# Patient Record
Sex: FEMALE | Race: WHITE | NOT HISPANIC OR LATINO | Employment: OTHER | URBAN - METROPOLITAN AREA
[De-identification: names, ages, dates, MRNs, and addresses within clinical notes are randomized per-mention and may not be internally consistent; named-entity substitution may affect disease eponyms.]

---

## 2017-01-25 ENCOUNTER — ALLSCRIPTS OFFICE VISIT (OUTPATIENT)
Dept: OTHER | Facility: OTHER | Age: 82
End: 2017-01-25

## 2017-03-13 ENCOUNTER — ALLSCRIPTS OFFICE VISIT (OUTPATIENT)
Dept: OTHER | Facility: OTHER | Age: 82
End: 2017-03-13

## 2017-03-13 DIAGNOSIS — E78.5 HYPERLIPIDEMIA: ICD-10-CM

## 2017-03-13 DIAGNOSIS — E74.39 OTHER DISORDERS OF INTESTINAL CARBOHYDRATE ABSORPTION: ICD-10-CM

## 2017-03-13 DIAGNOSIS — E55.9 VITAMIN D DEFICIENCY: ICD-10-CM

## 2017-03-13 DIAGNOSIS — I44.2 ATRIOVENTRICULAR BLOCK, COMPLETE (HCC): ICD-10-CM

## 2017-03-13 DIAGNOSIS — R55 SYNCOPE AND COLLAPSE: ICD-10-CM

## 2017-03-13 DIAGNOSIS — I21.3 ST ELEVATION (STEMI) MYOCARDIAL INFARCTION (HCC): ICD-10-CM

## 2017-03-13 DIAGNOSIS — I25.10 ATHEROSCLEROTIC HEART DISEASE OF NATIVE CORONARY ARTERY WITHOUT ANGINA PECTORIS: ICD-10-CM

## 2017-03-13 DIAGNOSIS — D50.9 IRON DEFICIENCY ANEMIA: ICD-10-CM

## 2017-03-13 DIAGNOSIS — I10 ESSENTIAL (PRIMARY) HYPERTENSION: ICD-10-CM

## 2017-03-20 ENCOUNTER — GENERIC CONVERSION - ENCOUNTER (OUTPATIENT)
Dept: OTHER | Facility: OTHER | Age: 82
End: 2017-03-20

## 2017-03-20 ENCOUNTER — APPOINTMENT (OUTPATIENT)
Dept: LAB | Facility: CLINIC | Age: 82
End: 2017-03-20
Payer: MEDICARE

## 2017-03-20 ENCOUNTER — TRANSCRIBE ORDERS (OUTPATIENT)
Dept: LAB | Facility: CLINIC | Age: 82
End: 2017-03-20

## 2017-03-20 DIAGNOSIS — E78.5 HYPERLIPIDEMIA: ICD-10-CM

## 2017-03-20 DIAGNOSIS — E74.39 OTHER DISORDERS OF INTESTINAL CARBOHYDRATE ABSORPTION: ICD-10-CM

## 2017-03-20 DIAGNOSIS — D50.9 IRON DEFICIENCY ANEMIA: ICD-10-CM

## 2017-03-20 DIAGNOSIS — E55.9 VITAMIN D DEFICIENCY: ICD-10-CM

## 2017-03-20 DIAGNOSIS — I10 ESSENTIAL (PRIMARY) HYPERTENSION: ICD-10-CM

## 2017-03-20 LAB
25(OH)D3 SERPL-MCNC: 27.8 NG/ML (ref 30–100)
ALBUMIN SERPL BCP-MCNC: 3.5 G/DL (ref 3.5–5)
ALP SERPL-CCNC: 61 U/L (ref 46–116)
ALT SERPL W P-5'-P-CCNC: 25 U/L (ref 12–78)
ANION GAP SERPL CALCULATED.3IONS-SCNC: 6 MMOL/L (ref 4–13)
AST SERPL W P-5'-P-CCNC: 14 U/L (ref 5–45)
BASOPHILS # BLD AUTO: 0.02 THOUSANDS/ΜL (ref 0–0.1)
BASOPHILS NFR BLD AUTO: 0 % (ref 0–1)
BILIRUB SERPL-MCNC: 0.78 MG/DL (ref 0.2–1)
BUN SERPL-MCNC: 17 MG/DL (ref 5–25)
CALCIUM SERPL-MCNC: 9.3 MG/DL (ref 8.3–10.1)
CHLORIDE SERPL-SCNC: 104 MMOL/L (ref 100–108)
CO2 SERPL-SCNC: 32 MMOL/L (ref 21–32)
CREAT SERPL-MCNC: 0.8 MG/DL (ref 0.6–1.3)
CREAT UR-MCNC: 69.2 MG/DL
EOSINOPHIL # BLD AUTO: 0.13 THOUSAND/ΜL (ref 0–0.61)
EOSINOPHIL NFR BLD AUTO: 2 % (ref 0–6)
ERYTHROCYTE [DISTWIDTH] IN BLOOD BY AUTOMATED COUNT: 14.4 % (ref 11.6–15.1)
EST. AVERAGE GLUCOSE BLD GHB EST-MCNC: 128 MG/DL
FERRITIN SERPL-MCNC: 51 NG/ML (ref 8–388)
GFR SERPL CREATININE-BSD FRML MDRD: >60 ML/MIN/1.73SQ M
GLUCOSE P FAST SERPL-MCNC: 131 MG/DL (ref 65–99)
HBA1C MFR BLD: 6.1 % (ref 4.2–6.3)
HCT VFR BLD AUTO: 48.2 % (ref 34.8–46.1)
HGB BLD-MCNC: 15.8 G/DL (ref 11.5–15.4)
IRON SERPL-MCNC: 119 UG/DL (ref 50–170)
LDLC SERPL DIRECT ASSAY-MCNC: 110 MG/DL (ref 0–100)
LYMPHOCYTES # BLD AUTO: 2.01 THOUSANDS/ΜL (ref 0.6–4.47)
LYMPHOCYTES NFR BLD AUTO: 37 % (ref 14–44)
MCH RBC QN AUTO: 29.6 PG (ref 26.8–34.3)
MCHC RBC AUTO-ENTMCNC: 32.8 G/DL (ref 31.4–37.4)
MCV RBC AUTO: 90 FL (ref 82–98)
MICROALBUMIN UR-MCNC: 24.4 MG/L (ref 0–20)
MICROALBUMIN/CREAT 24H UR: 35 MG/G CREATININE (ref 0–30)
MONOCYTES # BLD AUTO: 0.46 THOUSAND/ΜL (ref 0.17–1.22)
MONOCYTES NFR BLD AUTO: 9 % (ref 4–12)
NEUTROPHILS # BLD AUTO: 2.77 THOUSANDS/ΜL (ref 1.85–7.62)
NEUTS SEG NFR BLD AUTO: 52 % (ref 43–75)
NRBC BLD AUTO-RTO: 0 /100 WBCS
PLATELET # BLD AUTO: 331 THOUSANDS/UL (ref 149–390)
PMV BLD AUTO: 10.6 FL (ref 8.9–12.7)
POTASSIUM SERPL-SCNC: 4.3 MMOL/L (ref 3.5–5.3)
PROT SERPL-MCNC: 7.6 G/DL (ref 6.4–8.2)
RBC # BLD AUTO: 5.34 MILLION/UL (ref 3.81–5.12)
SODIUM SERPL-SCNC: 142 MMOL/L (ref 136–145)
TIBC SERPL-MCNC: 362 UG/DL (ref 250–450)
TRIGL SERPL-MCNC: 90 MG/DL
TSH SERPL DL<=0.05 MIU/L-ACNC: 1.07 UIU/ML (ref 0.36–3.74)
WBC # BLD AUTO: 5.4 THOUSAND/UL (ref 4.31–10.16)

## 2017-03-20 PROCEDURE — 80053 COMPREHEN METABOLIC PANEL: CPT

## 2017-03-20 PROCEDURE — 83540 ASSAY OF IRON: CPT

## 2017-03-20 PROCEDURE — 82728 ASSAY OF FERRITIN: CPT

## 2017-03-20 PROCEDURE — 84478 ASSAY OF TRIGLYCERIDES: CPT

## 2017-03-20 PROCEDURE — 84443 ASSAY THYROID STIM HORMONE: CPT

## 2017-03-20 PROCEDURE — 83036 HEMOGLOBIN GLYCOSYLATED A1C: CPT

## 2017-03-20 PROCEDURE — 82306 VITAMIN D 25 HYDROXY: CPT

## 2017-03-20 PROCEDURE — 82570 ASSAY OF URINE CREATININE: CPT

## 2017-03-20 PROCEDURE — 83550 IRON BINDING TEST: CPT

## 2017-03-20 PROCEDURE — 82043 UR ALBUMIN QUANTITATIVE: CPT

## 2017-03-20 PROCEDURE — 85025 COMPLETE CBC W/AUTO DIFF WBC: CPT

## 2017-03-20 PROCEDURE — 36415 COLL VENOUS BLD VENIPUNCTURE: CPT

## 2017-03-20 PROCEDURE — 83721 ASSAY OF BLOOD LIPOPROTEIN: CPT

## 2017-03-21 ENCOUNTER — GENERIC CONVERSION - ENCOUNTER (OUTPATIENT)
Dept: OTHER | Facility: OTHER | Age: 82
End: 2017-03-21

## 2017-04-07 ENCOUNTER — GENERIC CONVERSION - ENCOUNTER (OUTPATIENT)
Dept: OTHER | Facility: OTHER | Age: 82
End: 2017-04-07

## 2017-04-07 ENCOUNTER — APPOINTMENT (OUTPATIENT)
Dept: LAB | Facility: CLINIC | Age: 82
End: 2017-04-07
Payer: MEDICARE

## 2017-04-07 ENCOUNTER — ALLSCRIPTS OFFICE VISIT (OUTPATIENT)
Dept: OTHER | Facility: OTHER | Age: 82
End: 2017-04-07

## 2017-04-07 ENCOUNTER — TRANSCRIBE ORDERS (OUTPATIENT)
Dept: LAB | Facility: CLINIC | Age: 82
End: 2017-04-07

## 2017-04-07 DIAGNOSIS — I21.11 ST ELEVATION MYOCARDIAL INFARCTION INVOLVING RIGHT CORONARY ARTERY (HCC): Primary | ICD-10-CM

## 2017-04-07 DIAGNOSIS — I10 ESSENTIAL (PRIMARY) HYPERTENSION: ICD-10-CM

## 2017-04-07 DIAGNOSIS — I21.3 ST ELEVATION (STEMI) MYOCARDIAL INFARCTION (HCC): ICD-10-CM

## 2017-04-07 DIAGNOSIS — I25.10 ATHEROSCLEROTIC HEART DISEASE OF NATIVE CORONARY ARTERY WITHOUT ANGINA PECTORIS: ICD-10-CM

## 2017-04-07 DIAGNOSIS — R55 SYNCOPE AND COLLAPSE: ICD-10-CM

## 2017-04-07 DIAGNOSIS — I10 ESSENTIAL HYPERTENSION, BENIGN: ICD-10-CM

## 2017-04-07 DIAGNOSIS — I44.2 ATRIOVENTRICULAR BLOCK, COMPLETE (HCC): ICD-10-CM

## 2017-04-07 LAB
ALBUMIN SERPL BCP-MCNC: 3.2 G/DL (ref 3.5–5)
ALP SERPL-CCNC: 58 U/L (ref 46–116)
ALT SERPL W P-5'-P-CCNC: 29 U/L (ref 12–78)
ANION GAP SERPL CALCULATED.3IONS-SCNC: 3 MMOL/L (ref 4–13)
AST SERPL W P-5'-P-CCNC: 14 U/L (ref 5–45)
BASOPHILS # BLD AUTO: 0.03 THOUSANDS/ΜL (ref 0–0.1)
BASOPHILS NFR BLD AUTO: 0 % (ref 0–1)
BILIRUB SERPL-MCNC: 0.62 MG/DL (ref 0.2–1)
BUN SERPL-MCNC: 11 MG/DL (ref 5–25)
CALCIUM SERPL-MCNC: 9 MG/DL (ref 8.3–10.1)
CHLORIDE SERPL-SCNC: 102 MMOL/L (ref 100–108)
CO2 SERPL-SCNC: 33 MMOL/L (ref 21–32)
CREAT SERPL-MCNC: 0.83 MG/DL (ref 0.6–1.3)
EOSINOPHIL # BLD AUTO: 0.12 THOUSAND/ΜL (ref 0–0.61)
EOSINOPHIL NFR BLD AUTO: 2 % (ref 0–6)
ERYTHROCYTE [DISTWIDTH] IN BLOOD BY AUTOMATED COUNT: 14.1 % (ref 11.6–15.1)
GFR SERPL CREATININE-BSD FRML MDRD: >60 ML/MIN/1.73SQ M
GLUCOSE SERPL-MCNC: 105 MG/DL (ref 65–140)
HCT VFR BLD AUTO: 43.2 % (ref 34.8–46.1)
HGB BLD-MCNC: 13.8 G/DL (ref 11.5–15.4)
LYMPHOCYTES # BLD AUTO: 1.61 THOUSANDS/ΜL (ref 0.6–4.47)
LYMPHOCYTES NFR BLD AUTO: 21 % (ref 14–44)
MCH RBC QN AUTO: 28.7 PG (ref 26.8–34.3)
MCHC RBC AUTO-ENTMCNC: 31.9 G/DL (ref 31.4–37.4)
MCV RBC AUTO: 90 FL (ref 82–98)
MONOCYTES # BLD AUTO: 0.89 THOUSAND/ΜL (ref 0.17–1.22)
MONOCYTES NFR BLD AUTO: 11 % (ref 4–12)
NEUTROPHILS # BLD AUTO: 5.16 THOUSANDS/ΜL (ref 1.85–7.62)
NEUTS SEG NFR BLD AUTO: 66 % (ref 43–75)
NRBC BLD AUTO-RTO: 0 /100 WBCS
PLATELET # BLD AUTO: 412 THOUSANDS/UL (ref 149–390)
PMV BLD AUTO: 9.7 FL (ref 8.9–12.7)
POTASSIUM SERPL-SCNC: 4.2 MMOL/L (ref 3.5–5.3)
PROT SERPL-MCNC: 7.2 G/DL (ref 6.4–8.2)
RBC # BLD AUTO: 4.81 MILLION/UL (ref 3.81–5.12)
SODIUM SERPL-SCNC: 138 MMOL/L (ref 136–145)
WBC # BLD AUTO: 7.83 THOUSAND/UL (ref 4.31–10.16)

## 2017-04-07 PROCEDURE — 36415 COLL VENOUS BLD VENIPUNCTURE: CPT

## 2017-04-07 PROCEDURE — 85025 COMPLETE CBC W/AUTO DIFF WBC: CPT

## 2017-04-07 PROCEDURE — 80053 COMPREHEN METABOLIC PANEL: CPT

## 2017-05-03 ENCOUNTER — ALLSCRIPTS OFFICE VISIT (OUTPATIENT)
Dept: OTHER | Facility: OTHER | Age: 82
End: 2017-05-03

## 2017-05-20 ENCOUNTER — GENERIC CONVERSION - ENCOUNTER (OUTPATIENT)
Dept: OTHER | Facility: OTHER | Age: 82
End: 2017-05-20

## 2017-05-22 ENCOUNTER — GENERIC CONVERSION - ENCOUNTER (OUTPATIENT)
Dept: OTHER | Facility: OTHER | Age: 82
End: 2017-05-22

## 2017-05-31 ENCOUNTER — APPOINTMENT (OUTPATIENT)
Dept: LAB | Facility: CLINIC | Age: 82
End: 2017-05-31
Payer: MEDICARE

## 2017-05-31 ENCOUNTER — ALLSCRIPTS OFFICE VISIT (OUTPATIENT)
Dept: OTHER | Facility: OTHER | Age: 82
End: 2017-05-31

## 2017-05-31 DIAGNOSIS — R74.8 ABNORMAL LEVELS OF OTHER SERUM ENZYMES: ICD-10-CM

## 2017-05-31 DIAGNOSIS — J18.9 PNEUMONIA: ICD-10-CM

## 2017-05-31 DIAGNOSIS — R55 SYNCOPE AND COLLAPSE: ICD-10-CM

## 2017-05-31 DIAGNOSIS — N39.0 URINARY TRACT INFECTION: ICD-10-CM

## 2017-05-31 DIAGNOSIS — R79.89 OTHER SPECIFIED ABNORMAL FINDINGS OF BLOOD CHEMISTRY: ICD-10-CM

## 2017-05-31 DIAGNOSIS — R26.9 ABNORMALITY OF GAIT AND MOBILITY: ICD-10-CM

## 2017-05-31 LAB
ALBUMIN SERPL BCP-MCNC: 3.6 G/DL (ref 3.5–5)
ALP SERPL-CCNC: 49 U/L (ref 46–116)
ALT SERPL W P-5'-P-CCNC: 27 U/L (ref 12–78)
ANION GAP SERPL CALCULATED.3IONS-SCNC: 5 MMOL/L (ref 4–13)
AST SERPL W P-5'-P-CCNC: 18 U/L (ref 5–45)
BASOPHILS # BLD AUTO: 0.03 THOUSANDS/ΜL (ref 0–0.1)
BASOPHILS NFR BLD AUTO: 0 % (ref 0–1)
BILIRUB SERPL-MCNC: 0.81 MG/DL (ref 0.2–1)
BUN SERPL-MCNC: 9 MG/DL (ref 5–25)
CALCIUM SERPL-MCNC: 10.1 MG/DL (ref 8.3–10.1)
CHLORIDE SERPL-SCNC: 103 MMOL/L (ref 100–108)
CO2 SERPL-SCNC: 32 MMOL/L (ref 21–32)
CREAT SERPL-MCNC: 0.8 MG/DL (ref 0.6–1.3)
DEPRECATED D DIMER PPP: 498 NG/ML (FEU) (ref 0–424)
EOSINOPHIL # BLD AUTO: 0.04 THOUSAND/ΜL (ref 0–0.61)
EOSINOPHIL NFR BLD AUTO: 1 % (ref 0–6)
ERYTHROCYTE [DISTWIDTH] IN BLOOD BY AUTOMATED COUNT: 14.7 % (ref 11.6–15.1)
ERYTHROCYTE [SEDIMENTATION RATE] IN BLOOD: 9 MM/HOUR (ref 0–20)
GFR SERPL CREATININE-BSD FRML MDRD: >60 ML/MIN/1.73SQ M
GLUCOSE SERPL-MCNC: 139 MG/DL (ref 65–140)
HCT VFR BLD AUTO: 47.3 % (ref 34.8–46.1)
HGB BLD-MCNC: 15.6 G/DL (ref 11.5–15.4)
LYMPHOCYTES # BLD AUTO: 1.88 THOUSANDS/ΜL (ref 0.6–4.47)
LYMPHOCYTES NFR BLD AUTO: 22 % (ref 14–44)
MCH RBC QN AUTO: 29.6 PG (ref 26.8–34.3)
MCHC RBC AUTO-ENTMCNC: 33 G/DL (ref 31.4–37.4)
MCV RBC AUTO: 90 FL (ref 82–98)
MONOCYTES # BLD AUTO: 0.84 THOUSAND/ΜL (ref 0.17–1.22)
MONOCYTES NFR BLD AUTO: 10 % (ref 4–12)
NEUTROPHILS # BLD AUTO: 5.75 THOUSANDS/ΜL (ref 1.85–7.62)
NEUTS SEG NFR BLD AUTO: 67 % (ref 43–75)
NRBC BLD AUTO-RTO: 0 /100 WBCS
PLATELET # BLD AUTO: 404 THOUSANDS/UL (ref 149–390)
PMV BLD AUTO: 10 FL (ref 8.9–12.7)
POTASSIUM SERPL-SCNC: 3.9 MMOL/L (ref 3.5–5.3)
PROT SERPL-MCNC: 8.7 G/DL (ref 6.4–8.2)
RBC # BLD AUTO: 5.27 MILLION/UL (ref 3.81–5.12)
SODIUM SERPL-SCNC: 140 MMOL/L (ref 136–145)
WBC # BLD AUTO: 8.55 THOUSAND/UL (ref 4.31–10.16)

## 2017-05-31 PROCEDURE — 85379 FIBRIN DEGRADATION QUANT: CPT

## 2017-05-31 PROCEDURE — 36415 COLL VENOUS BLD VENIPUNCTURE: CPT

## 2017-05-31 PROCEDURE — 85025 COMPLETE CBC W/AUTO DIFF WBC: CPT

## 2017-05-31 PROCEDURE — 80053 COMPREHEN METABOLIC PANEL: CPT

## 2017-05-31 PROCEDURE — 85652 RBC SED RATE AUTOMATED: CPT

## 2017-06-01 ENCOUNTER — GENERIC CONVERSION - ENCOUNTER (OUTPATIENT)
Dept: OTHER | Facility: OTHER | Age: 82
End: 2017-06-01

## 2017-06-05 ENCOUNTER — GENERIC CONVERSION - ENCOUNTER (OUTPATIENT)
Dept: OTHER | Facility: OTHER | Age: 82
End: 2017-06-05

## 2017-06-27 ENCOUNTER — ALLSCRIPTS OFFICE VISIT (OUTPATIENT)
Dept: OTHER | Facility: OTHER | Age: 82
End: 2017-06-27

## 2017-06-28 ENCOUNTER — GENERIC CONVERSION - ENCOUNTER (OUTPATIENT)
Dept: OTHER | Facility: OTHER | Age: 82
End: 2017-06-28

## 2017-08-08 ENCOUNTER — ALLSCRIPTS OFFICE VISIT (OUTPATIENT)
Dept: OTHER | Facility: OTHER | Age: 82
End: 2017-08-08

## 2017-08-21 ENCOUNTER — GENERIC CONVERSION - ENCOUNTER (OUTPATIENT)
Dept: OTHER | Facility: OTHER | Age: 82
End: 2017-08-21

## 2017-09-26 ENCOUNTER — ALLSCRIPTS OFFICE VISIT (OUTPATIENT)
Dept: OTHER | Facility: OTHER | Age: 82
End: 2017-09-26

## 2017-09-26 ENCOUNTER — GENERIC CONVERSION - ENCOUNTER (OUTPATIENT)
Dept: OTHER | Facility: OTHER | Age: 82
End: 2017-09-26

## 2017-09-26 DIAGNOSIS — E78.5 HYPERLIPIDEMIA: ICD-10-CM

## 2017-09-26 DIAGNOSIS — D50.9 IRON DEFICIENCY ANEMIA: ICD-10-CM

## 2017-09-26 DIAGNOSIS — R11.0 NAUSEA: ICD-10-CM

## 2017-09-26 DIAGNOSIS — E74.39 OTHER DISORDERS OF INTESTINAL CARBOHYDRATE ABSORPTION: ICD-10-CM

## 2017-09-26 DIAGNOSIS — I10 ESSENTIAL (PRIMARY) HYPERTENSION: ICD-10-CM

## 2017-09-28 ENCOUNTER — GENERIC CONVERSION - ENCOUNTER (OUTPATIENT)
Dept: OTHER | Facility: OTHER | Age: 82
End: 2017-09-28

## 2017-10-03 ENCOUNTER — ALLSCRIPTS OFFICE VISIT (OUTPATIENT)
Dept: OTHER | Facility: OTHER | Age: 82
End: 2017-10-03

## 2017-10-10 ENCOUNTER — GENERIC CONVERSION - ENCOUNTER (OUTPATIENT)
Dept: OTHER | Facility: OTHER | Age: 82
End: 2017-10-10

## 2017-10-18 ENCOUNTER — APPOINTMENT (OUTPATIENT)
Dept: LAB | Facility: CLINIC | Age: 82
End: 2017-10-18
Payer: MEDICARE

## 2017-10-18 ENCOUNTER — TRANSCRIBE ORDERS (OUTPATIENT)
Dept: LAB | Facility: CLINIC | Age: 82
End: 2017-10-18

## 2017-10-18 DIAGNOSIS — E78.5 HYPERLIPIDEMIA: ICD-10-CM

## 2017-10-18 DIAGNOSIS — E74.39 OTHER DISORDERS OF INTESTINAL CARBOHYDRATE ABSORPTION: ICD-10-CM

## 2017-10-18 DIAGNOSIS — D50.9 IRON DEFICIENCY ANEMIA: ICD-10-CM

## 2017-10-18 DIAGNOSIS — I10 ESSENTIAL (PRIMARY) HYPERTENSION: ICD-10-CM

## 2017-10-18 DIAGNOSIS — R11.0 NAUSEA: ICD-10-CM

## 2017-10-18 LAB
ALBUMIN SERPL BCP-MCNC: 3.3 G/DL (ref 3.5–5)
ALP SERPL-CCNC: 53 U/L (ref 46–116)
ALT SERPL W P-5'-P-CCNC: 22 U/L (ref 12–78)
AMYLASE SERPL-CCNC: 37 IU/L (ref 25–115)
ANION GAP SERPL CALCULATED.3IONS-SCNC: 6 MMOL/L (ref 4–13)
AST SERPL W P-5'-P-CCNC: 23 U/L (ref 5–45)
BASOPHILS # BLD AUTO: 0.02 THOUSANDS/ΜL (ref 0–0.1)
BASOPHILS NFR BLD AUTO: 0 % (ref 0–1)
BILIRUB SERPL-MCNC: 0.94 MG/DL (ref 0.2–1)
BUN SERPL-MCNC: 11 MG/DL (ref 5–25)
CALCIUM SERPL-MCNC: 8.8 MG/DL (ref 8.3–10.1)
CHLORIDE SERPL-SCNC: 104 MMOL/L (ref 100–108)
CHOLEST SERPL-MCNC: 120 MG/DL (ref 50–200)
CO2 SERPL-SCNC: 30 MMOL/L (ref 21–32)
CREAT SERPL-MCNC: 0.8 MG/DL (ref 0.6–1.3)
EOSINOPHIL # BLD AUTO: 0.13 THOUSAND/ΜL (ref 0–0.61)
EOSINOPHIL NFR BLD AUTO: 2 % (ref 0–6)
ERYTHROCYTE [DISTWIDTH] IN BLOOD BY AUTOMATED COUNT: 14.5 % (ref 11.6–15.1)
EST. AVERAGE GLUCOSE BLD GHB EST-MCNC: 131 MG/DL
FERRITIN SERPL-MCNC: 33 NG/ML (ref 8–388)
GFR SERPL CREATININE-BSD FRML MDRD: 64 ML/MIN/1.73SQ M
GLUCOSE P FAST SERPL-MCNC: 109 MG/DL (ref 65–99)
HBA1C MFR BLD: 6.2 % (ref 4.2–6.3)
HCT VFR BLD AUTO: 46 % (ref 34.8–46.1)
HDLC SERPL-MCNC: 63 MG/DL (ref 40–60)
HGB BLD-MCNC: 15.3 G/DL (ref 11.5–15.4)
IRON SERPL-MCNC: 91 UG/DL (ref 50–170)
LDLC SERPL CALC-MCNC: 43 MG/DL (ref 0–100)
LIPASE SERPL-CCNC: 131 U/L (ref 73–393)
LYMPHOCYTES # BLD AUTO: 2.15 THOUSANDS/ΜL (ref 0.6–4.47)
LYMPHOCYTES NFR BLD AUTO: 38 % (ref 14–44)
MAGNESIUM SERPL-MCNC: 2.2 MG/DL (ref 1.6–2.6)
MCH RBC QN AUTO: 29.4 PG (ref 26.8–34.3)
MCHC RBC AUTO-ENTMCNC: 33.3 G/DL (ref 31.4–37.4)
MCV RBC AUTO: 89 FL (ref 82–98)
MONOCYTES # BLD AUTO: 0.53 THOUSAND/ΜL (ref 0.17–1.22)
MONOCYTES NFR BLD AUTO: 9 % (ref 4–12)
NEUTROPHILS # BLD AUTO: 2.78 THOUSANDS/ΜL (ref 1.85–7.62)
NEUTS SEG NFR BLD AUTO: 51 % (ref 43–75)
NRBC BLD AUTO-RTO: 0 /100 WBCS
PLATELET # BLD AUTO: 331 THOUSANDS/UL (ref 149–390)
PMV BLD AUTO: 10 FL (ref 8.9–12.7)
POTASSIUM SERPL-SCNC: 3.7 MMOL/L (ref 3.5–5.3)
PROT SERPL-MCNC: 8 G/DL (ref 6.4–8.2)
RBC # BLD AUTO: 5.2 MILLION/UL (ref 3.81–5.12)
SODIUM SERPL-SCNC: 140 MMOL/L (ref 136–145)
TIBC SERPL-MCNC: 348 UG/DL (ref 250–450)
TRIGL SERPL-MCNC: 72 MG/DL
TSH SERPL DL<=0.05 MIU/L-ACNC: 0.64 UIU/ML (ref 0.36–3.74)
WBC # BLD AUTO: 5.61 THOUSAND/UL (ref 4.31–10.16)

## 2017-10-18 PROCEDURE — 84443 ASSAY THYROID STIM HORMONE: CPT

## 2017-10-18 PROCEDURE — 85025 COMPLETE CBC W/AUTO DIFF WBC: CPT

## 2017-10-18 PROCEDURE — 80053 COMPREHEN METABOLIC PANEL: CPT

## 2017-10-18 PROCEDURE — 83550 IRON BINDING TEST: CPT

## 2017-10-18 PROCEDURE — 83540 ASSAY OF IRON: CPT

## 2017-10-18 PROCEDURE — 80061 LIPID PANEL: CPT

## 2017-10-18 PROCEDURE — 82150 ASSAY OF AMYLASE: CPT

## 2017-10-18 PROCEDURE — 83036 HEMOGLOBIN GLYCOSYLATED A1C: CPT

## 2017-10-18 PROCEDURE — 36415 COLL VENOUS BLD VENIPUNCTURE: CPT

## 2017-10-18 PROCEDURE — 82728 ASSAY OF FERRITIN: CPT

## 2017-10-18 PROCEDURE — 83735 ASSAY OF MAGNESIUM: CPT

## 2017-10-18 PROCEDURE — 83690 ASSAY OF LIPASE: CPT

## 2017-10-19 ENCOUNTER — GENERIC CONVERSION - ENCOUNTER (OUTPATIENT)
Dept: OTHER | Facility: OTHER | Age: 82
End: 2017-10-19

## 2017-10-24 ENCOUNTER — ALLSCRIPTS OFFICE VISIT (OUTPATIENT)
Dept: OTHER | Facility: OTHER | Age: 82
End: 2017-10-24

## 2017-12-27 ENCOUNTER — GENERIC CONVERSION - ENCOUNTER (OUTPATIENT)
Dept: FAMILY MEDICINE CLINIC | Facility: CLINIC | Age: 82
End: 2017-12-27

## 2018-01-09 NOTE — PROGRESS NOTES
Assessment    1  Medicare annual wellness visit, subsequent (V70 0) (Z00 00)   2  Allergic rhinitis (477 9) (J30 9)   3  Hypertension (401 9) (I10)   4  Pulmonary vascular congestion (514) (R09 89)    Plan  Allergic rhinitis    · Fexofenadine HCl - 180 MG Oral Tablet; TAKE 1 TABLET DAILY AS NEEDED FOR  ALLERGIES   · Fluticasone Propionate 50 MCG/ACT Nasal Suspension; USE 2 SPRAYS IN EACH  NOSTRIL ONCE DAILY  Generalized anxiety disorder    · LORazepam 0 5 MG Oral Tablet; take 1 tablet by mouth every 8 hours if needed  Pulmonary vascular congestion    · Furosemide 20 MG Oral Tablet; take 1/2 tablet by mouth once daily    Discussion/Summary  Impression: Subsequent Annual Wellness Visit  Cardiovascular screening and counseling: screening is current  Diabetes screening and counseling: screening is current  Colorectal cancer screening and counseling: screening is current  Breast cancer screening and counseling: screening is current  Osteoporosis screening and counseling: screening is current  Abdominal aortic aneurysm screening and counseling: screening not indicated  Glaucoma screening and counseling: screening is current  HIV screening and counseling: screening not indicated  Immunizations: influenza vaccine is up to date this year  Advance Directive Planning: complete and up to date  Advice and education were given regarding fall risk reduction, increasing physical activity, nutrition (non-diabetic) and seat belt use  She was referred to cardiology  Medical Equipment/Suppliers: none  Patient Discussion: plan discussed with the patient, follow-up visit needed in one year, follow-up as needed  Self Referrals: No      History of Present Illness  WF presents for Medicare Wellness  The patient is being seen for the subsequent annual wellness visit  Medicare Screening and Risk Factors   Hospitalizations: she has been previously hospitalizied    Medicare Screening Tests Risk Questions   Abdominal aortic aneurysm risk assessment: none indicated  Osteoporosis risk assessment:  and female gender  HIV risk assessment: none indicated  Drug and Alcohol Use: The patient is a former cigarette smoker  The patient reports occasional alcohol use  Alcohol concern:   The patient has no concerns about alcohol abuse  She has never used illicit drugs  Diet and Physical Activity: Current diet includes frequent junk food, 1 servings of fruit per day, 1 servings of meat per day, 1 servings of whole grains per day and water  She exercises infrequently  Mood Disorder and Cognitive Impairment Screening: She reports feeling down, depressed, or hopeless over the past two weeks  She reports feeling little interest or pleasure in doing things over the past two weeks  Cognitive impairment screening: denies difficulty learning/retaining new information, denies difficulty handling complex tasks, denies difficulty with reasoning, denies difficulty with spatial ability and orientation, denies difficulty with language and denies difficulty with behavior  Functional Ability/Level of Safety: Hearing is normal bilaterally  She denies hearing difficulties  She does not use a hearing aid  The patient is currently able to do activities of daily living with limitations  Activities of daily living details: transportation help needed, but does not need help using the phone, does not need help shopping, no meal preparation help needed, does not need help doing housework, does not need help doing laundry, does not need help managing medications and does not need help managing money  Fall risk factors: The patient fell 0 times in the past 12 months  Home safety risk factors:  loose rugs, but no unfamiliar surroundings, no poor household lighting, no uneven floors, no household clutter, grab bars in the bathroom and handrails on the stairs  Advance Directives: Advance directives: living will and advance directives  Co-Managers and Medical Equipment/Suppliers: See Patient Care Team     Last Medicare Wellness Visit Information was reviewed, patient interviewed and updates made to the record today  Falls Risk: The patient fell 0 times in the past 12 months  The patient is currently asymptomatic Symptoms Include: The patient currently has no urinary incontinence symptoms  Patient Care Team    Care Team Member Role Specialty Office Number   Dom Quick MD  General Surgery [phone]     Active Problems    1  Abnormal imaging of thyroid (794 5) (R94 6)   2  Acute congestive heart failure (428 0) (I50 9)   3  Allergic rhinitis (477 9) (J30 9)   4  Asymptomatic menopausal state (V49 81) (Z78 0)   5  Benign colon polyp (211 3) (K63 5)   6  Benign paroxysmal vertigo, unspecified laterality (386 11) (H81 10)   7  Cataract (366 9) (H26 9)   8  Chronic obstructive pulmonary disease with acute exacerbation (491 21) (J44 1)   9  Compression fracture of thoracic vertebra, closed, initial encounter (805 2) (S22 000A)   10  Constipation (564 00) (K59 00)   11  Coronary artery disease (414 00) (I25 10)   12  Diverticulosis (562 10) (K57 90)   13  Dizziness (780 4) (R42)   14  Fibrocystic breast disease, unspecified laterality (610 1) (N60 19)   15  Gait abnormality (781 2) (R26 9)   16  Generalized anxiety disorder (300 02) (F41 1)   17  GERD without esophagitis (530 81) (K21 9)   18  Glucose intolerance (no malabsorption) (271 3) (E74 39)   19  Hyperlipidemia (272 4) (E78 5)   20  Hypertension (401 9) (I10)   21  Iron deficiency anemia (280 9) (D50 9)   22  Malignant melanoma of skin (172 9) (C43 9)   23  Nausea (787 02) (R11 0)   24  Need for influenza vaccination (V04 81) (Z23)   25  Non-ST elevation myocardial infarction (NSTEMI), type 2 (410 70) (I21 4)   26  Osteoarthritis (715 90) (M19 90)   27  Osteoporosis (733 00) (M81 0)   28  Palpitations (785 1) (R00 2)   29  Proteinuria (791 0) (R80 9)   30   Pulmonary vascular congestion (514) (R09 89)   31  Screening for depression (V79 0) (Z13 89)   32  Screening for genitourinary condition (V81 6) (Z13 89)   33  Screening for neurological condition (V80 09) (Z13 89)   34  Urinary incontinence (788 30) (R32)   35  Vitamin D deficiency (268 9) (E55 9)    Past Medical History    1  History of Abnormal blood chemistry (790 6) (R79 9)   2  History of Abnormal blood sugar (790 29) (R73 09)   3  History of Abnormal blood sugar (790 29) (R73 09)   4  History of Acute frontal sinusitis (461 1) (J01 10)   5  History of Acute myocardial infarction (410 90) (I21 9)   6  History of Acute otitis media, unspecified laterality   7  History of Atypical chest pain (786 59) (R07 89)   8  History of Bleeding hemorrhoids (455 8) (K64 9)   9  History of CAP (community acquired pneumonia) (5) (J18 9)   10  History of Cervical radiculopathy (723 4) (M54 12)   11  History of Costochondritis (733 6) (M94 0)   12  History of Cough (786 2) (R05)   13  History of Elevated d-dimer (790 92) (R79 89)   14  History of Elevated troponin level (790 6) (R74 8)   15  History of abnormal weight loss (V13 89) (Z87 898)   16  History of acute sinusitis (V12 69) (Z87 09)   17  History of dermatitis (V13 3) (Z87 2)   18  History of dizziness (V13 89) (Z87 898)   19  History of pneumococcal vaccination (V49 89) (Z92 29)   20  History of pneumonia (V12 61) (Z87 01)   21  History of pneumonia (V12 61) (Z87 01)   22  History of syncope (V15 89) (Z87 898)   23  History of upper respiratory infection (V12 09) (Z87 09)   24  History of urinary tract infection (V13 02) (Z87 440)   25  History of Medicare annual wellness visit, initial (V70 0) (Z00 00)   26  History of Mental status change (780 97) (R41 82)   27  History of Microscopic hematuria (599 72) (R31 29)   28  History of Near syncope (780 2) (R55)   29  History of Need for influenza vaccination (V04 81) (Z23)   30   History of Short of breath on exertion (786 05) (R06 02) 31  History of 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426 0) (I44 2)    Surgical History    1  History of Breast Surgery Lumpectomy   2  History of Cardiac Cath Procedure Outcome: Successful   3  History of Cardio-Vascular Agents Drug-Eluting Stent   4  History of Cholecystectomy   5  History of Hemorrhoidectomy    Family History  Mother    1  Family history of Congestive Heart Failure  Father    2  Family history of Prostate Cancer (V16 42)  Family History    3  Family history of Stroke Syndrome (V17 1)    Social History    · Former smoker (A40 09) (H89 836)   · No alcohol use   · No caffeine use   · No illicit drug use   · Uses Safety Equipment - Seatbelts    Current Meds   1  Aspirin EC 81 MG Oral Tablet Delayed Release; Take one tablet once daily; Therapy: 13NYJ3352 to (Evaluate:07Apr2013); Last DF:09PBQ4318 Ordered   2  Atorvastatin Calcium 40 MG Oral Tablet; TAKE 1 TABLET DAILY; Therapy: 70Fbs3211 to (Evaluate:13Mar2018)  Requested for: 75Zny6051; Last   Rx:79Zed9339 Ordered   3  Benzonatate 200 MG Oral Capsule; TAKE 1 CAPSULE 3 TIMES DAILY AS NEEDED; Therapy: 07XHR0425 to ([phone])  Requested for: 22AOQ0509; Last   Rx:98Ypc5739 Ordered   4  Calcium Carbonate-Vitamin D 600-125 MG-UNIT TABS; Take 2 twice daily; Therapy: 65TVE6433 to (Last MW:52HHY6166) Ordered   5  Clopidogrel Bisulfate 75 MG Oral Tablet; TAKE 1 TABLET DAILY; Therapy: 68Xxe2518 to (Evaluate:13Mar2018)  Requested for: 49Dpm5394; Last   Rx:80Ued3158 Ordered   6  Ferrous Sulfate 325 (65 Fe) MG Oral Tablet; TAKE 1 TABLET DAILY AS DIRECTED; Therapy: 48NRA2333 to (Evaluate:12Mar2017)  Requested for: 35PPS0505; Last   Rx:17Mar2016 Ordered   7  Furosemide 20 MG Oral Tablet; TAKE 1 TABLET BY MOUTH ONCE DAILY; Therapy: 62QUB6509 to ()  Requested for: 41YQU7062; Last   Rx:16Qhe0859 Ordered   8  LORazepam 0 5 MG Oral Tablet; take 1 tablet by mouth every 8 hours if needed;    Therapy: 86AJN5219 to (Evaluate:43Vtf1415) Requested for: 00Czu7329; Last   Rx:11Poz1550 Ordered   9  Meclizine HCl - 25 MG Oral Tablet; TAKE 1 TABLET 3 TIMES DAILY AS NEEDED; Therapy: 78ERZ6317 to (Last Rx:78Dob8358)  Requested for: 26JNW1836 Ordered   10  Metoprolol Tartrate 25 MG Oral Tablet; Take half a tab bid; Therapy: 01Ijg0494 to (Evaluate:14Kqh5946)  Requested for: 55HRE2035 Recorded   11  Multiple Vitamins Oral Tablet; Take 1 daily; Therapy: 96ARL2955 to (Last GP:51JHP5423) Ordered   12  OneTouch Verio In [employer]; USE 1 STRIP DAILY; Therapy: 38UHM1330 to (Laura Lopezbury)  Requested for: 80VBK7521; Last    Rx:86Xuv9407 Ordered   13  Polyethylene Glycol 3350 Oral Powder; MIX 17 GRAMS IN 8 OUNCES OF LIQUID AND    DRINK TWICE DAILY AS NEEDED; Therapy: 22XZB8534 to (Evaluate:94Ghn4227)  Requested for: 94Fll9830; Last    Rx:43Kpz6146 Ordered   14  Prochlorperazine Maleate 5 MG Oral Tablet; TAKE 1 TABLET EVERY 4 TO 6 HOURS AS    NEEDED FOR NAUSEA; Therapy: 38Kaw5016 to (Evaluate:93Ivn6890)  Requested for: 60Ker5330; Last    Rx:37Myl7243 Ordered   15  RaNITidine HCl - 150 MG Oral Capsule; Take 1 capsule twice daily; Therapy: 15PEL4232 to ([id number])  Requested for: 97WVS2104; Last    Rx:62Cht9678 Ordered   16  RaNITidine HCl - 150 MG Oral Tablet; take 1 tablet by mouth twice a day; Therapy: 36QWO6244 to (Evaluate:79Bji9261)  Requested for: 76ULN9761; Last    Rx:74Hzp9413 Ordered    Allergies    1  Ciprofloxacin-Ciproflox HCl ER TB24   2  Macrodantin CAPS   3  Penicillins    Immunizations  Influenza --- Eulogio Young: 91-Cyy-4455Kfhnyd Ashin80-Lyj-7550Vyog Sánchez: 2014; Series4:  14-Sep-2015; Series5: 12-Sep-2016   PCV --- Series1: 13-Mar-2017   PPSV --- Series1: ;  Series2: 2009     Vitals  Signs   Recorded: 34IRQ7924 02:52PM   Temperature: 97 6 F, Tympanic  Heart Rate: 82  Respiration: 18  Systolic: 883, Sitting  Diastolic: 78, Sitting  Height: 5 ft 2 in  Weight: 164 lb 8 0 oz  BMI Calculated: 30 09  BSA Calculated: 1 76  O2 Saturation: 92    Results/Data  PHQ-9 Adult Depression Screening 08RJB9972 03:01PM User, Papo     Test Name Result Flag Reference   PHQ-9 Adult Depression Score 3     Over the last two weeks, how often have you been bothered by any of the following problems? Little interest or pleasure in doing things: Several days - 1  Feeling down, depressed, or hopeless: Several days - 1  Trouble falling or staying asleep, or sleeping too much: Not at all - 0  Feeling tired or having little energy: Not at all - 0  Poor appetite or over eating: Not at all - 0  Feeling bad about yourself - or that you are a failure or have let yourself or your family down: Several days - 1  Trouble concentrating on things, such as reading the newspaper or watching television: Not at all - 0  Moving or speaking so slowly that other people could have noticed  Or the opposite -  being so fidgety or restless that you have been moving around a lot more than usual: Not at all - 0  Thoughts that you would be better off dead, or of hurting yourself in some way: Not at all - 0   PHQ-9 Adult Depression Screening Negative     PHQ-9 Difficulty Level Not difficult at all     PHQ-9 Severity Minimal Depression         Health Management  Benign colon polyp   COLONOSCOPY; every 5 years; Last 04Apr2016; Next Due: 11GVL1307; Active  Fibrocystic breast disease, unspecified laterality   Digital Bilateral Screening Mammogram With CAD; every 2 years; Last 86OJK9902; Next Due:  00WQY8654; Overdue    Future Appointments    Date/Time Provider Specialty Site   02/06/2018 02:30 PM MARIA ELENA Day   Internal Medicine [facility] [id number]     Signatures   Electronically signed by : MARIA ELENA Dodge ; Oct 24 2017  3:28PM EST                       (Author)

## 2018-01-11 NOTE — RESULT NOTES
Verified Results  (1) MICROALBUMIN CREATININE RATIO, RANDOM URINE 20Mar2017 08:55AM Jade [facility] Order Number: [id number]     Test Name Result Flag Reference   MICROALBUMIN/ CREAT R 35 mg/g creatinine H 0-30   MICROALBUMIN,URINE 24 4 mg/L H 0 0-20 0   CREATININE URINE 69 2 mg/dL       (1) HEMOGLOBIN A1C 20Mar2017 08:54AM [facility] Order Number: [id number]     Test Name Result Flag Reference   HEMOGLOBIN A1C 6 1 %  4 2-6 3   EST  AVG  GLUCOSE 128 mg/dl       (1) LDL,DIRECT 74FIB0104 08:54AM Jade [facility] Order Number: [id number]     Test Name Result Flag Reference   LDL, DIRECT 110 mg/dl H 0-100   - Patient Instructions: This is a fasting blood test  Water,black tea or black  coffee only after 9:00pm the night before test   Drink 2 glasses of water the morning of test     - Patient Instructions: This is a fasting blood test  Water,black tea or black  coffee only after 9:00pm the night before test Drink 2 glasses of water the morning of test - Patient Instructions: This bloodwork is non-fasting  Please drink two glasses of   water morning of bloodwork  LDL Cholesterol:        Optimal          <100 mg/dl        Near Optimal     100-129 mg/dl        Above Optimal          Borderline High   130-159 mg/dl          High              160-189 mg/dl          Very High        >189 mg/dl     (1) TRIGLYCERIDE 20Mar2017 08:54AM [facility] Order Number: [id number]     Test Name Result Flag Reference   TRIGLYCERIDES 90 mg/dL  <=150   Specimen collection should occur prior to N-Acetylcysteine or Metamizole administration due to the potential for falsely depressed results  - Patient Instructions: This is a fasting blood test  Water,black tea or black  coffee only after 9:00pm the night before test Drink 2 glasses of water the morning of test - Patient Instructions: This bloodwork is non-fasting  Please drink two glasses of   water morning of bloodwork  Triglyceride:         Normal              <150 mg/dl       Borderline High    150-199 mg/dl       High               200-499 mg/dl       Very High          >499 mg/dl     (1) CBC/PLT/DIFF 31WHD8952 08:54AM Moreno Palringo Order Number: [id number]     Test Name Result Flag Reference   WBC COUNT 5 40 Thousand/uL  4 31-10 16   RBC COUNT 5 34 Million/uL H 3 81-5 12   HEMOGLOBIN 15 8 g/dL H 11 5-15 4   HEMATOCRIT 48 2 % H 34 8-46  1   MCV 90 fL  82-98   MCH 29 6 pg  26 8-34 3   MCHC 32 8 g/dL  31 4-37 4   RDW 14 4 %  11 6-15 1   MPV 10 6 fL  8 9-12 7   PLATELET COUNT 366 Thousands/uL  149-390   nRBC AUTOMATED 0 /100 WBCs     NEUTROPHILS RELATIVE PERCENT 52 %  43-75   LYMPHOCYTES RELATIVE PERCENT 37 %  14-44   MONOCYTES RELATIVE PERCENT 9 %  4-12   EOSINOPHILS RELATIVE PERCENT 2 %  0-6   BASOPHILS RELATIVE PERCENT 0 %  0-1   NEUTROPHILS ABSOLUTE COUNT 2 77 Thousands/? ??L  1 85-7 62   LYMPHOCYTES ABSOLUTE COUNT 2 01 Thousands/? ??L  0 60-4 47   MONOCYTES ABSOLUTE COUNT 0 46 Thousand/? ??L  0 17-1 22   EOSINOPHILS ABSOLUTE COUNT 0 13 Thousand/? ??L  0 00-0 61   BASOPHILS ABSOLUTE COUNT 0 02 Thousands/? ??L  0 00-0 10   - Patient Instructions: This bloodwork is non-fasting  Please drink two glasses of water morning of bloodwork  - Patient Instructions: This bloodwork is non-fasting  Please drink two glasses of water morning of bloodwork       (1) COMPREHENSIVE METABOLIC PANEL 47VTF8309 22:16YS [facility]: [id number]     Test Name Result Flag Reference   SODIUM 142 mmol/L  136-145   POTASSIUM 4 3 mmol/L  3 5-5 3   CHLORIDE 104 mmol/L  100-108   CARBON DIOXIDE 32 mmol/L  21-32   ANION GAP (CALC) 6 mmol/L  4-13   BLOOD UREA NITROGEN 17 mg/dL  5-25   CREATININE 0 80 mg/dL  0 60-1 30   Standardized to IDMS reference method   CALCIUM 9 3 mg/dL  8 3-10 1   BILI, TOTAL 0 78 mg/dL  0 20-1 00   ALK PHOSPHATAS 61 U/L     ALT (SGPT) 25 U/L  12-78   AST(SGOT) 14 U/L  5-45 ALBUMIN 3 5 g/dL  3 5-5 0   TOTAL PROTEIN 7 6 g/dL  6 4-8 2   eGFR Non-African American      >60 0 ml/min/1 73sq m   - Patient Instructions: This is a fasting blood test  Water,black tea or black  coffee only after 9:00pm the night before test Drink 2 glasses of water the morning of test - Patient Instructions: This bloodwork is non-fasting  Please drink two glasses of   water morning of bloodwork  National Kidney Disease Education Program recommendations are as follows:  GFR calculation is accurate only with a steady state creatinine  Chronic Kidney disease less than 60 ml/min/1 73 sq  meters  Kidney failure less than 15 ml/min/1 73 sq  meters  GLUCOSE FASTING 131 mg/dL H 65-99     (1) TSH 24ZGA2847 08:54AM [facility] [facility] Order Number: [id number]     Test Name Result Flag Reference   TSH 1 070 uIU/mL  0 358-3 740   - Patient Instructions: This bloodwork is non-fasting  Please drink two glasses of water morning of bloodwork  - Patient Instructions: This is a fasting blood test  Water,black tea or black  coffee only after 9:00pm the night before test Drink 2 glasses of water the morning of test - Patient Instructions: This bloodwork is non-fasting  Please drink two glasses of   water morning of bloodwork  Patients undergoing fluorescein dye angiography may retain small amounts of fluorescein in the body for 48-72 hours post procedure  Samples containing fluorescein can produce falsely depressed TSH values  If the patient had this procedure,a specimen should be resubmitted post fluorescein clearance  The recommended reference ranges for TSH during pregnancy are as follows:  First trimester 0 1 to 2 5 uIU/mL  Second trimester  0 2 to 3 0 uIU/mL  Third trimester 0 3 to 3 0 uIU/m     (1) FERRITIN 72PMG9163 08:54AM [employer] [facility] Order Number: [id number]     Test Name Result Flag Reference   FERRITIN 51 ng/mL  8-388   - Patient Instructions:  This is a fasting blood test  Water,black tea or black  coffee only after 9:00pm the night before test Drink 2 glasses of water the morning of test - Patient Instructions: This bloodwork is non-fasting  Please drink two glasses of   water morning of bloodwork  (1) IRON 01OOW9196 08:54AM Samuel Luciano Order Number: [id number]     Test Name Result Flag Reference   IRON 119 ug/dL     Patients treated with metal-binding drugs (ie  Deferoxamine) may have depressed iron values  - Patient Instructions: This is a fasting blood test  Water,black tea or black  coffee only after 9:00pm the night before test Drink 2 glasses of water the morning of test - Patient Instructions: This bloodwork is non-fasting  Please drink two glasses of   water morning of bloodwork  (1) TIBC 61ZGI9629 08:54AM Samuel Luciano Order Number: [id number]     Test Name Result Flag Reference   TOTAL IRON BINDING CAPACITY 362 ug/dL  250-450   - Patient Instructions: This is a fasting blood test  Water,black tea or black  coffee only after 9:00pm the night before test Drink 2 glasses of water the morning of test - Patient Instructions: This bloodwork is non-fasting  Please drink two glasses of   water morning of bloodwork  (1) VITAMIN D 25-HYDROXY 68LKZ0733 08:54AM Samuel Luciano Order Number: [id number]     Test Name Result Flag Reference   VIT D 25-HYDROX 27 8 ng/mL L 30 0-100 0   This assay is a certified procedure of the CDC Vitamin D Standardization Certification Program (VDSCP)     Deficiency <20ng/ml   Insufficiency 20-30ng/ml   Sufficient  ng/ml     *Patients undergoing fluorescein dye angiography may retain small amounts of fluorescein in the body for 48-72 hours post procedure  Samples containing fluorescein can produce falsely elevated Vitamin D values  If the patient had this procedure, a specimen should be resubmitted post fluorescein clearance

## 2018-01-11 NOTE — RESULT NOTES
Verified Results  (1) COMPREHENSIVE METABOLIC PANEL 32KGW1831 91:96FY [facility]ad[facility] Order Number: [id number]     Test Name Result Flag Reference   SODIUM 140 mmol/L  136-145   POTASSIUM 3 7 mmol/L  3 5-5 3   CHLORIDE 104 mmol/L  100-108   CARBON DIOXIDE 30 mmol/L  21-32   ANION GAP (CALC) 6 mmol/L  4-13   BLOOD UREA NITROGEN 11 mg/dL  5-25   CREATININE 0 80 mg/dL  0 60-1 30   Standardized to IDMS reference method   CALCIUM 8 8 mg/dL  8 3-10 1   BILI, TOTAL 0 94 mg/dL  0 20-1 00   ALK PHOSPHATAS 53 U/L     ALT (SGPT) 22 U/L  12-78   Specimen collection should occur prior to Sulfasalazine and/or Sulfapyridine administration due to the potential for falsely depressed results  AST(SGOT) 23 U/L  5-45   Specimen collection should occur prior to Sulfasalazine administration due to the potential for falsely depressed results  ALBUMIN 3 3 g/dL L 3 5-5 0   TOTAL PROTEIN 8 0 g/dL  6 4-8 2   eGFR 64 ml/min/1 73sq [facility] Disease Education Program recommendations are as follows:  GFR calculation is accurate only with a steady state creatinine  Chronic Kidney disease less than 60 ml/min/1 73 sq  meters  Kidney failure less than 15 ml/min/1 73 sq  meters  GLUCOSE FASTING 109 mg/dL H 65-99   Specimen collection should occur prior to Sulfasalazine administration due to the potential for falsely depressed results  Specimen collection should occur prior to Sulfapyridine administration due to the potential for falsely elevated results       (1) LIPID PANEL FASTING W DIRECT LDL REFLEX 18Oct2017 09:32AM Bladimir [facility] Order Number: [id number]     Test Name Result Flag Reference   CHOLESTEROL 120 mg/dL     LDL CHOLESTEROL CALCULATED 43 mg/dL  0-100   Triglyceride:        Normal <150 mg/dl   Borderline High 150-199 mg/dl   High 200-499 mg/dl   Very High >499 mg/dl      Cholesterol:       Desirable <200 mg/dl    Borderline High 200-239 mg/dl    High >239 mg/dl      HDL Cholesterol:       High>59 mg/dL    Low <41 mg/dL      HDL Cholesterol:       High>59 mg/dL    Low <41 mg/dL      This screening LDL is a calculated result  It does not have the accuracy of the Direct Measured LDL in the monitoring of patients with hyperlipidemia and/or statin therapy  Direct Measure LDL (IOT671) must be ordered separately in these patients  TRIGLYCERIDES 72 mg/dL  <=150   Specimen collection should occur prior to N-Acetylcysteine or Metamizole administration due to the potential for falsely depressed results  HDL,DIRECT 63 mg/dL H 40-60   Specimen collection should occur prior to Metamizole administration due to the potential for falsley depressed results  (1) TSH 18Oct2017 09:32AM [employer] Order Number: [id number]     Test Name Result Flag Reference   TSH 0 642 uIU/mL  0 358-3 740   Patients undergoing fluorescein dye angiography may retain small amounts of fluorescein in the body for 48-72 hours post procedure  Samples containing fluorescein can produce falsely depressed TSH values  If the patient had this procedure,a specimen should be resubmitted post fluorescein clearance  The recommended reference ranges for TSH during pregnancy are as follows:  First trimester 0 1 to 2 5 uIU/mL  Second trimester  0 2 to 3 0 uIU/mL  Third trimester 0 3 to 3 0 uIU/m     (1) CBC/PLT/DIFF [id number] 09:32AM [employer] Order Number: [id number]     Test Name Result Flag Reference   WBC COUNT 5 61 Thousand/uL  4 31-10 16   RBC COUNT 5 20 Million/uL H 3 81-5 12   HEMOGLOBIN 15 3 g/dL  11 5-15 4   HEMATOCRIT 46 0 %  34 8-46  1   MCV 89 fL  82-98   MCH 29 4 pg  26 8-34 3   MCHC 33 3 g/dL  31 4-37 4   RDW 14 5 %  11 6-15 1   MPV 10 0 fL  8 9-12 7   PLATELET COUNT 456 Thousands/uL  149-390   nRBC AUTOMATED 0 /100 WBCs     NEUTROPHILS RELATIVE PERCENT 51 %  43-75   LYMPHOCYTES RELATIVE PERCENT 38 %  14-44   MONOCYTES RELATIVE PERCENT 9 %  4-12   EOSINOPHILS RELATIVE PERCENT 2 %  0-6   BASOPHILS RELATIVE PERCENT 0 %  0-1   NEUTROPHILS ABSOLUTE COUNT 2 78 Thousands/? ??L  1 85-7 62   LYMPHOCYTES ABSOLUTE COUNT 2 15 Thousands/? ??L  0 60-4 47   MONOCYTES ABSOLUTE COUNT 0 53 Thousand/? ??L  0 17-1 22   EOSINOPHILS ABSOLUTE COUNT 0 13 Thousand/? ??L  0 00-0 61   BASOPHILS ABSOLUTE COUNT 0 02 Thousands/? ??L  0 00-0 10     (1) FERRITIN [id number] 09:32AM [employer] Order Number: [id number]     Test Name Result Flag Reference   FERRITIN 33 ng/mL  8-388     (1) IRON [id number] 09:32AM [employer] Order Number: [id number]     Test Name Result Flag Reference   IRON 91 ug/dL     Patients treated with metal-binding drugs (ie  Deferoxamine) may have depressed iron values  (1) TIBC 19AKI8431 09:32AM [employer] Order Number: [id number]     Test Name Result Flag Reference   TOTAL IRON BINDING CAPACITY 348 ug/dL  250-450     (1) HEMOGLOBIN A1C 92NBM7041 09:32AM [employer] Order Number: [id number]     Test Name Result Flag Reference   HEMOGLOBIN A1C 6 2 %  4 2-6 3   EST  AVG   GLUCOSE 131 mg/dl       (1) AMYLASE 64FDO9154 09:32AM [employer] Order Number: [id number]     Test Name Result Flag Reference   AMYLASE 37 IU/L       (1) LIPASE 16DIL8387 09:32AM [employer] Order Number: [id number]     Test Name Result Flag Reference   LIPASE 131 u/L       (1) MAGNESIUM 01CXZ5077 09:32AM [employer] Order Number: [id number]     Test Name Result Flag Reference   MAGNESIUM 2 2 mg/dL  1 6-2 6

## 2018-01-12 NOTE — MISCELLANEOUS
Assessment    1  Hypertension (401 9) (I10)   2  Acute congestive heart failure (428 0) (I50 9)   3  Non-ST elevation myocardial infarction (NSTEMI), type 2 (410 70) (I21 4)   4  Dizziness (780 4) (R42)    Discussion/Summary  Discussion Summary:   Again, looks better and PE reveals pt to be on the dry side  BP and HR are acceptable and wt is exactly the same  ??why she went into CHF  Will continue current treatment with reduced beta blocker and daily lasix  Check labs later this week and keep f/u with cards next week  RTC as scheduled for routine  Medication SE Review and Pt Understands Tx: Possible side effects of new medications were reviewed with the patient/guardian today  The treatment plan was reviewed with the patient/guardian  The patient/guardian understands and agrees with the treatment plan      Chief Complaint  Chief Complaint Free Text Note Form: Patient is being seen today for a ELIUD  Patient was seen at [facility]  Patient was seen in the hospital for respiratory distress  Patient states to be doing well today but she does feel a little lightheaded today  History of Present Illness  TCM Communication [facility]: The patient is being contacted for 10/3/17  She was hospitalized [facility]  The date of admission: 09/27/2017, date of discharge: 09/29/2017  Diagnosis: CHF  She was discharged to home, Pts daughter called to schedule appt on 9/29/17  Communication performed and completed by rik 9/29/17   HPI: WF presents with her relative for f/u two day admission to [facility] for SOB  W/u revealed sCHF and NESTMI due to increase demand  Pt suffered a hypotensive episode and bradycardia after receiving IV NTG  Pt eventually diuresed and meds adjusted  Since d/c, doing better with no CP or SOB  Some dizziness at times  No new issues  Review of Systems  Complete-Female:   Constitutional: no fever, not feeling poorly, no chills and not feeling tired     Cardiovascular: no chest pain, no intermittent leg claudication, no palpitations and no lower extremity edema  Respiratory: no shortness of breath, no cough, no orthopnea, no wheezing, no shortness of breath during exertion and no PND  Gastrointestinal: no abdominal pain, no nausea, no constipation and no diarrhea  Genitourinary: no dysuria  Neurological: dizziness, but no headache  Active Problems    1  Abnormal imaging of thyroid (794 5) (R94 6)   2  Allergic rhinitis (477 9) (J30 9)   3  Asymptomatic menopausal state (V49 81) (Z78 0)   4  Benign colon polyp (211 3) (K63 5)   5  Benign paroxysmal vertigo, unspecified laterality (386 11) (H81 10)   6  Cataract (366 9) (H26 9)   7  Chronic obstructive pulmonary disease with acute exacerbation (491 21) (J44 1)   8  Compression fracture of thoracic vertebra, closed, initial encounter (805 2) (S22 000A)   9  Constipation (564 00) (K59 00)   10  Coronary artery disease (414 00) (I25 10)   11  Diverticulosis (562 10) (K57 90)   12  Dizziness (780 4) (R42)   13  Fibrocystic breast disease, unspecified laterality (610 1) (N60 19)   14  Gait abnormality (781 2) (R26 9)   15  Generalized anxiety disorder (300 02) (F41 1)   16  GERD without esophagitis (530 81) (K21 9)   17  Glucose intolerance (no malabsorption) (271 3) (E74 39)   18  Hyperlipidemia (272 4) (E78 5)   19  Hypertension (401 9) (I10)   20  Iron deficiency anemia (280 9) (D50 9)   21  Malignant melanoma of skin (172 9) (C43 9)   22  Nausea (787 02) (R11 0)   23  Need for influenza vaccination (V04 81) (Z23)   24  Osteoarthritis (715 90) (M19 90)   25  Osteoporosis (733 00) (M81 0)   26  Palpitations (785 1) (R00 2)   27  Proteinuria (791 0) (R80 9)   28  Pulmonary vascular congestion (514) (R09 89)   29  Screening for depression (V79 0) (Z13 89)   30  Screening for genitourinary condition (V81 6) (Z13 89)   31  Screening for neurological condition (V80 09) (Z13 89)   32  Urinary incontinence (788 30) (R32)   33   Vitamin D deficiency (268 9) (E55 9)    Past Medical History    1  History of Abnormal blood chemistry (790 6) (R79 9)   2  History of Abnormal blood sugar (790 29) (R73 09)   3  History of Abnormal blood sugar (790 29) (R73 09)   4  History of Acute frontal sinusitis (461 1) (J01 10)   5  History of Acute myocardial infarction (410 90) (I21 9)   6  History of Acute otitis media, unspecified laterality   7  History of Atypical chest pain (786 59) (R07 89)   8  History of Bleeding hemorrhoids (455 8) (K64 9)   9  History of CAP (community acquired pneumonia) (5) (J18 9)   10  History of Cervical radiculopathy (723 4) (M54 12)   11  History of Costochondritis (733 6) (M94 0)   12  History of Cough (786 2) (R05)   13  History of Elevated d-dimer (790 92) (R79 89)   14  History of Elevated troponin level (790 6) (R74 8)   15  History of abnormal weight loss (V13 89) (Z87 898)   16  History of acute sinusitis (V12 69) (Z87 09)   17  History of dermatitis (V13 3) (Z87 2)   18  History of dizziness (V13 89) (Z87 898)   19  History of pneumococcal vaccination (V49 89) (Z92 29)   20  History of pneumonia (V12 61) (Z87 01)   21  History of pneumonia (V12 61) (Z87 01)   22  History of syncope (V15 89) (Z87 898)   23  History of upper respiratory infection (V12 09) (Z87 09)   24  History of urinary tract infection (V13 02) (Z87 440)   25  History of Medicare annual wellness visit, initial (V70 0) (Z00 00)   26  History of Mental status change (780 97) (R41 82)   27  History of Microscopic hematuria (599 72) (R31 29)   28  History of Near syncope (780 2) (R55)   29  History of Need for influenza vaccination (V04 81) (Z23)   30  History of Short of breath on exertion (786 05) (R06 02)   31  History of 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426 0) (I44 2)    Surgical History    1  History of Breast Surgery Lumpectomy   2  History of Cardiac Cath Procedure Outcome: Successful   3  History of Cardio-Vascular Agents Drug-Eluting Stent   4   History of Cholecystectomy   5  History of Hemorrhoidectomy  Surgical History Reviewed: The surgical history was reviewed and updated today  Family History  Mother    1  Family history of Congestive Heart Failure  Father    2  Family history of Prostate Cancer (V16 42)  Family History    3  Family history of Stroke Syndrome (V17 1)  Family History Reviewed: The family history was reviewed and updated today  Social History    · Former smoker (H21 95) (O12 347)   · No alcohol use   · No caffeine use   · No illicit drug use   · Uses Safety Equipment - Seatbelts  Social History Reviewed: The social history was reviewed and updated today  The social history was reviewed and is unchanged  Current Meds   1  Aspirin EC 81 MG Oral Tablet Delayed Release; Take one tablet once daily; Therapy: 54ECX4754 to (Evaluate:07Apr2013); Last YJ:16XOC4998 Ordered   2  Atorvastatin Calcium 40 MG Oral Tablet; TAKE 1 TABLET DAILY; Therapy: 07Apr2017 to (Evaluate:13Mar2018)  Requested for: 90Hde1755; Last   Rx:32Oie4279 Ordered   3  Benzonatate 200 MG Oral Capsule; TAKE 1 CAPSULE 3 TIMES DAILY AS NEEDED; Therapy: 98XQU2813 to ([phone])  Requested for: 90DFS4965; Last   Rx:27Chv0683 Ordered   4  Calcium Carbonate-Vitamin D 600-125 MG-UNIT TABS; Take 2 twice daily; Therapy: 40GFT4967 to (Last UZ:27BFA8494) Ordered   5  Clopidogrel Bisulfate 75 MG Oral Tablet; TAKE 1 TABLET DAILY; Therapy: 07Apr2017 to (Evaluate:13Mar2018)  Requested for: 46Mxe3861; Last   Rx:69Ult3859 Ordered   6  Ferrous Sulfate 325 (65 Fe) MG Oral Tablet; TAKE 1 TABLET DAILY AS DIRECTED; Therapy: 73JUY5262 to (Evaluate:12Mar2017)  Requested for: 80EZI7564; Last   Rx:17Mar2016 Ordered   7  Furosemide 20 MG Oral Tablet; TAKE 1 TABLET BY MOUTH ONCE DAILY; Therapy: 66ABP1565 to [phone])  Requested for: 45SRJ2764; Last   Rx:90Hpj6411 Ordered   8  LORazepam 0 5 MG Oral Tablet; take 1 tablet by mouth every 8 hours if needed;    Therapy: 24USA4065 to (Evaluate:23Sav9079)  Requested for: 06Dek3596; Last   Rx:20Zsi9450 Ordered   9  Meclizine HCl - 25 MG Oral Tablet; TAKE 1 TABLET 3 TIMES DAILY AS NEEDED; Therapy: 39NQQ5421 to (Last Rx:60Woe4428)  Requested for: 65XUU1394 Ordered   10  Metoprolol Tartrate 25 MG Oral Tablet; Take half a tab bid; Therapy: 91Gry0472 to (Evaluate:58Pik2727)  Requested for: 17XLK1322 Recorded   11  Multiple Vitamins Oral Tablet; Take 1 daily; Therapy: 94NEP2952 to (Last J85HXR4152) Ordered   12  OneTouch Verio In [employer]; USE 1 STRIP DAILY; Therapy: 38QVQ7983 to (Bonnie Shepard)  Requested for: 91LBX1519; Last    Rx:09Esp7362 Ordered   13  Polyethylene Glycol 3350 Oral Powder; MIX 17 GRAMS IN 8 OUNCES OF LIQUID AND    DRINK TWICE DAILY AS NEEDED; Therapy: 12MZK0734 to (Evaluate:80Cow6488)  Requested for: 45Man6076; Last    Rx:99Vxd5479 Ordered   14  Prochlorperazine Maleate 5 MG Oral Tablet; TAKE 1 TABLET EVERY 4 TO 6 HOURS AS    NEEDED FOR NAUSEA; Therapy: 35Qlu6062 to (Evaluate:87Muy5380)  Requested for: 69Rdz7343; Last    Rx:29Fsx0757 Ordered   15  RaNITidine HCl - 150 MG Oral Capsule; Take 1 capsule twice daily; Therapy: 13Imq1479 to ([employer])  Requested for: 13QDR4835; Last    Rx:33Qqb4433 Ordered  Medication List Reviewed: The medication list was reviewed and updated today  Allergies    1  Ciprofloxacin-Ciproflox HCl ER TB24   2  Macrodantin CAPS   3  Penicillins    Vitals  Signs   Recorded:  03:23PM   Temperature: 97 6 F, Tympanic  Heart Rate: 77  Respiration: 18  Systolic: 136, Sitting  Diastolic: 82, Sitting  Height: 5 ft 2 in  Weight: 166 lb 4 0 oz  BMI Calculated: 30 41  BSA Calculated: 1 77  O2 Saturation: 95    Physical Exam    Constitutional   General appearance: No acute distress, well appearing and well nourished  Eyes   Conjunctiva and lids: No swelling, erythema or discharge  Pupils and irises: Equal, round and reactive to light      Ears, Nose, Mouth, and Throat   Oropharynx: Normal with no erythema, edema, exudate or lesions  Pulmonary   Respiratory effort: No increased work of breathing or signs of respiratory distress  Auscultation of lungs: Clear to auscultation  Cardiovascular   Auscultation of heart: Normal rate and rhythm, normal S1 and S2, without murmurs  Examination of extremities for edema and/or varicosities: Normal     Carotid pulses: Normal     Abdomen   Abdomen: Non-tender, no masses  Psychiatric   Orientation to person, place, and time: Normal     Mood and affect: Normal          Health Management  Benign colon polyp   COLONOSCOPY; every 5 years; Last 04Apr2016; Next Due: 17BJZ9145; Active  Fibrocystic breast disease, unspecified laterality   Digital Bilateral Screening Mammogram With CAD; every 2 years; Last 64HHL4542; Next Due:  47NDF6973; Overdue    Future Appointments    Date/Time Provider Specialty Site   10/24/2017 02:30 PM MARIA ELENA Mason   [facility]     Signatures   Electronically signed by : Metta Leyden, M D ; Oct  3 2017  4:18PM EST                       (Author)

## 2018-01-12 NOTE — RESULT NOTES
Verified Results  (1) IRON [id number] 10:40AM [employer] Order Number: [id number]     Test Name Result Flag Reference   IRON 35 ug/dL L      (1) TIBC 61OWV7547 10:40AM [employer] Order Number: [id number]     Test Name Result Flag Reference   TOTAL IRON BINDING CAPACITY 465 ug/dL H 250-450     (1) FERRITIN 11ZWR5114 10:40AM [employer] Order Number: [id number]     Test Name Result Flag Reference   FERRITIN 9 ng/mL  8-388     (1) HEMOGLOBIN A1C 47XUZ4979 08:13AM [employer] Order Number: [id number]      5 7-6 4% impaired fasting glucose  >=6 5% diagnosis of diabetes    Falsely low levels are seen in conditions linked to short RBC life span-  hemolytic anemia, and splenomegaly  Falsely elevated levels are seen in situations where there is an increased production of RBC- receipt of erythropoietin or blood transfusions  Adopted from ADA-Clinical Practice Recommendations     Test Name Result Flag Reference   HEMOGLOBIN A1C 6 1 % H 4 0-5 6   EST  AVG   GLUCOSE 128 mg/dl

## 2018-01-12 NOTE — RESULT NOTES
Verified Results  (1) CBC/PLT/DIFF 07Apr2017 12:45PM [facility] Holder Order Number: [id number]     Test Name Result Flag Reference   WBC COUNT 7 83 Thousand/uL  4 31-10 16   RBC COUNT 4 81 Million/uL  3 81-5 12   HEMOGLOBIN 13 8 g/dL  11 5-15 4   HEMATOCRIT 43 2 %  34 8-46  1   MCV 90 fL  82-98   MCH 28 7 pg  26 8-34 3   MCHC 31 9 g/dL  31 4-37 4   RDW 14 1 %  11 6-15 1   MPV 9 7 fL  8 9-12 7   PLATELET COUNT 228 Thousands/uL H 149-390   nRBC AUTOMATED 0 /100 WBCs     NEUTROPHILS RELATIVE PERCENT 66 %  43-75   LYMPHOCYTES RELATIVE PERCENT 21 %  14-44   MONOCYTES RELATIVE PERCENT 11 %  4-12   EOSINOPHILS RELATIVE PERCENT 2 %  0-6   BASOPHILS RELATIVE PERCENT 0 %  0-1   NEUTROPHILS ABSOLUTE COUNT 5 16 Thousands/? ??L  1 85-7 62   LYMPHOCYTES ABSOLUTE COUNT 1 61 Thousands/? ??L  0 60-4 47   MONOCYTES ABSOLUTE COUNT 0 89 Thousand/? ??L  0 17-1 22   EOSINOPHILS ABSOLUTE COUNT 0 12 Thousand/? ??L  0 00-0 61   BASOPHILS ABSOLUTE COUNT 0 03 Thousands/? ??L  0 00-0 10   - Patient Instructions: This bloodwork is non-fasting  Please drink two glasses of water morning of bloodwork  - Patient Instructions: This bloodwork is non-fasting  Please drink two glasses of water morning of bloodwork  (1) COMPREHENSIVE METABOLIC PANEL 44BMA1567 38:51QN Tenisha Holder Order Number: [id number]     Test Name Result Flag Reference   GLUCOSE,RANDM 105 mg/dL     If the patient is fasting, the ADA then defines impaired fasting glucose as > 100 mg/dL and diabetes as > or equal to 123 mg/dL     SODIUM 138 mmol/L  136-145   POTASSIUM 4 2 mmol/L  3 5-5 3   CHLORIDE 102 mmol/L  100-108   CARBON DIOXIDE 33 mmol/L H 21-32   ANION GAP (CALC) 3 mmol/L L 4-13   BLOOD UREA NITROGEN 11 mg/dL  5-25   CREATININE 0 83 mg/dL  0 60-1 30   Standardized to IDMS reference method   CALCIUM 9 0 mg/dL  8 3-10 1   BILI, TOTAL 0 62 mg/dL  0 20-1 00   ALK PHOSPHATAS 58 U/L     ALT (SGPT) 29 U/L  12-78   AST(SGOT) 14 U/L  5-45 ALBUMIN 3 2 g/dL L 3 5-5 0   TOTAL PROTEIN 7 2 g/dL  6 4-8 2   eGFR Non-African American      >60 0 ml/min/1 73sq [facility] [employer] Disease Education Program recommendations are as follows:  GFR calculation is accurate only with a steady state creatinine  Chronic Kidney disease less than 60 ml/min/1 73 sq  meters  Kidney failure less than 15 ml/min/1 73 sq  meters

## 2018-01-13 VITALS
RESPIRATION RATE: 18 BRPM | HEIGHT: 62 IN | TEMPERATURE: 97.6 F | BODY MASS INDEX: 32.02 KG/M2 | DIASTOLIC BLOOD PRESSURE: 80 MMHG | SYSTOLIC BLOOD PRESSURE: 142 MMHG | WEIGHT: 174 LBS | HEART RATE: 86 BPM

## 2018-01-13 VITALS
HEART RATE: 84 BPM | WEIGHT: 174 LBS | TEMPERATURE: 97.2 F | HEIGHT: 62 IN | BODY MASS INDEX: 32.02 KG/M2 | DIASTOLIC BLOOD PRESSURE: 68 MMHG | SYSTOLIC BLOOD PRESSURE: 134 MMHG | RESPIRATION RATE: 18 BRPM

## 2018-01-13 VITALS
WEIGHT: 173 LBS | DIASTOLIC BLOOD PRESSURE: 68 MMHG | SYSTOLIC BLOOD PRESSURE: 158 MMHG | BODY MASS INDEX: 31.83 KG/M2 | TEMPERATURE: 97.9 F | HEART RATE: 88 BPM | HEIGHT: 62 IN | RESPIRATION RATE: 18 BRPM

## 2018-01-13 NOTE — MISCELLANEOUS
Assessment    1  Atypical chest pain (786 59) (R07 89)   2  Cervical radiculopathy (723 4) (M54 12)    Discussion/Summary  Discussion Summary:   Doing well and appears her s/s of left UE is more MS rather than cardiac  Continue to modify her risks  Discussed options as far as PT, Pain management, or going up on her pain meds, watching for constipation and confusion  Pt elected to go up on meds  Would like to use lidoderm patch, but insurance won't cover  Continue current meds and RTC two months for routine  Medication SE Review and Pt Understands Tx: Possible side effects of new medications were reviewed with the patient/guardian today  The treatment plan was reviewed with the patient/guardian  The patient/guardian understands and agrees with the treatment plan      Chief Complaint  Chief Complaint Free Text Note Form: pt here efren still has arm pain  History of Present Illness  TCM Communication [facility]: The patient is being contacted for 8-8-17  She was hospitalized [facility]  Diagnosis: Non-ST elevated myocardial infaction, HTN, GERD, Anxiety  She was discharged to home  Medications reviewed and updated today  Communication performed and completed by Priyanka Meredith   HPI: WF presents with her relative for f/u recent, brief admission to [facility] for left arm pain  Pt s/p CAD with MI earlier this year and when the arm became sore, felt it may be her heart  Admitted and r/o for ischemia, but echo showed 3V disease and family decline cath as it was too risky  Pt taken off norvasc and beta blocker adjusted  Since d/c, no CP or SOB, but continues to get left arm pain from the neck to the index finger, which is numb  No new issues  Was started on tramadol and was taking a half a pill at  and it helps  Review of Systems  Complete-Female:   Constitutional: no fever, not feeling poorly, no chills and not feeling tired     Cardiovascular: no chest pain, no intermittent leg claudication, no palpitations and no lower extremity edema  Respiratory: no shortness of breath, no cough, no orthopnea, no wheezing, no shortness of breath during exertion and no PND  Gastrointestinal: no abdominal pain, no nausea, no constipation and no diarrhea  Genitourinary: No complaints of dysuria, no incontinence, no pelvic pain, no dysmenorrhea, no vaginal discharge or bleeding  Musculoskeletal: as noted in HPI  Neurological: no headache, no confusion and no convulsions  Psychiatric: no anxiety and no depression  Endocrine: No complaints of proptosis, no hot flashes, no muscle weakness, no deepening of the voice, no feelings of weakness  Hematologic/Lymphatic: No complaints of swollen glands, no swollen glands in the neck, does not bleed easily, does not bruise easily  Active Problems    1  Abnormal imaging of thyroid (794 5) (R94 6)   2  Abnormal weight loss (783 21) (R63 4)   3  Acute frontal sinusitis (461 1) (J01 10)   4  Acute myocardial infarction (410 90) (I21 3)   5  Acute ST elevation myocardial infarction (410 90) (I21 3)   6  Allergic rhinitis (477 9) (J30 9)   7  Asymptomatic menopausal state (V49 81) (Z78 0)   8  Benign colon polyp (211 3) (K63 5)   9  Benign paroxysmal vertigo, unspecified laterality (386 11) (H81 10)   10  Bleeding hemorrhoids (455 8) (K64 9)   11  CAP (community acquired pneumonia) (5) (J18 9)   12  Cataract (366 9) (H26 9)   13  Chronic obstructive pulmonary disease with acute exacerbation (491 21) (J44 1)   14  Compression fracture of thoracic vertebra, closed, initial encounter (805 2) (S22 000A)   15  Constipation (564 00) (K59 00)   16  Coronary artery disease (414 00) (I25 10)   17  Dermatitis (692 9) (L30 9)   18  Diverticulosis (562 10) (K57 90)   19  Dizziness (780 4) (R42)   20  Elevated d-dimer (790 92) (R79 89)   21  Elevated troponin level (790 6) (R74 8)   22  Fibrocystic breast disease, unspecified laterality (610 1) (N60 19)   23   Gait abnormality (781 2) (R26 9) 24  Generalized anxiety disorder (300 02) (F41 1)   25  GERD without esophagitis (530 81) (K21 9)   26  Glucose intolerance (no malabsorption) (271 3) (E74 39)   27  Hyperlipidemia (272 4) (E78 5)   28  Hypertension (401 9) (I10)   29  Iron deficiency anemia (280 9) (D50 9)   30  Low iron stores (790 6) (R79 0)   31  Malignant melanoma of skin (172 9) (C43 9)   32  Microscopic hematuria (599 72) (R31 29)   33  Near syncope (780 2) (R55)   34  Need for influenza vaccination (V04 81) (Z23)   35  Need for pneumococcal vaccination (V03 82) (Z23)   36  Osteoarthritis (715 90) (M19 90)   37  Osteoporosis (733 00) (M81 0)   38  Palpitations (785 1) (R00 2)   39  Proteinuria (791 0) (R80 9)   40  Screening for depression (V79 0) (Z13 89)   41  Screening for genitourinary condition (V81 6) (Z13 89)   42  Screening for neurological condition (V80 09) (Z13 89)   43  Syncope (780 2) (R55)   44  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426 0) (I44 2)   45  Urinary incontinence (788 30) (R32)   46  Urinary tract infection (599 0) (N39 0)   47  Vitamin D deficiency (268 9) (E55 9)    Past Medical History    1  History of Abnormal blood chemistry (790 6) (R79 9)   2  History of Abnormal blood sugar (790 29) (R73 09)   3  History of Abnormal blood sugar (790 29) (R73 09)   4  History of Acute otitis media, unspecified laterality   5  History of Costochondritis (733 6) (M94 0)   6  History of Cough (786 2) (R05)   7  History of acute sinusitis (V12 69) (Z87 09)   8  History of dizziness (V13 89) (Z87 898)   9  History of pneumonia (V12 61) (Z87 01)   10  History of pneumonia (V12 61) (Z87 01)   11  History of upper respiratory infection (V12 09) (Z87 09)   12  History of Medicare annual wellness visit, initial (V70 0) (Z00 00)   13  History of Mental status change (780 97) (R41 82)   14  History of Need for influenza vaccination (V04 81) (Z23)   15  History of Short of breath on exertion (786 05) (R06 02)    Surgical History    1   History of Breast Surgery Lumpectomy   2  History of Cardiac Cath Procedure Outcome: Successful   3  History of Cardio-Vascular Agents Drug-Eluting Stent   4  History of Cholecystectomy   5  History of Hemorrhoidectomy  Surgical History Reviewed: The surgical history was reviewed and updated today  Family History  Mother    1  Family history of Congestive Heart Failure  Father    2  Family history of Prostate Cancer (V16 42)  Family History    3  Family history of Stroke Syndrome (V17 1)  Family History Reviewed: The family history was reviewed and updated today  Social History    · Former smoker (E51 28) (O88 923)   · Uses Safety Equipment - Seatbelts  Social History Reviewed: The social history was reviewed and updated today  The social history was reviewed and is unchanged  Current Meds   1  Aspirin EC 81 MG Oral Tablet Delayed Release; Take one tablet once daily; Therapy: 36JOP1660 to (Evaluate:07Apr2013); Last AB:13BUL4925 Ordered   2  Atorvastatin Calcium 40 MG Oral Tablet; TAKE 1 TABLET DAILY; Therapy: 07Apr2017 to (Evaluate:04Oct2017) Recorded   3  Benzonatate 200 MG Oral Capsule; TAKE 1 CAPSULE 3 TIMES DAILY AS NEEDED; Therapy: 18PMU2955 to (Roseanna Sandoval)  Requested for: 45UNQ6742; Last   Rx:99Rrm5062 Ordered   4  Calcium Carbonate-Vitamin D 600-125 MG-UNIT TABS; Take 2 twice daily; Therapy: 77MOK1744 to (Last YN:53FBS3281) Ordered   5  Clopidogrel Bisulfate 75 MG Oral Tablet; TAKE 1 TABLET DAILY; Therapy: 72YGL5339 to (Evaluate:04Oct2017) Recorded   6  Ferrous Sulfate 325 (65 Fe) MG Oral Tablet; TAKE 1 TABLET DAILY AS DIRECTED; Therapy: 01KWO1059 to (Evaluate:12Mar2017)  Requested for: 47OSH7000; Last   Rx:17Mar2016 Ordered   7  LORazepam 0 5 MG Oral Tablet; take 1 tablet by mouth every 8 hours if needed; Therapy: 63SXT1909 to (Evaluate:56Uxm9809)  Requested for: 83Hri9899; Last   Rx:82Htn3099 Ordered   8   Meclizine HCl - 25 MG Oral Tablet; TAKE 1 TABLET 3 TIMES DAILY AS NEEDED; Therapy: 96ZIP4653 to (Last Rx:72Tot1232)  Requested for: 82RJQ8276 Ordered   9  Metoprolol Tartrate 25 MG Oral Tablet; TAKE 1 TABLET DAILY; Therapy: 07Apr2017 to (Evaluate:68Mob5909)  Requested for: 28Ypi7823 Recorded   10  Multiple Vitamins Oral Tablet; Take 1 daily; Therapy: 74GSJ7897 to (Last Rx:48Drg9511) Ordered   11  Omega-3-acid Ethyl Esters 1 GM Oral Capsule; TAKE 2 CAPSULES TWICE DAILY; Therapy: 61MDT0458 to (Preet Manuela)  Requested for: 07Apr2017; Last    Rx:07Apr2017 Ordered   12  OneTouch Verio In [employer]; USE 1 STRIP DAILY; Therapy: 04GDG9331 to (Kevyn Ojeda)  Requested for: 24PZA5085; Last    Rx:31May2017 Ordered   13  Polyethylene Glycol 3350 Oral Powder; MIX 17 GRAMS IN 8 OUNCES OF LIQUID AND    DRINK TWICE DAILY AS NEEDED; Therapy: 09FSS0672 to (Evaluate:51Nuc9985)  Requested for: 06Rro3253; Last    Rx:72Dvo3175 Ordered   14  RaNITidine HCl - 150 MG Oral Capsule; Take 1 capsule twice daily; Therapy: 07Apr2017 to ([facility]) Recorded  Medication List Reviewed: The medication list was reviewed and updated today  Allergies    1  Ciprofloxacin-Ciproflox HCl ER TB24   2  Macrodantin CAPS   3  Penicillins    Physical Exam    Constitutional   General appearance: No acute distress, well appearing and well nourished  Eyes   Conjunctiva and lids: No swelling, erythema or discharge  Pupils and irises: Equal, round and reactive to light  Ears, Nose, Mouth, and Throat   Oropharynx: Normal with no erythema, edema, exudate or lesions  Pulmonary   Respiratory effort: No increased work of breathing or signs of respiratory distress  Auscultation of lungs: Clear to auscultation  Cardiovascular   Auscultation of heart: Normal rate and rhythm, normal S1 and S2, without murmurs  Examination of extremities for edema and/or varicosities: Normal     Carotid pulses: Normal     Abdomen   Abdomen: Non-tender, no masses      Musculoskeletal   Gait and station: Normal     Neurologic   Cranial nerves: Cranial nerves 2-12 intact  Psychiatric   Orientation to person, place, and time: Normal     Mood and affect: Normal          Health Management  Benign colon polyp   COLONOSCOPY; every 5 years; Last 04Apr2016; Next Due: 32KGD9387; Active  Fibrocystic breast disease, unspecified laterality   Digital Bilateral Screening Mammogram With CAD; every 2 years; Last 75XXM9339; Next Due:  06DUZ1067; Overdue    Future Appointments    Date/Time Provider Specialty Site   09/26/2017 02:30 PM MARIA ELENA Day   Internal Medicine [address] [place] [address]     Signatures   Electronically signed by : Wilmar Santos, ; Jul 31 2017  8:49AM EST                       (Author)    Electronically signed by : MARIA ELENA Dodge ; Aug  8 2017  4:28PM EST                       (Author)

## 2018-01-14 VITALS
RESPIRATION RATE: 18 BRPM | WEIGHT: 169 LBS | DIASTOLIC BLOOD PRESSURE: 60 MMHG | BODY MASS INDEX: 31.1 KG/M2 | HEIGHT: 62 IN | HEART RATE: 74 BPM | TEMPERATURE: 97.3 F | SYSTOLIC BLOOD PRESSURE: 124 MMHG

## 2018-01-14 VITALS
RESPIRATION RATE: 18 BRPM | HEIGHT: 62 IN | HEART RATE: 88 BPM | WEIGHT: 165 LBS | DIASTOLIC BLOOD PRESSURE: 78 MMHG | SYSTOLIC BLOOD PRESSURE: 130 MMHG | BODY MASS INDEX: 30.36 KG/M2 | TEMPERATURE: 98.1 F

## 2018-01-14 VITALS
HEART RATE: 82 BPM | BODY MASS INDEX: 30.27 KG/M2 | HEIGHT: 62 IN | SYSTOLIC BLOOD PRESSURE: 148 MMHG | TEMPERATURE: 97.6 F | OXYGEN SATURATION: 92 % | RESPIRATION RATE: 18 BRPM | WEIGHT: 164.5 LBS | DIASTOLIC BLOOD PRESSURE: 78 MMHG

## 2018-01-14 VITALS
RESPIRATION RATE: 16 BRPM | HEIGHT: 62 IN | SYSTOLIC BLOOD PRESSURE: 136 MMHG | BODY MASS INDEX: 30.36 KG/M2 | TEMPERATURE: 97.5 F | DIASTOLIC BLOOD PRESSURE: 60 MMHG | WEIGHT: 165 LBS | HEART RATE: 76 BPM

## 2018-01-14 VITALS
HEART RATE: 77 BPM | OXYGEN SATURATION: 95 % | TEMPERATURE: 97.6 F | RESPIRATION RATE: 18 BRPM | BODY MASS INDEX: 30.59 KG/M2 | WEIGHT: 166.25 LBS | HEIGHT: 62 IN | DIASTOLIC BLOOD PRESSURE: 82 MMHG | SYSTOLIC BLOOD PRESSURE: 140 MMHG

## 2018-01-14 NOTE — RESULT NOTES
Verified Results  (1) CBC/PLT/DIFF 71YRZ8669 12:59PM Liana Pate Order Number: [id number]     Test Name Result Flag Reference   WBC COUNT 8 55 Thousand/uL  4 31-10 16   RBC COUNT 5 27 Million/uL H 3 81-5 12   HEMOGLOBIN 15 6 g/dL H 11 5-15 4   HEMATOCRIT 47 3 % H 34 8-46  1   MCV 90 fL  82-98   MCH 29 6 pg  26 8-34 3   MCHC 33 0 g/dL  31 4-37 4   RDW 14 7 %  11 6-15 1   MPV 10 0 fL  8 9-12 7   PLATELET COUNT 393 Thousands/uL H 149-390   nRBC AUTOMATED 0 /100 WBCs     NEUTROPHILS RELATIVE PERCENT 67 %  43-75   LYMPHOCYTES RELATIVE PERCENT 22 %  14-44   MONOCYTES RELATIVE PERCENT 10 %  4-12   EOSINOPHILS RELATIVE PERCENT 1 %  0-6   BASOPHILS RELATIVE PERCENT 0 %  0-1   NEUTROPHILS ABSOLUTE COUNT 5 75 Thousands/? ??L  1 85-7 62   LYMPHOCYTES ABSOLUTE COUNT 1 88 Thousands/? ??L  0 60-4 47   MONOCYTES ABSOLUTE COUNT 0 84 Thousand/? ??L  0 17-1 22   EOSINOPHILS ABSOLUTE COUNT 0 04 Thousand/? ??L  0 00-0 61   BASOPHILS ABSOLUTE COUNT 0 03 Thousands/? ??L  0 00-0 10     (1) COMPREHENSIVE METABOLIC PANEL 58DVD8933 80:71VF Liana Pate Order Number: [id number]     Test Name Result Flag Reference   GLUCOSE,RANDM 139 mg/dL     If the patient is fasting, the ADA then defines impaired fasting glucose as > 100 mg/dL and diabetes as > or equal to 123 mg/dL     SODIUM 140 mmol/L  136-145   POTASSIUM 3 9 mmol/L  3 5-5 3   CHLORIDE 103 mmol/L  100-108   CARBON DIOXIDE 32 mmol/L  21-32   ANION GAP (CALC) 5 mmol/L  4-13   BLOOD UREA NITROGEN 9 mg/dL  5-25   CREATININE 0 80 mg/dL  0 60-1 30   Standardized to IDMS reference method   CALCIUM 10 1 mg/dL  8 3-10 1   BILI, TOTAL 0 81 mg/dL  0 20-1 00   ALK PHOSPHATAS 49 U/L     ALT (SGPT) 27 U/L  12-78   AST(SGOT) 18 U/L  5-45   ALBUMIN 3 6 g/dL  3 5-5 0   TOTAL PROTEIN 8 7 g/dL H 6 4-8 2   eGFR Non-African American      >60 0 ml/min/1 73sq m   [facility] [employer] Disease Education Program recommendations are as follows:  GFR calculation is accurate only with a steady state creatinine  Chronic Kidney disease less than 60 ml/min/1 73 sq  meters  Kidney failure less than 15 ml/min/1 73 sq  meters  (1) D-DIMER 11TLE6275 12:59PM Lalita Matos Order Number: [id number]     Test Name Result Flag Reference   D-DIMER 498 ng/ml (FEU) H 0-424   Reference and upper limits to exclude DVT and PE are the same  Do not use to exclude if clinical symptoms are present        Pregnant women:  1st trimester:  <220 - 1060 ng/ml (FEU)  2nd trimester:  <220 - 1880 ng/ml (FEU)  3rd trimester:   238 - 3280 ng/ml (FEU)     (1) SED RATE 99JYD7799 12:59PM Lalita Matos Order Number: [id number]     Test Name Result Flag Reference   SED RATE 9 mm/hour  0-20

## 2018-01-14 NOTE — RESULT NOTES
Verified Results  (1) VITAMIN D 25-HYDROXY 33ESN1959 08:13AM [employer] Order Number: [id number]     Order Number: [id number]     Test Name Result Flag Reference   VIT D 25-HYDROX 30 0 ng/mL  30 0-100 0     (1) CBC/PLT/DIFF 21ASN6892 08:13AM [employer] Order Number: [id number]     Order Number: [id number]     Test Name Result Flag Reference   WBC COUNT 5 91 Thousand/uL  4 31-10 16   RBC COUNT 5 63 Million/uL H 3 81-5 12   HEMOGLOBIN 13 5 g/dL  11 5-15 4   HEMATOCRIT 43 3 %  34 8-46  1   MCV 77 fL L 82-98   MCH 24 0 pg L 26 8-34 3   MCHC 31 2 g/dL L 31 4-37 4   RDW 16 2 % H 11 6-15 1   MPV 10 4 fL  8 9-12 7   PLATELET COUNT 874 Thousands/uL H 149-390   NEUTROPHILS RELATIVE PERCENT 43 %  43-75   LYMPHOCYTES RELATIVE PERCENT 44 %  14-44   MONOCYTES RELATIVE PERCENT 10 %  4-12   EOSINOPHILS RELATIVE PERCENT 2 %  0-6   BASOPHILS RELATIVE PERCENT 1 %  0-1   NEUTROPHILS ABSOLUTE COUNT 2 55 Thousands/µL  1 85-7 62   LYMPHOCYTES ABSOLUTE COUNT 2 59 Thousands/µL  0 60-4 47   MONOCYTES ABSOLUTE COUNT 0 60 Thousand/µL  0 17-1 22   EOSINOPHILS ABSOLUTE COUNT 0 14 Thousand/µL  0 00-0 61   BASOPHILS ABSOLUTE COUNT 0 03 Thousands/µL  0 00-0 10     (1) COMPREHENSIVE METABOLIC PANEL 15OPC5226 00:68EV [employer] Order Number: [id number]      National Kidney Disease Education Program recommendations are as follows:  GFR calculation is accurate only with a steady state creatinine  Chronic Kidney disease less than 60 ml/min/1 73 sq  meters  Kidney failure less than 15 ml/min/1 73 sq  meters  Test Name Result Flag Reference   GLUCOSE,RANDM 120 mg/dL     If the patient is fasting, the ADA then defines impaired fasting glucose as > 100 mg/dL and diabetes as > or equal to 123 mg/dL     SODIUM 139 mmol/L  136-145   POTASSIUM 4 1 mmol/L  3 5-5 3   CHLORIDE 102 mmol/L  100-108   CARBON DIOXIDE 28 mmol/L  21-32   ANION GAP (CALC) 9 mmol/L  4-13   BLOOD UREA NITROGEN 10 mg/dL  5-25   CREATININE 0 73 mg/dL  0 60-1 30   Standardized to IDMS reference method   CALCIUM 8 7 mg/dL  8 3-10 1   BILI, TOTAL 0 49 mg/dL  0 20-1 00   ALK PHOSPHATAS 66 U/L     ALT (SGPT) 24 U/L  12-78   AST(SGOT) 17 U/L  5-45   ALBUMIN 3 6 g/dL  3 5-5 0   TOTAL PROTEIN 7 3 g/dL  6 4-8 2   eGFR Non-African American      >60 0 ml/min/1 73sq m     (1) MICROALBUMIN CREATININE RATIO, RANDOM URINE [id number] 08:13AM Samuel Luciano Order Number: [id number]     Test Name Result Flag Reference   MICROALBUMIN/ CREAT R 35 mg/g creatinine H 0-30   MICROALBUMIN,URINE 32 8 mg/L H 0 0-20 0   CREATININE URINE 92 5 mg/dL       (1) LDL,DIRECT [id number] 08:13AM [facility] Order Number: [id number]      LDL Cholesterol:        Optimal          <100 mg/dl         Near Optimal     100-129 mg/dl        Above Optimal          Borderline High   130-159 mg/dl          High              160-189 mg/dl          Very High        >189 mg/dl     Test Name Result Flag Reference   LDL, DIRECT 94 mg/dl  0-100     (1) TRIGLYCERIDE 11KCX0693 08:13AM [facility] Order Number: [id number]      Triglyceride:         Normal              <150 mg/dl       Borderline High    150-199 mg/dl       High               200-499 mg/dl       Very High          >499 mg/dl     Test Name Result Flag Reference   TRIGLYCERIDES 76 mg/dL  <=150     (1) TSH 72OCB7330 08:13AM [facility] Order Number: [id number]    Patients undergoing fluorescein dye angiography may retain small amounts of fluorescein in the body for 48-72 hours post procedure  Samples containing fluorescein can produce falsely depressed TSH values  If the patient had this procedure,a specimen should be resubmitted post fluorescein clearance          The recommended reference ranges for TSH during pregnancy are as follows:  First trimester 0 1 to 2 5 uIU/mL  Second trimester  0 2 to 3 0 uIU/mL  Third trimester 0 3 to 3 0 uIU/m     Test Name Result Flag Reference   TSH 0 890 uIU/mL  0 358-3 740

## 2018-01-16 NOTE — RESULT NOTES
Verified Results  (1) HEMOGLOBIN A1C 19Sep2016 08:30AM Angelika Brice Order Number: [id number]     Test Name Result Flag Reference   HEMOGLOBIN A1C 5 9 %  4 2-6 3   EST  AVG  GLUCOSE 123 mg/dl       (1) COMPREHENSIVE METABOLIC PANEL 69GEL1518 64:79HQ Angelika Brice Order Number: [id number]     Test Name Result Flag Reference   GLUCOSE,RANDM 123 mg/dL     If the patient is fasting, the ADA then defines impaired fasting glucose as > 100 mg/dL and diabetes as > or equal to 123 mg/dL  SODIUM 139 mmol/L  136-145   POTASSIUM 3 6 mmol/L  3 5-5 3   CHLORIDE 103 mmol/L  100-108   CARBON DIOXIDE 29 mmol/L  21-32   ANION GAP (CALC) 7 mmol/L  4-13   BLOOD UREA NITROGEN 14 mg/dL  5-25   CREATININE 0 82 mg/dL  0 60-1 30   Standardized to IDMS reference method   CALCIUM 8 5 mg/dL  8 3-10 1   BILI, TOTAL 0 74 mg/dL  0 20-1 00   ALK PHOSPHATAS 61 U/L     ALT (SGPT) 21 U/L  12-78   AST(SGOT) 13 U/L  5-45   ALBUMIN 3 5 g/dL  3 5-5 0   TOTAL PROTEIN 7 9 g/dL  6 4-8 2   eGFR Non-African American      >60 0 ml/min/1 73sq m   - Patient Instructions: This is a fasting blood test  Water, black tea or black coffee only after 9:00pm the night before test Drink 2 glasses of water the morning of test   National Kidney Disease Education Program recommendations are as follows:  GFR calculation is accurate only with a steady state creatinine  Chronic Kidney disease less than 60 ml/min/1 73 sq  meters  Kidney failure less than 15 ml/min/1 73 sq  meters  (1) LIPID PANEL FASTING W DIRECT LDL REFLEX [id number] 08:30AM Angelika Brice Order Number: [id number]     Test Name Result Flag Reference   CHOLESTEROL 178 mg/dL     LDL CHOLESTEROL CALCULATED 104 mg/dL H 0-100   - Patient Instructions: This is a fasting blood test  Water, black tea or black coffee only after 9:00pm the night before test   Drink 2 glasses of water the morning of test     - Patient Instructions:  This is a fasting blood test  Water, black tea or black coffee only after 9:00pm the night before test Drink 2 glasses of water the morning of test   Triglyceride:         Normal              <150 mg/dl       Borderline High    150-199 mg/dl       High               200-499 mg/dl       Very High          >499 mg/dl  Cholesterol:         Desirable        <200 mg/dl      Borderline High  200-239 mg/dl      High             >239 mg/dl  HDL Cholesterol:        High    >59 mg/dL      Low     <41 mg/dL  LDL Cholesterol:        Optimal          <100 mg/dl        Near Optimal     100-129 mg/dl        Above Optimal          Borderline High   130-159 mg/dl          High              160-189 mg/dl          Very High        >189 mg/dl  LDL CALCULATED:    This screening LDL is a calculated result  It does not have the accuracy of the Direct Measured LDL in the monitoring of patients with hyperlipidemia and/or statin therapy  Direct Measure LDL (WEV585) must be ordered separately in these patients  TRIGLYCERIDES 82 mg/dL  <=150   Specimen collection should occur prior to N-Acetylcysteine or Metamizole administration due to the potential for falsely depressed results  HDL,DIRECT 58 mg/dL  40-60   Specimen collection should occur prior to Metamizole administration due to the potential for falsely depressed results  (1) HGB AND HCT, BLOOD 54Peo5232 08:30AM [place] Order Number: [id number]     Test Name Result Flag Reference   HEMATOCRIT 45 5 %  34 8-46 1   HEMOGLOBIN 15 4 g/dL  11 5-15 4     (1) FERRITIN 85Duo7959 08:30AM [place] Order Number: [id number]     Test Name Result Flag Reference   FERRITIN 26 ng/mL  8-388   - Patient Instructions:  This is a fasting blood test  Water, black tea or black coffee only after 9:00pm the night before test Drink 2 glasses of water the morning of test      (1) IRON 68Opv1919 08:30AM [place] Order Number: [id number]     Test Name Result Flag Reference   IRON 86 ug/dL     - Patient Instructions: This is a fasting blood test  Water, black tea or black coffee only after 9:00pm the night before test Drink 2 glasses of water the morning of test      (1) TIBC 43Mlb2315 08:30AM [facility] Order Number: [id number]     Test Name Result Flag Reference   TOTAL IRON BINDING CAPACITY 389 ug/dL  250-450   - Patient Instructions:  This is a fasting blood test  Water, black tea or black coffee only after 9:00pm the night before test Drink 2 glasses of water the morning of test

## 2018-01-17 NOTE — MISCELLANEOUS
Assessment    1  Near syncope (780 2) (R55)   2  Gait abnormality (781 2) (R26 9)   3  CAP (community acquired pneumonia) (5) (J18 9)   4  Urinary tract infection (599 0) (N39 0)   5  Elevated d-dimer (790 92) (R79 89)   6  Elevated troponin level (790 6) (R74 8)    Plan  CAP (community acquired pneumonia)    · Benzonatate 200 MG Oral Capsule; TAKE 1 CAPSULE 3 TIMES DAILY AS NEEDED   Rx By: Laura Benson; Dispense: 17 Days ; #:50 Capsule; Refill: 1; For: CAP (community acquired pneumonia); ROCHELLE = N; Verified Transmission to [address] [place] [address]; Last Updated By: System, SureScripts; 5/31/2017 12:57:27 PM  CAP (community acquired pneumonia), Elevated d-dimer, Elevated troponin level, Gait  abnormality, Near syncope, Urinary tract infection    · (1) CBC/PLT/DIFF; Status:Active; Requested for:31May2017;    Perform:[facility]; JVP:[id number]; Ordered; For:CAP (community acquired pneumonia), Elevated d-dimer, Elevated troponin level, Gait abnormality, Near syncope, Urinary tract infection; Ordered By:Nestor Medina;   · (1) COMPREHENSIVE METABOLIC PANEL; Status:Active; Requested for:31May2017;    Perform:[facility]; EKN:[id number]; Ordered; For:CAP (community acquired pneumonia), Elevated d-dimer, Elevated troponin level, Gait abnormality, Near syncope, Urinary tract infection; Ordered By:Nestor Medina;   · (1) SED RATE; Status:Active; Requested for:31May2017;    Perform:[facility]; SIP:[id number]; Ordered; For:CAP (community acquired pneumonia), Elevated d-dimer, Elevated troponin level, Gait abnormality, Near syncope, Urinary tract infection; Ordered By:Nestor Medina;   · HOLTER MONITOR - 24 HOUR; Status:Hold For - Scheduling; Requested  for:31May2017;    Perform:[facility]; UFI:[id number]; Ordered;   For:CAP (community acquired pneumonia), Elevated d-dimer, Elevated troponin level, Gait abnormality, Near syncope, Urinary tract infection; Ordered By:Adam Octavio Sanz;  CAP (community acquired pneumonia), Gait abnormality    · *1 - [facility]  *  Status: Hold For - Scheduling  Requested  for: 04CYB5161   Ordered; For: CAP (community acquired pneumonia), Gait abnormality; Ordered By: Joseph Haney Performed:  Due: 41ICI0610; Last Updated By: Irene Agustin; 5/31/2017 1:43:54 PM  Care Summary provided  : Yes  CAP (community acquired pneumonia), Near syncope    · * XR CHEST PA & LATERAL; Status:Active; Requested for:45Wde1956;    Perform:[facility]; LJY:[id number]; Ordered; For:CAP (community acquired pneumonia), Near syncope; Ordered By:Nestor Medina;  Glucose intolerance (no malabsorption)    · OneTouch Verio In [employer]; USE 1 STRIP DAILY   Rx By: Joseph Haney; Dispense: 30 Days ; #:1 X 50 Strip Box; Refill: 5; For: Glucose intolerance (no malabsorption); ROCHELLE = N; Verified Transmission to [address]; Last Updated By: System, SureScripts; 5/31/2017 2:10:52 PM  Near syncope    · Meclizine HCl - 25 MG Oral Tablet; TAKE 1 TABLET 3 TIMES DAILY AS NEEDED   Rx By: Joseph Haney; Dispense: 0 Days ; #:90 Tablet; Refill: 0; For: Near syncope; ROCHELLE = N; Verified Transmission to [facility] [address]; Last Updated By: System, SureScripts; 5/31/2017 12:56:32 PM   · (1) D-DIMER; Status:Active; Requested for:81Naa2061;    Perform:[facility]; CPR:[id number]; Ordered;  For:Near syncope; Ordered By:Nestor Medina; Discussion/Summary  Discussion Summary:   ??cause  PE unimpressive  EKG unremarkable  Will check labs, f/u cxr, and since this is an ongoing issue, 24 hour holter  Cards recommended a stress test as OP and will order next visit  Will try some meclazine per pt's request  Had a negative CT head in the hospital per the d/c summary  RTC two weeks  Chief Complaint  Chief Complaint Free Text Note Form: [facility] FU - Pneumonia, UTI  Shelvy Mingle Shelvy Mingle Shelemmay Mingle Pt c/o dizziness and passing out        History of Present Illness  TCM Communication [facility]: The patient is being contacted for follow-up after hospitalization and 06/02/2017  Hospital records were not available  She was hospitalized at and [facility]  The dates of hospitalization:, date of admission: 05/20/2017, date of discharge: 05/23/2017  Diagnosis: PNEUMONIA/UTI  She was discharged to home  She scheduled a follow up appointment  Communication performed and completed by  5/24/2017   HPI: WF presents with her granddaughter for f/u recent [facility] admission  Pt with near syncopal/syncopal event and taken to the ER  Admitted and w/u revealed multilobar pneumonia, elevated treponins, and a UTI  Seen by cards and felt her syncope was due to infection  Treated conservatively and d/c to home  Since going home, continues with periods of "dizziness", but asked further, its is more of lightheadedness and feeling poorly than spinning  No associated CP, SOB, or palpitations  Occurred upon awakening in the am initially, but now is throughout the day  Is eating and drinking  No new meds  No sz  No fever or chills  No slurred speech, facial droop, numbness or parethsias  No prodrome or aura  Review of Systems  Complete-Female:   Constitutional: feeling poorly and feeling tired, but no fever and no chills  Eyes: No complaints of eye pain, no red eyes, no eyesight problems, no discharge, no dry eyes, no itching of eyes  ENT: no complaints of earache, no loss of hearing, no nose bleeds, no nasal discharge, no sore throat, no hoarseness  Cardiovascular: as noted in HPI, no chest pain, no intermittent leg claudication, no palpitations and no lower extremity edema  Respiratory: cough, but no shortness of breath, no orthopnea, no wheezing, no shortness of breath during exertion and no PND  Gastrointestinal: no abdominal pain, no nausea, no constipation and no diarrhea     Genitourinary: No complaints of dysuria, no incontinence, no pelvic pain, no dysmenorrhea, no vaginal discharge or bleeding  Musculoskeletal: No complaints of arthralgias, no myalgias, no joint swelling or stiffness, no limb pain or swelling  Integumentary: No complaints of skin rash or lesions, no itching, no skin wounds, no breast pain or lump  Neurological: fainting, but as noted in HPI, no headache, no confusion and no convulsions  Psychiatric: Not suicidal, no sleep disturbance, no anxiety or depression, no change in personality, no emotional problems  Endocrine: No complaints of proptosis, no hot flashes, no muscle weakness, no deepening of the voice, no feelings of weakness  Hematologic/Lymphatic: No complaints of swollen glands, no swollen glands in the neck, does not bleed easily, does not bruise easily  Active Problems    1  Abnormal imaging of thyroid (794 5) (R94 6)   2  Abnormal weight loss (783 21) (R63 4)   3  Acute frontal sinusitis (461 1) (J01 10)   4  Acute myocardial infarction (410 90) (I21 3)   5  Acute ST elevation myocardial infarction (410 90) (I21 3)   6  Allergic rhinitis (477 9) (J30 9)   7  Asymptomatic menopausal state (V49 81) (Z78 0)   8  Benign colon polyp (211 3) (K63 5)   9  Benign paroxysmal vertigo, unspecified laterality (386 11) (H81 10)   10  Bleeding hemorrhoids (455 8) (K64 9)   11  Cataract (366 9) (H26 9)   12  Chronic obstructive pulmonary disease with acute exacerbation (491 21) (J44 1)   13  Compression fracture of thoracic vertebra, closed, initial encounter (805 2) (S22 000A)   14  Constipation (564 00) (K59 00)   15  Coronary artery disease (414 00) (I25 10)   16  Dermatitis (692 9) (L30 9)   17  Diverticulosis (562 10) (K57 90)   18  Dizziness (780 4) (R42)   19  Fibrocystic breast disease, unspecified laterality (610 1) (N60 19)   20  Generalized anxiety disorder (300 02) (F41 1)   21  GERD without esophagitis (530 81) (K21 9)   22  Glucose intolerance (no malabsorption) (271 3) (E74 39)   23  Hyperlipidemia (272 4) (E78 5)   24   Hypertension (401  9) (I10)   25  Iron deficiency anemia (280 9) (D50 9)   26  Low iron stores (790 6) (R79 0)   27  Malignant melanoma of skin (172 9) (C43 9)   28  Microscopic hematuria (599 72) (R31 29)   29  Need for influenza vaccination (V04 81) (Z23)   30  Need for pneumococcal vaccination (V03 82) (Z23)   31  Osteoarthritis (715 90) (M19 90)   32  Osteoporosis (733 00) (M81 0)   33  Palpitations (785 1) (R00 2)   34  Proteinuria (791 0) (R80 9)   35  Screening for depression (V79 0) (Z13 89)   36  Screening for genitourinary condition (V81 6) (Z13 89)   37  Screening for neurological condition (V80 09) (Z13 89)   38  Syncope (780 2) (R55)   39  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426 0) (I44 2)   40  Urinary incontinence (788 30) (R32)   41  Urinary tract infection (599 0) (N39 0)   42  Vitamin D deficiency (268 9) (E55 9)    Past Medical History    1  History of Abnormal blood chemistry (790 6) (R79 9)   2  History of Abnormal blood sugar (790 29) (R73 09)   3  History of Abnormal blood sugar (790 29) (R73 09)   4  History of Acute otitis media, unspecified laterality   5  History of Costochondritis (733 6) (M94 0)   6  History of Cough (786 2) (R05)   7  History of acute sinusitis (V12 69) (Z87 09)   8  History of dizziness (V13 89) (Z87 898)   9  History of pneumonia (V12 61) (Z87 01)   10  History of pneumonia (V12 61) (Z87 01)   11  History of upper respiratory infection (V12 09) (Z87 09)   12  History of Medicare annual wellness visit, initial (V70 0) (Z00 00)   13  History of Mental status change (780 97) (R41 82)   14  History of Need for influenza vaccination (V04 81) (Z23)   15  History of Short of breath on exertion (786 05) (R06 02)    Surgical History    1  History of Breast Surgery Lumpectomy   2  History of Cardiac Cath Procedure Outcome: Successful   3  History of Cardio-Vascular Agents Drug-Eluting Stent   4  History of Cholecystectomy   5  History of Hemorrhoidectomy  Surgical History Reviewed:    The surgical history was reviewed and updated today  Family History  Mother    1  Family history of Congestive Heart Failure  Father    2  Family history of Prostate Cancer (V16 42)  Family History    3  Family history of Stroke Syndrome (V17 1)  Family History Reviewed: The family history was reviewed and updated today  Social History    · Former smoker (O16 97) (G40 424)   · Uses Safety Equipment - Seatbelts  Social History Reviewed: The social history was reviewed and updated today  The social history was reviewed and is unchanged  Current Meds   1  AmLODIPine Besylate 10 MG Oral Tablet; take 1 tablet by mouth once daily; Therapy: 2017 to (Evaluate:2017)  Requested for: 82Wnj9886; Last   Rx:10Sky5909 Ordered   2  Aspirin EC 81 MG Oral Tablet Delayed Release; Take one tablet once daily; Therapy: 85BGT9355 to (Evaluate:2013); Last [id number] Ordered   3  Atorvastatin Calcium 40 MG Oral Tablet; TAKE 1 TABLET DAILY; Therapy: 49QAY7003 to (Evaluate:2017) Recorded   4  Calcium Carbonate-Vitamin D 600-125 MG-UNIT TABS; Take 2 twice daily; Therapy: 29XHW4881 to (Last [id number]) Ordered   5  Clopidogrel Bisulfate 75 MG Oral Tablet; TAKE 1 TABLET DAILY; Therapy: 69RQL1989 to (Evaluate:2017) Recorded   6  Ferrous Sulfate 325 (65 Fe) MG Oral Tablet; TAKE 1 TABLET DAILY AS DIRECTED; Therapy: 62ACM3680 to (Evaluate:2017)  Requested for: 71VTB4356; Last   Rx:2016 Ordered   7  LORazepam 0 5 MG Oral Tablet; take 1 tablet by mouth every 8 hours if needed; Therapy: 01FFU3748 to (Evaluate:2017)  Requested for: 78TMQ4655; Last   Rx:53Qza3970 Ordered   8  Metoprolol Tartrate 25 MG Oral Tablet; TAKE 1 TABLET TWICE DAILY; Therapy: 2017 to ([phone])  Requested for: 27Sqv0535; Last   Rx:2017 Ordered   9  Multiple Vitamins Oral Tablet; Take 1 daily; Therapy: 34ZJY0958 to (Last Rx:22Jpq3643) Ordered   10   Omega-3-acid Ethyl Esters 1 GM Oral Capsule; TAKE 2 CAPSULES TWICE DAILY; Therapy: 60TAZ0332 to (Dennie Modena)  Requested for: 07Apr2017; Last    Rx:07Apr2017 Ordered   11  Polyethylene Glycol 3350 Oral Powder; MIX 17 GRAMS IN 8 OUNCES OF LIQUID AND    DRINK TWICE DAILY AS NEEDED; Therapy: 44HBX8376 to (Evaluate:06Rfx2954)  Requested for: 95WAW2117; Last    Rx:41Fen5101 Ordered   12  RaNITidine HCl - 150 MG Oral Capsule; Take 1 capsule twice daily; Therapy: 07Apr2017 to (Dennie Modena) Recorded  Medication List Reviewed: The medication list was reviewed and updated today  Allergies    1  Ciprofloxacin-Ciproflox HCl ER TB24   2  Macrodantin CAPS   3  Penicillins    Vitals  Signs   Recorded: 13HJU0585 12:22PM   Temperature: 97 8 F  Heart Rate: 80  Respiration: 18  Systolic: 805  Diastolic: 60  Height: 5 ft 2 in  Weight: 164 lb   BMI Calculated: 30  BSA Calculated: 1 76    Physical Exam    Constitutional   General appearance: No acute distress, well appearing and well nourished  Eyes   Conjunctiva and lids: No swelling, erythema or discharge  Pupils and irises: Equal, round and reactive to light  Ears, Nose, Mouth, and Throat   External inspection of ears and nose: Normal     Otoscopic examination: Tympanic membranes translucent with normal light reflex  Canals patent without erythema  Nasal mucosa, septum, and turbinates: Normal without edema or erythema  Oropharynx: Normal with no erythema, edema, exudate or lesions  Pulmonary   Respiratory effort: No increased work of breathing or signs of respiratory distress  Auscultation of lungs: Clear to auscultation  Cardiovascular   Auscultation of heart: Normal rate and rhythm, normal S1 and S2, without murmurs  Examination of extremities for edema and/or varicosities: Normal     Carotid pulses: Normal     Abdomen   Abdomen: Non-tender, no masses      Musculoskeletal   Gait and station: Normal     Psychiatric   Orientation to person, place, and time: Normal  Mood and affect: Normal          Health Management  Benign colon polyp   COLONOSCOPY; every 5 years; Last 09Cao1944; Next Due: 15CGA5762; Active  Fibrocystic breast disease, unspecified laterality   Digital Bilateral Screening Mammogram With CAD; every 2 years; Last 82KLF0299; Next Due:  82SFS9655; Overdue    Future Appointments    Date/Time Provider Specialty Site   06/27/2017 01:45 PM MARIA ELENA You  Internal [address] [place] [address]   09/11/2017 01:00 PM MARIA ELENA You   Internal Medicine [facility] [address]     Signatures   Electronically signed by : MARIA ELENA Guadalupe ; May 31 2017  2:50PM EST                       (Author)

## 2018-01-22 VITALS
BODY MASS INDEX: 30.18 KG/M2 | WEIGHT: 164 LBS | HEIGHT: 62 IN | HEART RATE: 80 BPM | RESPIRATION RATE: 18 BRPM | SYSTOLIC BLOOD PRESSURE: 146 MMHG | DIASTOLIC BLOOD PRESSURE: 60 MMHG | TEMPERATURE: 97.8 F

## 2018-01-22 VITALS
WEIGHT: 166.38 LBS | TEMPERATURE: 98 F | RESPIRATION RATE: 18 BRPM | SYSTOLIC BLOOD PRESSURE: 152 MMHG | HEART RATE: 72 BPM | DIASTOLIC BLOOD PRESSURE: 72 MMHG | BODY MASS INDEX: 30.62 KG/M2 | OXYGEN SATURATION: 90 % | HEIGHT: 62 IN

## 2018-02-06 ENCOUNTER — OFFICE VISIT (OUTPATIENT)
Dept: INTERNAL MEDICINE CLINIC | Facility: CLINIC | Age: 83
End: 2018-02-06
Payer: MEDICARE

## 2018-02-06 VITALS
HEART RATE: 77 BPM | BODY MASS INDEX: 30.33 KG/M2 | HEIGHT: 62 IN | OXYGEN SATURATION: 92 % | WEIGHT: 164.8 LBS | DIASTOLIC BLOOD PRESSURE: 84 MMHG | SYSTOLIC BLOOD PRESSURE: 158 MMHG | RESPIRATION RATE: 18 BRPM | TEMPERATURE: 97.2 F

## 2018-02-06 DIAGNOSIS — M19.90 OSTEOARTHRITIS, UNSPECIFIED OSTEOARTHRITIS TYPE, UNSPECIFIED SITE: ICD-10-CM

## 2018-02-06 DIAGNOSIS — I25.10 CORONARY ARTERY DISEASE INVOLVING NATIVE HEART WITHOUT ANGINA PECTORIS, UNSPECIFIED VESSEL OR LESION TYPE: ICD-10-CM

## 2018-02-06 DIAGNOSIS — D50.9 IRON DEFICIENCY ANEMIA, UNSPECIFIED IRON DEFICIENCY ANEMIA TYPE: ICD-10-CM

## 2018-02-06 DIAGNOSIS — I10 ESSENTIAL HYPERTENSION: Primary | ICD-10-CM

## 2018-02-06 DIAGNOSIS — J44.9 CHRONIC OBSTRUCTIVE PULMONARY DISEASE, UNSPECIFIED COPD TYPE (HCC): ICD-10-CM

## 2018-02-06 DIAGNOSIS — F41.1 GENERALIZED ANXIETY DISORDER: ICD-10-CM

## 2018-02-06 DIAGNOSIS — K59.00 CONSTIPATION, UNSPECIFIED CONSTIPATION TYPE: ICD-10-CM

## 2018-02-06 DIAGNOSIS — M81.0 AGE-RELATED OSTEOPOROSIS WITHOUT CURRENT PATHOLOGICAL FRACTURE: ICD-10-CM

## 2018-02-06 DIAGNOSIS — K21.9 GERD WITHOUT ESOPHAGITIS: ICD-10-CM

## 2018-02-06 DIAGNOSIS — E78.5 HYPERLIPIDEMIA, UNSPECIFIED HYPERLIPIDEMIA TYPE: ICD-10-CM

## 2018-02-06 DIAGNOSIS — E74.39 GLUCOSE INTOLERANCE (NO MALABSORPTION): ICD-10-CM

## 2018-02-06 PROBLEM — I21.11 ST ELEVATION MYOCARDIAL INFARCTION INVOLVING RIGHT CORONARY ARTERY (HCC): Status: ACTIVE | Noted: 2017-03-24

## 2018-02-06 PROBLEM — I21.4 NSTEMI (NON-ST ELEVATED MYOCARDIAL INFARCTION) (HCC): Status: ACTIVE | Noted: 2017-07-25

## 2018-02-06 PROBLEM — R00.2 PALPITATIONS: Status: ACTIVE | Noted: 2017-03-13

## 2018-02-06 PROBLEM — R26.9 GAIT ABNORMALITY: Status: ACTIVE | Noted: 2017-05-31

## 2018-02-06 PROCEDURE — 99214 OFFICE O/P EST MOD 30 MIN: CPT | Performed by: INTERNAL MEDICINE

## 2018-02-06 RX ORDER — ATORVASTATIN CALCIUM 40 MG/1
1 TABLET, FILM COATED ORAL DAILY
COMMUNITY
Start: 2017-04-07 | End: 2018-08-01 | Stop reason: SDUPTHER

## 2018-02-06 RX ORDER — FEXOFENADINE HCL 180 MG/1
1 TABLET ORAL DAILY PRN
COMMUNITY
Start: 2017-10-24 | End: 2018-05-08 | Stop reason: ALTCHOICE

## 2018-02-06 RX ORDER — MECLIZINE HYDROCHLORIDE 25 MG/1
1 TABLET ORAL 3 TIMES DAILY PRN
COMMUNITY
Start: 2017-05-31 | End: 2018-09-12

## 2018-02-06 RX ORDER — RANITIDINE 150 MG/1
1 TABLET ORAL 2 TIMES DAILY
COMMUNITY
Start: 2017-10-13 | End: 2018-06-25 | Stop reason: SDUPTHER

## 2018-02-06 RX ORDER — PROCHLORPERAZINE MALEATE 5 MG/1
1 TABLET ORAL
COMMUNITY
Start: 2017-08-23 | End: 2018-06-29 | Stop reason: ALTCHOICE

## 2018-02-06 RX ORDER — FERROUS SULFATE 325(65) MG
0.5 TABLET ORAL DAILY
Status: ON HOLD | COMMUNITY
Start: 2016-03-17 | End: 2019-01-01 | Stop reason: CLARIF

## 2018-02-06 RX ORDER — LORAZEPAM 0.5 MG/1
1 TABLET ORAL
COMMUNITY
Start: 2012-02-01 | End: 2018-03-12 | Stop reason: SDUPTHER

## 2018-02-06 RX ORDER — ASPIRIN 81 MG/1
1 TABLET ORAL DAILY
COMMUNITY
Start: 2012-09-17 | End: 2018-07-27 | Stop reason: HOSPADM

## 2018-02-06 RX ORDER — FUROSEMIDE 20 MG/1
0.5 TABLET ORAL DAILY
COMMUNITY
Start: 2017-09-27 | End: 2018-02-21 | Stop reason: SDUPTHER

## 2018-02-06 RX ORDER — POLYETHYLENE GLYCOL 1000
17 POWDER (GRAM) MISCELLANEOUS 2 TIMES DAILY PRN
COMMUNITY
Start: 2016-05-20 | End: 2018-05-08 | Stop reason: ALTCHOICE

## 2018-02-06 RX ORDER — CLOPIDOGREL BISULFATE 75 MG/1
1 TABLET ORAL DAILY
COMMUNITY
Start: 2017-04-07 | End: 2018-06-25 | Stop reason: SDUPTHER

## 2018-02-06 RX ORDER — LISINOPRIL 2.5 MG/1
2.5 TABLET ORAL 2 TIMES DAILY
Refills: 0 | COMMUNITY
Start: 2018-01-09 | End: 2018-05-08

## 2018-02-06 NOTE — PATIENT INSTRUCTIONS
Hypotension   WHAT YOU NEED TO KNOW:   What is hypotension? Hypotension is a condition that causes your blood pressure (BP) to drop lower than it should be  Hypotension may be mild, serious, or life-threatening  What are the most common types of hypotension? · Acute:  Acute hypotension is a sudden drop in your BP that may be life-threatening  · Constitutional:  Constitutional hypotension means your BP is lower than it should be most or all of the time  This is a chronic condition that occurs with no known medical cause  · Orthostatic:  Orthostatic hypotension normally occurs when you stand up from a sitting or lying position  It is also called postural hypotension  · Postprandial:  Postprandial hypotension means your BP becomes too low after you eat a meal  Your BP may drop within 2 hours after you eat, and is more common when you eat meals high in carbohydrates  What causes hypotension? · Anemia or blood loss    · Nervous system, heart, or adrenal disorders    · Dehydration from not drinking enough liquids, frequent vomiting, diarrhea, or severe burns    · Some medicines, such as those used to treat high blood pressure, heart conditions, pain, depression, or cancer    · A blood infection (sepsis)  What increases my risk for hypotension? · Increasing age    · Drug and alcohol use    · Being bedridden for a long period of time    · Low body weight    · Hemodialysis    · Medical conditions such as diabetes, Parkinson disease, and Alzheimer disease  What are the signs and symptoms of hypotension? · Feeling anxious, nauseated, tired, or weak    · Lightheadedness, dizziness, or fainting    · Increased sweating, palpitations (fast, forceful heartbeats), tremors, or a seizure     · Headache or pain in your neck, shoulders, chest, lower back, buttocks, or legs    · Blurred vision or changes in vision    · Confusion, decreased memory, or inability to pay attention  How is hypotension diagnosed?   Your healthcare provider will ask about your symptoms, health conditions, and medicines  Tell him how often you have symptoms, and if they change during the day  Tell him if you recently have had diarrhea, vomiting, or blood loss  Your healthcare provider will carefully examine you, listen to your heart, and may check your eyes  He may check your body movements and sensations (ability to feel something that touches you)  You may also need the following:  · Blood pressure testing: This may be done while you lie down, sit, and stand  You may need to wear a BP monitor to record your BP for up to 24 hours  · Tilt table testing: You are secured on a table that changes your position  Your BP is checked when the table moves you into each position  What tests may help find the cause of my hypotension? Many times, hypotension is a symptom of another condition  You may need any of the following tests to find the cause of your hypotension:  · Blood tests:  A sample of your blood or urine may be checked for anemia or other conditions causing your hypotension  · EKG: This test records the electrical activity of your heart  It is used to check for damage or other heart problems that may be causing your hypotension  · Autonomic nervous system tests:  Your healthcare provider may check for changes in how fast your heart beats when you take deep breaths  He may also check for changes in your BP while you put your hand in ice cold water  · 24 hour urine test: During this test you will need to collect all of your urine for 24 hours  You will urinate into a container and the urine will be put into a jug  The jug will need to be kept cold  If you urinate during the night, you will need to save that urine  Caregivers will measure and record how much you urinate  At the end of 24 hours, the urine will be sent to a lab for tests  · Echocardiogram:  This is a type of ultrasound, also called an echo   An ultrasound uses sound waves to show pictures of your heart on a monitor  An ultrasound may be done to show how your heart moves when it beats  How is hypotension treated? Your healthcare provider will work with you to find the cause of your hypotension and help treat your symptoms  You may need the following:  · Compression stockings or abdominal binder: These may help blood return to your heart and decrease your hypotension  · IV fluids: These may be used to increase your BP if you are dehydrated or have blood loss or sepsis  · Medicines:      ¨ Alpha-adrenoreceptor agonists: These medicines may increase your BP and decrease your symptoms  ¨ Steroids: This medicine helps prevent salt loss from your body  Steroids may also help increase the amount of fluid in your body and raise your BP  ¨ Vasopressors: These medicines help constrict (make smaller) your blood vessels and increase your BP  Vasopressor medicines may increase the blood flow to your brain and help decrease your symptoms  ¨ Antidiuretic hormone: This medicine helps control your BP and helps decrease your need to urinate during the night  ¨ Antiparkinson medicine: This medicine may help increase your standing BP and decrease your symptoms  What are the risks of hypotension? Without treatment, your symptoms may get worse  You may faint or fall often, which can lead to injuries, such as a broken bone  You may be at increased risk for depression, confusion, and memory problems  Hypotension may cause decreased blood flow to your brain and heart  This may lead to a stroke or heart attack and can be life-threatening  Sepsis-related hypotension is life-threatening without treatment  How can I manage my symptoms? · Change your position slowly:  When you get out of bed, sit up first, then slowly move your legs to the side of the bed  If you are not having any symptoms, slowly stand up  If you have symptoms, sit down right away      · Avoid straining: Activities and movements that cause you to strain can cause a drop in your BP  Activities to avoid include lifting, coughing, and other movements that increase the feeling of pressure in your chest     · Avoid the heat: This can cause a decrease in your BP  Stay inside during very hot days, or limit the amount of time you are outside  Do not take hot baths  · Exercise and do physical counter maneuvers:  Ask your healthcare provider about the best exercise plan for you  Physical counter maneuvers may help to increase your BP and increase blood flow to your heart  They include crossing your legs, squatting, and bending at the waist  You can also rise up on your toes while you are standing, and tighten your thigh muscles  · Drink liquids as directed:  Ask your healthcare provider how much liquid to drink each day and which liquids are best for you  Drink 500 milliliters (½ liter) of liquid quickly in the morning or before meals to help increase your BP  Your healthcare provider may tell you to drink 2 cups of coffee with, or after, breakfast and lunch  The caffeine in the coffee can help prevent a drop in your BP  Your healthcare provider may also give you caffeine pills  · Change how you eat meals: If your BP drops after you eat large meals, try to eat smaller meals more often  Eat foods low in carbohydrates and cholesterol to help prevent BP drops after you eat  Ask if you need to increase the amount of sodium (salt) you eat each day  · Raise the head of your bed:  Raise the head of your bed 4 to 8 inches  This can help prevent morning BP drops and decrease the need to urinate during the night  · Do not drink alcohol:  Alcohol can make your symptoms worse  Ask for information if you need help quitting  When should I contact my healthcare provider? · You vomit several times or have diarrhea, and you cannot drink liquid  · You have a fever       · You have new or increased symptoms, such as dizziness, weakness, or fainting  · Your legs, ankles, and feet are swollen, or you gain weight for no known reason  · You have questions or concerns about your condition or care  When should I seek immediate care or call 911? · You become confused or cannot speak  · You urinate very little or not at all  · You have a seizure  · You have chest pain or trouble breathing  · You have changes in vision or cannot see  CARE AGREEMENT:   You have the right to help plan your care  Learn about your health condition and how it may be treated  Discuss treatment options with your caregivers to decide what care you want to receive  You always have the right to refuse treatment  The above information is an  only  It is not intended as medical advice for individual conditions or treatments  Talk to your doctor, nurse or pharmacist before following any medical regimen to see if it is safe and effective for you  © 2017 [employer] Information is for End User's use only and may not be sold, redistributed or otherwise used for commercial purposes  All illustrations and images included in CareNotes® are the copyrighted property of [employer]  or [employer]

## 2018-02-06 NOTE — PROGRESS NOTES
Assessment/Plan:    No problem-specific Assessment & Plan notes found for this encounter  Diagnoses and all orders for this visit:    Essential hypertension    Coronary artery disease involving native heart without angina pectoris, unspecified vessel or lesion type    Chronic obstructive pulmonary disease, unspecified COPD type ([facility] [address] )    GERD without esophagitis    Constipation, unspecified constipation type    Age-related osteoporosis without current pathological fracture    Osteoarthritis, unspecified osteoarthritis type, unspecified site    Generalized anxiety disorder    Hyperlipidemia, unspecified hyperlipidemia type    Glucose intolerance (no malabsorption)    Iron deficiency anemia, unspecified iron deficiency anemia type    Other orders  -     aspirin (ECOTRIN LOW STRENGTH) 81 mg EC tablet; Take 1 tablet by mouth daily  -     atorvastatin (LIPITOR) 40 mg tablet; Take 1 tablet by mouth daily  -     Calcium Carbonate-Vitamin D 600-125 MG-UNIT TABS; Take by mouth 2 (two) times a day  -     clopidogrel (PLAVIX) 75 mg tablet; Take 1 tablet by mouth daily  -     ferrous sulfate 325 (65 Fe) mg tablet; Take 1 tablet by mouth daily  -     fexofenadine (ALLEGRA) 180 MG tablet; Take 1 tablet by mouth daily as needed  -     furosemide (LASIX) 20 mg tablet; Take 0 5 tablets by mouth daily  -     LORazepam (ATIVAN) 0 5 mg tablet; Take 1 tablet by mouth  -     meclizine (ANTIVERT) 25 mg tablet; Take 1 tablet by mouth 3 (three) times a day as needed  -     metoprolol tartrate (LOPRESSOR) 25 mg tablet; Take by mouth  -     MULTIPLE VITAMINS-CALCIUM PO; Take by mouth daily  -     ranitidine (ZANTAC) 150 mg tablet; Take 1 tablet by mouth 2 (two) times a day  -     prochlorperazine (COMPAZINE) 5 mg tablet;  Take 1 tablet by mouth  -     Polyethylene Glycol 1000 POWD; Take 17 g by mouth 2 (two) times a day as needed  -     glucose blood (ONE TOUCH TEST STRIPS) test strip; 1 Squirt by In Vitro route daily  -     lisinopril (ZESTRIL) 2 5 mg tablet; Take 2 5 mg by mouth 2 (two) times a day      A/P: Doing well  Labs up to date  BP is up and pt is feeling better and I think we will have to live with her BP being up slightly so she doesn't fall  As far as the dizziness in bed, ?due to her c spine and the way she sleeps  She will try using different bed and pillows  Flu vaccine up to date  Continue current treatment otherwise and keep f/u with cards  RTC four months  Subjective:      Patient ID: Vicki Moore is a 80 y o  female  WF RTC with her daughter for f/u Htn, CAD, etc  Doing better and no new issues  Reports ACEI increased due to elevated bp, but became lightheaded and is back to once a day  Gets dizzy if she lies in bed too long  Remains active w/o difficulty otherwise  No recent falls  Labs up to date  BP is up today and daughter reports slight increase at home, but pt feels better with higher bp  Had her flu shot  Continues with MADELYN, but meds are helping  No CP or edema  Breathing has been good as well  Hypertension   Pertinent negatives include no chest pain, headaches, palpitations or shortness of breath  The following portions of the patient's history were reviewed and updated as appropriate:   She  has a past medical history of Abnormal blood sugar (03/13/2017); Anxiety; Cervical radiculopathy (08/08/2017); COPD (chronic obstructive pulmonary disease) ([facility] ) (2/6/2018); Coronary artery disease (4/7/2017); Costochondritis (02/05/2013); GERD without esophagitis (8/30/2012); Hypertension; Iron deficiency anemia (4/21/2016); Malignant melanoma of skin ([facility] ) (9/17/2012); NSTEMI (non-ST elevated myocardial infarction) ([facility] ) (7/25/2017); Osteoarthritis (9/17/2012); Osteoporosis (3/13/2017); and 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[facility] ) (04/07/2017)  She  does not have any pertinent problems on file  She  has a past surgical history that includes Breast surgery; Cardiac catheterization (04/07/2017);  INSERTION STENT ARTERY (04/07/2017); Cholecystectomy; and Hemorrhoid surgery  Her family history includes Heart failure in her mother; Prostate cancer in her father; Stroke in her family  She  reports that she has quit smoking  She has never used smokeless tobacco  She reports that she does not drink alcohol or use drugs  Current Outpatient Prescriptions   Medication Sig Dispense Refill    aspirin (ECOTRIN LOW STRENGTH) 81 mg EC tablet Take 1 tablet by mouth daily      atorvastatin (LIPITOR) 40 mg tablet Take 1 tablet by mouth daily      Calcium Carbonate-Vitamin D 600-125 MG-UNIT TABS Take by mouth 2 (two) times a day      clopidogrel (PLAVIX) 75 mg tablet Take 1 tablet by mouth daily      ferrous sulfate 325 (65 Fe) mg tablet Take 1 tablet by mouth daily      fexofenadine (ALLEGRA) 180 MG tablet Take 1 tablet by mouth daily as needed      furosemide (LASIX) 20 mg tablet Take 0 5 tablets by mouth daily      glucose blood (ONE TOUCH TEST STRIPS) test strip 1 Squirt by In Vitro route daily      LORazepam (ATIVAN) 0 5 mg tablet Take 1 tablet by mouth      meclizine (ANTIVERT) 25 mg tablet Take 1 tablet by mouth 3 (three) times a day as needed      metoprolol tartrate (LOPRESSOR) 25 mg tablet Take by mouth      MULTIPLE VITAMINS-CALCIUM PO Take by mouth daily      Polyethylene Glycol 1000 POWD Take 17 g by mouth 2 (two) times a day as needed      prochlorperazine (COMPAZINE) 5 mg tablet Take 1 tablet by mouth      ranitidine (ZANTAC) 150 mg tablet Take 1 tablet by mouth 2 (two) times a day      lisinopril (ZESTRIL) 2 5 mg tablet Take 2 5 mg by mouth 2 (two) times a day  0     No current facility-administered medications for this visit  No current outpatient prescriptions on file prior to visit  No current facility-administered medications on file prior to visit  She is allergic to ciprofloxacin; nitrofurantoin; and penicillins       Review of Systems   Constitutional: Negative for activity change, chills, diaphoresis, fatigue and fever  Respiratory: Negative for cough, chest tightness, shortness of breath and wheezing  Cardiovascular: Negative for chest pain, palpitations and leg swelling  Gastrointestinal: Negative for abdominal pain, constipation, diarrhea, nausea and vomiting  Genitourinary: Negative for difficulty urinating, dysuria and frequency  Musculoskeletal: Negative for arthralgias, gait problem and myalgias  Neurological: Positive for dizziness  Negative for seizures, syncope, weakness, light-headedness and headaches  Psychiatric/Behavioral: Negative for confusion and sleep disturbance  The patient is not nervous/anxious  Objective:     Physical Exam   Constitutional: She is oriented to person, place, and time  She appears well-developed and well-nourished  No distress  HENT:   Head: Normocephalic and atraumatic  Mouth/Throat: Oropharynx is clear and moist    Eyes: Conjunctivae and EOM are normal  Pupils are equal, round, and reactive to light  Cardiovascular: Normal rate, regular rhythm and normal heart sounds  Pulmonary/Chest: Effort normal and breath sounds normal  No respiratory distress  She has no wheezes  Abdominal: Soft  Bowel sounds are normal  There is no tenderness  Musculoskeletal: She exhibits no edema  Neurological: She is alert and oriented to person, place, and time  Psychiatric: She has a normal mood and affect  Her behavior is normal  Judgment and thought content normal    Nursing note and vitals reviewed

## 2018-02-21 DIAGNOSIS — R60.9 EDEMA, UNSPECIFIED TYPE: Primary | ICD-10-CM

## 2018-02-22 RX ORDER — FUROSEMIDE 20 MG/1
TABLET ORAL
Qty: 30 TABLET | Refills: 5 | Status: SHIPPED | OUTPATIENT
Start: 2018-02-22 | End: 2018-02-23 | Stop reason: DRUGHIGH

## 2018-02-23 DIAGNOSIS — R60.9 EDEMA, UNSPECIFIED TYPE: ICD-10-CM

## 2018-02-24 RX ORDER — FUROSEMIDE 20 MG/1
TABLET ORAL
Qty: 30 TABLET | Refills: 0 | Status: SHIPPED | OUTPATIENT
Start: 2018-02-24 | End: 2018-03-26 | Stop reason: SDUPTHER

## 2018-03-12 ENCOUNTER — TELEPHONE (OUTPATIENT)
Dept: INTERNAL MEDICINE CLINIC | Facility: CLINIC | Age: 83
End: 2018-03-12

## 2018-03-12 DIAGNOSIS — F41.1 GENERALIZED ANXIETY DISORDER: Primary | ICD-10-CM

## 2018-03-12 RX ORDER — LORAZEPAM 0.5 MG/1
0.5 TABLET ORAL EVERY 8 HOURS PRN
Qty: 90 TABLET | Refills: 0 | Status: SHIPPED | OUTPATIENT
Start: 2018-03-12 | End: 2018-05-08 | Stop reason: SDUPTHER

## 2018-03-26 DIAGNOSIS — R60.9 EDEMA, UNSPECIFIED TYPE: ICD-10-CM

## 2018-03-26 RX ORDER — FUROSEMIDE 20 MG/1
TABLET ORAL
Qty: 30 TABLET | Refills: 5 | Status: SHIPPED | OUTPATIENT
Start: 2018-03-26 | End: 2018-06-04

## 2018-05-07 PROBLEM — F41.9 ANXIETY: Status: ACTIVE | Noted: 2018-05-07

## 2018-05-07 PROBLEM — S22.000A COMPRESSION FRACTURE OF THORACIC VERTEBRA, CLOSED, INITIAL ENCOUNTER (HCC): Status: ACTIVE | Noted: 2017-03-13

## 2018-05-07 PROBLEM — I21.A1 NON-ST ELEVATION MYOCARDIAL INFARCTION (NSTEMI), TYPE 2: Status: ACTIVE | Noted: 2017-07-25

## 2018-05-08 ENCOUNTER — OFFICE VISIT (OUTPATIENT)
Dept: INTERNAL MEDICINE CLINIC | Facility: CLINIC | Age: 83
End: 2018-05-08
Payer: MEDICARE

## 2018-05-08 VITALS
BODY MASS INDEX: 30.18 KG/M2 | WEIGHT: 164 LBS | TEMPERATURE: 99.4 F | OXYGEN SATURATION: 94 % | DIASTOLIC BLOOD PRESSURE: 80 MMHG | HEIGHT: 62 IN | RESPIRATION RATE: 18 BRPM | SYSTOLIC BLOOD PRESSURE: 124 MMHG | HEART RATE: 80 BPM

## 2018-05-08 DIAGNOSIS — K21.9 GASTROESOPHAGEAL REFLUX DISEASE WITHOUT ESOPHAGITIS: ICD-10-CM

## 2018-05-08 DIAGNOSIS — F41.1 GENERALIZED ANXIETY DISORDER: ICD-10-CM

## 2018-05-08 DIAGNOSIS — E55.9 VITAMIN D DEFICIENCY: ICD-10-CM

## 2018-05-08 DIAGNOSIS — J44.9 CHRONIC OBSTRUCTIVE PULMONARY DISEASE, UNSPECIFIED COPD TYPE (HCC): ICD-10-CM

## 2018-05-08 DIAGNOSIS — D50.9 IRON DEFICIENCY ANEMIA, UNSPECIFIED IRON DEFICIENCY ANEMIA TYPE: ICD-10-CM

## 2018-05-08 DIAGNOSIS — I25.10 CORONARY ARTERY DISEASE INVOLVING NATIVE HEART WITHOUT ANGINA PECTORIS, UNSPECIFIED VESSEL OR LESION TYPE: ICD-10-CM

## 2018-05-08 DIAGNOSIS — I10 ESSENTIAL HYPERTENSION: Primary | ICD-10-CM

## 2018-05-08 PROBLEM — F41.9 ANXIETY: Status: RESOLVED | Noted: 2018-05-07 | Resolved: 2018-05-08

## 2018-05-08 PROCEDURE — 99214 OFFICE O/P EST MOD 30 MIN: CPT | Performed by: INTERNAL MEDICINE

## 2018-05-08 RX ORDER — LORAZEPAM 0.5 MG/1
0.5 TABLET ORAL EVERY 8 HOURS PRN
Qty: 90 TABLET | Refills: 0 | Status: SHIPPED | OUTPATIENT
Start: 2018-05-08 | End: 2018-07-16 | Stop reason: SDUPTHER

## 2018-05-08 NOTE — PROGRESS NOTES
Assessment/Plan:    No problem-specific Assessment & Plan notes found for this encounter  Diagnoses and all orders for this visit:    Essential hypertension  -     Comprehensive metabolic panel; Future  -     Microalbumin / creatinine urine ratio; Future    Coronary artery disease involving native heart without angina pectoris, unspecified vessel or lesion type  -     LDL cholesterol, direct; Future  -     TSH, 3rd generation; Future  -     Triglycerides; Future    Chronic obstructive pulmonary disease, unspecified COPD type ([place] [address] )    Gastroesophageal reflux disease without esophagitis    Iron deficiency anemia, unspecified iron deficiency anemia type  -     CBC and differential; Future  -     Ferritin; Future  -     Iron; Future    Generalized anxiety disorder    Vitamin D deficiency  -     Vitamin D 1,25 dihydroxy; Future      A/P: Doing well and check labs  Continue current treatment and await labs  RTC four months for routine  Subjective:      Patient ID: Sharon Baeza is a 80 y o  female  WF RTC with her family for f/u htn, COPD, etc  Doing ok and no c/o's  Remains active w/o difficulty and no falls  Lives with her family and sold her house  Breathing is good  No angina, edema, palpitations, or PND/orthopnea  Due for labs  No skin lesions  Family relates only slight memory issues  The following portions of the patient's history were reviewed and updated as appropriate:   She  has a past medical history of Abnormal blood sugar (03/13/2017); Anxiety; Cervical radiculopathy (08/08/2017); COPD (chronic obstructive pulmonary disease) ([facility] ) (2/6/2018); Coronary artery disease (4/7/2017); Costochondritis (02/05/2013); GERD without esophagitis (8/30/2012); Hypertension; Iron deficiency anemia (4/21/2016); Malignant melanoma of skin ([facility] ) (9/17/2012); NSTEMI (non-ST elevated myocardial infarction) ([facility] ) (7/25/2017); Osteoarthritis (9/17/2012);  Osteoporosis (3/13/2017); and 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[place] [address] ) (04/07/2017)  She   Patient Active Problem List    Diagnosis Date Noted    Hyperlipidemia 02/06/2018    COPD (chronic obstructive pulmonary disease) ([place] [address] ) 02/06/2018    Non-ST elevation myocardial infarction (NSTEMI), type 2 07/25/2017    Gait abnormality 05/31/2017    Coronary artery disease 04/07/2017    ST elevation myocardial infarction involving right coronary artery ([place] [address] ) 03/24/2017    Osteoporosis 03/13/2017    Palpitations 03/13/2017    Compression fracture of thoracic vertebra, closed, initial encounter ([place] [address] ) 03/13/2017    Constipation 04/21/2016    Diverticulosis 04/21/2016    Iron deficiency anemia 04/21/2016    Proteinuria 01/06/2015    Glucose intolerance (no malabsorption) 02/06/2014    Vitamin D deficiency 01/07/2013    Allergic rhinitis 09/17/2012    Benign colon polyp 09/17/2012    Benign paroxysmal vertigo 09/17/2012    Cataract 09/17/2012    Fibrocystic breast disease, unspecified laterality 09/17/2012    Generalized anxiety disorder 09/17/2012    Hypertension 09/17/2012    Malignant melanoma of skin ([place] [address] ) 09/17/2012    Osteoarthritis 09/17/2012    Urinary incontinence 09/17/2012    GERD (gastroesophageal reflux disease) 08/30/2012     She  has a past surgical history that includes Breast surgery; Cardiac catheterization (04/07/2017); INSERTION STENT ARTERY (04/07/2017); Cholecystectomy; and Hemorrhoid surgery  Her family history includes Heart failure in her mother; Prostate cancer in her father; Stroke in her family  She  reports that she has quit smoking  She has never used smokeless tobacco  She reports that she does not drink alcohol or use drugs    Current Outpatient Prescriptions   Medication Sig Dispense Refill    aspirin (ECOTRIN LOW STRENGTH) 81 mg EC tablet Take 1 tablet by mouth daily      atorvastatin (LIPITOR) 40 mg tablet Take 1 tablet by mouth daily      Calcium Carbonate-Vitamin D 600-125 MG-UNIT TABS Take by mouth 2 (two) times a day  clopidogrel (PLAVIX) 75 mg tablet Take 1 tablet by mouth daily      ferrous sulfate 325 (65 Fe) mg tablet Take 1 tablet by mouth daily      furosemide (LASIX) 20 mg tablet Take  1/2 Tablet daily 30 tablet 5    glucose blood (ONE TOUCH TEST STRIPS) test strip 1 Squirt by In Vitro route daily      LORazepam (ATIVAN) 0 5 mg tablet Take 1 tablet (0 5 mg total) by mouth every 8 (eight) hours as needed for anxiety 90 tablet 0    meclizine (ANTIVERT) 25 mg tablet Take 1 tablet by mouth 3 (three) times a day as needed      metoprolol tartrate (LOPRESSOR) 25 mg tablet Take 1 tablet (25 mg total) by mouth every 12 (twelve) hours 60 tablet 5    MULTIPLE VITAMINS-CALCIUM PO Take by mouth daily      prochlorperazine (COMPAZINE) 5 mg tablet Take 1 tablet by mouth      ranitidine (ZANTAC) 150 mg tablet Take 1 tablet by mouth 2 (two) times a day       No current facility-administered medications for this visit        Current Outpatient Prescriptions on File Prior to Visit   Medication Sig    aspirin (ECOTRIN LOW STRENGTH) 81 mg EC tablet Take 1 tablet by mouth daily    atorvastatin (LIPITOR) 40 mg tablet Take 1 tablet by mouth daily    Calcium Carbonate-Vitamin D 600-125 MG-UNIT TABS Take by mouth 2 (two) times a day    clopidogrel (PLAVIX) 75 mg tablet Take 1 tablet by mouth daily    ferrous sulfate 325 (65 Fe) mg tablet Take 1 tablet by mouth daily    furosemide (LASIX) 20 mg tablet Take  1/2 Tablet daily    glucose blood (ONE TOUCH TEST STRIPS) test strip 1 Squirt by In Vitro route daily    meclizine (ANTIVERT) 25 mg tablet Take 1 tablet by mouth 3 (three) times a day as needed    metoprolol tartrate (LOPRESSOR) 25 mg tablet Take 1 tablet (25 mg total) by mouth every 12 (twelve) hours    MULTIPLE VITAMINS-CALCIUM PO Take by mouth daily    prochlorperazine (COMPAZINE) 5 mg tablet Take 1 tablet by mouth    ranitidine (ZANTAC) 150 mg tablet Take 1 tablet by mouth 2 (two) times a day    [DISCONTINUED] fexofenadine (ALLEGRA) 180 MG tablet Take 1 tablet by mouth daily as needed    [DISCONTINUED] lisinopril (ZESTRIL) 2 5 mg tablet Take 2 5 mg by mouth 2 (two) times a day    [DISCONTINUED] LORazepam (ATIVAN) 0 5 mg tablet Take 1 tablet (0 5 mg total) by mouth every 8 (eight) hours as needed for anxiety    [DISCONTINUED] Polyethylene Glycol 1000 POWD Take 17 g by mouth 2 (two) times a day as needed     No current facility-administered medications on file prior to visit  She is allergic to ciprofloxacin; nitrofurantoin; and penicillins       Review of Systems   Constitutional: Negative for activity change, chills, diaphoresis, fatigue and fever  HENT: Negative  Eyes: Negative for visual disturbance  Respiratory: Negative for cough, chest tightness, shortness of breath and wheezing  Cardiovascular: Negative for chest pain, palpitations and leg swelling  Gastrointestinal: Negative for abdominal pain, constipation, diarrhea, nausea and vomiting  Endocrine: Negative for cold intolerance and heat intolerance  Genitourinary: Negative for difficulty urinating, dysuria and frequency  Musculoskeletal: Negative for arthralgias, gait problem and myalgias  Neurological: Negative for dizziness, tremors, seizures, light-headedness and headaches  Psychiatric/Behavioral: Negative for confusion, dysphoric mood and sleep disturbance  The patient is nervous/anxious  Objective:      /80 (BP Location: Left arm, Patient Position: Sitting, Cuff Size: Adult)   Pulse 80   Temp 99 4 °F (37 4 °C) (Tympanic)   Resp 18   Ht 5' 2" (1 575 m)   Wt 74 4 kg (164 lb)   SpO2 94%   BMI 30 00 kg/m²          Physical Exam   Constitutional: She is oriented to person, place, and time  She appears well-developed and well-nourished  No distress  HENT:   Head: Normocephalic and atraumatic  Mouth/Throat: Oropharynx is clear and moist  No oropharyngeal exudate     Eyes: Conjunctivae and EOM are normal  Pupils are equal, round, and reactive to light  Neck: Neck supple  No JVD present  Cardiovascular: Normal rate, regular rhythm and normal heart sounds  No murmur heard  Pulmonary/Chest: Effort normal and breath sounds normal  No respiratory distress  She has no wheezes  Abdominal: Soft  Bowel sounds are normal  She exhibits no distension  There is no tenderness  Musculoskeletal: She exhibits no edema  Neurological: She is alert and oriented to person, place, and time  Psychiatric: She has a normal mood and affect  Her behavior is normal  Judgment and thought content normal    Nursing note and vitals reviewed

## 2018-05-08 NOTE — PATIENT INSTRUCTIONS

## 2018-05-10 ENCOUNTER — LAB (OUTPATIENT)
Dept: LAB | Facility: CLINIC | Age: 83
End: 2018-05-10
Payer: MEDICARE

## 2018-05-10 ENCOUNTER — TRANSCRIBE ORDERS (OUTPATIENT)
Dept: LAB | Facility: CLINIC | Age: 83
End: 2018-05-10

## 2018-05-10 DIAGNOSIS — E55.9 VITAMIN D DEFICIENCY: ICD-10-CM

## 2018-05-10 DIAGNOSIS — I25.10 CORONARY ARTERY DISEASE INVOLVING NATIVE HEART WITHOUT ANGINA PECTORIS, UNSPECIFIED VESSEL OR LESION TYPE: ICD-10-CM

## 2018-05-10 DIAGNOSIS — I10 ESSENTIAL HYPERTENSION: ICD-10-CM

## 2018-05-10 DIAGNOSIS — D50.9 IRON DEFICIENCY ANEMIA, UNSPECIFIED IRON DEFICIENCY ANEMIA TYPE: ICD-10-CM

## 2018-05-10 LAB
ALBUMIN SERPL BCP-MCNC: 3.5 G/DL (ref 3.5–5)
ALP SERPL-CCNC: 58 U/L (ref 46–116)
ALT SERPL W P-5'-P-CCNC: 22 U/L (ref 12–78)
ANION GAP SERPL CALCULATED.3IONS-SCNC: 5 MMOL/L (ref 4–13)
AST SERPL W P-5'-P-CCNC: 15 U/L (ref 5–45)
BASOPHILS # BLD AUTO: 0.03 THOUSANDS/ΜL (ref 0–0.1)
BASOPHILS NFR BLD AUTO: 1 % (ref 0–1)
BILIRUB SERPL-MCNC: 1.03 MG/DL (ref 0.2–1)
BUN SERPL-MCNC: 17 MG/DL (ref 5–25)
CALCIUM SERPL-MCNC: 9 MG/DL (ref 8.3–10.1)
CHLORIDE SERPL-SCNC: 103 MMOL/L (ref 100–108)
CO2 SERPL-SCNC: 32 MMOL/L (ref 21–32)
CREAT SERPL-MCNC: 0.82 MG/DL (ref 0.6–1.3)
CREAT UR-MCNC: 32 MG/DL
EOSINOPHIL # BLD AUTO: 0.14 THOUSAND/ΜL (ref 0–0.61)
EOSINOPHIL NFR BLD AUTO: 2 % (ref 0–6)
ERYTHROCYTE [DISTWIDTH] IN BLOOD BY AUTOMATED COUNT: 14.2 % (ref 11.6–15.1)
FERRITIN SERPL-MCNC: 51 NG/ML (ref 8–388)
GFR SERPL CREATININE-BSD FRML MDRD: 62 ML/MIN/1.73SQ M
GLUCOSE P FAST SERPL-MCNC: 110 MG/DL (ref 65–99)
HCT VFR BLD AUTO: 47.1 % (ref 34.8–46.1)
HGB BLD-MCNC: 14.1 G/DL (ref 11.5–15.4)
IRON SERPL-MCNC: 98 UG/DL (ref 50–170)
LDLC SERPL DIRECT ASSAY-MCNC: 52 MG/DL (ref 0–100)
LYMPHOCYTES # BLD AUTO: 2.25 THOUSANDS/ΜL (ref 0.6–4.47)
LYMPHOCYTES NFR BLD AUTO: 39 % (ref 14–44)
MCH RBC QN AUTO: 27.6 PG (ref 26.8–34.3)
MCHC RBC AUTO-ENTMCNC: 29.9 G/DL (ref 31.4–37.4)
MCV RBC AUTO: 92 FL (ref 82–98)
MICROALBUMIN UR-MCNC: 5.2 MG/L (ref 0–20)
MICROALBUMIN/CREAT 24H UR: 16 MG/G CREATININE (ref 0–30)
MONOCYTES # BLD AUTO: 0.54 THOUSAND/ΜL (ref 0.17–1.22)
MONOCYTES NFR BLD AUTO: 9 % (ref 4–12)
NEUTROPHILS # BLD AUTO: 2.85 THOUSANDS/ΜL (ref 1.85–7.62)
NEUTS SEG NFR BLD AUTO: 49 % (ref 43–75)
NRBC BLD AUTO-RTO: 0 /100 WBCS
PLATELET # BLD AUTO: 287 THOUSANDS/UL (ref 149–390)
PMV BLD AUTO: 10.2 FL (ref 8.9–12.7)
POTASSIUM SERPL-SCNC: 3.9 MMOL/L (ref 3.5–5.3)
PROT SERPL-MCNC: 7.8 G/DL (ref 6.4–8.2)
RBC # BLD AUTO: 5.1 MILLION/UL (ref 3.81–5.12)
SODIUM SERPL-SCNC: 140 MMOL/L (ref 136–145)
TRIGL SERPL-MCNC: 107 MG/DL
TSH SERPL DL<=0.05 MIU/L-ACNC: 0.9 UIU/ML (ref 0.36–3.74)
WBC # BLD AUTO: 5.82 THOUSAND/UL (ref 4.31–10.16)

## 2018-05-10 PROCEDURE — 83540 ASSAY OF IRON: CPT

## 2018-05-10 PROCEDURE — 83721 ASSAY OF BLOOD LIPOPROTEIN: CPT

## 2018-05-10 PROCEDURE — 82652 VIT D 1 25-DIHYDROXY: CPT

## 2018-05-10 PROCEDURE — 84478 ASSAY OF TRIGLYCERIDES: CPT

## 2018-05-10 PROCEDURE — 36415 COLL VENOUS BLD VENIPUNCTURE: CPT

## 2018-05-10 PROCEDURE — 82728 ASSAY OF FERRITIN: CPT

## 2018-05-10 PROCEDURE — 82043 UR ALBUMIN QUANTITATIVE: CPT

## 2018-05-10 PROCEDURE — 85025 COMPLETE CBC W/AUTO DIFF WBC: CPT

## 2018-05-10 PROCEDURE — 84443 ASSAY THYROID STIM HORMONE: CPT

## 2018-05-10 PROCEDURE — 80053 COMPREHEN METABOLIC PANEL: CPT

## 2018-05-10 PROCEDURE — 82570 ASSAY OF URINE CREATININE: CPT

## 2018-05-11 LAB — 1,25(OH)2D3 SERPL-MCNC: 33.1 PG/ML (ref 19.9–79.3)

## 2018-05-23 ENCOUNTER — TRANSITIONAL CARE MANAGEMENT (OUTPATIENT)
Dept: INTERNAL MEDICINE CLINIC | Facility: CLINIC | Age: 83
End: 2018-05-23

## 2018-06-04 ENCOUNTER — OFFICE VISIT (OUTPATIENT)
Dept: INTERNAL MEDICINE CLINIC | Facility: CLINIC | Age: 83
End: 2018-06-04
Payer: MEDICARE

## 2018-06-04 VITALS
TEMPERATURE: 98.1 F | WEIGHT: 164 LBS | RESPIRATION RATE: 18 BRPM | SYSTOLIC BLOOD PRESSURE: 142 MMHG | HEART RATE: 76 BPM | BODY MASS INDEX: 30.18 KG/M2 | DIASTOLIC BLOOD PRESSURE: 70 MMHG | HEIGHT: 62 IN

## 2018-06-04 DIAGNOSIS — R04.2 HEMOPTYSIS: ICD-10-CM

## 2018-06-04 DIAGNOSIS — R06.02 SHORTNESS OF BREATH: ICD-10-CM

## 2018-06-04 DIAGNOSIS — I50.9 ACUTE CONGESTIVE HEART FAILURE, UNSPECIFIED HEART FAILURE TYPE (HCC): ICD-10-CM

## 2018-06-04 DIAGNOSIS — R07.89 ATYPICAL CHEST PAIN: ICD-10-CM

## 2018-06-04 DIAGNOSIS — J20.9 ACUTE BRONCHITIS, UNSPECIFIED ORGANISM: Primary | ICD-10-CM

## 2018-06-04 LAB
ANION GAP SERPL CALCULATED.3IONS-SCNC: 10.5 MM/L
BUN SERPL-MCNC: 16 MG/DL (ref 7–25)
CALCIUM SERPL-MCNC: 9.4 MG/DL (ref 8.6–10.5)
CHLORIDE SERPL-SCNC: 100 MM/L (ref 98–107)
CO2 SERPL-SCNC: 33 MM/L (ref 21–31)
CREAT SERPL-MCNC: 0.74 MG/DL (ref 0.6–1.2)
EGFR (HISTORICAL): > 60 GFR
EGFR AFRICAN AMERICAN (HISTORICAL): > 60 GFR
GLUCOSE (HISTORICAL): 85 MG/DL (ref 65–99)
OSMOLALITY, SERUM (HISTORICAL): 278 MOSM (ref 262–291)
POTASSIUM SERPL-SCNC: 4.5 MM/L (ref 3.5–5.5)
SODIUM SERPL-SCNC: 139 MM/L (ref 134–143)

## 2018-06-04 PROCEDURE — 99495 TRANSJ CARE MGMT MOD F2F 14D: CPT | Performed by: INTERNAL MEDICINE

## 2018-06-04 RX ORDER — FUROSEMIDE 20 MG/1
20 TABLET ORAL DAILY
Qty: 90 TABLET | Refills: 0 | Status: SHIPPED | OUTPATIENT
Start: 2018-06-04 | End: 2018-08-31 | Stop reason: SDUPTHER

## 2018-06-04 RX ORDER — POTASSIUM CHLORIDE 750 MG/1
10 TABLET, FILM COATED, EXTENDED RELEASE ORAL DAILY
Refills: 0 | COMMUNITY
Start: 2018-05-23 | End: 2018-06-25 | Stop reason: SDUPTHER

## 2018-06-04 NOTE — PROGRESS NOTES
Assessment/Plan:     No problem-specific Assessment & Plan notes found for this encounter  Diagnoses and all orders for this visit:    Acute bronchitis, unspecified organism    Atypical chest pain    Shortness of breath    Acute congestive heart failure, unspecified heart failure type ([facility] [address] )  -     Basic metabolic panel; Future  -     furosemide (LASIX) 20 mg tablet; Take 1 tablet (20 mg total) by mouth daily    Hemoptysis    Other orders  -     potassium chloride (K-DUR) 10 mEq tablet; Take 10 mEq by mouth daily     A/P: Doing better  Activity level increasing  Tolerating diet and meds  Continue current treatment with increase lasix and potassium  Will check BMP  RTC as scheduled  Subjective:     Patient ID: Angel Luis Paulino is a 80 y o  female  WF presents with her relative for f/u brief admission to [facility] for SOB, CP, and coughing up blood  Admitted and w/u revealed bronchitis and ??CHF  Lasix increase and did well  D/c'd to home after one day  Since d/c, doing better and cough almost gone  No SOB and no more blood  Activity level back to normal  Eating well  Tolerating increase lasix  No edema, orthopnea or PND  No new c/o's  Review of Systems   Constitutional: Negative for activity change, chills, diaphoresis, fatigue and fever  HENT: Negative for congestion, postnasal drip and rhinorrhea  Respiratory: Negative for cough, chest tightness, shortness of breath and wheezing  Cardiovascular: Negative for chest pain, palpitations and leg swelling  Gastrointestinal: Negative for abdominal pain, constipation, diarrhea, nausea and vomiting  Endocrine: Negative for cold intolerance and heat intolerance  Genitourinary: Negative for difficulty urinating, dysuria and frequency  Musculoskeletal: Negative for arthralgias, gait problem and myalgias  Neurological: Negative for dizziness, weakness, light-headedness and headaches     Psychiatric/Behavioral: Negative for confusion and dysphoric mood  The patient is not nervous/anxious  Objective:     Physical Exam   Constitutional: She is oriented to person, place, and time  She appears well-developed and well-nourished  No distress  HENT:   Head: Normocephalic and atraumatic  Mouth/Throat: Oropharynx is clear and moist  No oropharyngeal exudate  Eyes: Conjunctivae and EOM are normal  Pupils are equal, round, and reactive to light  Neck: Neck supple  No JVD present  Cardiovascular: Normal rate, regular rhythm and normal heart sounds  No murmur heard  Pulmonary/Chest: Effort normal and breath sounds normal  No respiratory distress  She has no wheezes  She has no rales  Abdominal: Soft  Bowel sounds are normal  There is no tenderness  Musculoskeletal: She exhibits no edema  Lymphadenopathy:     She has no cervical adenopathy  Neurological: She is alert and oriented to person, place, and time  Psychiatric: She has a normal mood and affect  Her behavior is normal  Judgment and thought content normal    Nursing note and vitals reviewed  Vitals:    06/04/18 1436   BP: 142/70   Pulse: 76   Resp: 18   Temp: 98 1 °F (36 7 °C)   TempSrc: Tympanic   Weight: 74 4 kg (164 lb)   Height: 5' 2" (1 575 m)       Transitional Care Management Review: Kristian Umana is a 80 y o  female here for TCM follow up       During the TCM phone call patient stated:    Date and time hospital follow up call was made:  5/23/2018  4:24 PM  Hospital care reviewed:  Records not available  Patient was hopsitalized at:  [address] [place] [address]  Date of admission:  5/21/18  Date of discharge:  5/23/18  Diagnosis:  bronchitis/CHF  Comments:   06/04/2018             Ameya Esposito DO

## 2018-06-25 ENCOUNTER — TELEPHONE (OUTPATIENT)
Dept: INTERNAL MEDICINE CLINIC | Facility: CLINIC | Age: 83
End: 2018-06-25

## 2018-06-25 DIAGNOSIS — K21.9 GASTROESOPHAGEAL REFLUX DISEASE WITHOUT ESOPHAGITIS: Primary | ICD-10-CM

## 2018-06-25 DIAGNOSIS — E87.6 POTASSIUM DEFICIENCY: Primary | ICD-10-CM

## 2018-06-25 DIAGNOSIS — I25.10 CORONARY ARTERY DISEASE INVOLVING NATIVE HEART WITHOUT ANGINA PECTORIS, UNSPECIFIED VESSEL OR LESION TYPE: Primary | ICD-10-CM

## 2018-06-25 RX ORDER — CLOPIDOGREL BISULFATE 75 MG/1
TABLET ORAL
Qty: 30 TABLET | Refills: 2 | Status: SHIPPED | OUTPATIENT
Start: 2018-06-25 | End: 2018-07-27 | Stop reason: HOSPADM

## 2018-06-25 RX ORDER — RANITIDINE 150 MG/1
TABLET ORAL
Qty: 60 TABLET | Refills: 2 | Status: SHIPPED | OUTPATIENT
Start: 2018-06-25 | End: 2018-09-29 | Stop reason: SDUPTHER

## 2018-06-25 RX ORDER — POTASSIUM CHLORIDE 750 MG/1
10 TABLET, FILM COATED, EXTENDED RELEASE ORAL DAILY
Qty: 30 TABLET | Refills: 5 | Status: SHIPPED | OUTPATIENT
Start: 2018-06-25 | End: 2018-06-26 | Stop reason: SDUPTHER

## 2018-06-25 NOTE — TELEPHONE ENCOUNTER
Pt called for potassium refill earlier, pt called back asking if she is to cont with this med before you send it in to the pharmacy

## 2018-06-25 NOTE — TELEPHONE ENCOUNTER
Pt needs refill on potassium 10mg, qd, 30 day supply, pls send to [facility] pharm,[place]

## 2018-06-26 DIAGNOSIS — E87.6 POTASSIUM DEFICIENCY: ICD-10-CM

## 2018-06-26 RX ORDER — POTASSIUM CHLORIDE 750 MG/1
10 TABLET, FILM COATED, EXTENDED RELEASE ORAL DAILY
Qty: 30 TABLET | Refills: 0 | Status: SHIPPED | OUTPATIENT
Start: 2018-06-26 | End: 2018-09-12

## 2018-06-28 PROBLEM — Z98.61 CORONARY ANGIOPLASTY STATUS: Status: ACTIVE | Noted: 2018-06-28

## 2018-06-28 RX ORDER — NITROGLYCERIN 0.4 MG/1
TABLET SUBLINGUAL
COMMUNITY
End: 2018-09-12

## 2018-06-29 ENCOUNTER — OFFICE VISIT (OUTPATIENT)
Dept: CARDIOLOGY CLINIC | Facility: CLINIC | Age: 83
End: 2018-06-29
Payer: MEDICARE

## 2018-06-29 VITALS
HEART RATE: 80 BPM | BODY MASS INDEX: 30.55 KG/M2 | DIASTOLIC BLOOD PRESSURE: 80 MMHG | WEIGHT: 166 LBS | SYSTOLIC BLOOD PRESSURE: 130 MMHG | HEIGHT: 62 IN

## 2018-06-29 DIAGNOSIS — Z98.61 CORONARY ANGIOPLASTY STATUS: ICD-10-CM

## 2018-06-29 DIAGNOSIS — I10 ESSENTIAL HYPERTENSION: ICD-10-CM

## 2018-06-29 DIAGNOSIS — E78.2 MIXED HYPERLIPIDEMIA: ICD-10-CM

## 2018-06-29 DIAGNOSIS — I25.2 HISTORY OF NON-ST ELEVATION MYOCARDIAL INFARCTION (NSTEMI): Primary | ICD-10-CM

## 2018-06-29 PROCEDURE — 99214 OFFICE O/P EST MOD 30 MIN: CPT | Performed by: INTERNAL MEDICINE

## 2018-06-29 NOTE — PROGRESS NOTES
Subjective:        Patient ID: Herbert Rogel is a 80 y o  female  Chief Complaint:  And had a pulmonary infection which resulted in a small troponin spill but she had no angina with this  She was treated medically in the hospital, records reviewed  She has not had any chest pain chest tightness concerning dyspnea, palpitations, presyncope, edema, orthopnea and in fact she states that her morning lightheadedness has pretty much resolved  She is no longer on ACE-inhibitor therapy and remains on low-dose beta-blocker therapy, dual anti-platelet therapy, and statin therapy  Coronary Artery Disease   Pertinent negatives include no chest pain, dizziness, leg swelling, muscle weakness, palpitations, shortness of breath or weight gain  Risk factors include hyperlipidemia  Hyperlipidemia   Pertinent negatives include no chest pain, focal weakness, myalgias or shortness of breath  Hypertension   Pertinent negatives include no blurred vision, chest pain, headaches, malaise/fatigue, orthopnea, palpitations, PND or shortness of breath  The following portions of the patient's history were reviewed and updated as appropriate: allergies, current medications, past family history, past medical history, past social history, past surgical history and problem list   Review of Systems   Constitution: Negative  Negative for diaphoresis, fever, weakness, malaise/fatigue, night sweats, weight gain and weight loss  HENT: Negative  Negative for congestion, nosebleeds, stridor and tinnitus  Eyes: Negative  Negative for blurred vision, double vision, photophobia, vision loss in left eye, vision loss in right eye and visual disturbance  Cardiovascular: Negative for chest pain, claudication, cyanosis, dyspnea on exertion, irregular heartbeat, leg swelling, near-syncope, orthopnea, palpitations, paroxysmal nocturnal dyspnea and syncope  Respiratory: Negative    Negative for cough, hemoptysis, shortness of breath, sleep disturbances due to breathing, snoring, sputum production and wheezing  Endocrine: Negative  Negative for cold intolerance and heat intolerance  Hematologic/Lymphatic: Negative  Negative for adenopathy and bleeding problem  Does not bruise/bleed easily  Skin: Negative  Negative for color change, dry skin, flushing, itching, nail changes and rash  Musculoskeletal: Negative  Negative for arthritis, back pain, joint pain, muscle cramps, muscle weakness, myalgias and stiffness  Gastrointestinal: Negative  Negative for bloating, abdominal pain, anorexia, bowel incontinence, constipation, diarrhea, dysphagia, excessive appetite, heartburn, hematemesis, hematochezia, melena, nausea and vomiting  Genitourinary: Negative  Negative for bladder incontinence, hematuria, nocturia, non-menstrual bleeding and urgency  Neurological: Negative  Negative for excessive daytime sleepiness, dizziness, focal weakness, headaches, light-headedness, loss of balance, numbness, paresthesias and tremors  Psychiatric/Behavioral: Negative  Negative for altered mental status, depression, memory loss and substance abuse  The patient is not nervous/anxious  Allergic/Immunologic: Negative  Negative for environmental allergies and hives  Objective:     Physical Exam   Constitutional: She is oriented to person, place, and time  She appears well-developed and well-nourished  HENT:   Head: Normocephalic and atraumatic  Eyes: EOM are normal  Pupils are equal, round, and reactive to light  Neck: Normal range of motion  Neck supple  No JVD present  No thyromegaly present  Cardiovascular: Normal rate, regular rhythm, normal heart sounds and intact distal pulses  Exam reveals no gallop and no friction rub  No murmur heard  Pulmonary/Chest: Effort normal and breath sounds normal  No stridor  No respiratory distress  She has no wheezes  She has no rales  Abdominal: Soft   Bowel sounds are normal  She exhibits no mass  There is no tenderness  Musculoskeletal: Normal range of motion  She exhibits no edema  Lymphadenopathy:     She has no cervical adenopathy  Neurological: She is alert and oriented to person, place, and time  Skin: Skin is warm and dry  No rash noted  No pallor  Psychiatric: She has a normal mood and affect  Her behavior is normal  Judgment and thought content normal    Nursing note and vitals reviewed  Lab Review:   not applicable      Assessment:       1  History of non-ST elevation myocardial infarction (NSTEMI)     2  Coronary angioplasty status     3  Essential hypertension     4  Mixed hyperlipidemia          Plan:       I think she is doing well without any signs or symptoms of unstable angina, decompensated heart failure, nor electrical instability  Her medications are adequate I made no changes today  We discussed the option of surveillance pharmacologic nuclear stress testing, both her and I were disinterested in this, we plan on following her clinically, I think her medications are optimized    I will see her back in about 3 months, sooner as needed with any problems

## 2018-06-29 NOTE — PATIENT INSTRUCTIONS
Coronary Intravascular Stent Placement   AMBULATORY CARE:   What you need to know about coronary intravascular stent placement:  Coronary intravascular stent placement is a procedure to place a stent in an artery of your heart that has plaque buildup  Plaque is a mixture of fat and cholesterol  A stent is a small mesh tube made of metal that helps keep your artery open  Your healthcare provider may place a bare metal stent or a drug-eluting stent (ARNIE) in your artery  A ARNIE is coated with medicine that is slowly released and helps prevent more plaque buildup in the area where the stent is placed  The stent remains in your artery for life  You may need more than one stent  How to prepare for your procedure:   · Your healthcare provider will talk to you about how to prepare for your procedure  He may tell you not to eat or drink anything after midnight on the day of your procedure  He will tell you what medicines to take or not take on the day of your procedure  Arrange to have someone drive you home when you leave the hospital      · You may need blood tests and a stress test before your procedure  Talk to your healthcare provider about these or other tests you may need  Contrast liquid will be used during your procedure to help healthcare providers see your heart better  Tell the healthcare provider if you have ever had an allergic reaction to contrast liquid  What will happen during the procedure:   · You may be given an antibiotic through your IV to help prevent a bacterial infection  You will be given medicine in your IV to help you relax or make you drowsy  You will also get local anesthesia that will numb the area where the catheter will be placed  You will be awake during the procedure so healthcare providers can give you instructions  You may be asked to cough, hold your breath, or to tell them how you feel during the procedure       · A catheter (long, thin tube) will be put into an artery in your wrist, groin, or neck  The catheter will be gently guided through this artery to your heart and into the narrowed or blocked artery  Healthcare providers will use x-rays and contrast liquid to find the area where the stent needs to be placed  You may feel warm as the contrast liquid is put into the catheter  This feeling should go away quickly  · A guidewire will then be placed into the catheter  The balloon catheter will be guided into the narrowed or blocked artery using the guidewire  Healthcare providers will inflate the balloon several times for short periods  The inflated balloon pushes the plaque against the artery walls  This opens them and allows more blood flow to your heart  Another balloon catheter with a stent is then inserted into the artery  The balloon is inflated  This expands the stent and pushes it into place against the artery wall  The stent will be left in your artery to help keep it open  What will happen after the procedure:   · The catheter and guidewire will be taken out of the artery and a pressure bandage will be put on the area  Your healthcare provider may apply a collagen plug or other closure device to stop the bleeding  Healthcare providers will put pressure on the bandaged area to help stop the bleeding  · You will be taken to a room to rest  Healthcare providers will monitor you closely for any problems  You will then be taken to your hospital room  You may need to lie still and flat for 3 to 6 hours after the procedure to prevent bleeding  Do not get out of bed until your healthcare provider says it is okay  Risks of the procedure:   · You may develop a hematoma (swelling caused by collection of blood) or bleed more than expected from your catheter site  The contrast liquid used during this procedure may cause an allergic reaction or kidney problems  You may develop an infection  · Your artery may be injured when the catheter is inserted   During or after your procedure, blood clots may form, or plaque may break off  The blood clot or plaque may block your artery and cause a heart attack or stroke  The stent could collapse, or a clot could form on the stent  This could cause the artery to become blocked again  You may need another procedure to open your artery  Call 911 for any of the following:   · You have any of the following signs of a heart attack:      ¨ Squeezing, pressure, or pain in your chest that lasts longer than 5 minutes or returns    ¨ Discomfort or pain in your back, neck, jaw, stomach, or arm     ¨ Trouble breathing    ¨ Nausea or vomiting    ¨ Lightheadedness or a sudden cold sweat, especially with chest pain or trouble breathing    · You have any of the following signs of a stroke:      ¨ Numbness or drooping on one side of your face     ¨ Weakness in an arm or leg    ¨ Confusion or difficulty speaking    ¨ Dizziness, a severe headache, or vision loss  Seek care immediately if:   · Your arm or leg feels warm, tender, and painful  It may look swollen and red  · Your leg or arm becomes numb or turns white or blue  · The area where the catheter was placed is swollen, red, or has pus or foul-smelling fluid coming from it  · You start to bleed from your catheter site again  Contact your cardiologist if:   · You have a fever or chills  · You have questions or concerns about your condition or care  Medicine:   · Medicines  may be given to prevent blood clots around your stent, lower your blood pressure, and decrease cholesterol in your blood  You may also be given medicine to relieve chest pain  · Take your medicine as directed  Contact your healthcare provider if you think your medicine is not helping or if you have side effects  Tell him or her if you are allergic to any medicine  Keep a list of the medicines, vitamins, and herbs you take  Include the amounts, and when and why you take them   Bring the list or the pill bottles to follow-up visits  Carry your medicine list with you in case of an emergency  Activity:   · Avoid unnecessary stair climbing for 48 hours, if a catheter was put in your groin  · Do not place pressure on your arm, hand, or wrist, if the catheter was placed in your wrist  Avoid pushing, pulling, or heavy lifting with that arm  · If you need to cough, support the area where the catheter was inserted with your hand  · Ask your cardiologist how long you need to limit movement and avoid certain activities  · You may feel like resting more after your procedure  Slowly start to do more each day  Rest when you feel it is needed  Wound care:  Ask your cardiologist about how to care for your incision wound  Ask when you can get into a tub, shower, or pool  Do not smoke:  Nicotine and other chemicals in cigarettes and cigars can cause heart and lung damage  Ask your healthcare provider for information if you currently smoke and need help to quit  E-cigarettes or smokeless tobacco still contain nicotine  Talk to your healthcare provider before you use these products  Cardiac rehab:  Your cardiologist may recommend that you attend cardiac rehabilitation (rehab)  This is a program run by specialists who will help you safely strengthen your heart and reduce the risk for more heart disease  The plan includes exercise, relaxation, stress management, and heart-healthy nutrition  Healthcare providers will also check to make sure any medicines you are taking are working  Follow up with your cardiologist as directed: If you need an MRI, wait at least 6 to 8 weeks after stent placement, or as directed  Write down your questions so you remember to ask them during your visits  © 2017 [address] Information is for End User's use only and may not be sold, redistributed or otherwise used for commercial purposes   All illustrations and images included in CareNotes® are the copyrighted property of [employer]  or [employer]  The above information is an  only  It is not intended as medical advice for individual conditions or treatments  Talk to your doctor, nurse or pharmacist before following any medical regimen to see if it is safe and effective for you  Coronary Artery Disease   WHAT YOU SHOULD KNOW:   Coronary artery disease (CAD) is narrowing of the arteries to your heart caused by a buildup of plaque  Plaque is made up of cholesterol and other substances  The narrowing in your arteries decreases the amount of blood that can flow to your heart  This causes your heart to get less oxygen  INSTRUCTIONS:   Medicines: You may  need any of the following:  · Blood pressure medicines  are given to lower your blood pressure  These medicines may include ACE inhibitors and beta-blockers  ACE inhibitors help keep your blood vessels relaxed and open, which helps keep blood flowing into your heart  Beta-blockers keep your heart pumping strongly and regularly  This helps keep your heart from working too hard to get oxygen  · Cholesterol-lowering medicines  help lower high blood cholesterol levels  · Nitrates , such as nitroglycerin, relax the arteries of your heart so it gets more oxygen  They help to relieve your chest pain  · Antiplatelet medicines , such as aspirin, keep platelets from sticking to a damaged part of your artery  Platelets are a part of your blood that stick together to help heal injuries  They may cause a blockage in your artery and keep blood from flowing to your heart  · Anticoagulants    are a type of blood thinner medicine that helps prevent clots  Clots can cause strokes, heart attacks, and death  These medicines may cause you to bleed or bruise more easily  ¨ Watch for bleeding from your gums or nose  Watch for blood in your urine and bowel movements  Use a soft washcloth and a soft toothbrush  If you shave, use an electric razor   Avoid activities that can cause bruising or bleeding  ¨ Tell your healthcare provider about all medicines you take because many medicines cannot be used with anticoagulants  Do not start or stop any medicines unless your healthcare provider tells you to  Tell your dentist and other healthcare providers that you take anticoagulants  Wear a bracelet or necklace that says you take this medicine  ¨ You will need regular blood tests so your healthcare provider can decide how much medicine you need  Take anticoagulants exactly as directed  Tell your healthcare provider right away if you forget to take the medicine, or if you take too much  ¨ If you take warfarin, some foods can change how your blood clots  Do not make major changes to your diet while you take warfarin  Warfarin works best when you eat about the same amount of vitamin K every day  Vitamin K is found in green leafy vegetables, broccoli, grapes, and other foods  Ask for more information about what to eat when you take warfarin  · Take your medicine as directed  Call your healthcare provider if you think your medicine is not helping or if you have side effects  Tell him if you are allergic to any medicine  Keep a list of the medicines, vitamins, and herbs you take  Include the amounts, and when and why you take them  Bring the list or the pill bottles to follow-up visits  Carry your medicine list with you in case of an emergency  Follow up with your primary healthcare provider or cardiologist as directed: You may need to return for other tests  You may also be referred to a cardiac surgeon  Write down your questions so you remember to ask them during your visits  Cardiac rehabilitation:  Your primary healthcare provider or cardiologist may recommend that you attend cardiac rehabilitation (rehab)  This is a program run by specialists who will help you safely strengthen your heart and reduce the risk of more heart disease   The plan includes exercise, relaxation, stress management, and heart-healthy nutrition  Caregivers will also check to make sure any medicines you are taking are working  Manage CAD:   · Exercise regularly  Exercise at least 30 minutes per day, on most days of the week  Exercise helps to lower high cholesterol and high blood pressure  It can also help you to maintain a healthy weight  Ask your primary healthcare provider about the kind of exercise you should do and how to get started  · Maintain a healthy weight  If you are overweight, talk to your primary healthcare provider about how to lose weight  A weight loss of 10% can improve your heart health  · Eat heart-healthy foods  Include fresh fruits and vegetables in your meal plan  Choose low-fat foods, such as skim or 1% fat milk, low-fat cheese and yogurt, fish, chicken (without skin), and lean meats  Eat two 4-ounce servings of fish high in omega-3 fats each week, such as salmon, fresh tuna, and herring  Avoid foods that are high in sodium, such as canned foods, potato chips, salty snacks, and cold cuts  Put less table salt on your food  · Do not smoke  Smoking increases your risk of a heart attack  If you smoke, it is never too late to quit  Ask primary healthcare provider for information if you need help quitting  · Limit alcohol  Women should limit alcohol to 1 drink a day  Men should limit alcohol to 2 drinks a day  A drink of alcohol is 12 ounces of beer, 5 ounces of wine, or 1½ ounces of liquor  · Manage other health conditions  Follow your primary healthcare provider's advice on how to manage other conditions that can affect your heart health  These include diabetes, high blood pressure, and high cholesterol  You may need to take medicines for these conditions and make other lifestyle changes  Talk to your primary healthcare provider if you are depressed  He may recommend treatment for your depression  · Ask if you should have a flu vaccine    The flu can be dangerous for a person who has CAD  The flu vaccine is available every year in the fall  Contact your primary healthcare provider if:   · You have chest pain that is more frequent, or you have chest pain at rest      · You have questions or concerns about your condition or care  Return to the emergency department if:  You have any of the following signs of a heart attack:  · Squeezing, pressure, fullness, or pain in your chest that lasts longer than a few minutes or returns     · Discomfort or pain in your back, neck, jaw, stomach, or arm    · Shortness of breath or breathing problems    · A sudden cold sweat, lightheadedness, dizziness, or nausea, especially with chest pain or trouble breathing  © 2014 [address] is for End User's use only and may not be sold, redistributed or otherwise used for commercial purposes  All illustrations and images included in CareNotes® are the copyrighted property of [employer]  or [employer]  The above information is an  only  It is not intended as medical advice for individual conditions or treatments  Talk to your doctor, nurse or pharmacist before following any medical regimen to see if it is safe and effective for you

## 2018-07-16 ENCOUNTER — TELEPHONE (OUTPATIENT)
Dept: INTERNAL MEDICINE CLINIC | Facility: CLINIC | Age: 83
End: 2018-07-16

## 2018-07-16 DIAGNOSIS — F41.1 GENERALIZED ANXIETY DISORDER: ICD-10-CM

## 2018-07-16 RX ORDER — LORAZEPAM 0.5 MG/1
0.5 TABLET ORAL EVERY 8 HOURS PRN
Qty: 90 TABLET | Refills: 0 | Status: SHIPPED | OUTPATIENT
Start: 2018-07-16 | End: 2018-09-11 | Stop reason: SDUPTHER

## 2018-07-23 ENCOUNTER — APPOINTMENT (EMERGENCY)
Dept: CT IMAGING | Facility: HOSPITAL | Age: 83
DRG: 378 | End: 2018-07-23
Payer: MEDICARE

## 2018-07-23 ENCOUNTER — HOSPITAL ENCOUNTER (INPATIENT)
Facility: HOSPITAL | Age: 83
LOS: 4 days | Discharge: HOME | DRG: 378 | End: 2018-07-27
Attending: FAMILY MEDICINE | Admitting: INTERNAL MEDICINE
Payer: MEDICARE

## 2018-07-23 DIAGNOSIS — K57.31 DIVERTICULOSIS OF LARGE INTESTINE WITH HEMORRHAGE: ICD-10-CM

## 2018-07-23 DIAGNOSIS — K62.5 BRIGHT RED RECTAL BLEEDING: Primary | ICD-10-CM

## 2018-07-23 PROBLEM — K92.1 HEMATOCHEZIA: Status: ACTIVE | Noted: 2018-07-23

## 2018-07-23 PROBLEM — E87.2 LACTIC ACID INCREASED: Status: ACTIVE | Noted: 2018-07-23

## 2018-07-23 LAB
ABO GROUP BLD: NORMAL
ANION GAP SERPL CALCULATED.3IONS-SCNC: 5 MMOL/L (ref 4–13)
BACTERIA UR QL AUTO: ABNORMAL /HPF
BASOPHILS # BLD AUTO: 0 THOUSANDS/ΜL (ref 0–0.1)
BASOPHILS NFR BLD AUTO: 1 % (ref 0–2)
BILIRUB UR QL STRIP: NEGATIVE
BLD GP AB SCN SERPL QL: NEGATIVE
BUN SERPL-MCNC: 20 MG/DL (ref 7–25)
CALCIUM SERPL-MCNC: 9.2 MG/DL (ref 8.6–10.5)
CHLORIDE SERPL-SCNC: 103 MMOL/L (ref 98–107)
CLARITY UR: CLEAR
CO2 SERPL-SCNC: 32 MMOL/L (ref 21–31)
COLOR UR: YELLOW
CREAT SERPL-MCNC: 0.8 MG/DL (ref 0.6–1.2)
EOSINOPHIL # BLD AUTO: 0.1 THOUSAND/ΜL (ref 0–0.61)
EOSINOPHIL NFR BLD AUTO: 1 % (ref 0–5)
ERYTHROCYTE [DISTWIDTH] IN BLOOD BY AUTOMATED COUNT: 14.7 % (ref 11.5–14.5)
GFR SERPL CREATININE-BSD FRML MDRD: 64 ML/MIN/1.73SQ M
GLUCOSE SERPL-MCNC: 214 MG/DL (ref 65–99)
GLUCOSE UR STRIP-MCNC: NEGATIVE MG/DL
HCT VFR BLD AUTO: 37.6 % (ref 34.8–46.1)
HGB BLD-MCNC: 11.6 G/DL (ref 12–16)
HGB BLD-MCNC: 12.7 G/DL (ref 12–16)
HGB UR QL STRIP.AUTO: NEGATIVE
INR PPP: 1.11 (ref 0.9–1.5)
KETONES UR STRIP-MCNC: NEGATIVE MG/DL
LACTATE SERPL-SCNC: 1.8 MMOL/L (ref 0.5–2)
LACTATE SERPL-SCNC: 2.3 MMOL/L (ref 0.5–2)
LEUKOCYTE ESTERASE UR QL STRIP: ABNORMAL
LYMPHOCYTES # BLD AUTO: 1.4 THOUSANDS/ΜL (ref 0.6–4.47)
LYMPHOCYTES NFR BLD AUTO: 30 % (ref 21–51)
MCH RBC QN AUTO: 29.6 PG (ref 26–34)
MCHC RBC AUTO-ENTMCNC: 33.7 G/DL (ref 31–37)
MCV RBC AUTO: 88 FL (ref 81–99)
MONOCYTES # BLD AUTO: 0.3 THOUSAND/ΜL (ref 0.17–1.22)
MONOCYTES NFR BLD AUTO: 6 % (ref 2–12)
MUCOUS THREADS UR QL AUTO: ABNORMAL
NEUTROPHILS # BLD AUTO: 2.8 THOUSANDS/ΜL (ref 1.4–6.5)
NEUTS SEG NFR BLD AUTO: 63 % (ref 42–75)
NITRITE UR QL STRIP: NEGATIVE
NON-SQ EPI CELLS URNS QL MICRO: ABNORMAL /HPF
NRBC BLD AUTO-RTO: 0 /100 WBCS
PH UR STRIP.AUTO: 6 [PH] (ref 5–8)
PLATELET # BLD AUTO: 262 THOUSANDS/UL (ref 149–390)
PMV BLD AUTO: 7.7 FL (ref 8.6–11.7)
POTASSIUM SERPL-SCNC: 3.9 MMOL/L (ref 3.5–5.5)
PROT UR STRIP-MCNC: NEGATIVE MG/DL
PROTHROMBIN TIME: 12.9 SECONDS (ref 10.1–12.9)
RBC # BLD AUTO: 4.29 MILLION/UL (ref 3.9–5.2)
RBC #/AREA URNS AUTO: ABNORMAL /HPF
RH BLD: POSITIVE
SODIUM SERPL-SCNC: 140 MMOL/L (ref 134–143)
SP GR UR STRIP.AUTO: 1.02 (ref 1–1.03)
SPECIMEN EXPIRATION DATE: NORMAL
UROBILINOGEN UR QL STRIP.AUTO: 0.2 E.U./DL
WBC # BLD AUTO: 4.5 THOUSAND/UL (ref 4.8–10.8)
WBC #/AREA URNS AUTO: ABNORMAL /HPF

## 2018-07-23 PROCEDURE — 99285 EMERGENCY DEPT VISIT HI MDM: CPT

## 2018-07-23 PROCEDURE — 83605 ASSAY OF LACTIC ACID: CPT | Performed by: FAMILY MEDICINE

## 2018-07-23 PROCEDURE — 81001 URINALYSIS AUTO W/SCOPE: CPT | Performed by: FAMILY MEDICINE

## 2018-07-23 PROCEDURE — 85610 PROTHROMBIN TIME: CPT | Performed by: FAMILY MEDICINE

## 2018-07-23 PROCEDURE — 86850 RBC ANTIBODY SCREEN: CPT | Performed by: FAMILY MEDICINE

## 2018-07-23 PROCEDURE — 83605 ASSAY OF LACTIC ACID: CPT | Performed by: NURSE PRACTITIONER

## 2018-07-23 PROCEDURE — 96361 HYDRATE IV INFUSION ADD-ON: CPT

## 2018-07-23 PROCEDURE — 86901 BLOOD TYPING SEROLOGIC RH(D): CPT | Performed by: FAMILY MEDICINE

## 2018-07-23 PROCEDURE — 93005 ELECTROCARDIOGRAM TRACING: CPT

## 2018-07-23 PROCEDURE — 36415 COLL VENOUS BLD VENIPUNCTURE: CPT | Performed by: FAMILY MEDICINE

## 2018-07-23 PROCEDURE — 85025 COMPLETE CBC W/AUTO DIFF WBC: CPT | Performed by: FAMILY MEDICINE

## 2018-07-23 PROCEDURE — 74177 CT ABD & PELVIS W/CONTRAST: CPT

## 2018-07-23 PROCEDURE — 86900 BLOOD TYPING SEROLOGIC ABO: CPT | Performed by: FAMILY MEDICINE

## 2018-07-23 PROCEDURE — 99223 1ST HOSP IP/OBS HIGH 75: CPT | Performed by: NURSE PRACTITIONER

## 2018-07-23 PROCEDURE — C9113 INJ PANTOPRAZOLE SODIUM, VIA: HCPCS | Performed by: NURSE PRACTITIONER

## 2018-07-23 PROCEDURE — 85018 HEMOGLOBIN: CPT | Performed by: NURSE PRACTITIONER

## 2018-07-23 PROCEDURE — 80048 BASIC METABOLIC PNL TOTAL CA: CPT | Performed by: FAMILY MEDICINE

## 2018-07-23 PROCEDURE — 96374 THER/PROPH/DIAG INJ IV PUSH: CPT

## 2018-07-23 RX ORDER — POTASSIUM CHLORIDE 20 MEQ/1
20 TABLET, EXTENDED RELEASE ORAL DAILY
Status: DISCONTINUED | OUTPATIENT
Start: 2018-07-24 | End: 2018-07-27 | Stop reason: HOSPADM

## 2018-07-23 RX ORDER — B-COMPLEX WITH VITAMIN C
1 TABLET ORAL 2 TIMES DAILY WITH MEALS
Status: DISCONTINUED | OUTPATIENT
Start: 2018-07-23 | End: 2018-07-27 | Stop reason: HOSPADM

## 2018-07-23 RX ORDER — BENZONATATE 200 MG/1
200 CAPSULE ORAL 3 TIMES DAILY PRN
COMMUNITY
End: 2018-07-27 | Stop reason: HOSPADM

## 2018-07-23 RX ORDER — ACETAMINOPHEN 325 MG/1
650 TABLET ORAL EVERY 6 HOURS PRN
Status: DISCONTINUED | OUTPATIENT
Start: 2018-07-23 | End: 2018-07-27 | Stop reason: HOSPADM

## 2018-07-23 RX ORDER — BENZONATATE 100 MG/1
200 CAPSULE ORAL 3 TIMES DAILY PRN
Status: DISCONTINUED | OUTPATIENT
Start: 2018-07-23 | End: 2018-07-27 | Stop reason: HOSPADM

## 2018-07-23 RX ORDER — MECLIZINE HYDROCHLORIDE 25 MG/1
25 TABLET ORAL 3 TIMES DAILY PRN
Status: DISCONTINUED | OUTPATIENT
Start: 2018-07-23 | End: 2018-07-27 | Stop reason: HOSPADM

## 2018-07-23 RX ORDER — ONDANSETRON 2 MG/ML
4 INJECTION INTRAMUSCULAR; INTRAVENOUS EVERY 6 HOURS PRN
Status: DISCONTINUED | OUTPATIENT
Start: 2018-07-23 | End: 2018-07-27 | Stop reason: HOSPADM

## 2018-07-23 RX ORDER — ATORVASTATIN CALCIUM 40 MG/1
40 TABLET, FILM COATED ORAL
Status: DISCONTINUED | OUTPATIENT
Start: 2018-07-23 | End: 2018-07-27 | Stop reason: HOSPADM

## 2018-07-23 RX ORDER — LORAZEPAM 0.5 MG/1
0.5 TABLET ORAL EVERY 8 HOURS PRN
Status: DISCONTINUED | OUTPATIENT
Start: 2018-07-23 | End: 2018-07-27 | Stop reason: HOSPADM

## 2018-07-23 RX ORDER — NITROGLYCERIN 0.4 MG/1
0.4 TABLET SUBLINGUAL
Status: DISCONTINUED | OUTPATIENT
Start: 2018-07-23 | End: 2018-07-27 | Stop reason: HOSPADM

## 2018-07-23 RX ORDER — FUROSEMIDE 20 MG/1
10 TABLET ORAL DAILY
Status: DISCONTINUED | OUTPATIENT
Start: 2018-07-23 | End: 2018-07-27 | Stop reason: HOSPADM

## 2018-07-23 RX ORDER — FERROUS SULFATE 325(65) MG
325 TABLET ORAL DAILY
Status: DISCONTINUED | OUTPATIENT
Start: 2018-07-23 | End: 2018-07-27 | Stop reason: HOSPADM

## 2018-07-23 RX ORDER — FAMOTIDINE 20 MG/1
20 TABLET, FILM COATED ORAL 2 TIMES DAILY
Status: DISCONTINUED | OUTPATIENT
Start: 2018-07-23 | End: 2018-07-24

## 2018-07-23 RX ORDER — POTASSIUM CHLORIDE 750 MG/1
10 TABLET, FILM COATED, EXTENDED RELEASE ORAL DAILY
Status: DISCONTINUED | OUTPATIENT
Start: 2018-07-23 | End: 2018-07-23

## 2018-07-23 RX ORDER — POTASSIUM CHLORIDE 750 MG/1
10 TABLET, EXTENDED RELEASE ORAL DAILY
Status: DISCONTINUED | OUTPATIENT
Start: 2018-07-23 | End: 2018-07-23

## 2018-07-23 RX ADMIN — IOHEXOL 80 ML: 350 INJECTION, SOLUTION INTRAVENOUS at 11:38

## 2018-07-23 RX ADMIN — FAMOTIDINE 20 MG: 20 TABLET ORAL at 17:12

## 2018-07-23 RX ADMIN — LORAZEPAM 0.5 MG: 0.5 TABLET ORAL at 21:30

## 2018-07-23 RX ADMIN — OYSTER SHELL CALCIUM WITH VITAMIN D 1 TABLET: 500; 200 TABLET, FILM COATED ORAL at 15:40

## 2018-07-23 RX ADMIN — FAMOTIDINE 40 MG: 10 INJECTION INTRAVENOUS at 13:29

## 2018-07-23 RX ADMIN — FUROSEMIDE 10 MG: 20 TABLET ORAL at 15:42

## 2018-07-23 RX ADMIN — ATORVASTATIN CALCIUM 40 MG: 40 TABLET, FILM COATED ORAL at 15:40

## 2018-07-23 RX ADMIN — SODIUM CHLORIDE 1000 ML: 0.9 INJECTION, SOLUTION INTRAVENOUS at 10:52

## 2018-07-23 RX ADMIN — Medication 325 MG: at 15:40

## 2018-07-23 RX ADMIN — METOPROLOL TARTRATE 25 MG: 25 TABLET, FILM COATED ORAL at 20:45

## 2018-07-23 RX ADMIN — Medication 8 MG/HR: at 16:36

## 2018-07-23 RX ADMIN — POTASSIUM CHLORIDE 10 MEQ: 750 TABLET, EXTENDED RELEASE ORAL at 15:48

## 2018-07-23 NOTE — ED NOTES
Dr Rigo Rashid at bedside after pt went to restroom, rectal exam performed       Sarah Kingsley RN  07/23/18 [id number]

## 2018-07-23 NOTE — H&P
H&P- Shelby Godwin 2/25/1925, 80 y o  female MRN: [id number]    Unit/Bed#: -02 Encounter: [id number]    Primary Care Provider: Sun Mckinney DO   Date and time admitted to hospital: 7/23/2018 10:08 AM        * Hematochezia   Assessment & Plan    · BRBPR with + heme test in ED  · Has history of hemorrhoidectomy due to internal hemorrhoids  · Consult GI  · Check HgB Q 8 hours  · Telemetry monitor  · Clear liquids  · protonix drip  · Hold Aspirin and Plavix        Diverticulosis   Assessment & Plan    · In the past, today CT of abdomen says NO diverticulitis  · Consult GI  · Continue clear liquid diet with no red liquids  · On CT of the abdomen, large hiatal hernia was found  Iron deficiency anemia   Assessment & Plan    · Continue iron        Anxiety   Assessment & Plan    · Continue Ativan        Coronary artery disease   Assessment & Plan    · She did have a cardiac catheterization with stent placement in March of 2017  · We are holding her aspirin and Plavix at this time  · Continue metoprolol         Essential hypertension   Assessment & Plan    · Metoprolol        Vitamin D deficiency   Assessment & Plan    · Continue supplements        Benign paroxysmal vertigo   Assessment & Plan    · Continue Antivert        Gastroesophageal reflux disease without esophagitis   Assessment & Plan    · Will start patient on a Protonix drip  · Continue her Pepcid        Hyperlipidemia   Assessment & Plan    · Continue Lipitor        Osteoarthritis   Assessment & Plan    · Continue supportive care        Osteoporosis   Assessment & Plan    · Continue supportive care        Lactic acid increased   Assessment & Plan    · Will recheck in 3 hr  · Monitor the patient            VTE Prophylaxis: none due to actve bleed    / sequential compression device   Code Status: full  POLST: POLST is not applicable to this patient  Discussion with family: testing that may be required to verify bleed, and consultation to GI     Anticipated Length of Stay:  Patient will be admitted on an Inpatient basis with an anticipated length of stay of  > 2 midnights  Justification for Hospital Stay: rectal bleed    Total Time for Visit, including Counseling / Coordination of Care: 30 minutes  Greater than 50% of this total time spent on direct patient counseling and coordination of care  Chief Complaint:     Bleeding from her rectum    History of Present Illness:    Bruce Elliott is a 80 y o  female who presents with bleeding from her rectum  Patient states that it is bright red blood in the toilet  She also states that there is a thought of dark clots that are there as well, with stool  Patient does take an 81 mg aspirin daily and 75 mg Plavix daily, as well as iron tablets  She does deny any type of abdominal pain nausea or vomiting  She does state that she has taken Imodium in the past and most recently secondary to having which she thought was diarrhea  She does have a past medical history of hemorrhoidectomy, and which she believes as having diverticulitis in the past     Review of Systems:    Review of Systems   Constitutional: Negative  HENT: Negative  Eyes: Negative  Respiratory: Negative  Cardiovascular: Negative  Gastrointestinal: Positive for blood in stool  Endocrine: Negative  Genitourinary: Negative  Musculoskeletal: Negative  Skin: Negative  Allergic/Immunologic: Negative  Neurological: Negative  Hematological: Negative  Psychiatric/Behavioral: Negative  Past Medical and Surgical History:     Past Medical History:   Diagnosis Date    Abnormal blood sugar 03/13/2017    Abnormal carotid duplex scan     Carotid Duplex (Plaque formation is quite prominent bilaterally     Despite these changes, no evidence for greater than 50% diameter reducing lesions in the cervical portion of either carotid artery hemisystem ) - 6/13/2017    Anxiety     Cervical radiculopathy 08/08/2017  CHF (congestive heart failure) (HCC)     [facility] ) 2/6/2018    Coronary artery disease 4/7/2017    Costochondritis 02/05/2013    suspect MS in origin given relationship to ROM and location  Start mobic and if persists, reeval  Refused xrayat this time   Dyslipidemia     GERD without esophagitis 8/30/2012    H/O CT scan of chest      (No PE, minimal interstitial change left lower lobe and right upper lobe, which may be acute ) - 5/19/2017    History of Holter monitoring     Holter (Sinus rhythm with rates ranging from 52 to 118 bpm   No afib or heart block  Rare atrial and ventricular ectopic beats  One six-beat atrial run noted  No pauses  ) - 5/31/2017    Hx of echocardiogram     Echo (EF 0 60 (60%), Biatrial enlargement ) - 3/24/2017 Echo (EF 0 55 (55%), mild LVH  Aortic valvular sclerosis  Trace MI with mild left atrial dilatation, mild MAC  Mild TI with mild pulmonary hypertension  ) - 5/22/2017 Echo (EF 0 60 (60%), Normal left vent chamber size and systolic function  Mild diastolic dysfunction of LV w/normal filling pressures   Mild mitral regurg ) - 7/26/2017 Echo (EF 0    Hypertension     Iron deficiency anemia 4/21/2016    Malignant melanoma of skin ([facility] ) 9/17/2012    NSTEMI (non-ST elevated myocardial infarction) ([facility] ) 7/25/2017    Osteoarthritis 9/17/2012    Osteoporosis 3/13/2017    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[facility] ) 04/07/2017       Past Surgical History:   Procedure Laterality Date    ABDOMINAL SURGERY      BREAST SURGERY      Lumpectomy    CARDIAC CATHETERIZATION  03/24/2017    ARNIE to 100% occluded prox RCA, chronic 99% ostial LCfx, 60% mid LAD, discrete 40% distal LM    CHOLECYSTECTOMY      CORONARY STENT PLACEMENT  03/25/2017    ARNIE RCA    HEMORRHOID SURGERY      INSERTION STENT ARTERY  04/07/2017    Cardio vascular agents drug-eluting stent    SKIN BIOPSY      TONSILLECTOMY         Meds/Allergies:    Prior to Admission medications    Medication Sig Start Date End Date Taking? Authorizing Provider   aspirin (ECOTRIN LOW STRENGTH) 81 mg EC tablet Take 1 tablet by mouth daily 9/17/12  Yes Historical Provider, MD   atorvastatin (LIPITOR) 40 mg tablet Take 1 tablet by mouth daily   4/7/17  Yes Historical Provider, MD   Calcium Carbonate-Vitamin D 600-125 MG-UNIT TABS Take by mouth daily   5/20/13  Yes Historical Provider, MD   clopidogrel (PLAVIX) 75 mg tablet TAKE 1 TABLET DAILY 6/25/18  Yes Aydee Bowers DO   ferrous sulfate 325 (65 Fe) mg tablet Take 1 tablet by mouth daily 3/17/16  Yes Historical Provider, MD   furosemide (LASIX) 20 mg tablet Take 1 tablet (20 mg total) by mouth daily 6/4/18  Yes Aydee Bowers DO   metoprolol tartrate (LOPRESSOR) 25 mg tablet 25 mg Take 1/2 tablet BID   Yes Historical Provider, MD   MULTIPLE VITAMINS-CALCIUM PO Take by mouth daily 5/20/13  Yes Historical Provider, MD   potassium chloride (K-DUR) 10 mEq tablet Take 1 tablet (10 mEq total) by mouth daily 6/26/18  Yes Aydee Bowers DO   ranitidine (ZANTAC) 150 mg tablet TAKE 1 TABLET BY MOUTH TWICE A DAY 6/25/18  Yes Aydee Bowers DO   benzonatate (TESSALON) 200 MG capsule Take 200 mg by mouth 3 (three) times a day as needed for cough    Historical Provider, MD   LORazepam (ATIVAN) 0 5 mg tablet Take 1 tablet (0 5 mg total) by mouth every 8 (eight) hours as needed for anxiety 7/16/18   Aydee Bowers DO   meclizine (ANTIVERT) 25 mg tablet Take 1 tablet by mouth 3 (three) times a day as needed 5/31/17   Historical Provider, MD   nitroglycerin (NITROSTAT) 0 4 mg SL tablet Place under the tongue place 1 tablet by sublingual route at the 1st sign of attack; may repeat every 5 min until relief; if pain persists after 3 tablets in 15 min, prompt medical attention is recommended    Historical Provider, MD     I have reviewed home medications with patient personally  Allergies:    Allergies   Allergen Reactions    Ciprofloxacin      Reaction Date: [id number];     Nitrofurantoin      Reaction Date: [id number];     Penicillins Rash and Other (See Comments)     Throat swells       Social History:     Marital Status:    Occupation:  Retired  Patient Pre-hospital Living Situation:  At home  Patient Pre-hospital Level of Mobility:  With a roller walker  Patient Pre-hospital Diet Restrictions:  None  Substance Use History:   History   Alcohol Use No     History   Smoking Status    Former Smoker   Smokeless Tobacco    Never Used     History   Drug Use No       Family History:    Family History   Problem Relation Age of Onset    Heart failure Mother     Prostate cancer Father     Stroke Family        Physical Exam:     Vitals:   Blood Pressure: 130/63 (07/23/18 1315)  Pulse: 76 (07/23/18 1315)  Temperature: 97 8 °F (36 6 °C) (07/23/18 1005)  Temp Source: Temporal (07/23/18 1005)  Respirations: 18 (07/23/18 1315)  Height: 5' 2" (157 5 cm) (07/23/18 1005)  Weight - Scale: 72 6 kg (160 lb) (07/23/18 1005)  SpO2: 93 % (07/23/18 1315)    Physical Exam   Constitutional: She is oriented to person, place, and time  Vital signs are normal  She appears well-developed and well-nourished  HENT:   Head: Normocephalic and atraumatic  Nose: Nose normal    Mouth/Throat: Mucous membranes are normal    Eyes: Pupils are equal, round, and reactive to light  Neck: Neck supple  Cardiovascular: Normal rate, regular rhythm, S1 normal, S2 normal, normal heart sounds and normal pulses  Pulmonary/Chest: Effort normal and breath sounds normal    Abdominal: Soft  Normal appearance  GI Bleed with BRBPR, patient thinks that it may be due to hemorrhoids  Musculoskeletal: Normal range of motion  Neurological: She is alert and oriented to person, place, and time  She has normal strength  Continue with roller walker   Skin: Skin is warm, dry and intact  Psychiatric: She has a normal mood and affect   Her speech is normal and behavior is normal  Thought content normal  Additional Data:     Lab Results: I have personally reviewed pertinent reports  Results from last 7 days  Lab Units 07/23/18  1040   WBC Thousand/uL 4 50*   HEMOGLOBIN g/dL 12 7   HEMATOCRIT % 37 6   PLATELETS Thousands/uL 262   NEUTROS PCT % 63   LYMPHS PCT % 30   MONOS PCT % 6   EOS PCT % 1       Results from last 7 days  Lab Units 07/23/18  1040   SODIUM mmol/L 140   POTASSIUM mmol/L 3 9   CHLORIDE mmol/L 103   CO2 mmol/L 32*   BUN mg/dL 20   CREATININE mg/dL 0 80   CALCIUM mg/dL 9 2   GLUCOSE RANDOM mg/dL 214*       Results from last 7 days  Lab Units 07/23/18  1040   INR  1 11               Imaging: I have personally reviewed pertinent reports  CT abdomen pelvis with contrast   Final Result by Chuck Esposito (07/23 6002)   1  No acute process in the bowel  Incidental large hiatal hernia  Incidental diverticulosis without superimposed diverticulitis  2   Stable probable hyperdense cyst in the upper pole of the left kidney   compared to CTA chest of 5/19/2017  Signed by Chuck Esposito MD          EKG, Pathology, and Other Studies Reviewed on Admission:   · EKG:  Not applicable    Allscripts / [facility] Records Reviewed: Yes     ** Please Note: This note has been constructed using a voice recognition system   **

## 2018-07-23 NOTE — ASSESSMENT & PLAN NOTE
· BRBPR with + heme test in ED  · Has history of hemorrhoidectomy due to internal hemorrhoids  · Consult GI  · Check HgB Q 8 hours  · Telemetry monitor  · Clear liquids  · protonix drip  · Hold Aspirin and Plavix

## 2018-07-23 NOTE — ASSESSMENT & PLAN NOTE
· She did have a cardiac catheterization with stent placement in March of 2017  · We are holding her aspirin and Plavix at this time  · Continue metoprolol

## 2018-07-23 NOTE — ED NOTES
Per [facility] in there lab, lactic acid is 2 3  Dr Burgess Luke aware         Reza Pozo, RN  07/23/18 1238

## 2018-07-23 NOTE — PLAN OF CARE
CARDIOVASCULAR - ADULT     Maintains optimal cardiac output and hemodynamic stability Progressing     Absence of cardiac dysrhythmias or at baseline rhythm Progressing        DISCHARGE PLANNING     Discharge to home or other facility with appropriate resources Progressing        GASTROINTESTINAL - ADULT     Minimal or absence of nausea and/or vomiting Progressing     Maintains or returns to baseline bowel function Progressing     Maintains adequate nutritional intake Progressing        GENITOURINARY - ADULT     Maintains or returns to baseline urinary function Progressing     Absence of urinary retention Progressing        HEMATOLOGIC - ADULT     Maintains hematologic stability Progressing        INFECTION - ADULT     Absence or prevention of progression during hospitalization Progressing     Absence of fever/infection during neutropenic period Progressing        Knowledge Deficit     Patient/family/caregiver demonstrates understanding of disease process, treatment plan, medications, and discharge instructions Progressing        METABOLIC, FLUID AND ELECTROLYTES - ADULT     Electrolytes maintained within normal limits Progressing     Fluid balance maintained Progressing     Glucose maintained within target range Progressing        MUSCULOSKELETAL - ADULT     Maintain or return mobility to safest level of function Progressing     Maintain proper alignment of affected body part Progressing        PAIN - ADULT     Verbalizes/displays adequate comfort level or baseline comfort level Progressing        Potential for Falls     Patient will remain free of falls Progressing        RESPIRATORY - ADULT     Achieves optimal ventilation and oxygenation Progressing        SAFETY ADULT     Maintain or return to baseline ADL function Progressing     Maintain or return mobility status to optimal level Progressing        SKIN/TISSUE INTEGRITY - ADULT     Skin integrity remains intact Progressing     Oral mucous membranes remain intact Progressing

## 2018-07-23 NOTE — ASSESSMENT & PLAN NOTE
· In the past, today CT of abdomen says NO diverticulitis  · Consult GI  · Continue clear liquid diet with no red liquids  · On CT of the abdomen, large hiatal hernia was found

## 2018-07-24 PROBLEM — D62 ACUTE BLOOD LOSS ANEMIA: Status: ACTIVE | Noted: 2018-07-24

## 2018-07-24 PROBLEM — E87.2 LACTIC ACID INCREASED: Status: RESOLVED | Noted: 2018-07-23 | Resolved: 2018-07-24

## 2018-07-24 PROBLEM — M81.0 OSTEOPOROSIS: Status: RESOLVED | Noted: 2017-03-13 | Resolved: 2018-07-24

## 2018-07-24 LAB
ALBUMIN SERPL BCP-MCNC: 3.1 G/DL (ref 3.5–5.7)
ALP SERPL-CCNC: 35 U/L (ref 55–165)
ALT SERPL W P-5'-P-CCNC: 11 U/L (ref 7–52)
ANION GAP SERPL CALCULATED.3IONS-SCNC: 6 MMOL/L (ref 4–13)
AST SERPL W P-5'-P-CCNC: 17 U/L (ref 13–39)
BASOPHILS # BLD AUTO: 0 THOUSANDS/ΜL (ref 0–0.1)
BASOPHILS NFR BLD AUTO: 1 % (ref 0–2)
BILIRUB SERPL-MCNC: 1.3 MG/DL (ref 0.2–1)
BUN SERPL-MCNC: 15 MG/DL (ref 7–25)
CALCIUM SERPL-MCNC: 8.8 MG/DL (ref 8.6–10.5)
CHLORIDE SERPL-SCNC: 106 MMOL/L (ref 98–107)
CO2 SERPL-SCNC: 28 MMOL/L (ref 21–31)
CREAT SERPL-MCNC: 0.84 MG/DL (ref 0.6–1.2)
EOSINOPHIL # BLD AUTO: 0.1 THOUSAND/ΜL (ref 0–0.61)
EOSINOPHIL NFR BLD AUTO: 2 % (ref 0–5)
ERYTHROCYTE [DISTWIDTH] IN BLOOD BY AUTOMATED COUNT: 14.5 % (ref 11.5–14.5)
GFR SERPL CREATININE-BSD FRML MDRD: 60 ML/MIN/1.73SQ M
GLUCOSE SERPL-MCNC: 135 MG/DL (ref 65–99)
HCT VFR BLD AUTO: 30.8 % (ref 34.8–46.1)
HCT VFR BLD AUTO: 30.9 % (ref 34.8–46.1)
HGB BLD-MCNC: 10.4 G/DL (ref 12–16)
HGB BLD-MCNC: 10.6 G/DL (ref 12–16)
HGB BLD-MCNC: 10.8 G/DL (ref 12–16)
INR PPP: 1.15 (ref 0.9–1.5)
LYMPHOCYTES # BLD AUTO: 1.6 THOUSANDS/ΜL (ref 0.6–4.47)
LYMPHOCYTES NFR BLD AUTO: 30 % (ref 21–51)
MAGNESIUM SERPL-MCNC: 1.9 MG/DL (ref 1.9–2.7)
MCH RBC QN AUTO: 29.5 PG (ref 26–34)
MCHC RBC AUTO-ENTMCNC: 33.5 G/DL (ref 31–37)
MCV RBC AUTO: 88 FL (ref 81–99)
MONOCYTES # BLD AUTO: 0.5 THOUSAND/ΜL (ref 0.17–1.22)
MONOCYTES NFR BLD AUTO: 10 % (ref 2–12)
NEUTROPHILS # BLD AUTO: 3.1 THOUSANDS/ΜL (ref 1.4–6.5)
NEUTS SEG NFR BLD AUTO: 58 % (ref 42–75)
NRBC BLD AUTO-RTO: 0 /100 WBCS
PLATELET # BLD AUTO: 234 THOUSANDS/UL (ref 149–390)
PMV BLD AUTO: 8.5 FL (ref 8.6–11.7)
POTASSIUM SERPL-SCNC: 4.1 MMOL/L (ref 3.5–5.5)
PROT SERPL-MCNC: 5.5 G/DL (ref 6.4–8.9)
PROTHROMBIN TIME: 13.4 SECONDS (ref 10.1–12.9)
RBC # BLD AUTO: 3.52 MILLION/UL (ref 3.9–5.2)
SODIUM SERPL-SCNC: 140 MMOL/L (ref 134–143)
WBC # BLD AUTO: 5.3 THOUSAND/UL (ref 4.8–10.8)

## 2018-07-24 PROCEDURE — 85610 PROTHROMBIN TIME: CPT | Performed by: NURSE PRACTITIONER

## 2018-07-24 PROCEDURE — 97165 OT EVAL LOW COMPLEX 30 MIN: CPT

## 2018-07-24 PROCEDURE — G8978 MOBILITY CURRENT STATUS: HCPCS

## 2018-07-24 PROCEDURE — G8979 MOBILITY GOAL STATUS: HCPCS

## 2018-07-24 PROCEDURE — 83735 ASSAY OF MAGNESIUM: CPT | Performed by: NURSE PRACTITIONER

## 2018-07-24 PROCEDURE — 97163 PT EVAL HIGH COMPLEX 45 MIN: CPT

## 2018-07-24 PROCEDURE — G8988 SELF CARE GOAL STATUS: HCPCS

## 2018-07-24 PROCEDURE — G8987 SELF CARE CURRENT STATUS: HCPCS

## 2018-07-24 PROCEDURE — 85025 COMPLETE CBC W/AUTO DIFF WBC: CPT | Performed by: NURSE PRACTITIONER

## 2018-07-24 PROCEDURE — 85018 HEMOGLOBIN: CPT | Performed by: NURSE PRACTITIONER

## 2018-07-24 PROCEDURE — 85018 HEMOGLOBIN: CPT | Performed by: HOSPITALIST

## 2018-07-24 PROCEDURE — 85014 HEMATOCRIT: CPT | Performed by: HOSPITALIST

## 2018-07-24 PROCEDURE — 80053 COMPREHEN METABOLIC PANEL: CPT | Performed by: NURSE PRACTITIONER

## 2018-07-24 PROCEDURE — 99232 SBSQ HOSP IP/OBS MODERATE 35: CPT | Performed by: HOSPITALIST

## 2018-07-24 PROCEDURE — C9113 INJ PANTOPRAZOLE SODIUM, VIA: HCPCS | Performed by: NURSE PRACTITIONER

## 2018-07-24 RX ORDER — CALCIUM CARBONATE 200(500)MG
1000 TABLET,CHEWABLE ORAL 3 TIMES DAILY PRN
Status: DISCONTINUED | OUTPATIENT
Start: 2018-07-24 | End: 2018-07-27 | Stop reason: HOSPADM

## 2018-07-24 RX ADMIN — POLYETHYLENE GLYCOL 3350, SODIUM SULFATE ANHYDROUS, SODIUM BICARBONATE, SODIUM CHLORIDE, POTASSIUM CHLORIDE 4000 ML: 236; 22.74; 6.74; 5.86; 2.97 POWDER, FOR SOLUTION ORAL at 11:25

## 2018-07-24 RX ADMIN — ATORVASTATIN CALCIUM 40 MG: 40 TABLET, FILM COATED ORAL at 15:08

## 2018-07-24 RX ADMIN — CALCIUM CARBONATE (ANTACID) CHEW TAB 500 MG 1000 MG: 500 CHEW TAB at 22:29

## 2018-07-24 RX ADMIN — OYSTER SHELL CALCIUM WITH VITAMIN D 1 TABLET: 500; 200 TABLET, FILM COATED ORAL at 15:09

## 2018-07-24 RX ADMIN — LORAZEPAM 0.5 MG: 0.5 TABLET ORAL at 19:39

## 2018-07-24 RX ADMIN — LORAZEPAM 0.5 MG: 0.5 TABLET ORAL at 08:59

## 2018-07-24 RX ADMIN — OYSTER SHELL CALCIUM WITH VITAMIN D 1 TABLET: 500; 200 TABLET, FILM COATED ORAL at 09:00

## 2018-07-24 RX ADMIN — FUROSEMIDE 10 MG: 20 TABLET ORAL at 09:00

## 2018-07-24 RX ADMIN — METOPROLOL TARTRATE 25 MG: 25 TABLET, FILM COATED ORAL at 08:59

## 2018-07-24 RX ADMIN — POTASSIUM CHLORIDE 20 MEQ: 1500 TABLET, EXTENDED RELEASE ORAL at 09:00

## 2018-07-24 RX ADMIN — Medication 325 MG: at 09:00

## 2018-07-24 RX ADMIN — Medication 8 MG/HR: at 04:15

## 2018-07-24 NOTE — PHYSICAL THERAPY NOTE
Physical Therapy Evaluation     Patient's Name: Coral Napier    Admitting Diagnosis  Bright red rectal bleeding [K62 5]  Rectal bleeding [K62 5]    Problem List  Patient Active Problem List   Diagnosis    Allergic rhinitis    Benign colon polyp    Cataract    Constipation    Coronary artery disease    Fibrocystic breast disease, unspecified laterality    Gait abnormality    Generalized anxiety disorder    Gastroesophageal reflux disease without esophagitis    Glucose intolerance (no malabsorption)    Hyperlipidemia    Essential hypertension    Iron deficiency anemia    [facility]    History of non-ST elevation myocardial infarction (NSTEMI)    Palpitations    Urinary incontinence    Vitamin D deficiency    ST elevation myocardial infarction involving right coronary artery ([facility] )    COPD [facility]ch[facility] obstructive [facility] disease) ([facility] )    Anxiety    Compression fracture of thoracic vertebra, closed, initial encounter ([facility] )    Proteinuria    Coronary angioplasty status    Hematochezia    Acute blood loss anemia       Past Medical History  Past Medical History:   Diagnosis Date    Abnormal blood sugar 03/13/2017    Abnormal carotid duplex scan     Carotid Duplex (Plaque formation is quite prominent bilaterally  Despite these changes, no evidence for greater than 50% diameter reducing lesions in the cervical portion of either carotid artery hemisystem ) - 6/13/2017    Anxiety     Cervical radiculopathy 08/08/2017    [facility]     COPD (chronic obstructive [facility] disease) [facility] ) 2/6/2018    Coronary artery disease 4/7/2017    Costochondritis 02/05/2013    suspect MS in origin given relationship to ROM and location  Start mobic and if persists, reeval  Refused xrayat this time        Dyslipidemia     GERD without esophagitis 8/30/2012    H/O CT scan of chest      (No PE, minimal interstitial change left lower lobe and right upper lobe, which may be acute ) - 5/19/2017    History of Holter monitoring     Holter (Sinus rhythm with rates ranging from 52 to 118 bpm   No afib or heart block  Rare atrial and ventricular ectopic beats  One six-beat atrial run noted  No pauses  ) - 5/31/2017    Hx of echocardiogram     Echo (EF 0 60 (60%), Biatrial enlargement ) - 3/24/2017 Echo (EF 0 55 (55%), mild LVH  Aortic valvular sclerosis  Trace MI with mild left atrial dilatation, mild MAC  Mild TI with mild pulmonary hypertension  ) - 5/22/2017 Echo (EF 0 60 (60%), Normal left vent chamber size and systolic function  Mild diastolic dysfunction of LV w/normal filling pressures   Mild mitral regurg ) - 7/26/2017 Echo (EF 0    Hypertension     Iron deficiency anemia 4/21/2016    Mal[facility] of [facility] ) 9/17/2012    NSTEMI (non-ST elevated myocardial infarction) ([facility] ) 7/25/2017    Osteoarthritis 9/17/2012    Osteoporosis 3/13/2017    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[facility] ) 04/07/2017       Past Surgical History  Past Surgical History:   Procedure Laterality Date    ABDOMINAL SURGERY      BREAST SURGERY      Lumpectomy    CARDIAC CATHETERIZATION  03/24/2017    ARNIE to 100% occluded prox RCA, chronic 99% ostial LCfx, 60% mid LAD, discrete 40% distal LM    CHOLECYSTECTOMY      CORONARY STENT PLACEMENT  03/25/2017    ARNIE RCA    HEMORRHOID SURGERY      INSERTION STENT ARTERY  04/07/2017    Cardio vascular agents drug-eluting stent    SKIN BIOPSY      TONSILLECTOMY        07/24/18 0855   Note Type   Note type Eval/Treat   Pain Assessment   Pain Assessment No/denies pain   Pain Score No Pain   Home Living   Type of Home House  (stays with 2 different grand daughters)   Home Layout Two level;Ramped entrance   Bathroom Shower/Tub Tub/shower unit   Bathroom Toilet Standard   Bathroom Equipment Grab bars in shower;Grab bars around toilet   [address] [facility]   Prior Function   Level of [place] Independent with ADLs and functional mobility; Needs assistance with IADLs   Lives With Daughter  (2 granddaughters in 2 seperate homes on/off monthly)   Receives Help From Family   ADL Assistance Independent   IADLs Needs assistance   Falls in the last 6 months 0   Vocational Retired   Restrictions/Precautions   [employer] Bearing Precautions Per Order No   Cognition   Overall Cognitive Status WFL   Arousal/Participation Alert   Orientation Level Oriented X4   Memory Within functional limits   Following Commands Follows one step commands with increased time or repetition   RUE Assessment   RUE Assessment WFL   LUE Assessment   LUE Assessment WFL   RLE Assessment   RLE Assessment WFL   LLE Assessment   LLE Assessment WFL   Bed Mobility   Rolling L 6  Modified independent   Additional items Bedrails   Supine to Sit 4  Minimal assistance   Additional items Assist x 1   Sit to Supine 4  Minimal assistance   Additional items Assist x 1   Transfers   Sit to Stand 4  Minimal assistance   Additional items Assist x 1   Stand to Sit 4  Minimal assistance   Additional items Assist x 1   Ambulation/Elevation   Gait pattern Improper Weight shift; Forward Flexion;Narrow WYATT; Decreased foot clearance; Short stride   Gait Assistance 4  Minimal assist   Additional items Assist x 1   Assistive Device Rolling walker   Distance 35 feet   Stair Management Assistance Not tested   Balance   Static Sitting Good   Dynamic Sitting Fair +  (decrease stability upon removing BUE's from bed)   Static Standing Fair   Dynamic Standing Fair -   Ambulatory Fair -  ("just dont feel right", "weak"; provided tactile cues 100%)   Endurance Deficit   Endurance Deficit Yes   Endurance Deficit Description increase fatigue after short distance of AMB   Activity Tolerance   Activity Tolerance Patient limited by fatigue   Assessment   Prognosis Good   Problem List Decreased strength;Decreased endurance; Impaired balance;Decreased mobility; Decreased coordination;Decreased safety awareness   Assessment Pt is 80 y o  female seen for PT evaluation s/p admit to [address] on 7/23/2018 w/ Hematochezia  PT consulted to assess pt's functional mobility and d/c needs  Order placed for PT eval and tx, w/ up as tolerated order  Comorbidities affecting pt's physical performance at time of assessment include: hematochezia, divertulosis, anxiety, anemia, osteoporosis, osteoarthritis, CAD, HTN, vitamin D deficiency, iron deficient anemia  pt was requiring A for mobility and lives w/ granddaughter  in two level house  Personal factors affecting pt at time of IE include: inability to navigate community distances, unable to perform dynamic tasks in community, inability to perform IADLs, inability to perform ADLs and decrease activity tolerance  Please find objective findings from PT assessment regarding body systems outlined above with impairments and limitations including weakness, impaired balance, decreased endurance, impaired coordination, gait deviations, decreased activity tolerance and decreased functional mobility tolerance  The following objective measures performed on IE also reveal limitations: Barthel Index: 30/100  Pt's clinical presentation is currently unstable/unpredictable seen in pt's presentation of complex PMH, decrease activity tolerance, and HPI  Pt to benefit from continued PT tx to address deficits as defined above and maximize level of functional independent mobility and consistency  From PT/mobility standpoint, recommendation at time of d/c would be Home PT vs  STR pending progress in order to facilitate return to PLOF  Goals   Patient Goals get stronger, move better   [facility] Expiration Date 07/29/18   Short Term Goal #1 Pt will: Perform bed mobility tasks to Supervision to decrease burden of care  Perform transfers to Supervision to increase Indep in home environment and decrease risk for falls   Perform ambulation with RW for 200 feet, supervision of 1  to improve activity tolerance and increase Indep in prior living environment   Increase patient's dynamic standing balance to fair+ to perform ADL's in bathroom with supervision of 1 and RW without LOB or perturbations  Pt  Will exhibit increased  dynamic ambulatory  balance to good, manual cues 50% of treatment session to increase safety, decrease risk of falls, and enable safe ambulation to toilet/hallway to decrease risk of medical complications  LTG Expiration Date 08/03/18   Long Term Goal #1 Pt will: Perform bed mobility tasks to modified I to decrease burden of care  Perform transfers to modified I with RW to increase Indep in home environment and decrease risk for falls  Perform ambulation with RW for 350 feet, modified I  to improve activity tolerance and increase Indep in prior living environment   Increase patient's dynamic standing balance to good to perform ADL's in bathroom with  Modified I,RW without LOB or perturbations  Pt  Will exhibit increased  dynamic ambulatory  balance to good+, manual cues 25% of treatment session to increase safety, decrease risk of falls, and enable safe ambulation to toilet/hallway to decrease risk of medical complications  Plan   Treatment/Interventions ADL retraining;Functional transfer training;LE strengthening/ROM; Therapeutic exercise; Endurance training;Patient/family training;Bed mobility;Gait training; Compensatory technique education   PT Frequency 5x/wk   Recommendation   Recommendation Other (Comment)  (STR vs  home PT)   PT - OK to Discharge No   Barthel Index   Feeding 5   Bathing 0   Grooming Score 0   Dressing Score 5   Bladder Score 5   Bowels Score 5   Toilet Use Score 5   Transfers (Bed/Chair) Score 5   Mobility (Level Surface) Score 0   Stairs Score 0   Barthel Index Score 30     Patrick Hay, PT

## 2018-07-24 NOTE — PLAN OF CARE
Problem: DISCHARGE PLANNING - CARE MANAGEMENT  Goal: Discharge to post-acute care or home with appropriate resources  INTERVENTIONS:  - Conduct assessment to determine patient/family and health care team treatment goals, and need for post-acute services based on payer coverage, community resources, and patient preferences, and barriers to discharge  - Address psychosocial, clinical, and financial barriers to discharge as identified in assessment in conjunction with the patient/family and health care team  - Arrange appropriate level of post-acute services according to patient's   needs and preference and payer coverage in collaboration with the physician and health care team  - Communicate with and update the patient/family, physician, and health care team regarding progress on the discharge plan  - Arrange appropriate transportation to post-acute venues      D/c home with family  Outcome: Progressing

## 2018-07-24 NOTE — OCCUPATIONAL THERAPY NOTE
Chief Complaint:     Bleeding from her rectum       History of Present Illness:     Trinity Velazquez is a 80 y o  female who presents with bleeding from her rectum  Patient states that it is bright red blood in the toilet  She also states that there is a thought of dark clots that are there as well, with stool  Patient does take an 81 mg aspirin daily and 75 mg Plavix daily, as well as iron tablets      She does deny any type of abdominal pain nausea or vomiting  She does state that she has taken Imodium in the past and most recently secondary to having which she thought was diarrhea    She does have a past medical history of hemorrhoidectomy, and which she believes as having diverticulitis in the past          07/24/18 1015   Note Type   Note type Eval/Treat   Restrictions/Precautions   Weight Bearing Precautions Per Order No   Pain Assessment   Pain Assessment No/denies pain   Home Living   Type of [address] [facility] Two level   Bathroom Shower/Tub Tub/shower unit   Bathroom Toilet Standard   Bathroom Equipment Grab bars in [facility] 124   Additional Comments (reports she goes between 2 different [facility])   Prior Function   Level of Andrew Independent with ADLs and functional mobility   Lives With Other (Comment)  ([facility])   Receives Help From Family   ADL Assistance Independent   IADLs Needs assistance   Falls in the last 6 months 0   ADL   Eating Assistance 7  Independent   Grooming Assistance 7  Independent   [facility] [address] [place] [address] [address]  5  Samantha  66  4  [facility] to Stand 4  Minimal assistance   Stand pivot 4  Minimal assistance   Toilet transfer 4  Minimal assistance   Additional items (bedside commode)   RUE Assessment   RUE Assessment WFL   LUE Assessment   LUE Assessment WFL   Cognition   Overall Cognitive Status WFL   Memory Within functional limits   Following Commands Follows all commands and directions without difficulty   Assessment   Limitation Decreased ADL status; Decreased endurance   Prognosis Good   Assessment Pt is a 80 y o  female seen for OT evaluation [facility] w/ Hematochezia  Comorbidities affecting pt's functional performance at time of assessment include: decreased endurance, dynamic standing balance and ADL's  Prior to admission, pt was MI/I with ADL's  Upon evaluation: Pt requires Min A for functional mobility and LB self care 2* the following deficits impacting occupational performance: decreased balance, activity tolerance, endurance and overall strength  Pt to benefit from continued skilled OT tx while in the hospital to address deficits as defined above and maximize level of functional independence w ADL's and functional mobility  Occupational Performance areas to address include: ADL's   From OT standpoint, recommendation at time of d/c would be to return to living with family at [address] level for ADL's with assist for IADL's as previously  Goals   Patient Goals to get stronger so I can feel better and go home   Short Term Goal #1 Improve sponge bathing and dressing to set up assist   Short Term Goal #2 improve toileting to supervision   Short Term Goal  Improve safety and balance with transfer to toilet to good without VC   Long Term Goal #1 Improve ADL's of sponge bathing , dressing, toileting to independent   Plan   Treatment Interventions ADL retraining;UE strengthening/ROM; Endurance training;Patient/family training   Goal Expiration Date 08/03/18   OT Frequency 3-5x/wk   Barthel Index   Feeding 10   Bathing 0   Grooming Score 0   Dressing Score 5   Bladder Score 5   Bowels Score 5   Toilet Use Score 5   Transfers (Bed/Chair) Score 5   Mobility (Level Surface) Score 10   Stairs Score 0   Barthel Index Score 45   Gideon Jacobsen OT

## 2018-07-24 NOTE — PLAN OF CARE
Problem: OCCUPATIONAL THERAPY ADULT  Goal: Performs self-care activities at highest level of function for planned discharge setting  See evaluation for individualized goals  Outcome: Progressing  Limitation: Decreased ADL status, Decreased endurance  Prognosis: Good  Assessment: Pt is a 80 y o  female seen for OT evaluation s/p admit to [facility] 7/23/2018 w/ Hematochezia  Comorbidities affecting pt's functional performance at time of assessment include: decreased endurance, dynamic standing balance and ADL's  Prior to admission, pt was MI/I with ADL's  Upon evaluation: Pt requires Min A for functional mobility and LB self care 2* the following deficits impacting occupational performance: decreased balance, activity tolerance, endurance and overall strength  Pt to benefit from continued skilled OT tx while in the hospital to address deficits as defined above and maximize level of functional independence w ADL's and functional mobility  Occupational Performance areas to address include: ADL's   From OT standpoint, recommendation at time of d/c would be to return to living with family at [address] level for ADL's with assist for IADL's as previously

## 2018-07-24 NOTE — PLAN OF CARE
Problem: PHYSICAL THERAPY ADULT  Goal: Performs mobility at highest level of function for planned discharge setting  See evaluation for individualized goals  Treatment/Interventions: ADL retraining, Functional transfer training, LE strengthening/ROM, Therapeutic exercise, Endurance training, Patient/family training, Bed mobility, Gait training, Compensatory technique education     Michael Smith, PT         See flowsheet documentation for full assessment, interventions and recommendations  Outcome: Progressing  Prognosis: Good  Problem List: Decreased strength, Decreased endurance, Impaired balance, Decreased mobility, Decreased coordination, Decreased safety awareness  Assessment: Pt is 80 y o  female seen for PT evaluation s/p admit to [address] [place] [address] on 7/23/2018 w/ Hematochezia  PT consulted to assess pt's functional mobility and d/c needs  Order placed for PT eval and tx, w/ up as tolerated order  Comorbidities affecting pt's physical performance at time of assessment include: hematochezia, divertulosis, anxiety, anemia, osteoporosis, osteoarthritis, CAD, HTN, vitamin D deficiency, iron deficient anemia  pt was requiring A for mobility and lives w/ granddaughter  in two level house  Personal factors affecting pt at time of IE include: inability to navigate community distances, unable to perform dynamic tasks in community, inability to perform IADLs, inability to perform ADLs and decrease activity tolerance  Please find objective findings from PT assessment regarding body systems outlined above with impairments and limitations including weakness, impaired balance, decreased endurance, impaired coordination, gait deviations, decreased activity tolerance and decreased functional mobility tolerance  The following objective measures performed on IE also reveal limitations: Barthel Index: 30/100   Pt's clinical presentation is currently unstable/unpredictable seen in pt's presentation of complex PMH, decrease activity tolerance, and HPI  Pt to benefit from continued PT tx to address deficits as defined above and maximize level of functional independent mobility and consistency  From PT/mobility standpoint, recommendation at time of d/c would be Home PT vs  STR pending progress in order to facilitate return to PLOF  Recommendation: Other (Comment) (STR vs  home PT)     PT - OK to Discharge: No    See flowsheet documentation for full assessment

## 2018-07-24 NOTE — ASSESSMENT & PLAN NOTE
· Continue Protonix drip  · Unclear as to why Pepcid was also being continued, we will stop it while she is on Protonix

## 2018-07-24 NOTE — SOCIAL WORK
Chart reviewed by case management, assessment was completed at the bedside, the pt made  it very clear to me she does not need hhc, I will continue to follow and reassess for any additional d/c need, d/c plan was discussed at the d/c planning meeting, th ept lives with her grand-daughter Margarita and she helps her as needed, the pt also stated that she also lives with her other grand-daughter, she takes turns going to each others home,she stated when the winter comes she will stay longer at one place, pt is for a colonoscopy not stable for d/c today, family will transport the pt home when stable for d/c , call was placed to grand-daughter to discuss d/c plan and any additional d/c needs,  Pt has a ramp outside and lives on the first floor of the 2 story home, br on the 1st &2nd floor, she denies smoking and drinks alcohol on rare occasions, she has [facility] rx pal[facility], pt has dme equipment, pt does wear glasses and has had hhc in the past, she has declined at this time, will follow for any additional needs, CM reviewed d/c planning process including the following: identifying help at home, patient preference for d/c planning needs, availability of treatment team to discuss questions or concerns patient and/or family may have regarding understanding medications and recognizing signs and symptoms once discharged  CM also encouraged patient to follow up with all recommended appointments after discharge  Patient advised of importance for patient and family to participate in managing patients medical well being

## 2018-07-24 NOTE — CONSULTS
Consultation - Mirna Godoy 80 y o  female MRN: [id number]  Unit/Bed#: -02 Encounter: [id number]      Assessment/Plan     Assessment:  1  Hematochezia  2  Acute blood loss anemia  3  History of diverticulosis  4  History of colonic polyps  5  GERD  6  Hiatal hernia    Plan:  1-4  Monitor H/H  Transfuse PRBC prn to keep Hgb >8  Clear liquids  Will prep today for colonoscopy tomorrow      5-6  Dietary and lifestyle modification  Continue PPI  History of Present Illness   Physician Requesting Consult: Hayley Dale MD  Reason for Consult / Principal Problem: Hematochezia  Hx and PE limited by: Inpatient consult to gastroenterology  Consult performed by: Lokesh Han, [address] [place] [address] ordered by: Bong Pruett      HPI: Dariel Pham is a 80y o  year old female who presents with BRBPR yesterday  Patient states that blood per rectum started on Saturday with sudden-onset  Denies any abdominal pain or eating anything unusual  She has h/o MI and stents for which she takes ASA and plavix  It is currently being held in the hospital  She states that this am she had semi-formed brown stool  In the ER, patient had a CT abd/pelvis performed that showed large hiatal hernia and diverticulosis  She states that she's had several colonoscopies and the last one was about 5-6 yrs ago and was normal       Review of Systems   Gastrointestinal: Positive for blood in stool  Negative except for mentioned above in HPI  Historical Information   Past Medical History:   Diagnosis Date    Abnormal blood sugar 03/13/2017    Abnormal carotid duplex scan     Carotid Duplex (Plaque formation is quite prominent bilaterally     Despite these changes, no evidence for greater than 50% diameter reducing lesions in the cervical portion of either carotid artery hemisystem ) - 6/13/2017    Anxiety     Cervical radiculopathy 08/08/2017    CHF (congestive heart failure) (HCC)     COPD (chronic obstructive pulmonary disease) ([facility] ) 2/6/2018    Coronary artery disease 4/7/2017    Costochondritis 02/05/2013    suspect MS in origin given relationship to ROM and location  Start mobic and if persists, reeval  Refused xrayat this time   Dyslipidemia     GERD without esophagitis 8/30/2012    H/O CT scan of chest      (No PE, minimal interstitial change left lower lobe and right upper lobe, which may be acute ) - 5/19/2017    History of Holter monitoring     Holter (Sinus rhythm with rates ranging from 52 to 118 bpm   No afib or heart block  Rare atrial and ventricular ectopic beats  One six-beat atrial run noted  No pauses  ) - 5/31/2017    Hx of echocardiogram     Echo (EF 0 60 (60%), Biatrial enlargement ) - 3/24/2017 Echo (EF 0 55 (55%), mild LVH  Aortic valvular sclerosis  Trace MI with mild left atrial dilatation, mild MAC  Mild TI with mild pulmonary hypertension  ) - 5/22/2017 Echo (EF 0 60 (60%), Normal left vent chamber size and systolic function  Mild diastolic dysfunction of LV w/normal filling pressures   Mild mitral regurg ) - 7/26/2017 Echo (EF 0    Hypertension     Iron deficiency anemia 4/21/2016    Malignant melanoma of skin ([facility] ) 9/17/2012    NSTEMI (non-ST elevated myocardial infarction) ([facility] ) 7/25/2017    Osteoarthritis 9/17/2012    Osteoporosis 3/13/2017    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[facility] ) 04/07/2017     Past Surgical History:   Procedure Laterality Date    ABDOMINAL SURGERY      BREAST SURGERY      Lumpectomy    CARDIAC CATHETERIZATION  03/24/2017    ARNIE to 100% occluded prox RCA, chronic 99% ostial LCfx, 60% mid LAD, discrete 40% distal LM    CHOLECYSTECTOMY      CORONARY STENT PLACEMENT  03/25/2017    ARNIE RCA    HEMORRHOID SURGERY      INSERTION STENT ARTERY  04/07/2017    Cardio vascular agents drug-eluting stent    SKIN BIOPSY      TONSILLECTOMY       Social History   History   Alcohol Use No     History   Drug Use No     History   Smoking Status    Former Smoker   Smokeless Tobacco    Never Used     Family History: non-contributory    Meds/Allergies   all current active meds have been reviewed    Allergies   Allergen Reactions    Ciprofloxacin      Reaction Date: 01Jul2011;     Nitrofurantoin      Reaction Date: 01Jul2011;     Penicillins Rash and Other (See Comments)     Throat swells       Objective       Intake/Output Summary (Last 24 hours) at 07/24/18 0946  Last data filed at 07/24/18 0802   Gross per 24 hour   Intake             1960 ml   Output                0 ml   Net             1960 ml       Invasive Devices:   Peripheral IV 07/23/18 Right Antecubital (Active)   Site Assessment Clean;Dry; Intact 7/24/2018  7:33 AM   Dressing Type Transparent 7/24/2018  7:33 AM   Line Status Blood return noted; Saline locked 7/24/2018  7:33 AM   Dressing Status Clean;Dry; Intact 7/24/2018  7:33 AM       Physical Exam   Constitutional: She is oriented to person, place, and time  She appears well-developed and well-nourished  HENT:   Head: Normocephalic  Eyes: Pupils are equal, round, and reactive to light  Cardiovascular: Normal rate and regular rhythm  Pulmonary/Chest: Breath sounds normal    Abdominal: Soft  Bowel sounds are normal  She exhibits no distension  There is no tenderness  Neurological: She is alert and oriented to person, place, and time  Lab Results: I have personally reviewed pertinent reports  Imaging Studies: I have personally reviewed pertinent reports  EKG, Pathology, and Other Studies: I have personally reviewed pertinent reports  VTE Prophylaxis: RX contraindicated due to: blood per rectum    Counseling / Coordination of Care  Total floor / unit time spent today 40 minutes  Greater than 50% of total time was spent with the patient and / or family counseling and / or coordination of care   A description of the counseling / coordination of care: discussed with hospitalist

## 2018-07-24 NOTE — PHYSICIAN ADVISOR
Current patient class: Inpatient  The patient is currently on Hospital Day: 2 at [address]      The patient was admitted to the hospital at [id number] [phone] on 7/23/18 for the following diagnosis:  Bright red rectal bleeding [K62 5]  Rectal bleeding [K62 5]       There is documentation in the medical record of an expected length of stay of at least 2 midnights  The patient is therefore expected to satisfy the 2 midnight benchmark and given the 2 midnight presumption is appropriate for INPATIENT ADMISSION  Given this expectation of a satisfying stay, CMS instructs us that the patient is most often appropriate for inpatient admission under part A provided medical necessity is documented in the chart  After review of the relevant documentation, labs, vital signs and test results, the patient is appropriate for INPATIENT ADMISSION  Admission to the hospital as an inpatient is a complex decision making process which requires the practitioner to consider the patients presenting complaint, history and physical examination and all relevant testing  With this in mind, in this case, the patient was deemed appropriate for INPATIENT ADMISSION  After review of the documentation and testing available at the time of the admission I concur with this clinical determination of medical necessity  Rationale is as follows: The patient is a 80 yrs old Female who presented to the ED at 7/23/2018 10:08 AM with a chief complaint of Rectal Bleeding (rectal beeding that began yesterday  Dark red  )     The patient is being presented with rectal bleeding  She also noted dark clots with the stool  The patient is being admitted with hematochezia and diverticulosis  The plan of care includes serial Hgb levels, IV protonix, hold anticoagulation, clear liquid diet, GI consultation       The patients vitals on arrival were ED Triage Vitals [07/23/18 1005]   Temperature Pulse Respirations Blood Pressure SpO2 97 8 °F (36 6 °C) (!) 114 16 143/67 91 %      Temp Source Heart Rate Source Patient Position - Orthostatic VS BP Location FiO2 (%)   Temporal Monitor Sitting Left arm --      Pain Score       No Pain           Past Medical History:   Diagnosis Date    Abnormal blood sugar 03/13/2017    Abnormal carotid duplex scan     Carotid Duplex (Plaque formation is quite prominent bilaterally  Despite these changes, no evidence for greater than 50% diameter reducing lesions in the cervical portion of either carotid artery hemisystem ) - 6/13/2017    Anxiety     Cervical radiculopathy 08/08/2017    CHF (congestive heart failure) (HCC)     COPD (chronic obstructive pulmonary disease) ([place] [address] ) 2/6/2018    Coronary artery disease 4/7/2017    Costochondritis 02/05/2013    suspect MS in origin given relationship to ROM and location  Start mobic and if persists, reeval  Refused xrayat this time   Dyslipidemia     GERD without esophagitis 8/30/2012    H/O CT scan of chest      (No PE, minimal interstitial change left lower lobe and right upper lobe, which may be acute ) - 5/19/2017    History of Holter monitoring     Holter (Sinus rhythm with rates ranging from 52 to 118 bpm   No afib or heart block  Rare atrial and ventricular ectopic beats  One six-beat atrial run noted  No pauses  ) - 5/31/2017    Hx of echocardiogram     Echo (EF 0 60 (60%), Biatrial enlargement ) - 3/24/2017 Echo (EF 0 55 (55%), mild LVH  Aortic valvular sclerosis  Trace MI with mild left atrial dilatation, mild MAC  Mild TI with mild pulmonary hypertension  ) - 5/22/2017 Echo (EF 0 60 (60%), Normal left vent chamber size and systolic function  Mild diastolic dysfunction of LV w/normal filling pressures   Mild mitral regurg ) - 7/26/2017 Echo (EF 0    Hypertension     Iron deficiency anemia 4/21/2016    Malignant melanoma of skin ([place] [address] ) 9/17/2012    NSTEMI (non-ST elevated myocardial infarction) ([place] [address] ) 7/25/2017    Osteoarthritis 9/17/2012    Osteoporosis 3/13/2017    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[facility] ) 04/07/2017     Past Surgical History:   Procedure Laterality Date    ABDOMINAL SURGERY      BREAST SURGERY      Lumpectomy    CARDIAC CATHETERIZATION  03/24/2017    ARNIE to 100% occluded prox RCA, chronic 99% ostial LCfx, 60% mid LAD, discrete 40% distal LM    CHOLECYSTECTOMY      CORONARY STENT PLACEMENT  03/25/2017    ARNIE RCA    HEMORRHOID SURGERY      INSERTION STENT ARTERY  04/07/2017    Cardio vascular agents drug-eluting stent    SKIN BIOPSY      TONSILLECTOMY             Consults have been placed to:   IP CONSULT TO CASE MANAGEMENT  IP CONSULT TO GASTROENTEROLOGY  IP CONSULT TO CASE MANAGEMENT    Vitals:    07/24/18 1513 07/24/18 1514 07/24/18 1515 07/24/18 1539   BP: 106/54   98/60   BP Location:    Left arm   Pulse: 66   76   Resp:    16   Temp: 98 5 °F (36 9 °C)   97 5 °F (36 4 °C)   TempSrc:    Tympanic   SpO2: 92%   94%   Weight:  74 8 kg (165 lb)     Height:  5' 2" (1 575 m) 5' 2" (1 575 m)        Most recent labs:    Recent Labs      07/24/18   0442   WBC  5 30   HGB  10 4*   HCT  30 9*   PLT  234   K  4 1   NA  140   CALCIUM  8 8   BUN  15   CREATININE  0 84   INR  1 15   AST  17   ALT  11   ALKPHOS  35*   BILITOT  1 30*       Scheduled Meds:  Current Facility-Administered Medications:  acetaminophen 650 mg Oral Q6H PRN Mavis Bustamante, CRNP    atorvastatin 40 mg Oral Daily With [place], CRNP    benzonatate 200 mg Oral TID PRN Mavis Bustamante, CRNP    calcium carbonate-vitamin D 1 tablet Oral BID With Meals Mavis Bustamante, CRNP    ferrous sulfate 325 mg Oral Daily Mavis Bustamante, CRNP    furosemide 10 mg Oral Daily Mavis Bustamante, CRNP    LORazepam 0 5 mg Oral Q8H PRN Mavis Bustamante, CRNP    meclizine 25 mg Oral TID PRN Mavis Bustamante, CRNP    metoprolol tartrate 25 mg Oral Q12H [address] Mavis A Dianna, CRNP    nitroglycerin 0 4 mg Sublingual Q5 Min PRN Mavis Bustamante, CRNP    ondansetron 4 mg Intravenous Q6H PRN Mavis A Meckes, CRNP    pantoprozole (PROTONIX) infusion (Continuous) 8 mg/hr Intravenous Continuous Mavis A Meckes, CRNP Last Rate: 8 mg/hr (07/24/18 0415)   potassium chloride 20 mEq Oral Daily Mavis A Meckes, CRNP      Continuous Infusions:  pantoprozole (PROTONIX) infusion (Continuous) 8 mg/hr Last Rate: 8 mg/hr (07/24/18 0415)     PRN Meds:   acetaminophen    benzonatate    LORazepam    meclizine    nitroglycerin    ondansetron    Surgical procedures (if appropriate):  Procedure(s):  COLONOSCOPY

## 2018-07-24 NOTE — PLAN OF CARE
Problem: Nutrition/Hydration-ADULT  Goal: Nutrient/Hydration intake appropriate for improving, restoring or maintaining nutritional needs  Monitor and assess patient's nutrition/hydration status for malnutrition (ex- brittle hair, bruises, dry skin, pale skin and conjunctiva, muscle wasting, smooth red tongue, and disorientation)  Collaborate with interdisciplinary team and initiate plan and interventions as ordered  Monitor patient's weight and dietary intake as ordered or per policy  Utilize nutrition screening tool and intervene per policy  Determine patient's food preferences and provide high-protein, high-caloric foods as appropriate  INTERVENTIONS:  - Monitor oral intake, urinary output, labs, and treatment plans  - Assess nutrition and hydration status and recommend course of action  - Evaluate amount of meals eaten  - Assist patient with eating if necessary   - Allow adequate time for meals  - Recommend/ encourage appropriate diets, oral nutritional supplements, and vitamin/mineral supplements  - Order, calculate, and assess calorie counts as needed  - Recommend, monitor, and adjust tube feedings and TPN/PPN based on assessed needs  - Assess need for intravenous fluids  - Provide specific nutrition/hydration education as appropriate  - Include patient/family/caregiver in decisions related to nutrition  Outcome: Progressing  Goal: Pt will consume and tolerate >75% of clear liquid diet s/p colonoscopy  Recommend initiate supplement when pt diet is upgraded

## 2018-07-24 NOTE — PROGRESS NOTES
Progress Note - Julian Rajan 2/25/1925, 80 y o  female MRN: [id number]    Unit/Bed#: -02 Encounter: [id number]    Primary Care Provider: Adelaida Ervin DO   Date and time admitted to hospital: 7/23/2018 10:08 AM        * Hematochezia   Assessment & Plan    · Unspecified exact etiology - could be hemorrhoidal versus diverticular versus mixed verses a brisk upper GI bleed  · Patient continues to complain of ongoing GI bleeding, she describes it as occasionally being bright red and occasionally being dark  · Continue clear liquid diet  · Await GI input , continue Protonix drip        Acute blood loss anemia   Assessment & Plan    · Patient has had a drop in hemoglobin  · Arrival hemoglobin was 12 7 it has dropped down to 10 4 this morning  · Patient remains hemodynamically stable  · Patient has been typed and screened, will transfuse for hemoglobin of less than 7 and or if the patient becomes hemodynamically unstable        Coronary artery disease   Assessment & Plan    · Continue to hold blood thinners in the form of aspirin and Plavix  · Risks and benefits have been explained to patient  · She understands why we are holding the aspirin and Plavix and is in full agreement   · Continue metoprolol, atorvastatin, and nitroglycerin from a cardiac based medication standpoint        Gastroesophageal reflux disease without esophagitis   Assessment & Plan    · Continue Protonix drip  · Unclear as to why Pepcid was also being continued, we will stop it while she is on Protonix        Hyperlipidemia   Assessment & Plan    · Continue Lipitor        Iron deficiency anemia   Assessment & Plan    · Continue iron            VTE Pharmacologic Prophylaxis:   Pharmacologic: Pharmacologic VTE Prophylaxis contraindicated due to GI bleeding  Mechanical VTE Prophylaxis in Place: Yes    Patient Centered Rounds: I have performed bedside rounds with nursing staff today      Discussions with Specialists or Other Care Team Provider: GI    Education and Discussions with Family / Patient:   Family was not present at the time of my evaluation, patient was brought up to par with her current status, all questions were answered to her satisfaction    Time Spent for Care: 15 minutes  More than 50% of total time spent on counseling and coordination of care as described above  Current Length of Stay: 1 day(s)    Current Patient Status: Inpatient   Certification Statement: The patient will continue to require additional inpatient hospital stay due to The pending workup for her active GI bleeding    Discharge Plan:   Discharge planning will commence once the patient is medically stable from a GI standpoint   Code Status: Level 1 - Full Code      Subjective:   Patient seen and examined at 7:45 a m  she is concerned about ongoing intermittent self-limited GI bleeding  She denies any pain or discomfort  She is very eager on wanting to speak with Gastroenterology    Objective:     Vitals:   Temp (24hrs), Av 1 °F (36 7 °C), Min:97 8 °F (36 6 °C), Max:98 6 °F (37 °C)    HR:  [] 81  Resp:  [16-18] 18  BP: (106-146)/(54-67) 133/67  SpO2:  [90 %-94 %] 92 %  Body mass index is 30 19 kg/m²  Input and Output Summary (last 24 hours): Intake/Output Summary (Last 24 hours) at 18 0759  Last data filed at 18 1944   Gross per 24 hour   Intake             1360 ml   Output                0 ml   Net             1360 ml       Physical Exam:     Physical Exam   Constitutional: She is oriented to person, place, and time  She appears well-developed and well-nourished  HENT:   Head: Normocephalic and atraumatic  Nose: Nose normal    Mouth/Throat: Oropharynx is clear and moist    Eyes: Conjunctivae and EOM are normal  Pupils are equal, round, and reactive to light  Neck: Normal range of motion  Neck supple  No JVD present  No thyromegaly present  Cardiovascular: Normal rate, regular rhythm and intact distal pulses    Exam reveals no gallop and no friction rub  No murmur heard  Pulmonary/Chest: Effort normal and breath sounds normal  No respiratory distress  Abdominal: Soft  Bowel sounds are normal  She exhibits no distension and no mass  There is no tenderness  There is no guarding  Musculoskeletal: Normal range of motion  She exhibits no edema  Lymphadenopathy:     She has no cervical adenopathy  Neurological: She is alert and oriented to person, place, and time  No cranial nerve deficit  Skin: Skin is warm  No rash noted  No erythema  Psychiatric: She has a normal mood and affect  Her behavior is normal    Vitals reviewed  Additional Data:     Labs:      Results from last 7 days  Lab Units 07/24/18  0442   WBC Thousand/uL 5 30   HEMOGLOBIN g/dL 10 4*   HEMATOCRIT % 30 9*   PLATELETS Thousands/uL 234   NEUTROS PCT % 58   LYMPHS PCT % 30   MONOS PCT % 10   EOS PCT % 2       Results from last 7 days  Lab Units 07/23/18  1040   SODIUM mmol/L 140   POTASSIUM mmol/L 3 9   CHLORIDE mmol/L 103   CO2 mmol/L 32*   BUN mg/dL 20   CREATININE mg/dL 0 80   CALCIUM mg/dL 9 2   GLUCOSE RANDOM mg/dL 214*       Results from last 7 days  Lab Units 07/24/18  0442   INR  1 15                 * I Have Reviewed All Lab Data Listed Above  * Additional Pertinent Lab Tests Reviewed:  Amanda 66 Admission Reviewed    Imaging:    Imaging Reports Reviewed Today Include:  CT scan of the abdomen  Imaging Personally Reviewed by Myself Includes:  None    Recent Cultures (last 7 days):           Last 24 Hours Medication List:     Current Facility-Administered Medications:  acetaminophen 650 mg Oral Q6H PRN Mavis Bustamante, ROGELIO    atorvastatin 40 mg Oral Daily With [employer], CRNP    benzonatate 200 mg Oral TID PRN ROGELIO Sweeney    calcium carbonate-vitamin D 1 tablet Oral BID With Meals Mavis Bustamante, ROGELIO    famotidine 20 mg Oral BID ROGELIO Sweeney    ferrous sulfate 325 mg Oral Daily Mavis CONNELL Dianna, CRNP    furosemide 10 mg Oral Daily Mavis Bustamante, WINSTONNP    LORazepam 0 5 mg Oral Q8H PRN Mavis Bustamante, CRNP    meclizine 25 mg Oral TID PRN Escobar Bustamante, CRNP    metoprolol tartrate 25 mg Oral Q12H [facility] Mavis Bustamante, WINSTONNP    nitroglycerin 0 4 mg Sublingual Q5 Min PRN Mavis Bustamante, WINSTONNP    ondansetron 4 mg Intravenous Q6H PRN Mavis Bustamante, WINSTONNP    pantoprozole (PROTONIX) infusion (Continuous) 8 mg/hr Intravenous Continuous WINSTON SweeneyNP Last Rate: 8 mg/hr (07/24/18 0415)   potassium chloride 20 mEq Oral Daily ROGELIO Sweeney         Today, Patient Was Seen By: Anjelica Sanabria MD    ** Please Note: Dictation voice to text software may have been used in the creation of this document   **

## 2018-07-24 NOTE — ASSESSMENT & PLAN NOTE
· Continue to hold blood thinners in the form of aspirin and Plavix  · Risks and benefits have been explained to patient  · She understands why we are holding the aspirin and Plavix and is in full agreement   · Continue metoprolol, atorvastatin, and nitroglycerin from a cardiac based medication standpoint

## 2018-07-24 NOTE — ASSESSMENT & PLAN NOTE
· Patient has had a drop in hemoglobin  · Arrival hemoglobin was 12 7 it has dropped down to 10 4 this morning  · Patient remains hemodynamically stable  · Patient has been typed and screened, will transfuse for hemoglobin of less than 7 and or if the patient becomes hemodynamically unstable

## 2018-07-24 NOTE — ASSESSMENT & PLAN NOTE
· Unspecified exact etiology - could be hemorrhoidal versus diverticular versus mixed verses a brisk upper GI bleed  · Patient continues to complain of ongoing GI bleeding, she describes it as occasionally being bright red and occasionally being dark  · Continue clear liquid diet  · Await GI input , continue Protonix drip

## 2018-07-25 ENCOUNTER — ANESTHESIA (INPATIENT)
Dept: PERIOP | Facility: HOSPITAL | Age: 83
DRG: 378 | End: 2018-07-25
Payer: MEDICARE

## 2018-07-25 ENCOUNTER — ANESTHESIA EVENT (INPATIENT)
Dept: PERIOP | Facility: HOSPITAL | Age: 83
DRG: 378 | End: 2018-07-25
Payer: MEDICARE

## 2018-07-25 LAB
ANION GAP SERPL CALCULATED.3IONS-SCNC: 6 MMOL/L (ref 4–13)
ATRIAL RATE: 91 BPM
BASOPHILS # BLD AUTO: 0 THOUSANDS/ΜL (ref 0–0.1)
BASOPHILS NFR BLD AUTO: 1 % (ref 0–2)
BUN SERPL-MCNC: 12 MG/DL (ref 7–25)
CALCIUM SERPL-MCNC: 8.3 MG/DL (ref 8.6–10.5)
CHLORIDE SERPL-SCNC: 106 MMOL/L (ref 98–107)
CO2 SERPL-SCNC: 29 MMOL/L (ref 21–31)
CREAT SERPL-MCNC: 0.8 MG/DL (ref 0.6–1.2)
EOSINOPHIL # BLD AUTO: 0.1 THOUSAND/ΜL (ref 0–0.61)
EOSINOPHIL NFR BLD AUTO: 1 % (ref 0–5)
ERYTHROCYTE [DISTWIDTH] IN BLOOD BY AUTOMATED COUNT: 14.8 % (ref 11.5–14.5)
GFR SERPL CREATININE-BSD FRML MDRD: 64 ML/MIN/1.73SQ M
GLUCOSE SERPL-MCNC: 134 MG/DL (ref 65–99)
HCT VFR BLD AUTO: 23.6 % (ref 34.8–46.1)
HCT VFR BLD AUTO: 24.1 % (ref 34.8–46.1)
HCT VFR BLD AUTO: 32.1 % (ref 34.8–46.1)
HGB BLD-MCNC: 10.4 G/DL (ref 12–16)
HGB BLD-MCNC: 8.1 G/DL (ref 12–16)
HGB BLD-MCNC: 8.4 G/DL (ref 12–16)
LYMPHOCYTES # BLD AUTO: 1.7 THOUSANDS/ΜL (ref 0.6–4.47)
LYMPHOCYTES NFR BLD AUTO: 27 % (ref 21–51)
MAGNESIUM SERPL-MCNC: 1.8 MG/DL (ref 1.9–2.7)
MCH RBC QN AUTO: 30.4 PG (ref 26–34)
MCHC RBC AUTO-ENTMCNC: 35 G/DL (ref 31–37)
MCV RBC AUTO: 87 FL (ref 81–99)
MONOCYTES # BLD AUTO: 0.5 THOUSAND/ΜL (ref 0.17–1.22)
MONOCYTES NFR BLD AUTO: 8 % (ref 2–12)
NEUTROPHILS # BLD AUTO: 4 THOUSANDS/ΜL (ref 1.4–6.5)
NEUTS SEG NFR BLD AUTO: 63 % (ref 42–75)
NRBC BLD AUTO-RTO: 0 /100 WBCS
P AXIS: 24 DEGREES
PHOSPHATE SERPL-MCNC: 3.3 MG/DL (ref 3–5.5)
PLATELET # BLD AUTO: 209 THOUSANDS/UL (ref 149–390)
PMV BLD AUTO: 8 FL (ref 8.6–11.7)
POTASSIUM SERPL-SCNC: 3.7 MMOL/L (ref 3.5–5.5)
PR INTERVAL: 178 MS
QRS AXIS: 35 DEGREES
QRSD INTERVAL: 90 MS
QT INTERVAL: 364 MS
QTC INTERVAL: 447 MS
RBC # BLD AUTO: 2.77 MILLION/UL (ref 3.9–5.2)
SODIUM SERPL-SCNC: 141 MMOL/L (ref 134–143)
T WAVE AXIS: -42 DEGREES
VENTRICULAR RATE: 91 BPM
WBC # BLD AUTO: 6.4 THOUSAND/UL (ref 4.8–10.8)

## 2018-07-25 PROCEDURE — 99233 SBSQ HOSP IP/OBS HIGH 50: CPT | Performed by: HOSPITALIST

## 2018-07-25 PROCEDURE — C9113 INJ PANTOPRAZOLE SODIUM, VIA: HCPCS | Performed by: NURSE PRACTITIONER

## 2018-07-25 PROCEDURE — 80048 BASIC METABOLIC PNL TOTAL CA: CPT | Performed by: HOSPITALIST

## 2018-07-25 PROCEDURE — 84100 ASSAY OF PHOSPHORUS: CPT | Performed by: HOSPITALIST

## 2018-07-25 PROCEDURE — 83735 ASSAY OF MAGNESIUM: CPT | Performed by: HOSPITALIST

## 2018-07-25 PROCEDURE — 85025 COMPLETE CBC W/AUTO DIFF WBC: CPT | Performed by: HOSPITALIST

## 2018-07-25 PROCEDURE — 0DJD8ZZ INSPECTION OF LOWER INTESTINAL TRACT, VIA NATURAL OR ARTIFICIAL OPENING ENDOSCOPIC: ICD-10-PCS | Performed by: INTERNAL MEDICINE

## 2018-07-25 PROCEDURE — 85014 HEMATOCRIT: CPT | Performed by: HOSPITALIST

## 2018-07-25 PROCEDURE — 86920 COMPATIBILITY TEST SPIN: CPT

## 2018-07-25 PROCEDURE — P9021 RED BLOOD CELLS UNIT: HCPCS

## 2018-07-25 PROCEDURE — 30233N1 TRANSFUSION OF NONAUTOLOGOUS RED BLOOD CELLS INTO PERIPHERAL VEIN, PERCUTANEOUS APPROACH: ICD-10-PCS | Performed by: HOSPITALIST

## 2018-07-25 PROCEDURE — 85018 HEMOGLOBIN: CPT | Performed by: HOSPITALIST

## 2018-07-25 RX ORDER — SODIUM PHOSPHATE, DIBASIC AND SODIUM PHOSPHATE, MONOBASIC 7; 19 G/133ML; G/133ML
1 ENEMA RECTAL ONCE
Status: COMPLETED | OUTPATIENT
Start: 2018-07-25 | End: 2018-07-25

## 2018-07-25 RX ORDER — SODIUM CHLORIDE 9 MG/ML
75 INJECTION, SOLUTION INTRAVENOUS CONTINUOUS
Status: DISCONTINUED | OUTPATIENT
Start: 2018-07-25 | End: 2018-07-26

## 2018-07-25 RX ORDER — MAGNESIUM HYDROXIDE/ALUMINUM HYDROXICE/SIMETHICONE 120; 1200; 1200 MG/30ML; MG/30ML; MG/30ML
15 SUSPENSION ORAL ONCE
Status: COMPLETED | OUTPATIENT
Start: 2018-07-25 | End: 2018-07-25

## 2018-07-25 RX ORDER — PROPOFOL 10 MG/ML
INJECTION, EMULSION INTRAVENOUS AS NEEDED
Status: DISCONTINUED | OUTPATIENT
Start: 2018-07-25 | End: 2018-07-25 | Stop reason: SURG

## 2018-07-25 RX ORDER — SODIUM CHLORIDE, SODIUM LACTATE, POTASSIUM CHLORIDE, CALCIUM CHLORIDE 600; 310; 30; 20 MG/100ML; MG/100ML; MG/100ML; MG/100ML
INJECTION, SOLUTION INTRAVENOUS CONTINUOUS PRN
Status: DISCONTINUED | OUTPATIENT
Start: 2018-07-25 | End: 2018-07-25 | Stop reason: SURG

## 2018-07-25 RX ADMIN — SALINE LAXATIVE 1 ENEMA: 7; 19 ENEMA RECTAL at 13:16

## 2018-07-25 RX ADMIN — LIDOCAINE HYDROCHLORIDE 100 MG: 20 INJECTION, SOLUTION INTRAVENOUS at 14:51

## 2018-07-25 RX ADMIN — PROPOFOL 10 MG: 10 INJECTION, EMULSION INTRAVENOUS at 15:02

## 2018-07-25 RX ADMIN — SODIUM CHLORIDE, SODIUM LACTATE, POTASSIUM CHLORIDE, AND CALCIUM CHLORIDE: .6; .31; .03; .02 INJECTION, SOLUTION INTRAVENOUS at 14:46

## 2018-07-25 RX ADMIN — ATORVASTATIN CALCIUM 40 MG: 40 TABLET, FILM COATED ORAL at 17:08

## 2018-07-25 RX ADMIN — PROPOFOL 10 MG: 10 INJECTION, EMULSION INTRAVENOUS at 15:05

## 2018-07-25 RX ADMIN — PROPOFOL 50 MG: 10 INJECTION, EMULSION INTRAVENOUS at 14:52

## 2018-07-25 RX ADMIN — LORAZEPAM 0.5 MG: 0.5 TABLET ORAL at 19:21

## 2018-07-25 RX ADMIN — ALUMINUM HYDROXIDE, MAGNESIUM HYDROXIDE, AND SIMETHICONE 15 ML: 200; 200; 20 SUSPENSION ORAL at 02:53

## 2018-07-25 RX ADMIN — PROPOFOL 10 MG: 10 INJECTION, EMULSION INTRAVENOUS at 14:59

## 2018-07-25 RX ADMIN — OYSTER SHELL CALCIUM WITH VITAMIN D 1 TABLET: 500; 200 TABLET, FILM COATED ORAL at 17:08

## 2018-07-25 RX ADMIN — PROPOFOL 10 MG: 10 INJECTION, EMULSION INTRAVENOUS at 15:04

## 2018-07-25 RX ADMIN — PROPOFOL 10 MG: 10 INJECTION, EMULSION INTRAVENOUS at 14:58

## 2018-07-25 RX ADMIN — Medication 8 MG/HR: at 14:07

## 2018-07-25 RX ADMIN — PROPOFOL 10 MG: 10 INJECTION, EMULSION INTRAVENOUS at 14:56

## 2018-07-25 RX ADMIN — PROPOFOL 20 MG: 10 INJECTION, EMULSION INTRAVENOUS at 14:55

## 2018-07-25 RX ADMIN — PROPOFOL 10 MG: 10 INJECTION, EMULSION INTRAVENOUS at 15:00

## 2018-07-25 RX ADMIN — PROPOFOL 10 MG: 10 INJECTION, EMULSION INTRAVENOUS at 14:57

## 2018-07-25 RX ADMIN — METOPROLOL TARTRATE 25 MG: 25 TABLET, FILM COATED ORAL at 20:45

## 2018-07-25 RX ADMIN — Medication 8 MG/HR: at 02:33

## 2018-07-25 NOTE — NURSING NOTE
Patient continues to c/o "heartburn" with a rating of 2/10  She points to her left chest when asked where she is having pain but informs "I'm not having chest pain" "it's heart burn"  Spoke with hospitalist to inform  New order given for Mylanta which was administered as per order    Patient informs "it's hardly there any more "

## 2018-07-25 NOTE — ASSESSMENT & PLAN NOTE
· Arrival hemoglobin was 12 7 it has dropped down to 8 4 this morning  · The nurse reports that the patient did have a brief syncopal episode this morning  · Will transfuse 2 units of PRBCs in view of her cardiac history  · Will transfer patient to ICU for higher level of care and closer monitoring

## 2018-07-25 NOTE — NURSING NOTE
Patient's daily weight and chlorhexidine bath were not performed as of this time  This writer spoke with erika Camargo who is going to perform these items later this am once patient is awake

## 2018-07-25 NOTE — ANESTHESIA POSTPROCEDURE EVALUATION
Post-Op Assessment Note      CV Status:  Stable    Mental Status:  Alert and awake    Hydration Status:  Euvolemic    PONV Controlled:  Controlled    Airway Patency:  Patent    Post Op Vitals Reviewed: Yes          Staff: Anesthesiologist           BP   104/50   Temp (P) 97 9 °F (36 6 °C) (07/25/18 1510)    Pulse  88   Resp (P) 16 (07/25/18 1510)    SpO2   92

## 2018-07-25 NOTE — CONSULTS
Patient admitted with lower GI bleed, now presents to OR with need for colonoscopy  She denies any abdominal pain  Her last colonoscopy was 5-10 years ago  Physical exam reveals no abdominal tenderness  She is considered stable from Anesthesia viewpoint for colonoscopy

## 2018-07-25 NOTE — NURSING NOTE
Spoke with hospitalist to inform of patient's most recent hemoglobin of 8 4 (prior was 10 6)  New order given for repeat H&H at noon today  Will inform oncoming RN of this new order

## 2018-07-25 NOTE — PROCEDURES
Colonoscopy showed severe left-sided diverticulosis with old maroon blood in the left colon  The cecum and ascending colon were poorly prepped by showed no blood  The transverse colon had very little blood but did show 5 very small polyps the largest being approximately 7-8 mm  None of these polyps were removed due to advanced patient age and acute GI bleed  Impression probable diverticular bleed  Would recommend continuing clear liquid diet and then advancing diet slowly  Would avoid any anti-platelet agents or anticoagulation for at least 1 week  Would recommend Metamucil daily, starting once patient starts on solid food

## 2018-07-25 NOTE — PROGRESS NOTES
Progress Note - Ane Showers 2/25/1925, 80 y o  female MRN: [id number]    Unit/Bed#: -02 Encounter: [id number]    Primary Care Provider: Odalys Shipley DO   Date and time admitted to hospital: 7/23/2018 10:08 AM        * Hematochezia   Assessment & Plan    · Patient continues to complain of ongoing GI bleeding  · Patient is scheduled for colonoscopy later this afternoon  · Will continue Protonix infusion at this time        Acute blood loss anemia   Assessment & Plan    · Arrival hemoglobin was 12 7 it has dropped down to 8 4 this morning  · The nurse reports that the patient did have a brief syncopal episode this morning  · Will transfuse 2 units of PRBCs in view of her cardiac history  · Will transfer patient to ICU for higher level of care and closer monitoring        Coronary artery disease   Assessment & Plan    · Aspirin and Plavix remain on hold in view GI of bleeding   · Continue metoprolol, atorvastatin, and nitroglycerin from a cardiac based medication standpoint        Gastroesophageal reflux disease without esophagitis   Assessment & Plan    · Continue Protonix drip          Hyperlipidemia   Assessment & Plan    · Continue Lipitor        Iron deficiency anemia   Assessment & Plan    · Continue ferrous sulfate         Essential hypertension   Assessment & Plan    · Blood pressures remained stable-continue Lasix and metoprolol            VTE Pharmacologic Prophylaxis:   Pharmacologic: Pharmacologic VTE Prophylaxis contraindicated due to Active GI bleeding  Mechanical VTE Prophylaxis in Place: Yes    Patient Centered Rounds: I have performed bedside rounds with nursing staff today  Discussions with Specialists or Other Care Team Provider:   GI    Education and Discussions with Family / Patient:  Patient    Time Spent for Care: 30 minutes  More than 50% of total time spent on counseling and coordination of care as described above      Current Length of Stay: 2 day(s)    Current Patient Status: Inpatient   Certification Statement: The patient will continue to require additional inpatient hospital stay due to The need for blood transfusions, a colonoscopy and close monitoring    Discharge Plan:  Discharge planning will commence once patient is medically stable    Code Status: Level 1 - Full Code      Subjective:   Patient seen examined at 7:40 a m  at that time patient had no complaints other than feeling concerned for ongoing bloody bowel movements  About 10 minutes after I left the room the nurse reports she had a brief syncopal episode  Vitals remained stable, and once the patient was brought back to bed she has been doing fine    Objective:     Vitals:   Temp (24hrs), Av 5 °F (36 9 °C), Min:97 5 °F (36 4 °C), Max:99 7 °F (37 6 °C)    HR:  [] 100  Resp:  [16-20] 20  BP: ()/(54-65) 124/65  SpO2:  [91 %-94 %] 92 %  Body mass index is 30 18 kg/m²  Input and Output Summary (last 24 hours): Intake/Output Summary (Last 24 hours) at 18 0802  Last data filed at 18 2321   Gross per 24 hour   Intake              600 ml   Output                0 ml   Net              600 ml       Physical Exam:     Physical Exam   Constitutional: She is oriented to person, place, and time  She appears well-developed and well-nourished  HENT:   Head: Normocephalic and atraumatic  Nose: Nose normal    Mouth/Throat: Oropharynx is clear and moist    Eyes: Conjunctivae and EOM are normal  Pupils are equal, round, and reactive to light  Neck: Normal range of motion  Neck supple  No JVD present  No thyromegaly present  Cardiovascular: Normal rate, regular rhythm and intact distal pulses  Exam reveals no gallop and no friction rub  No murmur heard  Pulmonary/Chest: Effort normal and breath sounds normal  No respiratory distress  Abdominal: Soft  Bowel sounds are normal  She exhibits no distension and no mass  There is no tenderness  There is no guarding     Musculoskeletal: Normal range of motion  She exhibits no edema  Lymphadenopathy:     She has no cervical adenopathy  Neurological: She is alert and oriented to person, place, and time  No cranial nerve deficit  Skin: Skin is warm  No rash noted  No erythema  Psychiatric: She has a normal mood and affect  Her behavior is normal    Vitals reviewed  Additional Data:     Labs:      Results from last 7 days  Lab Units 07/25/18  0456   WBC Thousand/uL 6 40   HEMOGLOBIN g/dL 8 4*   HEMATOCRIT % 24 1*   PLATELETS Thousands/uL 209   NEUTROS PCT % 63   LYMPHS PCT % 27   MONOS PCT % 8   EOS PCT % 1       Results from last 7 days  Lab Units 07/25/18  0456 07/24/18  0442   SODIUM mmol/L 141 140   POTASSIUM mmol/L 3 7 4 1   CHLORIDE mmol/L 106 106   CO2 mmol/L 29 28   BUN mg/dL 12 15   CREATININE mg/dL 0 80 0 84   CALCIUM mg/dL 8 3* 8 8   TOTAL PROTEIN g/dL  --  5 5*   BILIRUBIN TOTAL mg/dL  --  1 30*   ALK PHOS U/L  --  35*   ALT U/L  --  11   AST U/L  --  17   GLUCOSE RANDOM mg/dL 134* 135*       Results from last 7 days  Lab Units 07/24/18  0442   INR  1 15                 * I Have Reviewed All Lab Data Listed Above  * Additional Pertinent Lab Tests Reviewed:  Amanda 66 Admission Reviewed    Imaging:    Imaging Reports Reviewed Today Include:  None  Imaging Personally Reviewed by Myself Includes:  None    Recent Cultures (last 7 days):           Last 24 Hours Medication List:     Current Facility-Administered Medications:  acetaminophen 650 mg Oral Q6H PRN ROGELIO Sweeney    atorvastatin 40 mg Oral Daily With [employer], ROGELIO    benzonatate 200 mg Oral TID PRN ROGELIO Sweeney    calcium carbonate 1,000 mg Oral TID PRN Kahlil Villanueva PA-C    calcium carbonate-vitamin D 1 tablet Oral BID With Meals ROGELIO Sweeney    ferrous sulfate 325 mg Oral Daily ROGELIO Sweeney    furosemide 10 mg Oral Daily ROGELIO Sweeney    LORazepam 0 5 mg Oral Q8H PRN Glorious ROGELIO Patel meclizine 25 mg Oral TID PRN Oran Pew Dianna, CRNP    metoprolol tartrate 25 mg Oral Q12H [facility] ROGELIO Sweeney    nitroglycerin 0 4 mg Sublingual Q5 Min PRN Mavis Bustamante, WINSTONNP    ondansetron 4 mg Intravenous Q6H PRN Mavis Bustamante, WINSTONNP    pantoprozole (PROTONIX) infusion (Continuous) 8 mg/hr Intravenous Continuous ROGELIO Sweeney Last Rate: 8 mg/hr (07/25/18 0233)   potassium chloride 20 mEq Oral Daily ROGELIO Sweeney         Today, Patient Was Seen By: Michelle See MD    ** Please Note: Dictation voice to text software may have been used in the creation of this document   **

## 2018-07-25 NOTE — ASSESSMENT & PLAN NOTE
· Aspirin and Plavix remain on hold in view GI of bleeding   · Continue metoprolol, atorvastatin, and nitroglycerin from a cardiac based medication standpoint

## 2018-07-25 NOTE — PLAN OF CARE
HEMATOLOGIC - ADULT     Maintains hematologic stability Progressing        Patient continues to have loose, bloody stools with clots  She consumed all of her ordered go-lytely on the prior shift in preparation for a colonscopy later today  She has q12hr h&h labs scheduled with her next one due at 0700  Will continue to monitor stools

## 2018-07-25 NOTE — NURSING NOTE
Patient has been alert and oriented x 3 throughout this shift  She has denied pain however c/o dyspepsia with a rating of 2-3/10  Hospitalist aware; new orders given for Tums and Mylanta for which she has received as per order  Patient continues to have bloody red stools with clots  She completed her bowel prep prior to the start of this shift and continues to have BMs via the [facility]  She is steady on feet  HRR  Lungs are CTA; breathing easy and non-labored  Protonix drip infusing as per order  Patient maintained NPO since midnight as per order  She is in bed sleeping at present; appears to be comfortable and in no acute distress  Will continue to monitor

## 2018-07-25 NOTE — ANESTHESIA PREPROCEDURE EVALUATION
Review of Systems/Medical History          Cardiovascular  Hyperlipidemia, Hypertension , Past MI > 6 months, CAD , Dysrhythmias , CHF ,   Comment: MI 3/17 Stents  On Plavix last dose 7/22,  Pulmonary  COPD ,        GI/Hepatic    GERD ,             Endo/Other     GYN       Hematology  Anemia ,     Musculoskeletal    Arthritis     Neurology   Psychology   Anxiety,            Wt Readings from Last 3 Encounters:   07/25/18 78 5 kg (173 lb 1 oz)   06/29/18 75 3 kg (166 lb)   06/04/18 74 4 kg (164 lb)     Temp Readings from Last 3 Encounters:   07/25/18 97 9 °F (36 6 °C) (Temporal)   06/04/18 98 1 °F (36 7 °C) (Tympanic)   05/08/18 99 4 °F (37 4 °C) (Tympanic)     BP Readings from Last 3 Encounters:   07/25/18 116/58   06/29/18 130/80   06/04/18 142/70     Pulse Readings from Last 3 Encounters:   07/25/18 78   06/29/18 80   06/04/18 76     Lab Results   Component Value Date    WBC 6 40 07/25/2018    HGB 8 1 (L) 07/25/2018    HCT 23 6 (L) 07/25/2018    MCV 87 07/25/2018     07/25/2018   getting blood starting at 1300  Lab Results   Component Value Date    GLUCOSE 134 (H) 07/25/2018    CALCIUM 8 3 (L) 07/25/2018     07/25/2018    K 3 7 07/25/2018    CO2 29 07/25/2018     07/25/2018    BUN 12 07/25/2018    CREATININE 0 80 07/25/2018     Lab Results   Component Value Date    INR 1 15 07/24/2018    INR 1 11 07/23/2018    INR 1 06 05/21/2018    PROTIME 13 4 (H) 07/24/2018    PROTIME 12 9 07/23/2018     Lab Results   Component Value Date    PTT 28 1 05/21/2018     o2 while in hospital  Physical Exam    Airway    Mallampati score: III  TM Distance: >3 FB  Neck ROM: full     Dental   upper dentures and lower dentures,     Cardiovascular  Cardiovascular exam normal    Pulmonary  Pulmonary exam normal     Other Findings        Anesthesia Plan  ASA Score- 3 Emergent    Anesthesia Type- IV sedation with anesthesia with ASA Monitors     Additional Monitors:   Airway Plan:     Comment: Risks and benefits of MAC vs  GA explained to pt and accepted  Questions answered  Pt  agrees to Fany 27  Eliezer Saldaña Plan Factors-    Induction- intravenous  Postoperative Plan-     Informed Consent- Anesthetic plan and risks discussed with patient  I personally reviewed this patient with the CRNA  Discussed and agreed on the Anesthesia Plan with the CRNA  Eliezer Saldaña

## 2018-07-25 NOTE — PROGRESS NOTES
At 0800, pt was assisted out of bed to [facility] with assist of 2  She became pale and stated she was lightheaded then became unresponsive  She was lifted back to bed  Only blood and clots noted in commode  BP was 110/61 and HR was 80 but O2 sat was 81 during episode  She became responsive after about 2 minutes  BP was 103/51 and O2 sat was 92%  MD made aware  He stated that he was ordering her 2 units of blood  One minute later, BP was 99/54  MD in room  Order to transfer now to ICU  Pt transferred in bed  Report given to ICU RN  Lab called to verify that type and screen was on file and let them know about 2 units of blood ordered

## 2018-07-25 NOTE — ASSESSMENT & PLAN NOTE
· Patient continues to complain of ongoing GI bleeding  · Patient is scheduled for colonoscopy later this afternoon  · Will continue Protonix infusion at this time

## 2018-07-25 NOTE — NURSING NOTE
Charge RN [id number] [facility] spoke with hospitalist to request Tums as per patient request for dyspepsia  New order given  Medication administered as per order

## 2018-07-25 NOTE — SOCIAL WORK
Chart reviewed by case management, pt was transferred to ICU today, pt needs to receive blood, will need to reassess d/c plan, call was placed to [facility] @11:30 am  Phone# [phone], will continue to follow for additional d/c needs

## 2018-07-25 NOTE — PHYSICAL THERAPY NOTE
PHYSICAL THERAPY NOTE    Patient Name: Torey MATHUROE'V Date: 7/25/2018   PT interventions held at this time due to medical complications  I will check her status later today, tomorrow am to assess the safety and appropriateness of treatment      Chaitanya Sellers, PT

## 2018-07-26 LAB
ANION GAP SERPL CALCULATED.3IONS-SCNC: 4 MMOL/L (ref 4–13)
BASOPHILS # BLD AUTO: 0 THOUSANDS/ΜL (ref 0–0.1)
BASOPHILS NFR BLD AUTO: 0 % (ref 0–2)
BUN SERPL-MCNC: 12 MG/DL (ref 7–25)
CALCIUM SERPL-MCNC: 8.1 MG/DL (ref 8.6–10.5)
CHLORIDE SERPL-SCNC: 109 MMOL/L (ref 98–107)
CO2 SERPL-SCNC: 29 MMOL/L (ref 21–31)
CREAT SERPL-MCNC: 0.74 MG/DL (ref 0.6–1.2)
EOSINOPHIL # BLD AUTO: 0.2 THOUSAND/ΜL (ref 0–0.61)
EOSINOPHIL NFR BLD AUTO: 2 % (ref 0–5)
ERYTHROCYTE [DISTWIDTH] IN BLOOD BY AUTOMATED COUNT: 14.9 % (ref 11.5–14.5)
GFR SERPL CREATININE-BSD FRML MDRD: 70 ML/MIN/1.73SQ M
GLUCOSE SERPL-MCNC: 109 MG/DL (ref 65–99)
HCT VFR BLD AUTO: 29.9 % (ref 34.8–46.1)
HGB BLD-MCNC: 10.3 G/DL (ref 12–16)
LYMPHOCYTES # BLD AUTO: 1.9 THOUSANDS/ΜL (ref 0.6–4.47)
LYMPHOCYTES NFR BLD AUTO: 23 % (ref 21–51)
MCH RBC QN AUTO: 30.1 PG (ref 26–34)
MCHC RBC AUTO-ENTMCNC: 34.4 G/DL (ref 31–37)
MCV RBC AUTO: 87 FL (ref 81–99)
MONOCYTES # BLD AUTO: 0.9 THOUSAND/ΜL (ref 0.17–1.22)
MONOCYTES NFR BLD AUTO: 11 % (ref 2–12)
NEUTROPHILS # BLD AUTO: 5.1 THOUSANDS/ΜL (ref 1.4–6.5)
NEUTS SEG NFR BLD AUTO: 63 % (ref 42–75)
NRBC BLD AUTO-RTO: 0 /100 WBCS
PLATELET # BLD AUTO: 192 THOUSANDS/UL (ref 149–390)
PMV BLD AUTO: 8.9 FL (ref 8.6–11.7)
POTASSIUM SERPL-SCNC: 3.7 MMOL/L (ref 3.5–5.5)
RBC # BLD AUTO: 3.42 MILLION/UL (ref 3.9–5.2)
SODIUM SERPL-SCNC: 142 MMOL/L (ref 134–143)
WBC # BLD AUTO: 8.1 THOUSAND/UL (ref 4.8–10.8)

## 2018-07-26 PROCEDURE — 97110 THERAPEUTIC EXERCISES: CPT

## 2018-07-26 PROCEDURE — C9113 INJ PANTOPRAZOLE SODIUM, VIA: HCPCS | Performed by: NURSE PRACTITIONER

## 2018-07-26 PROCEDURE — 99232 SBSQ HOSP IP/OBS MODERATE 35: CPT | Performed by: HOSPITALIST

## 2018-07-26 PROCEDURE — 85025 COMPLETE CBC W/AUTO DIFF WBC: CPT | Performed by: HOSPITALIST

## 2018-07-26 PROCEDURE — 80048 BASIC METABOLIC PNL TOTAL CA: CPT | Performed by: HOSPITALIST

## 2018-07-26 PROCEDURE — 97112 NEUROMUSCULAR REEDUCATION: CPT

## 2018-07-26 PROCEDURE — 97116 GAIT TRAINING THERAPY: CPT

## 2018-07-26 RX ORDER — FAMOTIDINE 20 MG/1
20 TABLET, FILM COATED ORAL DAILY
Status: DISCONTINUED | OUTPATIENT
Start: 2018-07-26 | End: 2018-07-27 | Stop reason: HOSPADM

## 2018-07-26 RX ADMIN — FUROSEMIDE 20 MG: 20 TABLET ORAL at 08:31

## 2018-07-26 RX ADMIN — ATORVASTATIN CALCIUM 40 MG: 40 TABLET, FILM COATED ORAL at 16:21

## 2018-07-26 RX ADMIN — FAMOTIDINE 20 MG: 20 TABLET ORAL at 08:31

## 2018-07-26 RX ADMIN — OYSTER SHELL CALCIUM WITH VITAMIN D 1 TABLET: 500; 200 TABLET, FILM COATED ORAL at 16:21

## 2018-07-26 RX ADMIN — Medication 8 MG/HR: at 00:11

## 2018-07-26 RX ADMIN — SODIUM CHLORIDE 75 ML/HR: 9 INJECTION, SOLUTION INTRAVENOUS at 03:52

## 2018-07-26 RX ADMIN — OYSTER SHELL CALCIUM WITH VITAMIN D 1 TABLET: 500; 200 TABLET, FILM COATED ORAL at 08:31

## 2018-07-26 RX ADMIN — Medication 325 MG: at 08:32

## 2018-07-26 RX ADMIN — LORAZEPAM 0.5 MG: 0.5 TABLET ORAL at 13:12

## 2018-07-26 RX ADMIN — METOPROLOL TARTRATE 25 MG: 25 TABLET, FILM COATED ORAL at 21:36

## 2018-07-26 RX ADMIN — LORAZEPAM 0.5 MG: 0.5 TABLET ORAL at 21:36

## 2018-07-26 RX ADMIN — METOPROLOL TARTRATE 25 MG: 25 TABLET, FILM COATED ORAL at 08:32

## 2018-07-26 RX ADMIN — Medication 1 PACKET: at 08:33

## 2018-07-26 RX ADMIN — POTASSIUM CHLORIDE 20 MEQ: 1500 TABLET, EXTENDED RELEASE ORAL at 08:32

## 2018-07-26 NOTE — ASSESSMENT & PLAN NOTE
· Bleeding has currently resolved  · Patient is status post a colonoscopy yesterday afternoon  · This bleeding was most likely secondary to a self-limited diverticular bleed  · I discussed this case with Dr Jessica Sinclair -if the patient were to rebleed the patient would need to be transferred to a higher level for an arterial embolization procedure under Interventional Radiology  · At this time we will increase her diet to a full regular diet and start Metamucil as instructed by Dr Jessica Sinclair  · We will continue to hold oral anticoagulants

## 2018-07-26 NOTE — SOCIAL WORK
I spoke with Margarita and she is in agreement with the pt receiving hhc, when I met with the patient today she has refused hhc, will continue to follow and reasses

## 2018-07-26 NOTE — ASSESSMENT & PLAN NOTE
· Aspirin and Plavix cannot be resumed for at least 7-10 days -once again patient understands why  · Continue metoprolol, atorvastatin, and nitroglycerin from a cardiac based medication standpoint

## 2018-07-26 NOTE — ASSESSMENT & PLAN NOTE
· Patient is status post 2 units of PRBC transfusion yesterday  · Repeat H&H is stable  · Patient is asymptomatic and has had no recurrent episodes of dizziness, presyncope or syncope

## 2018-07-26 NOTE — PROGRESS NOTES
Progress Note - Rosario Mcneal 2/25/1925, 80 y o  female MRN: [id number]    Unit/Bed#: ICU 05 Encounter: [id number]    Primary Care Provider: Joana Magallon DO   Date and time admitted to hospital: 7/23/2018 10:08 AM        * Hematochezia   Assessment & Plan    · Bleeding has currently resolved  · Patient is status post a colonoscopy yesterday afternoon  · This bleeding was most likely secondary to a self-limited diverticular bleed  · I discussed this case with Dr Chari Majano -if the patient were to rebleed the patient would need to be transferred to a higher level for an arterial embolization procedure under Interventional Radiology  · At this time we will increase her diet to a full regular diet and start Metamucil as instructed by Dr Chari Majano  · We will continue to hold oral anticoagulants        Acute blood loss anemia   Assessment & Plan    · Patient is status post 2 units of PRBC transfusion yesterday  · Repeat H&H is stable  · Patient is asymptomatic and has had no recurrent episodes of dizziness, presyncope or syncope         Coronary artery disease   Assessment & Plan    · Aspirin and Plavix cannot be resumed for at least 7-10 days -once again patient understands why  · Continue metoprolol, atorvastatin, and nitroglycerin from a cardiac based medication standpoint        Gastroesophageal reflux disease without esophagitis   Assessment & Plan    · Will discontinue the Protonix drip  · Restart patient's home Pepcid          Hyperlipidemia   Assessment & Plan    · Continue Lipitor        Iron deficiency anemia   Assessment & Plan    · Continue ferrous sulfate         Essential hypertension   Assessment & Plan    · Blood pressures remained stable-continue Lasix and metoprolol            VTE Pharmacologic Prophylaxis:   Pharmacologic: Pharmacologic VTE Prophylaxis contraindicated due to Recent GI bleed  Mechanical VTE Prophylaxis in Place: Yes    Patient Centered Rounds: I have performed bedside rounds with nursing staff today  Discussions with Specialists or Other Care Team Provider:   Gastroenterology    Education and Discussions with Family / Patient:   Discussed with patient    Time Spent for Care: 20 minutes  More than 50% of total time spent on counseling and coordination of care as described above  Current Length of Stay: 3 day(s)    Current Patient Status: Inpatient   Certification Statement: The patient will continue to require additional inpatient hospital stay due to Hemoglobin and hemodynamic monitoring    Discharge Plan:   Hopeful discharge planning in 24 hrs    Code Status: Level 1 - Full Code      Subjective:   Patient seen and examined 7:30 a m  doing well  Patient has no new complaints and is in no visible distress  Patient does report no further episodes of GI bleeding  She denies dizziness, chest pain, presyncope and/or syncope  Objective:     Vitals:   Temp (24hrs), Av 8 °F (36 6 °C), Min:96 7 °F (35 9 °C), Max:98 8 °F (37 1 °C)    HR:  [57-95] 95  Resp:  [12-33] 18  BP: ()/(50-65) 133/65  SpO2:  [90 %-97 %] 93 %  Body mass index is 31 53 kg/m²  Input and Output Summary (last 24 hours): Intake/Output Summary (Last 24 hours) at 18 0752  Last data filed at 18 0601   Gross per 24 hour   Intake             3271 ml   Output              450 ml   Net             2821 ml       Physical Exam:     Physical Exam   Constitutional: She is oriented to person, place, and time  She appears well-developed and well-nourished  HENT:   Head: Normocephalic and atraumatic  Nose: Nose normal    Mouth/Throat: Oropharynx is clear and moist    Eyes: Conjunctivae and EOM are normal  Pupils are equal, round, and reactive to light  Neck: Normal range of motion  Neck supple  No JVD present  No thyromegaly present  Cardiovascular: Normal rate, regular rhythm and intact distal pulses  Exam reveals no gallop and no friction rub  No murmur heard    Pulmonary/Chest: Effort normal and breath sounds normal  No respiratory distress  Abdominal: Soft  Bowel sounds are normal  She exhibits no distension and no mass  There is no tenderness  There is no guarding  Musculoskeletal: Normal range of motion  She exhibits no edema  Lymphadenopathy:     She has no cervical adenopathy  Neurological: She is alert and oriented to person, place, and time  No cranial nerve deficit  Skin: Skin is warm  No rash noted  No erythema  Psychiatric: She has a normal mood and affect  Her behavior is normal    Vitals reviewed  Additional Data:     Labs:      Results from last 7 days  Lab Units 07/26/18  0422   WBC Thousand/uL 8 10   HEMOGLOBIN g/dL 10 3*   HEMATOCRIT % 29 9*   PLATELETS Thousands/uL 192   NEUTROS PCT % 63   LYMPHS PCT % 23   MONOS PCT % 11   EOS PCT % 2       Results from last 7 days  Lab Units 07/26/18  0422  07/24/18  0442   SODIUM mmol/L 142  < > 140   POTASSIUM mmol/L 3 7  < > 4 1   CHLORIDE mmol/L 109*  < > 106   CO2 mmol/L 29  < > 28   BUN mg/dL 12  < > 15   CREATININE mg/dL 0 74  < > 0 84   CALCIUM mg/dL 8 1*  < > 8 8   TOTAL PROTEIN g/dL  --   --  5 5*   BILIRUBIN TOTAL mg/dL  --   --  1 30*   ALK PHOS U/L  --   --  35*   ALT U/L  --   --  11   AST U/L  --   --  17   GLUCOSE RANDOM mg/dL 109*  < > 135*   < > = values in this interval not displayed  Results from last 7 days  Lab Units 07/24/18  0442   INR  1 15                 * I Have Reviewed All Lab Data Listed Above  * Additional Pertinent Lab Tests Reviewed:  Amanda 66 Admission Reviewed    Imaging:    Imaging Reports Reviewed Today Include:  Colonoscopy report  Imaging Personally Reviewed by Myself Includes:  None    Recent Cultures (last 7 days):           Last 24 Hours Medication List:     Current Facility-Administered Medications:  acetaminophen 650 mg Oral Q6H PRN ROGELIO Sweeney   atorvastatin 40 mg Oral Daily With [employer], ROGELIO   benzonatate 200 mg Oral TID PRN Carolina Sabillon A Dianna, CRNP   calcium carbonate 1,000 mg Oral TID PRN Esmer Lowery PA-C   calcium carbonate-vitamin D 1 tablet Oral BID With Meals Mavis Bustamante, CRNP   ferrous sulfate 325 mg Oral Daily Mavis Bustamante, CRNP   furosemide 10 mg Oral Daily Mavis Bustamante, CRNP   LORazepam 0 5 mg Oral Q8H PRN Mavis Bustamante, CRNP   meclizine 25 mg Oral TID PRN Mavis Bustamante, CRNP   metoprolol tartrate 25 mg Oral Q12H [facility] Mavis Bustamante, CRNP   nitroglycerin 0 4 mg Sublingual Q5 Min PRN Mavis Bustamante, CRNP   ondansetron 4 mg Intravenous Q6H PRN Mavis Bustmaante, CRNP   potassium chloride 20 mEq Oral Daily Mavis Bustamante, CRNP   psyllium 1 packet Oral Daily Mykel Curtis MD        Today, Patient Was Seen By: Mykel Curtis MD    ** Please Note: Dictation voice to text software may have been used in the creation of this document   **

## 2018-07-26 NOTE — PLAN OF CARE
CARDIOVASCULAR - ADULT     Maintains optimal cardiac output and hemodynamic stability Progressing     Absence of cardiac dysrhythmias or at baseline rhythm Progressing        DISCHARGE PLANNING     Discharge to home or other facility with appropriate resources Progressing        DISCHARGE PLANNING - CARE MANAGEMENT     Discharge to post-acute care or home with appropriate resources Progressing        GASTROINTESTINAL - ADULT     Minimal or absence of nausea and/or vomiting Progressing     Maintains or returns to baseline bowel function Progressing     Maintains adequate nutritional intake Progressing        GENITOURINARY - ADULT     Maintains or returns to baseline urinary function Progressing     Absence of urinary retention Progressing        HEMATOLOGIC - ADULT     Maintains hematologic stability Progressing        INFECTION - ADULT     Absence or prevention of progression during hospitalization Progressing     Absence of fever/infection during neutropenic period Progressing        Knowledge Deficit     Patient/family/caregiver demonstrates understanding of disease process, treatment plan, medications, and discharge instructions Progressing        METABOLIC, FLUID AND ELECTROLYTES - ADULT     Electrolytes maintained within normal limits Progressing     Fluid balance maintained Progressing     Glucose maintained within target range Progressing        MUSCULOSKELETAL - ADULT     Maintain or return mobility to safest level of function Progressing     Maintain proper alignment of affected body part Progressing        Nutrition/Hydration-ADULT     Nutrient/Hydration intake appropriate for improving, restoring or maintaining nutritional needs Progressing        PAIN - ADULT     Verbalizes/displays adequate comfort level or baseline comfort level Progressing        Potential for Falls     Patient will remain free of falls Progressing        RESPIRATORY - ADULT     Achieves optimal ventilation and oxygenation Progressing        SAFETY ADULT     Maintain or return to baseline ADL function Progressing     Maintain or return mobility status to optimal level Progressing        SKIN/TISSUE INTEGRITY - ADULT     Skin integrity remains intact Progressing     Oral mucous membranes remain intact Progressing

## 2018-07-26 NOTE — NURSING NOTE
Pt arrived to [facility] room 116-1  Vss  Pt denies pain denies shortness of breath  Room orientation completed and call bell and personal needs within reach  All belongings with pt  Will continue to monitor

## 2018-07-26 NOTE — PLAN OF CARE
Problem: PHYSICAL THERAPY ADULT  Goal: Performs mobility at highest level of function for planned discharge setting  See evaluation for individualized goals  Treatment/Interventions: ADL retraining, Functional transfer training, LE strengthening/ROM, Therapeutic exercise, Endurance training, Patient/family training, Bed mobility, Gait training, Compensatory technique education     Ronda Singleton, PT         See flowsheet documentation for full assessment, interventions and recommendations  Outcome: Progressing  Prognosis: Good  Problem List: Decreased strength, Decreased endurance, Impaired balance, Decreased mobility, Decreased coordination, Decreased safety awareness  Assessment: Elva Flores was pleasant, apprehensive about moving due to "dizziness" but eager to use commode  Patient performed stand pivot transfer to commode with min A of 1, RW without LOB utilizing B armrests for stabilization  Upon concluding on commode, patient ambulated to chair with RW, 25 feet with min A of 1 with tactile cues 100% of tx  Upon session conclusion, patient was resting in chair with needs met and nursing staff preparing to help patient wash with assist  I discussed my continued recommendation of STR, patient apprehensive and reports grandchildren help her  However, she acknowledged her current weakness and decline from PLOF  Patient continues to benefit from skilled inpatient PT interventions to address deficits  Recommendation: Short-term skilled PT     PT - OK to Discharge: No    See flowsheet documentation for full assessment

## 2018-07-26 NOTE — OCCUPATIONAL THERAPY NOTE
07/26/18 1347   Restrictions/Precautions   Weight Bearing Precautions Per Order No   Pain Assessment   Pain Assessment No/denies pain   Right Upper Extremity- Strength   R Shoulder Flexion; Horizontal ABduction; Other (Comment)  (protraction/retraction)   R Elbow Elbow flexion;Elbow extension  (supination/pronation)   R Weight/Reps/Sets AROM 10 reps each shoulder motion (*2 sets of flexion); 1 pound weight 20 reps each elbow motion    RUE Strength Comment does have some arthritis of R shoulder giving mild discomfort with some movement   Left Upper Extremity-Strength   L Shoulder Flexion; Horizontal ABduction; Other (Comment)  (protraction/retraction)   L Elbow Elbow flexion;Elbow extension  (supination/pronation)   L Weights/Reps/Sets As above - See "R weight", etc     Coordination   Gross Motor WFL   Fine Motor some incomplete digit flexion of R hand secondary to arthritis   Cognition   Overall Cognitive Status WFL   Arousal/Participation Alert; Cooperative   Attention Within functional limits   Orientation Level Oriented X4   Memory Within functional limits   Following Commands Follows all commands and directions without difficulty   Activity Tolerance   Activity Tolerance Patient limited by fatigue  (stated 1 pound weight is "heavier than you'd think")   Assessment   Assessment Working toward Increased activity poncho  (today with UE exer  per pt  interest - when approached, pt  stated "I just got back into bed" and wanted to remain in same position) to resume self-care task completion at level prior to hospitalization  Pt  still having some bowel issues  Declined bathing need at this time  She understands the purpose of tx  and is eager to get back home  Cont  with POC as approp     Plan   Treatment Interventions UE strengthening/ROM   Goal Expiration Date 08/03/18   Treatment Day 172 CECILIA Valdovinos/L

## 2018-07-26 NOTE — PHYSICAL THERAPY NOTE
Physical Therapy Treatment Encounter Note    Patient's Name: Liane Mckeon    Admitting Diagnosis  Bright red rectal bleeding [K62 5]  Rectal bleeding [K62 5]    Problem List  Patient Active Problem List   Diagnosis    Allergic rhinitis    Benign colon polyp    Cataract    Constipation    Coronary artery disease    Fibrocystic breast disease, unspecified laterality    Gait abnormality    Generalized anxiety disorder    Gastroesophageal reflux disease without esophagitis    Glucose intolerance (no malabsorption)    Hyperlipidemia    Essential hypertension    Iron deficiency anemia    [facility]    History of non-ST elevation myocardial infarction (NSTEMI)    Palpitations    Urinary incontinence    Vitamin D deficiency    ST elevation myocardial infarction involving right coronary artery ([facility] )    COPD (chronic obstructive pulmonary disease) ([facility] )    Anxiety    Compression fracture of thoracic vertebra, closed, initial encounter ([facility] )    Proteinuria    Coronary angioplasty status    Hematochezia    Acute blood loss anemia       Past Medical History  Past Medical History:   Diagnosis Date    Abnormal blood sugar 03/13/2017    Abnormal carotid duplex scan     Carotid Duplex (Plaque formation is quite prominent bilaterally  Despite these changes, no evidence for greater than 50% diameter reducing lesions in the cervical portion of either carotid artery hemisystem ) - 6/13/2017    Anxiety     Cervical radiculopathy 08/08/2017    [facility]congestive [facility] failure) [facility]     COPD (chronic obstructive pulmonary disease) ([facility] ) 2/6/2018    Coronary artery disease 4/7/2017    Costochondritis 02/05/2013    suspect MS in origin given relationship to ROM and location  Start mobic and if persists, reeval  Refused xrayat this time        Dyslipidemia     GERD without esophagitis 8/30/2012    H/O CT scan of chest      (No PE, minimal interstitial change left lower lobe and right upper lobe, which may be acute ) - 5/19/2017    History of Holter monitoring     Holter (Sinus rhythm with rates ranging from 52 to 118 bpm   No afib or heart block  Rare atrial and ventricular ectopic beats  One six-beat atrial run noted  No pauses  ) - 5/31/2017    Hx of echocardiogram     Echo (EF 0 60 (60%), Biatrial enlargement ) - 3/24/2017 Echo (EF 0 55 (55%), mild LVH  Aortic valvular sclerosis  Trace MI with mild left atrial dilatation, mild MAC  Mild TI with mild pulmonary hypertension  ) - 5/22/2017 Echo (EF 0 60 (60%), Normal left vent chamber size and systolic function  Mild diastolic dysfunction of LV w/normal filling pressures  Mild mitral regurg ) - 7/26/2017 Echo (EF 0    Hypertension     Iron deficiency anemia 4/21/2016    Malignant melanoma [facility] ) 9/17/2012    NSTEMI (non-ST elevated myocardial infarction) ([facility] ) 7/25/2017    Osteoarthritis 9/17/2012    Osteoporosis 3/13/2017    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[facility] ) 04/07/2017       Past Surgical History  Past Surgical History:   Procedure Laterality Date    ABDOMINAL SURGERY      BREAST SURGERY      Lumpectomy    CARDIAC CATHETERIZATION  03/24/2017    ARNIE to 100% occluded prox RCA, chronic 99% ostial LCfx, 60% mid LAD, discrete 40% distal LM    CHOLECYSTECTOMY      COLONOSCOPY N/A 7/25/2018    Procedure: COLONOSCOPY;  Surgeon:  Tiffany Cintron MD;  Location: [address] [place] [address] OR;  Service: Gastroenterology    CORONARY STENT PLACEMENT  03/25/2017    ARNIE RCA    HEMORRHOID SURGERY      INSERTION STENT ARTERY  04/07/2017    Cardio vascular agents drug-eluting stent    SKIN BIOPSY      TONSILLECTOMY          07/26/18 1019   Pain Assessment   Pain Assessment 0-10   Pain Score No Pain   General   Family/Caregiver Present No   Cognition   Overall Cognitive Status WFL   Arousal/Participation Alert   Attention Within functional limits   Orientation Level Oriented X4   Memory Within functional limits   Following Commands Follows one step commands without difficulty   Subjective   Subjective "Im feeling much better"   Bed Mobility   Rolling L 6  Modified independent   Additional items Bedrails   Supine to Sit 3  Moderate assistance   Additional items Assist x 1;Bedrails   Transfers   Sit to Stand 4  Minimal assistance   Additional items Assist x 1   Stand to Sit 4  Minimal assistance   Additional items Assist x 1   Toilet transfer 4  Minimal assistance   Additional items Assist x 1;Commode   Ambulation/Elevation   Gait pattern Improper Weight shift; Wide WYATT; Forward Flexion;Decreased foot clearance; Inconsistent raghav; Short stride   Gait Assistance 4  Minimal assist   Additional items Assist x 1;Verbal cues; Tactile cues  (tactile cues 100% of tx session)   Assistive Device Rolling walker   Distance 25 feet   Stair Management Assistance Not tested   Balance   Static Sitting Fair +  (BUE stabilization)   Dynamic Sitting Fair   Static Standing Fair   Dynamic Standing Fair -   Ambulatory Fair -  (verbal cues 50% of tx session)   Endurance Deficit   Endurance Deficit Yes   Activity Tolerance   Activity Tolerance Patient limited by fatigue   Assessment   Prognosis Good   Problem List Decreased strength;Decreased endurance; Impaired balance;Decreased mobility; Decreased coordination;Decreased safety awareness   Assessment [place] was pleasant, apprehensive about moving due to "dizziness" but eager to use commode  Patient performed stand pivot transfer to commode with min A of 1, RW without LOB utilizing B armrests for stabilization  Upon concluding on commode, patient ambulated to chair with RW, 25 feet with min A of 1 with tactile cues 100% of tx  Upon session conclusion, patient was resting in chair with needs met and nursing staff preparing to help patient wash with assist  I discussed my continued recommendation of STR, patient apprehensive and reports grandchildren help her  However, she acknowledged her current weakness and decline from PLOF   Patient continues to benefit from skilled inpatient PT interventions to address deficits  Goals   Patient Goals get stronger   STG Expiration Date 07/29/18   LTG Expiration Date 08/03/18   Plan   Treatment/Interventions ADL retraining;Functional transfer training;LE strengthening/ROM; Therapeutic exercise; Endurance training;Patient/family training;Bed mobility;Gait training; Compensatory technique education;Spoke to nursing   Progress Progressing toward goals   PT Frequency 5x/wk   Recommendation   Recommendation Short-term skilled PT   PT - OK to Discharge No     Mee Delgado, PT

## 2018-07-26 NOTE — NURSING NOTE
Blood transfusion complete  pt denies s/sx of transfusion reaction  Personal needs and call bell within reach, will continue to monitor

## 2018-07-26 NOTE — PLAN OF CARE
Problem: OCCUPATIONAL THERAPY ADULT  Goal: Performs self-care activities at highest level of function for planned discharge setting  See evaluation for individualized goals  Outcome: Progressing  Limitation: Decreased ADL status, Decreased endurance  Prognosis: Good  Assessment: Working toward Increased activity poncho  (today with UE exer  per pt  interest - when approached, pt  stated "I just got back into bed" and wanted to remain in same position) to resume self-care task completion at level prior to hospitalization  Pt  still having some bowel issues  Declined bathing need at this time  She understands the purpose of tx  and is eager to get back home  Cont  with POC as approp            CECILIA Osborn/YUKO

## 2018-07-27 ENCOUNTER — TRANSITIONAL CARE MANAGEMENT (OUTPATIENT)
Dept: INTERNAL MEDICINE CLINIC | Facility: CLINIC | Age: 83
End: 2018-07-27

## 2018-07-27 ENCOUNTER — TELEPHONE (OUTPATIENT)
Dept: INTERNAL MEDICINE CLINIC | Facility: CLINIC | Age: 83
End: 2018-07-27

## 2018-07-27 VITALS
RESPIRATION RATE: 18 BRPM | HEIGHT: 62 IN | SYSTOLIC BLOOD PRESSURE: 144 MMHG | TEMPERATURE: 97.1 F | BODY MASS INDEX: 31.76 KG/M2 | HEART RATE: 79 BPM | OXYGEN SATURATION: 96 % | DIASTOLIC BLOOD PRESSURE: 77 MMHG | WEIGHT: 172.6 LBS

## 2018-07-27 LAB
ANION GAP SERPL CALCULATED.3IONS-SCNC: 3 MMOL/L (ref 4–13)
BASOPHILS # BLD AUTO: 0 THOUSANDS/ΜL (ref 0–0.1)
BASOPHILS NFR BLD AUTO: 1 % (ref 0–2)
BUN SERPL-MCNC: 13 MG/DL (ref 7–25)
CALCIUM SERPL-MCNC: 8.6 MG/DL (ref 8.6–10.5)
CHLORIDE SERPL-SCNC: 106 MMOL/L (ref 98–107)
CO2 SERPL-SCNC: 32 MMOL/L (ref 21–31)
CREAT SERPL-MCNC: 0.69 MG/DL (ref 0.6–1.2)
EOSINOPHIL # BLD AUTO: 0.2 THOUSAND/ΜL (ref 0–0.61)
EOSINOPHIL NFR BLD AUTO: 3 % (ref 0–5)
ERYTHROCYTE [DISTWIDTH] IN BLOOD BY AUTOMATED COUNT: 14.6 % (ref 11.5–14.5)
GFR SERPL CREATININE-BSD FRML MDRD: 75 ML/MIN/1.73SQ M
GLUCOSE SERPL-MCNC: 109 MG/DL (ref 65–99)
HCT VFR BLD AUTO: 30.8 % (ref 34.8–46.1)
HGB BLD-MCNC: 10.5 G/DL (ref 12–16)
LYMPHOCYTES # BLD AUTO: 1.4 THOUSANDS/ΜL (ref 0.6–4.47)
LYMPHOCYTES NFR BLD AUTO: 22 % (ref 21–51)
MCH RBC QN AUTO: 29.8 PG (ref 26–34)
MCHC RBC AUTO-ENTMCNC: 34 G/DL (ref 31–37)
MCV RBC AUTO: 88 FL (ref 81–99)
MONOCYTES # BLD AUTO: 0.6 THOUSAND/ΜL (ref 0.17–1.22)
MONOCYTES NFR BLD AUTO: 9 % (ref 2–12)
NEUTROPHILS # BLD AUTO: 4.4 THOUSANDS/ΜL (ref 1.4–6.5)
NEUTS SEG NFR BLD AUTO: 66 % (ref 42–75)
NRBC BLD AUTO-RTO: 0 /100 WBCS
PLATELET # BLD AUTO: 198 THOUSANDS/UL (ref 149–390)
PMV BLD AUTO: 8.9 FL (ref 8.6–11.7)
POTASSIUM SERPL-SCNC: 3.7 MMOL/L (ref 3.5–5.5)
RBC # BLD AUTO: 3.51 MILLION/UL (ref 3.9–5.2)
SODIUM SERPL-SCNC: 141 MMOL/L (ref 134–143)
WBC # BLD AUTO: 6.6 THOUSAND/UL (ref 4.8–10.8)

## 2018-07-27 PROCEDURE — 85025 COMPLETE CBC W/AUTO DIFF WBC: CPT | Performed by: HOSPITALIST

## 2018-07-27 PROCEDURE — 80048 BASIC METABOLIC PNL TOTAL CA: CPT | Performed by: HOSPITALIST

## 2018-07-27 PROCEDURE — 99239 HOSP IP/OBS DSCHRG MGMT >30: CPT | Performed by: HOSPITALIST

## 2018-07-27 PROCEDURE — 97116 GAIT TRAINING THERAPY: CPT

## 2018-07-27 PROCEDURE — 97110 THERAPEUTIC EXERCISES: CPT

## 2018-07-27 RX ADMIN — METOPROLOL TARTRATE 25 MG: 25 TABLET, FILM COATED ORAL at 08:11

## 2018-07-27 RX ADMIN — POTASSIUM CHLORIDE 20 MEQ: 1500 TABLET, EXTENDED RELEASE ORAL at 08:11

## 2018-07-27 RX ADMIN — OYSTER SHELL CALCIUM WITH VITAMIN D 1 TABLET: 500; 200 TABLET, FILM COATED ORAL at 08:10

## 2018-07-27 RX ADMIN — Medication 1 PACKET: at 08:13

## 2018-07-27 RX ADMIN — FUROSEMIDE 10 MG: 20 TABLET ORAL at 08:11

## 2018-07-27 RX ADMIN — Medication 325 MG: at 08:10

## 2018-07-27 RX ADMIN — FAMOTIDINE 20 MG: 20 TABLET ORAL at 08:10

## 2018-07-27 NOTE — DISCHARGE SUMMARY
Discharge- Justine Yeung 2/25/1925, 80 y o  female MRN: [id number]    Unit/Bed#: -01 Encounter: [id number]    Primary Care Provider: Aydee Bowers DO   Date and time admitted to hospital: 7/23/2018 10:08 AM        * Hematochezia   Assessment & Plan    · Bleeding has resolved  · Hemoglobin has remained stable  · Patient is tolerating a full diet  · This was secondary to his self-limited diverticular bleed  · Patient is status post a colonoscopy  · Has been cleared for discharge by GI  · Patient will follow up as an outpatient with GI in 1 week  · No aspirin or Plavix for 1 week         Acute blood loss anemia   Assessment & Plan    · Patient is status post transfusion of 2 units of PRBCs on this admission   · H&H is stable  · Patient will follow up with her primary care physician in 7-10 days  · Ideally patient should have repeat follow-up blood work from her primary care physician        Coronary artery disease   Assessment & Plan    · No aspirin or Plavix for at least 7-10 days post discharge  · Patient should follow up with both her primary care physician and Gastroenterology prior to restarting her medications  · Okay to continue her other cardiac based medications at the preadmission medications at the preadmission dosages         Gastroesophageal reflux disease without esophagitis   Assessment & Plan    · Resume home Pepcid          Hyperlipidemia   Assessment & Plan    · Resume Lipitor at home        Iron deficiency anemia   Assessment & Plan    · Continue ferrous sulfate         Essential hypertension   Assessment & Plan    · Blood pressures remained stable-continue Lasix and metoprolol              Discharging Physician / Practitioner: Pascual Lopez MD  PCP: Aydee Bowers DO  Admission Date:   Admission Orders     Ordered        07/23/18 1339  Inpatient Admission (expected length of stay for this patient is greater than two midnights)  Once             Discharge Date: 07/27/18    Resolved Problems  Date Reviewed: 7/25/2018          Resolved    Benign paroxysmal vertigo 7/24/2018     Resolved by  Michelle See MD    Diverticulosis 7/24/2018     Resolved by  Michelle See MD    Osteoarthritis 7/24/2018     Resolved by  Michelle See MD    Osteoporosis 7/24/2018     Resolved by  Michelle See MD    Overview Signed 2/6/2018  3:32 PM by Tatyana Cox DO     Transitioned From: Osteopenia         Lactic acid increased 7/24/2018     Resolved by  Michelle See MD          Consultations During Hospital Stay:  · Gastroenterology    Procedures Performed:     · Colonoscopy  · CT abdomen and pelvis    Significant Findings / Test Results:     Colonoscopy showed severe left-sided diverticulosis with old maroon blood in the left colon  The cecum and ascending colon were poorly prepped by showed no blood  The transverse colon had very little blood but did show 5 very small polyps the largest being approximately 7-8 mm  None of these polyps were removed due to advanced patient age and acute GI bleed  Impression probable diverticular bleed  Would recommend continuing clear liquid diet and then advancing diet slowly  Would avoid any anti-platelet agents or anticoagulation for at least 1 week  Would recommend Metamucil daily, starting once patient starts on solid food    CT abdomen pelvis:1   No acute process in the bowel   Incidental large hiatal hernia  Incidental diverticulosis without superimposed diverticulitis  2   Stable probable hyperdense cyst in the upper pole of the left kidney  compared to CTA chest of 5/19/2017  Incidental Findings:   · Hiatal hernia as outlined above     Test Results Pending at Discharge (will require follow up): · None     Outpatient Tests Requested:  · None    Complications:  None    Reason for Admission:  GI bleeding    Hospital Course:      Arnel Boland is a 80 y o  female patient who originally presented to the hospital on 7/23/2018 due to GI bleeding  She was originally admitted up to [facility] surg  She was started on a clear liquid diet as well as IV Protonix  H&Hs were checked every 6 hr   GI consultation was obtained  Patient did have an acute blood loss anemia  Hemoglobin dropped from 12 7 down to 8 4  At that point the patient did become symptomatic  She had a syncopal episode  She was transferred to the ICU   2 units of blood were given  Patient then underwent a colonoscopy  Results as above  Patient did well post colonoscopy  She had no recurrent bleeding  She tolerated a full diet  She was deemed stable for discharge on 07/27/2018  No aspirin or Plavix for 7 days  Outpatient follow-up with her primary care physician and with gastroenterology in 7-10 days  Please see above list of diagnoses and related plan for additional information  Condition at Discharge: good     Discharge Day Visit / Exam:     Subjective:  Patient seen and examined, no complaints and/or no distress  Is eager and is very happy on going home  Vitals: Blood Pressure: 144/77 (07/27/18 0726)  Pulse: 79 (07/27/18 0726)  Temperature: (!) 97 1 °F (36 2 °C) (07/27/18 0726)  Temp Source: Tympanic (07/27/18 0726)  Respirations: 18 (07/27/18 0726)  Height: 5' 2" (157 5 cm) (07/24/18 1515)  Weight - Scale: 78 3 kg (172 lb 9 6 oz) (07/27/18 0600)  SpO2: 94 % (07/27/18 0726)  Exam:   Physical Exam   Constitutional: She is oriented to person, place, and time  She appears well-developed and well-nourished  HENT:   Head: Normocephalic and atraumatic  Nose: Nose normal    Mouth/Throat: Oropharynx is clear and moist    Eyes: Conjunctivae and EOM are normal  Pupils are equal, round, and reactive to light  Neck: Normal range of motion  Neck supple  No JVD present  No thyromegaly present  Cardiovascular: Normal rate, regular rhythm and intact distal pulses  Exam reveals no gallop and no friction rub  No murmur heard    Pulmonary/Chest: Effort normal and breath sounds normal  No respiratory distress  Abdominal: Soft  Bowel sounds are normal  She exhibits no distension and no mass  There is no tenderness  There is no guarding  Musculoskeletal: Normal range of motion  She exhibits no edema  Lymphadenopathy:     She has no cervical adenopathy  Neurological: She is alert and oriented to person, place, and time  No cranial nerve deficit  Skin: Skin is warm  No rash noted  No erythema  Psychiatric: She has a normal mood and affect  Her behavior is normal    Vitals reviewed  Discussion with Family:   No family was present at the time of my evaluation    Discharge instructions/Information to patient and family:   See after visit summary for information provided to patient and family  Provisions for Follow-Up Care:  See after visit summary for information related to follow-up care and any pertinent home health orders  Disposition:     Home    For Discharges to   [address] [facility]:   · Not Applicable to this Patient - Not Applicable to this Patient    Planned Readmission:   None     Discharge Statement:  I spent 35 minutes discharging the patient  This time was spent on the day of discharge  I had direct contact with the patient on the day of discharge  Greater than 50% of the total time was spent examining patient, answering all patient questions, arranging and discussing plan of care with patient as well as directly providing post-discharge instructions  Additional time then spent on discharge activities  Discharge Medications:  See after visit summary for reconciled discharge medications provided to patient and family        ** Please Note: This note has been constructed using a voice recognition system **

## 2018-07-27 NOTE — PLAN OF CARE
Problem: GASTROINTESTINAL - ADULT  Goal: Maintains or returns to baseline bowel function  INTERVENTIONS:  - Assess bowel function  - Encourage oral fluids to ensure adequate hydration  - Administer IV fluids as ordered to ensure adequate hydration  - Administer ordered medications as needed  - Encourage mobilization and activity  - Nutrition services referral to assist patient with appropriate food choices   Outcome: Progressing    Goal: Maintains adequate nutritional intake  INTERVENTIONS:  - Monitor percentage of each meal consumed  - Identify factors contributing to decreased intake, treat as appropriate  - Assist with meals as needed  - Monitor I&O, WT and lab values  - Obtain nutrition services referral as needed   Patient displays nutritional ingestion sufficient to meet metabolic needs as manifested by stable weight or muscle-mass measurements, positive nitrogen balance, tissue regeneration and exhibits improved energy level  Patient shows no signs of malnutrition  Patient takes adequate amount of calories or nutrients

## 2018-07-27 NOTE — TELEPHONE ENCOUNTER
Please put TCM note in, admitted to [facility] on 7-23-18, DC 7-27-18, dx rectal bleeding, appt with Dr Jj Torres on 8-10-18

## 2018-07-27 NOTE — PLAN OF CARE
Problem: DISCHARGE PLANNING - CARE MANAGEMENT  Goal: Discharge to post-acute care or home with appropriate resources  INTERVENTIONS:  - Conduct assessment to determine patient/family and health care team treatment goals, and need for post-acute services based on payer coverage, community resources, and patient preferences, and barriers to discharge  - Address psychosocial, clinical, and financial barriers to discharge as identified in assessment in conjunction with the patient/family and health care team  - Arrange appropriate level of post-acute services according to patient's   needs and preference and payer coverage in collaboration with the physician and health care team  - Communicate with and update the patient/family, physician, and health care team regarding progress on the discharge plan  - Arrange appropriate transportation to post-acute venues      D/c home with family   Outcome: Completed Date Met: 07/27/18

## 2018-07-27 NOTE — ASSESSMENT & PLAN NOTE
· Bleeding has resolved  · Hemoglobin has remained stable  · Patient is tolerating a full diet  · This was secondary to his self-limited diverticular bleed  · Patient is status post a colonoscopy  · Has been cleared for discharge by GI  · Patient will follow up as an outpatient with GI in 1 week  · No aspirin or Plavix for 1 week

## 2018-07-27 NOTE — ASSESSMENT & PLAN NOTE
· No aspirin or Plavix for at least 7-10 days post discharge  · Patient should follow up with both her primary care physician and Gastroenterology prior to restarting her medications  · Okay to continue her other cardiac based medications at the preadmission medications at the preadmission dosages

## 2018-07-27 NOTE — ASSESSMENT & PLAN NOTE
· Patient is status post transfusion of 2 units of PRBCs on this admission   · H&H is stable  · Patient will follow up with her primary care physician in 7-10 days  · Ideally patient should have repeat follow-up blood work from her primary care physician

## 2018-07-27 NOTE — OP NOTE
OPERATIVE REPORT  PATIENT NAME: Torey Jones    :  1925  MRN: [id number]  Pt Location: [facility] OR ROOM 01    SURGERY DATE: 2018    Surgeon(s) and Role:     * Sharmila Galaviz MD - Primary    Preop Diagnosis:  * No pre-op diagnosis entered *    * No Diagnosis Codes entered *    Procedure(s) (LRB):  COLONOSCOPY (N/A)    Specimen(s):  * No specimens in log *    Estimated Blood Loss:   Minimal    Drains:       Anesthesia Type:   * No anesthesia type entered *    Operative Indications:    Lower GI bleeding    Operative Findings:  Left-sided diverticular bleed with severe left-sided diverticulosis    Complications:     None    Procedure and Technique:  Colonoscopy showed severe left-sided diverticulosis with old maroon blood in the left colon  The cecum and ascending colon were poorly prepped by showed no blood  The transverse colon had very little blood but did show 5 very small polyps the largest being approximately 7-8 mm  None of these polyps were removed due to advanced patient age and acute GI bleed  Impression probable diverticular bleed  Would recommend continuing clear liquid diet and then advancing diet slowly  Would avoid any anti-platelet agents or anticoagulation for at least 1 week  Would recommend Metamucil daily, starting once patient starts on solid food     I was present for the entire procedure    Patient Disposition:  Advance diet as tolerated, Metamucil daily  PACU     SIGNATURE: Sharmila Galaviz MD  DATE: 2018  TIME: 12:47 PM

## 2018-07-27 NOTE — PLAN OF CARE
Problem: PHYSICAL THERAPY ADULT  Goal: Performs mobility at highest level of function for planned discharge setting  See evaluation for individualized goals  Treatment/Interventions: ADL retraining, Functional transfer training, LE strengthening/ROM, Therapeutic exercise, Endurance training, Patient/family training, Bed mobility, Gait training, Compensatory technique education     Natali Espinoza PT         See flowsheet documentation for full assessment, interventions and recommendations  Outcome: Adequate for Discharge  Prognosis: Good  Problem List: Decreased strength, Decreased endurance, Impaired balance, Decreased mobility, Decreased coordination, Decreased safety awareness  Assessment: Pt was sitting at EOB  She agreed to amb & exercise  She did not need assist to stand  She amb using  feet with min assist of 1 for safety  She did become SOB & SPO2 was 82% when checked by RN  She recovered quickly with sitting rest break & performed B LE AROM as noted  Pt does not want home PT or STR & is scheduled to return home with family  She should be safe at home with use of RW & family assistance when ambulating  She would benefit from cont'd PT but pt does not want ongoing services  Recommendation: Home PT, Home with family support     PT - OK to Discharge: Yes    See flowsheet documentation for full assessment     Kyle Rainey PTA

## 2018-07-27 NOTE — PHYSICAL THERAPY NOTE
07/27/18 0949   Pain Assessment   Pain Assessment 0-10   Pain Score No Pain   Restrictions/Precautions   Weight Bearing Precautions Per Order No   General   Family/Caregiver Present No   Cognition   Overall Cognitive Status WFL   Arousal/Participation Alert; Cooperative   Attention Within functional limits   Orientation Level Oriented X4   Memory Within functional limits   Following Commands Follows all commands and directions without difficulty   Subjective   Subjective "I'm feeling good & I'm going home today " pt reports she does not want HHC or STR  Transfers   Sit to Stand 5  Supervision   Additional items Assist x 1   Stand to Sit 5  Supervision   Additional items Assist x 1   Ambulation/Elevation   Gait pattern Improper Weight shift; Wide WYATT; Foward flexed;Decreased foot clearance; Inconsistent raghav; Short stride   Gait Assistance 4  Minimal assist   Additional items Assist x 1   Assistive Device Rolling walker   Distance 150 feet    Stair Management Assistance Not tested   Balance   Static Sitting Fair +   Dynamic Sitting Fair   Static Standing Fair   Dynamic Standing Fair -   Ambulatory Fair -   Endurance Deficit   Endurance Deficit Yes   Activity Tolerance   Activity Tolerance Patient limited by fatigue   Exercises   Glute Sets Sitting;25 reps   Hip Flexion Sitting;10 reps;Bilateral   Hip Abduction Sitting;10 reps;Bilateral   Hip Adduction Sitting;20 reps   Knee AROM Long Arc Quad 20 reps;Bilateral   Ankle Pumps Sitting;25 reps;Bilateral   Assessment   Prognosis Good   Problem List Decreased strength;Decreased endurance; Impaired balance;Decreased mobility; Decreased coordination;Decreased safety awareness   Assessment Pt was sitting at EOB  She agreed to amb & exercise  She did not need assist to stand  She amb using  feet with min assist of 1 for safety  She did become SOB & SPO2 was 82% when checked by RN  She recovered quickly with sitting rest break & performed B LE AROM as noted   Pt does not want home PT or STR & is scheduled to return home with family  She should be safe at home with use of RW & family assistance when ambulating  She would benefit from cont'd PT but pt does not want ongoing services  Goals   Patient Goals get stronger, breathe better   Plan   Progress Discontinue PT   Recommendation   Recommendation Home PT; Home with family support   Equipment Recommended Carol Devine   PT - OK to Discharge Yes   Excell Dienes, PTA

## 2018-07-27 NOTE — SOCIAL WORK
Chart reviewed by case management, d/c order written,  I met with the pt  and she has refused  hhc and snf, I called Margarita and made her her that she has declined hhc and snf, she was made aware of what she can do if the pt were to change her mind about hhc, pcp appointment was set up for the pt and the earliest appointment was 8/10/18 @12:30pm, pt denies any additional d/c needs and her family will transport the pt home @1pm , pt and family in agreement with the d/c and d/c plan home

## 2018-07-29 ENCOUNTER — APPOINTMENT (EMERGENCY)
Dept: RADIOLOGY | Facility: HOSPITAL | Age: 83
DRG: 280 | End: 2018-07-29
Payer: MEDICARE

## 2018-07-29 ENCOUNTER — HOSPITAL ENCOUNTER (INPATIENT)
Facility: HOSPITAL | Age: 83
LOS: 2 days | Discharge: HOME | DRG: 280 | End: 2018-07-31
Attending: INTERNAL MEDICINE | Admitting: INTERNAL MEDICINE
Payer: MEDICARE

## 2018-07-29 DIAGNOSIS — I50.9 CONGESTIVE HEART FAILURE (CHF) (HCC): ICD-10-CM

## 2018-07-29 DIAGNOSIS — I21.4 NON-STEMI (NON-ST ELEVATED MYOCARDIAL INFARCTION) (HCC): Primary | ICD-10-CM

## 2018-07-29 DIAGNOSIS — I50.31 ACUTE DIASTOLIC CONGESTIVE HEART FAILURE (HCC): ICD-10-CM

## 2018-07-29 PROBLEM — I50.23 ACUTE ON CHRONIC SYSTOLIC CONGESTIVE HEART FAILURE (HCC): Status: ACTIVE | Noted: 2018-07-29

## 2018-07-29 PROBLEM — K92.1 HEMATOCHEZIA: Status: RESOLVED | Noted: 2018-07-23 | Resolved: 2018-07-29

## 2018-07-29 PROBLEM — D62 ACUTE BLOOD LOSS ANEMIA: Status: RESOLVED | Noted: 2018-07-24 | Resolved: 2018-07-29

## 2018-07-29 PROBLEM — R00.2 PALPITATIONS: Status: RESOLVED | Noted: 2017-03-13 | Resolved: 2018-07-29

## 2018-07-29 LAB
ALBUMIN SERPL BCP-MCNC: 3.6 G/DL (ref 3.5–5.7)
ALP SERPL-CCNC: 43 U/L (ref 55–165)
ALT SERPL W P-5'-P-CCNC: 17 U/L (ref 7–52)
ANION GAP SERPL CALCULATED.3IONS-SCNC: 7 MMOL/L (ref 4–13)
APTT PPP: 26 SECONDS (ref 24–36)
AST SERPL W P-5'-P-CCNC: 27 U/L (ref 13–39)
BASOPHILS # BLD AUTO: 0 THOUSANDS/ΜL (ref 0–0.1)
BASOPHILS NFR BLD AUTO: 1 % (ref 0–2)
BILIRUB SERPL-MCNC: 0.6 MG/DL (ref 0.2–1)
BILIRUB UR QL STRIP: NEGATIVE
BNP SERPL-MCNC: 1412 PG/ML (ref 1–100)
BUN SERPL-MCNC: 10 MG/DL (ref 7–25)
CALCIUM SERPL-MCNC: 9.3 MG/DL (ref 8.6–10.5)
CHLORIDE SERPL-SCNC: 99 MMOL/L (ref 98–107)
CLARITY UR: CLEAR
CO2 SERPL-SCNC: 33 MMOL/L (ref 21–31)
COLOR UR: YELLOW
CREAT SERPL-MCNC: 0.81 MG/DL (ref 0.6–1.2)
EOSINOPHIL # BLD AUTO: 0.2 THOUSAND/ΜL (ref 0–0.61)
EOSINOPHIL NFR BLD AUTO: 3 % (ref 0–5)
ERYTHROCYTE [DISTWIDTH] IN BLOOD BY AUTOMATED COUNT: 14.5 % (ref 11.5–14.5)
GFR SERPL CREATININE-BSD FRML MDRD: 63 ML/MIN/1.73SQ M
GLUCOSE SERPL-MCNC: 165 MG/DL (ref 65–99)
GLUCOSE UR STRIP-MCNC: NEGATIVE MG/DL
HCT VFR BLD AUTO: 34.6 % (ref 34.8–46.1)
HGB BLD-MCNC: 11.7 G/DL (ref 12–16)
HGB UR QL STRIP.AUTO: NEGATIVE
INR PPP: 1.05 (ref 0.9–1.5)
KETONES UR STRIP-MCNC: NEGATIVE MG/DL
LEUKOCYTE ESTERASE UR QL STRIP: NEGATIVE
LYMPHOCYTES # BLD AUTO: 2.4 THOUSANDS/ΜL (ref 0.6–4.47)
LYMPHOCYTES NFR BLD AUTO: 35 % (ref 21–51)
MCH RBC QN AUTO: 29.9 PG (ref 26–34)
MCHC RBC AUTO-ENTMCNC: 34 G/DL (ref 31–37)
MCV RBC AUTO: 88 FL (ref 81–99)
MONOCYTES # BLD AUTO: 0.8 THOUSAND/ΜL (ref 0.17–1.22)
MONOCYTES NFR BLD AUTO: 12 % (ref 2–12)
NEUTROPHILS # BLD AUTO: 3.4 THOUSANDS/ΜL (ref 1.4–6.5)
NEUTS SEG NFR BLD AUTO: 50 % (ref 42–75)
NITRITE UR QL STRIP: NEGATIVE
NRBC BLD AUTO-RTO: 0 /100 WBCS
PH UR STRIP.AUTO: 6.5 [PH] (ref 5–8)
PLATELET # BLD AUTO: 295 THOUSANDS/UL (ref 149–390)
PMV BLD AUTO: 8.1 FL (ref 8.6–11.7)
POTASSIUM SERPL-SCNC: 3.7 MMOL/L (ref 3.5–5.5)
PROT SERPL-MCNC: 6.4 G/DL (ref 6.4–8.9)
PROT UR STRIP-MCNC: NEGATIVE MG/DL
PROTHROMBIN TIME: 12.2 SECONDS (ref 10.1–12.9)
RBC # BLD AUTO: 3.93 MILLION/UL (ref 3.9–5.2)
SODIUM SERPL-SCNC: 139 MMOL/L (ref 134–143)
SP GR UR STRIP.AUTO: 1.01 (ref 1–1.03)
TROPONIN I SERPL-MCNC: 2.4 NG/ML
TROPONIN I SERPL-MCNC: 2.57 NG/ML
UROBILINOGEN UR QL STRIP.AUTO: 0.2 E.U./DL
WBC # BLD AUTO: 6.8 THOUSAND/UL (ref 4.8–10.8)

## 2018-07-29 PROCEDURE — 94760 N-INVAS EAR/PLS OXIMETRY 1: CPT

## 2018-07-29 PROCEDURE — 85730 THROMBOPLASTIN TIME PARTIAL: CPT | Performed by: PHYSICIAN ASSISTANT

## 2018-07-29 PROCEDURE — 80053 COMPREHEN METABOLIC PANEL: CPT | Performed by: PHYSICIAN ASSISTANT

## 2018-07-29 PROCEDURE — 71045 X-RAY EXAM CHEST 1 VIEW: CPT

## 2018-07-29 PROCEDURE — 81003 URINALYSIS AUTO W/O SCOPE: CPT | Performed by: PHYSICIAN ASSISTANT

## 2018-07-29 PROCEDURE — 85610 PROTHROMBIN TIME: CPT | Performed by: PHYSICIAN ASSISTANT

## 2018-07-29 PROCEDURE — 83880 ASSAY OF NATRIURETIC PEPTIDE: CPT | Performed by: PHYSICIAN ASSISTANT

## 2018-07-29 PROCEDURE — 84484 ASSAY OF TROPONIN QUANT: CPT | Performed by: NURSE PRACTITIONER

## 2018-07-29 PROCEDURE — 36415 COLL VENOUS BLD VENIPUNCTURE: CPT | Performed by: PHYSICIAN ASSISTANT

## 2018-07-29 PROCEDURE — 99223 1ST HOSP IP/OBS HIGH 75: CPT | Performed by: HOSPITALIST

## 2018-07-29 PROCEDURE — 93005 ELECTROCARDIOGRAM TRACING: CPT

## 2018-07-29 PROCEDURE — 84484 ASSAY OF TROPONIN QUANT: CPT | Performed by: PHYSICIAN ASSISTANT

## 2018-07-29 PROCEDURE — 85025 COMPLETE CBC W/AUTO DIFF WBC: CPT | Performed by: PHYSICIAN ASSISTANT

## 2018-07-29 PROCEDURE — 99285 EMERGENCY DEPT VISIT HI MDM: CPT

## 2018-07-29 PROCEDURE — 96374 THER/PROPH/DIAG INJ IV PUSH: CPT

## 2018-07-29 RX ORDER — NITROGLYCERIN 0.4 MG/1
0.4 TABLET SUBLINGUAL
Status: DISCONTINUED | OUTPATIENT
Start: 2018-07-29 | End: 2018-07-29 | Stop reason: SDUPTHER

## 2018-07-29 RX ORDER — NITROGLYCERIN 0.4 MG/1
0.4 TABLET SUBLINGUAL
Status: DISCONTINUED | OUTPATIENT
Start: 2018-07-29 | End: 2018-07-31 | Stop reason: HOSPADM

## 2018-07-29 RX ORDER — B-COMPLEX WITH VITAMIN C
1 TABLET ORAL DAILY
Status: DISCONTINUED | OUTPATIENT
Start: 2018-07-30 | End: 2018-07-31 | Stop reason: HOSPADM

## 2018-07-29 RX ORDER — ONDANSETRON 2 MG/ML
4 INJECTION INTRAMUSCULAR; INTRAVENOUS EVERY 6 HOURS PRN
Status: DISCONTINUED | OUTPATIENT
Start: 2018-07-29 | End: 2018-07-31 | Stop reason: HOSPADM

## 2018-07-29 RX ORDER — FUROSEMIDE 10 MG/ML
40 INJECTION INTRAMUSCULAR; INTRAVENOUS ONCE
Status: COMPLETED | OUTPATIENT
Start: 2018-07-29 | End: 2018-07-29

## 2018-07-29 RX ORDER — ASPIRIN 81 MG/1
325 TABLET, CHEWABLE ORAL DAILY
Status: DISCONTINUED | OUTPATIENT
Start: 2018-07-30 | End: 2018-07-30

## 2018-07-29 RX ORDER — ACETAMINOPHEN 325 MG/1
650 TABLET ORAL EVERY 6 HOURS PRN
Status: DISCONTINUED | OUTPATIENT
Start: 2018-07-29 | End: 2018-07-31 | Stop reason: HOSPADM

## 2018-07-29 RX ORDER — FUROSEMIDE 10 MG/ML
40 INJECTION INTRAMUSCULAR; INTRAVENOUS
Status: DISCONTINUED | OUTPATIENT
Start: 2018-07-30 | End: 2018-07-30

## 2018-07-29 RX ORDER — MECLIZINE HYDROCHLORIDE 25 MG/1
25 TABLET ORAL 3 TIMES DAILY PRN
Status: DISCONTINUED | OUTPATIENT
Start: 2018-07-29 | End: 2018-07-31 | Stop reason: HOSPADM

## 2018-07-29 RX ORDER — ATORVASTATIN CALCIUM 40 MG/1
40 TABLET, FILM COATED ORAL DAILY
Status: DISCONTINUED | OUTPATIENT
Start: 2018-07-30 | End: 2018-07-29

## 2018-07-29 RX ORDER — FERROUS SULFATE 325(65) MG
325 TABLET ORAL DAILY
Status: DISCONTINUED | OUTPATIENT
Start: 2018-07-30 | End: 2018-07-31 | Stop reason: HOSPADM

## 2018-07-29 RX ORDER — POTASSIUM CHLORIDE 750 MG/1
10 TABLET, EXTENDED RELEASE ORAL DAILY
Status: DISCONTINUED | OUTPATIENT
Start: 2018-07-30 | End: 2018-07-31 | Stop reason: HOSPADM

## 2018-07-29 RX ORDER — ATORVASTATIN CALCIUM 40 MG/1
40 TABLET, FILM COATED ORAL
Status: DISCONTINUED | OUTPATIENT
Start: 2018-07-29 | End: 2018-07-31 | Stop reason: HOSPADM

## 2018-07-29 RX ORDER — FAMOTIDINE 20 MG/1
20 TABLET, FILM COATED ORAL 2 TIMES DAILY
Status: DISCONTINUED | OUTPATIENT
Start: 2018-07-29 | End: 2018-07-31 | Stop reason: HOSPADM

## 2018-07-29 RX ORDER — OXYCODONE HYDROCHLORIDE 5 MG/1
5 TABLET ORAL EVERY 4 HOURS PRN
Status: DISCONTINUED | OUTPATIENT
Start: 2018-07-29 | End: 2018-07-31 | Stop reason: HOSPADM

## 2018-07-29 RX ORDER — ASPIRIN 81 MG/1
324 TABLET, CHEWABLE ORAL ONCE
Status: COMPLETED | OUTPATIENT
Start: 2018-07-29 | End: 2018-07-29

## 2018-07-29 RX ORDER — LORAZEPAM 0.5 MG/1
0.5 TABLET ORAL EVERY 8 HOURS PRN
Status: DISCONTINUED | OUTPATIENT
Start: 2018-07-29 | End: 2018-07-31 | Stop reason: HOSPADM

## 2018-07-29 RX ADMIN — FUROSEMIDE 40 MG: 10 INJECTION, SOLUTION INTRAVENOUS at 18:27

## 2018-07-29 RX ADMIN — ATORVASTATIN CALCIUM 40 MG: 40 TABLET, FILM COATED ORAL at 22:18

## 2018-07-29 RX ADMIN — METOPROLOL TARTRATE 12.5 MG: 25 TABLET, FILM COATED ORAL at 22:18

## 2018-07-29 RX ADMIN — ASPIRIN 81 MG 324 MG: 81 TABLET ORAL at 19:50

## 2018-07-29 RX ADMIN — FAMOTIDINE 20 MG: 20 TABLET ORAL at 22:18

## 2018-07-29 NOTE — ED PROVIDER NOTES
History  Chief Complaint   Patient presents with    Leg Swelling     patient feels he legs are swollen     Shortness of Breath     started today      Patient is a 58-year-old female with a history of coronary disease and MI status post stenting in 2017 as well as with a history of congestive heart failure  Recently discharged from the hospital on  after she presented to the emergency department due to lower GI bleed  She was admitted to the hospital at that time I received 2 units of packed red cells due to hemoglobin of 8 had colonoscopy was stabilized and discharged to home  She presents today due to increased lower extremity swelling and mild shortness of breath no fevers, chills, chest pain, nausea, vomiting, diarrhea, melena, hematochezia, headache, dizziness  Prior to Admission Medications   Prescriptions Last Dose Informant Patient Reported? Taking?    Calcium Carbonate-Vitamin D 600-125 MG-UNIT TABS 2018 at Unknown time  Yes Yes   Sig: Take by mouth daily     LORazepam (ATIVAN) 0 5 mg tablet 2018 at Unknown time  No Yes   Sig: Take 1 tablet (0 5 mg total) by mouth every 8 (eight) hours as needed for anxiety   MULTIPLE VITAMINS-CALCIUM PO   Yes No   Sig: Take by mouth daily   atorvastatin (LIPITOR) 40 mg tablet 2018 at Unknown time  Yes Yes   Sig: Take 1 tablet by mouth daily     ferrous sulfate 325 (65 Fe) mg tablet 2018 at Unknown time  Yes Yes   Sig: Take 1 tablet by mouth daily   furosemide (LASIX) 20 mg tablet 2018 at Unknown time  No Yes   Sig: Take 1 tablet (20 mg total) by mouth daily   meclizine (ANTIVERT) 25 mg tablet Past Month at Unknown time  Yes Yes   Sig: Take 1 tablet by mouth 3 (three) times a day as needed   metoprolol tartrate (LOPRESSOR) 25 mg tablet 2018 at Unknown time Family Member Yes Yes   Si mg Take 1/2 tablet BID   nitroglycerin (NITROSTAT) 0 4 mg SL tablet Past Month at Unknown time  Yes Yes   Sig: Place under the tongue place 1 tablet by sublingual route at the 1st sign of attack; may repeat every 5 min until relief; if pain persists after 3 tablets in 15 min, prompt medical attention is recommended   potassium chloride (K-DUR) 10 mEq tablet 7/29/2018 at Unknown time  No Yes   Sig: Take 1 tablet (10 mEq total) by mouth daily   psyllium (METAMUCIL) packet 7/29/2018 at Unknown time  No Yes   Sig: Take 1 packet by mouth daily   ranitidine (ZANTAC) 150 mg tablet 7/29/2018 at Unknown time  No Yes   Sig: TAKE 1 TABLET BY MOUTH TWICE A DAY      Facility-Administered Medications: None       Past Medical History:   Diagnosis Date    Abnormal blood sugar 03/13/2017    Abnormal carotid duplex scan     Carotid Duplex (Plaque formation is quite prominent bilaterally  Despite these changes, no evidence for greater than 50% diameter reducing lesions in the cervical portion of either carotid artery hemisystem ) - 6/13/2017    Anxiety     Cervical radiculopathy 08/08/2017    [facility]     COPD (chronic obstructive pulmonary disease) ([facility] [address] ) 2/6/2018    Coronary artery disease 4/7/2017    Costochondritis 02/05/2013    suspect MS in origin given relationship to ROM and location  Start mobic and if persists, reeval  Refused xrayat this time   Dyslipidemia     GERD without esophagitis 8/30/2012    H/O CT scan of chest      (No PE, minimal interstitial change left lower lobe and right upper lobe, which may be acute ) - 5/19/2017    History of Holter monitoring     Holter (Sinus rhythm with rates ranging from 52 to 118 bpm   No afib or heart block  Rare atrial and ventricular ectopic beats  One six-beat atrial run noted  No pauses  ) - 5/31/2017    Hx of echocardiogram     Echo (EF 0 60 (60%), Biatrial enlargement ) - 3/24/2017 Echo (EF 0 55 (55%), mild LVH  Aortic valvular sclerosis  Trace MI with mild left atrial dilatation, mild MAC  Mild TI with mild pulmonary hypertension  ) - 5/22/2017 Echo (EF 0 60 (60%), Normal left vent chamber size and systolic function  Mild diastolic dysfunction of LV w/normal filling pressures  Mild mitral regurg ) - 7/26/2017 Echo (EF 0    Hypertension     Iron deficiency anemia 4/21/2016    [facility] of [facility] 9/17/2012    NSTEMI (non-ST elevated myocardial infarction) [facility] 7/25/2017    Osteoarthritis 9/17/2012    Osteoporosis 3/13/2017    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[facility] ) 04/07/2017       Past Surgical History:   Procedure Laterality Date    ABDOMINAL SURGERY      BREAST SURGERY      Lumpectomy    CARDIAC CATHETERIZATION  03/24/2017    ARNIE to 100% occluded prox RCA, chronic 99% ostial LCfx, 60% mid LAD, discrete 40% distal LM    CHOLECYSTECTOMY      COLONOSCOPY N/A 7/25/2018    Procedure: COLONOSCOPY;  Surgeon: Senaida Opitz, MD;  Location: [address] [place] [address] OR;  Service: Gastroenterology    CORONARY STENT PLACEMENT  03/25/2017    ARNIE RCA    HEMORRHOID SURGERY      INSERTION STENT ARTERY  04/07/2017    Cardio vascular agents drug-eluting stent    SKIN BIOPSY      TONSILLECTOMY         Family History   Problem Relation Age of Onset    Heart failure Mother     Prostate cancer Father     Stroke Family      I have reviewed and agree with the history as documented  Social History   Substance Use Topics    Smoking status: Former Smoker    Smokeless tobacco: Never Used    Alcohol use No        Review of Systems   Constitutional: Negative for chills, diaphoresis, fatigue and fever  HENT: Negative for congestion, ear pain, rhinorrhea, sneezing and sore throat  Respiratory: Positive for shortness of breath  Negative for cough, wheezing and stridor  Cardiovascular: Positive for leg swelling  Negative for chest pain and palpitations  Gastrointestinal: Negative for abdominal distention, abdominal pain, blood in stool, constipation, diarrhea, nausea and vomiting     Genitourinary: Negative for difficulty urinating, dysuria, frequency, hematuria and urgency  Musculoskeletal: Negative for arthralgias, back pain, gait problem, joint swelling, myalgias and neck pain  Skin: Negative for rash  Neurological: Negative for dizziness, syncope, weakness, light-headedness and headaches  Hematological: Negative  All other systems reviewed and are negative  Physical Exam  Physical Exam   Constitutional: She is oriented to person, place, and time  She appears well-developed and well-nourished  No distress  HENT:   Head: Normocephalic and atraumatic  Right Ear: External ear normal    Left Ear: External ear normal    Nose: Nose normal    Mouth/Throat: Oropharynx is clear and moist    Eyes: Conjunctivae and EOM are normal  Pupils are equal, round, and reactive to light  Neck: Normal range of motion  Neck supple  No thyromegaly present  Cardiovascular: Normal rate, regular rhythm and normal heart sounds  Exam reveals no gallop and no friction rub  No murmur heard  Pulmonary/Chest: Effort normal and breath sounds normal  No stridor  No respiratory distress  She has no wheezes  She has no rales  She exhibits no tenderness  Abdominal: Soft  Bowel sounds are normal  She exhibits no distension and no mass  There is no tenderness  There is no rebound and no guarding  No hernia  Musculoskeletal: She exhibits no edema, tenderness or deformity  Lymphadenopathy:     She has no cervical adenopathy  Neurological: She is alert and oriented to person, place, and time  Skin: Skin is warm and dry  No rash noted  She is not diaphoretic  No erythema  No pallor  Psychiatric: She has a normal mood and affect  Her behavior is normal    Nursing note and vitals reviewed        Vital Signs  ED Triage Vitals [07/29/18 1755]   Temperature Pulse Respirations Blood Pressure SpO2   99 2 °F (37 3 °C) 100 22 (!) 185/86 93 %      Temp Source Heart Rate Source Patient Position - Orthostatic VS BP Location FiO2 (%)   Temporal Monitor Sitting Left arm --      Pain Score No Pain           Vitals:    07/30/18 2249 07/31/18 0328 07/31/18 0643 07/31/18 1106   BP: 122/61 144/71 135/65 133/63   Pulse: 72 72 88 66   Patient Position - Orthostatic VS: Lying Lying         Visual Acuity      ED Medications  Medications   furosemide (LASIX) injection 40 mg (40 mg Intravenous Given 7/29/18 1827)   aspirin chewable tablet 324 mg (324 mg Oral Given 7/29/18 1950)       Diagnostic Studies  Results Reviewed     Procedure Component Value Units Date/Time    Magnesium [[id number]]  (Normal) Collected:  07/30/18 0442    Lab Status:  Final result Specimen:  Blood from Arm, Right Updated:  07/30/18 0701     Magnesium 2 0 mg/dL     Comprehensive metabolic panel [[id number]]  (Abnormal) Collected:  07/30/18 0442    Lab Status:  Final result Specimen:  Blood from Arm, Right Updated:  07/30/18 0701     Sodium 142 mmol/L      Potassium 3 4 (L) mmol/L      Chloride 100 mmol/L      CO2 37 (H) mmol/L      Anion Gap 5 mmol/L      BUN 8 mg/dL      Creatinine 0 69 mg/dL      Glucose 114 (H) mg/dL      Calcium 9 0 mg/dL      AST 23 U/L      ALT 15 U/L      Alkaline Phosphatase 38 (L) U/L      Total Protein 5 8 (L) g/dL      Albumin 3 2 (L) g/dL      Total Bilirubin 0 70 mg/dL      eGFR 75 ml/min/1 73sq m     Narrative:         National Kidney Disease Education Program recommendations are as follows:  GFR calculation is accurate only with a steady state creatinine  Chronic Kidney disease less than 60 ml/min/1 73 sq  meters  Kidney failure less than 15 ml/min/1 73 sq  meters      CBC and differential [[id number]]  (Abnormal) Collected:  07/30/18 0442    Lab Status:  Final result Specimen:  Blood from Arm, Right Updated:  07/30/18 0611     WBC 5 80 Thousand/uL      RBC 3 72 (L) Million/uL      Hemoglobin 11 0 (L) g/dL      Hematocrit 32 4 (L) %      MCV 87 fL      MCH 29 5 pg      MCHC 33 9 g/dL      RDW 14 7 (H) %      MPV 8 5 (L) fL      Platelets 543 Thousands/uL      nRBC 0 /100 WBCs      Neutrophils Relative 60 % Lymphocytes Relative 25 %      Monocytes Relative 12 %      Eosinophils Relative 2 %      Basophils Relative 1 %      Neutrophils Absolute 3 50 Thousands/µL      Lymphocytes Absolute 1 40 Thousands/µL      Monocytes Absolute 0 70 Thousand/µL      Eosinophils Absolute 0 10 Thousand/µL      Basophils Absolute 0 00 Thousands/µL     Protime-INR [[id number]]  (Abnormal) Collected:  07/30/18 0442    Lab Status:  Final result Specimen:  Blood from Arm, Right Updated:  07/30/18 8730     Protime 13 0 (H) seconds      INR 1 12    Troponin I [[id number]]  (Abnormal) Collected:  07/30/18 0153    Lab Status:  Final result Specimen:  Blood from Arm, Right Updated:  07/30/18 0231     Troponin I 2 56 (H) ng/mL     Troponin I [[id number]]  (Abnormal) Collected:  07/29/18 2211    Lab Status:  Final result Specimen:  Blood from Arm, Right Updated:  07/29/18 2255     Troponin I 2 40 (H) ng/mL     Troponin I [[id number]]  (Abnormal) Collected:  07/29/18 1821    Lab Status:  Final result Specimen:  Blood from Arm, Left Updated:  07/29/18 1935     Troponin I 2 57 (H) ng/mL     Comprehensive metabolic panel [[id number]]  (Abnormal) Collected:  07/29/18 1821    Lab Status:  Final result Specimen:  Blood from Arm, Left Updated:  07/29/18 1912     Sodium 139 mmol/L      Potassium 3 7 mmol/L      Chloride 99 mmol/L      CO2 33 (H) mmol/L      Anion Gap 7 mmol/L      BUN 10 mg/dL      Creatinine 0 81 mg/dL      Glucose 165 (H) mg/dL      Calcium 9 3 mg/dL      AST 27 U/L      ALT 17 U/L      Alkaline Phosphatase 43 (L) U/L      Total Protein 6 4 g/dL      Albumin 3 6 g/dL      Total Bilirubin 0 60 mg/dL      eGFR 63 ml/min/1 73sq m     Narrative:         National Kidney Disease Education Program recommendations are as follows:  GFR calculation is accurate only with a steady state creatinine  Chronic Kidney disease less than 60 ml/min/1 73 sq  meters  Kidney failure less than 15 ml/min/1 73 sq  meters      B-Type Natriuretic Peptide [facility] ONLY) [[id number]]  (Abnormal) Collected:  07/29/18 1821    Lab Status:  Final result Specimen:  Blood from Arm, Left Updated:  07/29/18 1912     BNP 1,412 (H) pg/mL     APTT [[id number]]  (Normal) Collected:  07/29/18 1821    Lab Status:  Final result Specimen:  Blood from Arm, Left Updated:  07/29/18 1855     PTT 26 seconds     Protime-INR [[id number]]  (Normal) Collected:  07/29/18 1821    Lab Status:  Final result Specimen:  Blood from Arm, Left Updated:  07/29/18 1855     Protime 12 2 seconds      INR 1 05    CBC and differential [[id number]]  (Abnormal) Collected:  07/29/18 1821    Lab Status:  Final result Specimen:  Blood from Arm, Left Updated:  07/29/18 1841     WBC 6 80 Thousand/uL      RBC 3 93 Million/uL      Hemoglobin 11 7 (L) g/dL      Hematocrit 34 6 (L) %      MCV 88 fL      MCH 29 9 pg      MCHC 34 0 g/dL      RDW 14 5 %      MPV 8 1 (L) fL      Platelets 632 Thousands/uL      nRBC 0 /100 WBCs      Neutrophils Relative 50 %      Lymphocytes Relative 35 %      Monocytes Relative 12 %      Eosinophils Relative 3 %      Basophils Relative 1 %      Neutrophils Absolute 3 40 Thousands/µL      Lymphocytes Absolute 2 40 Thousands/µL      Monocytes Absolute 0 80 Thousand/µL      Eosinophils Absolute 0 20 Thousand/µL      Basophils Absolute 0 00 Thousands/µL     UA w Reflex to Microscopic w Reflex to Culture [[id number]]  (Normal) Collected:  07/29/18 1824    Lab Status:  Final result Specimen:  Urine from Urine, Clean Catch Updated:  07/29/18 1832     Color, UA Yellow     Clarity, UA Clear     Specific Gravity, UA 1 010     pH, UA 6 5     Leukocytes, UA Negative     Nitrite, UA Negative     Protein, UA Negative mg/dl      Glucose, UA Negative mg/dl      Ketones, UA Negative mg/dl      Urobilinogen, UA 0 2 E U /dl      Bilirubin, UA Negative     Blood, UA Negative                 XR chest 1 view portable   Final Result by Jairo Bledsoe (07/29 1924)   Findings suggest mild/borderline CHF  Large hiatal hernia  [facility] Signed by MARIA ELENA Bourgeois  ECG 12 Lead Documentation  Date/Time: 7/29/2018 7:15 PM  Performed by: Johanna Porter  Authorized by: Johanna Porter     Indications / Diagnosis:  Sob/edema  ECG reviewed by me, the ED Provider: yes    Patient location:  ED  Previous ECG:     Previous ECG:  Compared to current    Comparison ECG info:  7/23/18    Similarity:  No change    Comparison to cardiac monitor: Yes    Interpretation:     Interpretation: non-specific    Rate:     ECG rate:  92    ECG rate assessment: normal    Rhythm:     Rhythm: sinus rhythm    Ectopy:     Ectopy: none    QRS:     QRS axis:  Normal    QRS intervals:  Normal  Conduction:     Conduction: normal    ST segments:     ST segments:  Normal  T waves:     T waves: normal             Phone Contacts  ED Phone Contact    ED Course  ED Course as of Aug 01 1011   Sun Jul 29, 2018   1812 Cardiac monitor NSR 88 BPM    O2 sat 98% r/a      1917 BNP: (!) 1,412   1940 Troponin I: (!) 2 57                               MDM  Number of Diagnoses or Management Options  Congestive heart failure (CHF) ([facility] [address] ):   Non-STEMI (non-ST elevated myocardial infarction) [facility]):   Diagnosis management comments: Pt w/ h/x of CHF CAD s/p stenting 2017 to ED w/ increasing edema and mild sob  BNP today 1,412 last BNP 2 mo ago 802  Trop  2 57 Pt is in NAD Os sat 96 % r/a no resp distress or conversational dyspnea  Patient obviously of all thing non STEMI will admit to inpatient telemetry unit  Spoke with Dr Nakia Winters since he advises aspirin only at this time as patient recently with GI be bleed last week which had a hemoglobin of 8 and required 2 units of packed red blood cells  Other than aspirin he does not advise any aggressive therapy  Again patient is clinically and hemodynamically stable in the emergency department she has had no chest pain and is currently chest pain-free    Case was discussed with Thanh DAMIAN except patient to inpatient services  CritCare Time    Disposition  Final diagnoses:   Non-STEMI (non-ST elevated myocardial infarction) ([facility] [address] )   Congestive heart failure (CHF) ([facility] [address] )     Time reflects when diagnosis was documented in both MDM as applicable and the Disposition within this note     Time User Action Codes Description Comment    7/29/2018  7:46 PM Yasmani Belts Add [I21 4] Non-STEMI (non-ST elevated myocardial infarction) ([facility] [address] )     7/29/2018  7:46 PM Yasmani Belts Add [I50 9] Congestive heart failure (CHF) ([facility] )     7/31/2018 12:55 PM Margie Vargas Add [X32 75] Acute diastolic congestive heart failure [facility])       ED Disposition     ED Disposition Condition Comment    Admit  Case was discussed with Regan Alvarez and the patient's admission status was agreed to be Admission Status: inpatient status to the service of Dr Kayla Vela           Follow-up Information     Follow up With Specialties Details Why  47-7, DO Internal Medicine Follow up appointment for 8/10/18 @ 12:30pm [address] [facility] [address]  15 , DO Cardiology Follow up follow up with previously scheduled appointment [address] [facility] [address] [facility]  [phone]            Discharge Medication List as of 7/31/2018  3:12 PM      START taking these medications    Details   aspirin 81 mg chewable tablet Chew 1 tablet (81 mg total) daily, Starting Wed 8/1/2018, Normal         CONTINUE these medications which have NOT CHANGED    Details   atorvastatin (LIPITOR) 40 mg tablet Take 1 tablet by mouth daily  , Starting Fri 4/7/2017, Historical Med      Calcium Carbonate-Vitamin D 600-125 MG-UNIT TABS Take by mouth daily  , Starting Mon 5/20/2013, Historical Med      ferrous sulfate 325 (65 Fe) mg tablet Take 1 tablet by mouth daily, Starting Thu 3/17/2016, Historical Med      furosemide (LASIX) 20 mg tablet Take 1 tablet (20 mg total) by mouth daily, Starting Mon 6/4/2018, Normal      LORazepam (ATIVAN) 0 5 mg tablet Take 1 tablet (0 5 mg total) by mouth every 8 (eight) hours as needed for anxiety, Starting Mon 7/16/2018, Print      meclizine (ANTIVERT) 25 mg tablet Take 1 tablet by mouth 3 (three) times a day as needed, Starting Wed 5/31/2017, Historical Med      metoprolol tartrate (LOPRESSOR) 25 mg tablet 25 mg Take 1/2 tablet BID, Historical Med      MULTIPLE VITAMINS-CALCIUM PO Take by mouth daily, Starting Mon 5/20/2013, Historical Med      nitroglycerin (NITROSTAT) 0 4 mg SL tablet Place under the tongue place 1 tablet by sublingual route at the 1st sign of attack; may repeat every 5 min until relief; if pain persists after 3 tablets in 15 min, prompt medical attention is recommended, Historical Med      potassium chloride (K-DUR) 10 mEq tablet Take 1 tablet (10 mEq total) by mouth daily, Starting Tue 6/26/2018, Normal      psyllium (METAMUCIL) packet Take 1 packet by mouth daily, Starting Sat 7/28/2018, Print      ranitidine (ZANTAC) 150 mg tablet TAKE 1 TABLET BY MOUTH TWICE A DAY, Normal             Outpatient Discharge Orders  Discharge Diet     Activity as tolerated     Call provider for:  difficulty breathing, headache or visual disturbances     Call provider for:  persistent dizziness or light-headedness     No dressing needed         ED Provider  Electronically Signed by           Natalya Jackson PA-C  08/01/18 1011

## 2018-07-30 PROBLEM — I50.31 ACUTE DIASTOLIC CONGESTIVE HEART FAILURE (HCC): Status: ACTIVE | Noted: 2018-07-29

## 2018-07-30 LAB
ALBUMIN SERPL BCP-MCNC: 3.2 G/DL (ref 3.5–5.7)
ALP SERPL-CCNC: 38 U/L (ref 55–165)
ALT SERPL W P-5'-P-CCNC: 15 U/L (ref 7–52)
ANION GAP SERPL CALCULATED.3IONS-SCNC: 5 MMOL/L (ref 4–13)
AST SERPL W P-5'-P-CCNC: 23 U/L (ref 13–39)
ATRIAL RATE: 76 BPM
ATRIAL RATE: 92 BPM
BASOPHILS # BLD AUTO: 0 THOUSANDS/ΜL (ref 0–0.1)
BASOPHILS NFR BLD AUTO: 1 % (ref 0–2)
BILIRUB SERPL-MCNC: 0.7 MG/DL (ref 0.2–1)
BUN SERPL-MCNC: 8 MG/DL (ref 7–25)
CALCIUM SERPL-MCNC: 9 MG/DL (ref 8.6–10.5)
CHLORIDE SERPL-SCNC: 100 MMOL/L (ref 98–107)
CO2 SERPL-SCNC: 37 MMOL/L (ref 21–31)
CREAT SERPL-MCNC: 0.69 MG/DL (ref 0.6–1.2)
EOSINOPHIL # BLD AUTO: 0.1 THOUSAND/ΜL (ref 0–0.61)
EOSINOPHIL NFR BLD AUTO: 2 % (ref 0–5)
ERYTHROCYTE [DISTWIDTH] IN BLOOD BY AUTOMATED COUNT: 14.7 % (ref 11.5–14.5)
GFR SERPL CREATININE-BSD FRML MDRD: 75 ML/MIN/1.73SQ M
GLUCOSE SERPL-MCNC: 114 MG/DL (ref 65–99)
HCT VFR BLD AUTO: 32.4 % (ref 34.8–46.1)
HGB BLD-MCNC: 11 G/DL (ref 12–16)
INR PPP: 1.12 (ref 0.9–1.5)
LYMPHOCYTES # BLD AUTO: 1.4 THOUSANDS/ΜL (ref 0.6–4.47)
LYMPHOCYTES NFR BLD AUTO: 25 % (ref 21–51)
MAGNESIUM SERPL-MCNC: 2 MG/DL (ref 1.9–2.7)
MCH RBC QN AUTO: 29.5 PG (ref 26–34)
MCHC RBC AUTO-ENTMCNC: 33.9 G/DL (ref 31–37)
MCV RBC AUTO: 87 FL (ref 81–99)
MONOCYTES # BLD AUTO: 0.7 THOUSAND/ΜL (ref 0.17–1.22)
MONOCYTES NFR BLD AUTO: 12 % (ref 2–12)
NEUTROPHILS # BLD AUTO: 3.5 THOUSANDS/ΜL (ref 1.4–6.5)
NEUTS SEG NFR BLD AUTO: 60 % (ref 42–75)
NRBC BLD AUTO-RTO: 0 /100 WBCS
P AXIS: 101 DEGREES
PLATELET # BLD AUTO: 289 THOUSANDS/UL (ref 149–390)
PMV BLD AUTO: 8.5 FL (ref 8.6–11.7)
POTASSIUM SERPL-SCNC: 3.4 MMOL/L (ref 3.5–5.5)
PR INTERVAL: 166 MS
PR INTERVAL: 168 MS
PROT SERPL-MCNC: 5.8 G/DL (ref 6.4–8.9)
PROTHROMBIN TIME: 13 SECONDS (ref 10.1–12.9)
QRS AXIS: 14 DEGREES
QRS AXIS: 20 DEGREES
QRSD INTERVAL: 100 MS
QRSD INTERVAL: 98 MS
QT INTERVAL: 322 MS
QT INTERVAL: 420 MS
QTC INTERVAL: 398 MS
QTC INTERVAL: 472 MS
RBC # BLD AUTO: 3.72 MILLION/UL (ref 3.9–5.2)
SODIUM SERPL-SCNC: 142 MMOL/L (ref 134–143)
T WAVE AXIS: -31 DEGREES
T WAVE AXIS: -33 DEGREES
TROPONIN I SERPL-MCNC: 2.56 NG/ML
VENTRICULAR RATE: 76 BPM
VENTRICULAR RATE: 92 BPM
WBC # BLD AUTO: 5.8 THOUSAND/UL (ref 4.8–10.8)

## 2018-07-30 PROCEDURE — 85610 PROTHROMBIN TIME: CPT | Performed by: NURSE PRACTITIONER

## 2018-07-30 PROCEDURE — G8979 MOBILITY GOAL STATUS: HCPCS

## 2018-07-30 PROCEDURE — 80053 COMPREHEN METABOLIC PANEL: CPT | Performed by: NURSE PRACTITIONER

## 2018-07-30 PROCEDURE — 97166 OT EVAL MOD COMPLEX 45 MIN: CPT

## 2018-07-30 PROCEDURE — 84484 ASSAY OF TROPONIN QUANT: CPT | Performed by: NURSE PRACTITIONER

## 2018-07-30 PROCEDURE — 83735 ASSAY OF MAGNESIUM: CPT | Performed by: NURSE PRACTITIONER

## 2018-07-30 PROCEDURE — 99232 SBSQ HOSP IP/OBS MODERATE 35: CPT | Performed by: HOSPITALIST

## 2018-07-30 PROCEDURE — G8978 MOBILITY CURRENT STATUS: HCPCS

## 2018-07-30 PROCEDURE — G8987 SELF CARE CURRENT STATUS: HCPCS

## 2018-07-30 PROCEDURE — 85025 COMPLETE CBC W/AUTO DIFF WBC: CPT | Performed by: NURSE PRACTITIONER

## 2018-07-30 PROCEDURE — G8988 SELF CARE GOAL STATUS: HCPCS

## 2018-07-30 PROCEDURE — 99222 1ST HOSP IP/OBS MODERATE 55: CPT | Performed by: INTERNAL MEDICINE

## 2018-07-30 PROCEDURE — 97162 PT EVAL MOD COMPLEX 30 MIN: CPT

## 2018-07-30 RX ORDER — ASPIRIN 81 MG/1
81 TABLET, CHEWABLE ORAL DAILY
Status: DISCONTINUED | OUTPATIENT
Start: 2018-07-31 | End: 2018-07-31 | Stop reason: HOSPADM

## 2018-07-30 RX ORDER — FUROSEMIDE 40 MG/1
40 TABLET ORAL
Status: DISCONTINUED | OUTPATIENT
Start: 2018-07-30 | End: 2018-07-31 | Stop reason: HOSPADM

## 2018-07-30 RX ADMIN — FAMOTIDINE 20 MG: 20 TABLET ORAL at 17:35

## 2018-07-30 RX ADMIN — LORAZEPAM 0.5 MG: 0.5 TABLET ORAL at 02:03

## 2018-07-30 RX ADMIN — ATORVASTATIN CALCIUM 40 MG: 40 TABLET, FILM COATED ORAL at 17:34

## 2018-07-30 RX ADMIN — FAMOTIDINE 20 MG: 20 TABLET ORAL at 09:08

## 2018-07-30 RX ADMIN — FUROSEMIDE 40 MG: 10 INJECTION, SOLUTION INTRAVENOUS at 09:05

## 2018-07-30 RX ADMIN — METOPROLOL TARTRATE 12.5 MG: 25 TABLET, FILM COATED ORAL at 20:33

## 2018-07-30 RX ADMIN — PSYLLIUM HUSK 1 PACKET: 3.4 POWDER ORAL at 09:05

## 2018-07-30 RX ADMIN — OYSTER SHELL CALCIUM WITH VITAMIN D 1 TABLET: 500; 200 TABLET, FILM COATED ORAL at 09:08

## 2018-07-30 RX ADMIN — POTASSIUM CHLORIDE 10 MEQ: 750 TABLET, EXTENDED RELEASE ORAL at 09:09

## 2018-07-30 RX ADMIN — LORAZEPAM 0.5 MG: 0.5 TABLET ORAL at 20:33

## 2018-07-30 RX ADMIN — FERROUS SULFATE TAB 325 MG (65 MG ELEMENTAL FE) 325 MG: 325 (65 FE) TAB at 09:05

## 2018-07-30 RX ADMIN — ASPIRIN 81 MG 325 MG: 81 TABLET ORAL at 09:06

## 2018-07-30 RX ADMIN — FUROSEMIDE 40 MG: 40 TABLET ORAL at 12:33

## 2018-07-30 RX ADMIN — METOPROLOL TARTRATE 12.5 MG: 25 TABLET, FILM COATED ORAL at 09:07

## 2018-07-30 RX ADMIN — LORAZEPAM 0.5 MG: 0.5 TABLET ORAL at 10:14

## 2018-07-30 NOTE — PROGRESS NOTES
Progress Note - Jose Alberto Perkins 2/25/1925, 80 y o  female MRN: [id number]    Unit/Bed#: -01 Encounter: [id number]    Primary Care Provider: Shaila Avendano DO   Date and time admitted to hospital: 7/29/2018  6:01 PM        * NSTEMI (non-ST elevated myocardial infarction) [facility])   Assessment & Plan    · Patient's troponins have been equivocal  · Continue current Cardiology based medications which include aspirin, metoprolol and atorvastatin  · Plavix remains on hold in view of the recent GI bleed which required 2 units of PRBC transfusions  · Cardiology services are following  · There are no plans for acute intervention in view of her age and high risk  · Await 2D echocardiogram results  · Further management as per Cardiology        Acute diastolic congestive heart failure ([facility] [address] )   Assessment & Plan    · Patient with MI  · Echo May 2018: ef 45%, defer any new echo to Cardiology  · Continue IV Lasix         Coronary artery disease   Assessment & Plan    · See management for non ST segment elevation MI as outlined above        Hiatal hernia with GERD without esophagitis   Assessment & Plan    · Stable        Hyperlipidemia   Assessment & Plan    · Continue statin        Iron deficiency anemia   Assessment & Plan    · PO iron        Essential hypertension   Assessment & Plan    · Continue metoprolol  · Monitor BP        Anxiety   Assessment & Plan    · Prn medicaions          VTE Pharmacologic Prophylaxis: Pharmacologic: Pharmacologic VTE Prophylaxis contraindicated due to Recent GI bleed    Patient Centered Rounds: I have performed bedside rounds with nursing staff today  Discussions with Specialists or Other Care Team Provider:   Cardiology  Education and Discussions with Family / Patient:  Cardiology spoke with the patient's granddaughter - all questions answered to her satisfaction -because of this I did not speak with any family members    Time Spent for Care: 20 minutes    More than 50% of total time spent on counseling and coordination of care as described above  Current Length of Stay: 1 day(s)    Current Patient Status: Inpatient   Certification Statement: The patient will continue to require additional inpatient hospital stay due to Further cardiac workup    Discharge Plan:   Discharge planning once cleared by Cardiology    Code Status: Level 1 - Full Code    Subjective:   No new complaints, no visible distress, is happy that her leg swelling is going down    Objective:     Vitals:   Temp (24hrs), Av 8 °F (36 6 °C), Min:96 9 °F (36 1 °C), Max:99 2 °F (37 3 °C)    HR:  [] 70  Resp:  [16-26] 18  BP: (152-185)/(75-91) 160/78  SpO2:  [92 %-93 %] 92 %  Body mass index is 30 12 kg/m²  Input and Output Summary (last 24 hours): Intake/Output Summary (Last 24 hours) at 18 1201  Last data filed at 18 0847   Gross per 24 hour   Intake              110 ml   Output                0 ml   Net              110 ml       Physical Exam:     Physical Exam   Constitutional: She is oriented to person, place, and time  She appears well-developed and well-nourished  HENT:   Head: Normocephalic and atraumatic  Nose: Nose normal    Mouth/Throat: Oropharynx is clear and moist    Eyes: Conjunctivae and EOM are normal  Pupils are equal, round, and reactive to light  Neck: Normal range of motion  Neck supple  No JVD present  No thyromegaly present  Cardiovascular: Normal rate, regular rhythm and intact distal pulses  Exam reveals no gallop and no friction rub  No murmur heard  Pulmonary/Chest: Effort normal and breath sounds normal  No respiratory distress  Abdominal: Soft  Bowel sounds are normal  She exhibits no distension and no mass  There is no tenderness  There is no guarding  Musculoskeletal: Normal range of motion  She exhibits no edema  Lymphadenopathy:     She has no cervical adenopathy  Neurological: She is alert and oriented to person, place, and time   No cranial nerve deficit  Skin: Skin is warm  No rash noted  No erythema  Psychiatric: She has a normal mood and affect  Her behavior is normal    Nursing note and vitals reviewed  Additional Data:     Labs:      Results from last 7 days  Lab Units 07/30/18  0442   WBC Thousand/uL 5 80   HEMOGLOBIN g/dL 11 0*   HEMATOCRIT % 32 4*   PLATELETS Thousands/uL 289   NEUTROS PCT % 60   LYMPHS PCT % 25   MONOS PCT % 12   EOS PCT % 2       Results from last 7 days  Lab Units 07/30/18  0442   SODIUM mmol/L 142   POTASSIUM mmol/L 3 4*   CHLORIDE mmol/L 100   CO2 mmol/L 37*   BUN mg/dL 8   CREATININE mg/dL 0 69   CALCIUM mg/dL 9 0   TOTAL PROTEIN g/dL 5 8*   BILIRUBIN TOTAL mg/dL 0 70   ALK PHOS U/L 38*   ALT U/L 15   AST U/L 23   GLUCOSE RANDOM mg/dL 114*       Results from last 7 days  Lab Units 07/30/18  0442   INR  1 12               * I Have Reviewed All Lab Data Listed Above  * Additional Pertinent Lab Tests Reviewed:  [address] Admission  Reviewed    Imaging:  Imaging Reports Reviewed Today Include:  None    Recent Cultures (last 7 days):           Last 24 Hours Medication List:     Current Facility-Administered Medications:  acetaminophen 650 mg Oral Q6H PRN ROGELIO Valentine   [START ON 7/31/2018] aspirin 81 mg Oral Daily Kendal Castillo MD   atorvastatin 40 mg Oral Daily With Dinner Kahlil Villanueva PA-C   calcium carbonate-vitamin D 1 tablet Oral Daily ROGELIO Sweeney   famotidine 20 mg Oral BID ROGELIO Sweeney   ferrous sulfate 325 mg Oral Daily ROGELIO Sweeney   furosemide 40 mg Oral Early Morning Kendal Castillo MD   LORazepam 0 5 mg Oral Q8H PRN ROGELIO Sweeney   meclizine 25 mg Oral TID PRN ROGELIO Sweeney   metoprolol tartrate 12 5 mg Oral Q12H [address] ROGELIO Sweeney   nitroglycerin 0 4 mg Sublingual Q5 Min PRN ROGELIO Sweeney   ondansetron 4 mg Intravenous Q6H PRN ROGELIO Sweeney   oxyCODONE 5 mg Oral Q4H PRN Glorious RadishROGELIO potassium chloride 10 mEq Oral Daily ROGELIO Sweeney   psyllium 1 packet Oral Daily ROGELIO Sweeney        Today, Patient Was Seen By: Phil Nix MD    ** Please Note: Dictation voice to text software may have been used in the creation of this document   **

## 2018-07-30 NOTE — SOCIAL WORK
Chart reviewed by case management, pt lives with her grand -daughters, she takes turns living with them, she I now living with Margarita,  I did call her to discuss d/c plan,  I made her aware the pt is refusing hhc and she does not want snf for rehab,   The pt lives in a  2story home, but she does stay on the 1st floor,   There are 12-14 steps inside and a ramp out side, br on the 1st &2nd floor,  Her family helps her,   She wears glasses,, the pt has a rx plan at the [facility] in [facility] [place],  The pt does have handles  Around the toilet,  I encouraged the pt to let me order hhc and she refused, will continue to follow and assess for any additional d/c needs,  D/c plan was discussed at the d/c planning meeting,, I spoke to [facility] and the plan is for her to reutn home and  She will transport the pt home,  CM reviewed d/c planning process including the following: identifying help at home, patient preference for d/c planning needs, availability of treatment team to discuss questions or concerns patient and/or family may have regarding understanding medications and recognizing signs and symptoms once discharged  CM also encouraged patient to follow up with all recommended appointments after discharge  Patient advised of importance for patient and family to participate in managing patients medical well being

## 2018-07-30 NOTE — ASSESSMENT & PLAN NOTE
· Cardiology aware of consult  · Continue Aspirin, statin, BB hold plavix w/ recent hematochezia and ABLA requiring blood transfusion  · Trend troponins  · Telemetry

## 2018-07-30 NOTE — PHYSICAL THERAPY NOTE
Physical Therapy Evaluation     Patient's Name: Julian Rajan    Admitting Diagnosis  SOB (shortness of breath) [R06 02]  Leg swelling [M79 89]  Non-STEMI (non-ST elevated myocardial infarction) ([place] [address] ) [I21 4]  Congestive heart failure (CHF) ([place] [address] ) [I50 9]    Problem List  Patient Active Problem List   Diagnosis    Allergic rhinitis    Benign colon polyp    Cataract    Constipation    Coronary artery disease    Fibrocystic breast disease, unspecified laterality    Gait abnormality    Hiatal hernia with GERD without esophagitis    Glucose intolerance (no malabsorption)    Hyperlipidemia    Essential hypertension    Iron deficiency anemia    Malignant melanoma of skin ([place] [address] )    NSTEMI (non-ST elevated myocardial infarction) ([place] [address] )    Urinary incontinence    Vitamin D deficiency    ST elevation myocardial infarction involving right coronary artery ([place] [address] )    COPD (chronic obstructive pulmonary disease) ([place] [address] )    Anxiety    Compression fracture of thoracic vertebra, closed, initial encounter ([place] [address] )    Proteinuria    Coronary angioplasty status    Acute on chronic systolic congestive heart failure ([place] [address] )       Past Medical History  Past Medical History:   Diagnosis Date    Abnormal blood sugar 03/13/2017    Abnormal carotid duplex scan     Carotid Duplex (Plaque formation is quite prominent bilaterally  Despite these changes, no evidence for greater than 50% diameter reducing lesions in the cervical portion of either carotid artery hemisystem ) - 6/13/2017    Anxiety     Cervical radiculopathy 08/08/2017    [facility]est[facility]     COPD (chronic obstructive pulmonary disease) ([place] [address] ) 2/6/2018    Coronary artery disease 4/7/2017    Costochondritis 02/05/2013    suspect MS in origin given relationship to ROM and location  Start mobic and if persists, reeval  Refused xrayat this time        Dyslipidemia     GERD without esophagitis 8/30/2012    H/O CT scan of chest      (No PE, minimal interstitial change left lower lobe and right upper lobe, which may be acute ) - 5/19/2017    History of Holter monitoring     Holter (Sinus rhythm with rates ranging from 52 to 118 bpm   No afib or heart block  Rare atrial and ventricular ectopic beats  One six-beat atrial run noted  No pauses  ) - 5/31/2017    Hx of echocardiogram     Echo (EF 0 60 (60%), Biatrial enlargement ) - 3/24/2017 Echo (EF 0 55 (55%), mild LVH  Aortic valvular sclerosis  Trace MI with mild left atrial dilatation, mild MAC  Mild TI with mild pulmonary hypertension  ) - 5/22/2017 Echo (EF 0 60 (60%), Normal left vent chamber size and systolic function  Mild diastolic dysfunction of LV w/normal filling pressures  Mild mitral regurg ) - 7/26/2017 Echo (EF 0    Hypertension     Iron deficiency anemia 4/21/2016    Malignant melanoma of skin ([facility] [address] ) 9/17/2012    NSTEMI (non-ST elevated myocardial infarction) ([facility] [address] ) 7/25/2017    Osteoarthritis 9/17/2012    Osteoporosis 3/13/2017    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[facility] [address] ) 04/07/2017       Past Surgical History  Past Surgical History:   Procedure Laterality Date    ABDOMINAL SURGERY      BREAST SURGERY      Lumpectomy    CARDIAC CATHETERIZATION  03/24/2017    ARNIE to 100% occluded prox RCA, chronic 99% ostial LCfx, 60% mid LAD, discrete 40% distal LM    CHOLECYSTECTOMY      COLONOSCOPY N/A 7/25/2018    Procedure: COLONOSCOPY;  Surgeon: Eyal Sosa MD;  [facility] MAIN OR;  Service: Gastroenterology    CORONARY STENT PLACEMENT  03/25/2017    ARNIE RCA    HEMORRHOID SURGERY      INSERTION STENT ARTERY  04/07/2017    Cardio vascular agents drug-eluting stent    SKIN BIOPSY      TONSILLECTOMY        07/30/18 6572   Note Type   Note type Eval/Treat   Pain Assessment   Pain Assessment No/denies pain   Pain Score No Pain   Home Living   Type of Home House   Home Layout Two level; Able to live on main level with bedroom/bathroom; Ramped entrance   Bathroom Shower/Tub Tub/shower unit Bathroom Toilet Standard   Bathroom Equipment Grab bars around toilet; Shower chair   [facility]  (was using upon recent d/c)   Prior Function   Level of [place] Independent with ADLs and functional mobility; Needs assistance with IADLs   Lives With Family  (granddaughter)   Richie Help From Family   ADL Assistance Independent   IADLs Needs assistance   Falls in the last 6 months 0   Vocational Retired   Restrictions/Precautions   Wells [place]air Bearing Precautions Per Order No   Cognition   Overall Cognitive Status WFL   Arousal/Participation Alert   Orientation Level Oriented X4   Memory Within functional limits   Following Commands Follows one step commands without difficulty   RUE Assessment   RUE Assessment WFL   LUE Assessment   LUE Assessment WFL   RLE Assessment   RLE Assessment WFL   LLE Assessment   LLE Assessment WFL   Bed Mobility   Rolling L 6  Modified independent   Additional items Bedrails   Supine to Sit 4  Minimal assistance   Additional items Assist x 1;Bedrails;Verbal cues   Sit to Supine 5  Supervision   Additional items Assist x 1;Bedrails   Transfers   Sit to Stand 4  Minimal assistance   Additional items Assist x 1   Stand to Sit 4  Minimal assistance   Additional items Assist x 1   Toilet transfer 4  Minimal assistance   Additional items Assist x 1   Ambulation/Elevation   Gait pattern Improper Weight shift; Forward Flexion;Decreased foot clearance; Inconsistent raghav   Gait Assistance 4  Minimal assist   Additional items Assist x 1   Assistive Device Rolling walker   Distance 50 feet   Stair Management Assistance Not tested   Balance   Static Sitting Good   Dynamic Sitting Fair +   Static Standing Fair   Dynamic Standing Fair   Ambulatory Fair  (tactile cues 50% of tx)   Endurance Deficit   Endurance Deficit No   Activity Tolerance   Activity Tolerance Patient limited by fatigue   Assessment   Prognosis Good   Problem List Decreased strength; Impaired balance;Decreased mobility; Decreased safety awareness   Assessment Pt is 80 y o  female seen for PT evaluation s/p admit to [address] on 7/29/2018 w/ NSTEMI (non-ST elevated myocardial infarction) ([facility] [address] )  PT consulted to assess pt's functional mobility and d/c needs  Order placed for PT eval and tx, w/ up as tolerated order  Comorbidities affecting pt's physical performance at time of assessment include: anxiety, CAD, non-stemi MI, anemia, hyperlipidemia, recent hospitalization  pt was requiring A for mobility and lives w/ family in two level house  Personal factors affecting pt at time of IE include: ambulating w/ assistive device, inability to navigate community distances, anxiety and inability to perform IADLs  Please find objective findings from PT assessment regarding body systems outlined above with impairments and limitations including weakness, impaired balance, decreased endurance, decreased activity tolerance and decreased functional mobility tolerance  The following objective measures performed on IE also reveal limitations: Barthel Index: 65/100  Pt's clinical presentation is currently evolving seen in pt's presentation of HPI, multiple hospiatlziations  Pt to benefit from continued PT tx to address deficits as defined above and maximize level of functional independent mobility and consistency  From PT/mobility standpoint, recommendation at time of d/c would be Home PT vs  STR pending progress in order to facilitate return to PLOF  Goals   Patient Goals return home soon,walk better   [facility] Expiration Date 08/04/18   Short Term Goal #1 Pt will: Perform bed mobility tasks to independent to decrease burden of care  Perform transfers to Supervision to decrease risk for falls  Perform ambulation with RW for 250 feet, supervision of 1  To improve activity tolerance and increase Indep in prior living environment    Increase patient's dynamic standing balance to good to perform ADL's in bathroom with supervision of 1 and RW without LOB or perturbations  Pt  Will exhibit increased  dynamic ambulatory  balance to good, manual cues 25% of treatment session to increase safety, decrease risk of falls, and enable safe ambulation to toilet/hallway to decrease risk of medical complications due to immobility  LTG Expiration Date 08/09/18   Long Term Goal #1 Perform transfers to modified I  to decrease risk for falls  Perform ambulation with RW for 400  feet, modified I  To improve activity tolerance and increase Indep in prior living environment   Increase patient's dynamic standing balance to good+ to perform ADL's in bathroom with modified I, RW without LOB or perturbations  Pt  Will exhibit increased  dynamic ambulatory  balance to good+, manual cues 10% of treatment session to increase safety, decrease risk of falls, and enable safe ambulation to toilet/hallway to decrease risk of medical complications due to immobility  Plan   Treatment/Interventions ADL retraining;Functional transfer training;LE strengthening/ROM; Therapeutic exercise; Bed mobility;Gait training; Compensatory technique education   PT Frequency 5x/wk   Recommendation   Recommendation Other (Comment)  (STR vs home PT)   Additional Comments upon mentioning the possibility iof STR or home PT, patient reports "im fine my granddaughter is there"   Barthel Index   Feeding 10   Bathing 5   Grooming Score 0   Dressing Score 5   Bladder Score 10   Bowels Score 10   Toilet Use Score 5   Transfers (Bed/Chair) Score 10   Mobility (Level Surface) Score 10   Stairs Score 0   Barthel Index Score 65     Chaitanya Bloodgood, PT

## 2018-07-30 NOTE — H&P
H&P- Austin Dickey 2/25/1925, 80 y o  female MRN: [id number]    Unit/Bed#: -01 Encounter: [id number]    Primary Care Provider: Dulce Maria García DO   Date and time admitted to hospital: 7/29/2018  6:01 PM        * NSTEMI (non-ST elevated myocardial infarction) [facility])   Assessment & Plan    · Cardiology aware of consult  · Continue Aspirin, statin, BB hold plavix w/ recent hematochezia and ABLA requiring blood transfusion  · Trend troponins  · Telemetry        Acute on chronic systolic congestive heart failure ([facility] [address] )   Assessment & Plan    · Patient with MI  · Check echo  · IV diuretics  · Cards consult in AM        Coronary artery disease   Assessment & Plan    · Recent acute ST elevation myocardial infarction  · returns with elevated enzymes  · Elected medical management  · Patient had GI bleed w/ acute blood loss anemia, she was to have no aspirin or Plavix for 7-10 days post discharge and was to follow up with PCP, GI and Cards for restarting medications        Essential hypertension   Assessment & Plan    · Continue metoprolol  · Monitor BP        Iron deficiency anemia   Assessment & Plan    · PO iron        Hiatal hernia with GERD without esophagitis   Assessment & Plan    · Continue medication        Hyperlipidemia   Assessment & Plan    · Continue statin        Constipation   Assessment & Plan    · Bowel meds        Anxiety   Assessment & Plan    · Prn medicaions          VTE Prophylaxis: venodynes with recent GI Bleed  Code Status: full code  POLST: POLST is not applicable to this patient  Discussion with family: granddaughter    Anticipated Length of Stay:  Patient will be admitted on an Inpatient basis with an anticipated length of stay of  > 2 midnights  Justification for Hospital Stay: elevated heart enzymes and IV diuresis    Total Time for Visit, including Counseling / Coordination of Care: 45 minutes    Greater than 50% of this total time spent on direct patient counseling and coordination of care     Chief Complaint:   Leg Swelling and SOB    History of Present Illness:    Lilliam Bradford is a 80 y o  female who presents with leg swelling and SOB  Patient reports increased swelling in her legs started today "skin felt tight"  Noted weight was up a few pounds  Patient returns to ER after discharge on 7/27 with leg swelling and SOB  She also had acute blood loss anemia and required 2 units of blood and had a colonoscopy  Her Plavix was on hold  She had MI and stent in 2017 and history of CHF  Review of Systems:  Review of Systems   Constitutional: Negative for appetite change and fever  HENT: Negative for ear pain, sore throat and trouble swallowing  Eyes: Negative  Respiratory: Positive for shortness of breath  Negative for cough and wheezing  Cardiovascular: Positive for leg swelling  Negative for chest pain and palpitations  Gastrointestinal: Negative for blood in stool, diarrhea, nausea and vomiting  Musculoskeletal: Positive for arthralgias  Negative for back pain  Skin: Negative  Neurological: Positive for light-headedness  Negative for dizziness, weakness and headaches  Psychiatric/Behavioral: Negative for confusion  Past Medical and Surgical History:   Past Medical History:   Diagnosis Date    Abnormal blood sugar 03/13/2017    Abnormal carotid duplex scan     Carotid Duplex (Plaque formation is quite prominent bilaterally  Despite these changes, no evidence for greater than 50% diameter reducing lesions in the cervical portion of either carotid artery hemisystem ) - 6/13/2017    Anxiety     Cervical radiculopathy 08/08/2017    CHF [facility]congestive heart failure[facility])     COPD (chronic obstructive pulmonary disease) ([facility] [address] ) 2/6/2018    Coronary artery disease 4/7/2017    Costochondritis 02/05/2013    suspect MS in origin given relationship to ROM and location  Start mobic and if persists, reeval  Refused xrayat this time        Dyslipidemia     GERD without esophagitis 8/30/2012    H/O CT scan of chest      (No PE, minimal interstitial change left lower lobe and right upper lobe, which may be acute ) - 5/19/2017    History of Holter monitoring     Holter (Sinus rhythm with rates ranging from 52 to 118 bpm   No afib or heart block  Rare atrial and ventricular ectopic beats  One six-beat atrial run noted  No pauses  ) - 5/31/2017    Hx of echocardiogram     Echo (EF 0 60 (60%), Biatrial enlargement ) - 3/24/2017 Echo (EF 0 55 (55%), mild LVH  Aortic valvular sclerosis  Trace MI with mild left atrial dilatation, mild MAC  Mild TI with mild pulmonary hypertension  ) - 5/22/2017 Echo (EF 0 60 (60%), Normal left vent chamber size and systolic function  Mild diastolic dysfunction of LV w/normal filling pressures  Mild mitral regurg ) - 7/26/2017 Echo (EF 0    Hypertension     Iron deficiency anemia 4/21/2016    Malignant melanoma of skin ([facility] [address] ) 9/17/2012    NSTEMI (non-ST elevated myocardial infarction) ([facility] [address] ) 7/25/2017    Osteoarthritis 9/17/2012    Osteoporosis 3/13/2017    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[address] ) 04/07/2017       Past Surgical History:   Procedure Laterality Date    ABDOMINAL SURGERY      BREAST SURGERY      Lumpectomy    CARDIAC CATHETERIZATION  03/24/2017    ARNIE to 100% occluded prox RCA, chronic 99% ostial LCfx, 60% mid LAD, discrete 40% distal LM    CHOLECYSTECTOMY      COLONOSCOPY N/A 7/25/2018    Procedure: COLONOSCOPY;  Surgeon: Kirk Johnson MD;  Location: [facility] MAIN OR;  Service: Gastroenterology    CORONARY STENT PLACEMENT  03/25/2017    ARNIE RCA    HEMORRHOID SURGERY      INSERTION STENT ARTERY  04/07/2017    Cardio vascular agents drug-eluting stent    SKIN BIOPSY      TONSILLECTOMY         Meds/Allergies:  Prior to Admission medications    Medication Sig Start Date End Date Taking?  Authorizing Provider   atorvastatin (LIPITOR) 40 mg tablet Take 1 tablet by mouth daily   4/7/17  Yes Historical Provider, MD   Calcium Carbonate-Vitamin D 600-125 MG-UNIT TABS Take by mouth daily   5/20/13  Yes Historical Provider, MD   ferrous sulfate 325 (65 Fe) mg tablet Take 1 tablet by mouth daily 3/17/16  Yes Historical Provider, MD   furosemide (LASIX) 20 mg tablet Take 1 tablet (20 mg total) by mouth daily 6/4/18  Yes Lucy Montoyays, DO   LORazepam (ATIVAN) 0 5 mg tablet Take 1 tablet (0 5 mg total) by mouth every 8 (eight) hours as needed for anxiety 7/16/18  Yes Lucy Heys, DO   meclizine (ANTIVERT) 25 mg tablet Take 1 tablet by mouth 3 (three) times a day as needed 5/31/17  Yes Historical Provider, MD   metoprolol tartrate (LOPRESSOR) 25 mg tablet 25 mg Take 1/2 tablet BID   Yes Historical Provider, MD   nitroglycerin (NITROSTAT) 0 4 mg SL tablet Place under the tongue place 1 tablet by sublingual route at the 1st sign of attack; may repeat every 5 min until relief; if pain persists after 3 tablets in 15 min, prompt medical attention is recommended   Yes Historical Provider, MD   potassium chloride (K-DUR) 10 mEq tablet Take 1 tablet (10 mEq total) by mouth daily 6/26/18  Yes Lucy Meza, DO   psyllium (METAMUCIL) packet Take 1 packet by mouth daily 7/28/18  Yes Rui Alcantara MD   ranitidine (ZANTAC) 150 mg tablet TAKE 1 TABLET BY MOUTH TWICE A DAY 6/25/18  Yes Lucy Montoyays, DO   MULTIPLE VITAMINS-CALCIUM PO Take by mouth daily 5/20/13   Historical Provider, MD     I have reviewed home medications with patient personally  Allergies: Allergies   Allergen Reactions    Ciprofloxacin      Reaction Date: [id number];     Nitrofurantoin      Reaction Date: [id number];     Penicillins Rash and Other (See Comments)     Throat swells       Social History:  Marital Status:     Occupation: retired  Patient Pre-hospital Living Situation: lives with grandchildren  Patient Pre-hospital Level of Mobility: roller walker  Patient Pre-hospital Diet Restrictions: none  Substance Use History:     History   Alcohol Use No     History Smoking Status    Former Smoker   Smokeless Tobacco    Never Used     History   Drug Use No       Family History:  I have reviewed the patients family history    Physical Exam:   Vitals:   Blood Pressure: (!) 178/91 (07/29/18 2027)  Pulse: 83 (07/29/18 2027)  Temperature: 98 3 °F (36 8 °C) (07/29/18 2027)  Temp Source: Tympanic (07/29/18 2027)  Respirations: 16 (07/29/18 2027)  Height: 5' 2" (157 5 cm) (07/29/18 2027)  Weight - Scale: 76 4 kg (168 lb 8 oz) (07/29/18 2027)  SpO2: 93 % (07/29/18 2027)    Physical Exam   Constitutional: She is oriented to person, place, and time  She appears well-developed and well-nourished  HENT:   Head: Normocephalic and atraumatic  Mouth/Throat: No oropharyngeal exudate  Eyes: Conjunctivae and EOM are normal  Right eye exhibits no discharge  Left eye exhibits no discharge  Neck: Normal range of motion  No tracheal deviation present  Cardiovascular: Normal rate, regular rhythm and normal heart sounds  Exam reveals no gallop and no friction rub  No murmur heard  Pulmonary/Chest: Effort normal  No respiratory distress  She has no wheezes  She has rales (mild bases L>R)  She exhibits no tenderness  Abdominal: Soft  Bowel sounds are normal  She exhibits no distension and no mass  There is no tenderness  There is no rebound and no guarding  Musculoskeletal: Normal range of motion  She exhibits edema (bilateral lower extremity )  She exhibits no tenderness or deformity  Neurological: She is alert and oriented to person, place, and time  Skin: Skin is warm and dry  No rash noted  No erythema  No pallor  Psychiatric: She has a normal mood and affect  Her behavior is normal  Judgment and thought content normal    Nursing note and vitals reviewed  Additional Data:   Lab Results: I have personally reviewed pertinent reports          Results from last 7 days  Lab Units 07/29/18  1821   WBC Thousand/uL 6 80   HEMOGLOBIN g/dL 11 7*   HEMATOCRIT % 34 6*   PLATELETS Thousands/uL 295   NEUTROS PCT % 50   LYMPHS PCT % 35   MONOS PCT % 12   EOS PCT % 3       Results from last 7 days  Lab Units 07/29/18  1821   SODIUM mmol/L 139   POTASSIUM mmol/L 3 7   CHLORIDE mmol/L 99   CO2 mmol/L 33*   BUN mg/dL 10   CREATININE mg/dL 0 81   CALCIUM mg/dL 9 3   TOTAL PROTEIN g/dL 6 4   BILIRUBIN TOTAL mg/dL 0 60   ALK PHOS U/L 43*   ALT U/L 17   AST U/L 27   GLUCOSE RANDOM mg/dL 165*       Results from last 7 days  Lab Units 07/29/18  1821   INR  1 05               Imaging: I have personally reviewed pertinent reports  XR chest 1 view portable   Final Result by Rory Qiu (07/29 1924)   Findings suggest mild/borderline CHF  Large hiatal hernia              Signed by MARIA ELENA Lo           EKG, Pathology, and Other Studies Reviewed on Admission:   · EKG: normal sinus at 92    [facility] Yes     ** Please Note: This note has been constructed using a voice recognition system   **

## 2018-07-30 NOTE — CONSULTS
Consult- Bruce Elliott 2/25/1925, 80 y o  female MRN: [id number]    Unit/Bed#: -01 Encounter: [id number]    Primary Care Provider: Cris Foster DO   Date and time admitted to hospital: 7/29/2018  6:01 PM      Inpatient consult to Cardiology  Consult performed by: Jaylin Mendoza  Consult ordered by: Alejandra CONNELL          Coronary artery disease   Assessment & Plan    Three vessel disease at the time of heart catheterization in March of 2017  Single-vessel stenting then  She is not overtly symptomatic  Given her age and recent GI bleed any repeat coronary intervention would be very high risk  Will hold off on any such treatment unless symptoms require  In the meantime low-dose aspirin alone will be utilized but nothing more intense  Acute diastolic congestive heart failure (HCC)   Assessment & Plan    Improved with diuresis  Will change diuretic to weight based  Other summary comments:   I spoke with her granddaughter with whom she resides  Despite increased troponin there was not a clear pattern of acute coronary syndrome  She would be at very high risk for any intervention and all understand that repeat heart catheterization should be reserved for clear-cut symptoms  HPI: Bruce Elliott is a 80y o  year old female who presented with leg swelling and weight gain  Her family was concerned  There was no overt chest pain  Troponin however was mildly abnormal   I have reviewed her records  In March of 2017 she had heart catheterization for symptoms likely consistent with CAD and clear MI by troponin  She underwent stenting of the right coronary artery though there was also significant disease in the other to major coronary arteries  She has had no clear-cut symptoms of CAD since then  Earlier this month she had significant lower GI bleed for which anticoagulation was discontinued  Despite her age she remains quite coherent  EKG:   SR  Possible prior IWMI  NSSTs     MOST  RECENT CARDIAC IMAGING:   Last echo revealed reasonably preserved LV function  Review of Systems: a 12 point review of systems was conducted and is negative except for as mentioned in the HPI or as below  Historical Information   Past Medical History:   Diagnosis Date    Abnormal blood sugar 03/13/2017    Abnormal carotid duplex scan     Carotid Duplex (Plaque formation is quite prominent bilaterally  Despite these changes, no evidence for greater than 50% diameter reducing lesions in the cervical portion of either carotid artery hemisystem ) - 6/13/2017    Anxiety     Cervical radiculopathy 08/08/2017    CHF (congestive heart failure) (HCC)     [facility] 2/6/2018    Coronary artery disease 4/7/2017    Costochondritis 02/05/2013    suspect MS in origin given relationship to ROM and location  Start mobic and if persists, reeval  Refused xrayat this time   Dyslipidemia     GERD without esophagitis 8/30/2012    H/O CT scan of chest      (No PE, minimal interstitial change left lower lobe and right upper lobe, which may be acute ) - 5/19/2017    History of Holter monitoring     Holter (Sinus rhythm with rates ranging from 52 to 118 bpm   No afib or heart block  Rare atrial and ventricular ectopic beats  One six-beat atrial run noted  No pauses  ) - 5/31/2017    Hx of echocardiogram     Echo (EF 0 60 (60%), Biatrial enlargement ) - 3/24/2017 Echo (EF 0 55 (55%), mild LVH  Aortic valvular sclerosis  Trace MI with mild left atrial dilatation, mild MAC  Mild TI with mild pulmonary hypertension  ) - 5/22/2017 Echo (EF 0 60 (60%), Normal left vent chamber size and systolic function  Mild diastolic dysfunction of LV w/normal filling pressures   Mild mitral regurg ) - 7/26/2017 Echo (EF 0    Hypertension     Iron deficiency anemia 4/21/2016    Malignant melanoma of skin ([facility] [address] ) 9/17/2012    NSTEMI (non-ST elevated myocardial infarction) ([facility] [address] ) 7/25/2017    Osteoarthritis 9/17/2012    Osteoporosis 3/13/2017    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[facility] [address] ) 04/07/2017     Past Surgical History:   Procedure Laterality Date    ABDOMINAL SURGERY      BREAST SURGERY      Lumpectomy    CARDIAC CATHETERIZATION  03/24/2017    ARNIE to 100% occluded prox RCA, chronic 99% ostial LCfx, 60% mid LAD, discrete 40% distal LM    CHOLECYSTECTOMY      COLONOSCOPY N/A 7/25/2018    Procedure: COLONOSCOPY;  Surgeon: Emilia Dill MD;  Location: [facility] MAIN OR;  Service: Gastroenterology    CORONARY STENT PLACEMENT  03/25/2017    ARNIE RCA    HEMORRHOID SURGERY      INSERTION STENT ARTERY  04/07/2017    Cardio vascular agents drug-eluting stent    SKIN BIOPSY      TONSILLECTOMY       History   Alcohol Use No     History   Drug Use No     History   Smoking Status    Former Smoker   Smokeless Tobacco    Never Used       Family History:   No longer relevant  Meds/Allergies   all current active meds have been reviewed  Prescriptions Prior to Admission   Medication    atorvastatin (LIPITOR) 40 mg tablet    Calcium Carbonate-Vitamin D 600-125 MG-UNIT TABS    ferrous sulfate 325 (65 Fe) mg tablet    furosemide (LASIX) 20 mg tablet    LORazepam (ATIVAN) 0 5 mg tablet    meclizine (ANTIVERT) 25 mg tablet    metoprolol tartrate (LOPRESSOR) 25 mg tablet    nitroglycerin (NITROSTAT) 0 4 mg SL tablet    potassium chloride (K-DUR) 10 mEq tablet    psyllium (METAMUCIL) packet    ranitidine (ZANTAC) 150 mg tablet    MULTIPLE VITAMINS-CALCIUM PO       Allergies   Allergen Reactions    Ciprofloxacin      Reaction Date: 01Jul2011;     Nitrofurantoin      Reaction Date: 01Jul2011;     Penicillins Rash and Other (See Comments)     Throat swells       Objective   Vitals: Blood pressure 160/78, pulse 70, temperature 97 6 °F (36 4 °C), temperature source Tympanic, resp   rate 18, height 5' 2" (1 575 m), weight 74 7 kg (164 lb 11 2 oz), SpO2 92 %, not currently breastfeeding , Body mass index is 30 12 kg/m² , Orthostatic Blood Pressures      Most Recent Value   Blood Pressure  160/78 filed at 07/30/2018 1035   Patient Position - Orthostatic VS  Lying filed at 07/30/2018 7480          Systolic (32PWC), CZS:020 , Min:152 , RUF:156     Diastolic (71OFT), WNP:26, Min:75, Max:91              Physical Exam:    General:  Normal appearance in no distress  Eyes:  Anicteric  Oral mucosa:  Moist   Neck:  No JV D  Carotid upstrokes are brisk without bruits  No masses  Chest:  Clear to auscultation and percussion  Cardiac:  Normal PMI  Normal S1 and S2  No murmur gallop or rub  Abdomen:  Soft and nontender  No palpable organomegaly or aortic enlargement  Extremities:  No peripheral edema  Musculoskeletal:  Symmetric  Vascular:  Femoral pulses are brisk without bruits  Popliteal  pulses are intact bilaterally  Pedal pulses are intact  Neuro:  Grossly symmetric  Psych:  Alert and oriented x3          Lab Results:     Troponins:   Results from last 7 days  Lab Units 07/30/18  0153 07/29/18  2211 07/29/18  1821   TROPONIN I ng/mL 2 56* 2 40* 2 57*     BNP:   Results from last 6 Months  Lab Units 07/29/18  1821 05/23/18  0505   BNP pg/mL 1,412* 802*       CBC :   Results from last 7 days  Lab Units 07/30/18  0442 07/29/18  1821   WBC Thousand/uL 5 80 6 80   HEMOGLOBIN g/dL 11 0* 11 7*   HEMATOCRIT % 32 4* 34 6*   MCV fL 87 88   PLATELETS Thousands/uL 289 295     TSH:     CMP:   Results from last 7 days  Lab Units 07/30/18  0442 07/29/18  1821   SODIUM mmol/L 142 139   POTASSIUM mmol/L 3 4* 3 7   CHLORIDE mmol/L 100 99   CO2 mmol/L 37* 33*   BUN mg/dL 8 10   CREATININE mg/dL 0 69 0 81   GLUCOSE RANDOM mg/dL 114* 165*   AST U/L 23 27   ALT U/L 15 17   TOTAL PROTEIN g/dL 5 8* 6 4   BILIRUBIN TOTAL mg/dL 0 70 0 60   EGFR ml/min/1 73sq m 75 63     Lipid Profile:     Coags:   Results from last 7 days  Lab Units 07/30/18  0442 07/29/18  1821 07/24/18  0442   INR  1 12 1 05 1 15

## 2018-07-30 NOTE — PLAN OF CARE
Problem: PHYSICAL THERAPY ADULT  Goal: Performs mobility at highest level of function for planned discharge setting  See evaluation for individualized goals  Treatment/Interventions: ADL retraining, Functional transfer training, LE strengthening/ROM, Therapeutic exercise, Bed mobility, Gait training, Compensatory technique education   Samantha Ge, PT         See flowsheet documentation for full assessment, interventions and recommendations  Outcome: Progressing  Prognosis: Good  Problem List: Decreased strength, Impaired balance, Decreased mobility, Decreased safety awareness  Assessment: Pt is 80 y o  female seen for PT evaluation s/p admit to [address] [place] [address] on 7/29/2018 w/ NSTEMI (non-ST elevated myocardial infarction) ([facility] [address] )  PT consulted to assess pt's functional mobility and d/c needs  Order placed for PT eval and tx, w/ up as tolerated order  Comorbidities affecting pt's physical performance at time of assessment include: anxiety, CAD, non-stemi MI, anemia, hyperlipidemia, recent hospitalization  pt was requiring A for mobility and lives w/ family in two level house  Personal factors affecting pt at time of IE include: ambulating w/ assistive device, inability to navigate community distances, anxiety and inability to perform IADLs  Please find objective findings from PT assessment regarding body systems outlined above with impairments and limitations including weakness, impaired balance, decreased endurance, decreased activity tolerance and decreased functional mobility tolerance  The following objective measures performed on IE also reveal limitations: Barthel Index: 65/100  Pt's clinical presentation is currently evolving seen in pt's presentation of HPI, multiple hospiatlziations  Pt to benefit from continued PT tx to address deficits as defined above and maximize level of functional independent mobility and consistency   From PT/mobility standpoint, recommendation at time of d/c would be Home PT vs  STR pending progress in order to facilitate return to PLOF  Recommendation: Other (Comment) (STR vs home PT)          See flowsheet documentation for full assessment     Siobhan Urbina PT

## 2018-07-30 NOTE — ASSESSMENT & PLAN NOTE
· Patient with MI  · Echo May 2018: ef 45%, defer any new echo to Cardiology  · IV diuretics  · Cards consult in AM

## 2018-07-30 NOTE — NURSING NOTE
Patient sleeping comfortably in bed  Patient has had no acute changes in baseline status noted from previous assessment  Vital stable  IV patent and intact  Bed in lowest position, rails x2  Call bell within reach  will continue to monitor

## 2018-07-30 NOTE — OCCUPATIONAL THERAPY NOTE
Occupational Therapy Evaluation      Tristen Chan       Chief Complaint:   Leg Swelling and SOB     History of Present Illness:     Tristen Chan is a 80 y o  female who presents with leg swelling and SOB  Patient reports increased swelling in her legs started today "skin felt tight"  Noted weight was up a few pounds  Patient returns to ER after discharge on 7/27 with leg swelling and SOB  She also had acute blood loss anemia and required 2 units of blood and had a colonoscopy  Her Plavix was on hold  She had MI and stent in 2017 and history of CHF        Review of Systems:  Review of Systems   Constitutional: Negative for appetite change and fever  HENT: Negative for ear pain, sore throat and trouble swallowing  Eyes: Negative  Respiratory: Positive for shortness of breath  Negative for cough and wheezing  Cardiovascular: Positive for leg swelling  Negative for chest pain and palpitations  Gastrointestinal: Negative for blood in stool, diarrhea, nausea and vomiting  Musculoskeletal: Positive for arthralgias  Negative for back pain  Skin: Negative  Neurological: Positive for light-headedness  Negative for dizziness, weakness and headaches     Psychiatric/Behavioral: Negative for confusion  (per PA)     7/30/2018    Patient Active Problem List   Diagnosis    Allergic rhinitis    Benign colon polyp    Cataract    Constipation    Coronary artery disease    Fibrocystic breast disease, unspecified laterality    Gait abnormality    Hiatal hernia with GERD without esophagitis    Glucose intolerance (no malabsorption)    Hyperlipidemia    Essential hypertension    Iron deficiency anemia    Malignant [facility]    NSTEMI (non-ST elevated myocardial infarction) ([facility] )    Urinary incontinence    Vitamin D deficiency    ST elevation myocardial infarction involving right coronary artery ([facility] )    COPD (chronic obstructive pulmonary disease) ([facility] )    Anxiety    Compression fracture of thoracic vertebra, closed, initial encounter ([facility] )    Proteinuria    Coronary angioplasty status    Acute diastolic congestive heart failure (HCC)       Past Medical History:   Diagnosis Date    Abnormal blood sugar 03/13/2017    Abnormal carotid duplex scan     Carotid Duplex (Plaque formation is quite prominent bilaterally  Despite these changes, no evidence for greater than 50% diameter reducing lesions in the cervical portion of either carotid artery hemisystem ) - 6/13/2017    Anxiety     Cervical radiculopathy 08/08/2017    [facility]congestive heart failure) [facility])     COPD (chronic obstructive pulmonary disease) ([place] [address] ) 2/6/2018    Coronary artery disease 4/7/2017    Costochondritis 02/05/2013    suspect MS in origin given relationship to ROM and location  Start mobic and if persists, reeval  Refused xrayat this time   Dyslipidemia     GERD without esophagitis 8/30/2012    H/O CT scan of chest      (No PE, minimal interstitial change left lower lobe and right upper lobe, which may be acute ) - 5/19/2017    History of Holter monitoring     Holter (Sinus rhythm with rates ranging from 52 to 118 bpm   No afib or heart block  Rare atrial and ventricular ectopic beats  One six-beat atrial run noted  No pauses  ) - 5/31/2017    Hx of echocardiogram     Echo (EF 0 60 (60%), Biatrial enlargement ) - 3/24/2017 Echo (EF 0 55 (55%), mild LVH  Aortic valvular sclerosis  Trace MI with mild left atrial dilatation, mild MAC  Mild TI with mild pulmonary hypertension  ) - 5/22/2017 Echo (EF 0 60 (60%), Normal left vent chamber size and systolic function  Mild diastolic dysfunction of LV w/normal filling pressures   Mild mitral regurg ) - 7/26/2017 Echo (EF 0    Hypertension     Iron deficiency anemia 4/21/2016    Malignant melanoma of skin ([facility] ) 9/17/2012    NSTEMI (non-ST elevated myocardial infarction) ([place] [address] ) 7/25/2017    Osteoarthritis 9/17/2012    Osteoporosis 3/13/2017    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[facility] ) 04/07/2017       Past Surgical History:   Procedure Laterality Date    ABDOMINAL SURGERY      BREAST SURGERY      Lumpectomy    CARDIAC CATHETERIZATION  03/24/2017    ARNIE to 100% occluded prox RCA, chronic 99% ostial LCfx, 60% mid LAD, discrete 40% distal LM    CHOLECYSTECTOMY      COLONOSCOPY N/A 7/25/2018    Procedure: COLONOSCOPY;  Surgeon:  Sara Avalos MD;  Location: [address] MAIN OR;  Service: Gastroenterology    CORONARY STENT PLACEMENT  03/25/2017    ARNIE RCA    HEMORRHOID SURGERY      INSERTION STENT ARTERY  04/07/2017    Cardio vascular agents drug-eluting stent    SKIN BIOPSY      TONSILLECTOMY     Philip Pereyra, OT

## 2018-07-30 NOTE — ASSESSMENT & PLAN NOTE
Three vessel disease at the time of heart catheterization in March of 2017  Single-vessel stenting then  She is not overtly symptomatic  Given her age and recent GI bleed any repeat coronary intervention would be very high risk  Will hold off on any such treatment unless symptoms require  In the meantime low-dose aspirin alone will be utilized but nothing more intense

## 2018-07-30 NOTE — CASE MANAGEMENT
Initial Clinical Review    Admission: Date/Time/Statement: Inpatient 7/29/18 @ 1949    Orders Placed This Encounter   Procedures    Inpatient Admission (expected length of stay for this patient is greater than two midnights)     Standing Status:   Standing     Number of Occurrences:   1     Order Specific Question:   Admitting Physician     Answer:   [place]     Order Specific Question:   Level of Care     Answer:   Med Surg [16]     Order Specific Question:   Estimated length of stay     Answer:   More than 2 Midnights     Order Specific Question:   Certification     Answer:   I certify that inpatient services are medically necessary for this patient for a duration of greater than two midnights  See H&P and MD Progress Notes for additional information about the patient's course of treatment  ED: Date/Time/Mode of Arrival:   ED Arrival Information     Expected Arrival Acuity Means of Arrival Escorted By Service Admission Type    - 7/29/2018 17:51 Emergent Walk-In Family Member General Medicine Emergency    Arrival Complaint    LEGS SWOLLEN          Chief Complaint:   Chief Complaint   Patient presents with    Leg Swelling     patient feels he legs are swollen     Shortness of Breath     started today        History of Illness: 80 y o  female who presents with leg swelling and SOB  Patient reports increased swelling in her legs started today "skin felt tight"  Noted weight was up a few pounds  Patient returns to ER after discharge on 7/27 with leg swelling and SOB        ED Vital Signs:   ED Triage Vitals [07/29/18 1755]   Temperature Pulse Respirations Blood Pressure SpO2   99 2 °F  100 22 (!) 185/86 93 %   Pain Score       No Pain        Wt Readings from Last 1 Encounters:   WEIGHT 77 6 kg on admission     7/30 Weight 74 7 kg     Vital Signs (abnormal):   07/30/18 1035  97 6 °F  70 18 160/78 92 %   07/30/18 0653  97 6 °F  74 17 152/80 92 %         Abnormal Labs/Diagnostic Test Results: CO2 33   BNP 1412   Troponin 2 57 > 2 40 > 2 56   EKG SR  Possible prior IWMI  NSSTs  ED Treatment:   Medication Administration from 07/29/2018 1751 to 07/29/2018 2019       Date/Time Order Dose Route     07/29/2018 1827 furosemide (LASIX) injection 40 mg 40 mg Intravenous     07/29/2018 1950 aspirin chewable tablet 324 mg 324 mg Oral          Past Medical/Surgical History:    Active Ambulatory Problems     Diagnosis Date Noted    Allergic rhinitis 09/17/2012    Benign colon polyp 09/17/2012    Cataract 09/17/2012    Constipation 04/21/2016    Coronary artery disease 04/07/2017    Fibrocystic breast disease, unspecified laterality 09/17/2012    Gait abnormality 05/31/2017    Hiatal hernia with GERD without esophagitis 08/30/2012    Glucose intolerance (no malabsorption) 02/06/2014    Hyperlipidemia 02/06/2018    Essential hypertension 09/17/2012    Iron deficiency anemia 04/21/2016    Malignant melanoma of skin ([facility] ) 09/17/2012    NSTEMI (non-ST elevated myocardial infarction) ([place] [id number] ) 07/25/2017    Urinary incontinence 09/17/2012    Vitamin D deficiency 01/07/2013    ST elevation myocardial infarction involving right coronary artery ([facility] ) 03/24/2017    COPD (chronic obstructive pulmonary disease) ([facility] ) 02/06/2018    Anxiety 05/07/2018    Compression fracture of thoracic vertebra, closed, initial encounter ([facility] ) 03/13/2017    Proteinuria 01/06/2015    Coronary angioplasty status 06/28/2018     Resolved Ambulatory Problems     Diagnosis Date Noted    Benign paroxysmal vertigo 09/17/2012    Diverticulosis 04/21/2016    Osteoarthritis 09/17/2012    Osteoporosis 03/13/2017    Palpitations 03/13/2017    Hematochezia 07/23/2018    Lactic acid increased 07/23/2018    Acute blood loss anemia 07/24/2018     Past Medical History:   Diagnosis Date    Abnormal blood sugar 03/13/2017    Abnormal carotid duplex scan     Anxiety     Cervical radiculopathy 08/08/2017    CHF (congestive heart failure) ([place] [address] )     COPD (chronic obstructive pulmonary disease) ([place] [address] ) 2/6/2018    Coronary artery disease 4/7/2017    Costochondritis 02/05/2013    Dyslipidemia     GERD without esophagitis 8/30/2012    H/O CT scan of chest     History of Holter monitoring     Hx of echocardiogram     Hypertension     Iron deficiency anemia 4/21/2016    Malignant melanoma of skin ([place] [address] ) 9/17/2012    NSTEMI (non-ST elevated myocardial infarction) ([place] [address] ) 7/25/2017    Osteoarthritis 9/17/2012    Osteoporosis 3/13/2017    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[place] [address] ) 04/07/2017       Admitting Diagnosis: SOB (shortness of breath) [R06 02]  Leg swelling [M79 89]  Non-STEMI (non-ST elevated myocardial infarction) ([facility]) [I21 4]  Congestive heart failure (CHF) ([facility]) [I50 9]    Age/Sex: 80 y o  female    Assessment/Plan: 35-year-old female who is remarkably alert, awake and oriented x3 and is independent in her activities of daily living, and is a patient who recently discharged after she was treated for GI bleed  She returned last evening and is being treated for an acute on chronic systolic dysfunction CHF exacerbation as well as a non ST segment elevation myocardial infarction  NSTEMI Cardiology aware of consult  Continue Aspirin, statin, BB hold plavix w/ recent hematochezia and ABLA requiring blood transfusion  Trend troponins  Telemetry    Pulmonary/Chest: Effort normal  No respiratory distress  She has no wheezes  She has rales (mild bases L>R)  She exhibits no tenderness  Musculoskeletal: Normal range of motion  She exhibits edema (bilateral lower extremity )  Per cardiology CAD- Three vessel disease at the time of heart catheterization in March of 2017  Single-vessel stenting then  She is not overtly symptomatic  Given her age and recent GI bleed any repeat coronary intervention would be very high risk  Will hold off on any such treatment unless symptoms require    In the meantime low-dose aspirin alone will be utilized but nothing more intense  Acute CHF- Improved with diuresis  Will change diuretic to weight based  Anticipated Length of Stay:  Patient will be admitted on an Inpatient basis with an anticipated length of stay of  > 2 midnights  Justification for Hospital Stay: elevated heart enzymes and IV diuresis      Admission Orders:  Scheduled Meds:   Current Facility-Administered Medications:  acetaminophen 650 mg Oral Q6H PRN    aspirin 81 mg Oral Daily   atorvastatin 40 mg Oral Daily With Dinner   calcium carbonate-vitamin D 1 tablet Oral Daily   famotidine 20 mg Oral BID   ferrous sulfate 325 mg Oral Daily   furosemide 40 mg Oral Early Morning   LORazepam 0 5 mg Oral Q8H PRN   meclizine 25 mg Oral TID PRN   metoprolol tartrate 12 5 mg Oral Q12H LIZZY   nitroglycerin 0 4 mg Sublingual Q5 Min PRN   ondansetron 4 mg Intravenous Q6H PRN   oxyCODONE 5 mg Oral Q4H PRN   potassium chloride 10 mEq Oral Daily   psyllium 1 packet Oral Daily   PT/OT  Telemetry  SCD's   Serial cardiac enzymes  Daily weight   I's&O's   Cardiac diet     7/30/18 2 9 kg weight loss since admission (urine output not charted)  Dispo tbd

## 2018-07-30 NOTE — PLAN OF CARE
Problem: OCCUPATIONAL THERAPY ADULT  Goal: Performs self-care activities at highest level of function for planned discharge setting  See evaluation for individualized goals  Treatment Interventions: ADL retraining, Functional transfer training, Endurance training, Patient/family training, Energy conservation          See flowsheet documentation for full assessment, interventions and recommendations  Outcome: Progressing  Limitation: Decreased ADL status, Decreased endurance, Decreased self-care trans, Decreased high-level ADLs  Prognosis: Good  Assessment: Pt is a 80 y o  female seen for OT evaluation s/p admit to [facility] on 7/29/2018 w/ NSTEMI (non-ST elevated myocardial infarction) ([facility] )  Comorbidities affecting pt's functional performance at time of assessment include: COPD, CHF and NSTEMI, CAD, Anxiety, Compression Fx Thoracic Vertebra,   Personal factors affecting pt at time of IE include:difficulty performing ADLS  Prior to admission, pt was I with self care ADL's, functional transfers/toileting and functional mobility  Upon evaluation: Pt requires (min) A with self care ADL's and functional transfers/mobility r/t the following deficits impacting occupational performance: decreased tolerance and decreased safety awareness  Pt to benefit from continued skilled OT tx while in the hospital to address deficits as defined above and maximize level of functional independence w ADL's and functional mobility  Occupational Performance areas to address include: bathing/shower, toilet hygiene, dressing, functional mobility and clothing management  From OT standpoint, recommendation at time of d/c would be home with grandaughters if safe at d/c, possibly out pt cardiac rehab         OT Discharge Recommendation: Home with family support (out patient Cardiac Rehab)   Philip Pereyra OT

## 2018-07-30 NOTE — ASSESSMENT & PLAN NOTE
· Recent acute ST elevation myocardial infarction  · returns with elevated enzymes  · Elected medical management  · Patient had GI bleed w/ acute blood loss anemia, she was to have no aspirin or Plavix for 7-10 days post discharge and was to follow up with PCP, GI and Cards for restarting medications

## 2018-07-30 NOTE — ASSESSMENT & PLAN NOTE
· Patient's troponins have been equivocal  · Continue current Cardiology based medications which include aspirin, metoprolol and atorvastatin  · Plavix remains on hold in view of the recent GI bleed which required 2 units of PRBC transfusions  · Cardiology services are following  · There are no plans for acute intervention in view of her age and high risk  · Await 2D echocardiogram results  · Further management as per Cardiology

## 2018-07-31 ENCOUNTER — TELEPHONE (OUTPATIENT)
Dept: INTERNAL MEDICINE CLINIC | Facility: CLINIC | Age: 83
End: 2018-07-31

## 2018-07-31 VITALS
DIASTOLIC BLOOD PRESSURE: 63 MMHG | BODY MASS INDEX: 29.37 KG/M2 | HEART RATE: 66 BPM | OXYGEN SATURATION: 93 % | WEIGHT: 159.6 LBS | SYSTOLIC BLOOD PRESSURE: 133 MMHG | TEMPERATURE: 98 F | RESPIRATION RATE: 20 BRPM | HEIGHT: 62 IN

## 2018-07-31 PROBLEM — E78.2 HYPERLIPIDEMIA, MIXED: Status: ACTIVE | Noted: 2018-02-06

## 2018-07-31 LAB
ANION GAP SERPL CALCULATED.3IONS-SCNC: 7 MMOL/L (ref 4–13)
BASOPHILS # BLD AUTO: 0 THOUSANDS/ΜL (ref 0–0.1)
BASOPHILS NFR BLD AUTO: 1 % (ref 0–2)
BUN SERPL-MCNC: 13 MG/DL (ref 7–25)
CALCIUM SERPL-MCNC: 8.8 MG/DL (ref 8.6–10.5)
CHLORIDE SERPL-SCNC: 97 MMOL/L (ref 98–107)
CO2 SERPL-SCNC: 39 MMOL/L (ref 21–31)
CREAT SERPL-MCNC: 0.88 MG/DL (ref 0.6–1.2)
EOSINOPHIL # BLD AUTO: 0.2 THOUSAND/ΜL (ref 0–0.61)
EOSINOPHIL NFR BLD AUTO: 4 % (ref 0–5)
ERYTHROCYTE [DISTWIDTH] IN BLOOD BY AUTOMATED COUNT: 14.3 % (ref 11.5–14.5)
GFR SERPL CREATININE-BSD FRML MDRD: 57 ML/MIN/1.73SQ M
GLUCOSE SERPL-MCNC: 106 MG/DL (ref 65–99)
HCT VFR BLD AUTO: 34.8 % (ref 34.8–46.1)
HGB BLD-MCNC: 11.6 G/DL (ref 12–16)
LYMPHOCYTES # BLD AUTO: 1.4 THOUSANDS/ΜL (ref 0.6–4.47)
LYMPHOCYTES NFR BLD AUTO: 24 % (ref 21–51)
MCH RBC QN AUTO: 29.2 PG (ref 26–34)
MCHC RBC AUTO-ENTMCNC: 33.4 G/DL (ref 31–37)
MCV RBC AUTO: 88 FL (ref 81–99)
MONOCYTES # BLD AUTO: 0.7 THOUSAND/ΜL (ref 0.17–1.22)
MONOCYTES NFR BLD AUTO: 13 % (ref 2–12)
NEUTROPHILS # BLD AUTO: 3.4 THOUSANDS/ΜL (ref 1.4–6.5)
NEUTS SEG NFR BLD AUTO: 59 % (ref 42–75)
NRBC BLD AUTO-RTO: 0 /100 WBCS
PLATELET # BLD AUTO: 303 THOUSANDS/UL (ref 149–390)
PMV BLD AUTO: 8.4 FL (ref 8.6–11.7)
POTASSIUM SERPL-SCNC: 3.2 MMOL/L (ref 3.5–5.5)
RBC # BLD AUTO: 3.98 MILLION/UL (ref 3.9–5.2)
SODIUM SERPL-SCNC: 143 MMOL/L (ref 134–143)
WBC # BLD AUTO: 5.8 THOUSAND/UL (ref 4.8–10.8)

## 2018-07-31 PROCEDURE — 99239 HOSP IP/OBS DSCHRG MGMT >30: CPT | Performed by: NURSE PRACTITIONER

## 2018-07-31 PROCEDURE — 97530 THERAPEUTIC ACTIVITIES: CPT

## 2018-07-31 PROCEDURE — 97116 GAIT TRAINING THERAPY: CPT

## 2018-07-31 PROCEDURE — 80048 BASIC METABOLIC PNL TOTAL CA: CPT | Performed by: HOSPITALIST

## 2018-07-31 PROCEDURE — 85025 COMPLETE CBC W/AUTO DIFF WBC: CPT | Performed by: HOSPITALIST

## 2018-07-31 PROCEDURE — 99232 SBSQ HOSP IP/OBS MODERATE 35: CPT | Performed by: INTERNAL MEDICINE

## 2018-07-31 RX ORDER — POTASSIUM CHLORIDE 20 MEQ/1
20 TABLET, EXTENDED RELEASE ORAL
Status: DISCONTINUED | OUTPATIENT
Start: 2018-07-31 | End: 2018-07-31 | Stop reason: HOSPADM

## 2018-07-31 RX ORDER — ASPIRIN 81 MG/1
81 TABLET, CHEWABLE ORAL DAILY
Qty: 30 TABLET | Refills: 0 | Status: SHIPPED | OUTPATIENT
Start: 2018-08-01 | End: 2020-01-01 | Stop reason: HOSPADM

## 2018-07-31 RX ORDER — FUROSEMIDE 40 MG/1
40 TABLET ORAL DAILY PRN
Qty: 60 TABLET | Refills: 0 | Status: SHIPPED | OUTPATIENT
Start: 2018-07-31 | End: 2018-07-31 | Stop reason: HOSPADM

## 2018-07-31 RX ADMIN — POTASSIUM CHLORIDE 20 MEQ: 1500 TABLET, EXTENDED RELEASE ORAL at 15:42

## 2018-07-31 RX ADMIN — LORAZEPAM 0.5 MG: 0.5 TABLET ORAL at 11:04

## 2018-07-31 RX ADMIN — METOPROLOL TARTRATE 12.5 MG: 25 TABLET, FILM COATED ORAL at 08:50

## 2018-07-31 RX ADMIN — PSYLLIUM HUSK 1 PACKET: 3.4 POWDER ORAL at 08:50

## 2018-07-31 RX ADMIN — OYSTER SHELL CALCIUM WITH VITAMIN D 1 TABLET: 500; 200 TABLET, FILM COATED ORAL at 08:50

## 2018-07-31 RX ADMIN — FUROSEMIDE 40 MG: 40 TABLET ORAL at 05:37

## 2018-07-31 RX ADMIN — FERROUS SULFATE TAB 325 MG (65 MG ELEMENTAL FE) 325 MG: 325 (65 FE) TAB at 08:50

## 2018-07-31 RX ADMIN — POTASSIUM CHLORIDE 10 MEQ: 750 TABLET, EXTENDED RELEASE ORAL at 08:51

## 2018-07-31 RX ADMIN — ASPIRIN 81 MG 81 MG: 81 TABLET ORAL at 08:50

## 2018-07-31 RX ADMIN — FAMOTIDINE 20 MG: 20 TABLET ORAL at 08:51

## 2018-07-31 NOTE — PROGRESS NOTES
Progress Note - Rebel Hernandez 2/25/1925, 80 y o  female MRN: [id number]    Unit/Bed#: -01 Encounter: [id number]    Primary Care Provider: Ifeanyi Robert DO   Date and time admitted to hospital: 7/29/2018  6:01 PM        Coronary artery disease   Assessment & Plan    No further symptoms  Essential hypertension   Assessment & Plan    Improved post diuresis  Hyperlipidemia   Assessment & Plan    On statin tx  Subjective:   Patient seen and examined  No significant events overnight  Breathing improved  Back to baseline  No edema  Weight down to 159      Summary comments:  OK for discharge  She will see Dr Jennifer Hill in our office as previously planned  Telemetry/ECG/Cardiac testing:   NSR  Vitals: Blood pressure 133/63, pulse 66, temperature 98 °F (36 7 °C), temperature source Tympanic, resp  rate 20, height 5' 2" (1 575 m), weight 72 4 kg (159 lb 9 6 oz), SpO2 93 %, not currently breastfeeding , Orthostatic Blood Pressures      Most Recent Value   Blood Pressure  133/63 filed at 07/31/2018 1106   Patient Position - Orthostatic VS  Lying filed at 07/31/2018 0328      , Weight (last 2 days)     Date/Time   Weight    07/31/18 0534  72 4 (159 6)    07/30/18 0536  74 7 (164 7)    07/29/18 2027  76 4 (168 5)    07/29/18 1755  77 6 (171)              Physical Exam:    General:  Normal appearance in no distress  Eyes:  Anicteric  Oral mucosa:  Moist   Neck:  No JV D  Carotid upstrokes are brisk without bruits  No masses  Chest:  Clear to auscultation and percussion  Cardiac:  Normal PMI  Normal S1 and S2  No murmur gallop or rub  Abdomen:  Soft and nontender  No palpable organomegaly or aortic enlargement  Extremities:  No peripheral edema  Neuro:  Grossly symmetric  Psych:  Alert and oriented x3        Medications:      Current Facility-Administered Medications:     acetaminophen (TYLENOL) tablet 650 mg, 650 mg, Oral, Q6H PRN, ROGELIO Ervin    aspirin chewable tablet 81 mg, 81 mg, Oral, Daily, Amaris Dunbar MD, 81 mg at 07/31/18 0850    atorvastatin (LIPITOR) tablet 40 mg, 40 mg, Oral, Daily With Dinner, Koby Garza PA-C, 40 mg at 07/30/18 1734    calcium carbonate-vitamin D (OSCAL-D) 500 mg-200 units per tablet 1 tablet, 1 tablet, Oral, Daily, ROGELIO Sweeney, 1 tablet at 07/31/18 0850    famotidine (PEPCID) tablet 20 mg, 20 mg, Oral, BID, WINSTON SweeneyNP, 20 mg at 07/31/18 1437    ferrous sulfate tablet 325 mg, 325 mg, Oral, Daily, ROGELIO Sweeney, 325 mg at 07/31/18 0850    furosemide (LASIX) tablet 40 mg, 40 mg, Oral, Early Morning, Amaris Dunbar MD, 40 mg at 07/31/18 0537    LORazepam (ATIVAN) tablet 0 5 mg, 0 5 mg, Oral, Q8H PRN, ROGELIO Lay, 0 5 mg at 07/31/18 1104    meclizine (ANTIVERT) tablet 25 mg, 25 mg, Oral, TID PRN, ROGELIO Lay    metoprolol tartrate (LOPRESSOR) tablet 12 5 mg, 12 5 mg, Oral, Q12H LIZZY, Mavis Bustamante, WINSTONNP, 12 5 mg at 07/31/18 0850    nitroglycerin (NITROSTAT) SL tablet 0 4 mg, 0 4 mg, Sublingual, Q5 Min PRN, ROGELIO Sweeney    ondansetron (ZOFRAN) injection 4 mg, 4 mg, Intravenous, Q6H PRN, Mavis Bustamante, ROGELIO    oxyCODONE (ROXICODONE) IR tablet 5 mg, 5 mg, Oral, Q4H PRN, Mavis Bustamante, ROGELIO    potassium chloride (K-DUR,KLOR-CON) CR tablet 10 mEq, 10 mEq, Oral, Daily, WINSTON SweeneyNP, 10 mEq at 07/31/18 0851    psyllium (METAMUCIL) 1 packet, 1 packet, Oral, Daily, ROGELIO Sweeney, 1 packet at 07/31/18 0850     Labs & Results:    Troponins:   Results from last 7 days  Lab Units 07/30/18  0153 07/29/18  2211 07/29/18  1821   TROPONIN I ng/mL 2 56* 2 40* 2 57*       CBC with diff:   Results from last 7 days  Lab Units 07/31/18  0508 07/30/18  0442   WBC Thousand/uL 5 80 5 80   HEMOGLOBIN g/dL 11 6* 11 0*   HEMATOCRIT % 34 8 32 4*   MCV fL 88 87   PLATELETS Thousands/uL 303 289     TSH:     CMP:   Results from last 7 days  Lab Units 07/31/18  0508 07/30/18  0442 07/29/18  1821   SODIUM mmol/L 143 142 139   POTASSIUM mmol/L 3 2* 3 4* 3 7   CHLORIDE mmol/L 97* 100 99   CO2 mmol/L 39* 37* 33*   BUN mg/dL 13 8 10   CREATININE mg/dL 0 88 0 69 0 81   GLUCOSE RANDOM mg/dL 106* 114* 165*   AST U/L  --  23 27   ALT U/L  --  15 17   TOTAL PROTEIN g/dL  --  5 8* 6 4   BILIRUBIN TOTAL mg/dL  --  0 70 0 60   EGFR ml/min/1 73sq m 57 75 63     Lipid Profile:     Coags:   Results from last 7 days  Lab Units 07/30/18  0442 07/29/18  1821   INR  1 12 1 05

## 2018-07-31 NOTE — PLAN OF CARE
Problem: PHYSICAL THERAPY ADULT  Goal: Performs mobility at highest level of function for planned discharge setting  See evaluation for individualized goals  Treatment/Interventions: ADL retraining, Functional transfer training, LE strengthening/ROM, Therapeutic exercise, Bed mobility, Gait training, Compensatory technique education   Andrea Nichole, PT         See flowsheet documentation for full assessment, interventions and recommendations  Outcome: Progressing  Prognosis: Good  Problem List: Decreased strength, Impaired balance, Decreased safety awareness  Assessment: Wallace Leo was cooperative and agreeable to participate in PT, requested to use toilet prior to ambulation  Patient completed toilet transfer, ambulation with supervision of 1 and RW without LOB or requested pain  Occasional tactile cues to maneuver RW appropriately, no observed SOB upon return to bed  Patient's needs were met after treatment and comfortable elevated in bed  Wallace Leo continues to benefit from skilled inpatient PT interventions as she is steadily progressing and safety awareness has improved with decrease cues  Recommendation: Home PT     PT - OK to Discharge: Yes    See flowsheet documentation for full assessment     Andrea Nichole, PT

## 2018-07-31 NOTE — PHYSICIAN ADVISOR
Current patient class: Inpatient  The patient is currently on Hospital Day: 2 at [address]      The patient was admitted to the hospital at [address] on 7/29/18 for the following diagnosis:  SOB (shortness of breath) [R06 02]  Leg swelling [M79 89]  Non-STEMI (non-ST elevated myocardial infarction) ([facility] ) [I21 4]  Congest[facility] heart failure (CHF) ([facility] ) [I50 9]       There is documentation in the medical record of an expected length of stay of at least 2 midnights  The patient is therefore expected to satisfy the 2 midnight benchmark and given the 2 midnight presumption is appropriate for INPATIENT ADMISSION  Given this expectation of a satisfying stay, CMS instructs us that the patient is most often appropriate for inpatient admission under part A provided medical necessity is documented in the chart  After review of the relevant documentation, labs, vital signs and test results, the patient is appropriate for INPATIENT ADMISSION  Admission to the hospital as an inpatient is a complex decision making process which requires the practitioner to consider the patients presenting complaint, history and physical examination and all relevant testing  With this in mind, in this case, the patient was deemed appropriate for INPATIENT ADMISSION  After review of the documentation and testing available at the time of the admission I concur with this clinical determination of medical necessity  Rationale is as follows: The patient is a 80 yrs old Female who presented to the ED at 7/29/2018  6:01 PM with a chief complaint of Leg Swelling (patient feels he legs are swollen ) and Shortness of Breath (started today )     The patient had a prior admission from 7/23-7/27  For hematochezia  The patient was transfused and underwent a colonoscopy  The patient presented on this admission with SOB and leg swelling  The patient is being admitted with NSTEMI and acute on chronic CHF   The plan of care includes serial cardiac enzymes, IV diuretics, cardiology consultation  This patient is appropriate for INPATIENT admission; this admission is RELATED to her prior admission and she was readmitted within 24 hours  The patients vitals on arrival were ED Triage Vitals [07/29/18 1755]   Temperature Pulse Respirations Blood Pressure SpO2   99 2 °F (37 3 °C) 100 22 (!) 185/86 93 %      Temp Source Heart Rate Source Patient Position - Orthostatic VS BP Location FiO2 (%)   Temporal Monitor Sitting Left arm --      Pain Score       No Pain           Past Medical History:   Diagnosis Date    Abnormal blood sugar 03/13/2017    Abnormal carotid duplex scan     Carotid Duplex (Plaque formation is quite prominent bilaterally  Despite these changes, no evidence for greater than 50% diameter reducing lesions in the cervical portion of either carotid artery hemisystem ) - 6/13/2017    Anxiety     Cervical radiculopathy 08/08/2017    CHF (congestive heart failure) (HCC)     COPD (chronic obstructive pulmonary disease) ([facility] ) 2/6/2018    Coronary artery disease 4/7/2017    Costochondritis 02/05/2013    suspect MS in origin given relationship to ROM and location  Start mobic and if persists, reeval  Refused xrayat this time   Dyslipidemia     GERD without esophagitis 8/30/2012    H/O CT scan of chest      (No PE, minimal interstitial change left lower lobe and right upper lobe, which may be acute ) - 5/19/2017    History of Holter monitoring     Holter (Sinus rhythm with rates ranging from 52 to 118 bpm   No afib or heart block  Rare atrial and ventricular ectopic beats  One six-beat atrial run noted  No pauses  ) - 5/31/2017    Hx of echocardiogram     Echo (EF 0 60 (60%), Biatrial enlargement ) - 3/24/2017 Echo (EF 0 55 (55%), mild LVH  Aortic valvular sclerosis  Trace MI with mild left atrial dilatation, mild MAC  Mild TI with mild pulmonary hypertension  ) - 5/22/2017 Echo (EF 0 60 (60%), Normal left vent chamber size and systolic function  Mild diastolic dysfunction of LV w/normal filling pressures  Mild mitral regurg ) - 7/26/2017 Echo (EF 0    Hypertension     Iron deficiency anemia 4/21/2016    Malignant melanoma of skin ([facility] ) 9/17/2012    NSTEMI (non-ST elevated myocardial infarction) ([facility] ) 7/25/2017    Osteoarthritis 9/17/2012    Osteoporosis 3/13/2017    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[facility] ) 04/07/2017     Past Surgical History:   Procedure Laterality Date    ABDOMINAL SURGERY      BREAST SURGERY      Lumpectomy    CARDIAC CATHETERIZATION  03/24/2017    ARNIE to 100% occluded prox RCA, chronic 99% ostial LCfx, 60% mid LAD, discrete 40% distal LM    CHOLECYSTECTOMY      COLONOSCOPY N/A 7/25/2018    Procedure: COLONOSCOPY;  Surgeon:  Mariusz Newsome MD;  Location: [address] MAIN OR;  Service: Gastroenterology    CORONARY STENT PLACEMENT  03/25/2017    ARNIE RCA    HEMORRHOID SURGERY      INSERTION STENT ARTERY  04/07/2017    Cardio vascular agents drug-eluting stent    SKIN BIOPSY      TONSILLECTOMY             Consults have been placed to:   IP CONSULT TO CARDIOLOGY  IP CONSULT TO CASE MANAGEMENT    Vitals:    07/30/18 0653 07/30/18 1035 07/30/18 1504 07/30/18 1957   BP: 152/80 160/78 134/64 124/74   BP Location: Left arm Left arm Left arm Right arm   Pulse: 74 70 77 88   Resp: 17 18 18 18   Temp: 97 6 °F (36 4 °C) 97 6 °F (36 4 °C) (!) 97 1 °F (36 2 °C) 97 5 °F (36 4 °C)   TempSrc: Tympanic Tympanic Tympanic Tympanic   SpO2: 92% 92% 92% 92%   Weight:       Height:           Most recent labs:    Recent Labs      07/29/18   1821   07/30/18   0153  07/30/18   0442   WBC  6 80   --    --   5 80   HGB  11 7*   --    --   11 0*   HCT  34 6*   --    --   32 4*   PLT  295   --    --   289   K  3 7   --    --   3 4*   NA  139   --    --   142   CALCIUM  9 3   --    --   9 0   BUN  10   --    --   8   CREATININE  0 81   --    --   0 69   INR  1 05   --    --   1 12   TROPONINI  2 57*   < >  2 56*   --    AST 27   --    --   23   ALT  17   --    --   15   ALKPHOS  43*   --    --   38*   BILITOT  0 60   --    --   0 70    < > = values in this interval not displayed         Scheduled Meds:  Current Facility-Administered Medications:  acetaminophen 650 mg Oral Q6H PRN ROGELIO Atwood   [START ON 7/31/2018] aspirin 81 mg Oral Daily Perez Paz MD   atorvastatin 40 mg Oral Daily With Dinner Suzanne Jean PA-C   calcium carbonate-vitamin D 1 tablet Oral Daily Mavis A Meckes, CRNP   famotidine 20 mg Oral BID Mavis A Meckes, CRNP   ferrous sulfate 325 mg Oral Daily Mavis A Meckes, CRNP   furosemide 40 mg Oral Early Morning Perez Paz MD   LORazepam 0 5 mg Oral Q8H PRN Mavis A Meckes, CRNP   meclizine 25 mg Oral TID PRN Jose Perfect Meckes, CRNP   metoprolol tartrate 12 5 mg Oral Q12H [address] Mvais A Meckes, CRNP   nitroglycerin 0 4 mg Sublingual Q5 Min PRN Mavis A Meckes, CRNP   ondansetron 4 mg Intravenous Q6H PRN Mavis A Meckes, CRNP   oxyCODONE 5 mg Oral Q4H PRN Mavis A Meckes, CRNP   potassium chloride 10 mEq Oral Daily Mavis A Meckes, CRNP   psyllium 1 packet Oral Daily Mavis A Meckes, CRNP     Continuous Infusions:   PRN Meds:   acetaminophen    LORazepam    meclizine    nitroglycerin    ondansetron    oxyCODONE    Surgical procedures (if appropriate):

## 2018-07-31 NOTE — NURSING NOTE
Pt discharged to home  IV removed with catheter tip intact  Pressure and dressing applied until bleeding stopped  Dressing CDI  VSS  Belongings sent with patient  Discharge instructions discussed  Pt and oumou verbalized uderstanding of instructions   Pt transported off unit by Mateo Garcia

## 2018-07-31 NOTE — ASSESSMENT & PLAN NOTE
· Patient's troponins have been equivocal  · Continue current Cardiology based medications which include aspirin, metoprolol and atorvastatin  · No surgical intervention recommended per cardiology at this time due to her age and high risk  · Plavix remains on hold in view of the recent GI bleed which required 2 units of PRBC transfusions  She may resume plavix according to GI recommendation

## 2018-07-31 NOTE — PHYSICAL THERAPY NOTE
Physical Therapy Treatment Encounter Note    Patient's Name: Mike Davis    Admitting Diagnosis  SOB (shortness of breath) [R06 02]  Leg swelling [M79 89]  Non-STEMI (non-ST elevated myocardial infarction) ([place] [address] ) [I21 4]  Congestive heart failure (CHF) ([place] [address] ) [I50 9]    Problem List  Patient Active Problem List   Diagnosis    Allergic rhinitis    Benign colon polyp    Cataract    Constipation    Coronary artery disease    Fibrocystic breast disease, unspecified laterality    Gait abnormality    Hiatal hernia with GERD without esophagitis    Glucose intolerance (no malabsorption)    Hyperlipidemia    Essential hypertension    Iron deficiency anemia    Malignant melanoma of skin ([place] [address] )    NSTEMI (non-ST elevated myocardial infarction) ([place] [address] )    Urinary incontinence    Vitamin D deficiency    ST elevation myocardial infarction involving right coronary artery ([place] [address] )    COPD (chronic obstructive pulmonary disease) ([place] [address] )    Anxiety    Compression fracture of thoracic vertebra, closed, initial encounter ([place] [address] )    Proteinuria    Coronary angioplasty status    Acute diastolic congestive heart failure ([place] [address] )       Past Medical History  Past Medical History:   Diagnosis Date    Abnormal blood sugar 03/13/2017    Abnormal carotid duplex scan     Carotid Duplex (Plaque formation is quite prominent bilaterally  Despite these changes, no evidence for greater than 50% diameter reducing lesions in the cervical portion of either carotid artery hemisystem ) - 6/13/2017    Anxiety     Cervical radiculopathy 08/08/2017    CHF (congestive heart failure) (HCC)     COPD (chronic obstructive pulmonary disease) ([place] [address] ) 2/6/2018    Coronary artery disease 4/7/2017    Costochondritis 02/05/2013    suspect MS in origin given relationship to ROM and location  Start mobic and if persists, reeval  Refused xrayat this time        Dyslipidemia     GERD without esophagitis 8/30/2012    H/O CT scan of chest      (No PE, minimal interstitial change left lower lobe and right upper lobe, which may be acute ) - 5/19/2017    History of Holter monitoring     Holter (Sinus rhythm with rates ranging from 52 to 118 bpm   No afib or heart block  Rare atrial and ventricular ectopic beats  One six-beat atrial run noted  No pauses  ) - 5/31/2017    Hx of echocardiogram     Echo (EF 0 60 (60%), Biatrial enlargement ) - 3/24/2017 Echo (EF 0 55 (55%), mild LVH  Aortic valvular sclerosis  Trace MI with mild left atrial dilatation, mild MAC  Mild TI with mild pulmonary hypertension  ) - 5/22/2017 Echo (EF 0 60 (60%), Normal left vent chamber size and systolic function  Mild diastolic dysfunction of LV w/normal filling pressures  Mild mitral regurg ) - 7/26/2017 Echo (EF 0    Hypertension     Iron deficiency anemia 4/21/2016    Malignant melanoma of skin ([facility] ) 9/17/2012    NSTEMI (non-ST elevated myocardial infarction) ([facility] ) 7/25/2017    Osteoarthritis 9/17/2012    Osteoporosis 3/13/2017    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[facility] ) 04/07/2017       Past Surgical History  Past Surgical History:   Procedure Laterality Date    ABDOMINAL SURGERY      BREAST SURGERY      Lumpectomy    CARDIAC CATHETERIZATION  03/24/2017    ARNIE to 100% occluded prox RCA, chronic 99% ostial LCfx, 60% mid LAD, discrete 40% distal LM    CHOLECYSTECTOMY      COLONOSCOPY N/A 7/25/2018    Procedure: COLONOSCOPY;  Surgeon: Coby Ruiz MD;  Location: [facility] MAIN OR;  Service: Gastroenterology    CORONARY STENT PLACEMENT  03/25/2017    ARNIE RCA    HEMORRHOID SURGERY      INSERTION STENT ARTERY  04/07/2017    Cardio vascular agents drug-eluting stent    SKIN BIOPSY      TONSILLECTOMY          07/31/18 0940   Pain Assessment   Pain Assessment No/denies pain   Pain Score No Pain   Restrictions/Precautions   Weight Bearing Precautions Per Order No   Cognition   Overall Cognitive Status WFL   Arousal/Participation Alert; Cooperative   Attention Within functional limits Orientation Level Oriented X4   Memory Within functional limits   Following Commands Follows all commands and directions without difficulty   Subjective   Subjective "im feeling fine"   Bed Mobility   Supine to Sit 6  Modified independent   Additional items Bedrails   Sit to Supine 6  Modified independent   Additional items Bedrails   Transfers   Sit to Stand 5  Supervision   Additional items Assist x 1   Stand to Sit 6  Modified independent   Additional items Bedrails   Toilet transfer 5  Supervision   Additional items Assist x 1   Ambulation/Elevation   Gait pattern Forward Flexion;Narrow WYATT; Decreased foot clearance   Gait Assistance 5  Supervision   Additional items Assist x 1   Assistive Device Rolling walker   Distance 175 feet   Stair Management Assistance Not tested   Balance   Static Sitting Good   Dynamic Sitting Good   Static Standing Fair +   Dynamic Standing Fair +   Ambulatory Fair   Endurance Deficit   Endurance Deficit No   Activity Tolerance   Activity Tolerance Patient tolerated treatment well   Assessment   Prognosis Good   Problem List Decreased strength; Impaired balance;Decreased safety awareness   Assessment Nirali Solis was cooperative and agreeable to participate in PT, requested to use toilet prior to ambulation  Patient completed toilet transfer, ambulation with supervision of 1 and RW without LOB or requested pain  Occasional tactile cues to maneuver RW appropriately, no observed SOB upon return to bed  Patient's needs were met after treatment and comfortable elevated in bed  Nirali Solis continues to benefit from skilled inpatient PT interventions as she is steadily progressing and safety awareness has improved with decrease cues  Goals   Patient Goals go home soon   STG Expiration Date 08/04/18   LTG Expiration Date 08/09/18   Plan   Treatment/Interventions ADL retraining;Functional transfer training;LE strengthening/ROM; Therapeutic exercise; Bed mobility;Gait training; Compensatory technique education Progress Improving as expected   PT Frequency 5x/wk   Recommendation   Recommendation Home PT   PT - OK to Discharge Yes     Sisters Abt, PT

## 2018-07-31 NOTE — ASSESSMENT & PLAN NOTE
· Patient with STEMI   · Echo May 2018: ef 45%  Cardiology is holding off on repeating an ECHO  · She is clinically much improve and is responding to diuretic therapy  · Will continue with weight based diuretic regimen recommended by Cardiology- take 40 mg of furosemide if weight is greater than 165

## 2018-07-31 NOTE — PLAN OF CARE
Problem: DISCHARGE PLANNING - CARE MANAGEMENT  Goal: Discharge to post-acute care or home with appropriate resources  INTERVENTIONS:  - Conduct assessment to determine patient/family and health care team treatment goals, and need for post-acute services based on payer coverage, community resources, and patient preferences, and barriers to discharge  - Address psychosocial, clinical, and financial barriers to discharge as identified in assessment in conjunction with the patient/family and health care team  - Arrange appropriate level of post-acute services according to patient's   needs and preference and payer coverage in collaboration with the physician and health care team  - Communicate with and update the patient/family, physician, and health care team regarding progress on the discharge plan  - Arrange appropriate transportation to post-acute venues      D/c home with family   Outcome: Completed Date Met: 07/31/18

## 2018-07-31 NOTE — DISCHARGE SUMMARY
Discharge- Tyrone Kraus 2/25/1925, 80 y o  female MRN: [id number]    Unit/Bed#: -01 Encounter: [id number]    Primary Care Provider: Tisha JanuaryDO   Date and time admitted to hospital: 7/29/2018  6:01 PM        * Non-STEMI (non-ST elevated myocardial infarction) [facility])   Assessment & Plan    · Patient's troponins have been equivocal  · Continue current Cardiology based medications which include aspirin, metoprolol and atorvastatin  · No surgical intervention recommended per cardiology at this time due to her age and high risk  · Plavix remains on hold in view of the recent GI bleed which required 2 units of PRBC transfusions  She may resume plavix according to GI recommendation  Acute diastolic congestive heart failure [facility])   Assessment & Plan    · Patient with STEMI   · Echo May 2018: ef 45%  Cardiology is holding off on repeating an ECHO  · She is clinically much improve and is responding to diuretic therapy  · Will continue with weight based diuretic regimen recommended by Cardiology- take 40 mg of furosemide if weight is greater than 165             Anxiety   Assessment & Plan    · Prn medicaions        Iron deficiency anemia   Assessment & Plan    · PO iron        Essential hypertension   Assessment & Plan    · Continue metoprolol  · Monitor BP        Hyperlipidemia, mixed   Assessment & Plan    · Continue statin        Hiatal hernia with GERD without esophagitis   Assessment & Plan    · Stable        Coronary artery disease   Assessment & Plan    · See management for non ST segment elevation MI as outlined above        Constipation   Assessment & Plan    · Bowel meds              Discharging Physician / Practitioner: Wm Shea  PCP: Tisha January,   Admission Date:   Admission Orders     Ordered        07/29/18 1949  Inpatient Admission (expected length of stay for this patient is greater than two midnights)  Once             Discharge Date: 07/31/18    Resolved Problems  Date Reviewed: 7/31/2018    None          Consultations During Hospital Stay:  ·  Cardiology     Procedures Performed:     ·  none      Significant Findings / Test Results:     · NSTEMI with elevated trop 2 57,2 4, 2 56 cardiology does not recommend any surgical intervention due to her age and high risk  Continue with medical management  · Acute on chronic CHF- continue with furosemide according to weight      Incidental Findings:   ·  none      Test Results Pending at Discharge (will require follow up):   ·  none      Outpatient Tests Requested:  ·  none     Complications:  None     Reason for Admission:      Hospital Course: Mike Davis is a 80 y o  female patient who originally presented to the hospital on 7/29/2018 due to leg swelling and shortness of breath  Patient was found to have elevated BNP of 1400s and NSTEMI  She was recently admitted here for acute blood loss anemia and required 2 units of packed red blood cells transfusion and had a colonoscopy  She was found to have diverticular bleeding at that time  Currently aspirin was resumed but Plavix is on hold  Plavix is recommended to be resumed 1 week after colonoscopy  Cardiology recommended to continue with medical management due to her age and high risk and do not recommend further intervention at this time including cardiac catheterization  The patient was given diuretic with a good response and weight loss  She will be discharged with furosemide 40 mg to take as needed for weight greater than 165 lb  Please see above list of diagnoses and related plan for additional information  Condition at Discharge: good     Discharge Day Visit / Exam:     Subjective:  Feeling well  [place] to go home  Denies chest pain  Has some mild SOB on exertion but that is much improved     Vitals: Blood Pressure: 133/63 (07/31/18 1106)  Pulse: 66 (07/31/18 1106)  Temperature: 98 °F (36 7 °C) (07/31/18 1106)  Temp Source: Tympanic (07/31/18 1106)  Respirations: 20 (07/31/18 1106)  Height: 5' 2" (157 5 cm) (07/29/18 2027)  Weight - Scale: 72 4 kg (159 lb 9 6 oz) (07/31/18 0534)  SpO2: 93 % (07/31/18 1106)  Exam:   Physical Exam   Constitutional: She is oriented to person, place, and time  She appears well-developed and well-nourished  HENT:   Head: Normocephalic and atraumatic  Mouth/Throat: Oropharynx is clear and moist    Eyes: Conjunctivae and EOM are normal  Pupils are equal, round, and reactive to light  Neck: Normal range of motion  Neck supple  Cardiovascular: Normal rate, regular rhythm and normal heart sounds  Exam reveals no gallop and no friction rub  No murmur heard  Pulmonary/Chest: Effort normal  She has no wheezes  She has rales (fine rales on LLL)  Abdominal: Soft  Bowel sounds are normal  She exhibits no distension  There is no tenderness  There is no rebound  Musculoskeletal: Normal range of motion  She exhibits no edema, tenderness or deformity  Neurological: She is alert and oriented to person, place, and time  No cranial nerve deficit  Skin: Skin is warm and dry  No rash noted  No erythema  Psychiatric: She has a normal mood and affect  Her behavior is normal  Thought content normal    Vitals reviewed  Discussion with Family:  Patient and grand-daugther     Discharge instructions/Information to patient and family:   See after visit summary for information provided to patient and family  Provisions for Follow-Up Care:  See after visit summary for information related to follow-up care and any pertinent home health orders  Disposition:     Home    For Discharges to [facility] SN[facility]:   · Not Applicable to this Patient - Not Applicable to this Patient    Planned Readmission:  No      Discharge Statement:  I spent 35 minutes discharging the patient  This time was spent on the day of discharge  I had direct contact with the patient on the day of discharge   Greater than 50% of the total time was spent examining patient, answering all patient questions, arranging and discussing plan of care with patient as well as directly providing post-discharge instructions  Additional time then spent on discharge activities  Discharge Medications:  See after visit summary for reconciled discharge medications provided to patient and family        ** Please Note: This note has been constructed using a voice recognition system **

## 2018-07-31 NOTE — SOCIAL WORK
Chart reviewed by case management, d/c order written, I dis ask the pt if she would like [facility] again and her great grandson  was in  The room and she yelled at me and stated I'I told you NO", pt then apologized, pt's grand daughter was called by the doctor while I was in the room, plan is for the pt to return home with her family, pcp appointment was set up for the pt, pt denied any additional needs, family will transport the pt home

## 2018-07-31 NOTE — PROGRESS NOTES
I spoke with patient's grand-daughter and she does not feel comfortable with the change that was made on furosemide of 40 mg taking daily PRN for weight greater than 165 lbs or for increase swelling of legs  She states that the patient was seen by Dr Jennifer Hill a few weeks ago and was going really well with the regimen of 20 mg daily  She would like to keep patient on the current regimen and will follow up with cardiology outpatient   I discussed with her in details and advised that patient should weigh herself daily and if there is a weight gain more than 3-5 lbs she may take an extra dose and call cardiologist

## 2018-07-31 NOTE — OCCUPATIONAL THERAPY NOTE
Occupational Therapy Evaluation      Lilliam Bradford    7/31/2018    Patient Active Problem List   Diagnosis    Allergic rhinitis    Benign colon polyp    Cataract    Constipation    Coronary artery disease    Fibrocystic breast disease, unspecified laterality    Gait abnormality    Hiatal hernia with GERD without esophagitis    Glucose intolerance (no malabsorption)    Hyperlipidemia, mixed    Essential hypertension    Iron deficiency anemia    Malignant melanoma of skin ([facility] )    Non-STEMI (non-ST elevated myocardial infarction) ([place] [address] )    Urinary incontinence    Vitamin D deficiency    ST elevation myocardial infarction involving right coronary artery ([place] [address] )    COPD (chronic obstructive pulmonary disease) ([facility])    Anxiety    Compression fracture of thoracic vertebra, closed, initial encounter ([place] [address] )    Proteinuria    Coronary angioplasty status    Acute diastolic congestive heart failure ([place] [address] )       Past Medical History:   Diagnosis Date    Abnormal blood sugar 03/13/2017    Abnormal carotid duplex scan     Carotid Duplex (Plaque formation is quite prominent bilaterally  Despite these changes, no evidence for greater than 50% diameter reducing lesions in the cervical portion of either carotid artery hemisystem ) - 6/13/2017    Anxiety     Cervical radiculopathy 08/08/2017    CHF (congestive heart failure) ([facility])     COPD (chronic obstructive pulmonary disease) ([place] [address] ) 2/6/2018    Coronary artery disease 4/7/2017    Costochondritis 02/05/2013    suspect MS in origin given relationship to ROM and location  Start mobic and if persists, reeval  Refused xrayat this time   Dyslipidemia     GERD without esophagitis 8/30/2012    H/O CT scan of chest      (No PE, minimal interstitial change left lower lobe and right upper lobe, which may be acute ) - 5/19/2017    History of Holter monitoring     Holter (Sinus rhythm with rates ranging from 52 to 118 bpm   No afib or heart block    Rare atrial and ventricular ectopic beats  One six-beat atrial run noted  No pauses  ) - 5/31/2017    Hx of echocardiogram     Echo (EF 0 60 (60%), Biatrial enlargement ) - 3/24/2017 Echo (EF 0 55 (55%), mild LVH  Aortic valvular sclerosis  Trace MI with mild left atrial dilatation, mild MAC  Mild TI with mild pulmonary hypertension  ) - 5/22/2017 Echo (EF 0 60 (60%), Normal left vent chamber size and systolic function  Mild diastolic dysfunction of LV w/normal filling pressures  Mild mitral regurg ) - 7/26/2017 Echo (EF 0    Hypertension     Iron deficiency anemia 4/21/2016    Malignant melanoma of skin ([facility] ) 9/17/2012    NSTEMI (non-ST elevated myocardial infarction) ([facility] ) 7/25/2017    Osteoarthritis 9/17/2012    Osteoporosis 3/13/2017    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[facility] ) 04/07/2017       Past Surgical History:   Procedure Laterality Date    ABDOMINAL SURGERY      BREAST SURGERY      Lumpectomy    CARDIAC CATHETERIZATION  03/24/2017    ARNIE to 100% occluded prox RCA, chronic 99% ostial LCfx, 60% mid LAD, discrete 40% distal LM    CHOLECYSTECTOMY      COLONOSCOPY N/A 7/25/2018    Procedure: COLONOSCOPY;  Surgeon: Silvino Benson MD;  Location: [facility] MAIN OR;  Service: Gastroenterology    CORONARY STENT PLACEMENT  03/25/2017    ARNIE RCA    HEMORRHOID SURGERY      INSERTION STENT ARTERY  04/07/2017    Cardio vascular agents drug-eluting stent    SKIN BIOPSY      TONSILLECTOMY        07/30/18 1103   Note Type   Note type Eval/Treat   Restrictions/Precautions   Weight Bearing Precautions Per Order No   Pain Assessment   Pain Assessment No/denies pain   Pain Score No Pain   Home Living   Type of [address] Two level;Ramped entrance; Able to live on main level with bedroom/bathroom   Bathroom Shower/Tub Tub/shower unit   Bathroom Toilet Standard   Bathroom Equipment Grab bars in shower;Grab bars around toilet   [address] Walker;Reacher;Sock aid;Long-handled shoehorn  (long handle sponge)   Additional Comments used walker after last/recent hospitalization, alternates living with [facility], other home is [employer] [place]   Prior Function   Level of [place] Independent with ADLs and functional mobility   Lives With Family  (alternates [facility] homes)   Receives Help From Family   ADL Assistance Independent   IADLs Needs assistance   Falls in the last 6 months 0   Vocational Retired   Psychosocial   Psychosocial (WDL) WDL   Subjective   Subjective I'm not ready to leave (this earth) yet   ADL   Eating Assistance 7  Independent   Grooming Assistance 7  Independent   UB Bathing Assistance 4  [place] Deficit (LLANES initially/improved)   LB Bathing Assistance 4  Minimal Assistance   LB Bathing Deficit (initial LLANES hindered, improved)   UB Dressing Assistance 4  Minimal Assistance   LB Dressing Assistance 4  [id number] [facility] [facility]  4  [address] [facility] 4  Minimal Assistance   Bed Mobility   Rolling R 6  Modified independent   Additional items Bedrails   Rolling L 6  Modified independent   Additional items Bedrails   Supine to Sit 4  Minimal assistance   Additional items Assist x 1;Bedrails   Sit to Supine 5  Supervision   Additional items Assist x 1;Bedrails   Transfers   Sit to Stand 4  Minimal assistance   Additional items Assist x 1   Stand to Sit 4  Minimal assistance   Additional items Assist x 1   Toilet transfer 4  Minimal assistance   Additional items Assist x 1   Functional Mobility   Functional Mobility 4  Minimal assistance   Additional Comments (X1/walker)   Balance   Static Sitting Good   Dynamic Sitting Fair +   Static Standing Fair   Dynamic Standing Fair   Activity Tolerance   Activity Tolerance Patient limited by fatigue   RUE Assessment   RUE Assessment WFL   LUE Assessment   LUE Assessment WFL   Hand Function   Gross Motor Coordination Functional   Fine Motor Coordination Functional   Cognition   Overall Cognitive Status WFL   Arousal/Participation Alert; Cooperative   Attention Within functional limits   Orientation Level Oriented X4   Memory Within functional limits   Following Commands Follows one step commands without difficulty   Assessment   Limitation Decreased ADL status; Decreased endurance;Decreased self-care trans;Decreased high-level ADLs   Prognosis Good   Assessment Pt is a 80 y o  female seen for OT evaluation s/p admit to [address] [place] [address] on 7/29/2018 w/ NSTEMI (non-ST elevated myocardial infarction) ([facility] )  Comorbidities affecting pt's functional performance at time of assessment include: COPD, CHF and NSTEMI, CAD, Anxiety, Compression Fx Thoracic Vertebra,   Personal factors affecting pt at time of IE include:difficulty performing ADLS  Prior to admission, pt was I with self care ADL's, functional transfers/toileting and functional mobility  Upon evaluation: Pt requires (min) A with self care ADL's and functional transfers/mobility r/t the following deficits impacting occupational performance: decreased tolerance and decreased safety awareness  Pt to benefit from continued skilled OT tx while in the hospital to address deficits as defined above and maximize level of functional independence w ADL's and functional mobility  Occupational Performance areas to address include: bathing/shower, toilet hygiene, dressing, functional mobility and clothing management  From OT standpoint, recommendation at time of d/c would be home with [facility] if safe at d/c, possibly out pt cardiac rehab       Goals   Patient Goals go back home, hopefully tomorrow   Short Term Goal #1 Pt will demonstrate good understanding RE: adaptive techniques (including energy conservation), to increase ability to participate in self care/daily tasks   Short Term Goal #2 increase bed mobility to I to prepare for self care ADL's   Functional Transfer Goals   Pt Will Transfer To Toilet With mod indep   ADL Goals   Pt Will Perform Bathing With mod indep   Pt Will Perform UE Dressing With mod indep   Pt Will Perform LE Dressing With mod indep   Pt Will Perform Toileting With mod indep   Plan   Treatment Interventions ADL retraining;Functional transfer training; Endurance training;Patient/family training;Energy conservation   Goal Expiration Date 08/09/18   Treatment Day 0   OT Frequency 3-5x/wk   Recommendation   OT Discharge Recommendation Home with family support  (out patient Cardiac Rehab)   Barthel Index   Feeding 10   Bathing 0  (LLANES hinders)   Grooming Score 5   Dressing Score 5   Bladder Score 5   Bowels Score 10   Toilet Use Score 5   Transfers (Bed/Chair) Score 10   Mobility (Level Surface) Score 10   Stairs Score 0   Barthel Index Score 60   Leander Tucker, OT

## 2018-08-01 DIAGNOSIS — E78.5 HYPERLIPIDEMIA, UNSPECIFIED HYPERLIPIDEMIA TYPE: Primary | ICD-10-CM

## 2018-08-01 RX ORDER — ATORVASTATIN CALCIUM 40 MG/1
40 TABLET, FILM COATED ORAL DAILY
Qty: 90 TABLET | Refills: 3 | Status: SHIPPED | OUTPATIENT
Start: 2018-08-01 | End: 2019-04-12 | Stop reason: SDUPTHER

## 2018-08-03 ENCOUNTER — OFFICE VISIT (OUTPATIENT)
Dept: INTERNAL MEDICINE CLINIC | Facility: CLINIC | Age: 83
End: 2018-08-03
Payer: MEDICARE

## 2018-08-03 VITALS
OXYGEN SATURATION: 93 % | HEART RATE: 82 BPM | BODY MASS INDEX: 30.18 KG/M2 | HEIGHT: 62 IN | SYSTOLIC BLOOD PRESSURE: 146 MMHG | DIASTOLIC BLOOD PRESSURE: 86 MMHG | TEMPERATURE: 98.6 F | RESPIRATION RATE: 18 BRPM | WEIGHT: 164 LBS

## 2018-08-03 DIAGNOSIS — Z92.89 HISTORY OF RECENT BLOOD TRANSFUSION: ICD-10-CM

## 2018-08-03 DIAGNOSIS — I10 ESSENTIAL HYPERTENSION: ICD-10-CM

## 2018-08-03 DIAGNOSIS — K57.31 DIVERTICULOSIS OF COLON WITH HEMORRHAGE: Primary | ICD-10-CM

## 2018-08-03 DIAGNOSIS — I50.31 DIASTOLIC CHF, ACUTE (HCC): ICD-10-CM

## 2018-08-03 DIAGNOSIS — I21.4 NSTEMI (NON-ST ELEVATED MYOCARDIAL INFARCTION) (HCC): ICD-10-CM

## 2018-08-03 DIAGNOSIS — D62 ACUTE BLOOD LOSS ANEMIA: ICD-10-CM

## 2018-08-03 PROCEDURE — 99496 TRANSJ CARE MGMT HIGH F2F 7D: CPT | Performed by: INTERNAL MEDICINE

## 2018-08-03 NOTE — PROGRESS NOTES
Assessment/Plan:     No problem-specific Assessment & Plan notes found for this encounter  Diagnoses and all orders for this visit:    Diverticulosis of colon with hemorrhage  -     Comprehensive metabolic panel; Future  -     CBC and differential; Future    Acute blood loss anemia  -     Comprehensive metabolic panel; Future  -     CBC and differential; Future    History of recent blood transfusion  -     Comprehensive metabolic panel; Future  -     CBC and differential; Future    NSTEMI (non-ST elevated myocardial infarction) ([facility] )  -     Comprehensive metabolic panel; Future  -     CBC and differential; Future    Diastolic CHF, acute (HCC)  -     Comprehensive metabolic panel; Future  -     CBC and differential; Future    Essential hypertension  -     Comprehensive metabolic panel; Future  -     CBC and differential; Future     A/P: Doing better and no s/s of CHF currently and no rectal bleeding  Back on her lasix and taking it daily rather than PRN  Still off the plavix and cards feels the risk doesn't outweigh the benefits  Tolerating diet, meds, and activity  Will check labs  Continue current treatment and keep scheduled f/u appt  Discussed a low residue diet  Subjective:     Patient ID: Arnel Boland is a 80 y o  female  WF presents with her daughter for two admissions  Pt with acute onset of rectal bleeding with associated SOB and fatigue  Admitted and found to have a diverticular bleed after a colonoscopy was performed  Pt transfused two units pRBC and bleeding spontaneously resolved  D/c'd to home, but several days later, noted increase SOB, chest tightness, and worsening edema  Pt's plavix and ASA were on hold  Admitted and found to have a NESTMI and dCHF  Treated conservatively with diuretics and medical management  Due to her age, no aggressive therapy was warranted  Since d/c, doing well with active level improving, eating better, and no CP  Still some LLANES, but back to prior baseline   No BRBPR, melena, or hematochezia  Diet advancing  Review of Systems   Constitutional: Negative for activity change, chills, diaphoresis, fatigue and fever  HENT: Negative  Eyes: Negative for visual disturbance  Respiratory: Positive for shortness of breath  Negative for cough, chest tightness and wheezing  Cardiovascular: Negative for chest pain, palpitations and leg swelling  Gastrointestinal: Negative for abdominal pain, constipation, diarrhea, nausea and vomiting  Endocrine: Negative for cold intolerance and heat intolerance  Genitourinary: Negative for difficulty urinating, dysuria and frequency  Musculoskeletal: Negative for arthralgias, gait problem and myalgias  Neurological: Positive for light-headedness  Negative for dizziness, seizures, weakness and headaches  Psychiatric/Behavioral: Negative for confusion and dysphoric mood  The patient is not nervous/anxious  Objective:     Physical Exam   Constitutional: She is oriented to person, place, and time  She appears well-developed and well-nourished  No distress  HENT:   Head: Normocephalic and atraumatic  Mouth/Throat: Oropharynx is clear and moist    Eyes: Conjunctivae and EOM are normal  Pupils are equal, round, and reactive to light  Neck: Neck supple  No JVD present  Cardiovascular: Normal rate and regular rhythm  No murmur heard  No HJR/JVD  Pulmonary/Chest: Effort normal and breath sounds normal  No respiratory distress  She has no wheezes  Abdominal: Soft  Bowel sounds are normal  She exhibits no distension  There is no tenderness  Musculoskeletal: She exhibits edema (trace edema legs  )  Neurological: She is alert and oriented to person, place, and time  Psychiatric: She has a normal mood and affect  Her behavior is normal  Judgment and thought content normal    Nursing note and vitals reviewed          Vitals:    08/03/18 1536   BP: 146/86   BP Location: Left arm   Patient Position: Sitting Cuff Size: Adult   Pulse: 82   Resp: 18   Temp: 98 6 °F (37 °C)   TempSrc: Tympanic   SpO2: 93%   Weight: 74 4 kg (164 lb)   Height: 5' 2" (1 575 m)       Transitional Care Management Review: Arnel Boland is a 80 y o  female here for TCM follow up       During the TCM phone call patient stated:    Date and time hospital follow up call was made:  7/27/2018  2:42 PM  Hospital care reviewed:  Records not available  Patient was hopsitalized at:  [address] [place] [address]  Date of admission:  7/23/18  Date of discharge:  7/27/18  Diagnosis:  rectal bleeding  Disposition:  Home  Were the patients medicaitons reviewed and updated:  No  Post hospital issues:  None  Should patient be enrolled in anticoag monitoring?:  No  Scheduled for follow up?:  Yes  Comments:  alonzo 07/27/2018             Tatyana Cox, DO

## 2018-08-07 ENCOUNTER — APPOINTMENT (OUTPATIENT)
Dept: LAB | Facility: CLINIC | Age: 83
End: 2018-08-07
Payer: MEDICARE

## 2018-08-07 ENCOUNTER — TRANSCRIBE ORDERS (OUTPATIENT)
Dept: LAB | Facility: CLINIC | Age: 83
End: 2018-08-07

## 2018-08-07 DIAGNOSIS — Z92.89 HISTORY OF RECENT BLOOD TRANSFUSION: ICD-10-CM

## 2018-08-07 DIAGNOSIS — I21.4 NSTEMI (NON-ST ELEVATED MYOCARDIAL INFARCTION) (HCC): ICD-10-CM

## 2018-08-07 DIAGNOSIS — D62 ACUTE BLOOD LOSS ANEMIA: ICD-10-CM

## 2018-08-07 DIAGNOSIS — I50.31 DIASTOLIC CHF, ACUTE (HCC): ICD-10-CM

## 2018-08-07 DIAGNOSIS — K57.31 DIVERTICULOSIS OF COLON WITH HEMORRHAGE: ICD-10-CM

## 2018-08-07 DIAGNOSIS — I10 ESSENTIAL HYPERTENSION: ICD-10-CM

## 2018-08-07 LAB
ALBUMIN SERPL BCP-MCNC: 3.3 G/DL (ref 3.5–5)
ALP SERPL-CCNC: 56 U/L (ref 46–116)
ALT SERPL W P-5'-P-CCNC: 20 U/L (ref 12–78)
ANION GAP SERPL CALCULATED.3IONS-SCNC: 6 MMOL/L (ref 4–13)
AST SERPL W P-5'-P-CCNC: 19 U/L (ref 5–45)
BASOPHILS # BLD AUTO: 0.05 THOUSANDS/ΜL (ref 0–0.1)
BASOPHILS NFR BLD AUTO: 1 % (ref 0–1)
BILIRUB SERPL-MCNC: 0.73 MG/DL (ref 0.2–1)
BUN SERPL-MCNC: 11 MG/DL (ref 5–25)
CALCIUM SERPL-MCNC: 8.5 MG/DL (ref 8.3–10.1)
CHLORIDE SERPL-SCNC: 106 MMOL/L (ref 100–108)
CO2 SERPL-SCNC: 28 MMOL/L (ref 21–32)
CREAT SERPL-MCNC: 0.9 MG/DL (ref 0.6–1.3)
EOSINOPHIL # BLD AUTO: 0.17 THOUSAND/ΜL (ref 0–0.61)
EOSINOPHIL NFR BLD AUTO: 3 % (ref 0–6)
ERYTHROCYTE [DISTWIDTH] IN BLOOD BY AUTOMATED COUNT: 14.2 % (ref 11.6–15.1)
GFR SERPL CREATININE-BSD FRML MDRD: 55 ML/MIN/1.73SQ M
GLUCOSE P FAST SERPL-MCNC: 96 MG/DL (ref 65–99)
HCT VFR BLD AUTO: 40.3 % (ref 34.8–46.1)
HGB BLD-MCNC: 12 G/DL (ref 11.5–15.4)
IMM GRANULOCYTES # BLD AUTO: 0.02 THOUSAND/UL (ref 0–0.2)
IMM GRANULOCYTES NFR BLD AUTO: 0 % (ref 0–2)
LYMPHOCYTES # BLD AUTO: 2.4 THOUSANDS/ΜL (ref 0.6–4.47)
LYMPHOCYTES NFR BLD AUTO: 38 % (ref 14–44)
MCH RBC QN AUTO: 28.1 PG (ref 26.8–34.3)
MCHC RBC AUTO-ENTMCNC: 29.8 G/DL (ref 31.4–37.4)
MCV RBC AUTO: 94 FL (ref 82–98)
MONOCYTES # BLD AUTO: 0.52 THOUSAND/ΜL (ref 0.17–1.22)
MONOCYTES NFR BLD AUTO: 8 % (ref 4–12)
NEUTROPHILS # BLD AUTO: 3.13 THOUSANDS/ΜL (ref 1.85–7.62)
NEUTS SEG NFR BLD AUTO: 50 % (ref 43–75)
NRBC BLD AUTO-RTO: 0 /100 WBCS
PLATELET # BLD AUTO: 450 THOUSANDS/UL (ref 149–390)
PMV BLD AUTO: 10.3 FL (ref 8.9–12.7)
POTASSIUM SERPL-SCNC: 4 MMOL/L (ref 3.5–5.3)
PROT SERPL-MCNC: 7 G/DL (ref 6.4–8.2)
RBC # BLD AUTO: 4.27 MILLION/UL (ref 3.81–5.12)
SODIUM SERPL-SCNC: 140 MMOL/L (ref 136–145)
WBC # BLD AUTO: 6.29 THOUSAND/UL (ref 4.31–10.16)

## 2018-08-07 PROCEDURE — 85025 COMPLETE CBC W/AUTO DIFF WBC: CPT

## 2018-08-07 PROCEDURE — 80053 COMPREHEN METABOLIC PANEL: CPT

## 2018-08-07 PROCEDURE — 36415 COLL VENOUS BLD VENIPUNCTURE: CPT

## 2018-08-09 PROBLEM — I25.2 OLD MI (MYOCARDIAL INFARCTION): Status: ACTIVE | Noted: 2018-08-09

## 2018-08-09 PROBLEM — E78.5 DYSLIPIDEMIA: Status: ACTIVE | Noted: 2018-08-09

## 2018-08-09 RX ORDER — CLOPIDOGREL BISULFATE 75 MG/1
75 TABLET ORAL DAILY
COMMUNITY
End: 2018-09-12

## 2018-08-22 ENCOUNTER — OFFICE VISIT (OUTPATIENT)
Dept: CARDIOLOGY CLINIC | Facility: CLINIC | Age: 83
End: 2018-08-22
Payer: MEDICARE

## 2018-08-22 VITALS
SYSTOLIC BLOOD PRESSURE: 150 MMHG | DIASTOLIC BLOOD PRESSURE: 70 MMHG | HEIGHT: 62 IN | BODY MASS INDEX: 31.1 KG/M2 | HEART RATE: 76 BPM | WEIGHT: 169 LBS

## 2018-08-22 DIAGNOSIS — I10 ESSENTIAL HYPERTENSION: ICD-10-CM

## 2018-08-22 DIAGNOSIS — I25.10 CORONARY ARTERY DISEASE INVOLVING NATIVE CORONARY ARTERY OF NATIVE HEART WITHOUT ANGINA PECTORIS: ICD-10-CM

## 2018-08-22 DIAGNOSIS — I50.31 ACUTE DIASTOLIC CONGESTIVE HEART FAILURE (HCC): Primary | ICD-10-CM

## 2018-08-22 DIAGNOSIS — I21.4 NON-STEMI (NON-ST ELEVATED MYOCARDIAL INFARCTION) (HCC): ICD-10-CM

## 2018-08-22 PROCEDURE — 99214 OFFICE O/P EST MOD 30 MIN: CPT | Performed by: INTERNAL MEDICINE

## 2018-08-22 NOTE — PROGRESS NOTES
Subjective:        Patient ID: Lilliam Bradford is a 80 y o  female  Chief Complaint:  Jazlyn Quigley is here after recent hospitalization, she had significant anemia, transfusion, diverticulitis, records reviewed, a type 2 troponin spill, no angina during or after her hospitalization  She is back on 20 mg of Lasix daily, proBNP was over 1400 chest x-ray showed mild CH F  Today her lungs are clear, she has no fluid retention, weight is stable at home  Recent GFR 55, creatinine normal  BUN was also normal       The following portions of the patient's history were reviewed and updated as appropriate: allergies, current medications, past family history, past medical history, past social history, past surgical history and problem list   Review of Systems   Constitution: Negative for chills, diaphoresis, malaise/fatigue and weight gain  HENT: Negative for nosebleeds and stridor  Eyes: Negative for double vision, vision loss in left eye, vision loss in right eye and visual disturbance  Cardiovascular: Negative for chest pain, claudication, cyanosis, dyspnea on exertion, irregular heartbeat, leg swelling, near-syncope, orthopnea, palpitations, paroxysmal nocturnal dyspnea and syncope  Respiratory: Negative for cough, shortness of breath, snoring and wheezing  Endocrine: Negative for polydipsia, polyphagia and polyuria  Hematologic/Lymphatic: Negative for bleeding problem  Does not bruise/bleed easily  Skin: Negative for flushing and rash  Musculoskeletal: Negative for falls and myalgias  Gastrointestinal: Negative for abdominal pain, heartburn, hematemesis, hematochezia, melena and nausea  Genitourinary: Negative for hematuria  Neurological: Negative for brief paralysis, dizziness, focal weakness, headaches, light-headedness, loss of balance and vertigo  Psychiatric/Behavioral: Negative for altered mental status and substance abuse  Allergic/Immunologic: Negative for hives            Objective: Physical Exam   Constitutional: She is oriented to person, place, and time  She appears well-developed and well-nourished  HENT:   Head: Normocephalic and atraumatic  Eyes: EOM are normal  Pupils are equal, round, and reactive to light  Neck: Normal range of motion  Neck supple  No JVD present  No thyromegaly present  Cardiovascular: Normal rate, regular rhythm, normal heart sounds and intact distal pulses  Exam reveals no gallop and no friction rub  No murmur heard  Pulmonary/Chest: Effort normal and breath sounds normal  No stridor  No respiratory distress  She has no wheezes  She has no rales  Abdominal: Soft  Bowel sounds are normal  She exhibits no mass  There is no tenderness  Musculoskeletal: Normal range of motion  She exhibits no edema  Lymphadenopathy:     She has no cervical adenopathy  Neurological: She is alert and oriented to person, place, and time  Skin: Skin is warm and dry  No rash noted  No pallor  Psychiatric: She has a normal mood and affect  Her behavior is normal  Judgment and thought content normal    Nursing note and vitals reviewed        Lab Review:   Appointment on 08/07/2018   Component Date Value    Sodium 08/07/2018 140     Potassium 08/07/2018 4 0     Chloride 08/07/2018 106     CO2 08/07/2018 28     Anion Gap 08/07/2018 6     BUN 08/07/2018 11     Creatinine 08/07/2018 0 90     Glucose, Fasting 08/07/2018 96     Calcium 08/07/2018 8 5     AST 08/07/2018 19     ALT 08/07/2018 20     Alkaline Phosphatase 08/07/2018 56     Total Protein 08/07/2018 7 0     Albumin 08/07/2018 3 3*    Total Bilirubin 08/07/2018 0 73     eGFR 08/07/2018 55     WBC 08/07/2018 6 29     RBC 08/07/2018 4 27     Hemoglobin 08/07/2018 12 0     Hematocrit 08/07/2018 40 3     MCV 08/07/2018 94     MCH 08/07/2018 28 1     MCHC 08/07/2018 29 8*    RDW 08/07/2018 14 2     MPV 08/07/2018 10 3     Platelets 00/39/9252 450*    nRBC 08/07/2018 0     Neutrophils Relative 08/07/2018 50     Immat GRANS % 08/07/2018 0     Lymphocytes Relative 08/07/2018 38     Monocytes Relative 08/07/2018 8     Eosinophils Relative 08/07/2018 3     Basophils Relative 08/07/2018 1     Neutrophils Absolute 08/07/2018 3 13     Immature Grans Absolute 08/07/2018 0 02     Lymphocytes Absolute 08/07/2018 2 40     Monocytes Absolute 08/07/2018 0 52     Eosinophils Absolute 08/07/2018 0 17     Basophils Absolute 08/07/2018 0 05    Admission on 07/29/2018, Discharged on 07/31/2018   Component Date Value    Sodium 07/29/2018 139     Potassium 07/29/2018 3 7     Chloride 07/29/2018 99     CO2 07/29/2018 33*    Anion Gap 07/29/2018 7     BUN 07/29/2018 10     Creatinine 07/29/2018 0 81     Glucose 07/29/2018 165*    Calcium 07/29/2018 9 3     AST 07/29/2018 27     ALT 07/29/2018 17     Alkaline Phosphatase 07/29/2018 43*    Total Protein 07/29/2018 6 4     Albumin 07/29/2018 3 6     Total Bilirubin 07/29/2018 0 60     eGFR 07/29/2018 63     WBC 07/29/2018 6 80     RBC 07/29/2018 3 93     Hemoglobin 07/29/2018 11 7*    Hematocrit 07/29/2018 34 6*    MCV 07/29/2018 88     MCH 07/29/2018 29 9     MCHC 07/29/2018 34 0     RDW 07/29/2018 14 5     MPV 07/29/2018 8 1*    Platelets 21/65/7240 295     nRBC 07/29/2018 0     Neutrophils Relative 07/29/2018 50     Lymphocytes Relative 07/29/2018 35     Monocytes Relative 07/29/2018 12     Eosinophils Relative 07/29/2018 3     Basophils Relative 07/29/2018 1     Neutrophils Absolute 07/29/2018 3 40     Lymphocytes Absolute 07/29/2018 2 40     Monocytes Absolute 07/29/2018 0 80     Eosinophils Absolute 07/29/2018 0 20     Basophils Absolute 07/29/2018 0 00     Protime 07/29/2018 12 2     INR 07/29/2018 1 05     PTT 07/29/2018 26     Troponin I 07/29/2018 2 57*    BNP 07/29/2018 1412*    Color, UA 07/29/2018 Yellow     Clarity, UA 07/29/2018 Clear     Specific Gravity, UA 07/29/2018 1 010     pH, UA 07/29/2018 6 5     Leukocytes, UA 07/29/2018 Negative     Nitrite, UA 07/29/2018 Negative     Protein, UA 07/29/2018 Negative     Glucose, UA 07/29/2018 Negative     Ketones, UA 07/29/2018 Negative     Urobilinogen, UA 07/29/2018 0 2     Bilirubin, UA 07/29/2018 Negative     Blood, UA 07/29/2018 Negative     Troponin I 07/29/2018 2 40*    Troponin I 07/30/2018 2 56*    Sodium 07/30/2018 142     Potassium 07/30/2018 3 4*    Chloride 07/30/2018 100     CO2 07/30/2018 37*    Anion Gap 07/30/2018 5     BUN 07/30/2018 8     Creatinine 07/30/2018 0 69     Glucose 07/30/2018 114*    Calcium 07/30/2018 9 0     AST 07/30/2018 23     ALT 07/30/2018 15     Alkaline Phosphatase 07/30/2018 38*    Total Protein 07/30/2018 5 8*    Albumin 07/30/2018 3 2*    Total Bilirubin 07/30/2018 0 70     eGFR 07/30/2018 75     Magnesium 07/30/2018 2 0     WBC 07/30/2018 5 80     RBC 07/30/2018 3 72*    Hemoglobin 07/30/2018 11 0*    Hematocrit 07/30/2018 32 4*    MCV 07/30/2018 87     MCH 07/30/2018 29 5     MCHC 07/30/2018 33 9     RDW 07/30/2018 14 7*    MPV 07/30/2018 8 5*    Platelets 76/87/1285 289     nRBC 07/30/2018 0     Neutrophils Relative 07/30/2018 60     Lymphocytes Relative 07/30/2018 25     Monocytes Relative 07/30/2018 12     Eosinophils Relative 07/30/2018 2     Basophils Relative 07/30/2018 1     Neutrophils Absolute 07/30/2018 3 50     Lymphocytes Absolute 07/30/2018 1 40     Monocytes Absolute 07/30/2018 0 70     Eosinophils Absolute 07/30/2018 0 10     Basophils Absolute 07/30/2018 0 00     Protime 07/30/2018 13 0*    INR 07/30/2018 1 12     Ventricular Rate 07/29/2018 76     Atrial Rate 07/29/2018 76     OK Interval 07/29/2018 166     QRSD Interval 07/29/2018 100     QT Interval 07/29/2018 420     QTC Interval 07/29/2018 472     QRS Axis 07/29/2018 14     T Wave Axis 07/29/2018 -31     Ventricular Rate 07/29/2018 92     Atrial Rate 07/29/2018 92     OK Interval 07/29/2018 168     QRSD Interval 07/29/2018 98     QT Interval 07/29/2018 322     QTC Interval 07/29/2018 398     P Axis 07/29/2018 101     QRS Axis 07/29/2018 20     T Wave Axis 07/29/2018 -33     Sodium 07/31/2018 143     Potassium 07/31/2018 3 2*    Chloride 07/31/2018 97*    CO2 07/31/2018 39*    Anion Gap 07/31/2018 7     BUN 07/31/2018 13     Creatinine 07/31/2018 0 88     Glucose 07/31/2018 106*    Calcium 07/31/2018 8 8     eGFR 07/31/2018 57     WBC 07/31/2018 5 80     RBC 07/31/2018 3 98     Hemoglobin 07/31/2018 11 6*    Hematocrit 07/31/2018 34 8     MCV 07/31/2018 88     MCH 07/31/2018 29 2     MCHC 07/31/2018 33 4     RDW 07/31/2018 14 3     MPV 07/31/2018 8 4*    Platelets 25/98/2288 303     nRBC 07/31/2018 0     Neutrophils Relative 07/31/2018 59     Lymphocytes Relative 07/31/2018 24     Monocytes Relative 07/31/2018 13*    Eosinophils Relative 07/31/2018 4     Basophils Relative 07/31/2018 1     Neutrophils Absolute 07/31/2018 3 40     Lymphocytes Absolute 07/31/2018 1 40     Monocytes Absolute 07/31/2018 0 70     Eosinophils Absolute 07/31/2018 0 20     Basophils Absolute 07/31/2018 0 00    Admission on 07/23/2018, Discharged on 07/27/2018   No results displayed because visit has over 200 results  Assessment:       1  Acute diastolic congestive heart failure ([facility] )     2  Coronary artery disease involving native coronary artery of native heart without angina pectoris     3  Essential hypertension     4  Non-STEMI (non-ST elevated myocardial infarction) ([facility] )          Plan:       I feel her CHF is well compensated, meds ideal, advised she continue with 20 mg of Lasix daily, her current over the counter potassium which preceded her recent admission and her K was normal, as well as remainder of her other medications  She knows if she gains 3 lb or more in a 24    To double her Lasix for a few days but if significant dyspnea occur she will go to the hospital     There is no angina, I do not feel strongly about repeating ischemic workup, nor does she  She might reconsider this if angina develops as would I   Her blood pressure is reasonably well controlled so I made no changes here  She has little workup about being me late to see her today in the office, unfortunately she was not on my schedule initially so I was not aware she is being seen today  I Apologized  I will see her back in 2-3 months  Sooner as needed

## 2018-08-22 NOTE — PATIENT INSTRUCTIONS
Heart Failure   AMBULATORY CARE:   Heart failure (HF) is a condition that does not allow your heart to fill or pump properly  Not enough oxygen in your blood gets to your organs and tissues  HF can occur in the right side, the left side, or both lower chambers of your heart  HF is often caused by damage or injury to your heart  The damage may be caused by heart attack, other heart conditions, or high blood pressure  HF is a long-term condition that tends to get worse over time  It is important to manage your health to improve your quality of life  HF can be worsened by heavy alcohol use, smoking, diabetes that is not controlled, or obesity  Common signs and symptoms:   · Difficulty breathing with activity that worsens to difficulty breathing at rest    · Shortness of breath while lying flat    · Severe shortness of breath and coughing at night that usually wakes you     · Chest pain at night    · Periods of no breathing, then breathing fast    · Fatigue or lack of energy (often worsened by physical activity)     · Swelling in your ankles, legs, or abdomen    · Fast heartbeat, purple color around your mouth and nailbeds    · Fingers and toes cool to the touch  Call 911 if:   · You have any of the following signs of a heart attack:      ¨ Squeezing, pressure, or pain in your chest that lasts longer than 5 minutes or returns    ¨ Discomfort or pain in your back, neck, jaw, stomach, or arm     ¨ Trouble breathing    ¨ Nausea or vomiting    ¨ Lightheadedness or a sudden cold sweat, especially with chest pain or trouble breathing    Seek care immediately if:   · You gain 3 or more pounds (1 4 kg) in a day, or more than your healthcare provider says you should  · Your heartbeat is fast, slow, or uneven all the time    Contact your healthcare provider if:   · You have symptoms of worsening HF:      ¨ Shortness of breath at rest, at night, or that is getting worse in any way     ¨ Weight gain of 5 or more pounds (2 2 kg) in a week     ¨ More swelling in your legs or ankles     ¨ Abdominal pain or swelling     ¨ More coughing     ¨ Loss of appetite     ¨ Feeling tired all the time    · You feel hopeless or depressed, or you have lost interest in things you used to enjoy  · You often feel worried or afraid  · You have questions or concerns about your condition or care  Treatment for HF  may include any of the following:  · Medicines  may be needed to help regulate your heart rhythm  You may also need medicines to lower your blood pressure, and to get rid of extra fluids  · Oxygen  may help you breathe easier if your oxygen level is lower than normal  A CPAP machine may be used to keep your airway open while you sleep  · Surgery  can be done to implant a pacemaker in your chest to regulate your heart rhythm  Other types of surgery can open blocked heart vessels, replace a damaged heart valve, or remove scar tissue  Manage or prevent HF:   · Do not smoke  Nicotine and other chemicals in cigarettes and cigars can cause lung damage and make HF difficult to manage  Ask your healthcare provider for information if you currently smoke and need help to quit  E-cigarettes or smokeless tobacco still contain nicotine  Talk to your healthcare provider before you use these products  · Do not drink alcohol or take illegal drugs  Alcohol and drugs can worsen your symptoms quickly  · Weigh yourself every morning  Use the same scale, in the same spot  Do this after you use the bathroom, but before you eat or drink anything  Wear the same type of clothing  Do not wear shoes  Record your weight each day so you will notice any sudden weight gain  Swelling and weight gain are signs of fluid retention  If you are overweight, ask how to lose weight safely  · Check your blood pressure and heart rate every day  Ask for more information about how to measure your blood pressure and heart rate correctly   Ask what these numbers should be for you  · Manage any chronic health conditions you have  These include high blood pressure, diabetes, obesity, high cholesterol, metabolic syndrome, and COPD  You will have fewer symptoms if you manage these health conditions  Follow your healthcare provider's recommendations and follow up with him or her regularly  · Eat heart-healthy foods and limit sodium (salt)  An easy way to do this is to eat more fresh fruits and vegetables and fewer canned and processed foods  Replace butter and margarine with heart-healthy oils such as olive oil and canola oil  Other heart-healthy foods include walnuts, whole-grain breads, low-fat dairy products, beans, and lean meats  Fatty fish such as salmon and tuna are also heart healthy  Ask how much salt you can eat each day  Do not use salt substitutes  · Drink liquids as directed  You may need to limit the amount of liquids you drink if you retain fluid  Ask how much liquid to drink each day and which liquids are best for you  · Stay active  If you are not active, your symptoms are likely to worsen quickly  Walking, bicycling, and other types of physical activity help maintain your strength and improve your mood  Physical activity also helps you manage your weight  Work with your healthcare provider to create an exercise plan that is right for you  · Get vaccines as directed  Get a flu shot every year  You may also need the pneumonia vaccine  The flu and pneumonia can be severe for a person who has HF  Vaccines protect you from these infections  Join a support group:  Living with HF can be difficult  It may be helpful to talk with others who have HF  You may learn how to better manage your condition or get emotional support  For more information:   · [address]  [place] , [place]   Phone: [phone]  Web Address: https://[employer]/  org   Follow up with your healthcare provider or cardiologist within 2 weeks or as directed: You may need to return for other tests  You may need home health care  A healthcare provider will monitor your vital signs, weight, and make sure your medicines are working  Write down your questions so you remember to ask them during your visits  © 2017 [employer] Information is for End User's use only and may not be sold, redistributed or otherwise used for commercial purposes  All illustrations and images included in CareNotes® are the copyrighted property of [employer] , [employer]  or [employer]  The above information is an  only  It is not intended as medical advice for individual conditions or treatments  Talk to your doctor, nurse or pharmacist before following any medical regimen to see if it is safe and effective for you

## 2018-08-31 DIAGNOSIS — I50.9 ACUTE CONGESTIVE HEART FAILURE, UNSPECIFIED HEART FAILURE TYPE (HCC): ICD-10-CM

## 2018-08-31 RX ORDER — FUROSEMIDE 20 MG/1
20 TABLET ORAL DAILY
Qty: 90 TABLET | Refills: 1 | Status: SHIPPED | OUTPATIENT
Start: 2018-08-31 | End: 2019-03-11 | Stop reason: SDUPTHER

## 2018-09-06 DIAGNOSIS — I50.9 ACUTE CONGESTIVE HEART FAILURE, UNSPECIFIED HEART FAILURE TYPE (HCC): ICD-10-CM

## 2018-09-06 RX ORDER — FUROSEMIDE 20 MG/1
TABLET ORAL
Qty: 90 TABLET | Refills: 0 | Status: SHIPPED | OUTPATIENT
Start: 2018-09-06 | End: 2018-09-12

## 2018-09-11 ENCOUNTER — TELEPHONE (OUTPATIENT)
Dept: INTERNAL MEDICINE CLINIC | Facility: CLINIC | Age: 83
End: 2018-09-11

## 2018-09-11 DIAGNOSIS — F41.1 GENERALIZED ANXIETY DISORDER: ICD-10-CM

## 2018-09-11 RX ORDER — LORAZEPAM 0.5 MG/1
0.5 TABLET ORAL EVERY 8 HOURS PRN
Qty: 90 TABLET | Refills: 0 | Status: SHIPPED | OUTPATIENT
Start: 2018-09-11 | End: 2018-11-12 | Stop reason: SDUPTHER

## 2018-09-12 ENCOUNTER — OFFICE VISIT (OUTPATIENT)
Dept: INTERNAL MEDICINE CLINIC | Facility: CLINIC | Age: 83
End: 2018-09-12
Payer: MEDICARE

## 2018-09-12 VITALS
HEART RATE: 79 BPM | BODY MASS INDEX: 30.47 KG/M2 | RESPIRATION RATE: 18 BRPM | DIASTOLIC BLOOD PRESSURE: 88 MMHG | SYSTOLIC BLOOD PRESSURE: 140 MMHG | HEIGHT: 62 IN | WEIGHT: 165.6 LBS | OXYGEN SATURATION: 91 % | TEMPERATURE: 97.8 F

## 2018-09-12 DIAGNOSIS — K44.9 HIATAL HERNIA WITH GERD WITHOUT ESOPHAGITIS: ICD-10-CM

## 2018-09-12 DIAGNOSIS — K21.9 HIATAL HERNIA WITH GERD WITHOUT ESOPHAGITIS: ICD-10-CM

## 2018-09-12 DIAGNOSIS — E78.2 HYPERLIPIDEMIA, MIXED: ICD-10-CM

## 2018-09-12 DIAGNOSIS — J44.9 CHRONIC OBSTRUCTIVE PULMONARY DISEASE, UNSPECIFIED COPD TYPE (HCC): ICD-10-CM

## 2018-09-12 DIAGNOSIS — K59.00 CONSTIPATION, UNSPECIFIED CONSTIPATION TYPE: ICD-10-CM

## 2018-09-12 DIAGNOSIS — I25.10 CORONARY ARTERY DISEASE INVOLVING NATIVE CORONARY ARTERY OF NATIVE HEART WITHOUT ANGINA PECTORIS: ICD-10-CM

## 2018-09-12 DIAGNOSIS — D50.8 IRON DEFICIENCY ANEMIA SECONDARY TO INADEQUATE DIETARY IRON INTAKE: ICD-10-CM

## 2018-09-12 DIAGNOSIS — Z23 ENCOUNTER FOR VACCINATION: ICD-10-CM

## 2018-09-12 DIAGNOSIS — M75.51 BURSITIS OF RIGHT SHOULDER: ICD-10-CM

## 2018-09-12 DIAGNOSIS — F41.9 ANXIETY: ICD-10-CM

## 2018-09-12 DIAGNOSIS — I10 ESSENTIAL HYPERTENSION: Primary | ICD-10-CM

## 2018-09-12 DIAGNOSIS — D75.839 THROMBOCYTOSIS: ICD-10-CM

## 2018-09-12 PROBLEM — I50.31 ACUTE DIASTOLIC CONGESTIVE HEART FAILURE (HCC): Status: RESOLVED | Noted: 2018-07-29 | Resolved: 2018-09-12

## 2018-09-12 PROCEDURE — G0008 ADMIN INFLUENZA VIRUS VAC: HCPCS | Performed by: INTERNAL MEDICINE

## 2018-09-12 PROCEDURE — 20610 DRAIN/INJ JOINT/BURSA W/O US: CPT | Performed by: INTERNAL MEDICINE

## 2018-09-12 PROCEDURE — 99214 OFFICE O/P EST MOD 30 MIN: CPT | Performed by: INTERNAL MEDICINE

## 2018-09-12 PROCEDURE — 90662 IIV NO PRSV INCREASED AG IM: CPT | Performed by: INTERNAL MEDICINE

## 2018-09-12 NOTE — PROGRESS NOTES
Assessment/Plan:    No problem-specific Assessment & Plan notes found for this encounter  Diagnoses and all orders for this visit:    Essential hypertension    Coronary artery disease involving native coronary artery of native heart without angina pectoris    Chronic obstructive pulmonary disease, unspecified COPD type (HCC)    Constipation, unspecified constipation type    Hiatal hernia with GERD without esophagitis    Anxiety    Hyperlipidemia, mixed    Iron deficiency anemia secondary to inadequate dietary iron intake    Other orders  -     TSH, 3rd generation with Free T4 reflex; Future  -     Platelet count; Future  -     Potassium 99 MG TABS; Take by mouth daily          Subjective:      Patient ID: Ria Boyer is a 80 y o  female  HPI    The following portions of the patient's history were reviewed and updated as appropriate:   She  has a past medical history of Abnormal blood sugar (03/13/2017); Abnormal carotid duplex scan; Anxiety; Cervical radiculopathy (08/08/2017); CHF (congestive heart failure) ([facility] ); COPD (chronic obstructive pulmonary disease) ([facility] ) (2/6/2018); Coronary angioplasty status; Coronary artery disease (4/7/2017); Costochondritis (02/05/2013); Dyslipidemia; GERD without esophagitis (8/30/2012); H/O CT scan of chest; History of Holter monitoring; echocardiogram; echocardiogram (08/16/2017); Hypertension; Iron deficiency anemia (4/21/2016); Malignant melanoma of skin ([facility] ) (9/17/2012); Mixed hyperlipidemia; NSTEMI (non-ST elevated myocardial infarction) ([facility] ) (7/25/2017); Old MI (myocardial infarction); Osteoarthritis (9/17/2012); Osteoporosis (3/13/2017); and 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[facility] ) (04/07/2017)    She   Patient Active Problem List    Diagnosis Date Noted    Old MI (myocardial infarction) 08/09/2018    Dyslipidemia 08/09/2018    Acute diastolic congestive heart failure ([facility] ) 07/29/2018    Coronary angioplasty status 06/28/2018    Anxiety 05/07/2018    Hyperlipidemia, mixed 02/06/2018    COPD (chronic obstructive pulmonary disease) ([place] [address] ) 02/06/2018    Non-STEMI (non-ST elevated myocardial infarction) ([place] [address] ) 07/25/2017    Gait abnormality 05/31/2017    Coronary artery disease 04/07/2017    ST elevation myocardial infarction involving right coronary artery (HCC) 03/24/2017    Compression fracture of thoracic vertebra, closed, initial encounter ([place] [address] ) 03/13/2017    Constipation 04/21/2016    Iron deficiency anemia 04/21/2016    Proteinuria 01/06/2015    Glucose intolerance (no malabsorption) 02/06/2014    Vitamin D deficiency 01/07/2013    Allergic rhinitis 09/17/2012    Benign colon polyp 09/17/2012    Cataract 09/17/2012    Fibrocystic breast disease, unspecified laterality 09/17/2012    Essential hypertension 09/17/2012    Malignant melanoma of skin ([place] [address] ) 09/17/2012    Urinary incontinence 09/17/2012    Hiatal hernia with GERD without esophagitis 08/30/2012     She  has a past surgical history that includes Breast surgery; Cardiac catheterization (03/24/2017); INSERTION STENT ARTERY (04/07/2017); Cholecystectomy; Hemorrhoid surgery; Coronary stent placement (03/25/2017); Skin biopsy; Tonsillectomy; Abdominal surgery; and Colonoscopy (N/A, 7/25/2018)  Her family history includes Heart failure in her mother; Prostate cancer in her father; Stroke in her family  She  reports that she has quit smoking  She has never used smokeless tobacco  She reports that she does not drink alcohol or use drugs    Current Outpatient Prescriptions   Medication Sig Dispense Refill    aspirin 81 mg chewable tablet Chew 1 tablet (81 mg total) daily 30 tablet 0    atorvastatin (LIPITOR) 40 mg tablet Take 1 tablet (40 mg total) by mouth daily 90 tablet 3    Calcium Carbonate-Vitamin D 600-125 MG-UNIT TABS Take by mouth daily        ferrous sulfate 325 (65 Fe) mg tablet Take 1 tablet by mouth daily      furosemide (LASIX) 20 mg tablet Take 1 tablet (20 mg total) by mouth daily 90 tablet 1    LORazepam (ATIVAN) 0 5 mg tablet Take 1 tablet (0 5 mg total) by mouth every 8 (eight) hours as needed for anxiety 90 tablet 0    metoprolol tartrate (LOPRESSOR) 25 mg tablet 25 mg Take 1/2 tablet BID      MULTIPLE VITAMINS-CALCIUM PO Take by mouth daily      Potassium 99 MG TABS Take by mouth daily      psyllium (METAMUCIL) packet Take 1 packet by mouth daily 30 each 0    ranitidine (ZANTAC) 150 mg tablet TAKE 1 TABLET BY MOUTH TWICE A DAY 60 tablet 2     No current facility-administered medications for this visit        Current Outpatient Prescriptions on File Prior to Visit   Medication Sig    aspirin 81 mg chewable tablet Chew 1 tablet (81 mg total) daily    atorvastatin (LIPITOR) 40 mg tablet Take 1 tablet (40 mg total) by mouth daily    Calcium Carbonate-Vitamin D 600-125 MG-UNIT TABS Take by mouth daily      ferrous sulfate 325 (65 Fe) mg tablet Take 1 tablet by mouth daily    furosemide (LASIX) 20 mg tablet Take 1 tablet (20 mg total) by mouth daily    LORazepam (ATIVAN) 0 5 mg tablet Take 1 tablet (0 5 mg total) by mouth every 8 (eight) hours as needed for anxiety    metoprolol tartrate (LOPRESSOR) 25 mg tablet 25 mg Take 1/2 tablet BID    MULTIPLE VITAMINS-CALCIUM PO Take by mouth daily    psyllium (METAMUCIL) packet Take 1 packet by mouth daily    ranitidine (ZANTAC) 150 mg tablet TAKE 1 TABLET BY MOUTH TWICE A DAY    [DISCONTINUED] clopidogrel (PLAVIX) 75 mg tablet Take 75 mg by mouth daily    [DISCONTINUED] furosemide (LASIX) 20 mg tablet TAKE 1 TABLET BY MOUTH EVERY DAY (Patient not taking: Reported on 9/12/2018)    [DISCONTINUED] meclizine (ANTIVERT) 25 mg tablet Take 1 tablet by mouth 3 (three) times a day as needed    [DISCONTINUED] nitroglycerin (NITROSTAT) 0 4 mg SL tablet Place under the tongue place 1 tablet by sublingual route at the 1st sign of attack; may repeat every 5 min until relief; if pain persists after 3 tablets in 15 min, prompt medical attention is recommended    [DISCONTINUED] potassium chloride (K-DUR) 10 mEq tablet Take 1 tablet (10 mEq total) by mouth daily (Patient not taking: Reported on 9/12/2018 )     No current facility-administered medications on file prior to visit  She is allergic to ciprofloxacin; nitrofurantoin; and penicillins       Review of Systems      Objective:      /88 (BP Location: Left arm, Patient Position: Sitting, Cuff Size: Adult)   Pulse 79   Temp 97 8 °F (36 6 °C) (Tympanic)   Resp 18   Ht 5' 2" (1 575 m)   Wt 75 1 kg (165 lb 9 6 oz)   SpO2 91%   BMI 30 29 kg/m²          Physical Exam

## 2018-09-12 NOTE — PATIENT INSTRUCTIONS

## 2018-09-12 NOTE — PROGRESS NOTES
Assessment/Plan:    No problem-specific Assessment & Plan notes found for this encounter  Diagnoses and all orders for this visit:    Essential hypertension    Coronary artery disease involving native coronary artery of native heart without angina pectoris  -     Cancel: TSH, 3rd generation with Free T4 reflex; Future  -     Potassium 99 MG TABS; Take by mouth daily    Chronic obstructive pulmonary disease, unspecified COPD type (HCC)    Constipation, unspecified constipation type    Hiatal hernia with GERD without esophagitis    Anxiety  -     Cancel: TSH, 3rd generation with Free T4 reflex; Future    Hyperlipidemia, mixed  -     Cancel: TSH, 3rd generation with Free T4 reflex; Future    Iron deficiency anemia secondary to inadequate dietary iron intake    Thrombocytosis (HCC)  -     Cancel: Platelet count; Future    Bursitis of right shoulder  -     Cancel: Inj Trigger Point Single/Multiple; Future  -     Large joint arthrocentesis    Encounter for vaccination  -     influenza vaccine, [id number], high-dose, PF 0 5 mL, for patients 65 yr+ (FLUZONE HIGH-DOSE)      A/P: Doing well and no further CP or CHF  Labs up to date  Will update the flu vaccine  Pt with significant arthritis in the shoulder along with some bursitis  Injected  Unable to use NSAID's  Recommend some beti leal as well  Declined PT  May need to see ortho for another injection if fails to improve  Continue current treatment and RTC four months for routine  Subjective:      Patient ID: Tyrone Kraus is a 80 y o  female  WF RTC with her granddaughter for f/u htn, COPD, etc  Doing well and only c/o is worsening right shoulder pain for the past several weeks  NO known trauma and now unable to take NSAID's due to GI issues  Remains active w/o difficulty and no falls reported  Denies any CP, no worsening of SOB, edema, palpitations, orthopnea, or PND  Constipation is good currently  MADELYN is good with meds  Labs up to date  Due for vaccines  Medication Refill   Associated symptoms include arthralgias  Pertinent negatives include no abdominal pain, chest pain, chills, coughing, diaphoresis, fatigue, fever, headaches, joint swelling, myalgias, nausea, vomiting or weakness  The following portions of the patient's history were reviewed and updated as appropriate:   She  has a past medical history of Abnormal blood sugar (03/13/2017); Abnormal carotid duplex scan; Anxiety; Cervical radiculopathy (08/08/2017); CHF (congestive heart failure) ([place] [address] ); COPD (chronic obstructive pulmonary disease) ([place] [address] ) (2/6/2018); Coronary angioplasty status; Coronary artery disease (4/7/2017); Costochondritis (02/05/2013); Dyslipidemia; GERD without esophagitis (8/30/2012); H/O CT scan of chest; History of Holter monitoring; echocardiogram; echocardiogram (08/16/2017); Hypertension; Iron deficiency anemia (4/21/2016); Malignant melanoma of skin ([place] [address] ) (9/17/2012); Mixed hyperlipidemia; NSTEMI (non-ST elevated myocardial infarction) ([place] [address] ) (7/25/2017); Old MI (myocardial infarction); Osteoarthritis (9/17/2012); Osteoporosis (3/13/2017); and 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[place] [address] ) (04/07/2017)    She   Patient Active Problem List    Diagnosis Date Noted    Old MI (myocardial infarction) 08/09/2018    Dyslipidemia 08/09/2018    Coronary angioplasty status 06/28/2018    Anxiety 05/07/2018    Hyperlipidemia, mixed 02/06/2018    COPD (chronic obstructive pulmonary disease) ([place] [address] ) 02/06/2018    Non-STEMI (non-ST elevated myocardial infarction) ([place] [address] ) 07/25/2017    Gait abnormality 05/31/2017    Coronary artery disease 04/07/2017    ST elevation myocardial infarction involving right coronary artery ([place] [address] ) 03/24/2017    Compression fracture of thoracic vertebra, closed, initial encounter ([place] [address] ) 03/13/2017    Constipation 04/21/2016    Iron deficiency anemia 04/21/2016    Proteinuria 01/06/2015    Glucose intolerance (no malabsorption) 02/06/2014    Vitamin D deficiency 01/07/2013    Allergic rhinitis 09/17/2012    Benign colon polyp 09/17/2012    Cataract 09/17/2012    Fibrocystic breast disease, unspecified laterality 09/17/2012    Essential hypertension 09/17/2012    Malignant melanoma [facility] skin [facility] ) 09/17/2012    Urinary incontinence 09/17/2012    Hiatal hernia with GERD without esophagitis 08/30/2012     She  has a past surgical history that includes Breast surgery; Cardiac catheterization (03/24/2017); INSERTION STENT ARTERY (04/07/2017); Cholecystectomy; Hemorrhoid surgery; Coronary stent placement (03/25/2017); Skin biopsy; Tonsillectomy; Abdominal surgery; and Colonoscopy (N/A, 7/25/2018)  Her family history includes Heart failure in her mother; Prostate cancer in her father; Stroke in her family  She  reports that she has quit smoking  She has never used smokeless tobacco  She reports that she does not drink alcohol or use drugs  Current Outpatient Prescriptions   Medication Sig Dispense Refill    aspirin 81 mg chewable tablet Chew 1 tablet (81 mg total) daily 30 tablet 0    atorvastatin (LIPITOR) 40 mg tablet Take 1 tablet (40 mg total) by mouth daily 90 tablet 3    Calcium Carbonate-Vitamin D 600-125 MG-UNIT TABS Take by mouth daily        ferrous sulfate 325 (65 Fe) mg tablet Take 1 tablet by mouth daily      furosemide (LASIX) 20 mg tablet Take 1 tablet (20 mg total) by mouth daily 90 tablet 1    LORazepam (ATIVAN) 0 5 mg tablet Take 1 tablet (0 5 mg total) by mouth every 8 (eight) hours as needed for anxiety 90 tablet 0    metoprolol tartrate (LOPRESSOR) 25 mg tablet 25 mg Take 1/2 tablet BID      MULTIPLE VITAMINS-CALCIUM PO Take by mouth daily      Potassium 99 MG TABS Take by mouth daily      psyllium (METAMUCIL) packet Take 1 packet by mouth daily 30 each 0    ranitidine (ZANTAC) 150 mg tablet TAKE 1 TABLET BY MOUTH TWICE A DAY 60 tablet 2     No current facility-administered medications for this visit        Current Outpatient Prescriptions on File Prior to Visit   Medication Sig    aspirin 81 mg chewable tablet Chew 1 tablet (81 mg total) daily    atorvastatin (LIPITOR) 40 mg tablet Take 1 tablet (40 mg total) by mouth daily    Calcium Carbonate-Vitamin D 600-125 MG-UNIT TABS Take by mouth daily      ferrous sulfate 325 (65 Fe) mg tablet Take 1 tablet by mouth daily    furosemide (LASIX) 20 mg tablet Take 1 tablet (20 mg total) by mouth daily    LORazepam (ATIVAN) 0 5 mg tablet Take 1 tablet (0 5 mg total) by mouth every 8 (eight) hours as needed for anxiety    metoprolol tartrate (LOPRESSOR) 25 mg tablet 25 mg Take 1/2 tablet BID    MULTIPLE VITAMINS-CALCIUM PO Take by mouth daily    psyllium (METAMUCIL) packet Take 1 packet by mouth daily    ranitidine (ZANTAC) 150 mg tablet TAKE 1 TABLET BY MOUTH TWICE A DAY    [DISCONTINUED] clopidogrel (PLAVIX) 75 mg tablet Take 75 mg by mouth daily    [DISCONTINUED] furosemide (LASIX) 20 mg tablet TAKE 1 TABLET BY MOUTH EVERY DAY (Patient not taking: Reported on 9/12/2018)    [DISCONTINUED] meclizine (ANTIVERT) 25 mg tablet Take 1 tablet by mouth 3 (three) times a day as needed    [DISCONTINUED] nitroglycerin (NITROSTAT) 0 4 mg SL tablet Place under the tongue place 1 tablet by sublingual route at the 1st sign of attack; may repeat every 5 min until relief; if pain persists after 3 tablets in 15 min, prompt medical attention is recommended    [DISCONTINUED] potassium chloride (K-DUR) 10 mEq tablet Take 1 tablet (10 mEq total) by mouth daily (Patient not taking: Reported on 9/12/2018 )     No current facility-administered medications on file prior to visit  She is allergic to ciprofloxacin; nitrofurantoin; and penicillins       Review of Systems   Constitutional: Negative for activity change, chills, diaphoresis, fatigue and fever  HENT: Negative  Eyes: Negative for visual disturbance  Respiratory: Negative for cough, chest tightness, shortness of breath and wheezing      Cardiovascular: Negative for chest pain, palpitations and leg swelling  Gastrointestinal: Negative for abdominal pain, constipation, diarrhea, nausea and vomiting  Endocrine: Negative for cold intolerance and heat intolerance  Genitourinary: Negative for difficulty urinating, dysuria and frequency  Musculoskeletal: Positive for arthralgias  Negative for gait problem, joint swelling and myalgias  Neurological: Negative for dizziness, seizures, syncope, weakness, light-headedness and headaches  Psychiatric/Behavioral: Negative for confusion, dysphoric mood and sleep disturbance  The patient is nervous/anxious  Objective:      /88 (BP Location: Left arm, Patient Position: Sitting, Cuff Size: Adult)   Pulse 79   Temp 97 8 °F (36 6 °C) (Tympanic)   Resp 18   Ht 5' 2" (1 575 m)   Wt 75 1 kg (165 lb 9 6 oz)   SpO2 91%   BMI 30 29 kg/m²          Physical Exam   Constitutional: She is oriented to person, place, and time  She appears well-developed and well-nourished  No distress  HENT:   Head: Normocephalic and atraumatic  Mouth/Throat: Oropharynx is clear and moist    Eyes: Conjunctivae and EOM are normal  Pupils are equal, round, and reactive to light  Neck: Neck supple  No JVD present  Cardiovascular: Normal rate, regular rhythm and normal heart sounds  No murmur heard  Pulmonary/Chest: Effort normal and breath sounds normal  No respiratory distress  She has no wheezes  She has no rales  Abdominal: Soft  Bowel sounds are normal  She exhibits no distension  There is no tenderness  Musculoskeletal: She exhibits tenderness  She exhibits no edema or deformity  Right shoulder w/o gross deformity, increase temp, erythema, or swelling  Crepitus noted  Tenderness over the biceps bursa  ROM wnl with increase tenderness at the extremes  Negative drop test     Neurological: She is alert and oriented to person, place, and time  Psychiatric: She has a normal mood and affect   Her behavior is normal  Judgment and thought content normal    Nursing note and vitals reviewed  Large joint arthrocentesis  Date/Time: 9/12/2018 4:10 PM  Consent given by: patient  Site marked: site marked  Supporting Documentation  Indications: pain   Procedure Details  Location: shoulder - R subacromial bursa  Needle size: 22 G  Approach: anterior  Medication group details: sensorcaine 0 5/[id number]/xylocaine 2% w/o      Patient tolerance: patient tolerated the procedure well with no immediate complications  Dressing:  Sterile dressing applied

## 2018-09-27 ENCOUNTER — TELEPHONE (OUTPATIENT)
Dept: INTERNAL MEDICINE CLINIC | Facility: CLINIC | Age: 83
End: 2018-09-27

## 2018-09-27 NOTE — TELEPHONE ENCOUNTER
----- Message from Joana Magallon DO sent at 9/12/2018  4:21 PM EDT -----  Call pt in two weeks and get update on the shoulder

## 2018-09-29 ENCOUNTER — OFFICE VISIT (OUTPATIENT)
Dept: URGENT CARE | Facility: CLINIC | Age: 83
End: 2018-09-29
Payer: MEDICARE

## 2018-09-29 VITALS
HEART RATE: 82 BPM | RESPIRATION RATE: 16 BRPM | WEIGHT: 165 LBS | BODY MASS INDEX: 30.36 KG/M2 | DIASTOLIC BLOOD PRESSURE: 60 MMHG | OXYGEN SATURATION: 91 % | SYSTOLIC BLOOD PRESSURE: 162 MMHG | TEMPERATURE: 97.6 F | HEIGHT: 62 IN

## 2018-09-29 DIAGNOSIS — N30.00 ACUTE CYSTITIS WITHOUT HEMATURIA: Primary | ICD-10-CM

## 2018-09-29 DIAGNOSIS — K21.9 GASTROESOPHAGEAL REFLUX DISEASE WITHOUT ESOPHAGITIS: ICD-10-CM

## 2018-09-29 DIAGNOSIS — R30.0 DYSURIA: ICD-10-CM

## 2018-09-29 LAB
SL AMB  POCT GLUCOSE, UA: ABNORMAL
SL AMB LEUKOCYTE ESTERASE,UA: ABNORMAL
SL AMB POCT BILIRUBIN,UA: ABNORMAL
SL AMB POCT BLOOD,UA: ABNORMAL
SL AMB POCT CLARITY,UA: CLEAR
SL AMB POCT COLOR,UA: YELLOW
SL AMB POCT KETONES,UA: ABNORMAL
SL AMB POCT NITRITE,UA: ABNORMAL
SL AMB POCT PH,UA: 5
SL AMB POCT SPECIFIC GRAVITY,UA: 1.02
SL AMB POCT URINE PROTEIN: ABNORMAL
SL AMB POCT UROBILINOGEN: 0.2

## 2018-09-29 PROCEDURE — 87186 SC STD MICRODIL/AGAR DIL: CPT | Performed by: PHYSICIAN ASSISTANT

## 2018-09-29 PROCEDURE — 87077 CULTURE AEROBIC IDENTIFY: CPT | Performed by: PHYSICIAN ASSISTANT

## 2018-09-29 PROCEDURE — 81002 URINALYSIS NONAUTO W/O SCOPE: CPT | Performed by: PHYSICIAN ASSISTANT

## 2018-09-29 PROCEDURE — 87086 URINE CULTURE/COLONY COUNT: CPT | Performed by: PHYSICIAN ASSISTANT

## 2018-09-29 PROCEDURE — G0463 HOSPITAL OUTPT CLINIC VISIT: HCPCS | Performed by: PHYSICIAN ASSISTANT

## 2018-09-29 PROCEDURE — 99213 OFFICE O/P EST LOW 20 MIN: CPT | Performed by: PHYSICIAN ASSISTANT

## 2018-09-29 RX ORDER — RANITIDINE 150 MG/1
TABLET ORAL
Qty: 60 TABLET | Refills: 0 | Status: SHIPPED | OUTPATIENT
Start: 2018-09-29 | End: 2018-10-10 | Stop reason: SDUPTHER

## 2018-09-29 RX ORDER — SULFAMETHOXAZOLE AND TRIMETHOPRIM 800; 160 MG/1; MG/1
1 TABLET ORAL 2 TIMES DAILY
Qty: 14 TABLET | Refills: 0 | Status: SHIPPED | OUTPATIENT
Start: 2018-09-29 | End: 2018-10-06

## 2018-09-29 NOTE — PROGRESS NOTES
[facility]        NAME: Mike Davis is a 80 y o  female  : 1925    MRN: [id number]  DATE: 2018  TIME: 12:07 PM    Assessment and Plan   Dysuria [R30 0]  1  Dysuria  POCT urine dip auto non-scope    Urine culture   2  Acute cystitis without hematuria  sulfamethoxazole-trimethoprim (BACTRIM DS) 800-160 mg per tablet         Patient Instructions       Follow up with PCP in 3-5 days  Proceed to  ER if symptoms worsen  Chief Complaint     Chief Complaint   Patient presents with    Possible UTI     x 1 day         History of Present Illness       Patient started with urinary frequency during the night last night  He does not currently have any burning with urination  She did not note any blood in her urine fever chills flank pain back pain or abdominal pain  Her last urinary tract infection was approximately 1 and half years ago which required hospitalization  Review of Systems   Review of Systems   Constitutional: Negative for chills and fever  HENT: Negative for sore throat  Eyes: Negative for redness  Respiratory: Negative for cough  Cardiovascular: Negative for chest pain  Gastrointestinal: Negative for abdominal pain, diarrhea, nausea and vomiting  Genitourinary: Positive for frequency  Negative for difficulty urinating, flank pain, hematuria and urgency  Musculoskeletal: Positive for arthralgias (Knees)  Skin: Negative for rash  Neurological: Negative for dizziness and headaches  Hematological: Negative for adenopathy           Current Medications       Current Outpatient Prescriptions:     aspirin 81 mg chewable tablet, Chew 1 tablet (81 mg total) daily, Disp: 30 tablet, Rfl: 0    atorvastatin (LIPITOR) 40 mg tablet, Take 1 tablet (40 mg total) by mouth daily, Disp: 90 tablet, Rfl: 3    Calcium Carbonate-Vitamin D 600-125 MG-UNIT TABS, Take by mouth daily  , Disp: , Rfl:     ferrous sulfate 325 (65 Fe) mg tablet, Take 1 tablet by mouth daily, Disp: , Rfl:     furosemide (LASIX) 20 mg tablet, Take 1 tablet (20 mg total) by mouth daily, Disp: 90 tablet, Rfl: 1    LORazepam (ATIVAN) 0 5 mg tablet, Take 1 tablet (0 5 mg total) by mouth every 8 (eight) hours as needed for anxiety, Disp: 90 tablet, Rfl: 0    metoprolol tartrate (LOPRESSOR) 25 mg tablet, 25 mg Take 1/2 tablet BID, Disp: , Rfl:     MULTIPLE VITAMINS-CALCIUM PO, Take by mouth daily, Disp: , Rfl:     Potassium 99 MG TABS, Take by mouth daily, Disp: , Rfl:     psyllium (METAMUCIL) packet, Take 1 packet by mouth daily, Disp: 30 each, Rfl: 0    ranitidine (ZANTAC) 150 mg tablet, TAKE 1 TABLET BY MOUTH TWICE A DAY, Disp: 60 tablet, Rfl: 0    sulfamethoxazole-trimethoprim (BACTRIM DS) 800-160 mg per tablet, Take 1 tablet by mouth 2 (two) times a day for 7 days, Disp: 14 tablet, Rfl: 0    Current Allergies     Allergies as of 09/29/2018 - Reviewed 09/29/2018   Allergen Reaction Noted    Ciprofloxacin  04/21/2012    Nitrofurantoin  04/21/2012    Penicillins Rash and Other (See Comments) 09/17/2012            The following portions of the patient's history were reviewed and updated as appropriate: allergies, current medications, past family history, past medical history, past social history, past surgical history and problem list      Past Medical History:   Diagnosis Date    Abnormal blood sugar 03/13/2017    Abnormal carotid duplex scan     Carotid Duplex (Plaque formation is quite prominent bilaterally  Despite these changes, no evidence for greater than 50% diameter reducing lesions in the cervical portion of either carotid artery hemisystem ) - 6/13/2017    Anxiety     Cervical radiculopathy 08/08/2017    CHF [facility]congestive heart failure) ([facility]     diastolic    COPD (chronic obstructive pulmonary disease) ([facility] [address] ) 2/6/2018    Coronary angioplasty status     Coronary artery disease 4/7/2017    Costochondritis 02/05/2013    suspect MS in origin given relationship to ROM and location  Start mobic and if persists, reeval  Refused xrayat this time   Dyslipidemia     GERD without esophagitis 8/30/2012    H/O CT scan of chest      (No PE, minimal interstitial change left lower lobe and right upper lobe, which may be acute ) - 5/19/2017    History of Holter monitoring     Holter (Sinus rhythm with rates ranging from 52 to 118 bpm   No afib or heart block  Rare atrial and ventricular ectopic beats  One six-beat atrial run noted  No pauses  ) - 5/31/2017    Hx of echocardiogram     Echo (EF 0 60 (60%), Biatrial enlargement ) - 3/24/2017 Echo (EF 0 55 (55%), mild LVH  Aortic valvular sclerosis  Trace MI with mild left atrial dilatation, mild MAC  Mild TI with mild pulmonary hypertension  ) - 5/22/2017 Echo (EF 0 60 (60%), Normal left vent chamber size and systolic function  Mild diastolic dysfunction of LV w/normal filling pressures  Mild mitral regurg ) - 7/26/2017 Echo (EF 0    Hx of echocardiogram 08/16/2017    EF0 60 (60%) Normal left vent chamber size and systolic function of LV w/normal filling presures  Mild mitral regurg  / EF0 50 (50%) Mild LVH  MIld MR 10/6/17     Hypertension     Iron deficiency anemia 4/21/2016    Malignant melanoma of skin ([facility] [address] ) 9/17/2012    Mixed hyperlipidemia     NSTEMI (non-ST elevated myocardial infarction) ([facility] [address] ) 7/25/2017    Old MI (myocardial infarction)     Osteoarthritis 9/17/2012    Osteoporosis 3/13/2017    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[facility] [address] ) 04/07/2017       Past Surgical History:   Procedure Laterality Date    ABDOMINAL SURGERY      BREAST SURGERY      Lumpectomy    CARDIAC CATHETERIZATION  03/24/2017    ARNIE to 100% occluded prox RCA, chronic 99% ostial LCfx, 60% mid LAD, discrete 40% distal LM / EF0 60 (60%) 100% occluded proximal RCA s/p ARNIE, chronic 99% ostial LCX, 60% mid LAD, discrete 40% dital LM  4/5/17    CHOLECYSTECTOMY      COLONOSCOPY N/A 7/25/2018    Procedure: COLONOSCOPY;  Surgeon:  Per Spence MD;  Location: [address] [place] [address] OR;  Service: Gastroenterology    CORONARY STENT PLACEMENT  03/25/2017    ARNIE RCA    HEMORRHOID SURGERY      INSERTION STENT ARTERY  04/07/2017    Cardio vascular agents drug-eluting stent    SKIN BIOPSY      TONSILLECTOMY         Family History   Problem Relation Age of Onset    Heart failure Mother     Prostate cancer Father     Stroke Family          Medications have been verified  Objective   /60   Pulse 82   Temp 97 6 °F (36 4 °C)   Resp 16   Ht 5' 2" (1 575 m)   Wt 74 8 kg (165 lb)   SpO2 91%   BMI 30 18 kg/m²        Physical Exam     Physical Exam   Constitutional: She is oriented to person, place, and time  She appears well-developed and well-nourished  HENT:   Head: Normocephalic and atraumatic  Eyes: Pupils are equal, round, and reactive to light  Neck: Normal range of motion  Cardiovascular: Normal rate and regular rhythm  Pulmonary/Chest: Effort normal    Abdominal:   No CVA tenderness  Neurological: She is alert and oriented to person, place, and time  Skin: Skin is warm and dry  No rash noted  Psychiatric: She has a normal mood and affect  Her behavior is normal  Judgment and thought content normal    Nursing note and vitals reviewed

## 2018-09-29 NOTE — PATIENT INSTRUCTIONS
Urinary Tract Infection in Women   AMBULATORY CARE:   A urinary tract infection (UTI)  is caused by bacteria that get inside your urinary tract  Most bacteria that enter your urinary tract come out when you urinate  If the bacteria stay in your urinary tract, you may get an infection  Your urinary tract includes your kidneys, ureters, bladder, and urethra  Urine is made in your kidneys, and it flows from the ureters to the bladder  Urine leaves the bladder through the urethra  A UTI is more common in your lower urinary tract, which includes your bladder and urethra  Common symptoms include the following:   · Urinating more often or waking from sleep to urinate    · Pain or burning when you urinate    · Pain or pressure in your lower abdomen     · Urine that smells bad    · Blood in your urine    · Leaking urine  Seek care immediately if:   · You are urinating very little or not at all  · You have a high fever with shaking chills  · You have side or back pain that gets worse  Contact your healthcare provider if:   · You have a fever  · You do not feel better after 2 days of taking antibiotics  · You are vomiting  · You have questions or concerns about your condition or care  Treatment for a UTI  may include medicines to treat a bacterial infection  You may also need medicines to decrease pain and burning, or decrease the urge to urinate often  Prevent a UTI:   · Empty your bladder often  Urinate and empty your bladder as soon as you feel the need  Do not hold your urine for long periods of time  · Wipe from front to back after you urinate or have a bowel movement  This will help prevent germs from getting into your urinary tract through your urethra  · Drink liquids as directed  Ask how much liquid to drink each day and which liquids are best for you  You may need to drink more liquids than usual to help flush out the bacteria  Do not drink alcohol, caffeine, or citrus juices  These can irritate your bladder and increase your symptoms  Your healthcare provider may recommend cranberry juice to help prevent a UTI  · Urinate after you have sex  This can help flush out bacteria passed during sex  · Do not douche or use feminine deodorants  These can change the chemical balance in your vagina  · Change sanitary pads or tampons often  This will help prevent germs from getting into your urinary tract  · Do pelvic muscle exercises often  Pelvic muscle exercises may help you start and stop urinating  Strong pelvic muscles may help you empty your bladder easier  Squeeze these muscles tightly for 5 seconds like you are trying to hold back urine  Then relax for 5 seconds  Gradually work up to squeezing for 10 seconds  Do 3 sets of 15 repetitions a day, or as directed  Follow up with your healthcare provider as directed:  Write down your questions so you remember to ask them during your visits  © 2017 [address] [employer] Information is for End User's use only and may not be sold, redistributed or otherwise used for commercial purposes  All illustrations and images included in CareNotes® are the copyrighted property of [employer]  or [employer]  The above information is an  only  It is not intended as medical advice for individual conditions or treatments  Talk to your doctor, nurse or pharmacist before following any medical regimen to see if it is safe and effective for you

## 2018-10-02 LAB — BACTERIA UR CULT: ABNORMAL

## 2018-10-03 ENCOUNTER — TELEPHONE (OUTPATIENT)
Dept: INTERNAL MEDICINE CLINIC | Facility: CLINIC | Age: 83
End: 2018-10-03

## 2018-10-03 DIAGNOSIS — N39.0 URINARY TRACT INFECTION WITHOUT HEMATURIA, SITE UNSPECIFIED: Primary | ICD-10-CM

## 2018-10-03 RX ORDER — DOXYCYCLINE HYCLATE 100 MG/1
100 CAPSULE ORAL EVERY 12 HOURS SCHEDULED
Qty: 20 CAPSULE | Refills: 0 | Status: SHIPPED | OUTPATIENT
Start: 2018-10-03 | End: 2018-10-13

## 2018-10-03 RX ORDER — CEPHALEXIN 500 MG/1
500 CAPSULE ORAL 3 TIMES DAILY
Qty: 21 CAPSULE | Refills: 0 | Status: SHIPPED | OUTPATIENT
Start: 2018-10-03 | End: 2018-10-10

## 2018-10-03 NOTE — TELEPHONE ENCOUNTER
P/c from [facility], pt went to urgent care on Saturday, dx was UTI, was given Keflex ,  Is this the same as penicillin because pt is allergic?  Pt already took 1 dose of it, pls advise

## 2018-10-03 NOTE — PROGRESS NOTES
Spoke with patient regarding resistance to Bactrim she was private for UTI  She was advised to stop the Bactrim  She is also allergic to Cipro and penicillin  Will start her on Keflex 500 mg 3 times a day for 7 days she is aware there is a possibility of cross reaction with the allergy to penicillin  Fortunately at this time the only other antibiotic left is a cephalosporin to treat the resistant organism  Prescription was sent to [facility] on [address]

## 2018-10-03 NOTE — TELEPHONE ENCOUNTER
Spoke to pt's granddaughter, She only took 1 dose of Keflex  She did have a bad reaction to PCN so can you give her a different  Antibiotic? She had Bactrim but was told that won't help her that's why they gave her Keflex  Pt uses [facility] on [address]

## 2018-10-03 NOTE — TELEPHONE ENCOUNTER
Keflex isn't the same as pcn, BUT there is a 10% if you're are allergic to PCN, you will be allergic to this  If she had a very bad reaction to PCN in the past, would change abx

## 2018-10-10 DIAGNOSIS — K21.9 GASTROESOPHAGEAL REFLUX DISEASE WITHOUT ESOPHAGITIS: ICD-10-CM

## 2018-10-10 RX ORDER — RANITIDINE 150 MG/1
150 TABLET ORAL 2 TIMES DAILY
Qty: 60 TABLET | Refills: 0 | Status: SHIPPED | OUTPATIENT
Start: 2018-10-10 | End: 2018-11-09 | Stop reason: SDUPTHER

## 2018-10-11 DIAGNOSIS — K59.04 CHRONIC IDIOPATHIC CONSTIPATION: Primary | ICD-10-CM

## 2018-10-11 DIAGNOSIS — K59.04 CHRONIC IDIOPATHIC CONSTIPATION: ICD-10-CM

## 2018-10-11 RX ORDER — POLYETHYLENE GLYCOL 3350 17 G/17G
17 POWDER, FOR SOLUTION ORAL 2 TIMES DAILY PRN
Qty: 850 G | Refills: 0 | Status: ON HOLD | OUTPATIENT
Start: 2018-10-11 | End: 2019-05-22

## 2018-10-11 RX ORDER — POLYETHYLENE GLYCOL 3350 17 G/17G
17 POWDER, FOR SOLUTION ORAL 2 TIMES DAILY PRN
Qty: 850 G | Refills: 2 | Status: SHIPPED | OUTPATIENT
Start: 2018-10-11 | End: 2018-10-11 | Stop reason: SDUPTHER

## 2018-10-15 ENCOUNTER — OFFICE VISIT (OUTPATIENT)
Dept: INTERNAL MEDICINE CLINIC | Facility: CLINIC | Age: 83
End: 2018-10-15
Payer: MEDICARE

## 2018-10-15 VITALS
WEIGHT: 162 LBS | SYSTOLIC BLOOD PRESSURE: 134 MMHG | RESPIRATION RATE: 18 BRPM | HEIGHT: 62 IN | HEART RATE: 80 BPM | BODY MASS INDEX: 29.81 KG/M2 | TEMPERATURE: 97.2 F | DIASTOLIC BLOOD PRESSURE: 72 MMHG

## 2018-10-15 DIAGNOSIS — Z78.9 DRUG INTOLERANCE: ICD-10-CM

## 2018-10-15 DIAGNOSIS — N89.8 VAGINAL ITCHING: ICD-10-CM

## 2018-10-15 DIAGNOSIS — R35.0 URINARY FREQUENCY: ICD-10-CM

## 2018-10-15 DIAGNOSIS — R30.0 DYSURIA: Primary | ICD-10-CM

## 2018-10-15 LAB
BACTERIA UR QL AUTO: ABNORMAL /HPF
BILIRUB UR QL STRIP: NEGATIVE
CLARITY UR: CLEAR
COLOR UR: YELLOW
GLUCOSE UR STRIP-MCNC: NEGATIVE MG/DL
HGB UR QL STRIP.AUTO: NEGATIVE
HYALINE CASTS #/AREA URNS LPF: ABNORMAL /LPF
KETONES UR STRIP-MCNC: NEGATIVE MG/DL
LEUKOCYTE ESTERASE UR QL STRIP: ABNORMAL
NITRITE UR QL STRIP: NEGATIVE
NON-SQ EPI CELLS URNS QL MICRO: ABNORMAL /HPF
PH UR STRIP.AUTO: 7 [PH] (ref 4.5–8)
PROT UR STRIP-MCNC: NEGATIVE MG/DL
RBC #/AREA URNS AUTO: ABNORMAL /HPF
SL AMB  POCT GLUCOSE, UA: ABNORMAL
SL AMB LEUKOCYTE ESTERASE,UA: ABNORMAL
SL AMB POCT BILIRUBIN,UA: ABNORMAL
SL AMB POCT BLOOD,UA: ABNORMAL
SL AMB POCT CLARITY,UA: CLEAR
SL AMB POCT COLOR,UA: YELLOW
SL AMB POCT KETONES,UA: ABNORMAL
SL AMB POCT NITRITE,UA: ABNORMAL
SL AMB POCT PH,UA: 5
SL AMB POCT SPECIFIC GRAVITY,UA: 1.01
SL AMB POCT URINE PROTEIN: ABNORMAL
SL AMB POCT UROBILINOGEN: NORMAL
SP GR UR STRIP.AUTO: 1.01 (ref 1–1.03)
UROBILINOGEN UR QL STRIP.AUTO: 0.2 E.U./DL
WBC #/AREA URNS AUTO: ABNORMAL /HPF

## 2018-10-15 PROCEDURE — 81002 URINALYSIS NONAUTO W/O SCOPE: CPT | Performed by: INTERNAL MEDICINE

## 2018-10-15 PROCEDURE — 87077 CULTURE AEROBIC IDENTIFY: CPT | Performed by: INTERNAL MEDICINE

## 2018-10-15 PROCEDURE — 99213 OFFICE O/P EST LOW 20 MIN: CPT | Performed by: INTERNAL MEDICINE

## 2018-10-15 PROCEDURE — 87186 SC STD MICRODIL/AGAR DIL: CPT | Performed by: INTERNAL MEDICINE

## 2018-10-15 PROCEDURE — 87086 URINE CULTURE/COLONY COUNT: CPT | Performed by: INTERNAL MEDICINE

## 2018-10-15 PROCEDURE — 81001 URINALYSIS AUTO W/SCOPE: CPT | Performed by: INTERNAL MEDICINE

## 2018-10-15 RX ORDER — PHENAZOPYRIDINE HYDROCHLORIDE 200 MG/1
200 TABLET, FILM COATED ORAL
Qty: 6 TABLET | Refills: 0 | Status: SHIPPED | OUTPATIENT
Start: 2018-10-15 | End: 2018-10-15 | Stop reason: SDUPTHER

## 2018-10-15 RX ORDER — PHENAZOPYRIDINE HYDROCHLORIDE 200 MG/1
200 TABLET, FILM COATED ORAL
Qty: 6 TABLET | Refills: 0 | Status: SHIPPED | OUTPATIENT
Start: 2018-10-15 | End: 2018-11-02

## 2018-10-15 RX ORDER — FLUCONAZOLE 150 MG/1
TABLET ORAL
Qty: 2 TABLET | Refills: 0 | Status: SHIPPED | OUTPATIENT
Start: 2018-10-15 | End: 2018-10-15 | Stop reason: SDUPTHER

## 2018-10-15 RX ORDER — FLUCONAZOLE 150 MG/1
TABLET ORAL
Qty: 2 TABLET | Refills: 0 | Status: SHIPPED | OUTPATIENT
Start: 2018-10-15 | End: 2018-10-15

## 2018-10-15 NOTE — PROGRESS NOTES
Assessment/Plan:    No problem-specific Assessment & Plan notes found for this encounter  Diagnoses and all orders for this visit:    Dysuria  -     phenazopyridine (PYRIDIUM) 200 mg tablet; Take 1 tablet (200 mg total) by mouth 3 (three) times a day with meals  -     UA w Reflex to Microscopic w Reflex to Culture  -     POCT urine dip    Urinary frequency  -     phenazopyridine (PYRIDIUM) 200 mg tablet; Take 1 tablet (200 mg total) by mouth 3 (three) times a day with meals  -     UA w Reflex to Microscopic w Reflex to Culture  -     POCT urine dip    Vaginal itching  -     fluconazole (DIFLUCAN) 150 mg tablet; One pill now and may repeat in one week in needed  -     UA w Reflex to Microscopic w Reflex to Culture  -     POCT urine dip    Drug intolerance  -     phenazopyridine (PYRIDIUM) 200 mg tablet; Take 1 tablet (200 mg total) by mouth 3 (three) times a day with meals  -     UA w Reflex to Microscopic w Reflex to Culture  -     POCT urine dip      A/P: Urine dip with leuko only  Will send of for UA  Hold on abx and will try increase po fluids, cranberry juice, and pyridium  ??vaginal candidiasis and will try diflucan  Will place keflex in the allergies  Keep f/u as scheduled  Subjective:      Patient ID: Jake Pearson is a 80 y o  female  WF presents with her grandaughter due to ongoing urinary issues  Pt seen at the  end of 9/18 and given keflex for a ? UTI  C/s grew out less than 100K organism  Pt was suppose to switch, due to a PCN allergy, to doxy, but didn't get the script  Reports not feeling well on the keflex  Still with some burning with urination, frequency, and incomplete emptying  No fevers or chills  No gross blood  No flank pain  Notes some vaginal itching w/o d/c  Urinary Tract Infection    Associated symptoms include frequency and urgency  Pertinent negatives include no chills, flank pain, hematuria, nausea or vomiting         The following portions of the patient's history were reviewed and updated as appropriate:   She  has a past medical history of Abnormal blood sugar (03/13/2017); Abnormal carotid duplex scan; Anxiety; Cervical radiculopathy (08/08/2017); CHF (congestive heart failure) ([place] [address] ); COPD (chronic obstructive pulmonary disease) ([place] [address] ) (2/6/2018); Coronary angioplasty status; Coronary artery disease (4/7/2017); Costochondritis (02/05/2013); Dyslipidemia; GERD without esophagitis (8/30/2012); H/O CT scan of chest; History of Holter monitoring; echocardiogram; echocardiogram (08/16/2017); Hypertension; Iron deficiency anemia (4/21/2016); Malignant melanoma of skin ([place] [address] ) (9/17/2012); Mixed hyperlipidemia; NSTEMI (non-ST elevated myocardial infarction) ([place] [address] ) (7/25/2017); Old MI (myocardial infarction); Osteoarthritis (9/17/2012); Osteoporosis (3/13/2017); and 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[place] [address] ) (04/07/2017)    She   Patient Active Problem List    Diagnosis Date Noted    Old MI (myocardial infarction) 08/09/2018    Dyslipidemia 08/09/2018    Coronary angioplasty status 06/28/2018    Anxiety 05/07/2018    Hyperlipidemia, mixed 02/06/2018    COPD (chronic obstructive pulmonary disease) ([place] [address] ) 02/06/2018    Non-STEMI (non-ST elevated myocardial infarction) ([place] [address] ) 07/25/2017    Gait abnormality 05/31/2017    Coronary artery disease 04/07/2017    ST elevation myocardial infarction involving right coronary artery ([place] [address] ) 03/24/2017    Compression fracture of thoracic vertebra, closed, initial encounter ([place] [address] ) 03/13/2017    Constipation 04/21/2016    Iron deficiency anemia 04/21/2016    Proteinuria 01/06/2015    Glucose intolerance (no malabsorption) 02/06/2014    Vitamin D deficiency 01/07/2013    Allergic rhinitis 09/17/2012    Benign colon polyp 09/17/2012    Cataract 09/17/2012    Fibrocystic breast disease, unspecified laterality 09/17/2012    Essential hypertension 09/17/2012    Malignant melanoma of skin ([place] [address] ) 09/17/2012    Urinary incontinence 09/17/2012    Hiatal hernia with GERD without esophagitis 08/30/2012     She  has a past surgical history that includes Breast surgery; Cardiac catheterization (03/24/2017); INSERTION STENT ARTERY (04/07/2017); Cholecystectomy; Hemorrhoid surgery; Coronary stent placement (03/25/2017); Skin biopsy; Tonsillectomy; Abdominal surgery; and Colonoscopy (N/A, 7/25/2018)  Her family history includes Heart failure in her mother; Prostate cancer in her father; Stroke in her family  She  reports that she has quit smoking  She has never used smokeless tobacco  She reports that she does not drink alcohol or use drugs  Current Outpatient Prescriptions   Medication Sig Dispense Refill    aspirin 81 mg chewable tablet Chew 1 tablet (81 mg total) daily 30 tablet 0    atorvastatin (LIPITOR) 40 mg tablet Take 1 tablet (40 mg total) by mouth daily 90 tablet 3    Calcium Carbonate-Vitamin D 600-125 MG-UNIT TABS Take by mouth daily        ferrous sulfate 325 (65 Fe) mg tablet Take 1 tablet by mouth daily      fluconazole (DIFLUCAN) 150 mg tablet One pill now and may repeat in one week in needed   2 tablet 0    furosemide (LASIX) 20 mg tablet Take 1 tablet (20 mg total) by mouth daily 90 tablet 1    LORazepam (ATIVAN) 0 5 mg tablet Take 1 tablet (0 5 mg total) by mouth every 8 (eight) hours as needed for anxiety 90 tablet 0    metoprolol tartrate (LOPRESSOR) 25 mg tablet 25 mg Take 1/2 tablet BID      MULTIPLE VITAMINS-CALCIUM PO Take by mouth daily      phenazopyridine (PYRIDIUM) 200 mg tablet Take 1 tablet (200 mg total) by mouth 3 (three) times a day with meals 6 tablet 0    polyethylene glycol (GLYCOLAX) powder Take 17 g by mouth 2 (two) times a day as needed (prn for constipation) 850 g 0    Potassium 99 MG TABS Take by mouth daily      psyllium (METAMUCIL) packet Take 1 packet by mouth daily 30 each 0    ranitidine (ZANTAC) 150 mg tablet Take 1 tablet (150 mg total) by mouth 2 (two) times a day 60 tablet 0     No current facility-administered medications for this visit  Current Outpatient Prescriptions on File Prior to Visit   Medication Sig    aspirin 81 mg chewable tablet Chew 1 tablet (81 mg total) daily    atorvastatin (LIPITOR) 40 mg tablet Take 1 tablet (40 mg total) by mouth daily    Calcium Carbonate-Vitamin D 600-125 MG-UNIT TABS Take by mouth daily      ferrous sulfate 325 (65 Fe) mg tablet Take 1 tablet by mouth daily    furosemide (LASIX) 20 mg tablet Take 1 tablet (20 mg total) by mouth daily    LORazepam (ATIVAN) 0 5 mg tablet Take 1 tablet (0 5 mg total) by mouth every 8 (eight) hours as needed for anxiety    metoprolol tartrate (LOPRESSOR) 25 mg tablet 25 mg Take 1/2 tablet BID    MULTIPLE VITAMINS-CALCIUM PO Take by mouth daily    polyethylene glycol (GLYCOLAX) powder Take 17 g by mouth 2 (two) times a day as needed (prn for constipation)    Potassium 99 MG TABS Take by mouth daily    psyllium (METAMUCIL) packet Take 1 packet by mouth daily    ranitidine (ZANTAC) 150 mg tablet Take 1 tablet (150 mg total) by mouth 2 (two) times a day     No current facility-administered medications on file prior to visit  She is allergic to ciprofloxacin; keflex [cephalexin]; nitrofurantoin; and penicillins       Review of Systems   Constitutional: Negative for activity change, chills, diaphoresis, fatigue and fever  Respiratory: Negative for cough, chest tightness, shortness of breath and wheezing  Cardiovascular: Negative for chest pain, palpitations and leg swelling  Gastrointestinal: Negative for abdominal pain, constipation, diarrhea, nausea and vomiting  Genitourinary: Positive for dysuria, frequency and urgency  Negative for difficulty urinating, flank pain, hematuria and vaginal discharge  Musculoskeletal: Negative for arthralgias, gait problem and myalgias  Neurological: Negative for light-headedness and headaches  Psychiatric/Behavioral: Negative for confusion   The patient is not nervous/anxious  Objective:      /72   Pulse 80   Temp (!) 97 2 °F (36 2 °C) (Tympanic)   Resp 18   Ht 5' 2" (1 575 m)   Wt 73 5 kg (162 lb)   BMI 29 63 kg/m²          Physical Exam   Constitutional: She is oriented to person, place, and time  She appears well-developed and well-nourished  No distress  HENT:   Head: Normocephalic and atraumatic  Mouth/Throat: Oropharynx is clear and moist    Eyes: Pupils are equal, round, and reactive to light  Conjunctivae and EOM are normal    Cardiovascular: Normal rate, regular rhythm and normal heart sounds  No murmur heard  Pulmonary/Chest: Effort normal and breath sounds normal  No respiratory distress  She has no wheezes  Abdominal: Soft  Bowel sounds are normal  She exhibits no distension  There is no tenderness  Neurological: She is alert and oriented to person, place, and time  Psychiatric: She has a normal mood and affect  Her behavior is normal  Judgment and thought content normal    Nursing note and vitals reviewed

## 2018-10-15 NOTE — PATIENT INSTRUCTIONS
Dysuria   AMBULATORY CARE:   Dysuria  is trouble urinating, or pain, burning, or discomfort when you urinate  Dysuria is usually a symptom of another problem, such as a blockage or urinary tract infection  Common symptoms include the following:   · Fever     · Cloudy, bad smelling urine     · Urge to urinate often but urinating little     · Back, side, or abdominal pain     · Blood in your urine     · Discharge that smells bad     · Itching  Seek care immediately if:   · You have severe back, side, or abdominal pain  · You have fever and shaking chills  · You vomit several times in a row  Contact your healthcare provider if:   · Your symptoms do not go away, even after treatment  · You have questions or concerns about your condition or care  Treatment for dysuria  may include medicines to treat a bacterial infection or help decrease bladder spasms  Manage your dysuria:   · Drink more liquids  Liquids help flush out bacteria that may be causing an infection  Ask your healthcare provider how much liquid to drink each day and which liquids are best for you  · Take sitz baths as directed  Fill a bathtub with 4 to 6 inches of warm water  You may also use a sitz bath pan that fits over a toilet  Sit in the sitz bath for 20 minutes  Do this 2 to 3 times a day, or as directed  The warm water can help decrease pain and swelling  Follow up with your healthcare provider as directed:  Write down your questions so you remember to ask them during your visits  © 2017 [employer] Information is for End User's use only and may not be sold, redistributed or otherwise used for commercial purposes  All illustrations and images included in CareNotes® are the copyrighted property of [employer]  or [employer]  The above information is an  only  It is not intended as medical advice for individual conditions or treatments   Talk to your doctor, nurse or pharmacist before following any medical regimen to see if it is safe and effective for you

## 2018-10-17 LAB
BACTERIA UR CULT: ABNORMAL
BACTERIA UR CULT: ABNORMAL

## 2018-11-02 ENCOUNTER — OFFICE VISIT (OUTPATIENT)
Dept: URGENT CARE | Age: 83
End: 2018-11-02
Payer: MEDICARE

## 2018-11-02 VITALS
HEIGHT: 62 IN | DIASTOLIC BLOOD PRESSURE: 80 MMHG | SYSTOLIC BLOOD PRESSURE: 160 MMHG | RESPIRATION RATE: 18 BRPM | HEART RATE: 78 BPM | TEMPERATURE: 98.4 F | OXYGEN SATURATION: 94 % | WEIGHT: 166.4 LBS | BODY MASS INDEX: 30.62 KG/M2

## 2018-11-02 DIAGNOSIS — N39.0 ACUTE URINARY TRACT INFECTION: Primary | ICD-10-CM

## 2018-11-02 DIAGNOSIS — R30.0 DYSURIA: ICD-10-CM

## 2018-11-02 LAB
SL AMB  POCT GLUCOSE, UA: NEGATIVE
SL AMB LEUKOCYTE ESTERASE,UA: ABNORMAL
SL AMB POCT BILIRUBIN,UA: NEGATIVE
SL AMB POCT BLOOD,UA: NEGATIVE
SL AMB POCT CLARITY,UA: ABNORMAL
SL AMB POCT COLOR,UA: ABNORMAL
SL AMB POCT KETONES,UA: NEGATIVE
SL AMB POCT NITRITE,UA: POSITIVE
SL AMB POCT PH,UA: 6.5
SL AMB POCT SPECIFIC GRAVITY,UA: 1.01
SL AMB POCT URINE PROTEIN: NEGATIVE
SL AMB POCT UROBILINOGEN: 0.2

## 2018-11-02 PROCEDURE — G0463 HOSPITAL OUTPT CLINIC VISIT: HCPCS | Performed by: FAMILY MEDICINE

## 2018-11-02 PROCEDURE — 81002 URINALYSIS NONAUTO W/O SCOPE: CPT | Performed by: FAMILY MEDICINE

## 2018-11-02 PROCEDURE — 87086 URINE CULTURE/COLONY COUNT: CPT | Performed by: FAMILY MEDICINE

## 2018-11-02 PROCEDURE — 99213 OFFICE O/P EST LOW 20 MIN: CPT | Performed by: FAMILY MEDICINE

## 2018-11-02 RX ORDER — DOXYCYCLINE 100 MG/1
100 TABLET ORAL 2 TIMES DAILY
Qty: 20 TABLET | Refills: 0 | Status: SHIPPED | OUTPATIENT
Start: 2018-11-02 | End: 2018-11-12

## 2018-11-02 NOTE — PROGRESS NOTES
[facility]        NAME: Jake Pearson is a 80 y o  female  : 1925    MRN: [id number]  DATE: 2018  TIME: 3:28 PM    Assessment and Plan   Acute urinary tract infection [N39 0]  1  Acute urinary tract infection  doxycycline (ADOXA) 100 MG tablet   2  Dysuria  POCT urine dip         Patient Instructions     Patient Instructions   Options discussed with patient and daughter  Doxycycline twice a day until finished (please take probiotics)  Increase fluids (cranberry juice)  Recheck/follow-up with family physician  Please go to the hospital emergency department if needed  Follow up with PCP in 3-5 days  Proceed to  ER if symptoms worsen  Chief Complaint     Chief Complaint   Patient presents with    Urinary Frequency     Had UTI several weeks ago - finished Abx and states s/s resolved  Last night - noted  urinary frequency and suprapubic pressure  Denies burning or fever  History of Present Illness       Patient with recurrent urinary tract infections; patient with urinary frequency and suprapubic pressure; patient denies chills/fever, nausea/vomiting, or back pain; patient denies vaginal discharge or lesions; patient was recently on doxycycline with improvement of symptoms        Review of Systems   Review of Systems   Constitutional: Negative for chills and fever  Gastrointestinal: Negative for nausea and vomiting  Genitourinary: Positive for frequency  Negative for vaginal discharge  Suprapubic discomfort   Musculoskeletal: Negative for back pain           Current Medications       Current Outpatient Prescriptions:     aspirin 81 mg chewable tablet, Chew 1 tablet (81 mg total) daily, Disp: 30 tablet, Rfl: 0    atorvastatin (LIPITOR) 40 mg tablet, Take 1 tablet (40 mg total) by mouth daily, Disp: 90 tablet, Rfl: 3    Calcium Carbonate-Vitamin D 600-125 MG-UNIT TABS, Take by mouth daily  , Disp: , Rfl:     ferrous sulfate 325 (65 Fe) mg tablet, Take 0 5 tablets by mouth daily  , Disp: , Rfl:     furosemide (LASIX) 20 mg tablet, Take 1 tablet (20 mg total) by mouth daily, Disp: 90 tablet, Rfl: 1    LORazepam (ATIVAN) 0 5 mg tablet, Take 1 tablet (0 5 mg total) by mouth every 8 (eight) hours as needed for anxiety, Disp: 90 tablet, Rfl: 0    metoprolol tartrate (LOPRESSOR) 25 mg tablet, 25 mg Take 1/2 tablet BID, Disp: , Rfl:     MULTIPLE VITAMINS-CALCIUM PO, Take by mouth daily, Disp: , Rfl:     polyethylene glycol (GLYCOLAX) powder, Take 17 g by mouth 2 (two) times a day as needed (prn for constipation), Disp: 850 g, Rfl: 0    Potassium 99 MG TABS, Take by mouth daily, Disp: , Rfl:     psyllium (METAMUCIL) packet, Take 1 packet by mouth daily, Disp: 30 each, Rfl: 0    ranitidine (ZANTAC) 150 mg tablet, Take 1 tablet (150 mg total) by mouth 2 (two) times a day, Disp: 60 tablet, Rfl: 0    doxycycline (ADOXA) 100 MG tablet, Take 1 tablet (100 mg total) by mouth 2 (two) times a day for 20 doses, Disp: 20 tablet, Rfl: 0    Current Allergies     Allergies as of 11/02/2018 - Reviewed 11/02/2018   Allergen Reaction Noted    Ciprofloxacin  04/21/2012    Keflex [cephalexin] Dizziness 10/15/2018    Nitrofurantoin  04/21/2012    Penicillins Rash, Other (See Comments), and Throat Swelling 09/17/2012            The following portions of the patient's history were reviewed and updated as appropriate: allergies, current medications, past family history, past medical history, past social history, past surgical history and problem list      Past Medical History:   Diagnosis Date    Abnormal blood sugar 03/13/2017    Abnormal carotid duplex scan     Carotid Duplex (Plaque formation is quite prominent bilaterally     Despite these changes, no evidence for greater than 50% diameter reducing lesions in the cervical portion of either carotid artery hemisystem ) - 6/13/2017    Anxiety     Cervical radiculopathy 08/08/2017    CHF (congestive heart failure) [facility] [address] ) diastolic    COPD (chronic obstructive pulmonary disease) ([facility] [address] ) 2/6/2018    Coronary angioplasty status     Coronary artery disease 4/7/2017    Costochondritis 02/05/2013    suspect MS in origin given relationship to ROM and location  Start mobic and if persists, reeval  Refused xrayat this time   Dyslipidemia     GERD without esophagitis 8/30/2012    H/O CT scan of chest      (No PE, minimal interstitial change left lower lobe and right upper lobe, which may be acute ) - 5/19/2017    History of Holter monitoring     Holter (Sinus rhythm with rates ranging from 52 to 118 bpm   No afib or heart block  Rare atrial and ventricular ectopic beats  One six-beat atrial run noted  No pauses  ) - 5/31/2017    Hx of echocardiogram     Echo (EF 0 60 (60%), Biatrial enlargement ) - 3/24/2017 Echo (EF 0 55 (55%), mild LVH  Aortic valvular sclerosis  Trace MI with mild left atrial dilatation, mild MAC  Mild TI with mild pulmonary hypertension  ) - 5/22/2017 Echo (EF 0 60 (60%), Normal left vent chamber size and systolic function  Mild diastolic dysfunction of LV w/normal filling pressures  Mild mitral regurg ) - 7/26/2017 Echo (EF 0    Hx of echocardiogram 08/16/2017    EF0 60 (60%) Normal left vent chamber size and systolic function of LV w/normal filling presures  Mild mitral regurg  / EF0 50 (50%) Mild LVH   MIld MR 10/6/17     Hypertension     Iron deficiency anemia 4/21/2016    Malignant melanoma of skin ([facility] [address] ) 9/17/2012    Mixed hyperlipidemia     NSTEM[facility]non-ST [facility] 7/25/2017    Old MI (myocardial infarction)     Osteoarthritis 9/17/2012    Osteoporosis 3/13/2017    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[facility] [address] ) 04/07/2017    Urinary tract infection     frequent UTI's       Past Surgical History:   Procedure Laterality Date    ABDOMINAL SURGERY      BREAST SURGERY      Lumpectomy    CARDIAC CATHETERIZATION  03/24/2017    ARNIE to 100% occluded prox RCA, chronic 99% ostial LCfx, 60% mid LAD, discrete 40% distal LM / EF0 60 (60%) 100% occluded proximal RCA s/p ARNIE, chronic 99% ostial LCX, 60% mid LAD, discrete 40% dital LM  4/5/17    CHOLECYSTECTOMY      COLONOSCOPY N/A 7/25/2018    Procedure: COLONOSCOPY;  Surgeon: Bogdan Fitch MD;  [facility] MAIN OR;  Service: Gastroenterology    CORONARY STENT PLACEMENT  03/25/2017    ARNIE RCA    HEMORRHOID SURGERY      INSERTION STENT ARTERY  04/07/2017    Cardio vascular agents drug-eluting stent    SKIN BIOPSY      TONSILLECTOMY         Family History   Problem Relation Age of Onset    Heart failure Mother     Prostate cancer Father     Stroke Family          Medications have been verified  Objective   /80 (BP Location: Right arm, Patient Position: Sitting, Cuff Size: Standard)   Pulse 78   Temp 98 4 °F (36 9 °C) (Oral)   Resp 18   Ht 5' 2" (1 575 m)   Wt 75 5 kg (166 lb 6 4 oz)   SpO2 94%   BMI 30 43 kg/m²        Physical Exam     Physical Exam   Constitutional: She is oriented to person, place, and time  She appears well-developed and well-nourished  HENT:   Mouth/Throat: Oropharynx is clear and moist    Cardiovascular: Normal rate, regular rhythm and normal heart sounds  Pulmonary/Chest: Effort normal and breath sounds normal    Abdominal: Soft  She exhibits no distension  There is no tenderness  There is no rebound and no guarding  Neurological: She is alert and oriented to person, place, and time  No nuchal rigidity   Skin: Skin is warm  Fair color and turgor   Psychiatric: She has a normal mood and affect  Her behavior is normal    Nursing note and vitals reviewed

## 2018-11-02 NOTE — PATIENT INSTRUCTIONS
Options discussed with patient and daughter  Doxycycline twice a day until finished (please take probiotics)  Increase fluids (cranberry juice)  Recheck/follow-up with family physician  Please go to the hospital emergency department if needed

## 2018-11-03 LAB — BACTERIA UR CULT: NORMAL

## 2018-11-09 DIAGNOSIS — K21.9 GASTROESOPHAGEAL REFLUX DISEASE WITHOUT ESOPHAGITIS: ICD-10-CM

## 2018-11-09 RX ORDER — RANITIDINE 150 MG/1
150 TABLET ORAL 2 TIMES DAILY
Qty: 60 TABLET | Refills: 3 | Status: SHIPPED | OUTPATIENT
Start: 2018-11-09 | End: 2019-03-11 | Stop reason: SDUPTHER

## 2018-11-12 DIAGNOSIS — F41.1 GENERALIZED ANXIETY DISORDER: ICD-10-CM

## 2018-11-12 RX ORDER — LORAZEPAM 0.5 MG/1
0.5 TABLET ORAL EVERY 8 HOURS PRN
Qty: 90 TABLET | Refills: 0 | Status: SHIPPED | OUTPATIENT
Start: 2018-11-12 | End: 2019-01-07 | Stop reason: SDUPTHER

## 2018-11-14 ENCOUNTER — OFFICE VISIT (OUTPATIENT)
Dept: INTERNAL MEDICINE CLINIC | Facility: CLINIC | Age: 83
End: 2018-11-14
Payer: MEDICARE

## 2018-11-14 ENCOUNTER — APPOINTMENT (OUTPATIENT)
Dept: LAB | Facility: CLINIC | Age: 83
End: 2018-11-14
Payer: MEDICARE

## 2018-11-14 VITALS
RESPIRATION RATE: 18 BRPM | HEART RATE: 80 BPM | HEIGHT: 62 IN | OXYGEN SATURATION: 93 % | TEMPERATURE: 98.3 F | SYSTOLIC BLOOD PRESSURE: 138 MMHG | DIASTOLIC BLOOD PRESSURE: 80 MMHG | BODY MASS INDEX: 30.18 KG/M2 | WEIGHT: 164 LBS

## 2018-11-14 DIAGNOSIS — R35.0 URINARY FREQUENCY: ICD-10-CM

## 2018-11-14 DIAGNOSIS — N39.0 URINARY TRACT INFECTION WITHOUT HEMATURIA, SITE UNSPECIFIED: Primary | ICD-10-CM

## 2018-11-14 LAB
BACTERIA UR QL AUTO: ABNORMAL /HPF
BILIRUB UR QL STRIP: NEGATIVE
CLARITY UR: CLEAR
COLOR UR: YELLOW
GLUCOSE UR STRIP-MCNC: NEGATIVE MG/DL
HGB UR QL STRIP.AUTO: NEGATIVE
HYALINE CASTS #/AREA URNS LPF: ABNORMAL /LPF
KETONES UR STRIP-MCNC: NEGATIVE MG/DL
LEUKOCYTE ESTERASE UR QL STRIP: ABNORMAL
NITRITE UR QL STRIP: NEGATIVE
NON-SQ EPI CELLS URNS QL MICRO: ABNORMAL /HPF
PH UR STRIP.AUTO: 6.5 [PH] (ref 4.5–8)
PROT UR STRIP-MCNC: NEGATIVE MG/DL
RBC #/AREA URNS AUTO: ABNORMAL /HPF
SP GR UR STRIP.AUTO: 1.01 (ref 1–1.03)
UROBILINOGEN UR QL STRIP.AUTO: 0.2 E.U./DL
WBC #/AREA URNS AUTO: ABNORMAL /HPF

## 2018-11-14 PROCEDURE — 99213 OFFICE O/P EST LOW 20 MIN: CPT | Performed by: INTERNAL MEDICINE

## 2018-11-14 PROCEDURE — 81001 URINALYSIS AUTO W/SCOPE: CPT | Performed by: INTERNAL MEDICINE

## 2018-11-14 NOTE — PROGRESS NOTES
Assessment/Plan:    No problem-specific Assessment & Plan notes found for this encounter  Diagnoses and all orders for this visit:    Urinary tract infection without hematuria, site unspecified  -     UA w Reflex to Microscopic w Reflex to Culture    Urinary frequency  -     UA w Reflex to Microscopic w Reflex to Culture      A/P: Doing well and don't feel she has a UTI  Suspect it is more from the lasix  Will recheck the URI  Discussed OAB, but not classic and declines med trial  Will check with cards to see about possibly changing her water pill  Continue treatment and keep f/u in January  Subjective:      Patient ID: Floyd Driscoll is a 80 y o  female  WF RTC with her relative for f/u urinary s/s  Pt treated several weeks ago for a UTI and then seen in the UC due to s/s persisting  Treated again and is here for f/u  Continues with some frequency, but no incontinence, dysuria, hematuria, etc  NO fever or chills  No flank pain  Pt now on lasix for CHF  Last urine culture was negative  The following portions of the patient's history were reviewed and updated as appropriate:   She  has a past medical history of Abnormal blood sugar (03/13/2017); Abnormal carotid duplex scan; Anxiety; Cervical radiculopathy (08/08/2017); CHF (congestive heart failure) ([facility] [address] [facility] COPD (chronic obstructive pulmonary disease) ([facility] [address] ) (2/6/2018); Coronary angioplasty status; Coronary artery disease (4/7/2017); Costochondritis (02/05/2013); Dyslipidemia; GERD without esophagitis (8/30/2012); H/O CT scan of chest; History of Holter monitoring; echocardiogram; echocardiogram (08/16/2017); Hypertension; Iron deficiency anemia (4/21/2016); Malignant melanoma of skin ([facility] [address] ) (9/17/2012); Mixed hyperlipidemia; NSTEMI (non-ST elevated myocardial infarction) ([facility] [address] ) (7/25/2017); Old MI (myocardial infarction); Osteoarthritis (9/17/2012); Osteoporosis (3/13/2017);  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[facility] [address] ) (04/07/2017); and Urinary tract infection  She   Patient Active Problem List    Diagnosis Date Noted    Old MI (myocardial infarction) 08/09/2018    Dyslipidemia 08/09/2018    Coronary angioplasty status 06/28/2018    Anxiety 05/07/2018    Hyperlipidemia, mixed 02/06/2018    COPD (chronic obstructive pulmonary disease) ([place] [address] ) 02/06/2018    Non-STEMI (non-ST elevated myocardial infarction) ([place] [address] ) 07/25/2017    Gait abnormality 05/31/2017    Coronary artery disease 04/07/2017    ST elevation myocardial infarction involving right coronary artery ([place] [address] ) 03/24/2017    Compression fracture of thoracic vertebra, closed, initial encounter ([place] [address] ) 03/13/2017    Constipation 04/21/2016    Iron deficiency anemia 04/21/2016    Proteinuria 01/06/2015    Glucose intolerance (no malabsorption) 02/06/2014    Vitamin D deficiency 01/07/2013    Allergic rhinitis 09/17/2012    Benign colon polyp 09/17/2012    Cataract 09/17/2012    Fibrocystic breast disease, unspecified laterality 09/17/2012    Essential hypertension 09/17/2012    Malignant melanoma of skin ([place] [address] ) 09/17/2012    Urinary incontinence 09/17/2012    Hiatal hernia with GERD without esophagitis 08/30/2012     She  has a past surgical history that includes Breast surgery; Cardiac catheterization (03/24/2017); INSERTION STENT ARTERY (04/07/2017); Cholecystectomy; Hemorrhoid surgery; Coronary stent placement (03/25/2017); Skin biopsy; Tonsillectomy; Abdominal surgery; and Colonoscopy (N/A, 7/25/2018)  Her family history includes Heart failure in her mother; Prostate cancer in her father; Stroke in her family  She  reports that she has quit smoking  Her smoking use included Cigarettes  She has never used smokeless tobacco  She reports that she drinks alcohol  She reports that she does not use drugs    Current Outpatient Prescriptions   Medication Sig Dispense Refill    aspirin 81 mg chewable tablet Chew 1 tablet (81 mg total) daily 30 tablet 0    atorvastatin (LIPITOR) 40 mg tablet Take 1 tablet (40 mg total) by mouth daily 90 tablet 3    Calcium Carbonate-Vitamin D 600-125 MG-UNIT TABS Take by mouth daily        ferrous sulfate 325 (65 Fe) mg tablet Take 0 5 tablets by mouth daily        furosemide (LASIX) 20 mg tablet Take 1 tablet (20 mg total) by mouth daily 90 tablet 1    LORazepam (ATIVAN) 0 5 mg tablet Take 1 tablet (0 5 mg total) by mouth every 8 (eight) hours as needed for anxiety 90 tablet 0    metoprolol tartrate (LOPRESSOR) 25 mg tablet 25 mg Take 1/2 tablet BID      MULTIPLE VITAMINS-CALCIUM PO Take by mouth daily      polyethylene glycol (GLYCOLAX) powder Take 17 g by mouth 2 (two) times a day as needed (prn for constipation) 850 g 0    Potassium 99 MG TABS Take by mouth daily      psyllium (METAMUCIL) packet Take 1 packet by mouth daily 30 each 0    ranitidine (ZANTAC) 150 mg tablet Take 1 tablet (150 mg total) by mouth 2 (two) times a day 60 tablet 3     No current facility-administered medications for this visit        Current Outpatient Prescriptions on File Prior to Visit   Medication Sig    aspirin 81 mg chewable tablet Chew 1 tablet (81 mg total) daily    atorvastatin (LIPITOR) 40 mg tablet Take 1 tablet (40 mg total) by mouth daily    Calcium Carbonate-Vitamin D 600-125 MG-UNIT TABS Take by mouth daily      ferrous sulfate 325 (65 Fe) mg tablet Take 0 5 tablets by mouth daily      furosemide (LASIX) 20 mg tablet Take 1 tablet (20 mg total) by mouth daily    LORazepam (ATIVAN) 0 5 mg tablet Take 1 tablet (0 5 mg total) by mouth every 8 (eight) hours as needed for anxiety    metoprolol tartrate (LOPRESSOR) 25 mg tablet 25 mg Take 1/2 tablet BID    MULTIPLE VITAMINS-CALCIUM PO Take by mouth daily    polyethylene glycol (GLYCOLAX) powder Take 17 g by mouth 2 (two) times a day as needed (prn for constipation)    Potassium 99 MG TABS Take by mouth daily    psyllium (METAMUCIL) packet Take 1 packet by mouth daily    ranitidine (ZANTAC) 150 mg tablet Take 1 tablet (150 mg total) by mouth 2 (two) times a day     No current facility-administered medications on file prior to visit  She is allergic to ciprofloxacin; keflex [cephalexin]; nitrofurantoin; and penicillins       Review of Systems   Constitutional: Negative for activity change, chills, diaphoresis, fatigue and fever  Respiratory: Negative for cough, chest tightness, shortness of breath and wheezing  Cardiovascular: Negative for chest pain, palpitations and leg swelling  Gastrointestinal: Negative for abdominal pain, constipation, diarrhea, nausea and vomiting  Genitourinary: Negative for difficulty urinating, dysuria, flank pain, frequency, hematuria and urgency  Musculoskeletal: Negative for arthralgias, gait problem and myalgias  Neurological: Negative for dizziness, seizures, syncope, weakness, light-headedness and headaches  Psychiatric/Behavioral: Negative for confusion and dysphoric mood  The patient is not nervous/anxious  Objective:      /80 (BP Location: Left arm, Patient Position: Sitting, Cuff Size: Adult)   Pulse 80   Temp 98 3 °F (36 8 °C) (Tympanic)   Resp 18   Ht 5' 2" (1 575 m)   Wt 74 4 kg (164 lb)   SpO2 93%   BMI 30 00 kg/m²          Physical Exam   Constitutional: She is oriented to person, place, and time  She appears well-developed and well-nourished  No distress  HENT:   Head: Normocephalic and atraumatic  Mouth/Throat: No oropharyngeal exudate  Eyes: Pupils are equal, round, and reactive to light  Conjunctivae and EOM are normal    Neck: Neck supple  No JVD present  Cardiovascular: Normal rate, regular rhythm and normal heart sounds  No murmur heard  Pulmonary/Chest: Effort normal and breath sounds normal  No respiratory distress  She has no wheezes  Abdominal: Soft  Bowel sounds are normal  She exhibits no distension  There is no tenderness  Musculoskeletal: She exhibits no edema     Neurological: She is alert and oriented to person, place, and time  Psychiatric: She has a normal mood and affect  Her behavior is normal  Judgment and thought content normal    Nursing note and vitals reviewed

## 2018-11-19 ENCOUNTER — OFFICE VISIT (OUTPATIENT)
Dept: CARDIOLOGY CLINIC | Facility: CLINIC | Age: 83
End: 2018-11-19
Payer: MEDICARE

## 2018-11-19 VITALS
BODY MASS INDEX: 30 KG/M2 | WEIGHT: 163 LBS | DIASTOLIC BLOOD PRESSURE: 78 MMHG | HEART RATE: 72 BPM | HEIGHT: 62 IN | SYSTOLIC BLOOD PRESSURE: 134 MMHG

## 2018-11-19 DIAGNOSIS — I50.32 CHRONIC DIASTOLIC CONGESTIVE HEART FAILURE (HCC): ICD-10-CM

## 2018-11-19 DIAGNOSIS — I25.10 CORONARY ARTERY DISEASE INVOLVING NATIVE CORONARY ARTERY OF NATIVE HEART WITHOUT ANGINA PECTORIS: Primary | ICD-10-CM

## 2018-11-19 DIAGNOSIS — E78.5 DYSLIPIDEMIA: ICD-10-CM

## 2018-11-19 DIAGNOSIS — I10 ESSENTIAL HYPERTENSION: ICD-10-CM

## 2018-11-19 PROCEDURE — 99214 OFFICE O/P EST MOD 30 MIN: CPT | Performed by: INTERNAL MEDICINE

## 2018-11-19 NOTE — PROGRESS NOTES
Subjective:        Patient ID: Nuno Cole is a 80 y o  female  Chief Complaint:  Valla Dandy is here for routine follow-up  Denies any recurrent hospitalizations since our last visit  She has had no chest pains, no concerning dyspnea, no edema, no palpitations no presyncope or syncope  No new medication changes  She feels she has been doing well as do I  The following portions of the patient's history were reviewed and updated as appropriate: allergies, current medications, past family history, past medical history, past social history, past surgical history and problem list   Review of Systems   Constitution: Negative for chills, diaphoresis, malaise/fatigue and weight gain  HENT: Negative for nosebleeds and stridor  Eyes: Negative for double vision, vision loss in left eye, vision loss in right eye and visual disturbance  Cardiovascular: Negative for chest pain, claudication, cyanosis, dyspnea on exertion, irregular heartbeat, leg swelling, near-syncope, orthopnea, palpitations, paroxysmal nocturnal dyspnea and syncope  Respiratory: Negative for cough, shortness of breath, snoring and wheezing  Endocrine: Negative for polydipsia, polyphagia and polyuria  Hematologic/Lymphatic: Negative for bleeding problem  Does not bruise/bleed easily  Skin: Negative for flushing and rash  Musculoskeletal: Positive for arthritis  Negative for falls and myalgias  Gastrointestinal: Negative for abdominal pain, heartburn, hematemesis, hematochezia, melena and nausea  Genitourinary: Negative for hematuria  Neurological: Negative for brief paralysis, dizziness, focal weakness, headaches, light-headedness, loss of balance and vertigo  Psychiatric/Behavioral: Negative for altered mental status and substance abuse  The patient is nervous/anxious  Allergic/Immunologic: Negative for hives            Objective:      /78   Pulse 72   Ht 5' 2" (1 575 m)   Wt 73 9 kg (163 lb)   BMI 29 81 kg/m² Physical Exam   Constitutional: She is oriented to person, place, and time  She appears well-developed and well-nourished  HENT:   Head: Normocephalic and atraumatic  Eyes: Pupils are equal, round, and reactive to light  EOM are normal    Neck: Normal range of motion  Neck supple  No JVD present  No thyromegaly present  Cardiovascular: Normal rate, regular rhythm, normal heart sounds and intact distal pulses  Exam reveals no gallop and no friction rub  No murmur heard  Pulmonary/Chest: Effort normal and breath sounds normal  No stridor  No respiratory distress  She has no wheezes  She has no rales  Abdominal: Soft  Bowel sounds are normal  She exhibits no mass  There is no tenderness  Musculoskeletal: Normal range of motion  She exhibits no edema  Lymphadenopathy:     She has no cervical adenopathy  Neurological: She is alert and oriented to person, place, and time  Skin: Skin is warm and dry  No rash noted  No pallor  Psychiatric: She has a normal mood and affect  Her behavior is normal  Judgment and thought content normal    Nursing note and vitals reviewed        Lab Review:   Office Visit on 11/14/2018   Component Date Value    Color, UA 11/14/2018 Yellow     Clarity, UA 11/14/2018 Clear     Specific Gravity, UA 11/14/2018 1 009     pH, UA 11/14/2018 6 5     Leukocytes, UA 11/14/2018 Trace*    Nitrite, UA 11/14/2018 Negative     Protein, UA 11/14/2018 Negative     Glucose, UA 11/14/2018 Negative     Ketones, UA 11/14/2018 Negative     Urobilinogen, UA 11/14/2018 0 2     Bilirubin, UA 11/14/2018 Negative     Blood, UA 11/14/2018 Negative     RBC, UA 11/14/2018 None Seen     WBC, UA 11/14/2018 2-4*    Epithelial Cells 11/14/2018 None Seen     Bacteria, UA 11/14/2018 None Seen     Hyaline Casts, UA 11/14/2018 None Seen    Office Visit on 11/02/2018   Component Date Value    LEUKOCYTE ESTERASE,UA 11/02/2018 moderate     NITRITE,UA 11/02/2018 positive     SL AMB POCT UROBILINOGEN 11/02/2018 0 2     POCT URINE PROTEIN 11/02/2018 negative      PH,UA 11/02/2018 6 5     BLOOD,UA 11/02/2018 negative     SPECIFIC GRAVITY,UA 11/02/2018 1 010     KETONES,UA 11/02/2018 negative     BILIRUBIN,UA 11/02/2018 negative     GLUCOSE, UA 11/02/2018 negative      COLOR,UA 11/02/2018 junaid     CLARITY,UA 11/02/2018 hazy     Urine Culture 11/02/2018 30,000-39,000 cfu/ml     Office Visit on 10/15/2018   Component Date Value    Color, UA 10/15/2018 Yellow     Clarity, UA 10/15/2018 Clear     Specific Gravity, UA 10/15/2018 1 012     pH, UA 10/15/2018 7 0     Leukocytes, UA 10/15/2018 Large*    Nitrite, UA 10/15/2018 Negative     Protein, UA 10/15/2018 Negative     Glucose, UA 10/15/2018 Negative     Ketones, UA 10/15/2018 Negative     Urobilinogen, UA 10/15/2018 0 2     Bilirubin, UA 10/15/2018 Negative     Blood, UA 10/15/2018 Negative     LEUKOCYTE ESTERASE,UA 10/15/2018 ++     NITRITE,UA 10/15/2018 neg     SL AMB POCT UROBILINOGEN 10/15/2018 normal     POCT URINE PROTEIN 10/15/2018 neg      PH,UA 10/15/2018 5 0     BLOOD,UA 10/15/2018 neg     SPECIFIC GRAVITY,UA 10/15/2018 1 015     KETONES,UA 10/15/2018 neg     BILIRUBIN,UA 10/15/2018 neg     GLUCOSE, UA 10/15/2018 neg      COLOR,UA 10/15/2018 yellow     CLARITY,UA 10/15/2018 clear     RBC, UA 10/15/2018 None Seen     WBC, UA 10/15/2018 10-20*    Epithelial Cells 10/15/2018 None Seen     Bacteria, UA 10/15/2018 None Seen     Hyaline Casts, UA 10/15/2018 None Seen     Urine Culture 10/15/2018 >100,000 cfu/ml Escherichia coli*    Urine Culture 10/15/2018 3558-2939 cfu/ml     Office Visit on 09/29/2018   Component Date Value     COLOR,UA 09/29/2018 yellow     CLARITY,UA 09/29/2018 clear     SPECIFIC GRAVITY,UA 09/29/2018 1 020      PH,UA 09/29/2018 5     LEUKOCYTE ESTERASE,UA 09/29/2018 lg     NITRITE,UA 09/29/2018 neg     GLUCOSE, UA 09/29/2018 neg     KETONES,UA 09/29/2018 neg     [place] 09/29/2018 neg     BLOOD,UA 09/29/2018 sm     POCT URINE PROTEIN 09/29/2018 neg     SL AMB POCT UROBILINOGEN 09/29/2018 0 2     Urine Culture 09/29/2018 80,000-89,000 cfu/ml Escherichia coli*   Appointment on 08/07/2018   Component Date Value    Sodium 08/07/2018 140     Potassium 08/07/2018 4 0     Chloride 08/07/2018 106     CO2 08/07/2018 28     ANION GAP 08/07/2018 6     BUN 08/07/2018 11     Creatinine 08/07/2018 0 90     Glucose, Fasting 08/07/2018 96     Calcium 08/07/2018 8 5     AST 08/07/2018 19     ALT 08/07/2018 20     Alkaline Phosphatase 08/07/2018 56     Total Protein 08/07/2018 7 0     Albumin 08/07/2018 3 3*    Total Bilirubin 08/07/2018 0 73     eGFR 08/07/2018 55     WBC 08/07/2018 6 29     RBC 08/07/2018 4 27     Hemoglobin 08/07/2018 12 0     Hematocrit 08/07/2018 40 3     MCV 08/07/2018 94     MCH 08/07/2018 28 1     MCHC 08/07/2018 29 8*    RDW 08/07/2018 14 2     MPV 08/07/2018 10 3     Platelets 49/51/6996 450*    nRBC 08/07/2018 0     Neutrophils Relative 08/07/2018 50     Immat GRANS % 08/07/2018 0     Lymphocytes Relative 08/07/2018 38     Monocytes Relative 08/07/2018 8     Eosinophils Relative 08/07/2018 3     Basophils Relative 08/07/2018 1     Neutrophils Absolute 08/07/2018 3 13     Immature Grans Absolute 08/07/2018 0 02     Lymphocytes Absolute 08/07/2018 2 40     Monocytes Absolute 08/07/2018 0 52     Eosinophils Absolute 08/07/2018 0 17     Basophils Absolute 08/07/2018 0 05    Admission on 07/29/2018, Discharged on 07/31/2018   Component Date Value    Sodium 07/29/2018 139     Potassium 07/29/2018 3 7     Chloride 07/29/2018 99     CO2 07/29/2018 33*    ANION GAP 07/29/2018 7     BUN 07/29/2018 10     Creatinine 07/29/2018 0 81     Glucose 07/29/2018 165*    Calcium 07/29/2018 9 3     AST 07/29/2018 27     ALT 07/29/2018 17     Alkaline Phosphatase 07/29/2018 43*    Total Protein 07/29/2018 6 4     Albumin 07/29/2018 3 6  Total Bilirubin 07/29/2018 0 60     eGFR 07/29/2018 63     WBC 07/29/2018 6 80     RBC 07/29/2018 3 93     Hemoglobin 07/29/2018 11 7*    Hematocrit 07/29/2018 34 6*    MCV 07/29/2018 88     MCH 07/29/2018 29 9     MCHC 07/29/2018 34 0     RDW 07/29/2018 14 5     MPV 07/29/2018 8 1*    Platelets 35/69/8575 295     nRBC 07/29/2018 0     Neutrophils Relative 07/29/2018 50     Lymphocytes Relative 07/29/2018 35     Monocytes Relative 07/29/2018 12     Eosinophils Relative 07/29/2018 3     Basophils Relative 07/29/2018 1     Neutrophils Absolute 07/29/2018 3 40     Lymphocytes Absolute 07/29/2018 2 40     Monocytes Absolute 07/29/2018 0 80     Eosinophils Absolute 07/29/2018 0 20     Basophils Absolute 07/29/2018 0 00     Protime 07/29/2018 12 2     INR 07/29/2018 1 05     PTT 07/29/2018 26     Troponin I 07/29/2018 2 57*    BNP 07/29/2018 1412*    Color, UA 07/29/2018 Yellow     Clarity, UA 07/29/2018 Clear     Specific Gravity, UA 07/29/2018 1 010     pH, UA 07/29/2018 6 5     Leukocytes, UA 07/29/2018 Negative     Nitrite, UA 07/29/2018 Negative     Protein, UA 07/29/2018 Negative     Glucose, UA 07/29/2018 Negative     Ketones, UA 07/29/2018 Negative     Urobilinogen, UA 07/29/2018 0 2     Bilirubin, UA 07/29/2018 Negative     Blood, UA 07/29/2018 Negative     Troponin I 07/29/2018 2 40*    Troponin I 07/30/2018 2 56*    Sodium 07/30/2018 142     Potassium 07/30/2018 3 4*    Chloride 07/30/2018 100     CO2 07/30/2018 37*    ANION GAP 07/30/2018 5     BUN 07/30/2018 8     Creatinine 07/30/2018 0 69     Glucose 07/30/2018 114*    Calcium 07/30/2018 9 0     AST 07/30/2018 23     ALT 07/30/2018 15     Alkaline Phosphatase 07/30/2018 38*    Total Protein 07/30/2018 5 8*    Albumin 07/30/2018 3 2*    Total Bilirubin 07/30/2018 0 70     eGFR 07/30/2018 75     Magnesium 07/30/2018 2 0     WBC 07/30/2018 5 80     RBC 07/30/2018 3 72*    Hemoglobin 07/30/2018 11 0*    Hematocrit 07/30/2018 32 4*    MCV 07/30/2018 87     MCH 07/30/2018 29 5     MCHC 07/30/2018 33 9     RDW 07/30/2018 14 7*    MPV 07/30/2018 8 5*    Platelets 67/61/1088 289     nRBC 07/30/2018 0     Neutrophils Relative 07/30/2018 60     Lymphocytes Relative 07/30/2018 25     Monocytes Relative 07/30/2018 12     Eosinophils Relative 07/30/2018 2     Basophils Relative 07/30/2018 1     Neutrophils Absolute 07/30/2018 3 50     Lymphocytes Absolute 07/30/2018 1 40     Monocytes Absolute 07/30/2018 0 70     Eosinophils Absolute 07/30/2018 0 10     Basophils Absolute 07/30/2018 0 00     Protime 07/30/2018 13 0*    INR 07/30/2018 1 12     Ventricular Rate 07/29/2018 76     Atrial Rate 07/29/2018 76     HI Interval 07/29/2018 166     QRSD Interval 07/29/2018 100     QT Interval 07/29/2018 420     QTC Interval 07/29/2018 472     QRS Axis 07/29/2018 14     T Wave Axis 07/29/2018 -31     Ventricular Rate 07/29/2018 92     Atrial Rate 07/29/2018 92     HI Interval 07/29/2018 168     QRSD Interval 07/29/2018 98     QT Interval 07/29/2018 322     QTC Interval 07/29/2018 398     P Axis 07/29/2018 101     QRS Axis 07/29/2018 20     T Wave Axis 07/29/2018 -33     Sodium 07/31/2018 143     Potassium 07/31/2018 3 2*    Chloride 07/31/2018 97*    CO2 07/31/2018 39*    ANION GAP 07/31/2018 7     BUN 07/31/2018 13     Creatinine 07/31/2018 0 88     Glucose 07/31/2018 106*    Calcium 07/31/2018 8 8     eGFR 07/31/2018 57     WBC 07/31/2018 5 80     RBC 07/31/2018 3 98     Hemoglobin 07/31/2018 11 6*    Hematocrit 07/31/2018 34 8     MCV 07/31/2018 88     MCH 07/31/2018 29 2     MCHC 07/31/2018 33 4     RDW 07/31/2018 14 3     MPV 07/31/2018 8 4*    Platelets 91/28/5039 303     nRBC 07/31/2018 0     Neutrophils Relative 07/31/2018 59     Lymphocytes Relative 07/31/2018 24     Monocytes Relative 07/31/2018 13*    Eosinophils Relative 07/31/2018 4     Basophils Relative 07/31/2018 1     Neutrophils Absolute 07/31/2018 3 40     Lymphocytes Absolute 07/31/2018 1 40     Monocytes Absolute 07/31/2018 0 70     Eosinophils Absolute 07/31/2018 0 20     Basophils Absolute 07/31/2018 0 00    Admission on 07/23/2018, Discharged on 07/27/2018   No results displayed because visit has over 200 results  Orders Only on 06/04/2018   Component Date Value    Glucose 06/04/2018 85     BUN 06/04/2018 16     Creatinine 06/04/2018 0 74     Sodium 06/04/2018 139     Potassium 06/04/2018 4 5     Chloride 06/04/2018 100     CO2 06/04/2018 33*    Calcium 06/04/2018 9 4     Anion Gap 06/04/2018 10 5     OSMOLALITY, SERUM 06/04/2018 278     EGFR (HISTORICAL) 06/04/2018 > 60     eGFR  06/04/2018 > 60      No results found  Assessment:       1  Coronary artery disease involving native coronary artery of native heart without angina pectoris     2  Chronic diastolic congestive heart failure ([facility] [address] )     3  Essential hypertension     4  Dyslipidemia          Plan:       Stephanie Meredith is without any symptoms of unstable angina  She examines euvolemic, feel her diuretic dosage is optimized  Blood pressure is well controlled  She is tolerating statin therapy without myalgias  I advise no changes today nor any cardiac testing  Meds ideal   Return office in 4 months, sooner as needed

## 2018-11-19 NOTE — PATIENT INSTRUCTIONS
Heart Failure   AMBULATORY CARE:   Heart failure (HF) is a condition that does not allow your heart to fill or pump properly  Not enough oxygen in your blood gets to your organs and tissues  HF can occur in the right side, the left side, or both lower chambers of your heart  HF is often caused by damage or injury to your heart  The damage may be caused by heart attack, other heart conditions, or high blood pressure  HF is a long-term condition that tends to get worse over time  It is important to manage your health to improve your quality of life  HF can be worsened by heavy alcohol use, smoking, diabetes that is not controlled, or obesity  Common signs and symptoms:   · Difficulty breathing with activity that worsens to difficulty breathing at rest    · Shortness of breath while lying flat    · Severe shortness of breath and coughing at night that usually wakes you     · Chest pain at night    · Periods of no breathing, then breathing fast    · Fatigue or lack of energy (often worsened by physical activity)     · Swelling in your ankles, legs, or abdomen    · Fast heartbeat, purple color around your mouth and nailbeds    · Fingers and toes cool to the touch  Call 911 if:   · You have any of the following signs of a heart attack:      ¨ Squeezing, pressure, or pain in your chest that lasts longer than 5 minutes or returns    ¨ Discomfort or pain in your back, neck, jaw, stomach, or arm     ¨ Trouble breathing    ¨ Nausea or vomiting    ¨ Lightheadedness or a sudden cold sweat, especially with chest pain or trouble breathing    Seek care immediately if:   · You gain 3 or more pounds (1 4 kg) in a day, or more than your healthcare provider says you should  · Your heartbeat is fast, slow, or uneven all the time    Contact your healthcare provider if:   · You have symptoms of worsening HF:      ¨ Shortness of breath at rest, at night, or that is getting worse in any way     ¨ Weight gain of 5 or more pounds (2 2 kg) in a week     ¨ More swelling in your legs or ankles     ¨ Abdominal pain or swelling     ¨ More coughing     ¨ Loss of appetite     ¨ Feeling tired all the time    · You feel hopeless or depressed, or you have lost interest in things you used to enjoy  · You often feel worried or afraid  · You have questions or concerns about your condition or care  Treatment for HF  may include any of the following:  · Medicines  may be needed to help regulate your heart rhythm  You may also need medicines to lower your blood pressure, and to get rid of extra fluids  · Oxygen  may help you breathe easier if your oxygen level is lower than normal  A CPAP machine may be used to keep your airway open while you sleep  · Surgery  can be done to implant a pacemaker in your chest to regulate your heart rhythm  Other types of surgery can open blocked heart vessels, replace a damaged heart valve, or remove scar tissue  Manage or prevent HF:   · Do not smoke  Nicotine and other chemicals in cigarettes and cigars can cause lung damage and make HF difficult to manage  Ask your healthcare provider for information if you currently smoke and need help to quit  E-cigarettes or smokeless tobacco still contain nicotine  Talk to your healthcare provider before you use these products  · Do not drink alcohol or take illegal drugs  Alcohol and drugs can worsen your symptoms quickly  · Weigh yourself every morning  Use the same scale, in the same spot  Do this after you use the bathroom, but before you eat or drink anything  Wear the same type of clothing  Do not wear shoes  Record your weight each day so you will notice any sudden weight gain  Swelling and weight gain are signs of fluid retention  If you are overweight, ask how to lose weight safely  · Check your blood pressure and heart rate every day  Ask for more information about how to measure your blood pressure and heart rate correctly   Ask what these numbers should be for you  · Manage any chronic health conditions you have  These include high blood pressure, diabetes, obesity, high cholesterol, metabolic syndrome, and COPD  You will have fewer symptoms if you manage these health conditions  Follow your healthcare provider's recommendations and follow up with him or her regularly  · Eat heart-healthy foods and limit sodium (salt)  An easy way to do this is to eat more fresh fruits and vegetables and fewer canned and processed foods  Replace butter and margarine with heart-healthy oils such as olive oil and canola oil  Other heart-healthy foods include walnuts, whole-grain breads, low-fat dairy products, beans, and lean meats  Fatty fish such as salmon and tuna are also heart healthy  Ask how much salt you can eat each day  Do not use salt substitutes  · Drink liquids as directed  You may need to limit the amount of liquids you drink if you retain fluid  Ask how much liquid to drink each day and which liquids are best for you  · Stay active  If you are not active, your symptoms are likely to worsen quickly  Walking, bicycling, and other types of physical activity help maintain your strength and improve your mood  Physical activity also helps you manage your weight  Work with your healthcare provider to create an exercise plan that is right for you  · Get vaccines as directed  Get a flu shot every year  You may also need the pneumonia vaccine  The flu and pneumonia can be severe for a person who has HF  Vaccines protect you from these infections  Join a support group:  Living with HF can be difficult  It may be helpful to talk with others who have HF  You may learn how to better manage your condition or get emotional support  For more information:   · [address]  [place] , [place]   Phone: [phone]  Web Address: [email or web address]  org   Follow up with your healthcare provider or cardiologist within 2 weeks or as directed: You may need to return for other tests  You may need home health care  A healthcare provider will monitor your vital signs, weight, and make sure your medicines are working  Write down your questions so you remember to ask them during your visits  © 2017 [address] [employer] Information is for End User's use only and may not be sold, redistributed or otherwise used for commercial purposes  All illustrations and images included in CareNotes® are the copyrighted property of [employer]  or [address]  The above information is an  only  It is not intended as medical advice for individual conditions or treatments  Talk to your doctor, nurse or pharmacist before following any medical regimen to see if it is safe and effective for you

## 2018-11-28 ENCOUNTER — OFFICE VISIT (OUTPATIENT)
Dept: URGENT CARE | Age: 83
End: 2018-11-28
Payer: MEDICARE

## 2018-11-28 VITALS
TEMPERATURE: 97 F | RESPIRATION RATE: 16 BRPM | HEART RATE: 75 BPM | BODY MASS INDEX: 30.18 KG/M2 | OXYGEN SATURATION: 95 % | HEIGHT: 62 IN | WEIGHT: 164 LBS | DIASTOLIC BLOOD PRESSURE: 86 MMHG | SYSTOLIC BLOOD PRESSURE: 180 MMHG

## 2018-11-28 DIAGNOSIS — R35.0 URINARY FREQUENCY: Primary | ICD-10-CM

## 2018-11-28 LAB
SL AMB  POCT GLUCOSE, UA: NEGATIVE
SL AMB LEUKOCYTE ESTERASE,UA: ABNORMAL
SL AMB POCT BILIRUBIN,UA: NEGATIVE
SL AMB POCT BLOOD,UA: NEGATIVE
SL AMB POCT CLARITY,UA: ABNORMAL
SL AMB POCT COLOR,UA: YELLOW
SL AMB POCT KETONES,UA: NEGATIVE
SL AMB POCT NITRITE,UA: NEGATIVE
SL AMB POCT PH,UA: 5
SL AMB POCT SPECIFIC GRAVITY,UA: 1
SL AMB POCT URINE PROTEIN: NEGATIVE
SL AMB POCT UROBILINOGEN: 0.2

## 2018-11-28 PROCEDURE — 99213 OFFICE O/P EST LOW 20 MIN: CPT | Performed by: NURSE PRACTITIONER

## 2018-11-28 PROCEDURE — 87086 URINE CULTURE/COLONY COUNT: CPT | Performed by: NURSE PRACTITIONER

## 2018-11-28 PROCEDURE — G0463 HOSPITAL OUTPT CLINIC VISIT: HCPCS | Performed by: NURSE PRACTITIONER

## 2018-11-28 NOTE — PROGRESS NOTES
[facility]        NAME: Vanessa Evans is a 80 y o  female  : 1925    MRN: [id number]  DATE: 2018  TIME: 8:33 PM    Assessment and Plan   Urinary frequency [R35 0]  1  Urinary frequency  POCT urine dip    Urine culture         Patient Instructions     POCT reveals + leuks, will send out for culture as previous + POCTs but neg culture  Will call [phone] to discuss further once results obtained  Increase water intake  Continue to monitor  Follow up with PCP in 3-5 days  Proceed to  ER if symptoms worsen  Chief Complaint     Chief Complaint   Patient presents with    Possible UTI     Pt c/o urinary frequency for 2 days  History of Present Illness       80year-old female presenting today with c/o increase urinary frequency  No dysuria, urgency, suprapubic discomfort, or flank pain  No f/c  She was recently treated for UTI approximately 3 5 weeks ago with doxycycline; patient with multiple allergies  She reports symptoms resolved following treatment  No urinary or bowel incontinence  Previous urine cultures have shown e coli infection with resistance to bactrim  Review of Systems   Review of Systems   Constitutional: Negative  Respiratory: Negative  Cardiovascular: Negative  Genitourinary: Positive for frequency           Current Medications       Current Outpatient Prescriptions:     aspirin 81 mg chewable tablet, Chew 1 tablet (81 mg total) daily, Disp: 30 tablet, Rfl: 0    atorvastatin (LIPITOR) 40 mg tablet, Take 1 tablet (40 mg total) by mouth daily, Disp: 90 tablet, Rfl: 3    Calcium Carbonate-Vitamin D 600-125 MG-UNIT TABS, Take by mouth daily  , Disp: , Rfl:     ferrous sulfate 325 (65 Fe) mg tablet, Take 0 5 tablets by mouth daily  , Disp: , Rfl:     furosemide (LASIX) 20 mg tablet, Take 1 tablet (20 mg total) by mouth daily, Disp: 90 tablet, Rfl: 1    LORazepam (ATIVAN) 0 5 mg tablet, Take 1 tablet (0 5 mg total) by mouth every 8 (eight) hours as needed for anxiety, Disp: 90 tablet, Rfl: 0    metoprolol tartrate (LOPRESSOR) 25 mg tablet, 25 mg Take 1/2 tablet BID, Disp: , Rfl:     MULTIPLE VITAMINS-CALCIUM PO, Take by mouth daily, Disp: , Rfl:     polyethylene glycol (GLYCOLAX) powder, Take 17 g by mouth 2 (two) times a day as needed (prn for constipation), Disp: 850 g, Rfl: 0    Potassium 99 MG TABS, Take by mouth daily, Disp: , Rfl:     psyllium (METAMUCIL) packet, Take 1 packet by mouth daily, Disp: 30 each, Rfl: 0    ranitidine (ZANTAC) 150 mg tablet, Take 1 tablet (150 mg total) by mouth 2 (two) times a day, Disp: 60 tablet, Rfl: 3    Current Allergies     Allergies as of 11/28/2018 - Reviewed 11/28/2018   Allergen Reaction Noted    Ciprofloxacin  04/21/2012    Keflex [cephalexin] Dizziness 10/15/2018    Nitrofurantoin  04/21/2012    Penicillins Rash, Other (See Comments), and Throat Swelling 09/17/2012            The following portions of the patient's history were reviewed and updated as appropriate: allergies, current medications, past family history, past medical history, past social history, past surgical history and problem list      Past Medical History:   Diagnosis Date    Abnormal blood sugar 03/13/2017    Abnormal carotid duplex scan     Carotid Duplex (Plaque formation is quite prominent bilaterally  Despite these changes, no evidence for greater than 50% diameter reducing lesions in the cervical portion of either carotid artery hemisystem ) - 6/13/2017    Anxiety     Cervical radiculopathy 08/08/2017    [facility]congest[facility])     diastolic    COPD (chronic obstructive pulmonary disease) ([facility] [address] ) 2/6/2018    Coronary angioplasty status     Coronary artery disease 4/7/2017    Costochondritis 02/05/2013    suspect MS in origin given relationship to ROM and location  Start mobic and if persists, reeval  Refused xrayat this time        Dyslipidemia     GERD without esophagitis 8/30/2012    H/O CT scan of chest      (No PE, minimal interstitial change left lower lobe and right upper lobe, which may be acute ) - 5/19/2017    History of Holter monitoring     Holter (Sinus rhythm with rates ranging from 52 to 118 bpm   No afib or heart block  Rare atrial and ventricular ectopic beats  One six-beat atrial run noted  No pauses  ) - 5/31/2017    Hx of echocardiogram     Echo (EF 0 60 (60%), Biatrial enlargement ) - 3/24/2017 Echo (EF 0 55 (55%), mild LVH  Aortic valvular sclerosis  Trace MI with mild left atrial dilatation, mild MAC  Mild TI with mild pulmonary hypertension  ) - 5/22/2017 Echo (EF 0 60 (60%), Normal left vent chamber size and systolic function  Mild diastolic dysfunction of LV w/normal filling pressures  Mild mitral regurg ) - 7/26/2017 Echo (EF 0    Hx of echocardiogram 08/16/2017    EF0 60 (60%) Normal left vent chamber size and systolic function of LV w/normal filling presures  Mild mitral regurg  / EF0 50 (50%) Mild LVH  MIld MR 10/6/17     Hypertension     Iron deficiency anemia 4/21/2016    Malignant melanoma of skin ([facility] [address] ) 9/17/2012    Mixed hyperlipidemia     NSTEMI (non-ST elevated myocardial infarction) ([facility] [address] ) 7/25/2017    Old MI (myocardial infarction)     Osteoarthritis 9/17/2012    Osteoporosis 3/13/2017    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[facility] [address] ) 04/07/2017    Urinary tract infection     frequent UTI's       Past Surgical History:   Procedure Laterality Date    ABDOMINAL SURGERY      BREAST SURGERY      Lumpectomy    CARDIAC CATHETERIZATION  03/24/2017    ARNIE to 100% occluded prox RCA, chronic 99% ostial LCfx, 60% mid LAD, discrete 40% distal LM / EF0 60 (60%) 100% occluded proximal RCA s/p ARNIE, chronic 99% ostial LCX, 60% mid LAD, discrete 40% dital LM  4/5/17    CHOLECYSTECTOMY      COLONOSCOPY N/A 7/25/2018    Procedure: COLONOSCOPY;  Surgeon:  Gema Briceño MD;  Location: [facility] MAIN OR;  Service: Gastroenterology    CORONARY STENT PLACEMENT  03/25/2017    ARNIE RCA   Shaan Stoddard HEMORRHOID SURGERY      INSERTION STENT ARTERY  04/07/2017    Cardio vascular agents drug-eluting stent    SKIN BIOPSY      TONSILLECTOMY         Family History   Problem Relation Age of Onset    Heart failure Mother     Prostate cancer Father     Stroke Family          Medications have been verified  Objective   BP (!) 180/86   Pulse 75   Temp (!) 97 °F (36 1 °C) (Tympanic)   Resp 16   Ht 5' 2" (1 575 m)   Wt 74 4 kg (164 lb)   SpO2 95%   BMI 30 00 kg/m²        Physical Exam     Physical Exam   Constitutional: She is oriented to person, place, and time  She appears well-developed and well-nourished  Cardiovascular: Normal rate, regular rhythm and normal heart sounds  Pulmonary/Chest: Effort normal and breath sounds normal    Abdominal: Soft  Bowel sounds are normal    Neurological: She is alert and oriented to person, place, and time  Skin: Skin is warm and dry  Psychiatric: She has a normal mood and affect  Her behavior is normal  Judgment and thought content normal    Nursing note and vitals reviewed

## 2018-11-28 NOTE — PATIENT INSTRUCTIONS
Increase water intake  Continue to monitor  Urinary Tract Infection in Women   AMBULATORY CARE:   A urinary tract infection (UTI)  is caused by bacteria that get inside your urinary tract  Most bacteria that enter your urinary tract come out when you urinate  If the bacteria stay in your urinary tract, you may get an infection  Your urinary tract includes your kidneys, ureters, bladder, and urethra  Urine is made in your kidneys, and it flows from the ureters to the bladder  Urine leaves the bladder through the urethra  A UTI is more common in your lower urinary tract, which includes your bladder and urethra  Common symptoms include the following:   · Urinating more often or waking from sleep to urinate    · Pain or burning when you urinate    · Pain or pressure in your lower abdomen     · Urine that smells bad    · Blood in your urine    · Leaking urine  Seek care immediately if:   · You are urinating very little or not at all  · You have a high fever with shaking chills  · You have side or back pain that gets worse  Contact your healthcare provider if:   · You have a fever  · You do not feel better after 2 days of taking antibiotics  · You are vomiting  · You have questions or concerns about your condition or care  Treatment for a UTI  may include medicines to treat a bacterial infection  You may also need medicines to decrease pain and burning, or decrease the urge to urinate often  Prevent a UTI:   · Empty your bladder often  Urinate and empty your bladder as soon as you feel the need  Do not hold your urine for long periods of time  · Wipe from front to back after you urinate or have a bowel movement  This will help prevent germs from getting into your urinary tract through your urethra  · Drink liquids as directed  Ask how much liquid to drink each day and which liquids are best for you  You may need to drink more liquids than usual to help flush out the bacteria   Do not drink alcohol, caffeine, or citrus juices  These can irritate your bladder and increase your symptoms  Your healthcare provider may recommend cranberry juice to help prevent a UTI  · Urinate after you have sex  This can help flush out bacteria passed during sex  · Do not douche or use feminine deodorants  These can change the chemical balance in your vagina  · Change sanitary pads or tampons often  This will help prevent germs from getting into your urinary tract  · Do pelvic muscle exercises often  Pelvic muscle exercises may help you start and stop urinating  Strong pelvic muscles may help you empty your bladder easier  Squeeze these muscles tightly for 5 seconds like you are trying to hold back urine  Then relax for 5 seconds  Gradually work up to squeezing for 10 seconds  Do 3 sets of 15 repetitions a day, or as directed  Follow up with your healthcare provider as directed:  Write down your questions so you remember to ask them during your visits  © 2017 [employer] Information is for End User's use only and may not be sold, redistributed or otherwise used for commercial purposes  All illustrations and images included in CareNotes® are the copyrighted property of [employer]  or [employer]  The above information is an  only  It is not intended as medical advice for individual conditions or treatments  Talk to your doctor, nurse or pharmacist before following any medical regimen to see if it is safe and effective for you

## 2018-11-29 LAB — BACTERIA UR CULT: NORMAL

## 2019-01-01 ENCOUNTER — PATIENT OUTREACH (OUTPATIENT)
Dept: INTERNAL MEDICINE CLINIC | Facility: CLINIC | Age: 84
End: 2019-01-01

## 2019-01-01 ENCOUNTER — TELEPHONE (OUTPATIENT)
Dept: INTERNAL MEDICINE CLINIC | Facility: CLINIC | Age: 84
End: 2019-01-01

## 2019-01-01 ENCOUNTER — HOSPITAL ENCOUNTER (INPATIENT)
Facility: HOSPITAL | Age: 84
LOS: 10 days | Discharge: HOME HEALTH | DRG: 948 | End: 2019-11-29
Attending: PHYSICAL MEDICINE & REHABILITATION | Admitting: PHYSICAL MEDICINE & REHABILITATION
Payer: MEDICARE

## 2019-01-01 ENCOUNTER — APPOINTMENT (EMERGENCY)
Dept: CT IMAGING | Facility: HOSPITAL | Age: 84
DRG: 542 | End: 2019-01-01
Payer: MEDICARE

## 2019-01-01 ENCOUNTER — APPOINTMENT (EMERGENCY)
Dept: RADIOLOGY | Facility: HOSPITAL | Age: 84
DRG: 542 | End: 2019-01-01
Payer: MEDICARE

## 2019-01-01 ENCOUNTER — APPOINTMENT (INPATIENT)
Dept: CT IMAGING | Facility: HOSPITAL | Age: 84
DRG: 542 | End: 2019-01-01
Payer: MEDICARE

## 2019-01-01 ENCOUNTER — APPOINTMENT (INPATIENT)
Dept: NON INVASIVE DIAGNOSTICS | Facility: HOSPITAL | Age: 84
DRG: 542 | End: 2019-01-01
Payer: MEDICARE

## 2019-01-01 ENCOUNTER — APPOINTMENT (INPATIENT)
Dept: NUCLEAR MEDICINE | Facility: HOSPITAL | Age: 84
DRG: 542 | End: 2019-01-01
Payer: MEDICARE

## 2019-01-01 ENCOUNTER — OFFICE VISIT (OUTPATIENT)
Dept: INTERNAL MEDICINE CLINIC | Facility: CLINIC | Age: 84
End: 2019-01-01
Payer: MEDICARE

## 2019-01-01 ENCOUNTER — HOSPITAL ENCOUNTER (INPATIENT)
Facility: HOSPITAL | Age: 84
LOS: 9 days | DRG: 542 | End: 2019-11-19
Attending: EMERGENCY MEDICINE | Admitting: INTERNAL MEDICINE
Payer: MEDICARE

## 2019-01-01 ENCOUNTER — TRANSITIONAL CARE MANAGEMENT (OUTPATIENT)
Dept: INTERNAL MEDICINE CLINIC | Facility: CLINIC | Age: 84
End: 2019-01-01

## 2019-01-01 ENCOUNTER — APPOINTMENT (INPATIENT)
Dept: RADIOLOGY | Facility: HOSPITAL | Age: 84
DRG: 542 | End: 2019-01-01
Payer: MEDICARE

## 2019-01-01 ENCOUNTER — TELEPHONE (OUTPATIENT)
Dept: OTHER | Facility: OTHER | Age: 84
End: 2019-01-01

## 2019-01-01 ENCOUNTER — OFFICE VISIT (OUTPATIENT)
Dept: CARDIOLOGY CLINIC | Facility: CLINIC | Age: 84
End: 2019-01-01
Payer: MEDICARE

## 2019-01-01 VITALS
HEIGHT: 62 IN | WEIGHT: 169.8 LBS | BODY MASS INDEX: 31.25 KG/M2 | TEMPERATURE: 98.1 F | SYSTOLIC BLOOD PRESSURE: 140 MMHG | HEART RATE: 91 BPM | OXYGEN SATURATION: 92 % | RESPIRATION RATE: 18 BRPM | DIASTOLIC BLOOD PRESSURE: 62 MMHG

## 2019-01-01 VITALS
SYSTOLIC BLOOD PRESSURE: 130 MMHG | DIASTOLIC BLOOD PRESSURE: 60 MMHG | BODY MASS INDEX: 29.81 KG/M2 | HEIGHT: 62 IN | WEIGHT: 162 LBS | HEART RATE: 80 BPM

## 2019-01-01 VITALS
TEMPERATURE: 97 F | OXYGEN SATURATION: 93 % | BODY MASS INDEX: 29.63 KG/M2 | SYSTOLIC BLOOD PRESSURE: 134 MMHG | WEIGHT: 161 LBS | RESPIRATION RATE: 18 BRPM | DIASTOLIC BLOOD PRESSURE: 68 MMHG | HEART RATE: 75 BPM | HEIGHT: 62 IN

## 2019-01-01 VITALS
HEIGHT: 62 IN | HEART RATE: 77 BPM | OXYGEN SATURATION: 95 % | SYSTOLIC BLOOD PRESSURE: 120 MMHG | DIASTOLIC BLOOD PRESSURE: 60 MMHG | BODY MASS INDEX: 29.67 KG/M2 | TEMPERATURE: 97.8 F | WEIGHT: 161.25 LBS

## 2019-01-01 VITALS
TEMPERATURE: 97.9 F | SYSTOLIC BLOOD PRESSURE: 160 MMHG | RESPIRATION RATE: 16 BRPM | HEIGHT: 62 IN | BODY MASS INDEX: 32.2 KG/M2 | WEIGHT: 175 LBS | OXYGEN SATURATION: 91 % | HEART RATE: 76 BPM | DIASTOLIC BLOOD PRESSURE: 70 MMHG

## 2019-01-01 DIAGNOSIS — I50.9 ACUTE CONGESTIVE HEART FAILURE, UNSPECIFIED HEART FAILURE TYPE (HCC): ICD-10-CM

## 2019-01-01 DIAGNOSIS — J96.21 ACUTE ON CHRONIC RESPIRATORY FAILURE WITH HYPOXIA AND HYPERCAPNIA (HCC): ICD-10-CM

## 2019-01-01 DIAGNOSIS — K21.9 GASTROESOPHAGEAL REFLUX DISEASE WITHOUT ESOPHAGITIS: ICD-10-CM

## 2019-01-01 DIAGNOSIS — M54.50 ACUTE MIDLINE LOW BACK PAIN WITHOUT SCIATICA: Primary | ICD-10-CM

## 2019-01-01 DIAGNOSIS — E78.2 HYPERLIPIDEMIA, MIXED: Chronic | ICD-10-CM

## 2019-01-01 DIAGNOSIS — I25.10 CORONARY ARTERY DISEASE INVOLVING NATIVE CORONARY ARTERY OF NATIVE HEART WITHOUT ANGINA PECTORIS: Chronic | ICD-10-CM

## 2019-01-01 DIAGNOSIS — I10 ESSENTIAL HYPERTENSION: Chronic | ICD-10-CM

## 2019-01-01 DIAGNOSIS — F41.1 GENERALIZED ANXIETY DISORDER: ICD-10-CM

## 2019-01-01 DIAGNOSIS — I25.10 CAD (CORONARY ARTERY DISEASE): ICD-10-CM

## 2019-01-01 DIAGNOSIS — S32.000A LUMBAR COMPRESSION FRACTURE (HCC): Primary | ICD-10-CM

## 2019-01-01 DIAGNOSIS — I50.32 CHRONIC DIASTOLIC HEART FAILURE (HCC): ICD-10-CM

## 2019-01-01 DIAGNOSIS — M43.9 COMPRESSION DEFORMITY OF VERTEBRA: ICD-10-CM

## 2019-01-01 DIAGNOSIS — G93.41 METABOLIC ENCEPHALOPATHY: ICD-10-CM

## 2019-01-01 DIAGNOSIS — C43.9 MALIGNANT MELANOMA OF SKIN (HCC): ICD-10-CM

## 2019-01-01 DIAGNOSIS — N17.9 AKI (ACUTE KIDNEY INJURY) (HCC): ICD-10-CM

## 2019-01-01 DIAGNOSIS — I25.10 CORONARY ARTERY DISEASE INVOLVING NATIVE CORONARY ARTERY OF NATIVE HEART WITHOUT ANGINA PECTORIS: Primary | Chronic | ICD-10-CM

## 2019-01-01 DIAGNOSIS — F41.9 ANXIETY: ICD-10-CM

## 2019-01-01 DIAGNOSIS — I10 ESSENTIAL HYPERTENSION: Primary | Chronic | ICD-10-CM

## 2019-01-01 DIAGNOSIS — J96.22 ACUTE ON CHRONIC RESPIRATORY FAILURE WITH HYPOXIA AND HYPERCAPNIA (HCC): ICD-10-CM

## 2019-01-01 DIAGNOSIS — R26.9 GAIT ABNORMALITY: ICD-10-CM

## 2019-01-01 DIAGNOSIS — S32.010A COMPRESSION FRACTURE OF L1 VERTEBRA, INITIAL ENCOUNTER (HCC): ICD-10-CM

## 2019-01-01 DIAGNOSIS — J44.9 CHRONIC OBSTRUCTIVE PULMONARY DISEASE, UNSPECIFIED COPD TYPE (HCC): Chronic | ICD-10-CM

## 2019-01-01 DIAGNOSIS — D50.8 IRON DEFICIENCY ANEMIA SECONDARY TO INADEQUATE DIETARY IRON INTAKE: Chronic | ICD-10-CM

## 2019-01-01 DIAGNOSIS — J18.9 COMMUNITY ACQUIRED PNEUMONIA, UNSPECIFIED LATERALITY: ICD-10-CM

## 2019-01-01 DIAGNOSIS — K59.00 CONSTIPATION: ICD-10-CM

## 2019-01-01 DIAGNOSIS — E66.3 OVERWEIGHT (BMI 25.0-29.9): ICD-10-CM

## 2019-01-01 DIAGNOSIS — I10 ESSENTIAL HYPERTENSION: ICD-10-CM

## 2019-01-01 DIAGNOSIS — R77.8 ELEVATED TROPONIN: ICD-10-CM

## 2019-01-01 DIAGNOSIS — S32.010K COMPRESSION FRACTURE OF L1 VERTEBRA WITH NONUNION: ICD-10-CM

## 2019-01-01 LAB
ALBUMIN SERPL BCP-MCNC: 3.7 G/DL (ref 3.5–5.7)
ALBUMIN SERPL BCP-MCNC: 3.8 G/DL (ref 3.5–5.7)
ALP SERPL-CCNC: 47 U/L (ref 55–165)
ALP SERPL-CCNC: 49 U/L (ref 55–165)
ALT SERPL W P-5'-P-CCNC: 15 U/L (ref 7–52)
ALT SERPL W P-5'-P-CCNC: 18 U/L (ref 7–52)
ANION GAP SERPL CALCULATED.3IONS-SCNC: 1 MMOL/L (ref 4–13)
ANION GAP SERPL CALCULATED.3IONS-SCNC: 1 MMOL/L (ref 4–13)
ANION GAP SERPL CALCULATED.3IONS-SCNC: 3 MMOL/L (ref 4–13)
ANION GAP SERPL CALCULATED.3IONS-SCNC: 4 MMOL/L (ref 4–13)
ANION GAP SERPL CALCULATED.3IONS-SCNC: 4 MMOL/L (ref 4–13)
ANION GAP SERPL CALCULATED.3IONS-SCNC: 5 MMOL/L (ref 4–13)
ANION GAP SERPL CALCULATED.3IONS-SCNC: 5 MMOL/L (ref 4–13)
ANION GAP SERPL CALCULATED.3IONS-SCNC: 6 MMOL/L (ref 4–13)
ANION GAP SERPL CALCULATED.3IONS-SCNC: 7 MMOL/L (ref 4–13)
ARTERIAL PATENCY WRIST A: YES
AST SERPL W P-5'-P-CCNC: 24 U/L (ref 13–39)
AST SERPL W P-5'-P-CCNC: 25 U/L (ref 13–39)
ATRIAL RATE: 56 BPM
ATRIAL RATE: 91 BPM
ATRIAL RATE: 93 BPM
BACTERIA UR CULT: NORMAL
BACTERIA UR QL AUTO: ABNORMAL /HPF
BACTERIA UR QL AUTO: ABNORMAL /HPF
BASE EXCESS BLDA CALC-SCNC: 2.5 MMOL/L (ref -2–3)
BASE EXCESS BLDA CALC-SCNC: 5.1 MMOL/L (ref -2–3)
BASE EXCESS BLDA CALC-SCNC: 6.8 MMOL/L (ref -2–3)
BASOPHILS # BLD AUTO: 0 THOUSANDS/ΜL (ref 0–0.1)
BASOPHILS NFR BLD AUTO: 0 % (ref 0–2)
BILIRUB SERPL-MCNC: 0.9 MG/DL (ref 0.2–1)
BILIRUB SERPL-MCNC: 1 MG/DL (ref 0.2–1)
BILIRUB UR QL STRIP: NEGATIVE
BILIRUB UR QL STRIP: NEGATIVE
BNP SERPL-MCNC: 339 PG/ML (ref 1–100)
BUN SERPL-MCNC: 16 MG/DL (ref 7–25)
BUN SERPL-MCNC: 17 MG/DL (ref 7–25)
BUN SERPL-MCNC: 18 MG/DL (ref 7–25)
BUN SERPL-MCNC: 21 MG/DL (ref 7–25)
BUN SERPL-MCNC: 22 MG/DL (ref 7–25)
BUN SERPL-MCNC: 24 MG/DL (ref 7–25)
BUN SERPL-MCNC: 29 MG/DL (ref 7–25)
BUN SERPL-MCNC: 30 MG/DL (ref 7–25)
BUN SERPL-MCNC: 32 MG/DL (ref 7–25)
CALCIUM SERPL-MCNC: 10 MG/DL (ref 8.6–10.5)
CALCIUM SERPL-MCNC: 10.1 MG/DL (ref 8.6–10.5)
CALCIUM SERPL-MCNC: 8.9 MG/DL (ref 8.6–10.5)
CALCIUM SERPL-MCNC: 8.9 MG/DL (ref 8.6–10.5)
CALCIUM SERPL-MCNC: 9.2 MG/DL (ref 8.6–10.5)
CALCIUM SERPL-MCNC: 9.3 MG/DL (ref 8.6–10.5)
CALCIUM SERPL-MCNC: 9.4 MG/DL (ref 8.6–10.5)
CALCIUM SERPL-MCNC: 9.4 MG/DL (ref 8.6–10.5)
CALCIUM SERPL-MCNC: 9.6 MG/DL (ref 8.6–10.5)
CALCIUM SERPL-MCNC: 9.8 MG/DL (ref 8.6–10.5)
CALCIUM SERPL-MCNC: 9.9 MG/DL (ref 8.6–10.5)
CALCIUM SERPL-MCNC: 9.9 MG/DL (ref 8.6–10.5)
CAOX CRY URNS QL MICRO: ABNORMAL /HPF
CHLORIDE SERPL-SCNC: 100 MMOL/L (ref 98–107)
CHLORIDE SERPL-SCNC: 91 MMOL/L (ref 98–107)
CHLORIDE SERPL-SCNC: 93 MMOL/L (ref 98–107)
CHLORIDE SERPL-SCNC: 94 MMOL/L (ref 98–107)
CHLORIDE SERPL-SCNC: 96 MMOL/L (ref 98–107)
CHLORIDE SERPL-SCNC: 97 MMOL/L (ref 98–107)
CHLORIDE SERPL-SCNC: 98 MMOL/L (ref 98–107)
CLARITY UR: ABNORMAL
CLARITY UR: CLEAR
CO2 SERPL-SCNC: 33 MMOL/L (ref 21–31)
CO2 SERPL-SCNC: 34 MMOL/L (ref 21–31)
CO2 SERPL-SCNC: 36 MMOL/L (ref 21–31)
CO2 SERPL-SCNC: 37 MMOL/L (ref 21–31)
CO2 SERPL-SCNC: 40 MMOL/L (ref 21–31)
CO2 SERPL-SCNC: 41 MMOL/L (ref 21–31)
CO2 SERPL-SCNC: 42 MMOL/L (ref 21–31)
CO2 SERPL-SCNC: 42 MMOL/L (ref 21–31)
CO2 SERPL-SCNC: 43 MMOL/L (ref 21–31)
CO2 SERPL-SCNC: 43 MMOL/L (ref 21–31)
COLOR UR: YELLOW
COLOR UR: YELLOW
CREAT SERPL-MCNC: 0.66 MG/DL (ref 0.6–1.2)
CREAT SERPL-MCNC: 0.69 MG/DL (ref 0.6–1.2)
CREAT SERPL-MCNC: 0.71 MG/DL (ref 0.6–1.2)
CREAT SERPL-MCNC: 0.72 MG/DL (ref 0.6–1.2)
CREAT SERPL-MCNC: 0.74 MG/DL (ref 0.6–1.2)
CREAT SERPL-MCNC: 0.75 MG/DL (ref 0.6–1.2)
CREAT SERPL-MCNC: 0.81 MG/DL (ref 0.6–1.2)
CREAT SERPL-MCNC: 0.85 MG/DL (ref 0.6–1.2)
CREAT SERPL-MCNC: 0.86 MG/DL (ref 0.6–1.2)
CREAT SERPL-MCNC: 0.86 MG/DL (ref 0.6–1.2)
CREAT SERPL-MCNC: 1.15 MG/DL (ref 0.6–1.2)
CREAT SERPL-MCNC: 1.42 MG/DL (ref 0.6–1.2)
EOSINOPHIL # BLD AUTO: 0 THOUSAND/ΜL (ref 0–0.61)
EOSINOPHIL # BLD AUTO: 0 THOUSAND/ΜL (ref 0–0.61)
EOSINOPHIL # BLD AUTO: 0.1 THOUSAND/ΜL (ref 0–0.61)
EOSINOPHIL NFR BLD AUTO: 0 % (ref 0–5)
EOSINOPHIL NFR BLD AUTO: 0 % (ref 0–5)
EOSINOPHIL NFR BLD AUTO: 1 % (ref 0–5)
EOSINOPHIL NFR BLD AUTO: 2 % (ref 0–5)
ERYTHROCYTE [DISTWIDTH] IN BLOOD BY AUTOMATED COUNT: 13.6 % (ref 11.5–14.5)
ERYTHROCYTE [DISTWIDTH] IN BLOOD BY AUTOMATED COUNT: 13.7 % (ref 11.5–14.5)
ERYTHROCYTE [DISTWIDTH] IN BLOOD BY AUTOMATED COUNT: 13.7 % (ref 11.5–14.5)
ERYTHROCYTE [DISTWIDTH] IN BLOOD BY AUTOMATED COUNT: 13.9 % (ref 11.5–14.5)
ERYTHROCYTE [DISTWIDTH] IN BLOOD BY AUTOMATED COUNT: 13.9 % (ref 11.5–14.5)
ERYTHROCYTE [DISTWIDTH] IN BLOOD BY AUTOMATED COUNT: 14 % (ref 11.5–14.5)
ERYTHROCYTE [DISTWIDTH] IN BLOOD BY AUTOMATED COUNT: 14.1 % (ref 11.5–14.5)
ERYTHROCYTE [DISTWIDTH] IN BLOOD BY AUTOMATED COUNT: 14.4 % (ref 11.5–14.5)
GFR SERPL CREATININE-BSD FRML MDRD: 32 ML/MIN/1.73SQ M
GFR SERPL CREATININE-BSD FRML MDRD: 41 ML/MIN/1.73SQ M
GFR SERPL CREATININE-BSD FRML MDRD: 58 ML/MIN/1.73SQ M
GFR SERPL CREATININE-BSD FRML MDRD: 58 ML/MIN/1.73SQ M
GFR SERPL CREATININE-BSD FRML MDRD: 59 ML/MIN/1.73SQ M
GFR SERPL CREATININE-BSD FRML MDRD: 62 ML/MIN/1.73SQ M
GFR SERPL CREATININE-BSD FRML MDRD: 68 ML/MIN/1.73SQ M
GFR SERPL CREATININE-BSD FRML MDRD: 70 ML/MIN/1.73SQ M
GFR SERPL CREATININE-BSD FRML MDRD: 72 ML/MIN/1.73SQ M
GFR SERPL CREATININE-BSD FRML MDRD: 73 ML/MIN/1.73SQ M
GFR SERPL CREATININE-BSD FRML MDRD: 75 ML/MIN/1.73SQ M
GFR SERPL CREATININE-BSD FRML MDRD: 76 ML/MIN/1.73SQ M
GLUCOSE SERPL-MCNC: 101 MG/DL (ref 65–99)
GLUCOSE SERPL-MCNC: 104 MG/DL (ref 65–99)
GLUCOSE SERPL-MCNC: 110 MG/DL (ref 65–99)
GLUCOSE SERPL-MCNC: 120 MG/DL (ref 65–99)
GLUCOSE SERPL-MCNC: 127 MG/DL (ref 65–99)
GLUCOSE SERPL-MCNC: 128 MG/DL (ref 65–99)
GLUCOSE SERPL-MCNC: 152 MG/DL (ref 65–99)
GLUCOSE SERPL-MCNC: 164 MG/DL (ref 65–99)
GLUCOSE SERPL-MCNC: 202 MG/DL (ref 65–99)
GLUCOSE SERPL-MCNC: 228 MG/DL (ref 65–140)
GLUCOSE SERPL-MCNC: 81 MG/DL (ref 65–99)
GLUCOSE SERPL-MCNC: 97 MG/DL (ref 65–99)
GLUCOSE SERPL-MCNC: 99 MG/DL (ref 65–99)
GLUCOSE UR STRIP-MCNC: NEGATIVE MG/DL
GLUCOSE UR STRIP-MCNC: NEGATIVE MG/DL
HCO3 BLDA-SCNC: 31.4 MMOL/L (ref 22–28)
HCO3 BLDA-SCNC: 33.1 MMOL/L (ref 22–28)
HCO3 BLDA-SCNC: 34.9 MMOL/L (ref 22–28)
HCT VFR BLD AUTO: 38.7 % (ref 42–47)
HCT VFR BLD AUTO: 39.4 % (ref 42–47)
HCT VFR BLD AUTO: 40.2 % (ref 42–47)
HCT VFR BLD AUTO: 40.9 % (ref 42–47)
HCT VFR BLD AUTO: 41 % (ref 42–47)
HCT VFR BLD AUTO: 41.3 % (ref 42–47)
HCT VFR BLD AUTO: 43.6 % (ref 42–47)
HCT VFR BLD AUTO: 44.8 % (ref 42–47)
HCT VFR BLD AUTO: 45.7 % (ref 42–47)
HCT VFR BLD AUTO: 47.7 % (ref 42–47)
HGB BLD-MCNC: 12.8 G/DL (ref 12–16)
HGB BLD-MCNC: 12.9 G/DL (ref 12–16)
HGB BLD-MCNC: 13.1 G/DL (ref 12–16)
HGB BLD-MCNC: 13.3 G/DL (ref 12–16)
HGB BLD-MCNC: 13.6 G/DL (ref 12–16)
HGB BLD-MCNC: 13.7 G/DL (ref 12–16)
HGB BLD-MCNC: 14 G/DL (ref 12–16)
HGB BLD-MCNC: 14.4 G/DL (ref 12–16)
HGB BLD-MCNC: 15.2 G/DL (ref 12–16)
HGB BLD-MCNC: 15.4 G/DL (ref 12–16)
HGB UR QL STRIP.AUTO: NEGATIVE
HGB UR QL STRIP.AUTO: NEGATIVE
HYALINE CASTS #/AREA URNS LPF: ABNORMAL /LPF
IPAP: 12
IPAP: 12
KETONES UR STRIP-MCNC: NEGATIVE MG/DL
KETONES UR STRIP-MCNC: NEGATIVE MG/DL
L PNEUMO1 AG UR QL IA.RAPID: NEGATIVE
LEUKOCYTE ESTERASE UR QL STRIP: ABNORMAL
LEUKOCYTE ESTERASE UR QL STRIP: NEGATIVE
LIPASE SERPL-CCNC: 17 U/L (ref 11–82)
LYMPHOCYTES # BLD AUTO: 0.8 THOUSANDS/ΜL (ref 0.6–4.47)
LYMPHOCYTES # BLD AUTO: 0.8 THOUSANDS/ΜL (ref 0.6–4.47)
LYMPHOCYTES # BLD AUTO: 0.9 THOUSANDS/ΜL (ref 0.6–4.47)
LYMPHOCYTES # BLD AUTO: 1 THOUSANDS/ΜL (ref 0.6–4.47)
LYMPHOCYTES # BLD AUTO: 1.1 THOUSANDS/ΜL (ref 0.6–4.47)
LYMPHOCYTES # BLD AUTO: 1.3 THOUSANDS/ΜL (ref 0.6–4.47)
LYMPHOCYTES NFR BLD AUTO: 10 % (ref 21–51)
LYMPHOCYTES NFR BLD AUTO: 10 % (ref 21–51)
LYMPHOCYTES NFR BLD AUTO: 11 % (ref 21–51)
LYMPHOCYTES NFR BLD AUTO: 12 % (ref 21–51)
LYMPHOCYTES NFR BLD AUTO: 17 % (ref 21–51)
LYMPHOCYTES NFR BLD AUTO: 9 % (ref 21–51)
MAGNESIUM SERPL-MCNC: 2.1 MG/DL (ref 1.9–2.7)
MAGNESIUM SERPL-MCNC: 2.3 MG/DL (ref 1.9–2.7)
MCH RBC QN AUTO: 29.2 PG (ref 26–34)
MCH RBC QN AUTO: 29.3 PG (ref 26–34)
MCH RBC QN AUTO: 29.4 PG (ref 26–34)
MCH RBC QN AUTO: 29.5 PG (ref 26–34)
MCH RBC QN AUTO: 29.6 PG (ref 26–34)
MCH RBC QN AUTO: 29.7 PG (ref 26–34)
MCH RBC QN AUTO: 29.7 PG (ref 26–34)
MCH RBC QN AUTO: 29.9 PG (ref 26–34)
MCH RBC QN AUTO: 29.9 PG (ref 26–34)
MCH RBC QN AUTO: 30 PG (ref 26–34)
MCHC RBC AUTO-ENTMCNC: 32 G/DL (ref 31–37)
MCHC RBC AUTO-ENTMCNC: 32.1 G/DL (ref 31–37)
MCHC RBC AUTO-ENTMCNC: 32.1 G/DL (ref 31–37)
MCHC RBC AUTO-ENTMCNC: 32.3 G/DL (ref 31–37)
MCHC RBC AUTO-ENTMCNC: 32.7 G/DL (ref 31–37)
MCHC RBC AUTO-ENTMCNC: 32.7 G/DL (ref 31–37)
MCHC RBC AUTO-ENTMCNC: 33.1 G/DL (ref 31–37)
MCHC RBC AUTO-ENTMCNC: 33.2 G/DL (ref 31–37)
MCHC RBC AUTO-ENTMCNC: 33.2 G/DL (ref 31–37)
MCHC RBC AUTO-ENTMCNC: 33.4 G/DL (ref 31–37)
MCV RBC AUTO: 89 FL (ref 81–99)
MCV RBC AUTO: 90 FL (ref 81–99)
MCV RBC AUTO: 91 FL (ref 81–99)
MCV RBC AUTO: 92 FL (ref 81–99)
MCV RBC AUTO: 92 FL (ref 81–99)
MONOCYTES # BLD AUTO: 1 THOUSAND/ΜL (ref 0.17–1.22)
MONOCYTES # BLD AUTO: 1.1 THOUSAND/ΜL (ref 0.17–1.22)
MONOCYTES # BLD AUTO: 1.2 THOUSAND/ΜL (ref 0.17–1.22)
MONOCYTES # BLD AUTO: 1.2 THOUSAND/ΜL (ref 0.17–1.22)
MONOCYTES NFR BLD AUTO: 10 % (ref 2–12)
MONOCYTES NFR BLD AUTO: 12 % (ref 2–12)
MONOCYTES NFR BLD AUTO: 13 % (ref 2–12)
MONOCYTES NFR BLD AUTO: 13 % (ref 2–12)
MONOCYTES NFR BLD AUTO: 14 % (ref 2–12)
MONOCYTES NFR BLD AUTO: 14 % (ref 2–12)
MUCOUS THREADS UR QL AUTO: ABNORMAL
MUCOUS THREADS UR QL AUTO: ABNORMAL
NASAL CANNULA: 3
NEUTROPHILS # BLD AUTO: 5 THOUSANDS/ΜL (ref 1.4–6.5)
NEUTROPHILS # BLD AUTO: 5.7 THOUSANDS/ΜL (ref 1.4–6.5)
NEUTROPHILS # BLD AUTO: 6.2 THOUSANDS/ΜL (ref 1.4–6.5)
NEUTROPHILS # BLD AUTO: 6.3 THOUSANDS/ΜL (ref 1.4–6.5)
NEUTROPHILS # BLD AUTO: 7.9 THOUSANDS/ΜL (ref 1.4–6.5)
NEUTROPHILS # BLD AUTO: 8.4 THOUSANDS/ΜL (ref 1.4–6.5)
NEUTS SEG NFR BLD AUTO: 66 % (ref 42–75)
NEUTS SEG NFR BLD AUTO: 73 % (ref 42–75)
NEUTS SEG NFR BLD AUTO: 76 % (ref 42–75)
NEUTS SEG NFR BLD AUTO: 76 % (ref 42–75)
NEUTS SEG NFR BLD AUTO: 77 % (ref 42–75)
NEUTS SEG NFR BLD AUTO: 80 % (ref 42–75)
NITRITE UR QL STRIP: NEGATIVE
NITRITE UR QL STRIP: NEGATIVE
NON VENT- BIPAP: ABNORMAL
NON VENT- BIPAP: ABNORMAL
NON-SQ EPI CELLS URNS QL MICRO: ABNORMAL /HPF
NON-SQ EPI CELLS URNS QL MICRO: ABNORMAL /HPF
O2 CT BLDA-SCNC: 17.6 ML/DL
O2 CT BLDA-SCNC: 17.9 ML/DL
O2 CT BLDA-SCNC: 18.3 ML/DL
OTHER STN SPEC: ABNORMAL
OXYHGB MFR BLDA: 91.8 % (ref 94–100)
OXYHGB MFR BLDA: 96.1 % (ref 94–100)
OXYHGB MFR BLDA: 96.2 % (ref 94–100)
P AXIS: -48 DEGREES
P AXIS: 9 DEGREES
PCO2 BLDA: 67.4 MM HG (ref 35–45)
PCO2 BLDA: 69.8 MM HG (ref 35–45)
PCO2 BLDA: 71.4 MM HG (ref 35–45)
PEEP MAX SETTING VENT: 5 CM[H2O]
PEEP MAX SETTING VENT: 5 CM[H2O]
PH BLDA: 7.27 [PH] (ref 7.35–7.45)
PH BLDA: 7.31 [PH] (ref 7.35–7.45)
PH BLDA: 7.32 [PH] (ref 7.35–7.45)
PH UR STRIP.AUTO: 5 [PH]
PH UR STRIP.AUTO: 5 [PH]
PLATELET # BLD AUTO: 209 THOUSANDS/UL (ref 149–390)
PLATELET # BLD AUTO: 228 THOUSANDS/UL (ref 149–390)
PLATELET # BLD AUTO: 234 THOUSANDS/UL (ref 149–390)
PLATELET # BLD AUTO: 248 THOUSANDS/UL (ref 149–390)
PLATELET # BLD AUTO: 250 THOUSANDS/UL (ref 149–390)
PLATELET # BLD AUTO: 273 THOUSANDS/UL (ref 149–390)
PLATELET # BLD AUTO: 314 THOUSANDS/UL (ref 149–390)
PLATELET # BLD AUTO: 322 THOUSANDS/UL (ref 149–390)
PLATELET # BLD AUTO: 325 THOUSANDS/UL (ref 149–390)
PLATELET # BLD AUTO: 337 THOUSANDS/UL (ref 149–390)
PMV BLD AUTO: 7.9 FL (ref 8.6–11.7)
PMV BLD AUTO: 8 FL (ref 8.6–11.7)
PMV BLD AUTO: 8.1 FL (ref 8.6–11.7)
PMV BLD AUTO: 8.3 FL (ref 8.6–11.7)
PMV BLD AUTO: 8.3 FL (ref 8.6–11.7)
PMV BLD AUTO: 8.5 FL (ref 8.6–11.7)
PMV BLD AUTO: 8.6 FL (ref 8.6–11.7)
PMV BLD AUTO: 8.7 FL (ref 8.6–11.7)
PMV BLD AUTO: 8.9 FL (ref 8.6–11.7)
PMV BLD AUTO: 9 FL (ref 8.6–11.7)
PO2 BLDA: 109 MM HG (ref 80–100)
PO2 BLDA: 74 MM HG (ref 80–100)
PO2 BLDA: 86 MM HG (ref 80–100)
POTASSIUM SERPL-SCNC: 4 MMOL/L (ref 3.5–5.5)
POTASSIUM SERPL-SCNC: 4 MMOL/L (ref 3.5–5.5)
POTASSIUM SERPL-SCNC: 4.1 MMOL/L (ref 3.5–5.5)
POTASSIUM SERPL-SCNC: 4.3 MMOL/L (ref 3.5–5.5)
POTASSIUM SERPL-SCNC: 4.5 MMOL/L (ref 3.5–5.5)
POTASSIUM SERPL-SCNC: 4.6 MMOL/L (ref 3.5–5.5)
POTASSIUM SERPL-SCNC: 4.6 MMOL/L (ref 3.5–5.5)
POTASSIUM SERPL-SCNC: 4.8 MMOL/L (ref 3.5–5.5)
POTASSIUM SERPL-SCNC: 5.5 MMOL/L (ref 3.5–5.5)
PR INTERVAL: 122 MS
PR INTERVAL: 170 MS
PROCALCITONIN SERPL-MCNC: 0.2 NG/ML
PROCALCITONIN SERPL-MCNC: 0.32 NG/ML
PROCALCITONIN SERPL-MCNC: 0.33 NG/ML
PROCALCITONIN SERPL-MCNC: 0.56 NG/ML
PROT SERPL-MCNC: 7.1 G/DL (ref 6.4–8.9)
PROT SERPL-MCNC: 7.3 G/DL (ref 6.4–8.9)
PROT UR STRIP-MCNC: ABNORMAL MG/DL
PROT UR STRIP-MCNC: ABNORMAL MG/DL
QRS AXIS: 19 DEGREES
QRS AXIS: 19 DEGREES
QRS AXIS: 47 DEGREES
QRSD INTERVAL: 102 MS
QRSD INTERVAL: 90 MS
QRSD INTERVAL: 92 MS
QT INTERVAL: 334 MS
QT INTERVAL: 350 MS
QT INTERVAL: 428 MS
QTC INTERVAL: 413 MS
QTC INTERVAL: 415 MS
QTC INTERVAL: 418 MS
RBC # BLD AUTO: 4.27 MILLION/UL (ref 3.9–5.2)
RBC # BLD AUTO: 4.33 MILLION/UL (ref 3.9–5.2)
RBC # BLD AUTO: 4.45 MILLION/UL (ref 3.9–5.2)
RBC # BLD AUTO: 4.51 MILLION/UL (ref 3.9–5.2)
RBC # BLD AUTO: 4.54 MILLION/UL (ref 3.9–5.2)
RBC # BLD AUTO: 4.63 MILLION/UL (ref 3.9–5.2)
RBC # BLD AUTO: 4.78 MILLION/UL (ref 3.9–5.2)
RBC # BLD AUTO: 4.93 MILLION/UL (ref 3.9–5.2)
RBC # BLD AUTO: 5.08 MILLION/UL (ref 3.9–5.2)
RBC # BLD AUTO: 5.19 MILLION/UL (ref 3.9–5.2)
RBC #/AREA URNS AUTO: ABNORMAL /HPF
RBC #/AREA URNS AUTO: ABNORMAL /HPF
S PNEUM AG UR QL: POSITIVE
SODIUM SERPL-SCNC: 133 MMOL/L (ref 134–143)
SODIUM SERPL-SCNC: 134 MMOL/L (ref 134–143)
SODIUM SERPL-SCNC: 135 MMOL/L (ref 134–143)
SODIUM SERPL-SCNC: 135 MMOL/L (ref 134–143)
SODIUM SERPL-SCNC: 137 MMOL/L (ref 134–143)
SODIUM SERPL-SCNC: 137 MMOL/L (ref 134–143)
SODIUM SERPL-SCNC: 138 MMOL/L (ref 134–143)
SODIUM SERPL-SCNC: 138 MMOL/L (ref 134–143)
SODIUM SERPL-SCNC: 139 MMOL/L (ref 134–143)
SODIUM SERPL-SCNC: 139 MMOL/L (ref 134–143)
SODIUM SERPL-SCNC: 140 MMOL/L (ref 134–143)
SODIUM SERPL-SCNC: 140 MMOL/L (ref 134–143)
SP GR UR STRIP.AUTO: 1.02 (ref 1–1.03)
SP GR UR STRIP.AUTO: >=1.03 (ref 1–1.03)
SPECIMEN SOURCE: ABNORMAL
T WAVE AXIS: 64 DEGREES
T WAVE AXIS: 7 DEGREES
T WAVE AXIS: 86 DEGREES
TROPONIN I SERPL-MCNC: 0.07 NG/ML
TROPONIN I SERPL-MCNC: 0.07 NG/ML
TROPONIN I SERPL-MCNC: 0.09 NG/ML
TROPONIN I SERPL-MCNC: <0.03 NG/ML
UROBILINOGEN UR QL STRIP.AUTO: 0.2 E.U./DL
UROBILINOGEN UR QL STRIP.AUTO: 0.2 E.U./DL
VENT BIPAP FIO2: 60 %
VENT BIPAP FIO2: ABNORMAL %
VENTRICULAR RATE: 56 BPM
VENTRICULAR RATE: 86 BPM
VENTRICULAR RATE: 93 BPM
WBC # BLD AUTO: 10.1 THOUSAND/UL (ref 4.8–10.8)
WBC # BLD AUTO: 10.2 THOUSAND/UL (ref 4.8–10.8)
WBC # BLD AUTO: 10.4 THOUSAND/UL (ref 4.8–10.8)
WBC # BLD AUTO: 10.5 THOUSAND/UL (ref 4.8–10.8)
WBC # BLD AUTO: 12.5 THOUSAND/UL (ref 4.8–10.8)
WBC # BLD AUTO: 7.5 THOUSAND/UL (ref 4.8–10.8)
WBC # BLD AUTO: 7.6 THOUSAND/UL (ref 4.8–10.8)
WBC # BLD AUTO: 8.1 THOUSAND/UL (ref 4.8–10.8)
WBC # BLD AUTO: 8.1 THOUSAND/UL (ref 4.8–10.8)
WBC # BLD AUTO: 8.7 THOUSAND/UL (ref 4.8–10.8)
WBC #/AREA URNS AUTO: ABNORMAL /HPF
WBC #/AREA URNS AUTO: ABNORMAL /HPF

## 2019-01-01 PROCEDURE — 36600 WITHDRAWAL OF ARTERIAL BLOOD: CPT

## 2019-01-01 PROCEDURE — 99232 SBSQ HOSP IP/OBS MODERATE 35: CPT | Performed by: PHYSICIAN ASSISTANT

## 2019-01-01 PROCEDURE — 94664 DEMO&/EVAL PT USE INHALER: CPT

## 2019-01-01 PROCEDURE — 93005 ELECTROCARDIOGRAM TRACING: CPT

## 2019-01-01 PROCEDURE — 80053 COMPREHEN METABOLIC PANEL: CPT | Performed by: EMERGENCY MEDICINE

## 2019-01-01 PROCEDURE — 94760 N-INVAS EAR/PLS OXIMETRY 1: CPT

## 2019-01-01 PROCEDURE — 97530 THERAPEUTIC ACTIVITIES: CPT

## 2019-01-01 PROCEDURE — 83735 ASSAY OF MAGNESIUM: CPT | Performed by: STUDENT IN AN ORGANIZED HEALTH CARE EDUCATION/TRAINING PROGRAM

## 2019-01-01 PROCEDURE — 92526 ORAL FUNCTION THERAPY: CPT

## 2019-01-01 PROCEDURE — 82805 BLOOD GASES W/O2 SATURATION: CPT | Performed by: NURSE PRACTITIONER

## 2019-01-01 PROCEDURE — 80048 BASIC METABOLIC PNL TOTAL CA: CPT | Performed by: INTERNAL MEDICINE

## 2019-01-01 PROCEDURE — 94640 AIRWAY INHALATION TREATMENT: CPT

## 2019-01-01 PROCEDURE — 85025 COMPLETE CBC W/AUTO DIFF WBC: CPT | Performed by: NURSE PRACTITIONER

## 2019-01-01 PROCEDURE — 93306 TTE W/DOPPLER COMPLETE: CPT | Performed by: INTERNAL MEDICINE

## 2019-01-01 PROCEDURE — 97110 THERAPEUTIC EXERCISES: CPT

## 2019-01-01 PROCEDURE — 94660 CPAP INITIATION&MGMT: CPT

## 2019-01-01 PROCEDURE — 80048 BASIC METABOLIC PNL TOTAL CA: CPT | Performed by: NURSE PRACTITIONER

## 2019-01-01 PROCEDURE — 72100 X-RAY EXAM L-S SPINE 2/3 VWS: CPT

## 2019-01-01 PROCEDURE — 99214 OFFICE O/P EST MOD 30 MIN: CPT | Performed by: INTERNAL MEDICINE

## 2019-01-01 PROCEDURE — 97535 SELF CARE MNGMENT TRAINING: CPT

## 2019-01-01 PROCEDURE — 97116 GAIT TRAINING THERAPY: CPT

## 2019-01-01 PROCEDURE — G0515 COGNITIVE SKILLS DEVELOPMENT: HCPCS

## 2019-01-01 PROCEDURE — 94668 MNPJ CHEST WALL SBSQ: CPT

## 2019-01-01 PROCEDURE — 97537 COMMUNITY/WORK REINTEGRATION: CPT

## 2019-01-01 PROCEDURE — 87086 URINE CULTURE/COLONY COUNT: CPT | Performed by: NURSE PRACTITIONER

## 2019-01-01 PROCEDURE — 97763 ORTHC/PROSTC MGMT SBSQ ENC: CPT

## 2019-01-01 PROCEDURE — 1124F ACP DISCUSS-NO DSCNMKR DOCD: CPT | Performed by: INTERNAL MEDICINE

## 2019-01-01 PROCEDURE — 92610 EVALUATE SWALLOWING FUNCTION: CPT

## 2019-01-01 PROCEDURE — 71045 X-RAY EXAM CHEST 1 VIEW: CPT

## 2019-01-01 PROCEDURE — 85027 COMPLETE CBC AUTOMATED: CPT | Performed by: NURSE PRACTITIONER

## 2019-01-01 PROCEDURE — 93306 TTE W/DOPPLER COMPLETE: CPT

## 2019-01-01 PROCEDURE — 81001 URINALYSIS AUTO W/SCOPE: CPT | Performed by: EMERGENCY MEDICINE

## 2019-01-01 PROCEDURE — 99232 SBSQ HOSP IP/OBS MODERATE 35: CPT | Performed by: INTERNAL MEDICINE

## 2019-01-01 PROCEDURE — 96375 TX/PRO/DX INJ NEW DRUG ADDON: CPT

## 2019-01-01 PROCEDURE — 97167 OT EVAL HIGH COMPLEX 60 MIN: CPT

## 2019-01-01 PROCEDURE — 93010 ELECTROCARDIOGRAM REPORT: CPT | Performed by: INTERNAL MEDICINE

## 2019-01-01 PROCEDURE — G8978 MOBILITY CURRENT STATUS: HCPCS

## 2019-01-01 PROCEDURE — G0008 ADMIN INFLUENZA VIRUS VAC: HCPCS | Performed by: INTERNAL MEDICINE

## 2019-01-01 PROCEDURE — 99232 SBSQ HOSP IP/OBS MODERATE 35: CPT | Performed by: PHYSICAL MEDICINE & REHABILITATION

## 2019-01-01 PROCEDURE — G8997 SWALLOW GOAL STATUS: HCPCS

## 2019-01-01 PROCEDURE — 85025 COMPLETE CBC W/AUTO DIFF WBC: CPT | Performed by: EMERGENCY MEDICINE

## 2019-01-01 PROCEDURE — 81001 URINALYSIS AUTO W/SCOPE: CPT | Performed by: NURSE PRACTITIONER

## 2019-01-01 PROCEDURE — 99233 SBSQ HOSP IP/OBS HIGH 50: CPT | Performed by: STUDENT IN AN ORGANIZED HEALTH CARE EDUCATION/TRAINING PROGRAM

## 2019-01-01 PROCEDURE — 99223 1ST HOSP IP/OBS HIGH 75: CPT | Performed by: INTERNAL MEDICINE

## 2019-01-01 PROCEDURE — 71250 CT THORAX DX C-: CPT

## 2019-01-01 PROCEDURE — A9540 TC99M MAA: HCPCS

## 2019-01-01 PROCEDURE — 84484 ASSAY OF TROPONIN QUANT: CPT | Performed by: EMERGENCY MEDICINE

## 2019-01-01 PROCEDURE — 99285 EMERGENCY DEPT VISIT HI MDM: CPT

## 2019-01-01 PROCEDURE — 99223 1ST HOSP IP/OBS HIGH 75: CPT | Performed by: PHYSICAL MEDICINE & REHABILITATION

## 2019-01-01 PROCEDURE — NC001 PR NO CHARGE: Performed by: EMERGENCY MEDICINE

## 2019-01-01 PROCEDURE — 83690 ASSAY OF LIPASE: CPT | Performed by: EMERGENCY MEDICINE

## 2019-01-01 PROCEDURE — 92507 TX SP LANG VOICE COMM INDIV: CPT

## 2019-01-01 PROCEDURE — 84145 PROCALCITONIN (PCT): CPT | Performed by: INTERNAL MEDICINE

## 2019-01-01 PROCEDURE — G8979 MOBILITY GOAL STATUS: HCPCS

## 2019-01-01 PROCEDURE — 84484 ASSAY OF TROPONIN QUANT: CPT | Performed by: NURSE PRACTITIONER

## 2019-01-01 PROCEDURE — 99221 1ST HOSP IP/OBS SF/LOW 40: CPT | Performed by: INTERNAL MEDICINE

## 2019-01-01 PROCEDURE — 87449 NOS EACH ORGANISM AG IA: CPT | Performed by: NURSE PRACTITIONER

## 2019-01-01 PROCEDURE — 94761 N-INVAS EAR/PLS OXIMETRY MLT: CPT

## 2019-01-01 PROCEDURE — 85025 COMPLETE CBC W/AUTO DIFF WBC: CPT | Performed by: INTERNAL MEDICINE

## 2019-01-01 PROCEDURE — 99232 SBSQ HOSP IP/OBS MODERATE 35: CPT | Performed by: NURSE PRACTITIONER

## 2019-01-01 PROCEDURE — 93970 EXTREMITY STUDY: CPT | Performed by: SURGERY

## 2019-01-01 PROCEDURE — 80053 COMPREHEN METABOLIC PANEL: CPT | Performed by: NURSE PRACTITIONER

## 2019-01-01 PROCEDURE — 99239 HOSP IP/OBS DSCHRG MGMT >30: CPT | Performed by: PHYSICIAN ASSISTANT

## 2019-01-01 PROCEDURE — 78582 LUNG VENTILAT&PERFUS IMAGING: CPT

## 2019-01-01 PROCEDURE — 99233 SBSQ HOSP IP/OBS HIGH 50: CPT | Performed by: PHYSICAL MEDICINE & REHABILITATION

## 2019-01-01 PROCEDURE — 90662 IIV NO PRSV INCREASED AG IM: CPT | Performed by: INTERNAL MEDICINE

## 2019-01-01 PROCEDURE — 99291 CRITICAL CARE FIRST HOUR: CPT | Performed by: STUDENT IN AN ORGANIZED HEALTH CARE EDUCATION/TRAINING PROGRAM

## 2019-01-01 PROCEDURE — 96374 THER/PROPH/DIAG INJ IV PUSH: CPT

## 2019-01-01 PROCEDURE — 82948 REAGENT STRIP/BLOOD GLUCOSE: CPT

## 2019-01-01 PROCEDURE — 84145 PROCALCITONIN (PCT): CPT | Performed by: NURSE PRACTITIONER

## 2019-01-01 PROCEDURE — 96376 TX/PRO/DX INJ SAME DRUG ADON: CPT

## 2019-01-01 PROCEDURE — A9539 TC99M PENTETATE: HCPCS

## 2019-01-01 PROCEDURE — 83880 ASSAY OF NATRIURETIC PEPTIDE: CPT | Performed by: NURSE PRACTITIONER

## 2019-01-01 PROCEDURE — 99285 EMERGENCY DEPT VISIT HI MDM: CPT | Performed by: EMERGENCY MEDICINE

## 2019-01-01 PROCEDURE — 97163 PT EVAL HIGH COMPLEX 45 MIN: CPT

## 2019-01-01 PROCEDURE — 36415 COLL VENOUS BLD VENIPUNCTURE: CPT | Performed by: EMERGENCY MEDICINE

## 2019-01-01 PROCEDURE — G8987 SELF CARE CURRENT STATUS: HCPCS

## 2019-01-01 PROCEDURE — 74177 CT ABD & PELVIS W/CONTRAST: CPT

## 2019-01-01 PROCEDURE — 96125 COGNITIVE TEST BY HC PRO: CPT

## 2019-01-01 PROCEDURE — 97162 PT EVAL MOD COMPLEX 30 MIN: CPT

## 2019-01-01 PROCEDURE — G8996 SWALLOW CURRENT STATUS: HCPCS

## 2019-01-01 PROCEDURE — 71046 X-RAY EXAM CHEST 2 VIEWS: CPT

## 2019-01-01 PROCEDURE — 93970 EXTREMITY STUDY: CPT

## 2019-01-01 PROCEDURE — G8988 SELF CARE GOAL STATUS: HCPCS

## 2019-01-01 PROCEDURE — 99232 SBSQ HOSP IP/OBS MODERATE 35: CPT | Performed by: STUDENT IN AN ORGANIZED HEALTH CARE EDUCATION/TRAINING PROGRAM

## 2019-01-01 RX ORDER — FUROSEMIDE 20 MG/1
20 TABLET ORAL DAILY
Qty: 90 TABLET | Refills: 0 | Status: ON HOLD | OUTPATIENT
Start: 2019-01-01 | End: 2020-01-01 | Stop reason: SDUPTHER

## 2019-01-01 RX ORDER — DIPHENOXYLATE HYDROCHLORIDE AND ATROPINE SULFATE 2.5; .025 MG/1; MG/1
1 TABLET ORAL DAILY
COMMUNITY
End: 2020-01-01 | Stop reason: HOSPADM

## 2019-01-01 RX ORDER — OLANZAPINE 5 MG/1
5 TABLET, ORALLY DISINTEGRATING ORAL
Status: CANCELLED | OUTPATIENT
Start: 2019-01-01

## 2019-01-01 RX ORDER — ACETAMINOPHEN 325 MG/1
650 TABLET ORAL EVERY 6 HOURS PRN
Status: CANCELLED | OUTPATIENT
Start: 2019-01-01

## 2019-01-01 RX ORDER — AMLODIPINE BESYLATE 5 MG/1
5 TABLET ORAL DAILY
Status: DISCONTINUED | OUTPATIENT
Start: 2019-01-01 | End: 2019-01-01 | Stop reason: HOSPADM

## 2019-01-01 RX ORDER — RANITIDINE 150 MG/1
150 TABLET ORAL 2 TIMES DAILY
Qty: 60 TABLET | Refills: 3 | Status: SHIPPED | OUTPATIENT
Start: 2019-01-01 | End: 2019-01-01 | Stop reason: SDUPTHER

## 2019-01-01 RX ORDER — AMLODIPINE BESYLATE 5 MG/1
TABLET ORAL
Status: COMPLETED
Start: 2019-01-01 | End: 2019-01-01

## 2019-01-01 RX ORDER — FERROUS SULFATE 325(65) MG
162.5 TABLET ORAL DAILY
Status: CANCELLED | OUTPATIENT
Start: 2019-01-01

## 2019-01-01 RX ORDER — HEPARIN SODIUM 5000 [USP'U]/ML
5000 INJECTION, SOLUTION INTRAVENOUS; SUBCUTANEOUS EVERY 8 HOURS SCHEDULED
Status: DISCONTINUED | OUTPATIENT
Start: 2019-01-01 | End: 2019-01-01 | Stop reason: HOSPADM

## 2019-01-01 RX ORDER — AMLODIPINE BESYLATE 5 MG/1
5 TABLET ORAL DAILY
Status: CANCELLED | OUTPATIENT
Start: 2019-01-01

## 2019-01-01 RX ORDER — OLANZAPINE 10 MG/1
10 TABLET, ORALLY DISINTEGRATING ORAL
Status: DISCONTINUED | OUTPATIENT
Start: 2019-01-01 | End: 2019-01-01

## 2019-01-01 RX ORDER — RANITIDINE 150 MG/1
150 TABLET ORAL 2 TIMES DAILY
Qty: 60 TABLET | Refills: 0 | Status: SHIPPED | OUTPATIENT
Start: 2019-01-01 | End: 2020-01-01 | Stop reason: HOSPADM

## 2019-01-01 RX ORDER — POTASSIUM CHLORIDE
CRYSTALS MISCELLANEOUS
COMMUNITY
End: 2020-01-01 | Stop reason: HOSPADM

## 2019-01-01 RX ORDER — LORAZEPAM 0.5 MG/1
0.25 TABLET ORAL 2 TIMES DAILY
Status: CANCELLED | OUTPATIENT
Start: 2019-01-01

## 2019-01-01 RX ORDER — ACETAMINOPHEN 325 MG/1
975 TABLET ORAL EVERY 8 HOURS SCHEDULED
Status: COMPLETED | OUTPATIENT
Start: 2019-01-01 | End: 2019-01-01

## 2019-01-01 RX ORDER — HALOPERIDOL 5 MG/ML
2.5 INJECTION INTRAMUSCULAR EVERY 6 HOURS PRN
Status: DISCONTINUED | OUTPATIENT
Start: 2019-01-01 | End: 2019-01-01

## 2019-01-01 RX ORDER — SENNOSIDES 8.6 MG
1 TABLET ORAL
Status: CANCELLED | OUTPATIENT
Start: 2019-01-01

## 2019-01-01 RX ORDER — LIDOCAINE 50 MG/G
1 PATCH TOPICAL DAILY
Status: DISCONTINUED | OUTPATIENT
Start: 2019-01-01 | End: 2019-01-01 | Stop reason: HOSPADM

## 2019-01-01 RX ORDER — LANOLIN ALCOHOL/MO/W.PET/CERES
3 CREAM (GRAM) TOPICAL
Status: DISCONTINUED | OUTPATIENT
Start: 2019-01-01 | End: 2019-01-01

## 2019-01-01 RX ORDER — ASPIRIN 81 MG/1
81 TABLET, CHEWABLE ORAL DAILY
Status: CANCELLED | OUTPATIENT
Start: 2019-01-01

## 2019-01-01 RX ORDER — POLYETHYLENE GLYCOL 3350 17 G/17G
17 POWDER, FOR SOLUTION ORAL DAILY
Status: DISCONTINUED | OUTPATIENT
Start: 2019-01-01 | End: 2019-01-01 | Stop reason: HOSPADM

## 2019-01-01 RX ORDER — AMLODIPINE BESYLATE 5 MG/1
5 TABLET ORAL DAILY
Qty: 30 TABLET | Refills: 0 | Status: SHIPPED | OUTPATIENT
Start: 2019-01-01 | End: 2020-01-01

## 2019-01-01 RX ORDER — LEVALBUTEROL INHALATION SOLUTION 0.63 MG/3ML
0.63 SOLUTION RESPIRATORY (INHALATION)
Status: CANCELLED | OUTPATIENT
Start: 2019-01-01

## 2019-01-01 RX ORDER — OLANZAPINE 10 MG/1
2.5 INJECTION, POWDER, LYOPHILIZED, FOR SOLUTION INTRAMUSCULAR ONCE
Status: COMPLETED | OUTPATIENT
Start: 2019-01-01 | End: 2019-01-01

## 2019-01-01 RX ORDER — ONDANSETRON 2 MG/ML
4 INJECTION INTRAMUSCULAR; INTRAVENOUS EVERY 6 HOURS PRN
Status: DISCONTINUED | OUTPATIENT
Start: 2019-01-01 | End: 2019-01-01 | Stop reason: HOSPADM

## 2019-01-01 RX ORDER — FUROSEMIDE 20 MG/1
20 TABLET ORAL DAILY
Qty: 90 TABLET | Refills: 2
Start: 2019-01-01 | End: 2019-01-01 | Stop reason: SDUPTHER

## 2019-01-01 RX ORDER — CEFTRIAXONE 1 G/50ML
1000 INJECTION, SOLUTION INTRAVENOUS EVERY 24 HOURS
Status: DISCONTINUED | OUTPATIENT
Start: 2019-01-01 | End: 2019-01-01

## 2019-01-01 RX ORDER — ATORVASTATIN CALCIUM 40 MG/1
40 TABLET, FILM COATED ORAL DAILY
Status: DISCONTINUED | OUTPATIENT
Start: 2019-01-01 | End: 2019-01-01 | Stop reason: HOSPADM

## 2019-01-01 RX ORDER — RANITIDINE 150 MG/1
150 TABLET ORAL 2 TIMES DAILY
Qty: 60 TABLET | Refills: 5 | Status: SHIPPED | OUTPATIENT
Start: 2019-01-01 | End: 2019-01-01 | Stop reason: SDUPTHER

## 2019-01-01 RX ORDER — ASPIRIN 81 MG/1
81 TABLET, CHEWABLE ORAL DAILY
Status: DISCONTINUED | OUTPATIENT
Start: 2019-01-01 | End: 2019-01-01 | Stop reason: HOSPADM

## 2019-01-01 RX ORDER — AMLODIPINE BESYLATE 2.5 MG/1
2.5 TABLET ORAL ONCE
Status: COMPLETED | OUTPATIENT
Start: 2019-01-01 | End: 2019-01-01

## 2019-01-01 RX ORDER — LANOLIN ALCOHOL/MO/W.PET/CERES
6 CREAM (GRAM) TOPICAL
Status: DISCONTINUED | OUTPATIENT
Start: 2019-01-01 | End: 2019-01-01 | Stop reason: HOSPADM

## 2019-01-01 RX ORDER — SENNOSIDES 8.6 MG
1 TABLET ORAL
Status: DISCONTINUED | OUTPATIENT
Start: 2019-01-01 | End: 2019-01-01 | Stop reason: HOSPADM

## 2019-01-01 RX ORDER — PREDNISONE 20 MG/1
40 TABLET ORAL DAILY
Status: COMPLETED | OUTPATIENT
Start: 2019-01-01 | End: 2019-01-01

## 2019-01-01 RX ORDER — IPRATROPIUM BROMIDE AND ALBUTEROL SULFATE 2.5; .5 MG/3ML; MG/3ML
3 SOLUTION RESPIRATORY (INHALATION)
Status: DISCONTINUED | OUTPATIENT
Start: 2019-01-01 | End: 2019-01-01

## 2019-01-01 RX ORDER — LOSARTAN POTASSIUM 50 MG/1
50 TABLET ORAL DAILY
Status: DISCONTINUED | OUTPATIENT
Start: 2019-01-01 | End: 2019-01-01 | Stop reason: HOSPADM

## 2019-01-01 RX ORDER — LIDOCAINE 50 MG/G
1 PATCH TOPICAL DAILY
Status: COMPLETED | OUTPATIENT
Start: 2019-01-01 | End: 2019-01-01

## 2019-01-01 RX ORDER — DOCUSATE SODIUM 100 MG/1
100 CAPSULE, LIQUID FILLED ORAL 2 TIMES DAILY
Status: DISCONTINUED | OUTPATIENT
Start: 2019-01-01 | End: 2019-01-01 | Stop reason: HOSPADM

## 2019-01-01 RX ORDER — OXYCODONE HYDROCHLORIDE 5 MG/1
2.5 TABLET ORAL EVERY 4 HOURS PRN
Status: DISCONTINUED | OUTPATIENT
Start: 2019-01-01 | End: 2019-01-01

## 2019-01-01 RX ORDER — LORAZEPAM 0.5 MG/1
0.5 TABLET ORAL EVERY 8 HOURS PRN
Qty: 90 TABLET | Refills: 0 | Status: SHIPPED | OUTPATIENT
Start: 2019-01-01 | End: 2019-01-01 | Stop reason: SDUPTHER

## 2019-01-01 RX ORDER — FAMOTIDINE 20 MG/1
TABLET, FILM COATED ORAL
Status: COMPLETED
Start: 2019-01-01 | End: 2019-01-01

## 2019-01-01 RX ORDER — ACETAMINOPHEN 325 MG/1
650 TABLET ORAL EVERY 6 HOURS PRN
Status: DISCONTINUED | OUTPATIENT
Start: 2019-01-01 | End: 2019-01-01 | Stop reason: HOSPADM

## 2019-01-01 RX ORDER — LEVALBUTEROL INHALATION SOLUTION 0.63 MG/3ML
0.63 SOLUTION RESPIRATORY (INHALATION) EVERY 6 HOURS PRN
Status: DISCONTINUED | OUTPATIENT
Start: 2019-01-01 | End: 2019-01-01

## 2019-01-01 RX ORDER — LORAZEPAM 0.5 MG/1
0.5 TABLET ORAL EVERY 8 HOURS PRN
Qty: 90 TABLET | Refills: 0 | Status: ON HOLD | OUTPATIENT
Start: 2019-01-01 | End: 2019-01-01 | Stop reason: CLARIF

## 2019-01-01 RX ORDER — LOSARTAN POTASSIUM 50 MG/1
50 TABLET ORAL DAILY
Status: CANCELLED | OUTPATIENT
Start: 2019-01-01

## 2019-01-01 RX ORDER — FUROSEMIDE 20 MG/1
20 TABLET ORAL DAILY
Status: DISCONTINUED | OUTPATIENT
Start: 2019-01-01 | End: 2019-01-01 | Stop reason: HOSPADM

## 2019-01-01 RX ORDER — FENTANYL CITRATE 50 UG/ML
50 INJECTION, SOLUTION INTRAMUSCULAR; INTRAVENOUS ONCE
Status: COMPLETED | OUTPATIENT
Start: 2019-01-01 | End: 2019-01-01

## 2019-01-01 RX ORDER — LEVALBUTEROL INHALATION SOLUTION 0.63 MG/3ML
0.63 SOLUTION RESPIRATORY (INHALATION)
Status: DISCONTINUED | OUTPATIENT
Start: 2019-01-01 | End: 2019-01-01 | Stop reason: HOSPADM

## 2019-01-01 RX ORDER — OXYCODONE HYDROCHLORIDE 5 MG/1
5 TABLET ORAL EVERY 4 HOURS PRN
Status: DISCONTINUED | OUTPATIENT
Start: 2019-01-01 | End: 2019-01-01 | Stop reason: HOSPADM

## 2019-01-01 RX ORDER — FUROSEMIDE 20 MG/1
20 TABLET ORAL DAILY
Qty: 90 TABLET | Refills: 3
Start: 2019-01-01 | End: 2019-01-01 | Stop reason: SDUPTHER

## 2019-01-01 RX ORDER — DOCUSATE SODIUM 100 MG/1
100 CAPSULE, LIQUID FILLED ORAL 2 TIMES DAILY
Status: CANCELLED | OUTPATIENT
Start: 2019-01-01

## 2019-01-01 RX ORDER — OLANZAPINE 5 MG/1
5 TABLET, ORALLY DISINTEGRATING ORAL
Status: DISCONTINUED | OUTPATIENT
Start: 2019-01-01 | End: 2019-01-01 | Stop reason: HOSPADM

## 2019-01-01 RX ORDER — GUAIFENESIN 600 MG
600 TABLET, EXTENDED RELEASE 12 HR ORAL EVERY 12 HOURS SCHEDULED
Status: CANCELLED | OUTPATIENT
Start: 2019-01-01

## 2019-01-01 RX ORDER — AMLODIPINE BESYLATE 2.5 MG/1
2.5 TABLET ORAL DAILY
Status: DISCONTINUED | OUTPATIENT
Start: 2019-01-01 | End: 2019-01-01

## 2019-01-01 RX ORDER — FUROSEMIDE 20 MG/1
20 TABLET ORAL
Status: DISCONTINUED | OUTPATIENT
Start: 2019-01-01 | End: 2019-01-01

## 2019-01-01 RX ORDER — NITROGLYCERIN 0.4 MG/1
0.4 TABLET SUBLINGUAL
Status: DISCONTINUED | OUTPATIENT
Start: 2019-01-01 | End: 2019-01-01

## 2019-01-01 RX ORDER — FERROUS SULFATE 325(65) MG
162.5 TABLET ORAL DAILY
Status: DISCONTINUED | OUTPATIENT
Start: 2019-01-01 | End: 2019-01-01 | Stop reason: HOSPADM

## 2019-01-01 RX ORDER — TRAMADOL HYDROCHLORIDE 50 MG/1
50 TABLET ORAL EVERY 6 HOURS PRN
Status: DISCONTINUED | OUTPATIENT
Start: 2019-01-01 | End: 2019-01-01 | Stop reason: HOSPADM

## 2019-01-01 RX ORDER — ASPIRIN 81 MG/1
TABLET, CHEWABLE ORAL
Status: COMPLETED
Start: 2019-01-01 | End: 2019-01-01

## 2019-01-01 RX ORDER — B-COMPLEX WITH VITAMIN C
2 TABLET ORAL 2 TIMES DAILY WITH MEALS
Status: DISCONTINUED | OUTPATIENT
Start: 2019-01-01 | End: 2019-01-01 | Stop reason: HOSPADM

## 2019-01-01 RX ORDER — POLYETHYLENE GLYCOL 3350 17 G/17G
17 POWDER, FOR SOLUTION ORAL DAILY
Status: CANCELLED | OUTPATIENT
Start: 2019-01-01

## 2019-01-01 RX ORDER — FUROSEMIDE 20 MG/1
10 TABLET ORAL DAILY
Qty: 90 TABLET | Refills: 2 | Status: SHIPPED | OUTPATIENT
Start: 2019-01-01 | End: 2019-01-01

## 2019-01-01 RX ORDER — POLYETHYLENE GLYCOL 3350 17 G/17G
17 POWDER, FOR SOLUTION ORAL DAILY
Status: DISCONTINUED | OUTPATIENT
Start: 2019-01-01 | End: 2019-01-01

## 2019-01-01 RX ORDER — SODIUM CHLORIDE 9 MG/ML
75 INJECTION, SOLUTION INTRAVENOUS CONTINUOUS
Status: DISCONTINUED | OUTPATIENT
Start: 2019-01-01 | End: 2019-01-01

## 2019-01-01 RX ORDER — LORAZEPAM 0.5 MG/1
0.25 TABLET ORAL 2 TIMES DAILY
Status: DISCONTINUED | OUTPATIENT
Start: 2019-01-01 | End: 2019-01-01

## 2019-01-01 RX ORDER — FUROSEMIDE 20 MG/1
TABLET ORAL
Status: DISPENSED
Start: 2019-01-01 | End: 2019-01-01

## 2019-01-01 RX ORDER — FERROUS SULFATE TAB EC 324 MG (65 MG FE EQUIVALENT) 324 (65 FE) MG
324 TABLET DELAYED RESPONSE ORAL
COMMUNITY
End: 2020-01-01

## 2019-01-01 RX ORDER — LABETALOL 20 MG/4 ML (5 MG/ML) INTRAVENOUS SYRINGE
5 EVERY 6 HOURS PRN
Status: DISCONTINUED | OUTPATIENT
Start: 2019-01-01 | End: 2019-01-01 | Stop reason: HOSPADM

## 2019-01-01 RX ORDER — GUAIFENESIN 600 MG
600 TABLET, EXTENDED RELEASE 12 HR ORAL EVERY 12 HOURS SCHEDULED
Status: DISCONTINUED | OUTPATIENT
Start: 2019-01-01 | End: 2019-01-01 | Stop reason: HOSPADM

## 2019-01-01 RX ORDER — LIDOCAINE 50 MG/G
1 PATCH TOPICAL ONCE
Status: COMPLETED | OUTPATIENT
Start: 2019-01-01 | End: 2019-01-01

## 2019-01-01 RX ORDER — LORAZEPAM 0.5 MG/1
0.5 TABLET ORAL EVERY 8 HOURS PRN
Status: DISCONTINUED | OUTPATIENT
Start: 2019-01-01 | End: 2019-01-01

## 2019-01-01 RX ORDER — RANITIDINE 150 MG/1
150 TABLET ORAL 2 TIMES DAILY
Qty: 60 TABLET | Refills: 5 | Status: ON HOLD | OUTPATIENT
Start: 2019-01-01 | End: 2019-01-01 | Stop reason: SDUPTHER

## 2019-01-01 RX ORDER — ACETAMINOPHEN 325 MG/1
975 TABLET ORAL EVERY 8 HOURS SCHEDULED
Status: DISCONTINUED | OUTPATIENT
Start: 2019-01-01 | End: 2019-01-01

## 2019-01-01 RX ORDER — HALOPERIDOL 5 MG/ML
2.5 INJECTION INTRAMUSCULAR EVERY 8 HOURS PRN
Status: DISCONTINUED | OUTPATIENT
Start: 2019-01-01 | End: 2019-01-01

## 2019-01-01 RX ORDER — FUROSEMIDE 20 MG/1
20 TABLET ORAL DAILY
Status: CANCELLED | OUTPATIENT
Start: 2019-01-01

## 2019-01-01 RX ORDER — OLANZAPINE 5 MG/1
5 TABLET, ORALLY DISINTEGRATING ORAL
Status: DISCONTINUED | OUTPATIENT
Start: 2019-01-01 | End: 2019-01-01

## 2019-01-01 RX ORDER — OXYCODONE HYDROCHLORIDE 5 MG/1
2.5 TABLET ORAL EVERY 4 HOURS PRN
Status: DISCONTINUED | OUTPATIENT
Start: 2019-01-01 | End: 2019-01-01 | Stop reason: HOSPADM

## 2019-01-01 RX ORDER — FAMOTIDINE 20 MG/1
20 TABLET, FILM COATED ORAL 2 TIMES DAILY
Status: DISCONTINUED | OUTPATIENT
Start: 2019-01-01 | End: 2019-01-01 | Stop reason: HOSPADM

## 2019-01-01 RX ORDER — ATORVASTATIN CALCIUM 40 MG/1
40 TABLET, FILM COATED ORAL
COMMUNITY
End: 2020-01-01

## 2019-01-01 RX ORDER — METOPROLOL TARTRATE 5 MG/5ML
5 INJECTION INTRAVENOUS ONCE
Status: COMPLETED | OUTPATIENT
Start: 2019-01-01 | End: 2019-01-01

## 2019-01-01 RX ORDER — TRAMADOL HYDROCHLORIDE 50 MG/1
50 TABLET ORAL EVERY 6 HOURS PRN
Qty: 120 TABLET | Refills: 0 | Status: SHIPPED | OUTPATIENT
Start: 2019-01-01 | End: 2020-01-01

## 2019-01-01 RX ORDER — FUROSEMIDE 20 MG/1
20 TABLET ORAL DAILY
Qty: 90 TABLET | Refills: 3 | Status: SHIPPED | OUTPATIENT
Start: 2019-01-01 | End: 2019-01-01 | Stop reason: SDUPTHER

## 2019-01-01 RX ORDER — LIDOCAINE 50 MG/G
1 PATCH TOPICAL DAILY
Status: CANCELLED | OUTPATIENT
Start: 2019-01-01

## 2019-01-01 RX ORDER — BISACODYL 10 MG
10 SUPPOSITORY, RECTAL RECTAL DAILY PRN
Status: DISCONTINUED | OUTPATIENT
Start: 2019-01-01 | End: 2019-01-01

## 2019-01-01 RX ORDER — FUROSEMIDE 20 MG/1
20 TABLET ORAL DAILY
Status: DISCONTINUED | OUTPATIENT
Start: 2019-01-01 | End: 2019-01-01

## 2019-01-01 RX ORDER — CEFTRIAXONE 1 G/50ML
1000 INJECTION, SOLUTION INTRAVENOUS EVERY 24 HOURS
Status: COMPLETED | OUTPATIENT
Start: 2019-01-01 | End: 2019-01-01

## 2019-01-01 RX ORDER — FUROSEMIDE 20 MG/1
TABLET ORAL
Status: COMPLETED
Start: 2019-01-01 | End: 2019-01-01

## 2019-01-01 RX ORDER — LOSARTAN POTASSIUM 25 MG/1
25 TABLET ORAL DAILY
Status: DISCONTINUED | OUTPATIENT
Start: 2019-01-01 | End: 2019-01-01

## 2019-01-01 RX ORDER — HALOPERIDOL 5 MG/ML
2.5 INJECTION INTRAMUSCULAR ONCE
Status: COMPLETED | OUTPATIENT
Start: 2019-01-01 | End: 2019-01-01

## 2019-01-01 RX ORDER — FUROSEMIDE 20 MG/1
20 TABLET ORAL DAILY
Qty: 90 TABLET | Refills: 0 | Status: SHIPPED | OUTPATIENT
Start: 2019-01-01 | End: 2019-01-01 | Stop reason: SDUPTHER

## 2019-01-01 RX ORDER — FUROSEMIDE 20 MG/1
20 TABLET ORAL ONCE
Status: COMPLETED | OUTPATIENT
Start: 2019-01-01 | End: 2019-01-01

## 2019-01-01 RX ORDER — LOSARTAN POTASSIUM 50 MG/1
50 TABLET ORAL DAILY
Qty: 30 TABLET | Refills: 5 | Status: SHIPPED | OUTPATIENT
Start: 2019-01-01 | End: 2020-01-01

## 2019-01-01 RX ORDER — LORAZEPAM 0.5 MG/1
0.25 TABLET ORAL 2 TIMES DAILY
Status: DISCONTINUED | OUTPATIENT
Start: 2019-01-01 | End: 2019-01-01 | Stop reason: HOSPADM

## 2019-01-01 RX ORDER — ATORVASTATIN CALCIUM 40 MG/1
40 TABLET, FILM COATED ORAL DAILY
Status: CANCELLED | OUTPATIENT
Start: 2019-01-01

## 2019-01-01 RX ORDER — ACETAMINOPHEN 325 MG/1
650 TABLET ORAL EVERY 6 HOURS PRN
Status: DISCONTINUED | OUTPATIENT
Start: 2019-01-01 | End: 2019-01-01

## 2019-01-01 RX ORDER — SODIUM CHLORIDE 9 MG/ML
60 INJECTION, SOLUTION INTRAVENOUS CONTINUOUS
Status: DISPENSED | OUTPATIENT
Start: 2019-01-01 | End: 2019-01-01

## 2019-01-01 RX ORDER — TRAMADOL HYDROCHLORIDE 50 MG/1
50 TABLET ORAL EVERY 6 HOURS PRN
Status: CANCELLED | OUTPATIENT
Start: 2019-01-01

## 2019-01-01 RX ORDER — LANOLIN ALCOHOL/MO/W.PET/CERES
6 CREAM (GRAM) TOPICAL
Status: CANCELLED | OUTPATIENT
Start: 2019-01-01

## 2019-01-01 RX ORDER — LOSARTAN POTASSIUM 50 MG/1
50 TABLET ORAL DAILY
Qty: 30 TABLET | Refills: 0 | Status: SHIPPED | OUTPATIENT
Start: 2019-01-01 | End: 2019-01-01 | Stop reason: SDUPTHER

## 2019-01-01 RX ORDER — PREDNISONE 20 MG/1
20 TABLET ORAL DAILY
Status: COMPLETED | OUTPATIENT
Start: 2019-01-01 | End: 2019-01-01

## 2019-01-01 RX ORDER — OXYCODONE HYDROCHLORIDE 5 MG/1
5 TABLET ORAL EVERY 4 HOURS PRN
Status: DISCONTINUED | OUTPATIENT
Start: 2019-01-01 | End: 2019-01-01

## 2019-01-01 RX ORDER — ATORVASTATIN CALCIUM 40 MG/1
40 TABLET, FILM COATED ORAL
Status: DISCONTINUED | OUTPATIENT
Start: 2019-01-01 | End: 2019-01-01 | Stop reason: HOSPADM

## 2019-01-01 RX ORDER — BISACODYL 10 MG
10 SUPPOSITORY, RECTAL RECTAL ONCE
Status: DISCONTINUED | OUTPATIENT
Start: 2019-01-01 | End: 2019-01-01

## 2019-01-01 RX ORDER — B-COMPLEX WITH VITAMIN C
2 TABLET ORAL 2 TIMES DAILY WITH MEALS
Status: CANCELLED | OUTPATIENT
Start: 2019-01-01

## 2019-01-01 RX ORDER — PREDNISONE 10 MG/1
10 TABLET ORAL DAILY
Status: DISCONTINUED | OUTPATIENT
Start: 2019-01-01 | End: 2019-01-01 | Stop reason: HOSPADM

## 2019-01-01 RX ORDER — LOSARTAN POTASSIUM 50 MG/1
TABLET ORAL
Status: COMPLETED
Start: 2019-01-01 | End: 2019-01-01

## 2019-01-01 RX ORDER — FAMOTIDINE 20 MG/1
20 TABLET, FILM COATED ORAL 2 TIMES DAILY
Status: CANCELLED | OUTPATIENT
Start: 2019-01-01

## 2019-01-01 RX ORDER — POTASSIUM CHLORIDE 750 MG/1
10 TABLET, EXTENDED RELEASE ORAL DAILY
Status: DISCONTINUED | OUTPATIENT
Start: 2019-01-01 | End: 2019-01-01

## 2019-01-01 RX ORDER — METHOCARBAMOL 500 MG/1
500 TABLET, FILM COATED ORAL EVERY 6 HOURS SCHEDULED
Status: DISCONTINUED | OUTPATIENT
Start: 2019-01-01 | End: 2019-01-01

## 2019-01-01 RX ORDER — HALOPERIDOL 5 MG/ML
1 INJECTION INTRAMUSCULAR EVERY 6 HOURS PRN
Status: DISCONTINUED | OUTPATIENT
Start: 2019-01-01 | End: 2019-01-01

## 2019-01-01 RX ORDER — BISACODYL 10 MG
10 SUPPOSITORY, RECTAL RECTAL ONCE
Status: COMPLETED | OUTPATIENT
Start: 2019-01-01 | End: 2019-01-01

## 2019-01-01 RX ADMIN — METOPROLOL TARTRATE 12.5 MG: 25 TABLET ORAL at 08:34

## 2019-01-01 RX ADMIN — METOPROLOL TARTRATE 12.5 MG: 25 TABLET, FILM COATED ORAL at 09:44

## 2019-01-01 RX ADMIN — IPRATROPIUM BROMIDE 0.5 MG: 0.5 SOLUTION RESPIRATORY (INHALATION) at 20:10

## 2019-01-01 RX ADMIN — OXYCODONE HYDROCHLORIDE 5 MG: 5 TABLET ORAL at 01:42

## 2019-01-01 RX ADMIN — LORAZEPAM 0.25 MG: 0.5 TABLET ORAL at 17:48

## 2019-01-01 RX ADMIN — METOPROLOL TARTRATE 12.5 MG: 25 TABLET ORAL at 21:09

## 2019-01-01 RX ADMIN — FERROUS SULFATE TAB 325 MG (65 MG ELEMENTAL FE) 162.5 MG: 325 (65 FE) TAB at 08:58

## 2019-01-01 RX ADMIN — OXYCODONE HYDROCHLORIDE 2.5 MG: 5 TABLET ORAL at 07:34

## 2019-01-01 RX ADMIN — POTASSIUM CHLORIDE 10 MEQ: 750 TABLET, EXTENDED RELEASE ORAL at 08:03

## 2019-01-01 RX ADMIN — ACETAMINOPHEN 975 MG: 325 TABLET ORAL at 15:05

## 2019-01-01 RX ADMIN — ASPIRIN 81 MG 81 MG: 81 TABLET ORAL at 09:02

## 2019-01-01 RX ADMIN — POLYETHYLENE GLYCOL 3350 17 G: 17 POWDER, FOR SOLUTION ORAL at 09:44

## 2019-01-01 RX ADMIN — ATORVASTATIN CALCIUM 40 MG: 40 TABLET, FILM COATED ORAL at 09:02

## 2019-01-01 RX ADMIN — AMLODIPINE BESYLATE 5 MG: 5 TABLET ORAL at 08:21

## 2019-01-01 RX ADMIN — FAMOTIDINE 20 MG: 20 TABLET ORAL at 10:27

## 2019-01-01 RX ADMIN — HEPARIN SODIUM 5000 UNITS: 5000 INJECTION INTRAVENOUS; SUBCUTANEOUS at 05:20

## 2019-01-01 RX ADMIN — CEFTRIAXONE 1000 MG: 1 INJECTION, SOLUTION INTRAVENOUS at 16:50

## 2019-01-01 RX ADMIN — FUROSEMIDE 20 MG: 20 TABLET ORAL at 08:31

## 2019-01-01 RX ADMIN — DOCUSATE SODIUM 100 MG: 100 CAPSULE, LIQUID FILLED ORAL at 17:42

## 2019-01-01 RX ADMIN — FAMOTIDINE 20 MG: 20 TABLET ORAL at 17:07

## 2019-01-01 RX ADMIN — POLYETHYLENE GLYCOL 3350 17 G: 17 POWDER, FOR SOLUTION ORAL at 08:06

## 2019-01-01 RX ADMIN — CALCIUM CARBONATE-VITAMIN D TAB 500 MG-200 UNIT 2 TABLET: 500-200 TAB at 07:43

## 2019-01-01 RX ADMIN — ATORVASTATIN CALCIUM 40 MG: 40 TABLET, FILM COATED ORAL at 08:59

## 2019-01-01 RX ADMIN — ASPIRIN 81 MG 81 MG: 81 TABLET ORAL at 08:22

## 2019-01-01 RX ADMIN — LEVALBUTEROL HYDROCHLORIDE 0.63 MG: 0.63 SOLUTION RESPIRATORY (INHALATION) at 20:10

## 2019-01-01 RX ADMIN — SODIUM CHLORIDE 60 ML/HR: 9 INJECTION, SOLUTION INTRAVENOUS at 16:30

## 2019-01-01 RX ADMIN — LIDOCAINE 1 PATCH: 50 PATCH TOPICAL at 08:22

## 2019-01-01 RX ADMIN — MELATONIN 6 MG: at 20:41

## 2019-01-01 RX ADMIN — ACETAMINOPHEN 975 MG: 325 TABLET ORAL at 14:58

## 2019-01-01 RX ADMIN — BISACODYL 10 MG: 10 SUPPOSITORY RECTAL at 12:45

## 2019-01-01 RX ADMIN — FAMOTIDINE 20 MG: 20 TABLET ORAL at 08:29

## 2019-01-01 RX ADMIN — LORAZEPAM 0.25 MG: 0.5 TABLET ORAL at 17:05

## 2019-01-01 RX ADMIN — ATORVASTATIN CALCIUM 40 MG: 40 TABLET, FILM COATED ORAL at 17:17

## 2019-01-01 RX ADMIN — ONDANSETRON 4 MG: 2 INJECTION INTRAMUSCULAR; INTRAVENOUS at 11:23

## 2019-01-01 RX ADMIN — FUROSEMIDE 20 MG: 20 TABLET ORAL at 08:52

## 2019-01-01 RX ADMIN — LOSARTAN POTASSIUM 50 MG: 50 TABLET, FILM COATED ORAL at 08:21

## 2019-01-01 RX ADMIN — LIDOCAINE 1 PATCH: 50 PATCH TOPICAL at 01:53

## 2019-01-01 RX ADMIN — IPRATROPIUM BROMIDE AND ALBUTEROL SULFATE 3 ML: 2.5; .5 SOLUTION RESPIRATORY (INHALATION) at 23:11

## 2019-01-01 RX ADMIN — LOSARTAN POTASSIUM 50 MG: 50 TABLET, FILM COATED ORAL at 08:06

## 2019-01-01 RX ADMIN — FERROUS SULFATE TAB 325 MG (65 MG ELEMENTAL FE) 162.5 MG: 325 (65 FE) TAB at 08:02

## 2019-01-01 RX ADMIN — OXYCODONE HYDROCHLORIDE 5 MG: 5 TABLET ORAL at 04:08

## 2019-01-01 RX ADMIN — ENOXAPARIN SODIUM 30 MG: 30 INJECTION SUBCUTANEOUS at 08:54

## 2019-01-01 RX ADMIN — AZITHROMYCIN MONOHYDRATE 500 MG: 500 INJECTION, POWDER, LYOPHILIZED, FOR SOLUTION INTRAVENOUS at 18:13

## 2019-01-01 RX ADMIN — METOPROLOL TARTRATE 12.5 MG: 25 TABLET ORAL at 08:29

## 2019-01-01 RX ADMIN — SENNOSIDES 8.6 MG: 8.6 TABLET, FILM COATED ORAL at 21:10

## 2019-01-01 RX ADMIN — CALCIUM CARBONATE-VITAMIN D TAB 500 MG-200 UNIT 2 TABLET: 500-200 TAB at 08:58

## 2019-01-01 RX ADMIN — LIDOCAINE 1 PATCH: 50 PATCH TOPICAL at 08:36

## 2019-01-01 RX ADMIN — LEVALBUTEROL HYDROCHLORIDE 0.63 MG: 0.63 SOLUTION RESPIRATORY (INHALATION) at 21:03

## 2019-01-01 RX ADMIN — ATORVASTATIN CALCIUM 40 MG: 40 TABLET, FILM COATED ORAL at 17:14

## 2019-01-01 RX ADMIN — CALCIUM CARBONATE-VITAMIN D TAB 500 MG-200 UNIT 2 TABLET: 500-200 TAB at 09:29

## 2019-01-01 RX ADMIN — HEPARIN SODIUM 5000 UNITS: 5000 INJECTION INTRAVENOUS; SUBCUTANEOUS at 05:07

## 2019-01-01 RX ADMIN — AMLODIPINE BESYLATE 5 MG: 5 TABLET ORAL at 08:51

## 2019-01-01 RX ADMIN — OXYCODONE HYDROCHLORIDE 5 MG: 5 TABLET ORAL at 01:20

## 2019-01-01 RX ADMIN — FAMOTIDINE 20 MG: 20 TABLET ORAL at 17:58

## 2019-01-01 RX ADMIN — CALCIUM CARBONATE-VITAMIN D TAB 500 MG-200 UNIT 2 TABLET: 500-200 TAB at 09:43

## 2019-01-01 RX ADMIN — FAMOTIDINE 20 MG: 20 TABLET ORAL at 08:31

## 2019-01-01 RX ADMIN — IPRATROPIUM BROMIDE 0.5 MG: 0.5 SOLUTION RESPIRATORY (INHALATION) at 21:03

## 2019-01-01 RX ADMIN — PREDNISONE 20 MG: 20 TABLET ORAL at 09:03

## 2019-01-01 RX ADMIN — FAMOTIDINE 20 MG: 20 TABLET ORAL at 17:14

## 2019-01-01 RX ADMIN — AMLODIPINE BESYLATE 5 MG: 5 TABLET ORAL at 08:22

## 2019-01-01 RX ADMIN — AMLODIPINE BESYLATE 5 MG: 5 TABLET ORAL at 10:20

## 2019-01-01 RX ADMIN — CEFTRIAXONE 1000 MG: 1 INJECTION, SOLUTION INTRAVENOUS at 16:25

## 2019-01-01 RX ADMIN — ENOXAPARIN SODIUM 30 MG: 30 INJECTION SUBCUTANEOUS at 09:20

## 2019-01-01 RX ADMIN — GUAIFENESIN 600 MG: 600 TABLET, EXTENDED RELEASE ORAL at 21:17

## 2019-01-01 RX ADMIN — MELATONIN 6 MG: at 21:03

## 2019-01-01 RX ADMIN — FUROSEMIDE 20 MG: 20 TABLET ORAL at 08:14

## 2019-01-01 RX ADMIN — GUAIFENESIN 600 MG: 600 TABLET, EXTENDED RELEASE ORAL at 21:10

## 2019-01-01 RX ADMIN — AMLODIPINE BESYLATE 2.5 MG: 2.5 TABLET ORAL at 12:12

## 2019-01-01 RX ADMIN — LEVALBUTEROL HYDROCHLORIDE 0.63 MG: 0.63 SOLUTION RESPIRATORY (INHALATION) at 07:03

## 2019-01-01 RX ADMIN — CALCIUM CARBONATE-VITAMIN D TAB 500 MG-200 UNIT 2 TABLET: 500-200 TAB at 15:31

## 2019-01-01 RX ADMIN — LEVALBUTEROL HYDROCHLORIDE 0.63 MG: 0.63 SOLUTION RESPIRATORY (INHALATION) at 07:25

## 2019-01-01 RX ADMIN — DOCUSATE SODIUM 100 MG: 100 CAPSULE, LIQUID FILLED ORAL at 09:02

## 2019-01-01 RX ADMIN — HEPARIN SODIUM 5000 UNITS: 5000 INJECTION INTRAVENOUS; SUBCUTANEOUS at 14:23

## 2019-01-01 RX ADMIN — CALCIUM CARBONATE-VITAMIN D TAB 500 MG-200 UNIT 2 TABLET: 500-200 TAB at 08:55

## 2019-01-01 RX ADMIN — OLANZAPINE 5 MG: 5 TABLET, ORALLY DISINTEGRATING ORAL at 20:41

## 2019-01-01 RX ADMIN — FAMOTIDINE 20 MG: 20 TABLET ORAL at 09:04

## 2019-01-01 RX ADMIN — MELATONIN 6 MG: at 21:04

## 2019-01-01 RX ADMIN — ASPIRIN 81 MG 81 MG: 81 TABLET ORAL at 08:36

## 2019-01-01 RX ADMIN — METHOCARBAMOL TABLETS 500 MG: 500 TABLET, COATED ORAL at 23:35

## 2019-01-01 RX ADMIN — LEVALBUTEROL HYDROCHLORIDE 0.63 MG: 0.63 SOLUTION RESPIRATORY (INHALATION) at 11:03

## 2019-01-01 RX ADMIN — FUROSEMIDE 20 MG: 20 TABLET ORAL at 08:04

## 2019-01-01 RX ADMIN — TRAMADOL HYDROCHLORIDE 50 MG: 50 TABLET, FILM COATED ORAL at 23:38

## 2019-01-01 RX ADMIN — POLYETHYLENE GLYCOL 3350 17 G: 17 POWDER, FOR SOLUTION ORAL at 09:15

## 2019-01-01 RX ADMIN — MORPHINE SULFATE 2 MG: 2 INJECTION, SOLUTION INTRAMUSCULAR; INTRAVENOUS at 12:24

## 2019-01-01 RX ADMIN — METOPROLOL TARTRATE 12.5 MG: 25 TABLET, FILM COATED ORAL at 21:02

## 2019-01-01 RX ADMIN — POLYETHYLENE GLYCOL 3350 17 G: 17 POWDER, FOR SOLUTION ORAL at 09:04

## 2019-01-01 RX ADMIN — LEVALBUTEROL HYDROCHLORIDE 0.63 MG: 0.63 SOLUTION RESPIRATORY (INHALATION) at 11:32

## 2019-01-01 RX ADMIN — FAMOTIDINE 20 MG: 20 TABLET ORAL at 17:13

## 2019-01-01 RX ADMIN — IPRATROPIUM BROMIDE 0.5 MG: 0.5 SOLUTION RESPIRATORY (INHALATION) at 19:50

## 2019-01-01 RX ADMIN — LEVALBUTEROL HYDROCHLORIDE 0.63 MG: 0.63 SOLUTION RESPIRATORY (INHALATION) at 16:01

## 2019-01-01 RX ADMIN — LOSARTAN POTASSIUM 50 MG: 50 TABLET, FILM COATED ORAL at 08:59

## 2019-01-01 RX ADMIN — ASPIRIN 81 MG 81 MG: 81 TABLET ORAL at 08:31

## 2019-01-01 RX ADMIN — HEPARIN SODIUM 5000 UNITS: 5000 INJECTION INTRAVENOUS; SUBCUTANEOUS at 05:45

## 2019-01-01 RX ADMIN — DOCUSATE SODIUM 100 MG: 100 CAPSULE, LIQUID FILLED ORAL at 17:05

## 2019-01-01 RX ADMIN — FAMOTIDINE 20 MG: 20 TABLET ORAL at 17:01

## 2019-01-01 RX ADMIN — POLYETHYLENE GLYCOL 3350 17 G: 17 POWDER, FOR SOLUTION ORAL at 12:12

## 2019-01-01 RX ADMIN — FAMOTIDINE 20 MG: 20 TABLET ORAL at 09:44

## 2019-01-01 RX ADMIN — AMLODIPINE BESYLATE 5 MG: 5 TABLET ORAL at 08:34

## 2019-01-01 RX ADMIN — METHOCARBAMOL TABLETS 500 MG: 500 TABLET, COATED ORAL at 11:23

## 2019-01-01 RX ADMIN — POLYETHYLENE GLYCOL 3350 17 G: 17 POWDER, FOR SOLUTION ORAL at 09:43

## 2019-01-01 RX ADMIN — LORAZEPAM 0.5 MG: 0.5 TABLET ORAL at 23:47

## 2019-01-01 RX ADMIN — ATORVASTATIN CALCIUM 40 MG: 40 TABLET, FILM COATED ORAL at 17:13

## 2019-01-01 RX ADMIN — ASPIRIN 81 MG 81 MG: 81 TABLET ORAL at 08:35

## 2019-01-01 RX ADMIN — LEVALBUTEROL HYDROCHLORIDE 0.63 MG: 0.63 SOLUTION RESPIRATORY (INHALATION) at 19:50

## 2019-01-01 RX ADMIN — DEXTRAN 70 AND HYPROMELLOSE 2910 1 DROP: 1; 3 SOLUTION/ DROPS OPHTHALMIC at 18:28

## 2019-01-01 RX ADMIN — IPRATROPIUM BROMIDE 0.5 MG: 0.5 SOLUTION RESPIRATORY (INHALATION) at 16:01

## 2019-01-01 RX ADMIN — ASPIRIN 81 MG 81 MG: 81 TABLET ORAL at 08:29

## 2019-01-01 RX ADMIN — FAMOTIDINE 20 MG: 20 TABLET ORAL at 09:30

## 2019-01-01 RX ADMIN — HALOPERIDOL LACTATE 2.5 MG: 5 INJECTION, SOLUTION INTRAMUSCULAR at 00:40

## 2019-01-01 RX ADMIN — AMLODIPINE BESYLATE 5 MG: 5 TABLET ORAL at 08:55

## 2019-01-01 RX ADMIN — CALCIUM CARBONATE-VITAMIN D TAB 500 MG-200 UNIT 2 TABLET: 500-200 TAB at 16:58

## 2019-01-01 RX ADMIN — LOSARTAN POTASSIUM 50 MG: 50 TABLET, FILM COATED ORAL at 10:28

## 2019-01-01 RX ADMIN — OXYCODONE HYDROCHLORIDE 2.5 MG: 5 TABLET ORAL at 17:16

## 2019-01-01 RX ADMIN — IOHEXOL 50 ML: 240 INJECTION, SOLUTION INTRATHECAL; INTRAVASCULAR; INTRAVENOUS; ORAL at 22:12

## 2019-01-01 RX ADMIN — ENOXAPARIN SODIUM 40 MG: 40 INJECTION SUBCUTANEOUS at 09:43

## 2019-01-01 RX ADMIN — ACETAMINOPHEN 975 MG: 325 TABLET ORAL at 21:17

## 2019-01-01 RX ADMIN — METHOCARBAMOL TABLETS 500 MG: 500 TABLET, COATED ORAL at 12:43

## 2019-01-01 RX ADMIN — OLANZAPINE 5 MG: 5 TABLET, ORALLY DISINTEGRATING ORAL at 21:09

## 2019-01-01 RX ADMIN — POLYETHYLENE GLYCOL 3350 17 G: 17 POWDER, FOR SOLUTION ORAL at 08:55

## 2019-01-01 RX ADMIN — DOCUSATE SODIUM 100 MG: 100 CAPSULE, LIQUID FILLED ORAL at 09:00

## 2019-01-01 RX ADMIN — LIDOCAINE 1 PATCH: 50 PATCH TOPICAL at 08:06

## 2019-01-01 RX ADMIN — GUAIFENESIN 600 MG: 600 TABLET, EXTENDED RELEASE ORAL at 09:30

## 2019-01-01 RX ADMIN — LORAZEPAM 0.25 MG: 0.5 TABLET ORAL at 08:55

## 2019-01-01 RX ADMIN — LOSARTAN POTASSIUM 50 MG: 50 TABLET, FILM COATED ORAL at 08:29

## 2019-01-01 RX ADMIN — OLANZAPINE 5 MG: 5 TABLET, ORALLY DISINTEGRATING ORAL at 21:06

## 2019-01-01 RX ADMIN — METOPROLOL TARTRATE 12.5 MG: 25 TABLET ORAL at 10:26

## 2019-01-01 RX ADMIN — AZITHROMYCIN MONOHYDRATE 500 MG: 500 INJECTION, POWDER, LYOPHILIZED, FOR SOLUTION INTRAVENOUS at 18:04

## 2019-01-01 RX ADMIN — CEFTRIAXONE 1000 MG: 1 INJECTION, SOLUTION INTRAVENOUS at 17:55

## 2019-01-01 RX ADMIN — METOPROLOL TARTRATE 12.5 MG: 25 TABLET ORAL at 21:12

## 2019-01-01 RX ADMIN — METOPROLOL TARTRATE 12.5 MG: 25 TABLET ORAL at 21:10

## 2019-01-01 RX ADMIN — LOSARTAN POTASSIUM 25 MG: 25 TABLET, FILM COATED ORAL at 10:43

## 2019-01-01 RX ADMIN — IPRATROPIUM BROMIDE 0.5 MG: 0.5 SOLUTION RESPIRATORY (INHALATION) at 11:03

## 2019-01-01 RX ADMIN — FERROUS SULFATE TAB 325 MG (65 MG ELEMENTAL FE) 162.5 MG: 325 (65 FE) TAB at 09:44

## 2019-01-01 RX ADMIN — IPRATROPIUM BROMIDE 0.5 MG: 0.5 SOLUTION RESPIRATORY (INHALATION) at 16:22

## 2019-01-01 RX ADMIN — GUAIFENESIN 600 MG: 600 TABLET, EXTENDED RELEASE ORAL at 09:17

## 2019-01-01 RX ADMIN — ASPIRIN 81 MG 81 MG: 81 TABLET ORAL at 08:51

## 2019-01-01 RX ADMIN — FAMOTIDINE 20 MG: 20 TABLET ORAL at 09:15

## 2019-01-01 RX ADMIN — FAMOTIDINE 20 MG: 20 TABLET ORAL at 17:48

## 2019-01-01 RX ADMIN — LORAZEPAM 0.25 MG: 0.5 TABLET ORAL at 09:03

## 2019-01-01 RX ADMIN — IOHEXOL 100 ML: 350 INJECTION, SOLUTION INTRAVENOUS at 00:34

## 2019-01-01 RX ADMIN — LEVALBUTEROL HYDROCHLORIDE 0.63 MG: 0.63 SOLUTION RESPIRATORY (INHALATION) at 20:11

## 2019-01-01 RX ADMIN — TRAMADOL HYDROCHLORIDE 50 MG: 50 TABLET, FILM COATED ORAL at 10:45

## 2019-01-01 RX ADMIN — METHOCARBAMOL TABLETS 500 MG: 500 TABLET, COATED ORAL at 05:02

## 2019-01-01 RX ADMIN — FERROUS SULFATE TAB 325 MG (65 MG ELEMENTAL FE) 162.5 MG: 325 (65 FE) TAB at 12:11

## 2019-01-01 RX ADMIN — FAMOTIDINE 20 MG: 20 TABLET ORAL at 17:02

## 2019-01-01 RX ADMIN — FAMOTIDINE 20 MG: 20 TABLET ORAL at 08:03

## 2019-01-01 RX ADMIN — LEVALBUTEROL HYDROCHLORIDE 0.63 MG: 0.63 SOLUTION RESPIRATORY (INHALATION) at 11:08

## 2019-01-01 RX ADMIN — CALCIUM CARBONATE-VITAMIN D TAB 500 MG-200 UNIT 2 TABLET: 500-200 TAB at 17:56

## 2019-01-01 RX ADMIN — LOSARTAN POTASSIUM 50 MG: 50 TABLET, FILM COATED ORAL at 08:36

## 2019-01-01 RX ADMIN — LIDOCAINE 1 PATCH: 50 PATCH TOPICAL at 08:28

## 2019-01-01 RX ADMIN — ONDANSETRON 4 MG: 2 INJECTION INTRAMUSCULAR; INTRAVENOUS at 17:51

## 2019-01-01 RX ADMIN — ATORVASTATIN CALCIUM 40 MG: 40 TABLET, FILM COATED ORAL at 09:03

## 2019-01-01 RX ADMIN — IPRATROPIUM BROMIDE 0.5 MG: 0.5 SOLUTION RESPIRATORY (INHALATION) at 15:44

## 2019-01-01 RX ADMIN — OLANZAPINE 5 MG: 5 TABLET, ORALLY DISINTEGRATING ORAL at 21:03

## 2019-01-01 RX ADMIN — METOPROLOL TARTRATE 12.5 MG: 25 TABLET, FILM COATED ORAL at 22:22

## 2019-01-01 RX ADMIN — OLANZAPINE 5 MG: 5 TABLET, ORALLY DISINTEGRATING ORAL at 21:14

## 2019-01-01 RX ADMIN — IPRATROPIUM BROMIDE 0.5 MG: 0.5 SOLUTION RESPIRATORY (INHALATION) at 08:20

## 2019-01-01 RX ADMIN — FERROUS SULFATE TAB 325 MG (65 MG ELEMENTAL FE) 162.5 MG: 325 (65 FE) TAB at 08:55

## 2019-01-01 RX ADMIN — FERROUS SULFATE TAB 325 MG (65 MG ELEMENTAL FE) 162.5 MG: 325 (65 FE) TAB at 09:29

## 2019-01-01 RX ADMIN — FUROSEMIDE 20 MG: 20 TABLET ORAL at 09:44

## 2019-01-01 RX ADMIN — LORAZEPAM 0.25 MG: 0.5 TABLET ORAL at 09:02

## 2019-01-01 RX ADMIN — IPRATROPIUM BROMIDE 0.5 MG: 0.5 SOLUTION RESPIRATORY (INHALATION) at 16:40

## 2019-01-01 RX ADMIN — METOPROLOL TARTRATE 12.5 MG: 25 TABLET, FILM COATED ORAL at 08:55

## 2019-01-01 RX ADMIN — FUROSEMIDE 20 MG: 20 TABLET ORAL at 08:40

## 2019-01-01 RX ADMIN — OXYCODONE HYDROCHLORIDE 5 MG: 5 TABLET ORAL at 13:49

## 2019-01-01 RX ADMIN — MELATONIN 6 MG: at 21:10

## 2019-01-01 RX ADMIN — METOPROLOL TARTRATE 12.5 MG: 25 TABLET, FILM COATED ORAL at 12:10

## 2019-01-01 RX ADMIN — METOPROLOL TARTRATE 12.5 MG: 25 TABLET ORAL at 21:06

## 2019-01-01 RX ADMIN — METOPROLOL TARTRATE 12.5 MG: 25 TABLET, FILM COATED ORAL at 21:18

## 2019-01-01 RX ADMIN — LEVALBUTEROL HYDROCHLORIDE 0.63 MG: 0.63 SOLUTION RESPIRATORY (INHALATION) at 16:40

## 2019-01-01 RX ADMIN — FUROSEMIDE 20 MG: 20 TABLET ORAL at 08:34

## 2019-01-01 RX ADMIN — HEPARIN SODIUM 5000 UNITS: 5000 INJECTION INTRAVENOUS; SUBCUTANEOUS at 21:10

## 2019-01-01 RX ADMIN — ATORVASTATIN CALCIUM 40 MG: 40 TABLET, FILM COATED ORAL at 09:15

## 2019-01-01 RX ADMIN — LIDOCAINE 1 PATCH: 50 PATCH TOPICAL at 08:13

## 2019-01-01 RX ADMIN — OXYCODONE HYDROCHLORIDE 5 MG: 5 TABLET ORAL at 08:55

## 2019-01-01 RX ADMIN — LIDOCAINE 1 PATCH: 50 PATCH TOPICAL at 08:01

## 2019-01-01 RX ADMIN — FAMOTIDINE 20 MG: 20 TABLET ORAL at 17:16

## 2019-01-01 RX ADMIN — FERROUS SULFATE TAB 325 MG (65 MG ELEMENTAL FE) 162.5 MG: 325 (65 FE) TAB at 08:14

## 2019-01-01 RX ADMIN — CALCIUM CARBONATE-VITAMIN D TAB 500 MG-200 UNIT 2 TABLET: 500-200 TAB at 16:26

## 2019-01-01 RX ADMIN — FERROUS SULFATE TAB 325 MG (65 MG ELEMENTAL FE) 162.5 MG: 325 (65 FE) TAB at 09:03

## 2019-01-01 RX ADMIN — IPRATROPIUM BROMIDE 0.5 MG: 0.5 SOLUTION RESPIRATORY (INHALATION) at 07:22

## 2019-01-01 RX ADMIN — LEVALBUTEROL HYDROCHLORIDE 0.63 MG: 0.63 SOLUTION RESPIRATORY (INHALATION) at 07:09

## 2019-01-01 RX ADMIN — FUROSEMIDE 20 MG: 20 TABLET ORAL at 08:29

## 2019-01-01 RX ADMIN — LEVALBUTEROL HYDROCHLORIDE 0.63 MG: 0.63 SOLUTION RESPIRATORY (INHALATION) at 11:18

## 2019-01-01 RX ADMIN — LEVALBUTEROL HYDROCHLORIDE 0.63 MG: 0.63 SOLUTION RESPIRATORY (INHALATION) at 19:34

## 2019-01-01 RX ADMIN — METOPROLOL TARTRATE 12.5 MG: 25 TABLET, FILM COATED ORAL at 09:03

## 2019-01-01 RX ADMIN — METOPROLOL TARTRATE 12.5 MG: 25 TABLET ORAL at 09:43

## 2019-01-01 RX ADMIN — ATORVASTATIN CALCIUM 40 MG: 40 TABLET, FILM COATED ORAL at 08:56

## 2019-01-01 RX ADMIN — PREDNISONE 30 MG: 10 TABLET ORAL at 09:16

## 2019-01-01 RX ADMIN — ONDANSETRON 4 MG: 2 INJECTION INTRAMUSCULAR; INTRAVENOUS at 11:53

## 2019-01-01 RX ADMIN — DOCUSATE SODIUM 100 MG: 100 CAPSULE, LIQUID FILLED ORAL at 09:04

## 2019-01-01 RX ADMIN — OXYCODONE HYDROCHLORIDE 2.5 MG: 5 TABLET ORAL at 20:46

## 2019-01-01 RX ADMIN — AMLODIPINE BESYLATE 5 MG: 5 TABLET ORAL at 08:31

## 2019-01-01 RX ADMIN — AMLODIPINE BESYLATE 2.5 MG: 2.5 TABLET ORAL at 08:14

## 2019-01-01 RX ADMIN — LIDOCAINE 1 PATCH: 50 PATCH TOPICAL at 08:54

## 2019-01-01 RX ADMIN — AMLODIPINE BESYLATE 5 MG: 5 TABLET ORAL at 09:43

## 2019-01-01 RX ADMIN — METOPROLOL TARTRATE 12.5 MG: 25 TABLET ORAL at 08:31

## 2019-01-01 RX ADMIN — HEPARIN SODIUM 5000 UNITS: 5000 INJECTION INTRAVENOUS; SUBCUTANEOUS at 13:54

## 2019-01-01 RX ADMIN — DOCUSATE SODIUM 100 MG: 100 CAPSULE, LIQUID FILLED ORAL at 08:14

## 2019-01-01 RX ADMIN — HEPARIN SODIUM 5000 UNITS: 5000 INJECTION INTRAVENOUS; SUBCUTANEOUS at 14:20

## 2019-01-01 RX ADMIN — CALCIUM CARBONATE-VITAMIN D TAB 500 MG-200 UNIT 2 TABLET: 500-200 TAB at 17:29

## 2019-01-01 RX ADMIN — PREDNISONE 40 MG: 20 TABLET ORAL at 11:38

## 2019-01-01 RX ADMIN — ACETAMINOPHEN 975 MG: 325 TABLET ORAL at 21:02

## 2019-01-01 RX ADMIN — HEPARIN SODIUM 5000 UNITS: 5000 INJECTION INTRAVENOUS; SUBCUTANEOUS at 05:31

## 2019-01-01 RX ADMIN — HEPARIN SODIUM 5000 UNITS: 5000 INJECTION INTRAVENOUS; SUBCUTANEOUS at 21:04

## 2019-01-01 RX ADMIN — ENOXAPARIN SODIUM 40 MG: 40 INJECTION SUBCUTANEOUS at 08:05

## 2019-01-01 RX ADMIN — GUAIFENESIN 600 MG: 600 TABLET, EXTENDED RELEASE ORAL at 08:59

## 2019-01-01 RX ADMIN — LIDOCAINE 1 PATCH: 50 PATCH TOPICAL at 11:48

## 2019-01-01 RX ADMIN — IPRATROPIUM BROMIDE 0.5 MG: 0.5 SOLUTION RESPIRATORY (INHALATION) at 07:25

## 2019-01-01 RX ADMIN — ACETAMINOPHEN 975 MG: 325 TABLET ORAL at 21:10

## 2019-01-01 RX ADMIN — CEFTRIAXONE 1000 MG: 1 INJECTION, SOLUTION INTRAVENOUS at 17:07

## 2019-01-01 RX ADMIN — OLANZAPINE 5 MG: 5 TABLET, ORALLY DISINTEGRATING ORAL at 21:10

## 2019-01-01 RX ADMIN — ENOXAPARIN SODIUM 30 MG: 30 INJECTION SUBCUTANEOUS at 12:22

## 2019-01-01 RX ADMIN — AMLODIPINE BESYLATE 5 MG: 5 TABLET ORAL at 10:32

## 2019-01-01 RX ADMIN — GUAIFENESIN 600 MG: 600 TABLET, EXTENDED RELEASE ORAL at 08:14

## 2019-01-01 RX ADMIN — OXYCODONE HYDROCHLORIDE 2.5 MG: 5 TABLET ORAL at 07:13

## 2019-01-01 RX ADMIN — FUROSEMIDE 20 MG: 20 TABLET ORAL at 09:02

## 2019-01-01 RX ADMIN — IPRATROPIUM BROMIDE 0.5 MG: 0.5 SOLUTION RESPIRATORY (INHALATION) at 11:32

## 2019-01-01 RX ADMIN — OXYCODONE HYDROCHLORIDE 5 MG: 5 TABLET ORAL at 12:00

## 2019-01-01 RX ADMIN — SENNOSIDES 8.6 MG: 8.6 TABLET, FILM COATED ORAL at 21:50

## 2019-01-01 RX ADMIN — OXYCODONE HYDROCHLORIDE 5 MG: 5 TABLET ORAL at 08:36

## 2019-01-01 RX ADMIN — FUROSEMIDE 20 MG: 20 TABLET ORAL at 09:03

## 2019-01-01 RX ADMIN — LIDOCAINE 1 PATCH: 50 PATCH TOPICAL at 09:44

## 2019-01-01 RX ADMIN — TRAMADOL HYDROCHLORIDE 50 MG: 50 TABLET, FILM COATED ORAL at 10:59

## 2019-01-01 RX ADMIN — FAMOTIDINE 20 MG: 20 TABLET ORAL at 09:03

## 2019-01-01 RX ADMIN — TRAMADOL HYDROCHLORIDE 50 MG: 50 TABLET, FILM COATED ORAL at 08:55

## 2019-01-01 RX ADMIN — HEPARIN SODIUM 5000 UNITS: 5000 INJECTION INTRAVENOUS; SUBCUTANEOUS at 21:12

## 2019-01-01 RX ADMIN — ATORVASTATIN CALCIUM 40 MG: 40 TABLET, FILM COATED ORAL at 08:14

## 2019-01-01 RX ADMIN — HEPARIN SODIUM 5000 UNITS: 5000 INJECTION INTRAVENOUS; SUBCUTANEOUS at 06:13

## 2019-01-01 RX ADMIN — LEVALBUTEROL HYDROCHLORIDE 0.63 MG: 0.63 SOLUTION RESPIRATORY (INHALATION) at 08:28

## 2019-01-01 RX ADMIN — METOPROLOL TARTRATE 12.5 MG: 25 TABLET ORAL at 20:43

## 2019-01-01 RX ADMIN — IPRATROPIUM BROMIDE 0.5 MG: 0.5 SOLUTION RESPIRATORY (INHALATION) at 15:42

## 2019-01-01 RX ADMIN — FAMOTIDINE 20 MG: 20 TABLET ORAL at 08:21

## 2019-01-01 RX ADMIN — AMLODIPINE BESYLATE 5 MG: 5 TABLET ORAL at 08:29

## 2019-01-01 RX ADMIN — ASPIRIN 81 MG 81 MG: 81 TABLET ORAL at 12:10

## 2019-01-01 RX ADMIN — TRAMADOL HYDROCHLORIDE 50 MG: 50 TABLET, FILM COATED ORAL at 17:06

## 2019-01-01 RX ADMIN — CALCIUM CARBONATE-VITAMIN D TAB 500 MG-200 UNIT 2 TABLET: 500-200 TAB at 17:53

## 2019-01-01 RX ADMIN — IPRATROPIUM BROMIDE 0.5 MG: 0.5 SOLUTION RESPIRATORY (INHALATION) at 11:08

## 2019-01-01 RX ADMIN — ACETAMINOPHEN 975 MG: 325 TABLET ORAL at 06:30

## 2019-01-01 RX ADMIN — OLANZAPINE 5 MG: 5 TABLET, ORALLY DISINTEGRATING ORAL at 21:05

## 2019-01-01 RX ADMIN — CEFTRIAXONE 1000 MG: 1 INJECTION, SOLUTION INTRAVENOUS at 16:58

## 2019-01-01 RX ADMIN — POLYETHYLENE GLYCOL 3350 17 G: 17 POWDER, FOR SOLUTION ORAL at 08:52

## 2019-01-01 RX ADMIN — IPRATROPIUM BROMIDE 0.5 MG: 0.5 SOLUTION RESPIRATORY (INHALATION) at 11:50

## 2019-01-01 RX ADMIN — DOCUSATE SODIUM 100 MG: 100 CAPSULE, LIQUID FILLED ORAL at 09:44

## 2019-01-01 RX ADMIN — LEVALBUTEROL HYDROCHLORIDE 0.63 MG: 0.63 SOLUTION RESPIRATORY (INHALATION) at 19:09

## 2019-01-01 RX ADMIN — ATORVASTATIN CALCIUM 40 MG: 40 TABLET, FILM COATED ORAL at 17:01

## 2019-01-01 RX ADMIN — DOCUSATE SODIUM 100 MG: 100 CAPSULE, LIQUID FILLED ORAL at 09:17

## 2019-01-01 RX ADMIN — METHOCARBAMOL TABLETS 500 MG: 500 TABLET, COATED ORAL at 05:03

## 2019-01-01 RX ADMIN — LORAZEPAM 0.5 MG: 0.5 TABLET ORAL at 11:27

## 2019-01-01 RX ADMIN — ATORVASTATIN CALCIUM 40 MG: 40 TABLET, FILM COATED ORAL at 09:30

## 2019-01-01 RX ADMIN — HEPARIN SODIUM 5000 UNITS: 5000 INJECTION INTRAVENOUS; SUBCUTANEOUS at 13:07

## 2019-01-01 RX ADMIN — POLYETHYLENE GLYCOL 3350 17 G: 17 POWDER, FOR SOLUTION ORAL at 09:31

## 2019-01-01 RX ADMIN — POTASSIUM CHLORIDE 10 MEQ: 750 TABLET, EXTENDED RELEASE ORAL at 09:44

## 2019-01-01 RX ADMIN — ENOXAPARIN SODIUM 30 MG: 30 INJECTION SUBCUTANEOUS at 09:02

## 2019-01-01 RX ADMIN — ASPIRIN 81 MG 81 MG: 81 TABLET ORAL at 10:28

## 2019-01-01 RX ADMIN — IPRATROPIUM BROMIDE 0.5 MG: 0.5 SOLUTION RESPIRATORY (INHALATION) at 19:44

## 2019-01-01 RX ADMIN — MELATONIN 3 MG: at 21:02

## 2019-01-01 RX ADMIN — PREDNISONE 40 MG: 20 TABLET ORAL at 12:11

## 2019-01-01 RX ADMIN — DOCUSATE SODIUM 100 MG: 100 CAPSULE, LIQUID FILLED ORAL at 17:06

## 2019-01-01 RX ADMIN — LIDOCAINE 1 PATCH: 50 PATCH TOPICAL at 08:52

## 2019-01-01 RX ADMIN — MELATONIN 6 MG: at 21:01

## 2019-01-01 RX ADMIN — FUROSEMIDE 20 MG: 20 TABLET ORAL at 08:21

## 2019-01-01 RX ADMIN — PREDNISONE 30 MG: 10 TABLET ORAL at 09:02

## 2019-01-01 RX ADMIN — METOPROLOL TARTRATE 12.5 MG: 25 TABLET, FILM COATED ORAL at 20:12

## 2019-01-01 RX ADMIN — HEPARIN SODIUM 5000 UNITS: 5000 INJECTION INTRAVENOUS; SUBCUTANEOUS at 05:36

## 2019-01-01 RX ADMIN — FENTANYL CITRATE 50 MCG: 50 INJECTION INTRAMUSCULAR; INTRAVENOUS at 00:44

## 2019-01-01 RX ADMIN — METOPROLOL TARTRATE 12.5 MG: 25 TABLET ORAL at 08:05

## 2019-01-01 RX ADMIN — FUROSEMIDE 20 MG: 20 TABLET ORAL at 08:05

## 2019-01-01 RX ADMIN — IPRATROPIUM BROMIDE 0.5 MG: 0.5 SOLUTION RESPIRATORY (INHALATION) at 20:11

## 2019-01-01 RX ADMIN — CEFTRIAXONE 1000 MG: 1 INJECTION, SOLUTION INTRAVENOUS at 17:29

## 2019-01-01 RX ADMIN — FERROUS SULFATE TAB 325 MG (65 MG ELEMENTAL FE) 162.5 MG: 325 (65 FE) TAB at 09:17

## 2019-01-01 RX ADMIN — FAMOTIDINE 20 MG: 20 TABLET ORAL at 08:36

## 2019-01-01 RX ADMIN — IPRATROPIUM BROMIDE 0.5 MG: 0.5 SOLUTION RESPIRATORY (INHALATION) at 08:28

## 2019-01-01 RX ADMIN — DOCUSATE SODIUM 100 MG: 100 CAPSULE, LIQUID FILLED ORAL at 17:48

## 2019-01-01 RX ADMIN — ASPIRIN 81 MG 81 MG: 81 TABLET ORAL at 09:43

## 2019-01-01 RX ADMIN — GUAIFENESIN 600 MG: 600 TABLET, EXTENDED RELEASE ORAL at 09:03

## 2019-01-01 RX ADMIN — DOCUSATE SODIUM 100 MG: 100 CAPSULE, LIQUID FILLED ORAL at 17:29

## 2019-01-01 RX ADMIN — METOPROLOL TARTRATE 12.5 MG: 25 TABLET, FILM COATED ORAL at 09:30

## 2019-01-01 RX ADMIN — IPRATROPIUM BROMIDE 0.5 MG: 0.5 SOLUTION RESPIRATORY (INHALATION) at 19:34

## 2019-01-01 RX ADMIN — METHOCARBAMOL TABLETS 500 MG: 500 TABLET, COATED ORAL at 17:42

## 2019-01-01 RX ADMIN — MELATONIN 6 MG: at 21:05

## 2019-01-01 RX ADMIN — GUAIFENESIN 600 MG: 600 TABLET, EXTENDED RELEASE ORAL at 22:23

## 2019-01-01 RX ADMIN — FAMOTIDINE 20 MG: 20 TABLET ORAL at 17:53

## 2019-01-01 RX ADMIN — LORAZEPAM 0.25 MG: 0.5 TABLET ORAL at 09:18

## 2019-01-01 RX ADMIN — FUROSEMIDE 20 MG: 20 TABLET ORAL at 16:26

## 2019-01-01 RX ADMIN — FAMOTIDINE 20 MG: 20 TABLET ORAL at 08:51

## 2019-01-01 RX ADMIN — LOSARTAN POTASSIUM 50 MG: 50 TABLET, FILM COATED ORAL at 08:34

## 2019-01-01 RX ADMIN — LEVALBUTEROL HYDROCHLORIDE 0.63 MG: 0.63 SOLUTION RESPIRATORY (INHALATION) at 11:50

## 2019-01-01 RX ADMIN — CALCIUM CARBONATE-VITAMIN D TAB 500 MG-200 UNIT 2 TABLET: 500-200 TAB at 09:03

## 2019-01-01 RX ADMIN — HEPARIN SODIUM 5000 UNITS: 5000 INJECTION INTRAVENOUS; SUBCUTANEOUS at 05:23

## 2019-01-01 RX ADMIN — HEPARIN SODIUM 5000 UNITS: 5000 INJECTION INTRAVENOUS; SUBCUTANEOUS at 14:07

## 2019-01-01 RX ADMIN — OXYCODONE HYDROCHLORIDE 5 MG: 5 TABLET ORAL at 14:25

## 2019-01-01 RX ADMIN — FUROSEMIDE 20 MG: 20 TABLET ORAL at 10:28

## 2019-01-01 RX ADMIN — LIDOCAINE 1 PATCH: 50 PATCH TOPICAL at 09:04

## 2019-01-01 RX ADMIN — LORAZEPAM 0.25 MG: 0.5 TABLET ORAL at 17:06

## 2019-01-01 RX ADMIN — MELATONIN 6 MG: at 21:14

## 2019-01-01 RX ADMIN — LIDOCAINE 1 PATCH: 50 PATCH TOPICAL at 09:36

## 2019-01-01 RX ADMIN — OLANZAPINE 5 MG: 5 TABLET, ORALLY DISINTEGRATING ORAL at 21:08

## 2019-01-01 RX ADMIN — LEVALBUTEROL HYDROCHLORIDE 0.63 MG: 0.63 SOLUTION RESPIRATORY (INHALATION) at 08:20

## 2019-01-01 RX ADMIN — OXYCODONE HYDROCHLORIDE 5 MG: 5 TABLET ORAL at 05:03

## 2019-01-01 RX ADMIN — ASPIRIN 81 MG 81 MG: 81 TABLET ORAL at 09:03

## 2019-01-01 RX ADMIN — LORAZEPAM 0.25 MG: 0.5 TABLET ORAL at 11:47

## 2019-01-01 RX ADMIN — IPRATROPIUM BROMIDE 0.5 MG: 0.5 SOLUTION RESPIRATORY (INHALATION) at 07:09

## 2019-01-01 RX ADMIN — FAMOTIDINE 20 MG: 20 TABLET ORAL at 09:00

## 2019-01-01 RX ADMIN — FAMOTIDINE 20 MG: 20 TABLET ORAL at 08:05

## 2019-01-01 RX ADMIN — METOPROLOL TARTRATE 12.5 MG: 25 TABLET, FILM COATED ORAL at 09:02

## 2019-01-01 RX ADMIN — BISACODYL 10 MG: 10 SUPPOSITORY RECTAL at 15:31

## 2019-01-01 RX ADMIN — OLANZAPINE 5 MG: 5 TABLET, ORALLY DISINTEGRATING ORAL at 21:04

## 2019-01-01 RX ADMIN — ACETAMINOPHEN 975 MG: 325 TABLET ORAL at 16:26

## 2019-01-01 RX ADMIN — METOPROLOL TARTRATE 12.5 MG: 25 TABLET ORAL at 20:41

## 2019-01-01 RX ADMIN — ASPIRIN 81 MG 81 MG: 81 TABLET ORAL at 08:59

## 2019-01-01 RX ADMIN — SODIUM CHLORIDE 60 ML/HR: 9 INJECTION, SOLUTION INTRAVENOUS at 09:22

## 2019-01-01 RX ADMIN — POLYETHYLENE GLYCOL 3350 17 G: 17 POWDER, FOR SOLUTION ORAL at 08:32

## 2019-01-01 RX ADMIN — AMLODIPINE BESYLATE 2.5 MG: 2.5 TABLET ORAL at 10:59

## 2019-01-01 RX ADMIN — ASPIRIN 81 MG 81 MG: 81 TABLET ORAL at 08:57

## 2019-01-01 RX ADMIN — LOSARTAN POTASSIUM 50 MG: 50 TABLET, FILM COATED ORAL at 09:44

## 2019-01-01 RX ADMIN — ASPIRIN 81 MG 81 MG: 81 TABLET ORAL at 09:29

## 2019-01-01 RX ADMIN — ACETAMINOPHEN 650 MG: 325 TABLET ORAL at 12:11

## 2019-01-01 RX ADMIN — FAMOTIDINE 20 MG: 20 TABLET ORAL at 17:42

## 2019-01-01 RX ADMIN — CEFTRIAXONE 1000 MG: 1 INJECTION, SOLUTION INTRAVENOUS at 17:39

## 2019-01-01 RX ADMIN — METOPROLOL TARTRATE 12.5 MG: 25 TABLET, FILM COATED ORAL at 21:10

## 2019-01-01 RX ADMIN — LEVALBUTEROL HYDROCHLORIDE 0.63 MG: 0.63 SOLUTION RESPIRATORY (INHALATION) at 11:14

## 2019-01-01 RX ADMIN — ENOXAPARIN SODIUM 30 MG: 30 INJECTION SUBCUTANEOUS at 09:04

## 2019-01-01 RX ADMIN — OLANZAPINE 5 MG: 5 TABLET, ORALLY DISINTEGRATING ORAL at 22:23

## 2019-01-01 RX ADMIN — GUAIFENESIN 600 MG: 600 TABLET, EXTENDED RELEASE ORAL at 20:12

## 2019-01-01 RX ADMIN — LORAZEPAM 0.25 MG: 0.5 TABLET ORAL at 17:04

## 2019-01-01 RX ADMIN — LOSARTAN POTASSIUM 50 MG: 50 TABLET, FILM COATED ORAL at 08:22

## 2019-01-01 RX ADMIN — GUAIFENESIN 600 MG: 600 TABLET, EXTENDED RELEASE ORAL at 21:50

## 2019-01-01 RX ADMIN — GUAIFENESIN 600 MG: 600 TABLET, EXTENDED RELEASE ORAL at 21:02

## 2019-01-01 RX ADMIN — HALOPERIDOL LACTATE 2.5 MG: 5 INJECTION INTRAMUSCULAR at 20:52

## 2019-01-01 RX ADMIN — FAMOTIDINE 20 MG: 20 TABLET ORAL at 17:32

## 2019-01-01 RX ADMIN — OXYCODONE HYDROCHLORIDE 5 MG: 5 TABLET ORAL at 09:03

## 2019-01-01 RX ADMIN — HEPARIN SODIUM 5000 UNITS: 5000 INJECTION INTRAVENOUS; SUBCUTANEOUS at 20:43

## 2019-01-01 RX ADMIN — ATORVASTATIN CALCIUM 40 MG: 40 TABLET, FILM COATED ORAL at 17:02

## 2019-01-01 RX ADMIN — LIDOCAINE 1 PATCH: 50 PATCH TOPICAL at 12:12

## 2019-01-01 RX ADMIN — LORAZEPAM 0.25 MG: 0.5 TABLET ORAL at 17:54

## 2019-01-01 RX ADMIN — MELATONIN 6 MG: at 22:23

## 2019-01-01 RX ADMIN — HEPARIN SODIUM 5000 UNITS: 5000 INJECTION INTRAVENOUS; SUBCUTANEOUS at 21:13

## 2019-01-01 RX ADMIN — LOSARTAN POTASSIUM 50 MG: 50 TABLET, FILM COATED ORAL at 08:31

## 2019-01-01 RX ADMIN — ACETAMINOPHEN 650 MG: 325 TABLET ORAL at 15:23

## 2019-01-01 RX ADMIN — FAMOTIDINE 20 MG: 20 TABLET ORAL at 09:02

## 2019-01-01 RX ADMIN — HEPARIN SODIUM 5000 UNITS: 5000 INJECTION INTRAVENOUS; SUBCUTANEOUS at 13:49

## 2019-01-01 RX ADMIN — METOPROLOL TARTRATE 12.5 MG: 25 TABLET, FILM COATED ORAL at 08:03

## 2019-01-01 RX ADMIN — LIDOCAINE 1 PATCH: 50 PATCH TOPICAL at 08:32

## 2019-01-01 RX ADMIN — IPRATROPIUM BROMIDE 0.5 MG: 0.5 SOLUTION RESPIRATORY (INHALATION) at 07:03

## 2019-01-01 RX ADMIN — ACETAMINOPHEN 975 MG: 325 TABLET ORAL at 17:56

## 2019-01-01 RX ADMIN — IPRATROPIUM BROMIDE 0.5 MG: 0.5 SOLUTION RESPIRATORY (INHALATION) at 11:18

## 2019-01-01 RX ADMIN — ATORVASTATIN CALCIUM 40 MG: 40 TABLET, FILM COATED ORAL at 09:44

## 2019-01-01 RX ADMIN — CEFTRIAXONE 1000 MG: 1 INJECTION, SOLUTION INTRAVENOUS at 17:57

## 2019-01-01 RX ADMIN — MELATONIN 6 MG: at 21:51

## 2019-01-01 RX ADMIN — FAMOTIDINE 20 MG: 20 TABLET ORAL at 17:56

## 2019-01-01 RX ADMIN — LEVALBUTEROL HYDROCHLORIDE 0.63 MG: 0.63 SOLUTION RESPIRATORY (INHALATION) at 15:44

## 2019-01-01 RX ADMIN — METOPROLOL TARTRATE 12.5 MG: 25 TABLET ORAL at 08:21

## 2019-01-01 RX ADMIN — AMLODIPINE BESYLATE 5 MG: 5 TABLET ORAL at 08:05

## 2019-01-01 RX ADMIN — GUAIFENESIN 600 MG: 600 TABLET, EXTENDED RELEASE ORAL at 12:10

## 2019-01-01 RX ADMIN — LIDOCAINE 1 PATCH: 50 PATCH TOPICAL at 09:02

## 2019-01-01 RX ADMIN — POLYETHYLENE GLYCOL 3350 17 G: 17 POWDER, FOR SOLUTION ORAL at 08:13

## 2019-01-01 RX ADMIN — OLANZAPINE 5 MG: 5 TABLET, ORALLY DISINTEGRATING ORAL at 21:12

## 2019-01-01 RX ADMIN — LIDOCAINE 1 PATCH: 50 PATCH TOPICAL at 09:31

## 2019-01-01 RX ADMIN — FUROSEMIDE 20 MG: 20 TABLET ORAL at 08:03

## 2019-01-01 RX ADMIN — ACETAMINOPHEN 975 MG: 325 TABLET ORAL at 05:38

## 2019-01-01 RX ADMIN — FAMOTIDINE 20 MG: 20 TABLET ORAL at 12:11

## 2019-01-01 RX ADMIN — LOSARTAN POTASSIUM 50 MG: 50 TABLET, FILM COATED ORAL at 08:51

## 2019-01-01 RX ADMIN — ASPIRIN 81 MG 81 MG: 81 TABLET ORAL at 08:03

## 2019-01-01 RX ADMIN — LORAZEPAM 0.25 MG: 0.5 TABLET ORAL at 17:29

## 2019-01-01 RX ADMIN — MELATONIN 6 MG: at 21:12

## 2019-01-01 RX ADMIN — INFLUENZA A VIRUS A/MICHIGAN/45/2015 X-275 (H1N1) ANTIGEN (FORMALDEHYDE INACTIVATED), INFLUENZA A VIRUS A/SINGAPORE/INFIMH-16-0019/2016 IVR-186 (H3N2) ANTIGEN (FORMALDEHYDE INACTIVATED), AND INFLUENZA B VIRUS B/MARYLAND/15/2016 BX-69A (A B/COLORADO/6/2017-LIKE VIRUS) ANTIGEN (FORMALDEHYDE INACTIVATED) 0.5 ML: 60; 60; 60 INJECTION, SUSPENSION INTRAMUSCULAR at 08:01

## 2019-01-01 RX ADMIN — SODIUM CHLORIDE 500 ML: 9 INJECTION, SOLUTION INTRAVENOUS at 15:17

## 2019-01-01 RX ADMIN — POLYETHYLENE GLYCOL 3350 17 G: 17 POWDER, FOR SOLUTION ORAL at 08:22

## 2019-01-01 RX ADMIN — METOPROLOL TARTRATE 12.5 MG: 25 TABLET ORAL at 21:14

## 2019-01-01 RX ADMIN — ATORVASTATIN CALCIUM 40 MG: 40 TABLET, FILM COATED ORAL at 17:07

## 2019-01-01 RX ADMIN — CALCIUM CARBONATE-VITAMIN D TAB 500 MG-200 UNIT 2 TABLET: 500-200 TAB at 16:50

## 2019-01-01 RX ADMIN — OXYCODONE HYDROCHLORIDE 5 MG: 5 TABLET ORAL at 02:43

## 2019-01-01 RX ADMIN — FAMOTIDINE 20 MG: 20 TABLET ORAL at 08:13

## 2019-01-01 RX ADMIN — IPRATROPIUM BROMIDE 0.5 MG: 0.5 SOLUTION RESPIRATORY (INHALATION) at 08:18

## 2019-01-01 RX ADMIN — ASPIRIN 81 MG 81 MG: 81 TABLET ORAL at 08:14

## 2019-01-01 RX ADMIN — METOPROLOL TARTRATE 12.5 MG: 25 TABLET ORAL at 21:04

## 2019-01-01 RX ADMIN — SENNOSIDES 8.6 MG: 8.6 TABLET, FILM COATED ORAL at 21:02

## 2019-01-01 RX ADMIN — OXYCODONE HYDROCHLORIDE 5 MG: 5 TABLET ORAL at 13:18

## 2019-01-01 RX ADMIN — MORPHINE SULFATE 2 MG: 2 INJECTION, SOLUTION INTRAMUSCULAR; INTRAVENOUS at 21:58

## 2019-01-01 RX ADMIN — AMLODIPINE BESYLATE 5 MG: 5 TABLET ORAL at 08:36

## 2019-01-01 RX ADMIN — FAMOTIDINE 20 MG: 20 TABLET ORAL at 17:06

## 2019-01-01 RX ADMIN — MORPHINE SULFATE 2 MG: 2 INJECTION, SOLUTION INTRAMUSCULAR; INTRAVENOUS at 09:43

## 2019-01-01 RX ADMIN — FUROSEMIDE 20 MG: 20 TABLET ORAL at 12:10

## 2019-01-01 RX ADMIN — OXYCODONE HYDROCHLORIDE 5 MG: 5 TABLET ORAL at 08:34

## 2019-01-01 RX ADMIN — ASPIRIN 81 MG 81 MG: 81 TABLET ORAL at 09:44

## 2019-01-01 RX ADMIN — SENNOSIDES 8.6 MG: 8.6 TABLET, FILM COATED ORAL at 22:21

## 2019-01-01 RX ADMIN — FAMOTIDINE 20 MG: 20 TABLET ORAL at 08:34

## 2019-01-01 RX ADMIN — HEPARIN SODIUM 5000 UNITS: 5000 INJECTION INTRAVENOUS; SUBCUTANEOUS at 14:25

## 2019-01-01 RX ADMIN — HEPARIN SODIUM 5000 UNITS: 5000 INJECTION INTRAVENOUS; SUBCUTANEOUS at 06:08

## 2019-01-01 RX ADMIN — FAMOTIDINE 20 MG: 20 TABLET ORAL at 17:29

## 2019-01-01 RX ADMIN — CALCIUM CARBONATE-VITAMIN D TAB 500 MG-200 UNIT 2 TABLET: 500-200 TAB at 08:05

## 2019-01-01 RX ADMIN — METOPROLOL TARTRATE 12.5 MG: 25 TABLET ORAL at 08:22

## 2019-01-01 RX ADMIN — OXYCODONE HYDROCHLORIDE 5 MG: 5 TABLET ORAL at 08:21

## 2019-01-01 RX ADMIN — HEPARIN SODIUM 5000 UNITS: 5000 INJECTION INTRAVENOUS; SUBCUTANEOUS at 05:44

## 2019-01-01 RX ADMIN — AMLODIPINE BESYLATE 5 MG: 5 TABLET ORAL at 09:04

## 2019-01-01 RX ADMIN — FAMOTIDINE 20 MG: 20 TABLET ORAL at 17:05

## 2019-01-01 RX ADMIN — DOCUSATE SODIUM 100 MG: 100 CAPSULE, LIQUID FILLED ORAL at 09:30

## 2019-01-01 RX ADMIN — OXYCODONE HYDROCHLORIDE 2.5 MG: 5 TABLET ORAL at 12:32

## 2019-01-01 RX ADMIN — DOCUSATE SODIUM 100 MG: 100 CAPSULE, LIQUID FILLED ORAL at 12:11

## 2019-01-01 RX ADMIN — POLYETHYLENE GLYCOL 3350 17 G: 17 POWDER, FOR SOLUTION ORAL at 08:36

## 2019-01-01 RX ADMIN — ATORVASTATIN CALCIUM 40 MG: 40 TABLET, FILM COATED ORAL at 12:11

## 2019-01-01 RX ADMIN — HEPARIN SODIUM 5000 UNITS: 5000 INJECTION INTRAVENOUS; SUBCUTANEOUS at 21:06

## 2019-01-01 RX ADMIN — LEVALBUTEROL HYDROCHLORIDE 0.63 MG: 0.63 SOLUTION RESPIRATORY (INHALATION) at 08:18

## 2019-01-01 RX ADMIN — DOCUSATE SODIUM 100 MG: 100 CAPSULE, LIQUID FILLED ORAL at 08:55

## 2019-01-01 RX ADMIN — ACETAMINOPHEN 975 MG: 325 TABLET ORAL at 06:33

## 2019-01-01 RX ADMIN — MELATONIN 6 MG: at 20:43

## 2019-01-01 RX ADMIN — IPRATROPIUM BROMIDE 0.5 MG: 0.5 SOLUTION RESPIRATORY (INHALATION) at 15:34

## 2019-01-01 RX ADMIN — POLYETHYLENE GLYCOL 3350 17 G: 17 POWDER, FOR SOLUTION ORAL at 08:28

## 2019-01-01 RX ADMIN — TRAMADOL HYDROCHLORIDE 50 MG: 50 TABLET, FILM COATED ORAL at 17:05

## 2019-01-01 RX ADMIN — HEPARIN SODIUM 5000 UNITS: 5000 INJECTION INTRAVENOUS; SUBCUTANEOUS at 14:45

## 2019-01-01 RX ADMIN — PREDNISONE 10 MG: 10 TABLET ORAL at 08:55

## 2019-01-01 RX ADMIN — LEVALBUTEROL HYDROCHLORIDE 0.63 MG: 0.63 SOLUTION RESPIRATORY (INHALATION) at 19:44

## 2019-01-01 RX ADMIN — OXYCODONE HYDROCHLORIDE 5 MG: 5 TABLET ORAL at 03:54

## 2019-01-01 RX ADMIN — ATORVASTATIN CALCIUM 40 MG: 40 TABLET, FILM COATED ORAL at 08:02

## 2019-01-01 RX ADMIN — POLYETHYLENE GLYCOL 3350 17 G: 17 POWDER, FOR SOLUTION ORAL at 08:01

## 2019-01-01 RX ADMIN — LORAZEPAM 0.25 MG: 0.5 TABLET ORAL at 09:30

## 2019-01-01 RX ADMIN — ENOXAPARIN SODIUM 30 MG: 30 INJECTION SUBCUTANEOUS at 08:13

## 2019-01-01 RX ADMIN — METOPROLOL TARTRATE 5 MG: 5 INJECTION, SOLUTION INTRAVENOUS at 06:02

## 2019-01-01 RX ADMIN — FUROSEMIDE 20 MG: 20 TABLET ORAL at 08:59

## 2019-01-01 RX ADMIN — OXYCODONE HYDROCHLORIDE 5 MG: 5 TABLET ORAL at 17:16

## 2019-01-01 RX ADMIN — LEVALBUTEROL HYDROCHLORIDE 0.63 MG: 0.63 SOLUTION RESPIRATORY (INHALATION) at 15:42

## 2019-01-01 RX ADMIN — ENOXAPARIN SODIUM 30 MG: 30 INJECTION SUBCUTANEOUS at 11:37

## 2019-01-01 RX ADMIN — POLYETHYLENE GLYCOL 3350 17 G: 17 POWDER, FOR SOLUTION ORAL at 09:02

## 2019-01-01 RX ADMIN — METOPROLOL TARTRATE 12.5 MG: 25 TABLET ORAL at 21:03

## 2019-01-01 RX ADMIN — FUROSEMIDE 20 MG: 20 TABLET ORAL at 09:29

## 2019-01-01 RX ADMIN — LIDOCAINE 1 PATCH: 50 PATCH TOPICAL at 08:35

## 2019-01-01 RX ADMIN — LEVALBUTEROL HYDROCHLORIDE 0.63 MG: 0.63 SOLUTION RESPIRATORY (INHALATION) at 15:34

## 2019-01-01 RX ADMIN — IPRATROPIUM BROMIDE 0.5 MG: 0.5 SOLUTION RESPIRATORY (INHALATION) at 11:14

## 2019-01-01 RX ADMIN — FAMOTIDINE 20 MG: 20 TABLET ORAL at 17:04

## 2019-01-01 RX ADMIN — OXYCODONE HYDROCHLORIDE 5 MG: 5 TABLET ORAL at 00:14

## 2019-01-01 RX ADMIN — OLANZAPINE 5 MG: 5 TABLET, ORALLY DISINTEGRATING ORAL at 21:51

## 2019-01-01 RX ADMIN — POTASSIUM CHLORIDE 10 MEQ: 750 TABLET, EXTENDED RELEASE ORAL at 08:55

## 2019-01-01 RX ADMIN — MORPHINE SULFATE 2 MG: 2 INJECTION, SOLUTION INTRAMUSCULAR; INTRAVENOUS at 04:00

## 2019-01-01 RX ADMIN — PREDNISONE 20 MG: 20 TABLET ORAL at 08:14

## 2019-01-01 RX ADMIN — METOPROLOL TARTRATE 12.5 MG: 25 TABLET ORAL at 08:52

## 2019-01-01 RX ADMIN — ACETAMINOPHEN 650 MG: 325 TABLET ORAL at 09:30

## 2019-01-01 RX ADMIN — HEPARIN SODIUM 5000 UNITS: 5000 INJECTION INTRAVENOUS; SUBCUTANEOUS at 21:05

## 2019-01-01 RX ADMIN — MELATONIN 6 MG: at 21:06

## 2019-01-01 RX ADMIN — LEVALBUTEROL HYDROCHLORIDE 0.63 MG: 0.63 SOLUTION RESPIRATORY (INHALATION) at 07:21

## 2019-01-01 RX ADMIN — DOCUSATE SODIUM 100 MG: 100 CAPSULE, LIQUID FILLED ORAL at 17:56

## 2019-01-01 RX ADMIN — CALCIUM CARBONATE-VITAMIN D TAB 500 MG-200 UNIT 2 TABLET: 500-200 TAB at 17:06

## 2019-01-01 RX ADMIN — OXYCODONE HYDROCHLORIDE 5 MG: 5 TABLET ORAL at 17:18

## 2019-01-01 RX ADMIN — CALCIUM CARBONATE-VITAMIN D TAB 500 MG-200 UNIT 2 TABLET: 500-200 TAB at 08:14

## 2019-01-01 RX ADMIN — ATORVASTATIN CALCIUM 40 MG: 40 TABLET, FILM COATED ORAL at 17:32

## 2019-01-01 RX ADMIN — OLANZAPINE 2.5 MG: 10 INJECTION, POWDER, FOR SOLUTION INTRAMUSCULAR at 15:38

## 2019-01-01 RX ADMIN — METOPROLOL TARTRATE 12.5 MG: 25 TABLET, FILM COATED ORAL at 08:13

## 2019-01-01 RX ADMIN — ASPIRIN 81 MG 81 MG: 81 TABLET ORAL at 08:05

## 2019-01-01 RX ADMIN — LEVALBUTEROL HYDROCHLORIDE 0.63 MG: 0.63 SOLUTION RESPIRATORY (INHALATION) at 16:22

## 2019-01-01 RX ADMIN — OLANZAPINE 5 MG: 5 TABLET, ORALLY DISINTEGRATING ORAL at 20:44

## 2019-01-01 RX ADMIN — FAMOTIDINE 20 MG: 20 TABLET ORAL at 17:18

## 2019-01-01 RX ADMIN — IPRATROPIUM BROMIDE 0.5 MG: 0.5 SOLUTION RESPIRATORY (INHALATION) at 19:09

## 2019-01-01 RX ADMIN — GUAIFENESIN 600 MG: 600 TABLET, EXTENDED RELEASE ORAL at 11:47

## 2019-01-01 RX ADMIN — DOCUSATE SODIUM 100 MG: 100 CAPSULE, LIQUID FILLED ORAL at 08:03

## 2019-01-01 RX ADMIN — ASPIRIN 81 MG 81 MG: 81 TABLET ORAL at 09:16

## 2019-01-01 RX ADMIN — LIDOCAINE 1 PATCH: 50 PATCH TOPICAL at 09:15

## 2019-01-01 RX ADMIN — HEPARIN SODIUM 5000 UNITS: 5000 INJECTION INTRAVENOUS; SUBCUTANEOUS at 14:04

## 2019-01-01 RX ADMIN — FENTANYL CITRATE 50 MCG: 50 INJECTION INTRAMUSCULAR; INTRAVENOUS at 01:54

## 2019-01-01 RX ADMIN — POLYETHYLENE GLYCOL 3350 17 G: 17 POWDER, FOR SOLUTION ORAL at 08:47

## 2019-01-01 RX ADMIN — FUROSEMIDE 20 MG: 20 TABLET ORAL at 08:56

## 2019-01-01 RX ADMIN — OXYCODONE HYDROCHLORIDE 2.5 MG: 5 TABLET ORAL at 20:42

## 2019-01-01 RX ADMIN — ATORVASTATIN CALCIUM 40 MG: 40 TABLET, FILM COATED ORAL at 17:18

## 2019-01-01 RX ADMIN — OXYCODONE HYDROCHLORIDE 2.5 MG: 5 TABLET ORAL at 11:44

## 2019-01-01 RX ADMIN — MELATONIN 6 MG: at 21:18

## 2019-01-01 RX ADMIN — CALCIUM CARBONATE-VITAMIN D TAB 500 MG-200 UNIT 2 TABLET: 500-200 TAB at 09:02

## 2019-01-01 RX ADMIN — MELATONIN 6 MG: at 21:09

## 2019-01-01 RX ADMIN — LORAZEPAM 0.25 MG: 0.5 TABLET ORAL at 08:13

## 2019-01-01 RX ADMIN — OXYCODONE HYDROCHLORIDE 5 MG: 5 TABLET ORAL at 14:07

## 2019-01-01 RX ADMIN — MORPHINE SULFATE 2 MG: 2 INJECTION, SOLUTION INTRAMUSCULAR; INTRAVENOUS at 08:03

## 2019-01-01 RX ADMIN — METOPROLOL TARTRATE 12.5 MG: 25 TABLET, FILM COATED ORAL at 21:48

## 2019-01-01 RX ADMIN — METOPROLOL TARTRATE 12.5 MG: 25 TABLET ORAL at 08:36

## 2019-01-01 RX ADMIN — METOPROLOL TARTRATE 12.5 MG: 25 TABLET, FILM COATED ORAL at 09:17

## 2019-01-01 RX ADMIN — ONDANSETRON 4 MG: 2 INJECTION INTRAMUSCULAR; INTRAVENOUS at 06:54

## 2019-01-07 DIAGNOSIS — F41.1 GENERALIZED ANXIETY DISORDER: ICD-10-CM

## 2019-01-07 RX ORDER — LORAZEPAM 0.5 MG/1
0.5 TABLET ORAL EVERY 8 HOURS PRN
Qty: 90 TABLET | Refills: 0 | Status: SHIPPED | OUTPATIENT
Start: 2019-01-07 | End: 2019-03-14 | Stop reason: SDUPTHER

## 2019-01-16 ENCOUNTER — OFFICE VISIT (OUTPATIENT)
Dept: INTERNAL MEDICINE CLINIC | Facility: CLINIC | Age: 84
End: 2019-01-16
Payer: MEDICARE

## 2019-01-16 VITALS
TEMPERATURE: 98.2 F | RESPIRATION RATE: 18 BRPM | WEIGHT: 163 LBS | OXYGEN SATURATION: 93 % | HEIGHT: 62 IN | DIASTOLIC BLOOD PRESSURE: 82 MMHG | BODY MASS INDEX: 30 KG/M2 | HEART RATE: 78 BPM | SYSTOLIC BLOOD PRESSURE: 160 MMHG

## 2019-01-16 DIAGNOSIS — G89.29 CHRONIC PAIN OF BOTH SHOULDERS: ICD-10-CM

## 2019-01-16 DIAGNOSIS — E55.9 VITAMIN D DEFICIENCY: ICD-10-CM

## 2019-01-16 DIAGNOSIS — E74.39 GLUCOSE INTOLERANCE (NO MALABSORPTION): ICD-10-CM

## 2019-01-16 DIAGNOSIS — I25.10 CORONARY ARTERY DISEASE INVOLVING NATIVE CORONARY ARTERY OF NATIVE HEART WITHOUT ANGINA PECTORIS: ICD-10-CM

## 2019-01-16 DIAGNOSIS — I10 ESSENTIAL HYPERTENSION: Primary | ICD-10-CM

## 2019-01-16 DIAGNOSIS — M25.511 CHRONIC PAIN OF BOTH SHOULDERS: ICD-10-CM

## 2019-01-16 DIAGNOSIS — E78.2 HYPERLIPIDEMIA, MIXED: ICD-10-CM

## 2019-01-16 DIAGNOSIS — M25.512 CHRONIC PAIN OF BOTH SHOULDERS: ICD-10-CM

## 2019-01-16 DIAGNOSIS — F41.9 ANXIETY: ICD-10-CM

## 2019-01-16 DIAGNOSIS — Z00.00 MEDICARE ANNUAL WELLNESS VISIT, SUBSEQUENT: ICD-10-CM

## 2019-01-16 DIAGNOSIS — C43.9 MALIGNANT MELANOMA OF SKIN (HCC): ICD-10-CM

## 2019-01-16 DIAGNOSIS — J44.9 CHRONIC OBSTRUCTIVE PULMONARY DISEASE, UNSPECIFIED COPD TYPE (HCC): ICD-10-CM

## 2019-01-16 DIAGNOSIS — D50.8 IRON DEFICIENCY ANEMIA SECONDARY TO INADEQUATE DIETARY IRON INTAKE: ICD-10-CM

## 2019-01-16 DIAGNOSIS — I50.32 CHRONIC DIASTOLIC CONGESTIVE HEART FAILURE (HCC): ICD-10-CM

## 2019-01-16 PROBLEM — E78.5 DYSLIPIDEMIA: Status: RESOLVED | Noted: 2018-08-09 | Resolved: 2019-01-16

## 2019-01-16 PROBLEM — I21.11 ST ELEVATION MYOCARDIAL INFARCTION INVOLVING RIGHT CORONARY ARTERY (HCC): Status: RESOLVED | Noted: 2017-03-24 | Resolved: 2019-01-16

## 2019-01-16 PROCEDURE — G0439 PPPS, SUBSEQ VISIT: HCPCS | Performed by: INTERNAL MEDICINE

## 2019-01-16 PROCEDURE — 99214 OFFICE O/P EST MOD 30 MIN: CPT | Performed by: INTERNAL MEDICINE

## 2019-01-16 RX ORDER — MELOXICAM 15 MG/1
15 TABLET ORAL DAILY
Qty: 30 TABLET | Refills: 0 | Status: ON HOLD | OUTPATIENT
Start: 2019-01-16 | End: 2019-05-21 | Stop reason: CLARIF

## 2019-01-16 NOTE — PROGRESS NOTES
Assessment/Plan:    No problem-specific Assessment & Plan notes found for this encounter  Diagnoses and all orders for this visit:    Essential hypertension  -     Comprehensive metabolic panel; Future    Chronic obstructive pulmonary disease, unspecified COPD type (HCC)    Chronic diastolic congestive heart failure (HCC)  -     TSH, 3rd generation with Free T4 reflex; Future    Coronary artery disease involving native coronary artery of native heart without angina pectoris  -     Comprehensive metabolic panel; Future  -     CBC and differential; Future  -     Hemoglobin A1C; Future  -     Lipid Panel with Direct LDL reflex; Future  -     TSH, 3rd generation with Free T4 reflex; Future    Malignant melanoma of skin (HCC)    Anxiety  -     TSH, 3rd generation with Free T4 reflex; Future    Glucose intolerance (no malabsorption)  -     Hemoglobin A1C; Future    Hyperlipidemia, mixed  -     Lipid Panel with Direct LDL reflex; Future    Iron deficiency anemia secondary to inadequate dietary iron intake  -     CBC and differential; Future    Vitamin D deficiency    Medicare annual wellness visit, subsequent    Chronic pain of both shoulders  -     meloxicam (MOBIC) 15 mg tablet; Take 1 tablet (15 mg total) by mouth daily      A/P: Doing well and will check labs  Discussed watching the area rugs  Discussed PT for the gait and shoulder and declines  Will start NSAID and pt to consider shoulder injections  Vaccines up to date  Will continue current treatment and RTC six months for her routine per her request      Subjective:      Patient ID: Drew Ivey is a 80 y o  female  WF RTC with her relative for f/u COPD, CAD, etc  Doing well and no new issues, but continues with bilat shoulder pain, especially when getting out of chair  Remains active, but one fall after her shoe caught on an area rug  No injuries and none since  Denies angina, edema, SOB, palpitations, orthopnea, or PND  MADELYN is good  Due for labs  Vaccines up to date  The following portions of the patient's history were reviewed and updated as appropriate:   She  has a past medical history of Abnormal blood sugar (03/13/2017); Abnormal carotid duplex scan; Anxiety; Cervical radiculopathy (08/08/2017); CHF (congestive heart failure) ([place] [address] ); COPD (chronic obstructive pulmonary disease) ([place] [address] ) (2/6/2018); Coronary angioplasty status; Coronary artery disease (4/7/2017); Costochondritis (02/05/2013); Dyslipidemia; GERD without esophagitis (8/30/2012); H/O CT scan of chest; History of Holter monitoring; echocardiogram; echocardiogram (08/16/2017); Hypertension; Iron deficiency anemia (4/21/2016); Malignant melanoma of skin ([place] [address] ) (9/17/2012); Mixed hyperlipidemia; NSTEMI (non-ST elevated myocardial infarction) ([place] [address] ) (7/25/2017); Old MI (myocardial infarction); Osteoarthritis (9/17/2012); Osteoporosis (3/13/2017); 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[place] [address] ) (04/07/2017); and Urinary tract infection    She   Patient Active Problem List    Diagnosis Date Noted    Chronic diastolic congestive heart failure ([place] [address] ) 11/19/2018    Old MI (myocardial infarction) 08/09/2018    Coronary angioplasty status 06/28/2018    Anxiety 05/07/2018    Hyperlipidemia, mixed 02/06/2018    COPD (chronic obstructive pulmonary disease) ([place] [address] ) 02/06/2018    Non-STEMI (non-ST elevated myocardial infarction) ([place] [address] ) 07/25/2017    Gait abnormality 05/31/2017    Coronary artery disease 04/07/2017    Compression fracture of thoracic vertebra, closed, initial encounter ([place] [address] ) 03/13/2017    Constipation 04/21/2016    Iron deficiency anemia 04/21/2016    Proteinuria 01/06/2015    Glucose intolerance (no malabsorption) 02/06/2014    Vitamin D deficiency 01/07/2013    Allergic rhinitis 09/17/2012    Benign colon polyp 09/17/2012    Cataract 09/17/2012    Fibrocystic breast disease, unspecified laterality 09/17/2012    Essential hypertension 09/17/2012    Malignant melanoma of skin ([place] [address] ) 09/17/2012    Urinary incontinence 09/17/2012    Hiatal hernia with GERD without esophagitis 08/30/2012     She  has a past surgical history that includes Breast surgery; Cardiac catheterization (03/24/2017); INSERTION STENT ARTERY (04/07/2017); Cholecystectomy; Hemorrhoid surgery; Coronary stent placement (03/25/2017); Skin biopsy; Tonsillectomy; Abdominal surgery; and Colonoscopy (N/A, 7/25/2018)  Her family history includes Heart failure in her mother; Prostate cancer in her father; Stroke in her family  She  reports that she has quit smoking  Her smoking use included Cigarettes  She has never used smokeless tobacco  She reports that she drinks alcohol  She reports that she does not use drugs    Current Outpatient Prescriptions   Medication Sig Dispense Refill    aspirin 81 mg chewable tablet Chew 1 tablet (81 mg total) daily 30 tablet 0    atorvastatin (LIPITOR) 40 mg tablet Take 1 tablet (40 mg total) by mouth daily 90 tablet 3    Calcium Carbonate-Vitamin D 600-125 MG-UNIT TABS Take by mouth daily        ferrous sulfate 325 (65 Fe) mg tablet Take 0 5 tablets by mouth daily        furosemide (LASIX) 20 mg tablet Take 1 tablet (20 mg total) by mouth daily 90 tablet 1    LORazepam (ATIVAN) 0 5 mg tablet Take 1 tablet (0 5 mg total) by mouth every 8 (eight) hours as needed for anxiety 90 tablet 0    metoprolol tartrate (LOPRESSOR) 25 mg tablet 25 mg Take 1/2 tablet BID      MULTIPLE VITAMINS-CALCIUM PO Take by mouth daily      polyethylene glycol (GLYCOLAX) powder Take 17 g by mouth 2 (two) times a day as needed (prn for constipation) 850 g 0    Potassium 99 MG TABS Take by mouth daily      psyllium (METAMUCIL) packet Take 1 packet by mouth daily 30 each 0    ranitidine (ZANTAC) 150 mg tablet Take 1 tablet (150 mg total) by mouth 2 (two) times a day 60 tablet 3    meloxicam (MOBIC) 15 mg tablet Take 1 tablet (15 mg total) by mouth daily 30 tablet 0     No current facility-administered medications for this visit  Current Outpatient Prescriptions on File Prior to Visit   Medication Sig    aspirin 81 mg chewable tablet Chew 1 tablet (81 mg total) daily    atorvastatin (LIPITOR) 40 mg tablet Take 1 tablet (40 mg total) by mouth daily    Calcium Carbonate-Vitamin D 600-125 MG-UNIT TABS Take by mouth daily      ferrous sulfate 325 (65 Fe) mg tablet Take 0 5 tablets by mouth daily      furosemide (LASIX) 20 mg tablet Take 1 tablet (20 mg total) by mouth daily    LORazepam (ATIVAN) 0 5 mg tablet Take 1 tablet (0 5 mg total) by mouth every 8 (eight) hours as needed for anxiety    metoprolol tartrate (LOPRESSOR) 25 mg tablet 25 mg Take 1/2 tablet BID    MULTIPLE VITAMINS-CALCIUM PO Take by mouth daily    polyethylene glycol (GLYCOLAX) powder Take 17 g by mouth 2 (two) times a day as needed (prn for constipation)    Potassium 99 MG TABS Take by mouth daily    psyllium (METAMUCIL) packet Take 1 packet by mouth daily    ranitidine (ZANTAC) 150 mg tablet Take 1 tablet (150 mg total) by mouth 2 (two) times a day     No current facility-administered medications on file prior to visit  She is allergic to ciprofloxacin; keflex [cephalexin]; nitrofurantoin; and penicillins       Review of Systems   Constitutional: Negative for activity change, chills, diaphoresis, fatigue and fever  HENT: Negative  Eyes: Negative for visual disturbance  Respiratory: Negative for cough, chest tightness, shortness of breath and wheezing  Cardiovascular: Negative for chest pain, palpitations and leg swelling  Gastrointestinal: Negative for abdominal pain, constipation, diarrhea, nausea and vomiting  Endocrine: Negative for cold intolerance and heat intolerance  Genitourinary: Negative for difficulty urinating, dysuria and frequency  Musculoskeletal: Positive for arthralgias  Negative for gait problem and myalgias  Neurological: Negative for light-headedness and headaches     Psychiatric/Behavioral: Negative for confusion, dysphoric mood and sleep disturbance  The patient is nervous/anxious  Objective:      /82 (BP Location: Left arm, Patient Position: Sitting, Cuff Size: Adult)   Pulse 78   Temp 98 2 °F (36 8 °C) (Tympanic)   Resp 18   Ht 5' 2" (1 575 m)   Wt 73 9 kg (163 lb)   SpO2 93%   BMI 29 81 kg/m²          Physical Exam   Constitutional: She is oriented to person, place, and time  She appears well-developed and well-nourished  No distress  HENT:   Head: Normocephalic and atraumatic  Mouth/Throat: Oropharynx is clear and moist    Eyes: Pupils are equal, round, and reactive to light  Conjunctivae and EOM are normal    Neck: Neck supple  No JVD present  Cardiovascular: Normal rate, regular rhythm and normal heart sounds  No murmur heard  Pulmonary/Chest: Effort normal and breath sounds normal  No respiratory distress  She has no wheezes  She has no rales  Abdominal: Soft  Bowel sounds are normal  She exhibits no distension  There is no tenderness  Musculoskeletal: She exhibits tenderness  She exhibits no edema or deformity  Bilat shoulders w/o gross deformities, increase temp, erythema, swelling  Positive crepitus  ROM wnl except ABduction decreased 20%  Joint integrity intact  Diffuse tenderness  UE strength 5/5    Neurological: She is alert and oriented to person, place, and time  Psychiatric: She has a normal mood and affect  Her behavior is normal  Judgment and thought content normal    Nursing note and vitals reviewed

## 2019-01-16 NOTE — PROGRESS NOTES
Assessment and Plan:    Problem List Items Addressed This Visit        Respiratory    COPD (chronic obstructive pulmonary disease) ([facility] )       Cardiovascular and Mediastinum    Coronary artery disease    Relevant Orders    Comprehensive metabolic panel    CBC and differential    Hemoglobin A1C    Lipid Panel with Direct LDL reflex    TSH, 3rd generation with Free T4 reflex    Essential hypertension - Primary    Relevant Orders    Comprehensive metabolic panel    Chronic diastolic congestive heart failure (HCC)    Relevant Orders    TSH, 3rd generation with Free T4 reflex       Musculoskeletal and Integument    Malignant melanoma of skin (HCC)       Other    Glucose intolerance (no malabsorption)    Relevant Orders    Hemoglobin A1C    Hyperlipidemia, mixed    Relevant Orders    Lipid Panel with Direct LDL reflex    Iron deficiency anemia    Relevant Orders    CBC and differential    Vitamin D deficiency    Anxiety    Relevant Orders    TSH, 3rd generation with Free T4 reflex      Other Visit Diagnoses     Medicare annual wellness visit, subsequent        Chronic pain of both shoulders        Relevant Medications    meloxicam (MOBIC) 15 mg tablet        Health Maintenance Due   Topic Date Due    Medicare Annual Wellness Visit (AWV)  02/25/1925    DTaP,Tdap,and Td Vaccines (1 - Tdap) 02/25/1946         HPI:  Nirav Odell is a 80 y o  female here for her Subsequent Wellness Visit      Patient Active Problem List   Diagnosis    Allergic rhinitis    Benign colon polyp    Cataract    Constipation    Coronary artery disease    Fibrocystic breast disease, unspecified laterality    Gait abnormality    Hiatal hernia with GERD without esophagitis    Glucose intolerance (no malabsorption)    Hyperlipidemia, mixed    Essential hypertension    Iron deficiency anemia    Malignant melanoma of skin (HCC)    Non-STEMI (non-ST elevated myocardial infarction) ([facility] )    Urinary incontinence    Vitamin D deficiency    COPD [facility]ch[facility] obstructive pulmonary disease) ([facility])    Anxiety    Compression fracture of thoracic vertebra, closed, initial encounter ([facility] )    Proteinuria    Coronary angioplasty status    Old MI (myocardial infarction)    Chronic diastolic congestive heart failure ([facility])     Past Medical History:   Diagnosis Date    Abnormal blood sugar 03/13/2017    Abnormal carotid duplex scan     Carotid Duplex (Plaque formation is quite prominent bilaterally  Despite these changes, no evidence for greater than 50% diameter reducing lesions in the cervical portion of either carotid artery hemisystem ) - 6/13/2017    Anxiety     Cervical radiculopathy 08/08/2017    CHF (congestive heart failure) ([facility])     diastolic    COPD (chronic obstructive pulmonary disease) ([facility] ) 2/6/2018    Coronary angioplasty status     Coronary artery disease 4/7/2017    Costochondritis 02/05/2013    suspect MS in origin given relationship to ROM and location  Start mobic and if persists, reeval  Refused xrayat this time   Dyslipidemia     GERD without esophagitis 8/30/2012    H/O CT scan of chest      (No PE, minimal interstitial change left lower lobe and right upper lobe, which may be acute ) - 5/19/2017    History of Holter monitoring     Holter (Sinus rhythm with rates ranging from 52 to 118 bpm   No afib or heart block  Rare atrial and ventricular ectopic beats  One six-beat atrial run noted  No pauses  ) - 5/31/2017    Hx of echocardiogram     Echo (EF 0 60 (60%), Biatrial enlargement ) - 3/24/2017 Echo (EF 0 55 (55%), mild LVH  Aortic valvular sclerosis  Trace MI with mild left atrial dilatation, mild MAC  Mild TI with mild pulmonary hypertension  ) - 5/22/2017 Echo (EF 0 60 (60%), Normal left vent chamber size and systolic function  Mild diastolic dysfunction of LV w/normal filling pressures   Mild mitral regurg ) - 7/26/2017 Echo (EF 0    Hx of echocardiogram 08/16/2017    EF0 60 (60%) Normal left vent chamber size and systolic function of LV w/normal filling presures  Mild mitral regurg  / EF0 50 (50%) Mild LVH  MIld MR 10/6/17     Hypertension     Iron deficiency anemia 4/21/2016    Malignant melanoma of skin ([facility] ) 9/17/2012    Mixed hyperlipidemia     NSTEMI (non-ST elevated myocardial infarction) ([facility] ) 7/25/2017    Old MI (myocardial infarction)     Osteoarthritis 9/17/2012    Osteoporosis 3/13/2017    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/07/2017    Urinary tract infection     frequent UTI's     Past Surgical History:   Procedure Laterality Date    ABDOMINAL SURGERY      BREAST SURGERY      Lumpectomy    CARDIAC CATHETERIZATION  03/24/2017    ARNIE to 100% occluded prox RCA, chronic 99% ostial LCfx, 60% mid LAD, discrete 40% distal LM / EF0 60 (60%) 100% occluded proximal RCA s/p ARNIE, chronic 99% ostial LCX, 60% mid LAD, discrete 40% dital LM  4/5/17    CHOLECYSTECTOMY      COLONOSCOPY N/A 7/25/2018    Procedure: COLONOSCOPY;  Surgeon:  Adama Gonzalez MD;  Location: [address] [place], [place] [address]o [place] MAIN OR;  Service: Gastroenterology    CORONARY STENT PLACEMENT  03/25/2017    ARNIE RCA    HEMORRHOID SURGERY      INSERTION STENT ARTERY  04/07/2017    Cardio vascular agents drug-eluting stent    SKIN BIOPSY      TONSILLECTOMY       Family History   Problem Relation Age of Onset    Heart failure Mother     Prostate cancer Father     Stroke Family      History   Smoking Status    Former Smoker    Types: Cigarettes   Smokeless Tobacco    Never Used     History   Alcohol Use    Yes     Comment: 2 beers/monthly      History   Drug Use No       Current Outpatient Prescriptions   Medication Sig Dispense Refill    aspirin 81 mg chewable tablet Chew 1 tablet (81 mg total) daily 30 tablet 0    atorvastatin (LIPITOR) 40 mg tablet Take 1 tablet (40 mg total) by mouth daily 90 tablet 3    Calcium Carbonate-Vitamin D 600-125 MG-UNIT TABS Take by mouth daily        ferrous sulfate 325 (65 Fe) mg tablet Take 0 5 tablets by mouth daily        furosemide (LASIX) 20 mg tablet Take 1 tablet (20 mg total) by mouth daily 90 tablet 1    LORazepam (ATIVAN) 0 5 mg tablet Take 1 tablet (0 5 mg total) by mouth every 8 (eight) hours as needed for anxiety 90 tablet 0    metoprolol tartrate (LOPRESSOR) 25 mg tablet 25 mg Take 1/2 tablet BID      MULTIPLE VITAMINS-CALCIUM PO Take by mouth daily      polyethylene glycol (GLYCOLAX) powder Take 17 g by mouth 2 (two) times a day as needed (prn for constipation) 850 g 0    Potassium 99 MG TABS Take by mouth daily      psyllium (METAMUCIL) packet Take 1 packet by mouth daily 30 each 0    ranitidine (ZANTAC) 150 mg tablet Take 1 tablet (150 mg total) by mouth 2 (two) times a day 60 tablet 3    meloxicam (MOBIC) 15 mg tablet Take 1 tablet (15 mg total) by mouth daily 30 tablet 0     No current facility-administered medications for this visit  Allergies   Allergen Reactions    Ciprofloxacin      Reaction Date: [id number];     Keflex [Cephalexin] Dizziness    Nitrofurantoin      Reaction Date: [id number];     Penicillins Rash, Other (See Comments) and Throat Swelling     Throat swells     Immunization History   Administered Date(s) Administered    H1N1, All Formulations 01/19/2010    Influenza 09/14/2015, 09/12/2016, 09/27/2017, 10/27/2017, 09/12/2018    Influenza Split High Dose Preservative Free IM 09/14/2015, 09/12/2016    Influenza TIV (IM) 10/16/2012, 09/20/2013, 11/17/2014, 09/27/2017    Influenza, high dose seasonal 0 5 mL 09/12/2018    Pneumococcal Conjugate 13-Valent 03/13/2017    Pneumococcal Polysaccharide PPV23 01/01/2004, 04/24/2009       Patient Care Team:  Indra Andrew DO as PCP - General (Internal Medicine)    Medicare Screening Tests and Risk Assessments:  Last Medicare Wellness visit information reviewed, patient interviewed and updates made to the record today  Health Risk Assessment:  Patient rates overall health as good  Patient feels that their physical health rating is Same  Eyesight was rated as Slightly worse  Hearing was rated as Slightly worse  Patient feels that their emotional and mental health rating is Same  Pain experienced by patient in the last 7 days has been None  Patient states that she has experienced no weight loss or gain in last 6 months  Emotional/Mental Health:  Patient has been feeling nervous/anxious  PHQ-9 Depression Screening:    Frequency of the following problems over the past two weeks:      1  Little interest or pleasure in doing things: 1 - several days      2  Feeling down, depressed, or hopeless: 1 - several days      3  Trouble falling or staying asleep, or sleeping too much: 0 - not at all      4  Feeling tired or having little energy: 1 - several days      5  Poor appetite or overeatin - not at all      6  Feeling bad about yourself - or that you are a failure or have let yourself or your family down: 0 - not at all      7  Trouble concentrating on things, such as reading the newspaper or watching television: 0 - not at all      8  Moving or speaking so slowly that other people could have noticed  Or the opposite - being so fidgety or restless that you have been moving around a lot more than usual: 0 - not at all      9  Thoughts that you would be better off dead, or of hurting yourself in some way: 0 - not at all  PHQ-2 Score: 2  PHQ-9 Score: 3    Broken Bones/Falls: Fall Risk Assessment:    In the past year, patient has experienced: No history of falling in past year          Bladder/Bowel:  Patient has not leaked urine accidently in the last six months  Patient reports no loss of bowel control  Immunizations:  Patient has had a flu vaccination within the last year  Patient has received a pneumonia shot  Patient has not received a shingles shot  Patient has not received tetanus/diphtheria shot  Home Safety:  Patient does not have trouble with stairs inside or outside of their home     Patient currently reports that there are no safety hazards present in home, working smoke alarms, working carbon monoxide detectors  Preventative Screenings:   No breast cancer screening performed, colon cancer screen completed, cholesterol screen completed, glaucoma eye exam completed,     Nutrition:  Current diet: Regular with servings of the following:    Medications:  Patient is currently taking over-the-counter supplements  Patient is able to manage medications  Lifestyle Choices:  Patient reports no tobacco use  Patient has not smoked or used tobacco in the past   Patient reports alcohol use  Patient does not drive a vehicle  Patient wears seat belt  Activities of Daily Living:  Can get out of bed by his or her self, able to dress self, able to make own meals, unable to do own shopping, able to bathe self, unable to do laundry/housekeeping, can manage own money, pay bills and track expenses    Previous Hospitalizations:  No hospitalization or ED visit in past 12 months        Advanced Directives:  Patient has decided on a power of   Patient has spoken to designated power of   Patient has completed advanced directive          Preventative Screening/Counseling:      Cardiovascular:      General: Screening Current      Counseling: Healthy Diet, Healthy Weight, Improve Cholesterol, Improve Blood Pressure and Improve Exercise Tolerance     Due for Labs/Analytes/Optional EKG: Lipid Panel          Diabetes:      General: Screening Current      Counseling: Healthy Diet, Healthy Weight and Improve Physical Activity      Due for labs: Blood Glucose          Colorectal Cancer:      General: Screening Not Indicated      Counseling: high fiber diet          Breast Cancer:      General: Screening Not Indicated          Cervical Cancer:      General: Screening Not Indicated          Osteoporosis:      General: Risks and Benefits Discussed and Patient Declines      Counseling: Calcium and Vitamin D Intake and Regular Weightbearing Exercise          AAA:      General: Screening Not Indicated          Glaucoma:      General: Screening Current          HIV:      General: Screening Not Indicated          Hepatitis C:      General: Screening Not Indicated        Advanced Directives:   Patient has living will for healthcare, has durable POA for healthcare, patient has an advanced directive  Information on ACP and/or AD provided  No 5 wishes given  End of life assessment reviewed with patient  Provider agrees with end of life decisions   Immunizations:      Influenza: Influenza UTD This Year      Pneumococcal: Lifetime Vaccine Completed      TDAP: Tdap Vaccine UTD      Other Preventative Counseling (Non-Medicare): Fall Prevention, Increase physical activity, Nutrition Counseling, Car/seat belt/driving safety reviewed and Weight reduction discussed      A/P Doing well and denies depression  One fall w/o injury due to an area rug  Feels safe at home and declines PT  Diverse diet  Doesn't drive, but uses seat belts when in the car  Has a living will and POA  Vaccines up to date  No DME or referrals needed today  RTC one year for medicare wellness

## 2019-01-16 NOTE — PATIENT INSTRUCTIONS
Chronic Hypertension   AMBULATORY CARE:   Hypertension  is high blood pressure (BP)  Your BP is the force of your blood moving against the walls of your arteries  Normal BP is less than 120/80  Prehypertension is between 120/80 and 139/89  Hypertension is 140/90 or higher  Hypertension causes your BP to get so high that your heart has to work much harder than normal  This can damage your heart  Chronic hypertension is a long-term condition that you can control with a healthy lifestyle or medicines  A controlled blood pressure helps protect your organs, such as your heart, lungs, brain, and kidneys  Common symptoms include the following:   · Headache     · Blurred vision    · Chest pain     · Dizziness or weakness     · Trouble breathing     · Nosebleeds  Call 911 for any of the following:   · You have discomfort in your chest that feels like squeezing, pressure, fullness, or pain  · You become confused or have difficulty speaking  · You suddenly feel lightheaded or have trouble breathing  · You have pain or discomfort in your back, neck, jaw, stomach, or arm  Seek care immediately if:   · You have a severe headache or vision loss  · You have weakness in an arm or leg  Contact your healthcare provider if:   · You feel faint, dizzy, confused, or drowsy  · You have been taking your BP medicine and your BP is still higher than your healthcare provider says it should be  · You have questions or concerns about your condition or care  Treatment for chronic hypertension  may include medicine to lower your BP and lower your cholesterol level  A low cholesterol level helps prevent heart disease and makes it easier to control your blood pressure  Heart disease can make your blood pressure harder to control  You may also need to make lifestyle changes  Take your medicine exactly as directed    Manage chronic hypertension:  Talk with your healthcare provider about these and other ways to manage hypertension:  · Take your BP at home  Sit and rest for 5 minutes before you take your BP  Extend your arm and support it on a flat surface  Your arm should be at the same level as your heart  Follow the directions that came with your BP monitor  If possible, take at least 2 BP readings each time  Take your BP at least twice a day at the same times each day, such as morning and evening  Keep a record of your BP readings and bring it to your follow-up visits  Ask your healthcare provider what your blood pressure should be  · Limit sodium (salt) as directed  Too much sodium can affect your fluid balance  Check labels to find low-sodium or no-salt-added foods  Some low-sodium foods use potassium salts for flavor  Too much potassium can also cause health problems  Your healthcare provider will tell you how much sodium and potassium are safe for you to have in a day  He or she may recommend that you limit sodium to 2,300 mg a day  · Follow the meal plan recommended by your healthcare provider  A dietitian or your provider can give you more information on low-sodium plans or the DASH (Dietary Approaches to Stop Hypertension) eating plan  The DASH plan is low in sodium, unhealthy fats, and total fat  It is high in potassium, calcium, and fiber  · Exercise to maintain a healthy weight  Exercise at least 30 minutes per day, on most days of the week  This will help decrease your blood pressure  Ask about the best exercise plan for you  · Decrease stress  This may help lower your BP  Learn ways to relax, such as deep breathing or listening to music  · Limit alcohol  Women should limit alcohol to 1 drink a day  Men should limit alcohol to 2 drinks a day  A drink of alcohol is 12 ounces of beer, 5 ounces of wine, or 1½ ounces of liquor  · Do not smoke  Nicotine and other chemicals in cigarettes and cigars can increase your BP and also cause lung damage   Ask your healthcare provider for information if you currently smoke and need help to quit  E-cigarettes or smokeless tobacco still contain nicotine  Talk to your healthcare provider before you use these products  Follow up with your healthcare provider as directed: You will need to return to have your BP checked and to have other lab tests done  Write down your questions so you remember to ask them during your visits  © 2017 [address] Information is for End User's use only and may not be sold, redistributed or otherwise used for commercial purposes  All illustrations and images included in CareNotes® are the copyrighted property of [employer] M [employer]  or [employer]  The above information is an  only  It is not intended as medical advice for individual conditions or treatments  Talk to your doctor, nurse or pharmacist before following any medical regimen to see if it is safe and effective for you  Obesity   AMBULATORY CARE:   Obesity  is when your body mass index (BMI) is greater than 30  Your healthcare provider will use your height and weight to measure your BMI  The risks of obesity include  many health problems, such as injuries or physical disability  You may need tests to check for the following:  · Diabetes     · High blood pressure or high cholesterol     · Heart disease     · Gallbladder or liver disease     · Cancer of the colon, breast, prostate, liver, or kidney     · Sleep apnea     · Arthritis or gout  Seek care immediately if:   · You have a severe headache, confusion, or difficulty speaking  · You have weakness on one side of your body  · You have chest pain, sweating, or shortness of breath  Contact your healthcare provider if:   · You have symptoms of gallbladder or liver disease, such as pain in your upper abdomen  · You have knee or hip pain and discomfort while walking       · You have symptoms of diabetes, such as intense hunger and thirst, and frequent urination  · You have symptoms of sleep apnea, such as snoring or daytime sleepiness  · You have questions or concerns about your condition or care  Treatment for obesity  focuses on helping you lose weight to improve your health  Even a small decrease in BMI can reduce the risk for many health problems  Your healthcare provider will help you set a weight-loss goal   · Lifestyle changes  are the first step in treating obesity  These include making healthy food choices and getting regular physical activity  Your healthcare provider may suggest a weight-loss program that involves coaching, education, and therapy  · Medicine  may help you lose weight when it is used with a healthy diet and physical activity  · Surgery  can help you lose weight if you are very obese and have other health problems  There are several types of weight-loss surgery  Ask your healthcare provider for more information  Be successful losing weight:   · Set small, realistic goals  An example of a small goal is to walk for 20 minutes 5 days a week  Anther goal is to lose 5% of your body weight  · Tell friends, family members, and coworkers about your goals  and ask for their support  Ask a friend to lose weight with you, or join a weight-loss support group  · Identify foods or triggers that may cause you to overeat , and find ways to avoid them  Remove tempting high-calorie foods from your home and workplace  Place a bowl of fresh fruit on your kitchen counter  If stress causes you to eat, then find other ways to cope with stress  · Keep a diary to track what you eat and drink  Also write down how many minutes of physical activity you do each day  Weigh yourself once a week and record it in your diary  Eating changes: You will need to eat 500 to 1,000 fewer calories each day than you currently eat to lose 1 to 2 pounds a week  The following changes will help you cut calories:  · Eat smaller portions    Use small plates, no larger than 9 inches in diameter  Fill your plate half full of fruits and vegetables  Measure your food using measuring cups until you know what a serving size looks like  · Eat 3 meals and 1 or 2 snacks each day  Plan your meals in advance  Bren Mireles and eat at home most of the time  Eat slowly  · Eat fruits and vegetables at every meal   They are low in calories and high in fiber, which makes you feel full  Do not add butter, margarine, or cream sauce to vegetables  Use herbs to season steamed vegetables  · Eat less fat and fewer fried foods  Eat more baked or grilled chicken and fish  These protein sources are lower in calories and fat than red meat  Limit fast food  Dress your salads with olive oil and vinegar instead of bottled dressing  · Limit the amount of sugar you eat  Do not drink sugary beverages  Limit alcohol  Activity changes:  Physical activity is good for your body in many ways  It helps you burn calories and build strong muscles  It decreases stress and depression, and improves your mood  It can also help you sleep better  Talk to your healthcare provider before you begin an exercise program   · Exercise for at least 30 minutes 5 days a week  Start slowly  Set aside time each day for physical activity that you enjoy and that is convenient for you  It is best to do both weight training and an activity that increases your heart rate, such as walking, bicycling, or swimming  · Find ways to be more active  Do yard work and housecleaning  Walk up the stairs instead of using elevators  Spend your leisure time going to events that require walking, such as outdoor festivals or fairs  This extra physical activity can help you lose weight and keep it off  Follow up with your healthcare provider as directed: You may need to meet with a dietitian  Write down your questions so you remember to ask them during your visits     © 2017 [address] Information is for End User's use only and may not be sold, redistributed or otherwise used for commercial purposes  All illustrations and images included in CareNotes® are the copyrighted property of A D [employer] M [employer] Inc  or [employer]  The above information is an  only  It is not intended as medical advice for individual conditions or treatments  Talk to your doctor, nurse or pharmacist before following any medical regimen to see if it is safe and effective for you  Urinary Incontinence   WHAT YOU NEED TO KNOW:   What is urinary incontinence? Urinary incontinence (UI) is when you lose control of your bladder  What causes UI? UI occurs because your bladder cannot store or empty urine properly  The following are the most common types of UI:  · Stress incontinence  is when you leak urine due to increased bladder pressure  This may happen when you cough, sneeze, or exercise  · Urge incontinence  is when you feel the need to urinate right away and leak urine accidentally  · Mixed incontinence  is when you have both stress and urge UI  What are the signs and symptoms of UI?   · You feel like your bladder does not empty completely when you urinate  · You urinate often and need to urinate immediately  · You leak urine when you sleep, or you wake up with the urge to urinate  · You leak urine when you cough, sneeze, exercise, or laugh  How is UI diagnosed? Your healthcare provider will ask how often you leak urine and whether you have stress or urge symptoms  Tell him which medicines you take, how often you urinate, and how much liquid you drink each day  You may need any of the following tests:  · Urine tests  may show infection or kidney function  · A pelvic exam  may be done to check for blockages  A pelvic exam will also show if your bladder, uterus, or other organs have moved out of place  · An x-ray, ultrasound, or CT  may show problems with parts of your urinary system   You may be given contrast liquid to help your organs show up better in the pictures  Tell the healthcare provider if you have ever had an allergic reaction to contrast liquid  Do not enter the MRI room with anything metal  Metal can cause serious injury  Tell the healthcare provider if you have any metal in or on your body  · A bladder scan  will show how much urine is left in your bladder after you urinate  You will be asked to urinate and then healthcare providers will use a small ultrasound machine to check the urine left in your bladder  · Cystometry  is used to check the function of your urinary system  Your healthcare provider checks the pressure in your bladder while filling it with fluid  Your bladder pressure may also be tested when your bladder is full and while you urinate  How is UI treated? · Medicines  can help strengthen your bladder control  · Electrical stimulation  is used to send a small amount of electrical energy to your pelvic floor muscles  This helps control your bladder function  Electrodes may be placed outside your body or in your rectum  For women, the electrodes may be placed in the vagina  · A bulking agent  may be injected into the wall of your urethra to make it thicker  This helps keep your urethra closed and decreases urine leakage  · Devices  such as a clamp, pessary, or tampon may help stop urine leaks  Ask your healthcare provider for more information about these and other devices  · Surgery  may be needed if other treatments do not work  Several types of surgery can help improve your bladder control  Ask your healthcare provider for more information about the surgery you may need  How can I manage my symptoms? · Do pelvic muscle exercises often  Your pelvic muscles help you stop urinating  Squeeze these muscles tight for 5 seconds, then relax for 5 seconds  Gradually work up to squeezing for 10 seconds  Do 3 sets of 15 repetitions a day, or as directed   This will help strengthen your pelvic muscles and improve bladder control  · A catheter  may be used to help empty your bladder  A catheter is a tiny, plastic tube that is put into your bladder to drain your urine  Your healthcare provider may tell you to use a catheter to prevent your bladder from getting too full and leaking urine  · Keep a UI record  Write down how often you leak urine and how much you leak  Make a note of what you were doing when you leaked urine  · Train your bladder  Go to the bathroom at set times, such as every 2 hours, even if you do not feel the urge to go  You can also try to hold your urine when you feel the urge to go  For example, hold your urine for 5 minutes when you feel the urge to go  As that becomes easier, hold your urine for 10 minutes  · Drink liquids as directed  Ask your healthcare provider how much liquid to drink each day and which liquids are best for you  You may need to limit the amount of liquid you drink to help control your urine leakage  Limit or do not have drinks that contain caffeine or alcohol  Do not drink any liquid right before you go to bed  · Prevent constipation  Eat a variety of high-fiber foods  Good examples are high-fiber cereals, beans, vegetables, and whole-grain breads  Prune juice may help make your bowel movement softer  Walking is the best way to trigger your intestines to have a bowel movement  · Exercise regularly and maintain a healthy weight  Ask your healthcare provider how much you should weigh and about the best exercise plan for you  Weight loss and exercise will decrease pressure on your bladder and help you control your leakage  Ask him to help you create a weight loss plan if you are overweight  When should I seek immediate care? · You have severe pain  · You are confused or cannot think clearly  When should I contact my healthcare provider? · You have a fever  · You see blood in your urine      · You have pain when you urinate  · You have new or worse pain, even after treatment  · Your mouth feels dry or you have vision changes  · Your urine is cloudy or smells bad  · You have questions or concerns about your condition or care  CARE AGREEMENT:   You have the right to help plan your care  Learn about your health condition and how it may be treated  Discuss treatment options with your caregivers to decide what care you want to receive  You always have the right to refuse treatment  The above information is an  only  It is not intended as medical advice for individual conditions or treatments  Talk to your doctor, nurse or pharmacist before following any medical regimen to see if it is safe and effective for you  © 2017 [address] Information is for End User's use only and may not be sold, redistributed or otherwise used for commercial purposes  All illustrations and images included in CareNotes® are the copyrighted property of [employer]  or [employer]  Cigarette Smoking and Your Health   AMBULATORY CARE:   Risks to your health if you smoke:  Nicotine and other chemicals found in tobacco damage every cell in your body  Even if you are a light smoker, you have an increased risk for cancer, heart disease, and lung disease  If you are pregnant or have diabetes, smoking increases your risk for complications  Benefits to your health if you stop smoking:   · You decrease respiratory symptoms such as coughing, wheezing, and shortness of breath  · You reduce your risk for cancers of the lung, mouth, throat, kidney, bladder, pancreas, stomach, and cervix  If you already have cancer, you increase the benefits of chemotherapy  You also reduce your risk for cancer returning or a second cancer from developing  · You reduce your risk for heart disease, blood clots, heart attack, and stroke       · You reduce your risk for lung infections, and diseases such as pneumonia, asthma, chronic bronchitis, and emphysema  · Your circulation improves  More oxygen can be delivered to your body  If you have diabetes, you lower your risk for complications, such as kidney, artery, and eye diseases  You also lower your risk for nerve damage  Nerve damage can lead to amputations, poor vision, and blindness  · You improve your body's ability to heal and to fight infections  Benefits to the health of others if you stop smoking:  Tobacco is harmful to nonsmokers who breathe in your secondhand smoke  The following are ways the health of others around you may improve when you stop smoking:  · You lower the risks for lung cancer and heart disease in nonsmoking adults  · If you are pregnant, you lower the risk for miscarriage, early delivery, low birth weight, and stillbirth  You also lower your baby's risk for SIDS, obesity, developmental delay, and neurobehavioral problems, such as ADHD  · If you have children, you lower their risk for ear infections, colds, pneumonia, bronchitis, and asthma  For more information and support to stop smoking:   · [employer]  Phone: 3- 129 - 388-5540  Web Address: www [employer]  Follow up with your healthcare provider as directed:  Write down your questions so you remember to ask them during your visits  © 2017 [address] Information is for End User's use only and may not be sold, redistributed or otherwise used for commercial purposes  All illustrations and images included in CareNotes® are the copyrighted property of [employer]  or [employer]  The above information is an  only  It is not intended as medical advice for individual conditions or treatments  Talk to your doctor, nurse or pharmacist before following any medical regimen to see if it is safe and effective for you    Fall Prevention   AMBULATORY CARE:   Fall prevention  includes ways to make your home and other areas safer  It also includes ways you can move more carefully to prevent a fall  Health conditions that cause changes in your blood pressure, vision, or muscle strength and coordination may increase your risk for falls  Medicines may also increase your risk for falls if they make you dizzy, weak, or sleepy  Call 911 or have someone else call if:   · You have fallen and are unconscious  · You have fallen and cannot move part of your body  Contact your healthcare provider if:   · You have fallen and have pain or a headache  · You have questions or concerns about your condition or care  Fall prevention tips:   · Stand or sit up slowly  This may help you keep your balance and prevent falls  · Use assistive devices as directed  Your healthcare provider may suggest that you use a cane or walker to help you keep your balance  You may need to have grab bars put in your bathroom near the toilet or in the shower  · Wear shoes that fit well and have soles that   Wear shoes both inside and outside  Use slippers with good   Do not wear shoes with high heels  · Wear a personal alarm  This is a device that allows you to call 911 if you fall and need help  Ask your healthcare provider for more information  · Stay active  Exercise can help strengthen your muscles and improve your balance  Your healthcare provider may recommend water aerobics or walking  He or she may also recommend physical therapy to improve your coordination  Never start an exercise program without talking to your healthcare provider first      · Manage your medical conditions  Keep all appointments with your healthcare providers  Visit your eye doctor as directed  Home safety tips:   · Add items to prevent falls in the bathroom  Put nonslip strips on your bath or shower floor to prevent you from slipping  Use a bath mat if you do not have carpet in the bathroom   This will prevent you from falling when you step out of the bath or shower  Use a shower seat so you do not need to stand while you shower  Sit on the toilet or a chair in your bathroom to dry yourself and put on clothing  This will prevent you from losing your balance from drying or dressing yourself while you are standing  · Keep paths clear  Remove books, shoes, and other objects from walkways and stairs  Place cords for telephones and lamps out of the way so that you do not need to walk over them  Tape them down if you cannot move them  Remove small rugs  If you cannot remove a rug, secure it with double-sided tape  This will prevent you from tripping  · Install bright lights in your home  Use night lights to help light paths to the bathroom or kitchen  Always turn on the light before you start walking  · Keep items you use often on shelves within reach  Do not use a step stool to help you reach an item  · Paint or place reflective tape on the edges of your stairs  This will help you see the stairs better  Follow up with your healthcare provider as directed:  Write down your questions so you remember to ask them during your visits  © 2017 [address] [facility] Information is for End User's use only and may not be sold, redistributed or otherwise used for commercial purposes  All illustrations and images included in CareNotes® are the copyrighted property of [employer]  or [employer]  The above information is an  only  It is not intended as medical advice for individual conditions or treatments  Talk to your doctor, nurse or pharmacist before following any medical regimen to see if it is safe and effective for you  Advance Directives   WHAT YOU NEED TO KNOW:   What are advance directives? Advance directives are legal documents that state your wishes and plans for medical care  These plans are made ahead of time in case you lose your ability to make decisions for yourself   Advance directives can apply to any medical decision, such as the treatments you want, and if you want to donate organs  What are the types of advance directives? There are many types of advance directives, and each state has rules about how to use them  You may choose a combination of any of the following:  · Living will: This is a written record of the treatment you want  You can also choose which treatments you do not want, which to limit, and which to stop at a certain time  This includes surgery, medicine, IV fluid, and tube feedings  · Durable power of  for healthcare [facility]): This is a written record that states who you want to make healthcare choices for you when you are unable to make them for yourself  This person, called a proxy, is usually a family member or a friend  You may choose more than 1 proxy  · Do not resuscitate (DNR) order:  A DNR order is used in case your heart stops beating or you stop breathing  It is a request not to have certain forms of treatment, such as CPR  A DNR order may be included in other types of advance directives  · Medical directive: This covers the care that you want if you are in a coma, near death, or unable to make decisions for yourself  You can list the treatments you want for each condition  Treatment may include pain medicine, surgery, blood transfusions, dialysis, IV or tube feedings, and a ventilator (breathing machine)  · Values history: This document has questions about your views, beliefs, and how you feel and think about life  This information can help others choose the care that you would choose  Why are advance directives important? An advance directive helps you control your care  Although spoken wishes may be used, it is better to have your wishes written down  Spoken wishes can be misunderstood, or not followed  Treatments may be given even if you do not want them  An advance directive may make it easier for your family to make difficult choices about your care     How do I decide what to put in my advance directives? · Make informed decisions:  Make sure you fully understand treatments or care you may receive  Think about the benefits and problems your decisions could cause for you or your family  Talk to healthcare providers if you have concerns or questions before you write down your wishes  You may also want to talk with your [employer]ist or , or a   Check your state laws to make sure that what you put in your advance directive is legal      · Sign all forms:  Sign and date your advance directive when you have finished  You may also need 2 witnesses to sign the forms  Witnesses cannot be your doctor or his staff, your spouse, heirs or beneficiaries, people you owe money to, or your chosen proxy  Talk to your family, proxy, and healthcare providers about your advance directive  Give each person a copy, and keep one for yourself in a place you can get to easily  Do not keep it hidden or locked away  · Review and revise your plans: You can revise your advance directive at any time, as long as you are able to make decisions  Review your plan every year, and when there are changes in your life, or your health  When you make changes, let your family, proxy, and healthcare providers know  Give each a new copy  Where can I find more information? · [employer]  [address] , [address]  Phone: [phone]  Phone: [phone]  Web Address: http://www  aafp org  · [address] [facility])  [address] [facility] [place], [address]  Phone: [phone]  Phone: [phone]  Web Address: Otis carrera  CARE AGREEMENT:   You have the right to help plan your care  To help with this plan, you must learn about your health condition and treatment options  You must also learn about advance directives and how they are used   Work with your healthcare providers to decide what care will be used to treat you  You always have the right to refuse treatment  The above information is an  only  It is not intended as medical advice for individual conditions or treatments  Talk to your doctor, nurse or pharmacist before following any medical regimen to see if it is safe and effective for you  © 2017 [address] Information is for End User's use only and may not be sold, redistributed or otherwise used for commercial purposes  All illustrations and images included in CareNotes® are the copyrighted property of A D A M , Inc  or [employer] Sol

## 2019-02-07 ENCOUNTER — TELEPHONE (OUTPATIENT)
Dept: INTERNAL MEDICINE CLINIC | Facility: CLINIC | Age: 84
End: 2019-02-07

## 2019-02-07 NOTE — TELEPHONE ENCOUNTER
Called patient and left a message to call us back to see how her shoulders are doing and if interested in ortho      ----- Message from Megan Curry DO sent at 1/16/2019  4:05 PM EST -----  Call pt in three weeks and see how her shoulders are doing and if she wants to see ortho

## 2019-02-09 ENCOUNTER — TRANSCRIBE ORDERS (OUTPATIENT)
Dept: ADMINISTRATIVE | Facility: HOSPITAL | Age: 84
End: 2019-02-09

## 2019-02-09 ENCOUNTER — APPOINTMENT (OUTPATIENT)
Dept: LAB | Facility: IMAGING CENTER | Age: 84
End: 2019-02-09
Payer: MEDICARE

## 2019-02-09 DIAGNOSIS — I25.10 CORONARY ARTERY DISEASE INVOLVING NATIVE CORONARY ARTERY OF NATIVE HEART WITHOUT ANGINA PECTORIS: ICD-10-CM

## 2019-02-09 DIAGNOSIS — I10 ESSENTIAL HYPERTENSION: ICD-10-CM

## 2019-02-09 DIAGNOSIS — I50.9 ACUTE CONGESTIVE HEART FAILURE, UNSPECIFIED HEART FAILURE TYPE (HCC): ICD-10-CM

## 2019-02-09 DIAGNOSIS — E74.39 GLUCOSE INTOLERANCE (NO MALABSORPTION): ICD-10-CM

## 2019-02-09 DIAGNOSIS — I50.32 CHRONIC DIASTOLIC CONGESTIVE HEART FAILURE (HCC): ICD-10-CM

## 2019-02-09 DIAGNOSIS — E78.2 HYPERLIPIDEMIA, MIXED: ICD-10-CM

## 2019-02-09 DIAGNOSIS — F41.9 ANXIETY: ICD-10-CM

## 2019-02-09 DIAGNOSIS — D50.8 IRON DEFICIENCY ANEMIA SECONDARY TO INADEQUATE DIETARY IRON INTAKE: ICD-10-CM

## 2019-02-09 LAB
ALBUMIN SERPL BCP-MCNC: 3.5 G/DL (ref 3.5–5)
ALP SERPL-CCNC: 55 U/L (ref 46–116)
ALT SERPL W P-5'-P-CCNC: 19 U/L (ref 12–78)
ANION GAP SERPL CALCULATED.3IONS-SCNC: 4 MMOL/L (ref 4–13)
AST SERPL W P-5'-P-CCNC: 18 U/L (ref 5–45)
BASOPHILS # BLD AUTO: 0.05 THOUSANDS/ΜL (ref 0–0.1)
BASOPHILS NFR BLD AUTO: 1 % (ref 0–1)
BILIRUB SERPL-MCNC: 0.88 MG/DL (ref 0.2–1)
BUN SERPL-MCNC: 13 MG/DL (ref 5–25)
CALCIUM SERPL-MCNC: 8.9 MG/DL (ref 8.3–10.1)
CHLORIDE SERPL-SCNC: 103 MMOL/L (ref 100–108)
CHOLEST SERPL-MCNC: 116 MG/DL (ref 50–200)
CO2 SERPL-SCNC: 33 MMOL/L (ref 21–32)
CREAT SERPL-MCNC: 0.89 MG/DL (ref 0.6–1.3)
EOSINOPHIL # BLD AUTO: 0.1 THOUSAND/ΜL (ref 0–0.61)
EOSINOPHIL NFR BLD AUTO: 2 % (ref 0–6)
ERYTHROCYTE [DISTWIDTH] IN BLOOD BY AUTOMATED COUNT: 13.2 % (ref 11.6–15.1)
EST. AVERAGE GLUCOSE BLD GHB EST-MCNC: 114 MG/DL
GFR SERPL CREATININE-BSD FRML MDRD: 56 ML/MIN/1.73SQ M
GLUCOSE P FAST SERPL-MCNC: 126 MG/DL (ref 65–99)
HBA1C MFR BLD: 5.6 % (ref 4.2–6.3)
HCT VFR BLD AUTO: 47.2 % (ref 34.8–46.1)
HDLC SERPL-MCNC: 63 MG/DL (ref 40–60)
HGB BLD-MCNC: 14.5 G/DL (ref 11.5–15.4)
IMM GRANULOCYTES # BLD AUTO: 0.01 THOUSAND/UL (ref 0–0.2)
IMM GRANULOCYTES NFR BLD AUTO: 0 % (ref 0–2)
LDLC SERPL CALC-MCNC: 37 MG/DL (ref 0–100)
LYMPHOCYTES # BLD AUTO: 2.31 THOUSANDS/ΜL (ref 0.6–4.47)
LYMPHOCYTES NFR BLD AUTO: 37 % (ref 14–44)
MCH RBC QN AUTO: 29.5 PG (ref 26.8–34.3)
MCHC RBC AUTO-ENTMCNC: 30.7 G/DL (ref 31.4–37.4)
MCV RBC AUTO: 96 FL (ref 82–98)
MONOCYTES # BLD AUTO: 0.45 THOUSAND/ΜL (ref 0.17–1.22)
MONOCYTES NFR BLD AUTO: 7 % (ref 4–12)
NEUTROPHILS # BLD AUTO: 3.27 THOUSANDS/ΜL (ref 1.85–7.62)
NEUTS SEG NFR BLD AUTO: 53 % (ref 43–75)
NRBC BLD AUTO-RTO: 0 /100 WBCS
PLATELET # BLD AUTO: 295 THOUSANDS/UL (ref 149–390)
PMV BLD AUTO: 10.4 FL (ref 8.9–12.7)
POTASSIUM SERPL-SCNC: 4 MMOL/L (ref 3.5–5.3)
PROT SERPL-MCNC: 7.6 G/DL (ref 6.4–8.2)
RBC # BLD AUTO: 4.92 MILLION/UL (ref 3.81–5.12)
SODIUM SERPL-SCNC: 140 MMOL/L (ref 136–145)
TRIGL SERPL-MCNC: 78 MG/DL
TSH SERPL DL<=0.05 MIU/L-ACNC: 0.85 UIU/ML (ref 0.36–3.74)
WBC # BLD AUTO: 6.19 THOUSAND/UL (ref 4.31–10.16)

## 2019-02-09 PROCEDURE — 83036 HEMOGLOBIN GLYCOSYLATED A1C: CPT

## 2019-02-09 PROCEDURE — 36415 COLL VENOUS BLD VENIPUNCTURE: CPT

## 2019-02-09 PROCEDURE — 80061 LIPID PANEL: CPT

## 2019-02-09 PROCEDURE — 85025 COMPLETE CBC W/AUTO DIFF WBC: CPT

## 2019-02-09 PROCEDURE — 84443 ASSAY THYROID STIM HORMONE: CPT

## 2019-02-09 PROCEDURE — 80053 COMPREHEN METABOLIC PANEL: CPT

## 2019-03-06 ENCOUNTER — OFFICE VISIT (OUTPATIENT)
Dept: OBGYN CLINIC | Facility: CLINIC | Age: 84
End: 2019-03-06
Payer: MEDICARE

## 2019-03-06 ENCOUNTER — APPOINTMENT (OUTPATIENT)
Dept: RADIOLOGY | Facility: CLINIC | Age: 84
End: 2019-03-06
Payer: MEDICARE

## 2019-03-06 VITALS
HEIGHT: 62 IN | SYSTOLIC BLOOD PRESSURE: 157 MMHG | HEART RATE: 70 BPM | DIASTOLIC BLOOD PRESSURE: 81 MMHG | RESPIRATION RATE: 16 BRPM | BODY MASS INDEX: 30 KG/M2 | WEIGHT: 163 LBS

## 2019-03-06 DIAGNOSIS — G89.29 CHRONIC RIGHT SHOULDER PAIN: ICD-10-CM

## 2019-03-06 DIAGNOSIS — M25.512 CHRONIC LEFT SHOULDER PAIN: Primary | ICD-10-CM

## 2019-03-06 DIAGNOSIS — G89.29 CHRONIC LEFT SHOULDER PAIN: ICD-10-CM

## 2019-03-06 DIAGNOSIS — M25.511 CHRONIC RIGHT SHOULDER PAIN: ICD-10-CM

## 2019-03-06 DIAGNOSIS — G89.29 CHRONIC LEFT SHOULDER PAIN: Primary | ICD-10-CM

## 2019-03-06 DIAGNOSIS — M25.512 CHRONIC LEFT SHOULDER PAIN: ICD-10-CM

## 2019-03-06 PROCEDURE — 20610 DRAIN/INJ JOINT/BURSA W/O US: CPT | Performed by: ORTHOPAEDIC SURGERY

## 2019-03-06 PROCEDURE — 73030 X-RAY EXAM OF SHOULDER: CPT

## 2019-03-06 PROCEDURE — 99204 OFFICE O/P NEW MOD 45 MIN: CPT | Performed by: ORTHOPAEDIC SURGERY

## 2019-03-06 RX ADMIN — TRIAMCINOLONE ACETONIDE 40 MG: 40 INJECTION, SUSPENSION INTRA-ARTICULAR; INTRAMUSCULAR at 16:36

## 2019-03-06 RX ADMIN — LIDOCAINE HYDROCHLORIDE 4 ML: 10 INJECTION, SOLUTION INFILTRATION; PERINEURAL at 16:36

## 2019-03-06 NOTE — PROGRESS NOTES
ASSESSMENT/PLAN:    Diagnoses and all orders for this visit:    Chronic left shoulder pain  -     XR shoulder 2+ vw left; Future    Chronic right shoulder pain  -     XR shoulder 2+ vw right; Future     Plan:  I discussed the treatment options  After a discussion with the patient and the family member present with her, it was elected to proceed with bilateral subacromial injections  I would recommend follow-up in about 2 weeks to see how she has responded  If the symptoms do not respond, consideration for formal physical therapy might be appropriate  I have discussed working on range of motion at home and not trying to limit activity as this may only lead to further problems with decreasing range of motion  Large joint arthrocentesis: bilateral subacromial bursa  Date/Time: 3/6/2019 4:36 PM  Consent given by: patient  Supporting Documentation  Indications: pain   Procedure Details  Location: shoulder - bilateral subacromial bursa  Needle size: 22 G  Ultrasound guidance: no  Approach: posterolateral    Medications (Right): 40 mg triamcinolone acetonide 40 mg/mL; 4 mL lidocaine 1 %Medications (Left): 40 mg triamcinolone acetonide 40 mg/mL; 4 mL lidocaine 1 %   Patient tolerance: patient tolerated the procedure well with no immediate complications  Dressing:  Sterile dressing applied              _____________________________________________________  CHIEF COMPLAINT:  Chief Complaint   Patient presents with    Left Shoulder - Pain    Right Shoulder - Pain         SUBJECTIVE:  Callie Virgen is a 80y o  year old female who presents for evaluation of bilateral shoulder pain  She is complaining of shoulder pain that has been present for some time, 3 months according to the family member present with her today  Over that time, she has noted some worsening of her left shoulder pain  She is right-hand-dominant but her left shoulder seems to bother her more than the right    She denies pain at rest   She has no difficulty sleeping  Her greatest difficulty is trying to reach behind her, such as to put on a coat  She notes lesser pain with forward reach and the use of her arm in front of her body  She has had no treatment other than meloxicam which was prescribed by her primary care physician  She only took this for very brief period of time secondary to side effects, describing a sensation of dizziness  She was seen by her primary care physician  She was ultimately referred for orthopedic consultation and treatment secondary to the persistent symptoms  Pain is located diffusely about the shoulder radiating to the lateral distal arm, but no further distal than the elbow  She denies upper extremity paresthesias including the left index finger which she states is chronic  PAST MEDICAL HISTORY:  Past Medical History:   Diagnosis Date    Abnormal blood sugar 03/13/2017    Abnormal carotid duplex scan     Carotid Duplex (Plaque formation is quite prominent bilaterally  Despite these changes, no evidence for greater than 50% diameter reducing lesions in the cervical portion of either carotid artery hemisystem ) - 6/13/2017    Anxiety     Cervical radiculopathy 08/08/2017    [facility]congestive [facility] failure[facility])     diastolic    COPD (chronic obstructive pulmonary disease) ([facility] [address] ) 2/6/2018    Coronary angioplasty status     Coronary artery disease 4/7/2017    Costochondritis 02/05/2013    suspect MS in origin given relationship to ROM and location  Start mobic and if persists, reeval  Refused xrayat this time   Dyslipidemia     GERD without esophagitis 8/30/2012    H/O CT scan of chest      (No PE, minimal interstitial change left lower lobe and right upper lobe, which may be acute ) - 5/19/2017    History of Holter monitoring     Holter (Sinus rhythm with rates ranging from 52 to 118 bpm   No afib or heart block  Rare atrial and ventricular ectopic beats  One six-beat atrial run noted    No pauses  ) - 5/31/2017    Hx of echocardiogram     Echo (EF 0 60 (60%), Biatrial enlargement ) - 3/24/2017 Echo (EF 0 55 (55%), mild LVH  Aortic valvular sclerosis  Trace MI with mild left atrial dilatation, mild MAC  Mild TI with mild pulmonary hypertension  ) - 5/22/2017 Echo (EF 0 60 (60%), Normal left vent chamber size and systolic function  Mild diastolic dysfunction of LV w/normal filling pressures  Mild mitral regurg ) - 7/26/2017 Echo (EF 0    Hx of echocardiogram 08/16/2017    EF0 60 (60%) Normal left vent chamber size and systolic function of LV w/normal filling presures  Mild mitral regurg  / EF0 50 (50%) Mild LVH  MIld MR 10/6/17     Hypertension     Iron deficiency anemia 4/21/2016    Macular degeneration, right eye     Malignant melanoma of skin ([facility] [address] ) 9/17/2012    Mixed hyperlipidemia     NSTEMI (non-ST elevated myocardial infarction) ([facility] [address] ) 7/25/2017    Old MI (myocardial infarction)     Osteoarthritis 9/17/2012    Osteoporosis 3/13/2017    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[facility] [address] ) 04/07/2017    Urinary tract infection     frequent UTI's       PAST SURGICAL HISTORY:  Past Surgical History:   Procedure Laterality Date    ABDOMINAL SURGERY      BREAST SURGERY      Lumpectomy    CARDIAC CATHETERIZATION  03/24/2017    ARNIE to 100% occluded prox RCA, chronic 99% ostial LCfx, 60% mid LAD, discrete 40% distal LM / EF0 60 (60%) 100% occluded proximal RCA s/p ARNIE, chronic 99% ostial LCX, 60% mid LAD, discrete 40% dital LM  4/5/17    CHOLECYSTECTOMY      COLONOSCOPY N/A 7/25/2018    Procedure: COLONOSCOPY;  Surgeon:  Nguyễn Rashid MD;  Location: [address] [place] [address] OR;  Service: Gastroenterology    CORONARY STENT PLACEMENT  03/25/2017    ARNIE RCA    HEMORRHOID SURGERY      INSERTION STENT ARTERY  04/07/2017    Cardio vascular agents drug-eluting stent    SKIN BIOPSY      TONSILLECTOMY         FAMILY HISTORY:  Family History   Problem Relation Age of Onset    Heart failure Mother     Prostate cancer Father  Stroke Family        SOCIAL HISTORY:  Social History     Tobacco Use    Smoking status: Former Smoker     Types: Cigarettes    Smokeless tobacco: Never Used   Substance Use Topics    Alcohol use: Yes     Comment: 2 beers/monthly    Drug use: No       MEDICATIONS:    Current Outpatient Medications:     aspirin 81 mg chewable tablet, Chew 1 tablet (81 mg total) daily, Disp: 30 tablet, Rfl: 0    atorvastatin (LIPITOR) 40 mg tablet, Take 1 tablet (40 mg total) by mouth daily, Disp: 90 tablet, Rfl: 3    Calcium Carbonate-Vitamin D 600-125 MG-UNIT TABS, Take by mouth daily  , Disp: , Rfl:     ferrous sulfate 325 (65 Fe) mg tablet, Take 0 5 tablets by mouth daily  , Disp: , Rfl:     furosemide (LASIX) 20 mg tablet, Take 1 tablet (20 mg total) by mouth daily, Disp: 90 tablet, Rfl: 1    LORazepam (ATIVAN) 0 5 mg tablet, Take 1 tablet (0 5 mg total) by mouth every 8 (eight) hours as needed for anxiety, Disp: 90 tablet, Rfl: 0    meloxicam (MOBIC) 15 mg tablet, Take 1 tablet (15 mg total) by mouth daily, Disp: 30 tablet, Rfl: 0    metoprolol tartrate (LOPRESSOR) 25 mg tablet, 25 mg Take 1/2 tablet BID, Disp: , Rfl:     MULTIPLE VITAMINS-CALCIUM PO, Take by mouth daily, Disp: , Rfl:     polyethylene glycol (GLYCOLAX) powder, Take 17 g by mouth 2 (two) times a day as needed (prn for constipation), Disp: 850 g, Rfl: 0    Potassium 99 MG TABS, Take by mouth daily, Disp: , Rfl:     psyllium (METAMUCIL) packet, Take 1 packet by mouth daily, Disp: 30 each, Rfl: 0    ranitidine (ZANTAC) 150 mg tablet, Take 1 tablet (150 mg total) by mouth 2 (two) times a day, Disp: 60 tablet, Rfl: 3    ALLERGIES:  Allergies   Allergen Reactions    Ciprofloxacin      Reaction Date: 01Jul2011;     Keflex [Cephalexin] Dizziness    Nitrofurantoin      Reaction Date: 01Jul2011;     Penicillins Rash, Other (See Comments) and Throat Swelling     Throat swells       Review of systems:   Constitutional: Negative for fatigue, fever or loss of apetite  HENT: Negative  Respiratory: Negative for shortness of breath, dyspnea  Cardiovascular: Negative for chest pain/tightness  Gastrointestinal: Negative for abdominal pain, N/V  Endocrine: Negative for cold/heat intolerance, unexplained weight loss/gain  Genitourinary: Negative for dysuria, hematuria  Musculoskeletal:  Positive as in the history of chief complaint   Skin: Negative for rash  Neurological:  Positive as in the history of chief complaint  Psychiatric/Behavioral: Negative for agitation  _____________________________________________________  PHYSICAL EXAMINATION:    Blood pressure 157/81, pulse 70, resp  rate 16, height 5' 2" (1 575 m), weight 73 9 kg (163 lb), not currently breastfeeding  General: alert, oriented times 3 and appears comfortable  HEENT: Benign, normocephalic, atraumatic  Cardiovascular:  Regular    Pulmonary: No wheezing or stridor  Abdomen: Soft, Nontender  Skin:  No lacerations or abrasions  Neurovascular:  Grossly intact except as indicated in the shoulder exam    MUSCULOSKELETAL EXAMINATION:  Extremities: The bilateral shoulder exam demonstrates generally good range of motion, somewhat restricted, but not inconsistent with her age  Active range of motion in abduction resulted in an obligatory forward flexion of the shoulders  She does complain of mild pain with range of motion, the greatest degree of pain noted with adduction/IR, especially of the left shoulder  Passive range of motion triggers no complaints  Drop-arm test is negative although she does demonstrate some slight weakness on the right compared to the contralateral left  She has weakness of resisted external rotation but good strength of resisted internal rotation of the shoulders  She has good strength of elbow flexion and extension  There is no significant crepitus appreciated    After positioning her shoulder in about 80-90 degrees of abduction, passively, active contraction triggers a slight proximal migration of the right humeral head more so than the left  There is no gross instability of the shoulders  _____________________________________________________  STUDIES REVIEWED:  X-rays of both shoulders were obtained today  These demonstrate minor, but no significant, degenerative changes  There is a minimal degree of inferior subluxation of the humeral head in relationship to the glenoid fossa          Angi Enter

## 2019-03-07 RX ORDER — LIDOCAINE HYDROCHLORIDE 10 MG/ML
4 INJECTION, SOLUTION INFILTRATION; PERINEURAL
Status: COMPLETED | OUTPATIENT
Start: 2019-03-06 | End: 2019-03-06

## 2019-03-07 RX ORDER — TRIAMCINOLONE ACETONIDE 40 MG/ML
40 INJECTION, SUSPENSION INTRA-ARTICULAR; INTRAMUSCULAR
Status: COMPLETED | OUTPATIENT
Start: 2019-03-06 | End: 2019-03-06

## 2019-03-11 DIAGNOSIS — I50.9 ACUTE CONGESTIVE HEART FAILURE, UNSPECIFIED HEART FAILURE TYPE (HCC): ICD-10-CM

## 2019-03-11 DIAGNOSIS — K21.9 GASTROESOPHAGEAL REFLUX DISEASE WITHOUT ESOPHAGITIS: ICD-10-CM

## 2019-03-11 RX ORDER — RANITIDINE 150 MG/1
150 TABLET ORAL 2 TIMES DAILY
Qty: 60 TABLET | Refills: 3 | Status: SHIPPED | OUTPATIENT
Start: 2019-03-11 | End: 2019-01-01 | Stop reason: SDUPTHER

## 2019-03-11 RX ORDER — FUROSEMIDE 20 MG/1
20 TABLET ORAL DAILY
Qty: 90 TABLET | Refills: 1 | Status: SHIPPED | OUTPATIENT
Start: 2019-03-11 | End: 2019-03-18 | Stop reason: SDUPTHER

## 2019-03-11 NOTE — TELEPHONE ENCOUNTER
Pt needs refill on lasix 20mg, ranitidine  150mg, pls send to [facility], [place]

## 2019-03-14 DIAGNOSIS — F41.1 GENERALIZED ANXIETY DISORDER: ICD-10-CM

## 2019-03-14 RX ORDER — LORAZEPAM 0.5 MG/1
0.5 TABLET ORAL EVERY 8 HOURS PRN
Qty: 90 TABLET | Refills: 0 | Status: SHIPPED | OUTPATIENT
Start: 2019-03-14 | End: 2019-05-01 | Stop reason: SDUPTHER

## 2019-03-18 ENCOUNTER — OFFICE VISIT (OUTPATIENT)
Dept: CARDIOLOGY CLINIC | Facility: CLINIC | Age: 84
End: 2019-03-18
Payer: MEDICARE

## 2019-03-18 VITALS
BODY MASS INDEX: 29.26 KG/M2 | SYSTOLIC BLOOD PRESSURE: 140 MMHG | WEIGHT: 159 LBS | HEIGHT: 62 IN | HEART RATE: 60 BPM | DIASTOLIC BLOOD PRESSURE: 68 MMHG

## 2019-03-18 DIAGNOSIS — Z98.61 CORONARY ANGIOPLASTY STATUS: ICD-10-CM

## 2019-03-18 DIAGNOSIS — I50.32 CHRONIC DIASTOLIC CONGESTIVE HEART FAILURE (HCC): ICD-10-CM

## 2019-03-18 DIAGNOSIS — R42 LIGHTHEADEDNESS: Primary | ICD-10-CM

## 2019-03-18 DIAGNOSIS — I50.9 ACUTE CONGESTIVE HEART FAILURE, UNSPECIFIED HEART FAILURE TYPE (HCC): ICD-10-CM

## 2019-03-18 PROCEDURE — 99214 OFFICE O/P EST MOD 30 MIN: CPT | Performed by: INTERNAL MEDICINE

## 2019-03-18 RX ORDER — FUROSEMIDE 20 MG/1
10 TABLET ORAL DAILY
Qty: 90 TABLET | Refills: 1
Start: 2019-03-18 | End: 2019-01-01 | Stop reason: SDUPTHER

## 2019-03-18 NOTE — PATIENT INSTRUCTIONS
Lightheadedness   WHAT YOU NEED TO KNOW:   Lightheadedness is the feeling that you may faint, but you do not  Your heartbeat may be fast or feel like it flutters  Lightheadedness may occur when you take certain medicines, such as medicine to lower your blood pressure  Dehydration, low sodium, low blood sugar, an abnormal heart rhythm, and anxiety are other common causes  DISCHARGE INSTRUCTIONS:   Return to the emergency department if:   · You have sudden chest pain  · You have trouble breathing or shortness of breath  · You have vision changes, are sweating, and have nausea while you are sitting or lying down  · You feel flushed and your heart is fluttering  · You faint  Contact your healthcare provider if:   · You feel lightheaded often  · Your heart beats faster or slower than usual      · You have questions or concerns about your condition or care  Follow up with your healthcare provider as directed: You may need more tests to help find the cause of your lightheadedness  The tests will help healthcare providers plan the best treatment for you  Write down your questions so you remember to ask them during your visits  Self-care:  Talk with your healthcare provider about these and other ways to manage your symptoms:  · Lie down  when you feel lightheaded, your throat gets tight, or your vision changes  Raise your legs above the level of your heart  · Stand up slowly  Sit on the side of the bed or couch for a few minutes before you stand up  · Take slow, deep breaths when you feel lightheaded  This can help decrease the feeling that you might faint  · Ask if you need to avoid hot baths and saunas  These may make your symptoms worse  Watch for signs of low blood sugar: These include hunger, nervousness, sweating, and fast or fluttery heartbeats  Talk with your healthcare provider about ways to keep your blood sugar level steady    Check your blood pressure often:  You should do this especially if you take medicine to lower your blood pressure  Check your blood pressure when you are lying down and when you are standing  Ask how often to check during the day  Keep a record of your blood pressure numbers  Your healthcare provider may use the record to help plan your treatment  Keep a record of your lightheadedness episodes:  Include your symptoms and your activity before and after the episode  The record can help your healthcare provider find the cause of your lightheadedness and help you manage episodes  © 2017 [address] [facility] Information is for End User's use only and may not be sold, redistributed or otherwise used for commercial purposes  All illustrations and images included in CareNotes® are the copyrighted property of A D A M , Inc  or [employer]  The above information is an  only  It is not intended as medical advice for individual conditions or treatments  Talk to your doctor, nurse or pharmacist before following any medical regimen to see if it is safe and effective for you

## 2019-03-18 NOTE — PROGRESS NOTES
Subjective:        Patient ID: Drew Ivey is a 80 y o  female  Chief Complaint:  Lenka Allan has no chest pain no alarming shortness of breath no edema no palpitations  She has morning lightheadedness while lying in bed  This is a rather chronic complaint  No presyncope or syncope  Her granddaughter thinks she is getting herself worked up and anxious and that is the cause  She tried meclizine for couple days but it made her symptoms worse  The following portions of the patient's history were reviewed and updated as appropriate: allergies, current medications, past family history, past medical history, past social history, past surgical history and problem list   Review of Systems   Constitution: Negative for chills, diaphoresis, malaise/fatigue and weight gain  HENT: Negative for nosebleeds and stridor  Eyes: Negative for double vision, vision loss in left eye, vision loss in right eye and visual disturbance  Cardiovascular: Negative for chest pain, claudication, cyanosis, dyspnea on exertion, irregular heartbeat, leg swelling, near-syncope, orthopnea, palpitations, paroxysmal nocturnal dyspnea and syncope  Respiratory: Negative for cough, shortness of breath, snoring and wheezing  Endocrine: Negative for polydipsia, polyphagia and polyuria  Hematologic/Lymphatic: Negative for bleeding problem  Does not bruise/bleed easily  Skin: Negative for flushing and rash  Musculoskeletal: Negative for falls and myalgias  Gastrointestinal: Negative for abdominal pain, heartburn, hematemesis, hematochezia, melena and nausea  Genitourinary: Negative for hematuria  Neurological: Positive for light-headedness  Negative for brief paralysis, dizziness, focal weakness, headaches, loss of balance and vertigo  Psychiatric/Behavioral: Negative for altered mental status and substance abuse  Allergic/Immunologic: Negative for hives            Objective:      /68   Pulse 60   Ht 5' 2" (1 575 m) Wt 72 1 kg (159 lb)   BMI 29 08 kg/m²   Physical Exam   Constitutional: She is oriented to person, place, and time  She appears well-developed and well-nourished  HENT:   Head: Normocephalic and atraumatic  Eyes: Pupils are equal, round, and reactive to light  EOM are normal    Neck: Normal range of motion  Neck supple  No JVD present  No thyromegaly present  Cardiovascular: Normal rate, regular rhythm, normal heart sounds and intact distal pulses  Exam reveals no gallop and no friction rub  No murmur heard  Pulmonary/Chest: Effort normal and breath sounds normal  No stridor  No respiratory distress  She has no wheezes  She has no rales  Abdominal: Soft  Bowel sounds are normal  She exhibits no mass  There is no tenderness  Musculoskeletal: Normal range of motion  She exhibits no edema  Lymphadenopathy:     She has no cervical adenopathy  Neurological: She is alert and oriented to person, place, and time  Skin: Skin is warm and dry  No rash noted  No pallor  Psychiatric: She has a normal mood and affect  Her behavior is normal  Judgment and thought content normal    Nursing note and vitals reviewed        Lab Review:   Appointment on 02/09/2019   Component Date Value    Sodium 02/09/2019 140     Potassium 02/09/2019 4 0     Chloride 02/09/2019 103     CO2 02/09/2019 33*    ANION GAP 02/09/2019 4     BUN 02/09/2019 13     Creatinine 02/09/2019 0 89     Glucose, Fasting 02/09/2019 126*    Calcium 02/09/2019 8 9     AST 02/09/2019 18     ALT 02/09/2019 19     Alkaline Phosphatase 02/09/2019 55     Total Protein 02/09/2019 7 6     Albumin 02/09/2019 3 5     Total Bilirubin 02/09/2019 0 88     eGFR 02/09/2019 56     WBC 02/09/2019 6 19     RBC 02/09/2019 4 92     Hemoglobin 02/09/2019 14 5     Hematocrit 02/09/2019 47 2*    MCV 02/09/2019 96     MCH 02/09/2019 29 5     MCHC 02/09/2019 30 7*    RDW 02/09/2019 13 2     MPV 02/09/2019 10 4     Platelets 60/65/1496 295     nRBC 02/09/2019 0     Neutrophils Relative 02/09/2019 53     Immat GRANS % 02/09/2019 0     Lymphocytes Relative 02/09/2019 37     Monocytes Relative 02/09/2019 7     Eosinophils Relative 02/09/2019 2     Basophils Relative 02/09/2019 1     Neutrophils Absolute 02/09/2019 3 27     Immature Grans Absolute 02/09/2019 0 01     Lymphocytes Absolute 02/09/2019 2 31     Monocytes Absolute 02/09/2019 0 45     Eosinophils Absolute 02/09/2019 0 10     Basophils Absolute 02/09/2019 0 05     Hemoglobin A1C 02/09/2019 5 6     EAG 02/09/2019 114     Cholesterol 02/09/2019 116     Triglycerides 02/09/2019 78     HDL, Direct 02/09/2019 63*    LDL Calculated 02/09/2019 37     TSH 3RD GENERATON 02/09/2019 0 853      Xr Shoulder 2+ Vw Left    Result Date: 3/11/2019  Narrative: RIGHT SHOULDER LEFT SHOULDER INDICATION:   M25 511: Pain in right shoulder G89 29: Other chronic pain  COMPARISON:  None VIEWS:  XR SHOULDER 2+ VW RIGHT, XR SHOULDER 2+ VW LEFT FINDINGS: There is no acute fracture or dislocation  Mild glenohumeral degenerative changes, bilaterally  Minimal acromioclavicular degenerative changes  No lytic or blastic lesions are seen  Soft tissues are unremarkable  Visualized portions of the lung apices are clear  Impression: Mild degenerative changes symmetrically at both shoulders  No acute osseous findings at either shoulder  Workstation performed: [id number]     Xr Shoulder 2+ Vw Right    Result Date: 3/11/2019  Narrative: RIGHT SHOULDER LEFT SHOULDER INDICATION:   M25 511: Pain in right shoulder G89 29: Other chronic pain  COMPARISON:  None VIEWS:  XR SHOULDER 2+ VW RIGHT, XR SHOULDER 2+ VW LEFT FINDINGS: There is no acute fracture or dislocation  Mild glenohumeral degenerative changes, bilaterally  Minimal acromioclavicular degenerative changes  No lytic or blastic lesions are seen  Soft tissues are unremarkable  Visualized portions of the lung apices are clear       Impression: Mild degenerative changes symmetrically at both shoulders  No acute osseous findings at either shoulder  Workstation performed: [id number]         Assessment:       1  Lightheadedness     2  Acute congestive heart failure, unspecified heart failure type (HCC)  furosemide (LASIX) 20 mg tablet   3  Coronary angioplasty status     4  Chronic diastolic congestive heart failure (HCC)          Plan:       I cannot delete acute congestive heart failure on the impression list above, epic will let me  She does not have acute heart failure  She has compensated chronic CHF and currently her volume status is ideal   I decreased her Lasix to 10 mg daily, told her she needs go back to 20 mg day if she gets more dyspneic or has progressive lower extremity edema  Maybe this is contributory lightheadedness but I doubt it  I do not think this is cardiac at all  I told I do not think low-dose metoprolol is causing any problems, asked her to continue 12 5 b i d  She asked if she should take this once a day, advised her not to  She is without clear signs of CHF, angina nor electrical instability/dysrhythmia  Conservative supportive care advised, I ordered no surveillance testing, I will see her back in 3-4 months  Told to call if her lightheadedness becomes more severe

## 2019-03-20 ENCOUNTER — OFFICE VISIT (OUTPATIENT)
Dept: OBGYN CLINIC | Facility: CLINIC | Age: 84
End: 2019-03-20
Payer: MEDICARE

## 2019-03-20 VITALS
SYSTOLIC BLOOD PRESSURE: 148 MMHG | BODY MASS INDEX: 28.89 KG/M2 | HEART RATE: 66 BPM | DIASTOLIC BLOOD PRESSURE: 78 MMHG | HEIGHT: 62 IN | WEIGHT: 157 LBS | RESPIRATION RATE: 16 BRPM

## 2019-03-20 DIAGNOSIS — G89.29 CHRONIC LEFT SHOULDER PAIN: Primary | ICD-10-CM

## 2019-03-20 DIAGNOSIS — G89.29 CHRONIC RIGHT SHOULDER PAIN: ICD-10-CM

## 2019-03-20 DIAGNOSIS — M25.512 CHRONIC LEFT SHOULDER PAIN: Primary | ICD-10-CM

## 2019-03-20 DIAGNOSIS — M25.511 CHRONIC RIGHT SHOULDER PAIN: ICD-10-CM

## 2019-03-20 PROCEDURE — 99213 OFFICE O/P EST LOW 20 MIN: CPT | Performed by: ORTHOPAEDIC SURGERY

## 2019-03-20 NOTE — PROGRESS NOTES
ASSESSMENT/PLAN:    Diagnoses and all orders for this visit:    Chronic left shoulder pain    Chronic right shoulder pain        General Discussions:  I would recommend follow-up as needed  Home exercises were reviewed  I briefly discussed the option of physical therapy  However, she would prefer to work on home exercises and does not want the inconvenience of trying to attend physical therapy and the inconvenience of would place on her family to transport her  I have encouraged her to contact me if symptoms do not seem to improve any further or if they worsen and we can discuss further treatment options, once again     _____________________________________________________  CHIEF COMPLAINT:  Chief Complaint   Patient presents with    Left Shoulder - Pain, Follow-up    Right Shoulder - Pain, Follow-up         SUBJECTIVE:  Debbie Das is a 80y o  year old female who presents for follow up of her bilateral shoulder pain  Since last visit, Debbie Das noted moderate relief after injections of both shoulders  She sees a greater degree of mobility, lesser pain, symptoms more improved on the left  She has found it easier to sleep at night  PAST MEDICAL HISTORY:  Past Medical History:   Diagnosis Date    Abnormal blood sugar 03/13/2017    Abnormal carotid duplex scan     Carotid Duplex (Plaque formation is quite prominent bilaterally  Despite these changes, no evidence for greater than 50% diameter reducing lesions in the cervical portion of either carotid artery hemisystem ) - 6/13/2017    Anxiety     Cervical radiculopathy 08/08/2017    [facility]     diastolic    COPD (chronic obstructive pulmonary disease) ([facility] ) 2/6/2018    Coronary angioplasty status     Coronary artery disease 4/7/2017    Costochondritis 02/05/2013    suspect MS in origin given relationship to ROM and location  Start mobic and if persists, reeval  Refused xrayat this time        Dyslipidemia  GERD without esophagitis 8/30/2012    H/O CT scan of chest      (No PE, minimal interstitial change left lower lobe and right upper lobe, which may be acute ) - 5/19/2017    History of Holter monitoring     Holter (Sinus rhythm with rates ranging from 52 to 118 bpm   No afib or heart block  Rare atrial and ventricular ectopic beats  One six-beat atrial run noted  No pauses  ) - 5/31/2017    Hx of echocardiogram     Echo (EF 0 60 (60%), Biatrial enlargement ) - 3/24/2017 Echo (EF 0 55 (55%), mild LVH  Aortic valvular sclerosis  Trace MI with mild left atrial dilatation, mild MAC  Mild TI with mild pulmonary hypertension  ) - 5/22/2017 Echo (EF 0 60 (60%), Normal left vent chamber size and systolic function  Mild diastolic dysfunction of LV w/normal filling pressures  Mild mitral regurg ) - 7/26/2017 Echo (EF 0    Hx of echocardiogram 08/16/2017    EF0 60 (60%) Normal left vent chamber size and systolic function of LV w/normal filling presures  Mild mitral regurg  / EF0 50 (50%) Mild LVH  MIld MR 10/6/17     Hypertension     Iron deficiency anemia 4/21/2016    Macular degeneration, right eye     Malignant melanoma of skin ([facility] [address] ) 9/17/2012    Mixed hyperlipidemia     NSTEMI (non-ST elevated myocardial infarction) ([facility] [address] ) 7/25/2017    Old MI (myocardial infarction)     Osteoarthritis 9/17/2012    Osteoporosis 3/13/2017    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[facility] [address] ) 04/07/2017    Urinary tract infection     frequent UTI's       PAST SURGICAL HISTORY:  Past Surgical History:   Procedure Laterality Date    ABDOMINAL SURGERY      BREAST SURGERY      Lumpectomy    CARDIAC CATHETERIZATION  03/24/2017    ARNIE to 100% occluded prox RCA, chronic 99% ostial LCfx, 60% mid LAD, discrete 40% distal LM / EF0 60 (60%) 100% occluded proximal RCA s/p ARNIE, chronic 99% ostial LCX, 60% mid LAD, discrete 40% dital LM  4/5/17    CHOLECYSTECTOMY      COLONOSCOPY N/A 7/25/2018    Procedure: COLONOSCOPY;  Surgeon:  Bogdan Fitch MD;  Location: [facility] MAIN OR;  Service: Gastroenterology    CORONARY STENT PLACEMENT  03/25/2017    ARNIE RCA    HEMORRHOID SURGERY      INSERTION STENT ARTERY  04/07/2017    Cardio vascular agents drug-eluting stent    SKIN BIOPSY      TONSILLECTOMY         FAMILY HISTORY:  Family History   Problem Relation Age of Onset    Heart failure Mother     Prostate cancer Father     Stroke Family        SOCIAL HISTORY:  Social History     Tobacco Use    Smoking status: Former Smoker     Types: Cigarettes    Smokeless tobacco: Never Used   Substance Use Topics    Alcohol use: Yes     Comment: 2 beers/monthly    Drug use: No       MEDICATIONS:    Current Outpatient Medications:     aspirin 81 mg chewable tablet, Chew 1 tablet (81 mg total) daily, Disp: 30 tablet, Rfl: 0    atorvastatin (LIPITOR) 40 mg tablet, Take 1 tablet (40 mg total) by mouth daily, Disp: 90 tablet, Rfl: 3    Calcium Carbonate-Vitamin D 600-125 MG-UNIT TABS, Take by mouth daily  , Disp: , Rfl:     ferrous sulfate 325 (65 Fe) mg tablet, Take 0 5 tablets by mouth daily  , Disp: , Rfl:     furosemide (LASIX) 20 mg tablet, Take 0 5 tablets (10 mg total) by mouth daily, Disp: 90 tablet, Rfl: 1    LORazepam (ATIVAN) 0 5 mg tablet, Take 1 tablet (0 5 mg total) by mouth every 8 (eight) hours as needed for anxiety, Disp: 90 tablet, Rfl: 0    meloxicam (MOBIC) 15 mg tablet, Take 1 tablet (15 mg total) by mouth daily, Disp: 30 tablet, Rfl: 0    metoprolol tartrate (LOPRESSOR) 25 mg tablet, 25 mg Take 1/2 tablet BID, Disp: , Rfl:     MULTIPLE VITAMINS-CALCIUM PO, Take by mouth daily, Disp: , Rfl:     polyethylene glycol (GLYCOLAX) powder, Take 17 g by mouth 2 (two) times a day as needed (prn for constipation), Disp: 850 g, Rfl: 0    Potassium 99 MG TABS, Take by mouth daily, Disp: , Rfl:     psyllium (METAMUCIL) packet, Take 1 packet by mouth daily, Disp: 30 each, Rfl: 0    ranitidine (ZANTAC) 150 mg tablet, Take 1 tablet (150 mg total) by mouth 2 (two) times a day, Disp: 60 tablet, Rfl: 3    ALLERGIES:  Allergies   Allergen Reactions    Ciprofloxacin      Reaction Date: 01Jul2011;     Keflex [Cephalexin] Dizziness    Nitrofurantoin      Reaction Date: 01Jul2011;     Penicillins Rash, Other (See Comments) and Throat Swelling     Throat swells       REVIEW OF SYSTEMS:  Musculoskeletal:negative, Positive as in the history of chief complaint  Neurological: negative      _____________________________________________________  PHYSICAL EXAMINATION:  General: well developed and well nourished, alert and oriented times 3  Cardiovascular:  Regular  Pulmonary: No wheezing or stridor  Skin:  No lacerations abrasions  Neurovascular:  Intact grossly    MUSCULOSKELETAL EXAMINATION:  The bilateral shoulder exam demonstrates abduction to about 100 and 40° on the left and 160° on the right, mild pain during abduction  Forward flexion is limited to about 140°, bilaterally, without pain  Eighty duction is limited to the upper thoracic spine  She has no tenderness about the shoulders  She demonstrated generally good strength, slightly weakened but consistent with her age    Distal motor and sensory exams are grossly intact     _____________________________________________________  STUDIES REVIEWED:  No Studies to review          Ronda John

## 2019-04-07 DIAGNOSIS — I10 ESSENTIAL HYPERTENSION: Primary | ICD-10-CM

## 2019-04-12 DIAGNOSIS — E78.5 HYPERLIPIDEMIA, UNSPECIFIED HYPERLIPIDEMIA TYPE: ICD-10-CM

## 2019-04-12 RX ORDER — ATORVASTATIN CALCIUM 40 MG/1
40 TABLET, FILM COATED ORAL DAILY
Qty: 90 TABLET | Refills: 3 | Status: ON HOLD | OUTPATIENT
Start: 2019-04-12 | End: 2019-01-01 | Stop reason: CLARIF

## 2019-05-01 DIAGNOSIS — F41.1 GENERALIZED ANXIETY DISORDER: ICD-10-CM

## 2019-05-01 RX ORDER — LORAZEPAM 0.5 MG/1
0.5 TABLET ORAL EVERY 8 HOURS PRN
Qty: 90 TABLET | Refills: 0 | Status: SHIPPED | OUTPATIENT
Start: 2019-05-01 | End: 2019-01-01 | Stop reason: SDUPTHER

## 2019-05-15 ENCOUNTER — TRANSCRIBE ORDERS (OUTPATIENT)
Dept: URGENT CARE | Facility: CLINIC | Age: 84
End: 2019-05-15

## 2019-05-15 ENCOUNTER — OFFICE VISIT (OUTPATIENT)
Dept: INTERNAL MEDICINE CLINIC | Facility: CLINIC | Age: 84
End: 2019-05-15
Payer: MEDICARE

## 2019-05-15 ENCOUNTER — APPOINTMENT (OUTPATIENT)
Dept: RADIOLOGY | Facility: CLINIC | Age: 84
End: 2019-05-15
Payer: MEDICARE

## 2019-05-15 VITALS
HEART RATE: 76 BPM | SYSTOLIC BLOOD PRESSURE: 146 MMHG | RESPIRATION RATE: 18 BRPM | BODY MASS INDEX: 30.18 KG/M2 | DIASTOLIC BLOOD PRESSURE: 68 MMHG | HEIGHT: 62 IN | WEIGHT: 164 LBS | TEMPERATURE: 97.9 F

## 2019-05-15 DIAGNOSIS — J44.1 CHRONIC OBSTRUCTIVE PULMONARY DISEASE WITH ACUTE EXACERBATION (HCC): ICD-10-CM

## 2019-05-15 DIAGNOSIS — J01.10 ACUTE NON-RECURRENT FRONTAL SINUSITIS: Primary | ICD-10-CM

## 2019-05-15 DIAGNOSIS — J01.10 ACUTE NON-RECURRENT FRONTAL SINUSITIS: ICD-10-CM

## 2019-05-15 PROCEDURE — 71046 X-RAY EXAM CHEST 2 VIEWS: CPT

## 2019-05-15 PROCEDURE — 94640 AIRWAY INHALATION TREATMENT: CPT | Performed by: INTERNAL MEDICINE

## 2019-05-15 PROCEDURE — 96372 THER/PROPH/DIAG INJ SC/IM: CPT | Performed by: INTERNAL MEDICINE

## 2019-05-15 PROCEDURE — 99213 OFFICE O/P EST LOW 20 MIN: CPT | Performed by: INTERNAL MEDICINE

## 2019-05-15 RX ORDER — DEXAMETHASONE SODIUM PHOSPHATE 4 MG/ML
4 INJECTION, SOLUTION INTRA-ARTICULAR; INTRALESIONAL; INTRAMUSCULAR; INTRAVENOUS; SOFT TISSUE ONCE
Status: COMPLETED | OUTPATIENT
Start: 2019-05-15 | End: 2019-05-15

## 2019-05-15 RX ORDER — IPRATROPIUM BROMIDE AND ALBUTEROL SULFATE 2.5; .5 MG/3ML; MG/3ML
3 SOLUTION RESPIRATORY (INHALATION) ONCE
Status: COMPLETED | OUTPATIENT
Start: 2019-05-15 | End: 2019-05-15

## 2019-05-15 RX ORDER — GUAIFENESIN AND CODEINE PHOSPHATE 100; 10 MG/5ML; MG/5ML
5 SOLUTION ORAL 3 TIMES DAILY PRN
Qty: 120 ML | Refills: 0 | Status: ON HOLD | OUTPATIENT
Start: 2019-05-15 | End: 2019-05-21 | Stop reason: CLARIF

## 2019-05-15 RX ORDER — GUAIFENESIN 600 MG
600 TABLET, EXTENDED RELEASE 12 HR ORAL EVERY 12 HOURS SCHEDULED
Qty: 20 TABLET | Refills: 0 | Status: ON HOLD | OUTPATIENT
Start: 2019-05-15 | End: 2019-05-21 | Stop reason: CLARIF

## 2019-05-15 RX ORDER — GUAIFENESIN 600 MG
600 TABLET, EXTENDED RELEASE 12 HR ORAL EVERY 12 HOURS SCHEDULED
Qty: 20 TABLET | Refills: 0 | Status: SHIPPED | OUTPATIENT
Start: 2019-05-15 | End: 2019-05-15 | Stop reason: SDUPTHER

## 2019-05-15 RX ORDER — AZITHROMYCIN 250 MG/1
TABLET, FILM COATED ORAL
Qty: 6 TABLET | Refills: 0 | Status: SHIPPED | OUTPATIENT
Start: 2019-05-15 | End: 2019-05-23 | Stop reason: HOSPADM

## 2019-05-15 RX ORDER — AZITHROMYCIN 250 MG/1
TABLET, FILM COATED ORAL
Qty: 6 TABLET | Refills: 0 | Status: SHIPPED | OUTPATIENT
Start: 2019-05-15 | End: 2019-05-15 | Stop reason: SDUPTHER

## 2019-05-15 RX ADMIN — DEXAMETHASONE SODIUM PHOSPHATE 4 MG: 4 INJECTION, SOLUTION INTRA-ARTICULAR; INTRALESIONAL; INTRAMUSCULAR; INTRAVENOUS; SOFT TISSUE at 16:04

## 2019-05-15 RX ADMIN — IPRATROPIUM BROMIDE AND ALBUTEROL SULFATE 3 ML: 2.5; .5 SOLUTION RESPIRATORY (INHALATION) at 16:05

## 2019-05-16 DIAGNOSIS — J18.9 PNEUMONIA DUE TO INFECTIOUS ORGANISM, UNSPECIFIED LATERALITY, UNSPECIFIED PART OF LUNG: Primary | ICD-10-CM

## 2019-05-21 ENCOUNTER — APPOINTMENT (EMERGENCY)
Dept: RADIOLOGY | Facility: HOSPITAL | Age: 84
DRG: 291 | End: 2019-05-21
Payer: MEDICARE

## 2019-05-21 ENCOUNTER — HOSPITAL ENCOUNTER (INPATIENT)
Facility: HOSPITAL | Age: 84
LOS: 2 days | Discharge: HOME | DRG: 291 | End: 2019-05-23
Attending: EMERGENCY MEDICINE | Admitting: INTERNAL MEDICINE
Payer: MEDICARE

## 2019-05-21 DIAGNOSIS — J98.01 BRONCHOSPASM: ICD-10-CM

## 2019-05-21 DIAGNOSIS — I50.9 ACUTE ON CHRONIC CONGESTIVE HEART FAILURE, UNSPECIFIED HEART FAILURE TYPE (HCC): Primary | ICD-10-CM

## 2019-05-21 PROBLEM — J44.9 COPD (CHRONIC OBSTRUCTIVE PULMONARY DISEASE) (HCC): Chronic | Status: ACTIVE | Noted: 2018-02-06

## 2019-05-21 PROBLEM — E78.2 HYPERLIPIDEMIA, MIXED: Chronic | Status: ACTIVE | Noted: 2018-02-06

## 2019-05-21 PROBLEM — I25.10 CORONARY ARTERY DISEASE: Chronic | Status: ACTIVE | Noted: 2017-04-07

## 2019-05-21 PROBLEM — I50.23 ACUTE ON CHRONIC SYSTOLIC CONGESTIVE HEART FAILURE (HCC): Status: ACTIVE | Noted: 2018-11-19

## 2019-05-21 LAB
ALBUMIN SERPL BCP-MCNC: 3.6 G/DL (ref 3.5–5.7)
ALP SERPL-CCNC: 47 U/L (ref 55–165)
ALT SERPL W P-5'-P-CCNC: 18 U/L (ref 7–52)
ANION GAP SERPL CALCULATED.3IONS-SCNC: 6 MMOL/L (ref 4–13)
APTT PPP: 34 SECONDS (ref 26–38)
AST SERPL W P-5'-P-CCNC: 15 U/L (ref 13–39)
ATRIAL RATE: 92 BPM
BASOPHILS # BLD AUTO: 0.1 THOUSANDS/ΜL (ref 0–0.1)
BASOPHILS NFR BLD AUTO: 1 % (ref 0–2)
BILIRUB SERPL-MCNC: 0.7 MG/DL (ref 0.2–1)
BNP SERPL-MCNC: 1029 PG/ML (ref 1–100)
BUN SERPL-MCNC: 11 MG/DL (ref 7–25)
CALCIUM SERPL-MCNC: 9.6 MG/DL (ref 8.6–10.5)
CHLORIDE SERPL-SCNC: 97 MMOL/L (ref 98–107)
CO2 SERPL-SCNC: 35 MMOL/L (ref 21–31)
CREAT SERPL-MCNC: 0.87 MG/DL (ref 0.6–1.2)
EOSINOPHIL # BLD AUTO: 0.2 THOUSAND/ΜL (ref 0–0.61)
EOSINOPHIL NFR BLD AUTO: 2 % (ref 0–5)
ERYTHROCYTE [DISTWIDTH] IN BLOOD BY AUTOMATED COUNT: 14.5 % (ref 11.5–14.5)
GFR SERPL CREATININE-BSD FRML MDRD: 57 ML/MIN/1.73SQ M
GLUCOSE SERPL-MCNC: 140 MG/DL (ref 65–99)
HCT VFR BLD AUTO: 43.1 % (ref 42–47)
HGB BLD-MCNC: 14.3 G/DL (ref 12–16)
INR PPP: 1.11 (ref 0.9–1.5)
LACTATE SERPL-SCNC: 1.4 MMOL/L (ref 0.5–2)
LYMPHOCYTES # BLD AUTO: 2.5 THOUSANDS/ΜL (ref 0.6–4.47)
LYMPHOCYTES NFR BLD AUTO: 27 % (ref 21–51)
MCH RBC QN AUTO: 29.7 PG (ref 26–34)
MCHC RBC AUTO-ENTMCNC: 33.2 G/DL (ref 31–37)
MCV RBC AUTO: 90 FL (ref 81–99)
MONOCYTES # BLD AUTO: 0.7 THOUSAND/ΜL (ref 0.17–1.22)
MONOCYTES NFR BLD AUTO: 8 % (ref 2–12)
NEUTROPHILS # BLD AUTO: 5.8 THOUSANDS/ΜL (ref 1.4–6.5)
NEUTS SEG NFR BLD AUTO: 63 % (ref 42–75)
P AXIS: 32 DEGREES
PLATELET # BLD AUTO: 409 THOUSANDS/UL (ref 149–390)
PMV BLD AUTO: 7.7 FL (ref 8.6–11.7)
POTASSIUM SERPL-SCNC: 3.9 MMOL/L (ref 3.5–5.5)
PR INTERVAL: 146 MS
PROT SERPL-MCNC: 7.2 G/DL (ref 6.4–8.9)
PROTHROMBIN TIME: 12.9 SECONDS (ref 10.2–13)
QRS AXIS: 42 DEGREES
QRSD INTERVAL: 90 MS
QT INTERVAL: 356 MS
QTC INTERVAL: 440 MS
RBC # BLD AUTO: 4.81 MILLION/UL (ref 3.9–5.2)
SODIUM SERPL-SCNC: 138 MMOL/L (ref 134–143)
T WAVE AXIS: -1 DEGREES
TROPONIN I SERPL-MCNC: 0.06 NG/ML
TROPONIN I SERPL-MCNC: 0.07 NG/ML
TROPONIN I SERPL-MCNC: 0.07 NG/ML
VENTRICULAR RATE: 92 BPM
WBC # BLD AUTO: 9.2 THOUSAND/UL (ref 4.8–10.8)

## 2019-05-21 PROCEDURE — 71045 X-RAY EXAM CHEST 1 VIEW: CPT

## 2019-05-21 PROCEDURE — 87040 BLOOD CULTURE FOR BACTERIA: CPT | Performed by: EMERGENCY MEDICINE

## 2019-05-21 PROCEDURE — 80053 COMPREHEN METABOLIC PANEL: CPT | Performed by: EMERGENCY MEDICINE

## 2019-05-21 PROCEDURE — 85025 COMPLETE CBC W/AUTO DIFF WBC: CPT | Performed by: EMERGENCY MEDICINE

## 2019-05-21 PROCEDURE — 84484 ASSAY OF TROPONIN QUANT: CPT | Performed by: PHYSICIAN ASSISTANT

## 2019-05-21 PROCEDURE — 94640 AIRWAY INHALATION TREATMENT: CPT

## 2019-05-21 PROCEDURE — 94760 N-INVAS EAR/PLS OXIMETRY 1: CPT

## 2019-05-21 PROCEDURE — 85730 THROMBOPLASTIN TIME PARTIAL: CPT | Performed by: EMERGENCY MEDICINE

## 2019-05-21 PROCEDURE — 1124F ACP DISCUSS-NO DSCNMKR DOCD: CPT | Performed by: INTERNAL MEDICINE

## 2019-05-21 PROCEDURE — 85610 PROTHROMBIN TIME: CPT | Performed by: EMERGENCY MEDICINE

## 2019-05-21 PROCEDURE — 99285 EMERGENCY DEPT VISIT HI MDM: CPT

## 2019-05-21 PROCEDURE — 83880 ASSAY OF NATRIURETIC PEPTIDE: CPT | Performed by: EMERGENCY MEDICINE

## 2019-05-21 PROCEDURE — 94664 DEMO&/EVAL PT USE INHALER: CPT

## 2019-05-21 PROCEDURE — 99223 1ST HOSP IP/OBS HIGH 75: CPT | Performed by: PHYSICIAN ASSISTANT

## 2019-05-21 PROCEDURE — 83605 ASSAY OF LACTIC ACID: CPT | Performed by: EMERGENCY MEDICINE

## 2019-05-21 PROCEDURE — 93010 ELECTROCARDIOGRAM REPORT: CPT | Performed by: INTERNAL MEDICINE

## 2019-05-21 PROCEDURE — 96375 TX/PRO/DX INJ NEW DRUG ADDON: CPT

## 2019-05-21 PROCEDURE — 96374 THER/PROPH/DIAG INJ IV PUSH: CPT

## 2019-05-21 PROCEDURE — 36415 COLL VENOUS BLD VENIPUNCTURE: CPT | Performed by: EMERGENCY MEDICINE

## 2019-05-21 PROCEDURE — 84484 ASSAY OF TROPONIN QUANT: CPT | Performed by: EMERGENCY MEDICINE

## 2019-05-21 PROCEDURE — 93005 ELECTROCARDIOGRAM TRACING: CPT

## 2019-05-21 RX ORDER — FAMOTIDINE 20 MG/1
20 TABLET, FILM COATED ORAL 2 TIMES DAILY
Status: DISCONTINUED | OUTPATIENT
Start: 2019-05-21 | End: 2019-05-23 | Stop reason: HOSPADM

## 2019-05-21 RX ORDER — ATORVASTATIN CALCIUM 40 MG/1
40 TABLET, FILM COATED ORAL DAILY
Status: DISCONTINUED | OUTPATIENT
Start: 2019-05-21 | End: 2019-05-23 | Stop reason: HOSPADM

## 2019-05-21 RX ORDER — FUROSEMIDE 10 MG/ML
40 INJECTION INTRAMUSCULAR; INTRAVENOUS ONCE
Status: COMPLETED | OUTPATIENT
Start: 2019-05-21 | End: 2019-05-21

## 2019-05-21 RX ORDER — ASPIRIN 81 MG/1
81 TABLET, CHEWABLE ORAL DAILY
Status: DISCONTINUED | OUTPATIENT
Start: 2019-05-21 | End: 2019-05-23 | Stop reason: HOSPADM

## 2019-05-21 RX ORDER — B-COMPLEX WITH VITAMIN C
1 TABLET ORAL
Status: DISCONTINUED | OUTPATIENT
Start: 2019-05-22 | End: 2019-05-23 | Stop reason: HOSPADM

## 2019-05-21 RX ORDER — ALBUTEROL SULFATE 2.5 MG/3ML
2.5 SOLUTION RESPIRATORY (INHALATION) EVERY 4 HOURS PRN
Status: DISCONTINUED | OUTPATIENT
Start: 2019-05-21 | End: 2019-05-22

## 2019-05-21 RX ORDER — FERROUS SULFATE 325(65) MG
162.5 TABLET ORAL DAILY
Status: DISCONTINUED | OUTPATIENT
Start: 2019-05-21 | End: 2019-05-23 | Stop reason: HOSPADM

## 2019-05-21 RX ORDER — FUROSEMIDE 10 MG/ML
40 INJECTION INTRAMUSCULAR; INTRAVENOUS 2 TIMES DAILY
Status: DISCONTINUED | OUTPATIENT
Start: 2019-05-21 | End: 2019-05-22

## 2019-05-21 RX ORDER — LORAZEPAM 0.5 MG/1
0.5 TABLET ORAL EVERY 8 HOURS PRN
Status: DISCONTINUED | OUTPATIENT
Start: 2019-05-21 | End: 2019-05-23 | Stop reason: HOSPADM

## 2019-05-21 RX ORDER — METHYLPREDNISOLONE SODIUM SUCCINATE 125 MG/2ML
125 INJECTION, POWDER, LYOPHILIZED, FOR SOLUTION INTRAMUSCULAR; INTRAVENOUS ONCE
Status: COMPLETED | OUTPATIENT
Start: 2019-05-21 | End: 2019-05-21

## 2019-05-21 RX ORDER — POTASSIUM CHLORIDE 750 MG/1
10 TABLET, EXTENDED RELEASE ORAL 2 TIMES DAILY
Status: DISCONTINUED | OUTPATIENT
Start: 2019-05-21 | End: 2019-05-23 | Stop reason: HOSPADM

## 2019-05-21 RX ORDER — ACETAMINOPHEN 325 MG/1
650 TABLET ORAL EVERY 6 HOURS PRN
Status: DISCONTINUED | OUTPATIENT
Start: 2019-05-21 | End: 2019-05-23 | Stop reason: HOSPADM

## 2019-05-21 RX ORDER — IPRATROPIUM BROMIDE AND ALBUTEROL SULFATE 2.5; .5 MG/3ML; MG/3ML
3 SOLUTION RESPIRATORY (INHALATION) ONCE
Status: COMPLETED | OUTPATIENT
Start: 2019-05-21 | End: 2019-05-21

## 2019-05-21 RX ADMIN — METHYLPREDNISOLONE SODIUM SUCCINATE 125 MG: 125 INJECTION, POWDER, FOR SOLUTION INTRAMUSCULAR; INTRAVENOUS at 08:08

## 2019-05-21 RX ADMIN — FAMOTIDINE 20 MG: 20 TABLET ORAL at 17:09

## 2019-05-21 RX ADMIN — FERROUS SULFATE TAB 325 MG (65 MG ELEMENTAL FE) 162.5 MG: 325 (65 FE) TAB at 12:33

## 2019-05-21 RX ADMIN — FUROSEMIDE 40 MG: 10 INJECTION, SOLUTION INTRAMUSCULAR; INTRAVENOUS at 17:09

## 2019-05-21 RX ADMIN — POTASSIUM CHLORIDE 10 MEQ: 750 TABLET, EXTENDED RELEASE ORAL at 17:09

## 2019-05-21 RX ADMIN — ALBUTEROL SULFATE 2.5 MG: 2.5 SOLUTION RESPIRATORY (INHALATION) at 17:34

## 2019-05-21 RX ADMIN — IPRATROPIUM BROMIDE AND ALBUTEROL SULFATE 3 ML: 2.5; .5 SOLUTION RESPIRATORY (INHALATION) at 08:18

## 2019-05-21 RX ADMIN — FAMOTIDINE 20 MG: 20 TABLET ORAL at 12:33

## 2019-05-21 RX ADMIN — METOPROLOL TARTRATE 25 MG: 25 TABLET ORAL at 12:33

## 2019-05-21 RX ADMIN — LORAZEPAM 0.5 MG: 0.5 TABLET ORAL at 21:27

## 2019-05-21 RX ADMIN — POTASSIUM CHLORIDE 10 MEQ: 750 TABLET, EXTENDED RELEASE ORAL at 12:33

## 2019-05-21 RX ADMIN — LORAZEPAM 0.5 MG: 0.5 TABLET ORAL at 14:28

## 2019-05-21 RX ADMIN — ENOXAPARIN SODIUM 40 MG: 40 INJECTION SUBCUTANEOUS at 12:33

## 2019-05-21 RX ADMIN — ASPIRIN 81 MG 81 MG: 81 TABLET ORAL at 12:33

## 2019-05-21 RX ADMIN — METOPROLOL TARTRATE 25 MG: 25 TABLET ORAL at 21:15

## 2019-05-21 RX ADMIN — FUROSEMIDE 40 MG: 10 INJECTION, SOLUTION INTRAMUSCULAR; INTRAVENOUS at 08:52

## 2019-05-21 RX ADMIN — ATORVASTATIN CALCIUM 40 MG: 40 TABLET, FILM COATED ORAL at 12:33

## 2019-05-22 ENCOUNTER — TELEPHONE (OUTPATIENT)
Dept: INTERNAL MEDICINE CLINIC | Facility: CLINIC | Age: 84
End: 2019-05-22

## 2019-05-22 ENCOUNTER — APPOINTMENT (INPATIENT)
Dept: NON INVASIVE DIAGNOSTICS | Facility: HOSPITAL | Age: 84
DRG: 291 | End: 2019-05-22
Payer: MEDICARE

## 2019-05-22 LAB
ANION GAP SERPL CALCULATED.3IONS-SCNC: 4 MMOL/L (ref 4–13)
BUN SERPL-MCNC: 15 MG/DL (ref 7–25)
CALCIUM SERPL-MCNC: 9.1 MG/DL (ref 8.6–10.5)
CHLORIDE SERPL-SCNC: 97 MMOL/L (ref 98–107)
CO2 SERPL-SCNC: 37 MMOL/L (ref 21–31)
CREAT SERPL-MCNC: 0.89 MG/DL (ref 0.6–1.2)
GFR SERPL CREATININE-BSD FRML MDRD: 56 ML/MIN/1.73SQ M
GLUCOSE SERPL-MCNC: 101 MG/DL (ref 65–99)
MAGNESIUM SERPL-MCNC: 2 MG/DL (ref 1.9–2.7)
POTASSIUM SERPL-SCNC: 3.9 MMOL/L (ref 3.5–5.5)
SODIUM SERPL-SCNC: 138 MMOL/L (ref 134–143)

## 2019-05-22 PROCEDURE — 99232 SBSQ HOSP IP/OBS MODERATE 35: CPT | Performed by: INTERNAL MEDICINE

## 2019-05-22 PROCEDURE — 80048 BASIC METABOLIC PNL TOTAL CA: CPT | Performed by: PHYSICIAN ASSISTANT

## 2019-05-22 PROCEDURE — 94760 N-INVAS EAR/PLS OXIMETRY 1: CPT

## 2019-05-22 PROCEDURE — 94640 AIRWAY INHALATION TREATMENT: CPT

## 2019-05-22 PROCEDURE — G8989 SELF CARE D/C STATUS: HCPCS

## 2019-05-22 PROCEDURE — 97163 PT EVAL HIGH COMPLEX 45 MIN: CPT

## 2019-05-22 PROCEDURE — G8988 SELF CARE GOAL STATUS: HCPCS

## 2019-05-22 PROCEDURE — 97116 GAIT TRAINING THERAPY: CPT

## 2019-05-22 PROCEDURE — G8978 MOBILITY CURRENT STATUS: HCPCS

## 2019-05-22 PROCEDURE — 97167 OT EVAL HIGH COMPLEX 60 MIN: CPT

## 2019-05-22 PROCEDURE — G8987 SELF CARE CURRENT STATUS: HCPCS

## 2019-05-22 PROCEDURE — 93306 TTE W/DOPPLER COMPLETE: CPT

## 2019-05-22 PROCEDURE — G8979 MOBILITY GOAL STATUS: HCPCS

## 2019-05-22 PROCEDURE — 99223 1ST HOSP IP/OBS HIGH 75: CPT | Performed by: INTERNAL MEDICINE

## 2019-05-22 PROCEDURE — 83735 ASSAY OF MAGNESIUM: CPT | Performed by: PHYSICIAN ASSISTANT

## 2019-05-22 PROCEDURE — 93306 TTE W/DOPPLER COMPLETE: CPT | Performed by: INTERNAL MEDICINE

## 2019-05-22 RX ORDER — FUROSEMIDE 10 MG/ML
40 INJECTION INTRAMUSCULAR; INTRAVENOUS ONCE
Status: COMPLETED | OUTPATIENT
Start: 2019-05-22 | End: 2019-05-22

## 2019-05-22 RX ORDER — ISOSORBIDE MONONITRATE 30 MG/1
30 TABLET, EXTENDED RELEASE ORAL DAILY
Status: DISCONTINUED | OUTPATIENT
Start: 2019-05-22 | End: 2019-05-23 | Stop reason: HOSPADM

## 2019-05-22 RX ORDER — POLYETHYLENE GLYCOL 3350 17 G/17G
17 POWDER, FOR SOLUTION ORAL DAILY PRN
Status: DISCONTINUED | OUTPATIENT
Start: 2019-05-22 | End: 2019-05-23 | Stop reason: HOSPADM

## 2019-05-22 RX ORDER — ALBUTEROL SULFATE 2.5 MG/3ML
2.5 SOLUTION RESPIRATORY (INHALATION)
Status: DISCONTINUED | OUTPATIENT
Start: 2019-05-22 | End: 2019-05-23

## 2019-05-22 RX ORDER — ALBUTEROL SULFATE 2.5 MG/3ML
2.5 SOLUTION RESPIRATORY (INHALATION)
Status: DISCONTINUED | OUTPATIENT
Start: 2019-05-22 | End: 2019-05-22

## 2019-05-22 RX ORDER — FUROSEMIDE 10 MG/ML
40 INJECTION INTRAMUSCULAR; INTRAVENOUS DAILY
Status: DISCONTINUED | OUTPATIENT
Start: 2019-05-23 | End: 2019-05-23

## 2019-05-22 RX ADMIN — METOPROLOL TARTRATE 25 MG: 25 TABLET ORAL at 09:46

## 2019-05-22 RX ADMIN — POTASSIUM CHLORIDE 10 MEQ: 750 TABLET, EXTENDED RELEASE ORAL at 18:30

## 2019-05-22 RX ADMIN — FAMOTIDINE 20 MG: 20 TABLET ORAL at 18:30

## 2019-05-22 RX ADMIN — FAMOTIDINE 20 MG: 20 TABLET ORAL at 09:46

## 2019-05-22 RX ADMIN — POTASSIUM CHLORIDE 10 MEQ: 750 TABLET, EXTENDED RELEASE ORAL at 09:46

## 2019-05-22 RX ADMIN — ASPIRIN 81 MG 81 MG: 81 TABLET ORAL at 09:46

## 2019-05-22 RX ADMIN — ISOSORBIDE MONONITRATE 30 MG: 30 TABLET, EXTENDED RELEASE ORAL at 09:45

## 2019-05-22 RX ADMIN — FERROUS SULFATE TAB 325 MG (65 MG ELEMENTAL FE) 162.5 MG: 325 (65 FE) TAB at 09:46

## 2019-05-22 RX ADMIN — METOPROLOL TARTRATE 25 MG: 25 TABLET ORAL at 20:36

## 2019-05-22 RX ADMIN — ATORVASTATIN CALCIUM 40 MG: 40 TABLET, FILM COATED ORAL at 09:46

## 2019-05-22 RX ADMIN — FUROSEMIDE 40 MG: 10 INJECTION, SOLUTION INTRAMUSCULAR; INTRAVENOUS at 09:47

## 2019-05-22 RX ADMIN — ALBUTEROL SULFATE 2.5 MG: 2.5 SOLUTION RESPIRATORY (INHALATION) at 15:29

## 2019-05-22 RX ADMIN — LORAZEPAM 0.5 MG: 0.5 TABLET ORAL at 09:52

## 2019-05-22 RX ADMIN — CALCIUM CARBONATE-VITAMIN D TAB 500 MG-200 UNIT 1 TABLET: 500-200 TAB at 07:54

## 2019-05-22 RX ADMIN — ALBUTEROL SULFATE 2.5 MG: 2.5 SOLUTION RESPIRATORY (INHALATION) at 19:34

## 2019-05-22 RX ADMIN — LORAZEPAM 0.5 MG: 0.5 TABLET ORAL at 20:36

## 2019-05-22 RX ADMIN — ENOXAPARIN SODIUM 40 MG: 40 INJECTION SUBCUTANEOUS at 09:46

## 2019-05-23 VITALS
BODY MASS INDEX: 29.44 KG/M2 | HEART RATE: 77 BPM | TEMPERATURE: 98.5 F | WEIGHT: 160 LBS | HEIGHT: 62 IN | RESPIRATION RATE: 18 BRPM | SYSTOLIC BLOOD PRESSURE: 138 MMHG | DIASTOLIC BLOOD PRESSURE: 62 MMHG | OXYGEN SATURATION: 90 %

## 2019-05-23 PROBLEM — I50.33 ACUTE ON CHRONIC DIASTOLIC CONGESTIVE HEART FAILURE (HCC): Status: ACTIVE | Noted: 2018-11-19

## 2019-05-23 LAB
ANION GAP SERPL CALCULATED.3IONS-SCNC: 4 MMOL/L (ref 4–13)
BUN SERPL-MCNC: 16 MG/DL (ref 7–25)
CALCIUM SERPL-MCNC: 8.8 MG/DL (ref 8.6–10.5)
CHLORIDE SERPL-SCNC: 97 MMOL/L (ref 98–107)
CO2 SERPL-SCNC: 38 MMOL/L (ref 21–31)
CREAT SERPL-MCNC: 0.87 MG/DL (ref 0.6–1.2)
GFR SERPL CREATININE-BSD FRML MDRD: 57 ML/MIN/1.73SQ M
GLUCOSE SERPL-MCNC: 94 MG/DL (ref 65–99)
POTASSIUM SERPL-SCNC: 3.9 MMOL/L (ref 3.5–5.5)
SODIUM SERPL-SCNC: 139 MMOL/L (ref 134–143)

## 2019-05-23 PROCEDURE — 99239 HOSP IP/OBS DSCHRG MGMT >30: CPT | Performed by: INTERNAL MEDICINE

## 2019-05-23 PROCEDURE — 94760 N-INVAS EAR/PLS OXIMETRY 1: CPT

## 2019-05-23 PROCEDURE — 94640 AIRWAY INHALATION TREATMENT: CPT

## 2019-05-23 PROCEDURE — 80048 BASIC METABOLIC PNL TOTAL CA: CPT | Performed by: INTERNAL MEDICINE

## 2019-05-23 RX ORDER — ISOSORBIDE MONONITRATE 30 MG/1
30 TABLET, EXTENDED RELEASE ORAL DAILY
Qty: 30 TABLET | Refills: 0 | Status: SHIPPED | OUTPATIENT
Start: 2019-05-24 | End: 2019-01-01 | Stop reason: SINTOL

## 2019-05-23 RX ADMIN — ASPIRIN 81 MG 81 MG: 81 TABLET ORAL at 09:15

## 2019-05-23 RX ADMIN — ALBUTEROL SULFATE 2.5 MG: 2.5 SOLUTION RESPIRATORY (INHALATION) at 07:00

## 2019-05-23 RX ADMIN — LORAZEPAM 0.5 MG: 0.5 TABLET ORAL at 09:14

## 2019-05-23 RX ADMIN — ENOXAPARIN SODIUM 40 MG: 40 INJECTION SUBCUTANEOUS at 09:14

## 2019-05-23 RX ADMIN — POTASSIUM CHLORIDE 10 MEQ: 750 TABLET, EXTENDED RELEASE ORAL at 09:15

## 2019-05-23 RX ADMIN — FAMOTIDINE 20 MG: 20 TABLET ORAL at 09:15

## 2019-05-23 RX ADMIN — METOPROLOL TARTRATE 25 MG: 25 TABLET ORAL at 09:16

## 2019-05-23 RX ADMIN — CALCIUM CARBONATE-VITAMIN D TAB 500 MG-200 UNIT 1 TABLET: 500-200 TAB at 09:15

## 2019-05-23 RX ADMIN — ISOSORBIDE MONONITRATE 30 MG: 30 TABLET, EXTENDED RELEASE ORAL at 09:15

## 2019-05-23 RX ADMIN — FUROSEMIDE 40 MG: 10 INJECTION, SOLUTION INTRAMUSCULAR; INTRAVENOUS at 09:15

## 2019-05-23 RX ADMIN — FERROUS SULFATE TAB 325 MG (65 MG ELEMENTAL FE) 162.5 MG: 325 (65 FE) TAB at 09:15

## 2019-05-23 RX ADMIN — ATORVASTATIN CALCIUM 40 MG: 40 TABLET, FILM COATED ORAL at 09:15

## 2019-05-24 ENCOUNTER — TRANSITIONAL CARE MANAGEMENT (OUTPATIENT)
Dept: INTERNAL MEDICINE CLINIC | Facility: CLINIC | Age: 84
End: 2019-05-24

## 2019-05-26 LAB
BACTERIA BLD CULT: NORMAL
BACTERIA BLD CULT: NORMAL

## 2019-05-28 ENCOUNTER — EPISODE CHANGES (OUTPATIENT)
Dept: CASE MANAGEMENT | Facility: HOSPITAL | Age: 84
End: 2019-05-28

## 2019-05-28 ENCOUNTER — PATIENT OUTREACH (OUTPATIENT)
Dept: INTERNAL MEDICINE CLINIC | Facility: CLINIC | Age: 84
End: 2019-05-28

## 2019-05-29 ENCOUNTER — PATIENT OUTREACH (OUTPATIENT)
Dept: INTERNAL MEDICINE CLINIC | Facility: CLINIC | Age: 84
End: 2019-05-29

## 2019-05-30 ENCOUNTER — HOSPITAL ENCOUNTER (EMERGENCY)
Facility: HOSPITAL | Age: 84
Discharge: HOME | End: 2019-05-30
Attending: EMERGENCY MEDICINE | Admitting: EMERGENCY MEDICINE
Payer: MEDICARE

## 2019-05-30 ENCOUNTER — APPOINTMENT (EMERGENCY)
Dept: RADIOLOGY | Facility: HOSPITAL | Age: 84
End: 2019-05-30
Payer: MEDICARE

## 2019-05-30 VITALS
DIASTOLIC BLOOD PRESSURE: 78 MMHG | BODY MASS INDEX: 29.63 KG/M2 | SYSTOLIC BLOOD PRESSURE: 175 MMHG | OXYGEN SATURATION: 92 % | HEART RATE: 80 BPM | WEIGHT: 162 LBS | RESPIRATION RATE: 22 BRPM

## 2019-05-30 DIAGNOSIS — R07.9 CHEST PAIN: Primary | ICD-10-CM

## 2019-05-30 LAB
ALBUMIN SERPL BCP-MCNC: 3 G/DL (ref 3.5–5)
ALP SERPL-CCNC: 58 U/L (ref 46–116)
ALT SERPL W P-5'-P-CCNC: 23 U/L (ref 12–78)
ANION GAP SERPL CALCULATED.3IONS-SCNC: 4 MMOL/L (ref 4–13)
AST SERPL W P-5'-P-CCNC: 18 U/L (ref 5–45)
ATRIAL RATE: 81 BPM
BASOPHILS # BLD AUTO: 0.04 THOUSANDS/ΜL (ref 0–0.1)
BASOPHILS NFR BLD AUTO: 1 % (ref 0–1)
BILIRUB SERPL-MCNC: 0.81 MG/DL (ref 0.2–1)
BUN SERPL-MCNC: 12 MG/DL (ref 5–25)
CALCIUM SERPL-MCNC: 8.3 MG/DL (ref 8.3–10.1)
CHLORIDE SERPL-SCNC: 103 MMOL/L (ref 100–108)
CO2 SERPL-SCNC: 31 MMOL/L (ref 21–32)
CREAT SERPL-MCNC: 0.84 MG/DL (ref 0.6–1.3)
EOSINOPHIL # BLD AUTO: 0.13 THOUSAND/ΜL (ref 0–0.61)
EOSINOPHIL NFR BLD AUTO: 2 % (ref 0–6)
ERYTHROCYTE [DISTWIDTH] IN BLOOD BY AUTOMATED COUNT: 14.3 % (ref 11.6–15.1)
GFR SERPL CREATININE-BSD FRML MDRD: 60 ML/MIN/1.73SQ M
GLUCOSE SERPL-MCNC: 116 MG/DL (ref 65–140)
HCT VFR BLD AUTO: 44.1 % (ref 34.8–46.1)
HGB BLD-MCNC: 13.7 G/DL (ref 11.5–15.4)
IMM GRANULOCYTES # BLD AUTO: 0.02 THOUSAND/UL (ref 0–0.2)
IMM GRANULOCYTES NFR BLD AUTO: 0 % (ref 0–2)
LYMPHOCYTES # BLD AUTO: 1.59 THOUSANDS/ΜL (ref 0.6–4.47)
LYMPHOCYTES NFR BLD AUTO: 25 % (ref 14–44)
MCH RBC QN AUTO: 29.2 PG (ref 26.8–34.3)
MCHC RBC AUTO-ENTMCNC: 31.1 G/DL (ref 31.4–37.4)
MCV RBC AUTO: 94 FL (ref 82–98)
MONOCYTES # BLD AUTO: 0.69 THOUSAND/ΜL (ref 0.17–1.22)
MONOCYTES NFR BLD AUTO: 11 % (ref 4–12)
NEUTROPHILS # BLD AUTO: 3.97 THOUSANDS/ΜL (ref 1.85–7.62)
NEUTS SEG NFR BLD AUTO: 61 % (ref 43–75)
NRBC BLD AUTO-RTO: 0 /100 WBCS
NT-PROBNP SERPL-MCNC: 2481 PG/ML
P AXIS: 88 DEGREES
PLATELET # BLD AUTO: 381 THOUSANDS/UL (ref 149–390)
PMV BLD AUTO: 9.4 FL (ref 8.9–12.7)
POTASSIUM SERPL-SCNC: 4 MMOL/L (ref 3.5–5.3)
PR INTERVAL: 164 MS
PROT SERPL-MCNC: 6.9 G/DL (ref 6.4–8.2)
QRS AXIS: 43 DEGREES
QRSD INTERVAL: 98 MS
QT INTERVAL: 350 MS
QTC INTERVAL: 406 MS
RBC # BLD AUTO: 4.69 MILLION/UL (ref 3.81–5.12)
SODIUM SERPL-SCNC: 138 MMOL/L (ref 136–145)
T WAVE AXIS: 28 DEGREES
TROPONIN I SERPL-MCNC: 0.03 NG/ML
VENTRICULAR RATE: 81 BPM
WBC # BLD AUTO: 6.44 THOUSAND/UL (ref 4.31–10.16)

## 2019-05-30 PROCEDURE — 71046 X-RAY EXAM CHEST 2 VIEWS: CPT

## 2019-05-30 PROCEDURE — 80053 COMPREHEN METABOLIC PANEL: CPT

## 2019-05-30 PROCEDURE — 99285 EMERGENCY DEPT VISIT HI MDM: CPT | Performed by: EMERGENCY MEDICINE

## 2019-05-30 PROCEDURE — 85025 COMPLETE CBC W/AUTO DIFF WBC: CPT

## 2019-05-30 PROCEDURE — 83880 ASSAY OF NATRIURETIC PEPTIDE: CPT

## 2019-05-30 PROCEDURE — 84484 ASSAY OF TROPONIN QUANT: CPT

## 2019-05-30 PROCEDURE — 99285 EMERGENCY DEPT VISIT HI MDM: CPT

## 2019-05-30 PROCEDURE — 93010 ELECTROCARDIOGRAM REPORT: CPT | Performed by: INTERNAL MEDICINE

## 2019-05-30 PROCEDURE — 36415 COLL VENOUS BLD VENIPUNCTURE: CPT

## 2019-05-30 PROCEDURE — 93005 ELECTROCARDIOGRAM TRACING: CPT

## 2019-05-30 RX ORDER — NITROGLYCERIN 0.4 MG/1
0.4 TABLET SUBLINGUAL
Qty: 30 TABLET | Refills: 0 | Status: ON HOLD | OUTPATIENT
Start: 2019-05-30 | End: 2019-01-01 | Stop reason: CLARIF

## 2019-05-31 ENCOUNTER — PATIENT OUTREACH (OUTPATIENT)
Dept: INTERNAL MEDICINE CLINIC | Facility: CLINIC | Age: 84
End: 2019-05-31

## 2019-06-03 ENCOUNTER — OFFICE VISIT (OUTPATIENT)
Dept: INTERNAL MEDICINE CLINIC | Facility: CLINIC | Age: 84
End: 2019-06-03
Payer: MEDICARE

## 2019-06-03 VITALS
WEIGHT: 162.8 LBS | SYSTOLIC BLOOD PRESSURE: 130 MMHG | BODY MASS INDEX: 29.96 KG/M2 | HEART RATE: 75 BPM | DIASTOLIC BLOOD PRESSURE: 74 MMHG | HEIGHT: 62 IN | OXYGEN SATURATION: 94 % | RESPIRATION RATE: 18 BRPM | TEMPERATURE: 97.3 F

## 2019-06-03 DIAGNOSIS — I50.33 ACUTE ON CHRONIC DIASTOLIC CONGESTIVE HEART FAILURE (HCC): Primary | ICD-10-CM

## 2019-06-03 DIAGNOSIS — I10 ESSENTIAL HYPERTENSION: Chronic | ICD-10-CM

## 2019-06-03 DIAGNOSIS — I25.10 CORONARY ARTERY DISEASE INVOLVING NATIVE CORONARY ARTERY OF NATIVE HEART WITHOUT ANGINA PECTORIS: Chronic | ICD-10-CM

## 2019-06-03 PROCEDURE — 99495 TRANSJ CARE MGMT MOD F2F 14D: CPT | Performed by: INTERNAL MEDICINE

## 2019-07-12 PROBLEM — I50.31 ACUTE DIASTOLIC CONGESTIVE HEART FAILURE (HCC): Status: RESOLVED | Noted: 2018-07-29 | Resolved: 2019-01-01

## 2019-07-12 PROBLEM — I21.4 NON-STEMI (NON-ST ELEVATED MYOCARDIAL INFARCTION) (HCC): Status: RESOLVED | Noted: 2017-07-25 | Resolved: 2019-01-01

## 2019-07-12 PROBLEM — I50.33 ACUTE ON CHRONIC DIASTOLIC CONGESTIVE HEART FAILURE (HCC): Status: RESOLVED | Noted: 2018-11-19 | Resolved: 2019-01-01

## 2019-07-12 NOTE — PROGRESS NOTES
Assessment/Plan:  Problem List Items Addressed This Visit        Respiratory    COPD (chronic obstructive pulmonary disease) (HCC) (Chronic)       Cardiovascular and Mediastinum    Coronary artery disease (Chronic)    Essential hypertension - Primary (Chronic)    Chronic diastolic heart failure (HCC)       Musculoskeletal and Integument    Malignant melanoma of skin (HCC)       Other    Hyperlipidemia, mixed (Chronic)    Iron deficiency anemia (Chronic)    Anxiety      Other Visit Diagnoses     Overweight (BMI 25 0-29  9)        BMI 29 0-29 9,adult               Diagnoses and all orders for this visit:    Essential hypertension    Chronic obstructive pulmonary disease, unspecified COPD type ([facility] )    Coronary artery disease involving native coronary artery of native heart without angina pectoris    Chronic diastolic heart failure (HCC)    Malignant melanoma of skin (HCC)    Anxiety    Hyperlipidemia, mixed    Iron deficiency anemia secondary to inadequate dietary iron intake    Overweight (BMI 25 0-29  9)    BMI 29 0-29 9,adult        No problem-specific Assessment & Plan notes found for this encounter  A/P: Doing well and labs are up to date  Continue current treatment and RTC four months for routine  Subjective:      Patient ID: Angel Luis Paulino is a 80 y o  female  WF RTC with her family for f/u htn, CAD, etc  Doing well and no new issues  Remains active w/o difficulty and no falls  Denies CP, SOB,palpitations, edema, lightheadedness, orthopnea, or PND  Melanoma is in remission  COPD and GERD are controlled  Labs are up to date         The following portions of the patient's history were reviewed and updated as appropriate:   She has a past medical history of Abnormal blood sugar (03/13/2017), Abnormal carotid duplex scan, Anxiety, Cervical radiculopathy (08/08/2017), CHF (congestive heart failure) ([facility] ), COPD (chronic obstructive pulmonary disease) ([facility] ) (2/6/2018), Coronary angioplasty status, Coronary artery disease (4/7/2017), Costochondritis (02/05/2013), Dyslipidemia, GERD without esophagitis (8/30/2012), H/O CT scan of chest, History of Holter monitoring, echocardiogram, echocardiogram (08/16/2017), Hypertension, Iron deficiency anemia (4/21/2016), Macular degeneration, right eye, Malignant melanoma of skin ([facility] ) (9/17/2012), Mixed hyperlipidemia, NSTEMI (non-ST elevated myocardial infarction) ([facility] ) (7/25/2017), Old MI (myocardial infarction), Osteoarthritis (9/17/2012), Osteoporosis (3/13/2017), 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[facility] ) (04/07/2017), and Urinary tract infection  ,  does not have any pertinent problems on file  ,   has a past surgical history that includes Breast surgery; Cardiac catheterization (03/24/2017); INSERTION STENT ARTERY (04/07/2017); Cholecystectomy; Hemorrhoid surgery; Coronary stent placement (03/25/2017); Skin biopsy; Tonsillectomy; Abdominal surgery; Colonoscopy (N/A, 7/25/2018); and Appendectomy  ,  family history includes Heart failure in her mother; Prostate cancer in her father; Stroke in her family  ,   reports that she has quit smoking  Her smoking use included cigarettes  She has never used smokeless tobacco  She reports that she drank alcohol  She reports that she does not use drugs  ,  is allergic to ciprofloxacin; keflex [cephalexin]; nitrofurantoin; and penicillins     Current Outpatient Medications   Medication Sig Dispense Refill    aspirin 81 mg chewable tablet Chew 1 tablet (81 mg total) daily 30 tablet 0    atorvastatin (LIPITOR) 40 mg tablet Take 1 tablet (40 mg total) by mouth daily 90 tablet 3    Calcium Carbonate-Vitamin D 600-125 MG-UNIT TABS Take by mouth daily        ferrous sulfate 325 (65 Fe) mg tablet Take 0 5 tablets by mouth daily        furosemide (LASIX) 20 mg tablet Take 0 5 tablets (10 mg total) by mouth daily 90 tablet 2    isosorbide mononitrate (IMDUR) 30 mg 24 hr tablet Take 1 tablet (30 mg total) by mouth daily (Patient not taking: Reported on 6/3/2019) 30 tablet 0    LORazepam (ATIVAN) 0 5 mg tablet Take 1 tablet (0 5 mg total) by mouth every 8 (eight) hours as needed for anxiety 90 tablet 0    metoprolol tartrate (LOPRESSOR) 25 mg tablet TAKE 1 TABLET BY MOUTH EVERY 12 HOURS (Patient taking differently: Taking 1/2 tablet twice daily) 60 tablet 4    MULTIPLE VITAMINS-CALCIUM PO Take by mouth daily      nitroglycerin (NITROSTAT) 0 4 mg SL tablet Place 1 tablet (0 4 mg total) under the tongue every 5 (five) minutes as needed for chest pain 30 tablet 0    Potassium 99 MG TABS Take by mouth daily      ranitidine (ZANTAC) 150 mg tablet Take 1 tablet (150 mg total) by mouth 2 (two) times a day 60 tablet 5     No current facility-administered medications for this visit  Review of Systems   Constitutional: Negative for activity change, chills, diaphoresis, fatigue and fever  HENT: Negative  Eyes: Negative for visual disturbance  Respiratory: Negative for cough, chest tightness, shortness of breath and wheezing  Cardiovascular: Negative for chest pain, palpitations and leg swelling  Gastrointestinal: Negative for abdominal pain, constipation, diarrhea, nausea and vomiting  Endocrine: Negative for cold intolerance and heat intolerance  Genitourinary: Negative for difficulty urinating, dysuria and frequency  Musculoskeletal: Negative for arthralgias, gait problem and myalgias  Neurological: Negative for dizziness, seizures, syncope, weakness, light-headedness, numbness and headaches  Psychiatric/Behavioral: Negative for confusion, dysphoric mood and sleep disturbance  The patient is not nervous/anxious  Objective:  Vitals:    07/12/19 1511   BP: 120/60   Pulse: 77   Temp: 97 8 °F (36 6 °C)   SpO2: 95%   Weight: 73 1 kg (161 lb 4 oz)   Height: 5' 2" (1 575 m)     Body mass index is 29 49 kg/m²  Physical Exam   Constitutional: She is oriented to person, place, and time  She appears well-developed and well-nourished   No distress  HENT:   Head: Normocephalic and atraumatic  Mouth/Throat: Oropharynx is clear and moist    Eyes: Pupils are equal, round, and reactive to light  Conjunctivae and EOM are normal    Neck: Neck supple  No JVD present  Cardiovascular: Normal rate, regular rhythm and normal heart sounds  Pulmonary/Chest: Effort normal and breath sounds normal  No respiratory distress  She has no wheezes  She has no rales  Abdominal: Soft  Bowel sounds are normal  She exhibits no distension  There is no tenderness  Neurological: She is alert and oriented to person, place, and time  Psychiatric: She has a normal mood and affect  Her behavior is normal  Judgment and thought content normal    Nursing note and vitals reviewed  BMI Counseling: Body mass index is 29 49 kg/m²  Discussed the patient's BMI with her  The BMI is above average  BMI counseling and education was provided to the patient  Nutrition recommendations include reducing portion sizes, decreasing overall calorie intake, reducing intake of saturated fat and trans fat and reducing intake of cholesterol  Exercise recommendations include moderate aerobic physical activity for 150 minutes/week

## 2019-07-12 NOTE — PATIENT INSTRUCTIONS
Weight Management   AMBULATORY CARE:   Why it is important to manage your weight:  Being overweight increases your risk of health conditions such as heart disease, high blood pressure, type 2 diabetes, and certain types of cancer  It can also increase your risk for osteoarthritis, sleep apnea, and other respiratory problems  Aim for a slow, steady weight loss  Even a small amount of weight loss can lower your risk of health problems  How to lose weight safely:  A safe and healthy way to lose weight is to eat fewer calories and get regular exercise  You can lose up about 1 pound a week by decreasing the number of calories you eat by 500 calories each day  You can decrease calories by eating smaller portion sizes or by cutting out high-calorie foods  Read labels to find out how many calories are in the foods you eat  You can also burn calories with exercise such as walking, swimming, or biking  You will be more likely to keep weight off if you make these changes part of your lifestyle  Healthy meal plan for weight management:  A healthy meal plan includes a variety of foods, contains fewer calories, and helps you stay healthy  A healthy meal plan includes the following:  · Eat whole-grain foods more often  A healthy meal plan should contain fiber  Fiber is the part of grains, fruits, and vegetables that is not broken down by your body  Whole-grain foods are healthy and provide extra fiber in your diet  Some examples of whole-grain foods are whole-wheat breads and pastas, oatmeal, brown rice, and bulgur  · Eat a variety of vegetables every day  Include dark, leafy greens such as spinach, kale, osito greens, and mustard greens  Eat yellow and orange vegetables such as carrots, sweet potatoes, and winter squash  · Eat a variety of fruits every day  Choose fresh or canned fruit (canned in its own juice or light syrup) instead of juice  Fruit juice has very little or no fiber  · Eat low-fat dairy foods  Drink fat-free (skim) milk or 1% milk  Eat fat-free yogurt and low-fat cottage cheese  Try low-fat cheeses such as mozzarella and other reduced-fat cheeses  · Choose meat and other protein foods that are low in fat  Choose beans or other legumes such as split peas or lentils  Choose fish, skinless poultry (chicken or turkey), or lean cuts of red meat (beef or pork)  Before you cook meat or poultry, cut off any visible fat  · Use less fat and oil  Try baking foods instead of frying them  Add less fat, such as margarine, sour cream, regular salad dressing and mayonnaise to foods  Eat fewer high-fat foods  Some examples of high-fat foods include french fries, doughnuts, ice cream, and cakes  · Eat fewer sweets  Limit foods and drinks that are high in sugar  This includes candy, cookies, regular soda, and sweetened drinks  Ways to decrease calories:   · Eat smaller portions  ¨ Use a small plate with smaller servings  ¨ Do not eat second helpings  ¨ When you eat at a restaurant, ask for a box and place half of your meal in the box before you eat  ¨ Share an entrée with someone else  · Replace high-calorie snacks with healthy, low-calorie snacks  ¨ Choose fresh fruit, vegetables, fat-free rice cakes, or air-popped popcorn instead of potato chips, nuts, or chocolate  ¨ Choose water or calorie-free drinks instead of soda or sweetened drinks  · Eat regular meals  Skipping meals can lead to overeating later in the day  Eat a healthy snack in place of a meal if you do not have time to eat a regular meal      · Do not shop for groceries when you are hungry  You may be more likely to make unhealthy food choices  Take a grocery list of healthy foods and shop after you have eaten  Exercise:  Exercise at least 30 minutes per day on most days of the week  Some examples of exercise include walking, biking, dancing, and swimming   You can also fit in more physical activity by taking the stairs instead of the elevator or parking farther away from stores  Ask your healthcare provider about the best exercise plan for you  Other things to consider as you try to lose weight:   · Be aware of situations that may give you the urge to overeat, such as eating while watching television  Find ways to avoid these situations  For example, read a book, go for a walk, or do crafts  · Meet with a weight loss support group or friends who are also trying to lose weight  This may help you stay motivated to continue working on your weight loss goals  © 2017 [address] [facility] Information is for End User's use only and may not be sold, redistributed or otherwise used for commercial purposes  All illustrations and images included in CareNotes® are the copyrighted property of [employer]  or [employer]  The above information is an  only  It is not intended as medical advice for individual conditions or treatments  Talk to your doctor, nurse or pharmacist before following any medical regimen to see if it is safe and effective for you  Low Fat Diet   AMBULATORY CARE:   A low-fat diet  is an eating plan that is low in total fat, unhealthy fat, and cholesterol  You may need to follow a low-fat diet if you have trouble digesting or absorbing fat  You may also need to follow this diet if you have high cholesterol  You can also lower your cholesterol by increasing the amount of fiber in your diet  Soluble fiber is a type of fiber that helps to decrease cholesterol levels  Different types of fat in food:   · Limit unhealthy fats  A diet that is high in cholesterol, saturated fat, and trans fat may cause unhealthy cholesterol levels  Unhealthy cholesterol levels increase your risk of heart disease  ¨ Cholesterol:  Limit intake of cholesterol to less than 200 mg per day  Cholesterol is found in meat, eggs, and dairy      ¨ Saturated fat:  Limit saturated fat to less than 7% of your total daily calories  Ask your dietitian how many calories you need each day  Saturated fat is found in butter, cheese, ice cream, whole milk, and palm oil  Saturated fat is also found in meat, such as beef, pork, chicken skin, and processed meats  Processed meats include sausage, hot dogs, and bologna  ¨ Trans fat:  Avoid trans fat as much as possible  Trans fat is used in fried and baked foods  Foods that say trans fat free on the label may still have up to 0 5 grams of trans fat per serving  · Include healthy fats  Replace foods that are high in saturated and trans fat with foods high in healthy fats  This may help to decrease high cholesterol levels  ¨ Monounsaturated fats: These are found in avocados, nuts, and vegetable oils, such as olive, canola, and sunflower oil  ¨ Polyunsaturated fats: These can be found in vegetable oils, such as soybean or corn oil  Omega-3 fats can help to decrease the risk of heart disease  Omega-3 fats are found in fish, such as salmon, herring, trout, and tuna  Omega-3 fats can also be found in plant foods, such as walnuts, flaxseed, soybeans, and canola oil    Foods to limit or avoid:   · Grains:      ¨ Snacks that are made with partially hydrogenated oils, such as chips, regular crackers, and butter-flavored popcorn    ¨ High-fat baked goods, such as biscuits, croissants, doughnuts, pies, cookies, and pastries    · Dairy:      ¨ Whole milk, 2% milk, and yogurt and ice cream made with whole milk    ¨ Half and half creamer, heavy cream, and whipping cream    ¨ Cheese, cream cheese, and sour cream    · Meats and proteins:      ¨ High-fat cuts of meat (T-bone steak, regular hamburger, and ribs)    ¨ Fried meat, poultry (turkey and chicken), and fish    ¨ Poultry (chicken and turkey) with skin    ¨ Cold cuts (salami or bologna), hot dogs, lacy, and sausage    ¨ Whole eggs and egg yolks    · Vegetables and fruits with added fat:      ¨ Fried vegetables or vegetables in butter or high-fat sauces, such as cream or cheese sauces    ¨ Fried fruit or fruit served with butter or cream    · Fats:      ¨ Butter, stick margarine, and shortening    ¨ Coconut, palm oil, and palm kernel oil  Foods to include:   · Grains:      ¨ Whole-grain breads, cereals, pasta, and brown rice    ¨ Low-fat crackers and pretzels    · Vegetables and fruits:      ¨ Fresh, frozen, or canned vegetables (no salt or low-sodium)    ¨ Fresh, frozen, dried, or canned fruit (canned in light syrup or fruit juice)    ¨ Avocado    · Low-fat dairy products:      ¨ Nonfat (skim) or 1% milk    ¨ Nonfat or low-fat cheese, yogurt, and cottage cheese    · Meats and proteins:      ¨ Chicken or turkey with no skin    ¨ Baked or broiled fish    ¨ Lean beef and pork (loin, round, extra lean hamburger)    ¨ Beans and peas, unsalted nuts, soy products    ¨ Egg whites and substitutes    ¨ Seeds and nuts    · Fats:      ¨ Unsaturated oil, such as canola, olive, peanut, soybean, or sunflower oil    ¨ Soft or liquid margarine and vegetable oil spread    ¨ Low-fat salad dressing  Other ways to decrease fat:   · Read food labels before you buy foods  Choose foods that have less than 30% of calories from fat  Choose low-fat or fat-free dairy products  Remember that fat free does not mean calorie free  These foods still contain calories, and too many calories can lead to weight gain  · Trim fat from meat and avoid fried food  Trim all visible fat from meat before you cook it  Remove the skin from poultry  Do not clarke meat, fish, or poultry  Bake, roast, boil, or broil these foods instead  Avoid fried foods  Eat a baked potato instead of Western Sheila fries  Steam vegetables instead of sautéing them in butter  · Add less fat to foods  Use imitation lacy bits on salads and baked potatoes instead of regular lacy bits  Use fat-free or low-fat salad dressings instead of regular dressings   Use low-fat or nonfat butter-flavored topping instead of regular butter or margarine on popcorn and other foods  Ways to decrease fat in recipes:  Replace high-fat ingredients with low-fat or nonfat ones  This may cause baked goods to be drier than usual  You may need to use nonfat cooking spray on pans to prevent food from sticking  You also may need to change the amount of other ingredients, such as water, in the recipe  Try the following:  · Use low-fat or light margarine instead of regular margarine or shortening  · Use lean ground turkey breast or chicken, or lean ground beef (less than 5% fat) instead of hamburger  · Add 1 teaspoon of canola oil to 8 ounces of skim milk instead of using cream or half and half  · Use grated zucchini, carrots, or apples in breads instead of coconut  · Use blenderized, low-fat cottage cheese, plain tofu, or low-fat ricotta cheese instead of cream cheese  · Use 1 egg white and 1 teaspoon of canola oil, or use ¼ cup (2 ounces) of fat-free egg substitute instead of a whole egg  · Replace half of the oil that is called for in a recipe with applesauce when you bake  Use 3 tablespoons of cocoa powder and 1 tablespoon of canola oil instead of a square of baking chocolate  How to increase fiber:  Eat enough high-fiber foods to get 20 to 30 grams of fiber every day  Slowly increase your fiber intake to avoid stomach cramps, gas, and other problems  · Eat 3 ounces of whole-grain foods each day  An ounce is about 1 slice of bread  Eat whole-grain breads, such as whole-wheat bread  Whole wheat, whole-wheat flour, or other whole grains should be listed as the first ingredient on the food label  Replace white flour with whole-grain flour or use half of each in recipes  Whole-grain flour is heavier than white flour, so you may have to add more yeast or baking powder  · Eat a high-fiber cereal for breakfast   Oatmeal is a good source of soluble fiber  Look for cereals that have bran or fiber in the name   Choose whole-grain products, such as brown rice, barley, and whole-wheat pasta  · Eat more beans, peas, and lentils  For example, add beans to soups or salads  Eat at least 5 cups of fruits and vegetables each day  Eat fruits and vegetables with the peel because the peel is high in fiber  © 2017 [address] [employer]  Information is for End User's use only and may not be sold, redistributed or otherwise used for commercial purposes  All illustrations and images included in CareNotes® are the copyrighted property of [employer]  or [employer]  The above information is an  only  It is not intended as medical advice for individual conditions or treatments  Talk to your doctor, nurse or pharmacist before following any medical regimen to see if it is safe and effective for you  Heart Healthy Diet   AMBULATORY CARE:   A heart healthy diet  is an eating plan low in total fat, unhealthy fats, and sodium (salt)  A heart healthy diet helps decrease your risk for heart disease and stroke  Limit the amount of fat you eat to 25% to 35% of your total daily calories  Limit sodium to less than 2,300 mg each day  Healthy fats:  Healthy fats can help improve cholesterol levels  The risk for heart disease is decreased when cholesterol levels are normal  Choose healthy fats, such as the following:  · Unsaturated fat  is found in foods such as soybean, canola, olive, corn, and safflower oils  It is also found in soft tub margarine that is made with liquid vegetable oil  · Omega-3 fat  is found in certain fish, such as salmon, tuna, and trout, and in walnuts and flaxseed  Unhealthy fats:  Unhealthy fats can cause unhealthy cholesterol levels in your blood and increase your risk of heart disease  Limit unhealthy fats, such as the following:  · Cholesterol  is found in animal foods, such as eggs and lobster, and in dairy products made from whole milk   Limit cholesterol to less than 300 milligrams (mg) each day  You may need to limit cholesterol to 200 mg each day if you have heart disease  · Saturated fat  is found in meats, such as lacy and hamburger  It is also found in chicken or turkey skin, whole milk, and butter  Limit saturated fat to less than 7% of your total daily calories  Limit saturated fat to less than 6% if you have heart disease or are at increased risk for it  · Trans fat  is found in packaged foods, such as potato chips and cookies  It is also in hard margarine, some fried foods, and shortening  Avoid trans fats as much as possible    Heart healthy foods and drinks to include:  Ask your dietitian or healthcare provider how many servings to have from each of the following food groups:  · Grains:      ¨ Whole-wheat breads, cereals, and pastas, and brown rice    ¨ Low-fat, low-sodium crackers and chips    · Vegetables:      ¨ Broccoli, green beans, green peas, and spinach    ¨ Collards, kale, and lima beans    ¨ Carrots, sweet potatoes, tomatoes, and peppers    ¨ Canned vegetables with no salt added    · Fruits:      ¨ Bananas, peaches, pears, and pineapple    ¨ Grapes, raisins, and dates    ¨ Oranges, tangerines, grapefruit, orange juice, and grapefruit juice    ¨ Apricots, mangoes, melons, and papaya    ¨ Raspberries and strawberries    ¨ Canned fruit with no added sugar    · Low-fat dairy products:      ¨ Nonfat (skim) milk, 1% milk, and low-fat almond, cashew, or soy milks fortified with calcium    ¨ Low-fat cheese, regular or frozen yogurt, and cottage cheese    · Meats and proteins , such as lean cuts of beef and pork (loin, leg, round), skinless chicken and turkey, legumes, soy products, egg whites, and nuts  Foods and drinks to limit or avoid:  Ask your dietitian or healthcare provider about these and other foods that are high in unhealthy fat, sodium, and sugar:  · Snack or packaged foods , such as frozen dinners, cookies, macaroni and cheese, and cereals with more than 300 mg of sodium per serving    · Canned or dry mixes  for cakes, soups, sauces, or gravies    · Vegetables with added sodium , such as instant potatoes, vegetables with added sauces, or regular canned vegetables    · Other foods high in sodium , such as ketchup, barbecue sauce, salad dressing, pickles, olives, soy sauce, and miso    · High-fat dairy foods  such as whole or 2% milk, cream cheese, or sour cream, and cheeses     · High-fat protein foods  such as high-fat cuts of beef (T-bone steaks, ribs), chicken or turkey with skin, and organ meats, such as liver    · Cured or smoked meats , such as hot dogs, lacy, and sausage    · Unhealthy fats and oils , such as butter, stick margarine, shortening, and cooking oils such as coconut or palm oil    · Food and drinks high in sugar , such as soft drinks (soda), sports drinks, sweetened tea, candy, cake, cookies, pies, and doughnuts  Other diet guidelines to follow:   · Eat more foods containing omega-3 fats  Eat fish high in omega-3 fats at least 2 times a week  · Limit alcohol  Too much alcohol can damage your heart and raise your blood pressure  Women should limit alcohol to 1 drink a day  Men should limit alcohol to 2 drinks a day  A drink of alcohol is 12 ounces of beer, 5 ounces of wine, or 1½ ounces of liquor  · Choose low-sodium foods  High-sodium foods can lead to high blood pressure  Add little or no salt to food you prepare  Use herbs and spices in place of salt  · Eat more fiber  to help lower cholesterol levels  Eat at least 5 servings of fruits and vegetables each day  Eat 3 ounces of whole-grain foods each day  Legumes (beans) are also a good source of fiber  Lifestyle guidelines:   · Do not smoke  Nicotine and other chemicals in cigarettes and cigars can cause lung and heart damage  Ask your healthcare provider for information if you currently smoke and need help to quit  E-cigarettes or smokeless tobacco still contain nicotine  Talk to your healthcare provider before you use these products  · Exercise regularly  to help you maintain a healthy weight and improve your blood pressure and cholesterol levels  Ask your healthcare provider about the best exercise plan for you  Do not start an exercise program without asking your healthcare provider  Follow up with your healthcare provider as directed:  Write down your questions so you remember to ask them during your visits  © 2017 [address] [employer] Information is for End User's use only and may not be sold, redistributed or otherwise used for commercial purposes  All illustrations and images included in CareNotes® are the copyrighted property of [employer]  or [employer]  The above information is an  only  It is not intended as medical advice for individual conditions or treatments  Talk to your doctor, nurse or pharmacist before following any medical regimen to see if it is safe and effective for you  Chronic Hypertension   AMBULATORY CARE:   Hypertension  is high blood pressure (BP)  Your BP is the force of your blood moving against the walls of your arteries  Normal BP is less than 120/80  Prehypertension is between 120/80 and 139/89  Hypertension is 140/90 or higher  Hypertension causes your BP to get so high that your heart has to work much harder than normal  This can damage your heart  Chronic hypertension is a long-term condition that you can control with a healthy lifestyle or medicines  A controlled blood pressure helps protect your organs, such as your heart, lungs, brain, and kidneys  Common symptoms include the following:   · Headache     · Blurred vision    · Chest pain     · Dizziness or weakness     · Trouble breathing     · Nosebleeds  Call 911 for any of the following:   · You have discomfort in your chest that feels like squeezing, pressure, fullness, or pain  · You become confused or have difficulty speaking      · You suddenly feel lightheaded or have trouble breathing  · You have pain or discomfort in your back, neck, jaw, stomach, or arm  Seek care immediately if:   · You have a severe headache or vision loss  · You have weakness in an arm or leg  Contact your healthcare provider if:   · You feel faint, dizzy, confused, or drowsy  · You have been taking your BP medicine and your BP is still higher than your healthcare provider says it should be  · You have questions or concerns about your condition or care  Treatment for chronic hypertension  may include medicine to lower your BP and lower your cholesterol level  A low cholesterol level helps prevent heart disease and makes it easier to control your blood pressure  Heart disease can make your blood pressure harder to control  You may also need to make lifestyle changes  Take your medicine exactly as directed  Manage chronic hypertension:  Talk with your healthcare provider about these and other ways to manage hypertension:  · Take your BP at home  Sit and rest for 5 minutes before you take your BP  Extend your arm and support it on a flat surface  Your arm should be at the same level as your heart  Follow the directions that came with your BP monitor  If possible, take at least 2 BP readings each time  Take your BP at least twice a day at the same times each day, such as morning and evening  Keep a record of your BP readings and bring it to your follow-up visits  Ask your healthcare provider what your blood pressure should be  · Limit sodium (salt) as directed  Too much sodium can affect your fluid balance  Check labels to find low-sodium or no-salt-added foods  Some low-sodium foods use potassium salts for flavor  Too much potassium can also cause health problems  Your healthcare provider will tell you how much sodium and potassium are safe for you to have in a day  He or she may recommend that you limit sodium to 2,300 mg a day             · Follow the meal plan recommended by your healthcare provider  A dietitian or your provider can give you more information on low-sodium plans or the DASH (Dietary Approaches to Stop Hypertension) eating plan  The DASH plan is low in sodium, unhealthy fats, and total fat  It is high in potassium, calcium, and fiber  · Exercise to maintain a healthy weight  Exercise at least 30 minutes per day, on most days of the week  This will help decrease your blood pressure  Ask about the best exercise plan for you  · Decrease stress  This may help lower your BP  Learn ways to relax, such as deep breathing or listening to music  · Limit alcohol  Women should limit alcohol to 1 drink a day  Men should limit alcohol to 2 drinks a day  A drink of alcohol is 12 ounces of beer, 5 ounces of wine, or 1½ ounces of liquor  · Do not smoke  Nicotine and other chemicals in cigarettes and cigars can increase your BP and also cause lung damage  Ask your healthcare provider for information if you currently smoke and need help to quit  E-cigarettes or smokeless tobacco still contain nicotine  Talk to your healthcare provider before you use these products  Follow up with your healthcare provider as directed: You will need to return to have your BP checked and to have other lab tests done  Write down your questions so you remember to ask them during your visits  © 2017 [address] [employer]  Information is for End User's use only and may not be sold, redistributed or otherwise used for commercial purposes  All illustrations and images included in CareNotes® are the copyrighted property of [employer]  or [employer]  The above information is an  only  It is not intended as medical advice for individual conditions or treatments  Talk to your doctor, nurse or pharmacist before following any medical regimen to see if it is safe and effective for you

## 2019-07-17 NOTE — PATIENT INSTRUCTIONS
Heart Failure   AMBULATORY CARE:   Heart failure (HF) is a condition that does not allow your heart to fill or pump properly  Not enough oxygen in your blood gets to your organs and tissues  HF can occur in the right side, the left side, or both lower chambers of your heart  HF is often caused by damage or injury to your heart  The damage may be caused by heart attack, other heart conditions, or high blood pressure  HF is a long-term condition that tends to get worse over time  It is important to manage your health to improve your quality of life  HF can be worsened by heavy alcohol use, smoking, diabetes that is not controlled, or obesity  Common signs and symptoms:   · Difficulty breathing with activity that worsens to difficulty breathing at rest    · Shortness of breath while lying flat    · Severe shortness of breath and coughing at night that usually wakes you     · Chest pain at night    · Periods of no breathing, then breathing fast    · Fatigue or lack of energy (often worsened by physical activity)     · Swelling in your ankles, legs, or abdomen    · Fast heartbeat, purple color around your mouth and nailbeds    · Fingers and toes cool to the touch  Call 911 if:   · You have any of the following signs of a heart attack:      ¨ Squeezing, pressure, or pain in your chest that lasts longer than 5 minutes or returns    ¨ Discomfort or pain in your back, neck, jaw, stomach, or arm     ¨ Trouble breathing    ¨ Nausea or vomiting    ¨ Lightheadedness or a sudden cold sweat, especially with chest pain or trouble breathing    Seek care immediately if:   · You gain 3 or more pounds (1 4 kg) in a day, or more than your healthcare provider says you should  · Your heartbeat is fast, slow, or uneven all the time    Contact your healthcare provider if:   · You have symptoms of worsening HF:      ¨ Shortness of breath at rest, at night, or that is getting worse in any way     ¨ Weight gain of 5 or more pounds (2 2 kg) in a week     ¨ More swelling in your legs or ankles     ¨ Abdominal pain or swelling     ¨ More coughing     ¨ Loss of appetite     ¨ Feeling tired all the time    · You feel hopeless or depressed, or you have lost interest in things you used to enjoy  · You often feel worried or afraid  · You have questions or concerns about your condition or care  Treatment for HF  may include any of the following:  · Medicines  may be needed to help regulate your heart rhythm  You may also need medicines to lower your blood pressure, and to get rid of extra fluids  · Oxygen  may help you breathe easier if your oxygen level is lower than normal  A CPAP machine may be used to keep your airway open while you sleep  · Surgery  can be done to implant a pacemaker in your chest to regulate your heart rhythm  Other types of surgery can open blocked heart vessels, replace a damaged heart valve, or remove scar tissue  Manage or prevent HF:   · Do not smoke  Nicotine and other chemicals in cigarettes and cigars can cause lung damage and make HF difficult to manage  Ask your healthcare provider for information if you currently smoke and need help to quit  E-cigarettes or smokeless tobacco still contain nicotine  Talk to your healthcare provider before you use these products  · Do not drink alcohol or take illegal drugs  Alcohol and drugs can worsen your symptoms quickly  · Weigh yourself every morning  Use the same scale, in the same spot  Do this after you use the bathroom, but before you eat or drink anything  Wear the same type of clothing  Do not wear shoes  Record your weight each day so you will notice any sudden weight gain  Swelling and weight gain are signs of fluid retention  If you are overweight, ask how to lose weight safely  · Check your blood pressure and heart rate every day  Ask for more information about how to measure your blood pressure and heart rate correctly   Ask what these numbers should be for you  · Manage any chronic health conditions you have  These include high blood pressure, diabetes, obesity, high cholesterol, metabolic syndrome, and COPD  You will have fewer symptoms if you manage these health conditions  Follow your healthcare provider's recommendations and follow up with him or her regularly  · Eat heart-healthy foods and limit sodium (salt)  An easy way to do this is to eat more fresh fruits and vegetables and fewer canned and processed foods  Replace butter and margarine with heart-healthy oils such as olive oil and canola oil  Other heart-healthy foods include walnuts, whole-grain breads, low-fat dairy products, beans, and lean meats  Fatty fish such as salmon and tuna are also heart healthy  Ask how much salt you can eat each day  Do not use salt substitutes  · Drink liquids as directed  You may need to limit the amount of liquids you drink if you retain fluid  Ask how much liquid to drink each day and which liquids are best for you  · Stay active  If you are not active, your symptoms are likely to worsen quickly  Walking, bicycling, and other types of physical activity help maintain your strength and improve your mood  Physical activity also helps you manage your weight  Work with your healthcare provider to create an exercise plan that is right for you  · Get vaccines as directed  Get a flu shot every year  You may also need the pneumonia vaccine  The flu and pneumonia can be severe for a person who has HF  Vaccines protect you from these infections  Join a support group:  Living with HF can be difficult  It may be helpful to talk with others who have HF  You may learn how to better manage your condition or get emotional support  For more information:   · [address]  [place] , [place]   Phone: 2- [phone]  Web Address: [email or web address]  [email or web address]   Follow up with your healthcare provider or cardiologist within 2 weeks or as directed: You may need to return for other tests  You may need home health care  A healthcare provider will monitor your vital signs, weight, and make sure your medicines are working  Write down your questions so you remember to ask them during your visits  © 2017 [employer] Information is for End User's use only and may not be sold, redistributed or otherwise used for commercial purposes  All illustrations and images included in CareNotes® are the copyrighted property of [employer] , [employer]  or [employer]  The above information is an  only  It is not intended as medical advice for individual conditions or treatments  Talk to your doctor, nurse or pharmacist before following any medical regimen to see if it is safe and effective for you

## 2019-07-17 NOTE — PROGRESS NOTES
Subjective:        Patient ID: Conner Zambrano is a 80 y o  female  Chief Complaint:  Wendie Myles is here for follow-up after recent hospitalization for CHF  Echo is stable  Cardiac enzymes equivocal   She has had no angina  Imdur made her sick so she stopped it  She is now on full strength Lasix daily  BNP up a little bit, but clinically she examines euvolemic  No edema orthopnea  No alarming dyspnea  No chest pains  No palpitations  The following portions of the patient's history were reviewed and updated as appropriate: allergies, current medications, past family history, past medical history, past social history, past surgical history and problem list   Review of Systems   Constitution: Negative for chills, diaphoresis, malaise/fatigue and weight gain  HENT: Negative for nosebleeds and stridor  Eyes: Negative for double vision, vision loss in left eye, vision loss in right eye and visual disturbance  Cardiovascular: Negative for chest pain, claudication, cyanosis, dyspnea on exertion, irregular heartbeat, leg swelling, near-syncope, orthopnea, palpitations, paroxysmal nocturnal dyspnea and syncope  Respiratory: Negative for cough, shortness of breath, snoring and wheezing  Endocrine: Negative for polydipsia, polyphagia and polyuria  Hematologic/Lymphatic: Negative for bleeding problem  Does not bruise/bleed easily  Skin: Negative for flushing and rash  Musculoskeletal: Negative for falls and myalgias  Gastrointestinal: Negative for abdominal pain, heartburn, hematemesis, hematochezia, melena and nausea  Genitourinary: Negative for hematuria  Neurological: Positive for light-headedness (  Chronic complaint)  Negative for brief paralysis, dizziness, focal weakness, headaches, loss of balance and vertigo  Psychiatric/Behavioral: Negative for altered mental status and substance abuse  Allergic/Immunologic: Negative for hives            Objective:      /60   Pulse 80   Ht 5' 2" (1 575 m)   Wt 73 5 kg (162 lb)   BMI 29 63 kg/m²   Physical Exam   Constitutional: She is oriented to person, place, and time  She appears well-developed and well-nourished  HENT:   Head: Normocephalic and atraumatic  Eyes: Pupils are equal, round, and reactive to light  EOM are normal    Neck: Normal range of motion  Neck supple  No JVD present  No thyromegaly present  Cardiovascular: Normal rate, regular rhythm, normal heart sounds and intact distal pulses  Exam reveals no gallop and no friction rub  No murmur heard  Pulmonary/Chest: Effort normal and breath sounds normal  No stridor  No respiratory distress  She has no wheezes  She has no rales  Abdominal: Soft  Bowel sounds are normal  She exhibits no mass  There is no tenderness  Musculoskeletal: Normal range of motion  She exhibits no edema  Lymphadenopathy:     She has no cervical adenopathy  Neurological: She is alert and oriented to person, place, and time  Skin: Skin is warm and dry  No rash noted  No pallor  Psychiatric: She has a normal mood and affect  Her behavior is normal  Judgment and thought content normal    Nursing note and vitals reviewed        Lab Review:   Admission on 05/30/2019, Discharged on 05/30/2019   Component Date Value    WBC 05/30/2019 6 44     RBC 05/30/2019 4 69     Hemoglobin 05/30/2019 13 7     Hematocrit 05/30/2019 44 1     MCV 05/30/2019 94     MCH 05/30/2019 29 2     MCHC 05/30/2019 31 1*    RDW 05/30/2019 14 3     MPV 05/30/2019 9 4     Platelets 99/05/7812 381     nRBC 05/30/2019 0     Neutrophils Relative 05/30/2019 61     Immat GRANS % 05/30/2019 0     Lymphocytes Relative 05/30/2019 25     Monocytes Relative 05/30/2019 11     Eosinophils Relative 05/30/2019 2     Basophils Relative 05/30/2019 1     Neutrophils Absolute 05/30/2019 3 97     Immature Grans Absolute 05/30/2019 0 02     Lymphocytes Absolute 05/30/2019 1 59     Monocytes Absolute 05/30/2019 0 69     Eosinophils Absolute 05/30/2019 0 13     Basophils Absolute 05/30/2019 0 04     Sodium 05/30/2019 138     Potassium 05/30/2019 4 0     Chloride 05/30/2019 103     CO2 05/30/2019 31     ANION GAP 05/30/2019 4     BUN 05/30/2019 12     Creatinine 05/30/2019 0 84     Glucose 05/30/2019 116     Calcium 05/30/2019 8 3     AST 05/30/2019 18     ALT 05/30/2019 23     Alkaline Phosphatase 05/30/2019 58     Total Protein 05/30/2019 6 9     Albumin 05/30/2019 3 0*    Total Bilirubin 05/30/2019 0 81     eGFR 05/30/2019 60     Troponin I 05/30/2019 0 03     NT-proBNP 05/30/2019 2,481*    Ventricular Rate 05/30/2019 81     Atrial Rate 05/30/2019 81     AR Interval 05/30/2019 164     QRSD Interval 05/30/2019 98     QT Interval 05/30/2019 350     QTC Interval 05/30/2019 406     P Axis 05/30/2019 88     QRS Axis 05/30/2019 43     T Wave Axis 05/30/2019 28    Admission on 05/21/2019, Discharged on 05/23/2019   Component Date Value    WBC 05/21/2019 9 20     RBC 05/21/2019 4 81     Hemoglobin 05/21/2019 14 3     Hematocrit 05/21/2019 43 1     MCV 05/21/2019 90     MCH 05/21/2019 29 7     MCHC 05/21/2019 33 2     RDW 05/21/2019 14 5     MPV 05/21/2019 7 7*    Platelets 28/32/5094 409*    Neutrophils Relative 05/21/2019 63     Lymphocytes Relative 05/21/2019 27     Monocytes Relative 05/21/2019 8     Eosinophils Relative 05/21/2019 2     Basophils Relative 05/21/2019 1     Neutrophils Absolute 05/21/2019 5 80     Lymphocytes Absolute 05/21/2019 2 50     Monocytes Absolute 05/21/2019 0 70     Eosinophils Absolute 05/21/2019 0 20     Basophils Absolute 05/21/2019 0 10     Protime 05/21/2019 12 9     INR 05/21/2019 1 11     PTT 05/21/2019 34     Blood Culture 05/21/2019 No Growth After 5 Days   Blood Culture 05/21/2019 No Growth After 5 Days       Sodium 05/21/2019 138     Potassium 05/21/2019 3 9     Chloride 05/21/2019 97*    CO2 05/21/2019 35*    ANION GAP 05/21/2019 6     BUN 05/21/2019 11     Creatinine 05/21/2019 0 87     Glucose 05/21/2019 140*    Calcium 05/21/2019 9 6     AST 05/21/2019 15     ALT 05/21/2019 18     Alkaline Phosphatase 05/21/2019 47*    Total Protein 05/21/2019 7 2     Albumin 05/21/2019 3 6     Total Bilirubin 05/21/2019 0 70     eGFR 05/21/2019 57     LACTIC ACID 05/21/2019 1 4     Troponin I 05/21/2019 0 07*    BNP 05/21/2019 1,029*    Ventricular Rate 05/21/2019 92     Atrial Rate 05/21/2019 92     RI Interval 05/21/2019 146     QRSD Interval 05/21/2019 90     QT Interval 05/21/2019 356     QTC Interval 05/21/2019 440     P Axis 05/21/2019 32     QRS Axis 05/21/2019 42     T Wave Axis 05/21/2019 -1     Troponin I 05/21/2019 0 07*    Troponin I 05/21/2019 0 06*    Sodium 05/22/2019 138     Potassium 05/22/2019 3 9     Chloride 05/22/2019 97*    CO2 05/22/2019 37*    ANION GAP 05/22/2019 4     BUN 05/22/2019 15     Creatinine 05/22/2019 0 89     Glucose 05/22/2019 101*    Calcium 05/22/2019 9 1     eGFR 05/22/2019 56     Magnesium 05/22/2019 2 0     Sodium 05/23/2019 139     Potassium 05/23/2019 3 9     Chloride 05/23/2019 97*    CO2 05/23/2019 38*    ANION GAP 05/23/2019 4     BUN 05/23/2019 16     Creatinine 05/23/2019 0 87     Glucose 05/23/2019 94     Calcium 05/23/2019 8 8     eGFR 05/23/2019 57      No results found  Assessment:       1  Acute congestive heart failure, unspecified heart failure type (HCC)  furosemide (LASIX) 20 mg tablet   2  Essential hypertension  metoprolol tartrate (LOPRESSOR) 25 mg tablet        Plan:       I again cannot delete acute congestive heart failure above  She does not have acute CHF  She has chronic CHF currently euvolemic without any evidence for decompensation  She is not having any angina pectoris  She has no evidence for electrical instability  Meds currently ideal, no changes advised today        At her request I will have her come back in 4 months, sooner as needed she will call with any potential cardiac symptoms in the interim

## 2019-08-29 NOTE — TELEPHONE ENCOUNTER
THIS WAS UPDATED THE LAST TIME AND NOT SENT INTO THE PHARMACY [facility] 20 -1 DAILY    CAN YOU SEND THIS RX IN PLEASE-THANKS

## 2019-09-19 NOTE — TELEPHONE ENCOUNTER
Scheduled Medication Review:  Pt's scheduled medication use was reviewed by myself/staff via the [employer] website  Pt's use has been found to be appropriate w/o any concerns for misuse by the patient  Pt's current conditions require continued scheduled medication use at this time  Future review for continued appropriate medication use and misuse will continue

## 2019-11-06 NOTE — TELEPHONE ENCOUNTER
Scheduled Medication Review:  Pt's scheduled medication use was reviewed by myself/staff via the [employer] website  Pt's use has been found to be appropriate w/o any concerns for misuse by the patient  Pt's current conditions require continued scheduled medication use at this time  Future review for continued appropriate medication use and misuse will continue

## 2019-11-10 PROBLEM — S32.010K: Status: ACTIVE | Noted: 2019-01-01

## 2019-11-10 PROBLEM — S32.000A LUMBAR COMPRESSION FRACTURE (HCC): Status: ACTIVE | Noted: 2019-01-01

## 2019-11-10 NOTE — ED PROVIDER NOTES
History  Chief Complaint   Patient presents with    Back Pain     low pain pain without known injury  states no bowel movement since wednesday  nausea  This is a 25-year-old female chief complaint lower back pain  She states that the pain is 10/10 and severe  She states it is in the middle of her lower lumbar spine  She states that the pain in her back is not radiating  Worse with movement  Not worse with palpation  She does not recall ever having pain quite this severe  She thinks it initially started approximately 1 week ago when she pushed up off the chair  However, the patient also notes that she has had nausea, vomiting and has not been able to have a bowel movement for the past 4 days  She states this is extremely had usual for her  She has been taking MiraLax daily for the past few days with no effect  Patient does note that she has been globally weak recently but has not had any specific areas weakness  Patient notes that she does have a history of COPD but does not take any medications for this  Prior to Admission Medications   Prescriptions Last Dose Informant Patient Reported? Taking?    Calcium Carbonate-Vitamin D 600-125 MG-UNIT TABS 11/9/2019 at Unknown time  Yes Yes   Sig: Take by mouth daily     LORazepam (ATIVAN) 0 5 mg tablet Unknown at Unknown time  No No   Sig: Take 1 tablet (0 5 mg total) by mouth every 8 (eight) hours as needed for anxiety   MULTIPLE VITAMINS-CALCIUM PO 11/9/2019 at Unknown time  Yes Yes   Sig: Take by mouth daily   Potassium 99 MG TABS 11/9/2019 at Unknown time  Yes Yes   Sig: Take by mouth daily   aspirin 81 mg chewable tablet 11/9/2019 at Unknown time  No Yes   Sig: Chew 1 tablet (81 mg total) daily   atorvastatin (LIPITOR) 40 mg tablet 11/9/2019 at Unknown time  No Yes   Sig: Take 1 tablet (40 mg total) by mouth daily   ferrous sulfate 325 (65 Fe) mg tablet 11/9/2019 at Unknown time  Yes Yes   Sig: Take 0 5 tablets by mouth daily     furosemide (LASIX) 20 mg tablet 2019 at Unknown time  No Yes   Sig: Take 1 tablet (20 mg total) by mouth daily   metoprolol tartrate (LOPRESSOR) 25 mg tablet 2019 at Unknown time  No Yes   Si 5 tablets twice a day   nitroglycerin (NITROSTAT) 0 4 mg SL tablet Unknown at Unknown time  No No   Sig: Place 1 tablet (0 4 mg total) under the tongue every 5 (five) minutes as needed for chest pain   ranitidine (ZANTAC) 150 mg tablet 2019 at Unknown time  No Yes   Sig: Take 1 tablet (150 mg total) by mouth 2 (two) times a day      Facility-Administered Medications: None       Past Medical History:   Diagnosis Date    Abnormal blood sugar 2017    Abnormal carotid duplex scan     Carotid Duplex (Plaque formation is quite prominent bilaterally  Despite these changes, no evidence for greater than 50% diameter reducing lesions in the cervical portion of either carotid artery hemisystem ) - 2017    Anxiety     Cervical radiculopathy 2017    CHF [facility]congestive [facility] failure) [facility]     diastolic    COPD (chronic obstructive pulmonary disease) [facility] [address] ) 2018    Coronary angioplasty status     Coronary artery disease 2017    Costochondritis 2013    suspect MS in origin given relationship to ROM and location  Start mobic and if persists, reeval  Refused xrayat this time   Dyslipidemia     GERD without esophagitis 2012    H/O CT scan of chest      (No PE, minimal interstitial change left lower lobe and right upper lobe, which may be acute ) - 2017    History of Holter monitoring     Holter (Sinus rhythm with rates ranging from 52 to 118 bpm   No afib or heart block  Rare atrial and ventricular ectopic beats  One six-beat atrial run noted  No pauses  ) - 2017    Hx of echocardiogram     Echo (EF 0 60 (60%), Biatrial enlargement ) - 3/24/2017 Echo (EF 0 55 (55%), mild LVH  Aortic valvular sclerosis  Trace MI with mild left atrial dilatation, mild MAC   Mild TI with mild pulmonary hypertension  ) - 5/22/2017 Echo (EF 0 60 (60%), Normal left vent chamber size and systolic function  Mild diastolic dysfunction of LV w/normal filling pressures  Mild mitral regurg ) - 7/26/2017 Echo (EF 0    Hx of echocardiogram 08/16/2017    EF0 60 (60%) Normal left vent chamber size and systolic function of LV w/normal filling presures  Mild mitral regurg  / EF0 50 (50%) Mild LVH  MIld MR 10/6/17     Hypertension     Iron deficiency anemia 4/21/2016    Macular degeneration, right eye     Malignant melanoma of skin ([facility] [address] ) 9/17/2012    Mixed hyperlipidemia     NSTEMI (non-ST elevated myocardial infarction) ([facility] [address] ) 7/25/2017    Old MI (myocardial infarction)     Osteoarthritis 9/17/2012    Osteoporosis 3/13/2017    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[facility] [address] ) 04/07/2017    Urinary tract infection     frequent UTI's       Past Surgical History:   Procedure Laterality Date    ABDOMINAL SURGERY      APPENDECTOMY      BREAST SURGERY      Lumpectomy    CARDIAC CATHETERIZATION  03/24/2017    ARNIE to 100% occluded prox RCA, chronic 99% ostial LCfx, 60% mid LAD, discrete 40% distal LM / EF0 60 (60%) 100% occluded proximal RCA s/p ARNIE, chronic 99% ostial LCX, 60% mid LAD, discrete 40% dital LM  4/5/17    CHOLECYSTECTOMY      COLONOSCOPY N/A 7/25/2018    Procedure: COLONOSCOPY;  Surgeon: Francisco Elias MD;  [facility] MAIN OR;  Service: Gastroenterology    CORONARY STENT PLACEMENT  03/25/2017    ARNIE RCA    HEMORRHOID SURGERY      INSERTION STENT ARTERY  04/07/2017    Cardio vascular agents drug-eluting stent    SKIN BIOPSY      TONSILLECTOMY         Family History   Problem Relation Age of Onset    Heart failure Mother     Prostate cancer Father     Stroke Family      I have reviewed and agree with the history as documented      Social History     Tobacco Use    Smoking status: Former Smoker     Types: Cigarettes    Smokeless tobacco: Never Used   Substance Use Topics    Alcohol use: Not Currently    Drug use: Never        Review of Systems   Constitutional: Positive for chills and fatigue  Negative for activity change and fever  HENT: Negative for congestion  Eyes: Negative for visual disturbance  Respiratory: Negative for cough, chest tightness and shortness of breath  Cardiovascular: Negative for chest pain  Gastrointestinal: Negative for abdominal pain, diarrhea and vomiting  Genitourinary: Negative for dysuria  Skin: Negative for rash  Neurological: Negative for dizziness, weakness and numbness  Physical Exam  Physical Exam   Constitutional: She is oriented to person, place, and time  Frail, elderly woman who is comfortable and resting but appears significantly discomfort when shifting positions  HENT:   Head: Normocephalic and atraumatic  Eyes: Pupils are equal, round, and reactive to light  Conjunctivae and EOM are normal    Neck: Normal range of motion  Neck supple  Cardiovascular: Normal rate, regular rhythm and normal heart sounds  Pulmonary/Chest: No respiratory distress  Patient is on 4 L nasal cannula, significant wheezing diffusely   Abdominal: Soft  Bowel sounds are normal  There is no tenderness  There is no rebound and no guarding  Musculoskeletal: Normal range of motion  No pain to palpation over the spine, no bony step-offs, patient has significant lordosis  Neurological: She is alert and oriented to person, place, and time  No focal deficits, equal strength in bilateral lower extremities  Skin: Skin is warm and dry  Capillary refill takes less than 2 seconds  Psychiatric: She has a normal mood and affect   Her behavior is normal        Vital Signs  ED Triage Vitals   Temperature Pulse Respirations Blood Pressure SpO2   11/1933 11/1933 11/1933 11/1933 11/1933   98 5 °F (36 9 °C) 105 22 (!) 200/92 (!) 88 %      Temp Source Heart Rate Source Patient Position - Orthostatic VS BP Location FiO2 (%)   11/10/19 0310 11/10/19 0310 11/10/19 0310 11/10/19 0310 11/11/19 1631   Temporal Monitor Lying Left arm 60      Pain Score       11/1933       Worst Possible Pain           Vitals:    11/17/19 0716 11/17/19 1057 11/17/19 1143 11/17/19 1150   BP: (!) 180/83 164/74 165/78    Pulse: 72 76     Patient Position - Orthostatic VS: Lying Lying  Sitting         Visual Acuity  Visual Acuity      Most Recent Value   L Pupil Size (mm)  3   R Pupil Size (mm)  3   L Pupil Shape  Round   R Pupil Shape  Round          ED Medications  Medications   aspirin chewable tablet 81 mg (81 mg Oral Given 11/17/19 0814)   atorvastatin (LIPITOR) tablet 40 mg (40 mg Oral Given 11/17/19 0814)   calcium carbonate-vitamin D (OSCAL-D) 500 mg-200 units per tablet 2 tablet (2 tablets Oral Given 11/17/19 0814)   ferrous sulfate tablet 162 5 mg (162 5 mg Oral Given 11/17/19 0814)   famotidine (PEPCID) tablet 20 mg (20 mg Oral Given 11/17/19 0813)   metoprolol tartrate (LOPRESSOR) partial tablet 12 5 mg (12 5 mg Oral Given 11/17/19 0813)   docusate sodium (COLACE) capsule 100 mg (100 mg Oral Given 11/17/19 0814)   polyethylene glycol (MIRALAX) packet 17 g (17 g Oral Given 11/17/19 0813)   ondansetron (ZOFRAN) injection 4 mg ( Intravenous MAR Unhold 11/15/19 1612)   senna (SENOKOT) tablet 8 6 mg (8 6 mg Oral Given 11/16/19 2221)   sodium chloride 0 9 % infusion (0 mL/hr Intravenous Stopped 11/12/19 2020)   enoxaparin (LOVENOX) subcutaneous injection 30 mg (30 mg Subcutaneous Given 11/17/19 0813)   LORazepam (ATIVAN) tablet 0 25 mg (0 25 mg Oral Given 11/17/19 0813)   ipratropium (ATROVENT) 0 02 % inhalation solution 0 5 mg (0 5 mg Nebulization Given 11/17/19 1103)   levalbuterol (XOPENEX) inhalation solution 0 63 mg (0 63 mg Nebulization Given 11/17/19 1103)   lidocaine (LIDODERM) 5 % patch 1 patch (1 patch Topical Medication Applied 11/17/19 0813)   guaiFENesin (MUCINEX) 12 hr tablet 600 mg (600 mg Oral Given 11/17/19 0814)   melatonin tablet 6 mg (6 mg Oral Given 11/16/19 2223)   predniSONE tablet 40 mg (40 mg Oral Given 11/14/19 1211)     Followed by   predniSONE tablet 30 mg (30 mg Oral Given 11/16/19 0902)     Followed by   predniSONE tablet 20 mg (20 mg Oral Given 11/17/19 0814)     Followed by   predniSONE tablet 10 mg ( Oral MAR Unhold 11/15/19 1613)   Labetalol HCl (NORMODYNE) injection 5 mg ( Intravenous MAR Unhold 11/15/19 1613)   OLANZapine (ZyPREXA ZYDIS) dispersible tablet 5 mg (5 mg Oral Given 11/16/19 2223)   furosemide (LASIX) tablet 20 mg (20 mg Oral Given 11/17/19 0814)   acetaminophen (TYLENOL) tablet 975 mg (975 mg Oral Given 11/16/19 0538)     Followed by   acetaminophen (TYLENOL) tablet 650 mg (650 mg Oral Given 11/16/19 1211)   cefTRIAXone (ROCEPHIN) IVPB (premix) 1,000 mg (1,000 mg Intravenous New Bag 11/16/19 1729)   amLODIPine (NORVASC) tablet 5 mg (has no administration in time range)   traMADol (ULTRAM) tablet 50 mg (50 mg Oral Given 11/17/19 1059)   morphine injection 2 mg (2 mg Intravenous Given 11/9/19 2158)   iohexol (OMNIPAQUE) 240 MG/ML solution 50 mL (50 mL Oral Given 11/9/19 2212)   iohexol (OMNIPAQUE) 350 MG/ML injection (MULTI-DOSE) 100 mL (100 mL Intravenous Given 11/10/19 0034)   fentanyl citrate (PF) 100 MCG/2ML 50 mcg (50 mcg Intravenous Given 11/10/19 0044)   fentanyl citrate (PF) 100 MCG/2ML 50 mcg (50 mcg Intravenous Given 11/10/19 0154)   lidocaine (LIDODERM) 5 % patch 1 patch (1 patch Topical Patch Removed 11/10/19 1349)   influenza vaccine, high-dose (FLUZONE HIGH-DOSE) IM injection RENA 0 5 mL (0 5 mL Intramuscular Given 11/10/19 0801)   lidocaine (LIDODERM) 5 % patch 1 patch (1 patch Topical Patch Removed 11/10/19 2118)   sodium chloride 0 9 % bolus 500 mL (500 mL Intravenous New Bag 11/11/19 1517)   metoprolol (LOPRESSOR) injection 5 mg (5 mg Intravenous Given 11/12/19 0602)   OLANZapine (ZyPREXA) IM injection 2 5 mg (2 5 mg Intramuscular Given 11/12/19 1538)   haloperidol lactate (HALDOL) injection 2 5 mg (2 5 mg Intravenous Given 11/12/19 2052)   furosemide (LASIX) tablet 20 mg (20 mg Oral Given 11/15/19 1626)   amLODIPine (NORVASC) tablet 2 5 mg (2 5 mg Oral Given 11/17/19 1059)       Diagnostic Studies  Results Reviewed     Procedure Component Value Units Date/Time    Urine Microscopic [[id number]]  (Abnormal) Collected:  11/09/19 2224    Lab Status:  Final result Specimen:  Urine, Clean Catch Updated:  11/09/19 2239     RBC, UA None Seen /hpf      WBC, UA 4-10 /hpf      Epithelial Cells Occasional /hpf      Bacteria, UA Occasional /hpf      Hyaline Casts, UA 0-1 /lpf      Ca Oxalate Crystal, UA Occasional /hpf      OTHER OBSERVATIONS Renal Epithelial Cells Present  Transitional Epithelial Cells     MUCUS THREADS Occasional    UA w Reflex to Microscopic w Reflex to Culture [[id number]]  (Abnormal) Collected:  11/09/19 2224    Lab Status:  Final result Specimen:  Urine, Clean Catch Updated:  11/09/19 2235     Color, UA Yellow     Clarity, UA Clear     Specific Gravity, UA >=1 030     pH, UA 5 0     Leukocytes, UA Trace     Nitrite, UA Negative     Protein, UA Trace mg/dl      Glucose, UA Negative mg/dl      Ketones, UA Negative mg/dl      Urobilinogen, UA 0 2 E U /dl      Bilirubin, UA Negative     Blood, UA Negative    Troponin I [[id number]]  (Normal) Collected:  11/09/19 2215    Lab Status:  Final result Specimen:  Blood Updated:  11/09/19 2235     Troponin I <0 03 ng/mL     Lipase [[id number]]  (Normal) Collected:  11/09/19 2151    Lab Status:  Final result Specimen:  Blood from Arm, Left Updated:  11/09/19 2214     Lipase 17 u/L     CMP [[id number]]  (Abnormal) Collected:  11/09/19 2151    Lab Status:  Final result Specimen:  Blood from Arm, Left Updated:  11/09/19 2213     Sodium 134 mmol/L      Potassium 4 6 mmol/L      Chloride 96 mmol/L      CO2 33 mmol/L      ANION GAP 5 mmol/L      BUN 21 mg/dL      Creatinine 0 85 mg/dL      Glucose 152 mg/dL      Calcium 9 9 mg/dL      AST 25 U/L      ALT 15 U/L      Alkaline Phosphatase 47 U/L      Total Protein 7 1 g/dL      Albumin 3 7 g/dL      Total Bilirubin 0 90 mg/dL      eGFR 59 ml/min/1 73sq m     Narrative:       [place] guidelines for Chronic Kidney Disease (CKD):     Stage 1 with normal or high GFR (GFR > 90 mL/min/1 73 square meters)    Stage 2 Mild CKD (GFR = 60-89 mL/min/1 73 square meters)    Stage 3A Moderate CKD (GFR = 45-59 mL/min/1 73 square meters)    Stage 3B Moderate CKD (GFR = 30-44 mL/min/1 73 square meters)    Stage 4 Severe CKD (GFR = 15-29 mL/min/1 73 square meters)    Stage 5 End Stage CKD (GFR <15 mL/min/1 73 square meters)  Note: GFR calculation is accurate only with a steady state creatinine    CBC and differential [[id number]]  (Abnormal) Collected:  11/09/19 2151    Lab Status:  Final result Specimen:  Blood from Arm, Left Updated:  11/09/19 2158     WBC 10 50 Thousand/uL      RBC 5 08 Million/uL      Hemoglobin 15 2 g/dL      Hematocrit 45 7 %      MCV 90 fL      MCH 29 9 pg      MCHC 33 2 g/dL      RDW 14 0 %      MPV 8 3 fL      Platelets 073 Thousands/uL      Neutrophils Relative 80 %      Lymphocytes Relative 9 %      Monocytes Relative 10 %      Eosinophils Relative 1 %      Basophils Relative 0 %      Neutrophils Absolute 8 40 Thousands/µL      Lymphocytes Absolute 0 90 Thousands/µL      Monocytes Absolute 1 10 Thousand/µL      Eosinophils Absolute 0 10 Thousand/µL      Basophils Absolute 0 00 Thousands/µL                  XR chest portable   Final Result by Donna Bingham MD (11/14 1116)      Moderate pulmonary edema  Small effusions  Large hiatal hernia  Workstation performed: [id number]         NM lung ventilation / perfusion   Final Result by Gary Plascencia MD (11/12 7189)      Limited study without ventilation  The probability for pulmonary embolus is intermediate or indeterminate  See above comments  The study was marked in [facility] for immediate notification        Workstation performed: [id number]         VAS lower limb venous duplex study, complete bilateral   Final Result by Claudetta Knife, MD (11/12 1935)      XR chest portable   Final Result by Juan Garnica MD (11/12 5696)      No acute cardiopulmonary disease  Large hiatal hernia  Workstation performed: [id number]         CT chest wo contrast   Final Result by Man Casper MD (11/11 3189)      Multifocal patchy areas of groundglass and consolidation within the lungs bilaterally a few of which appear nodular  Findings are favored to be infectious in nature and should be followed to resolution  Cardiomegaly  Trace bilateral pleural effusions  Large hiatal hernia  Redemonstration of an age-indeterminate L1 compression fracture, new since 7/23/2018  Workstation performed: [id number]         CT Abdomen pelvis with contrast   Final Result by Nereida Solares MD (11/10 0619)      Age-indeterminate L1 compression deformity with 45% loss of height  There is minimal retropulsion of posterior fracture fragments  I personally discussed this study with Dr Rossy Ya on 11/10/2019 at 1:37 AM       Unchanged large hiatal hernia  Workstation performed: [id number]         XR chest 2 views   Final Result by Janeth Christensen MD (11/10 5448)      Large hiatal hernia without evidence of acute cardiopulmonary disease  Workstation performed: [id number]         XR spine lumbar 2 or 3 views injury   Final Result by Humza Haas MD (11/10 4216)      Chronic T12 and L1 compression abnormalities similar to the prior 2018 CT         Workstation performed: [id number]                    Procedures  Procedures       ED Course                               MDM  Number of Diagnoses or Management Options  Constipation: new and requires workup  Lumbar compression fracture [facility]): new and requires workup  Diagnosis management comments: This is a 51-year-old female with multiple complaints  I  Am concerned due to her lower back pain being so severe  Plain film x-ray completed by me  Noted to have possible bowel obstruction incidentally found  Further history and examination revealed more significant GI problems including multiple days of no bowel movements at significant pain on palpation  CT abdomen pelvis ordered and pending  This will also allow for better imaging of the lumbar spine  My initial plain film did not demonstrate any fractures but the pain was quite severe and there was some pain to palpation  Patient also began to have experience of worsening shortness of breath while in the emergency department and required up to 4 L nasal cannula  Shortness of breath workup included EKG, troponin  These results were pending well I signed the case out to Dr Amanda Moore  Patient and her daughter stated understanding and agreement with the plan for further workup  Patient was noted to have a significant DELIO which will likely need further workup inpatient         Amount and/or Complexity of Data Reviewed  Clinical lab tests: ordered and reviewed  Tests in the radiology section of CPT®: ordered and reviewed    Risk of Complications, Morbidity, and/or Mortality  Presenting problems: high  Diagnostic procedures: high  Management options: high        Disposition  Final diagnoses:   Lumbar compression fracture ([facility] [address] )   Constipation     Time reflects when diagnosis was documented in both MDM as applicable and the Disposition within this note     Time User Action Codes Description Comment    11/10/2019  2:48 AM Binh HUNTLEY Add [S32 000A] Lumbar compression fracture ([facility] [address] )     11/10/2019  2:48 AM Binh HUNTLEY Add [K59 00] Constipation     11/10/2019  3:40 AM Angy Bustamante Modify [S32 000A] Lumbar compression fracture ([facility] [address] )     11/10/2019  3:44 AM Angy Bustamante Add [S32 010K] 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     11/11/2019  3:54 PM Lizabeth Gonsalez Add [R79 89] Elevated troponin 11/11/2019  4:06 PM Rogelio Anna Add [N17 9] DELIO (acute kidney injury) ([facility] [address] )     11/11/2019  5:59 PM Elijah Situ [J96 21,  J96 22] Acute on chronic respiratory failure with hypoxia and hypercapnia [facility])       ED Disposition     ED Disposition Condition Date/Time Comment    Admit Stable Julia Nov 10, 2019  2:48 AM Case was discussed with DELBERT and the patient's admission status was agreed to be Admission Status: inpatient status to the service of Dr Leticia Gross           Follow-up Information    None         Current Discharge Medication List      CONTINUE these medications which have NOT CHANGED    Details   aspirin 81 mg chewable tablet Chew 1 tablet (81 mg total) daily  Qty: 30 tablet, Refills: 0    Associated Diagnoses: Non-STEMI (non-ST elevated myocardial infarction) ([facility])      atorvastatin (LIPITOR) 40 mg tablet Take 1 tablet (40 mg total) by mouth daily  Qty: 90 tablet, Refills: 3    Associated Diagnoses: Hyperlipidemia, unspecified hyperlipidemia type      Calcium Carbonate-Vitamin D 600-125 MG-UNIT TABS Take by mouth daily        ferrous sulfate 325 (65 Fe) mg tablet Take 0 5 tablets by mouth daily        furosemide (LASIX) 20 mg tablet Take 1 tablet (20 mg total) by mouth daily  Qty: 90 tablet, Refills: 0    Associated Diagnoses: Acute congestive heart failure, unspecified heart failure type ([facility])      metoprolol tartrate (LOPRESSOR) 25 mg tablet 0 5 tablets twice a day  Qty: 60 tablet, Refills: 4    Associated Diagnoses: Essential hypertension      MULTIPLE VITAMINS-CALCIUM PO Take by mouth daily      Potassium 99 MG TABS Take by mouth daily    Associated Diagnoses: Coronary artery disease involving native coronary artery of native heart without angina pectoris      ranitidine (ZANTAC) 150 mg tablet Take 1 tablet (150 mg total) by mouth 2 (two) times a day  Qty: 60 tablet, Refills: 5    Associated Diagnoses: Gastroesophageal reflux disease without esophagitis      LORazepam (ATIVAN) 0 5 mg tablet Take 1 tablet (0 5 mg total) by mouth every 8 (eight) hours as needed for anxiety  Qty: 90 tablet, Refills: 0    Associated Diagnoses: Generalized anxiety disorder      nitroglycerin (NITROSTAT) 0 4 mg SL tablet Place 1 tablet (0 4 mg total) under the tongue every 5 (five) minutes as needed for chest pain  Qty: 30 tablet, Refills: 0    Associated Diagnoses: Chest pain               ED Provider  Electronically Signed by           Olivia Patterson MD  11/17/19 7243

## 2019-11-10 NOTE — PHYSICAL THERAPY NOTE
PT and OT evals held until pt is seen by ortho due to compression fracture in lumbar spine   Letty Tyson PT

## 2019-11-10 NOTE — H&P
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  Assessment & Plan  · ER provider contacted neurosurgery Dr Fred Roman  · He states pain control, PT OT, brace  · Consult Orthopedics here  · Consult acute rehab unit  · Lidoderm patch, Robaxin, Oxy IR, morphine IV  Constipation  Assessment & Plan  · Start Colace, MiraLax, suppository    Essential hypertension  Assessment & Plan  · Continue metoprolol and Lasix    Hyperlipidemia, mixed  Assessment & Plan  · Continue Lipitor    Coronary artery disease  Assessment & Plan  · Continue aspirin, metoprolol    Hiatal hernia with GERD without esophagitis  Assessment & Plan  · Continue Pepcid      VTE Prophylaxis: Enoxaparin (Lovenox)  Code Status:  DNR DNI  POLST: POLST is not applicable to this patient  Discussion with family:  Discussed with patient    Anticipated Length of Stay:  Patient will be admitted on an Inpatient basis with an anticipated length of stay of  > 2 midnights  Justification for Hospital Stay:  Need to be seen by Orthopedics    Total Time for Visit, including Counseling / Coordination of Care: 1 hour  Greater than 50% of this total time spent on direct patient counseling and coordination of care  Chief Complaint:   Low back pain    History of Present Illness:    Marleen Chu is a 80 y o  female who presents with low back pain  This patient has no known injury  Patient complains of midline low back pain that does not radiate anywhere and is worse when she moves  She states that this is been going on for greater than 1 week  In the emergency department she was given morphine, fentanyl, and a Lidoderm patch      And upon reviewing CT of abdomen and pelvis does show an L1 compression deformity with 45% decrease in height, with minimal retropulsion of posterior fracture fragments  Neurosurgery was consulted in the emergency department, and stated that pain control PT OT and a brace would be appropriate  There will be a consult for Orthopedics  The patient also states that along with her low back pain she has not had a bowel movement in 4 days since Wednesday, we will order the patient Colace and suppository in an attempt to help alleviate her constipation  Review of Systems:  Review of Systems   Constitutional: Positive for activity change  Gastrointestinal: Positive for constipation  Musculoskeletal: Positive for back pain  All other systems reviewed and are negative  Past Medical and Surgical History:   Past Medical History:   Diagnosis Date    Abnormal blood sugar 03/13/2017    Abnormal carotid duplex scan     Carotid Duplex (Plaque formation is quite prominent bilaterally  Despite these changes, no evidence for greater than 50% diameter reducing lesions in the cervical portion of either carotid artery hemisystem ) - 6/13/2017    Anxiety     Cervical radiculopathy 08/08/2017    CHF (congestive heart failure) ([facility]     diastolic    COPD (chronic obstructive pulmonary disease) ([facility] [address] ) 2/6/2018    Coronary angioplasty status     Coronary artery disease 4/7/2017    Costochondritis 02/05/2013    suspect MS in origin given relationship to ROM and location  Start mobic and if persists, reeval  Refused xrayat this time   Dyslipidemia     GERD without esophagitis 8/30/2012    H/O CT scan of chest      (No PE, minimal interstitial change left lower lobe and right upper lobe, which may be acute ) - 5/19/2017    History of Holter monitoring     Holter (Sinus rhythm with rates ranging from 52 to 118 bpm   No afib or heart block  Rare atrial and ventricular ectopic beats  One six-beat atrial run noted  No pauses  ) - 5/31/2017    Hx of echocardiogram     Echo (EF 0 60 (60%), Biatrial enlargement ) - 3/24/2017 Echo (EF 0 55 (55%), mild LVH  Aortic valvular sclerosis  Trace MI with mild left atrial dilatation, mild MAC  Mild TI with mild pulmonary hypertension  ) - 5/22/2017 Echo (EF 0 60 (60%), Normal left vent chamber size and systolic function  Mild diastolic dysfunction of LV w/normal filling pressures  Mild mitral regurg ) - 7/26/2017 Echo (EF 0    Hx of echocardiogram 08/16/2017    EF0 60 (60%) Normal left vent chamber size and systolic function of LV w/normal filling presures  Mild mitral regurg  / EF0 50 (50%) Mild LVH  MIld MR 10/6/17     Hypertension     Iron deficiency anemia 4/21/2016    Macular degeneration, right eye     Malignant melanoma of skin ([facility] [address] ) 9/17/2012    Mixed hyperlipidemia     NSTEMI (non-ST elevated myocardial infarction) ([facility] [address] ) 7/25/2017    Old MI (myocardial infarction)     Osteoarthritis 9/17/2012    Osteoporosis 3/13/2017    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[facility] [address] ) 04/07/2017    Urinary tract infection     frequent UTI's       Past Surgical History:   Procedure Laterality Date    ABDOMINAL SURGERY      APPENDECTOMY      BREAST SURGERY      Lumpectomy    CARDIAC CATHETERIZATION  03/24/2017    ARNIE to 100% occluded prox RCA, chronic 99% ostial LCfx, 60% mid LAD, discrete 40% distal LM / EF0 60 (60%) 100% occluded proximal RCA s/p ARNIE, chronic 99% ostial LCX, 60% mid LAD, discrete 40% dital LM  4/5/17    CHOLECYSTECTOMY      COLONOSCOPY N/A 7/25/2018    Procedure: COLONOSCOPY;  Surgeon: Keya Agustin MD;  Location: [address] [place] [address] OR;  Service: Gastroenterology    CORONARY STENT PLACEMENT  03/25/2017    ARNIE RCA    HEMORRHOID SURGERY      INSERTION STENT ARTERY  04/07/2017    Cardio vascular agents drug-eluting stent    SKIN BIOPSY      TONSILLECTOMY         Meds/Allergies:  Prior to Admission medications    Medication Sig Start Date End Date Taking?  Authorizing Provider   aspirin 81 mg chewable tablet Chew 1 tablet (81 mg total) daily 8/1/18  Yes ROGELIO Mosquera atorvastatin (LIPITOR) 40 mg tablet Take 1 tablet (40 mg total) by mouth daily 4/12/19  Yes Maurice Flanagan DO   Calcium Carbonate-Vitamin D 600-125 MG-UNIT TABS Take by mouth daily   5/20/13  Yes Historical Provider, MD   ferrous sulfate 325 (65 Fe) mg tablet Take 0 5 tablets by mouth daily   3/17/16  Yes Historical Provider, MD   furosemide (LASIX) 20 mg tablet Take 1 tablet (20 mg total) by mouth daily 10/11/19  Yes Maurice Flanagan DO   metoprolol tartrate (LOPRESSOR) 25 mg tablet 0 5 tablets twice a day 7/17/19  Yes Kevin Patten, DO   MULTIPLE VITAMINS-CALCIUM PO Take by mouth daily 5/20/13  Yes Historical Provider, MD   Potassium 99 MG TABS Take by mouth daily   Yes Historical Provider, MD   ranitidine (ZANTAC) 150 mg tablet Take 1 tablet (150 mg total) by mouth 2 (two) times a day 7/11/19  Yes Maurice Flanagan DO   LORazepam (ATIVAN) 0 5 mg tablet Take 1 tablet (0 5 mg total) by mouth every 8 (eight) hours as needed for anxiety 11/6/19   Maurice Flanagan DO   nitroglycerin (NITROSTAT) 0 4 mg SL tablet Place 1 tablet (0 4 mg total) under the tongue every 5 (five) minutes as needed for chest pain 5/30/19   Joana Szymanski, DO     I have reviewed home medications with patient personally  Allergies: Allergies   Allergen Reactions    Ciprofloxacin      Reaction Date: 01Jul2011;     Keflex [Cephalexin] Dizziness    Nitrofurantoin      Reaction Date: [id number];     Penicillins Rash, Other (See Comments) and Throat Swelling     Throat swells       Social History:  Marital Status:     Occupation:  Retired  Patient Pre-hospital Living Situation:  At home  Patient Pre-hospital Level of Mobility:  Functioning  Patient Pre-hospital Diet Restrictions:  None  Substance Use History:     Social History     Substance and Sexual Activity   Alcohol Use Not Currently     Social History     Tobacco Use   Smoking Status Former Smoker    Types: Cigarettes   Smokeless Tobacco Never Used     Social History     Substance and Sexual Activity   Drug Use Never       Family History:  I have reviewed the patients family history    Physical Exam:   Vitals:   Blood Pressure: 170/68 (11/10/19 0348)  Pulse: 91 (11/10/19 0310)  Temperature: 97 9 °F (36 6 °C) (11/10/19 0310)  Temp Source: Temporal (11/10/19 0310)  Respirations: 22 (11/10/19 0310)  Height: 5' 2" (157 5 cm) (11/10/19 0310)  Weight - Scale: 75 5 kg (166 lb 7 2 oz) (11/10/19 0345)  SpO2: 95 % (11/10/19 0310)    Physical Exam   Constitutional: She is oriented to person, place, and time  She appears well-developed and well-nourished  She is cooperative  HENT:   Head: Normocephalic and atraumatic  Nose: Nose normal    Mouth/Throat: Oropharynx is clear and moist and mucous membranes are normal    Eyes: Conjunctivae and EOM are normal    Neck: Full passive range of motion without pain  Cardiovascular: Normal rate, regular rhythm, normal heart sounds and normal pulses  Pulmonary/Chest: Effort normal and breath sounds normal    Abdominal: Soft  Normal appearance and bowel sounds are normal  There is generalized tenderness  Musculoskeletal:        Lumbar back: She exhibits decreased range of motion, tenderness and pain  Neurological: She is alert and oriented to person, place, and time  Skin: Skin is warm  Psychiatric: She has a normal mood and affect  Her speech is normal and behavior is normal        Additional Data:   Lab Results: I have personally reviewed pertinent reports        Results from last 7 days   Lab Units 11/09/19  2151   WBC Thousand/uL 10 50   HEMOGLOBIN g/dL 15 2   HEMATOCRIT % 45 7   PLATELETS Thousands/uL 228   NEUTROS PCT % 80*   LYMPHS PCT % 9*   MONOS PCT % 10   EOS PCT % 1     Results from last 7 days   Lab Units 11/09/19  2151   POTASSIUM mmol/L 4 6   CHLORIDE mmol/L 96*   CO2 mmol/L 33*   BUN mg/dL 21   CREATININE mg/dL 0 85   CALCIUM mg/dL 9 9   ALK PHOS U/L 47*   ALT U/L 15   AST U/L 25                   Imaging: I have personally reviewed pertinent reports  CT Abdomen pelvis with contrast   Final Result by Maria Alejandra Ingram MD (11/10 0139)      Age-indeterminate L1 compression deformity with 45% loss of height  There is minimal retropulsion of posterior fracture fragments  I personally discussed this study with Dr Alex Messer on 11/10/2019 at 1:37 AM       Unchanged large hiatal hernia  Workstation performed: [id number]         XR spine lumbar 2 or 3 views injury    (Results Pending)   XR chest 2 views    (Results Pending)       EKG, Pathology, and Other Studies Reviewed on Admission:   · EKG:  None performed in the ED    NetAccess/Epic Records Reviewed: Yes     ** Please Note: This note has been constructed using a voice recognition system   **

## 2019-11-10 NOTE — PHYSICAL THERAPY NOTE
Attempted PT eval however pt reported that she was dizzy and didn't want to perform   Earna Royce, PT

## 2019-11-10 NOTE — ED PROVIDER NOTES
Received sign out from prior team  Reviewed plan of care with them and will accept care of patient  Will follow up on labs and/or radiology, as well as dispo patient  Labs Reviewed   CBC AND DIFFERENTIAL - Abnormal       Result Value Ref Range Status    WBC 10 50  4 80 - 10 80 Thousand/uL Final    RBC 5 08  3 90 - 5 20 Million/uL Final    Hemoglobin 15 2  12 0 - 16 0 g/dL Final    Hematocrit 45 7  42 0 - 47 0 % Final    MCV 90  81 - 99 fL Final    MCH 29 9  26 0 - 34 0 pg Final    MCHC 33 2  31 0 - 37 0 g/dL Final    RDW 14 0  11 5 - 14 5 % Final    MPV 8 3 (*) 8 6 - 11 7 fL Final    Platelets 244  383 - 390 Thousands/uL Final    Neutrophils Relative 80 (*) 42 - 75 % Final    Lymphocytes Relative 9 (*) 21 - 51 % Final    Monocytes Relative 10  2 - 12 % Final    Eosinophils Relative 1  0 - 5 % Final    Basophils Relative 0  0 - 2 % Final    Neutrophils Absolute 8 40 (*) 1 40 - 6 50 Thousands/µL Final    Lymphocytes Absolute 0 90  0 60 - 4 47 Thousands/µL Final    Monocytes Absolute 1 10  0 17 - 1 22 Thousand/µL Final    Eosinophils Absolute 0 10  0 00 - 0 61 Thousand/µL Final    Basophils Absolute 0 00  0 00 - 0 10 Thousands/µL Final   COMPREHENSIVE METABOLIC PANEL - Abnormal    Sodium 134  134 - 143 mmol/L Final    Potassium 4 6  3 5 - 5 5 mmol/L Final    Chloride 96 (*) 98 - 107 mmol/L Final    CO2 33 (*) 21 - 31 mmol/L Final    ANION GAP 5  4 - 13 mmol/L Final    BUN 21  7 - 25 mg/dL Final    Creatinine 0 85  0 60 - 1 20 mg/dL Final    Comment: Standardized to IDMS reference method    Glucose 152 (*) 65 - 99 mg/dL Final    Comment:   If the patient is fasting, the ADA then defines impaired fasting glucose as > 100 mg/dL and diabetes as > or equal to 123 mg/dL  Specimen collection should occur prior to Sulfasalazine administration due to the potential for falsely depressed results  Specimen collection should occur prior to Sulfapyridine administration due to the potential for falsely elevated results  Calcium 9 9  8 6 - 10 5 mg/dL Final    AST 25  13 - 39 U/L Final    Comment:   Specimen collection should occur prior to Sulfasalazine administration due to the potential for falsely depressed results  ALT 15  7 - 52 U/L Final    Comment:   Specimen collection should occur prior to Sulfasalazine administration due to the potential for falsely depressed results       Alkaline Phosphatase 47 (*) 55 - 165 U/L Final    Total Protein 7 1  6 4 - 8 9 g/dL Final    Albumin 3 7  3 5 - 5 7 g/dL Final    Total Bilirubin 0 90  0 20 - 1 00 mg/dL Final    eGFR 59  ml/min/1 73sq m Final    Narrative:     [place] guidelines for Chronic Kidney Disease (CKD):     Stage 1 with normal or high GFR (GFR > 90 mL/min/1 73 square meters)    Stage 2 Mild CKD (GFR = 60-89 mL/min/1 73 square meters)    Stage 3A Moderate CKD (GFR = 45-59 mL/min/1 73 square meters)    Stage 3B Moderate CKD (GFR = 30-44 mL/min/1 73 square meters)    Stage 4 Severe CKD (GFR = 15-29 mL/min/1 73 square meters)    Stage 5 End Stage CKD (GFR <15 mL/min/1 73 square meters)  Note: GFR calculation is accurate only with a steady state creatinine   UA W REFLEX TO MICROSCOPIC WITH REFLEX TO CULTURE - Abnormal    Color, UA Yellow  Yellow, Straw Final    Clarity, UA Clear  Hazy, Clear Final    Specific Gravity, UA >=1 030 (*) >1 005-<1 030 Final    pH, UA 5 0  5 0, 5 5, 6 0, 6 5, 7 0, 7 5 Final    Leukocytes, UA Trace (*) Negative Final    Nitrite, UA Negative  Negative Final    Protein, UA Trace (*) Negative, Interference- unable to analyze mg/dl Final    Glucose, UA Negative  Negative mg/dl Final    Ketones, UA Negative  Negative mg/dl Final    Urobilinogen, UA 0 2  0 2, 1 0 E U /dl E U /dl Final    Bilirubin, UA Negative  Negative Final    Blood, UA Negative  Negative Final   URINE MICROSCOPIC - Abnormal    RBC, UA None Seen  None Seen, 0-5 /hpf Final    WBC, UA 4-10 (*) None Seen, 0-5, 5-55, 5-65 /hpf Final    Epithelial Cells Occasional  None Seen, Occasional /hpf Final    Bacteria, UA Occasional  None Seen, Occasional /hpf Final    Hyaline Casts, UA 0-1 (*) (none) /lpf Final    Ca Oxalate Crystal, UA Occasional (*) None Seen /hpf Final    OTHER OBSERVATIONS     Final    Value: Renal Epithelial Cells Present  Transitional Epithelial Cells    MUCUS THREADS Occasional (*) None Seen Final   LIPASE - Normal    Lipase 17  11 - 82 u/L Final   TROPONIN I - Normal    Troponin I <0 03  <=0 03 ng/mL Final     CT Abdomen pelvis with contrast   Final Result      Age-indeterminate L1 compression deformity with 45% loss of height  There is minimal retropulsion of posterior fracture fragments  I personally discussed this study with Dr Rossy Ya on 11/10/2019 at 1:37 AM       Unchanged large hiatal hernia  Workstation performed: [id number]         XR spine lumbar 2 or 3 views injury    (Results Pending)   XR chest 2 views    (Results Pending)       I spoke with neurosurgery who felt that the patient could stay here and would not be a candidate for intervention  She should have pain control, brace and supportive care  Patient was admitted to [facility]        Kely Goldberg   11/10/19 [id number]

## 2019-11-10 NOTE — ASSESSMENT & PLAN NOTE
· ER provider contacted neurosurgery Dr Letty Vázquez  · He states pain control, PT OT, brace  · Consult Orthopedics here  · Consult acute rehab unit  · Lidoderm patch, Robaxin, Oxy IR, morphine IV

## 2019-11-10 NOTE — PLAN OF CARE
Problem: Potential for Falls  Goal: Patient will remain free of falls  Description  INTERVENTIONS:  - Assess patient frequently for physical needs  -  Identify cognitive and physical deficits and behaviors that affect risk of falls    -  [place] fall precautions as indicated by assessment   - Educate patient/family on patient safety including physical limitations  - Instruct patient to call for assistance with activity based on assessment  - Modify environment to reduce risk of injury  - Consider OT/PT consult to assist with strengthening/mobility  Outcome: Progressing     Problem: PAIN - ADULT  Goal: Verbalizes/displays adequate comfort level or baseline comfort level  Description  Interventions:  - Encourage patient to monitor pain and request assistance  - Assess pain using appropriate pain scale  - Administer analgesics based on type and severity of pain and evaluate response  - Implement non-pharmacological measures as appropriate and evaluate response  - Consider cultural and social influences on pain and pain management  - Notify physician/advanced practitioner if interventions unsuccessful or patient reports new pain  Outcome: Progressing     Problem: INFECTION - ADULT  Goal: Absence or prevention of progression during hospitalization  Description  INTERVENTIONS:  - Assess and monitor for signs and symptoms of infection  - Monitor lab/diagnostic results  - Monitor all insertion sites, i e  indwelling lines, tubes, and drains  - Monitor endotracheal if appropriate and nasal secretions for changes in amount and color  - [place] appropriate cooling/warming therapies per order  - Administer medications as ordered  - Instruct and encourage patient and family to use good hand hygiene technique  - Identify and instruct in appropriate isolation precautions for identified infection/condition  Outcome: Progressing  Goal: Absence of fever/infection during neutropenic period  Description  INTERVENTIONS:  - Monitor WBC    Outcome: Progressing     Problem: SAFETY ADULT  Goal: Maintain or return to baseline ADL function  Description  INTERVENTIONS:  -  Assess patient's ability to carry out ADLs; assess patient's baseline for ADL function and identify physical deficits which impact ability to perform ADLs (bathing, care of mouth/teeth, toileting, grooming, dressing, etc )  - Assess/evaluate cause of self-care deficits   - Assess range of motion  - Assess patient's mobility; develop plan if impaired  - Assess patient's need for assistive devices and provide as appropriate  - Encourage maximum independence but intervene and supervise when necessary  - Involve family in performance of ADLs  - Assess for home care needs following discharge   - Consider OT consult to assist with ADL evaluation and planning for discharge  - Provide patient education as appropriate  Outcome: Progressing  Goal: Maintain or return mobility status to optimal level  Description  INTERVENTIONS:  - Assess patient's baseline mobility status (ambulation, transfers, stairs, etc )    - Identify cognitive and physical deficits and behaviors that affect mobility  - Identify mobility aids required to assist with transfers and/or ambulation (gait belt, sit-to-stand, lift, walker, cane, etc )  - [place] fall precautions as indicated by assessment  - Record patient progress and toleration of activity level on Mobility SBAR; progress patient to next Phase/Stage  - Instruct patient to call for assistance with activity based on assessment  - Consider rehabilitation consult to assist with strengthening/weightbearing, etc   Outcome: Progressing     Problem: DISCHARGE PLANNING  Goal: Discharge to home or other facility with appropriate resources  Description  INTERVENTIONS:  - Identify barriers to discharge w/patient and caregiver  - Arrange for needed discharge resources and transportation as appropriate  - Identify discharge learning needs (meds, wound care, etc )  - Arrange for interpretive services to assist at discharge as needed  - Refer to Case Management Department for coordinating discharge planning if the patient needs post-hospital services based on physician/advanced practitioner order or complex needs related to functional status, cognitive ability, or social support system  Outcome: Progressing     Problem: Knowledge Deficit  Goal: Patient/family/caregiver demonstrates understanding of disease process, treatment plan, medications, and discharge instructions  Description  Complete learning assessment and assess knowledge base    Interventions:  - Provide teaching at level of understanding  - Provide teaching via preferred learning methods  Outcome: Progressing

## 2019-11-10 NOTE — CONSULTS
Consultation - Tg Alegria 80 y o  female MRN: [id number]  Unit/Bed#: -01 Encounter: [id number]      Assessment/Plan     Assessment:  Low back pain  Chronic compression fracture L1  Plan:  She has a stable injury needing no surgical intervention  Her pain is distal to the old fracture site  Out of bed  Weightbearing as tolerated  PT/OT  Discharge planning to skilled nursing facility versus rehab  Will be glad to see as an outpatient    History of Present Illness   Physician Requesting Consult: Zaina Denise MD  Reason for Consult / Principal Problem:  Compression fracture L1  HPI: Blossom Carlson is a 80y o  year old female who presents with low back pain for least the past week  She denies any trauma  She denies any numbness or tingling  She denies any loss of bowel or bladder function  She came to the emergency room where x-rays were taken which showed an old compression fracture at L1  She was subsequently admitted with the above diagnosis    Consults    Review of Systems    Historical Information   Past Medical History:   Diagnosis Date    Abnormal blood sugar 03/13/2017    Abnormal carotid duplex scan     Carotid Duplex (Plaque formation is quite prominent bilaterally  Despite these changes, no evidence for greater than 50% diameter reducing lesions in the cervical portion of either carotid artery hemisystem ) - 6/13/2017    Anxiety     Cervical radiculopathy 08/08/2017    [facility]estive [facility] failure[facility])     diastolic    COPD (chronic obstructive pulmonary disease) ([facility] [address] ) 2/6/2018    Coronary angioplasty status     Coronary artery disease 4/7/2017    Costochondritis 02/05/2013    suspect MS in origin given relationship to ROM and location  Start mobic and if persists, reeval  Refused xrayat this time        Dyslipidemia     GERD without esophagitis 8/30/2012    H/O CT scan of chest      (No PE, minimal interstitial change left lower lobe and right upper lobe, which may be acute ) - 5/19/2017    History of Holter monitoring     Holter (Sinus rhythm with rates ranging from 52 to 118 bpm   No afib or heart block  Rare atrial and ventricular ectopic beats  One six-beat atrial run noted  No pauses  ) - 5/31/2017    Hx of echocardiogram     Echo (EF 0 60 (60%), Biatrial enlargement ) - 3/24/2017 Echo (EF 0 55 (55%), mild LVH  Aortic valvular sclerosis  Trace MI with mild left atrial dilatation, mild MAC  Mild TI with mild pulmonary hypertension  ) - 5/22/2017 Echo (EF 0 60 (60%), Normal left vent chamber size and systolic function  Mild diastolic dysfunction of LV w/normal filling pressures  Mild mitral regurg ) - 7/26/2017 Echo (EF 0    Hx of echocardiogram 08/16/2017    EF0 60 (60%) Normal left vent chamber size and systolic function of LV w/normal filling presures  Mild mitral regurg  / EF0 50 (50%) Mild LVH  MIld MR 10/6/17     Hypertension     Iron deficiency anemia 4/21/2016    Macular degeneration, right eye     Malignant melanoma of skin ([facility] [address] ) 9/17/2012    Mixed hyperlipidemia     NSTEMI (non-ST elevated myocardial infarction) ([facility] [address] ) 7/25/2017    Old MI (myocardial infarction)     Osteoarthritis 9/17/2012    Osteoporosis 3/13/2017    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[facility] [address] ) 04/07/2017    Urinary tract infection     frequent UTI's     Past Surgical History:   Procedure Laterality Date    ABDOMINAL SURGERY      APPENDECTOMY      BREAST SURGERY      Lumpectomy    CARDIAC CATHETERIZATION  03/24/2017    ARNIE to 100% occluded prox RCA, chronic 99% ostial LCfx, 60% mid LAD, discrete 40% distal LM / EF0 60 (60%) 100% occluded proximal RCA s/p ARNIE, chronic 99% ostial LCX, 60% mid LAD, discrete 40% dital LM  4/5/17    CHOLECYSTECTOMY      COLONOSCOPY N/A 7/25/2018    Procedure: COLONOSCOPY;  Surgeon:  Kami Cifuentes MD;  [facility] MAIN OR;  Service: Gastroenterology    CORONARY STENT PLACEMENT  03/25/2017    ARNIE RCA    HEMORRHOID SURGERY      INSERTION STENT ARTERY  04/07/2017    Cardio vascular agents drug-eluting stent    SKIN BIOPSY      TONSILLECTOMY       Social History   Social History     Substance and Sexual Activity   Alcohol Use Not Currently     Social History     Substance and Sexual Activity   Drug Use Never     Social History     Tobacco Use   Smoking Status Former Smoker    Types: Cigarettes   Smokeless Tobacco Never Used     Family History: non-contributory    Meds/Allergies   current meds:   Current Facility-Administered Medications   Medication Dose Route Frequency    acetaminophen (TYLENOL) tablet 650 mg  650 mg Oral Q6H PRN    aspirin chewable tablet 81 mg  81 mg Oral Daily    atorvastatin (LIPITOR) tablet 40 mg  40 mg Oral Daily    bisacodyl (DULCOLAX) rectal suppository 10 mg  10 mg Rectal Daily PRN    calcium carbonate-vitamin D (OSCAL-D) 500 mg-200 units per tablet 2 tablet  2 tablet Oral BID With Meals    docusate sodium (COLACE) capsule 100 mg  100 mg Oral BID    enoxaparin (LOVENOX) subcutaneous injection 40 mg  40 mg Subcutaneous Daily    famotidine (PEPCID) tablet 20 mg  20 mg Oral BID    ferrous sulfate tablet 162 5 mg  162 5 mg Oral Daily    furosemide (LASIX) tablet 20 mg  20 mg Oral Daily    lidocaine (LIDODERM) 5 % patch 1 patch  1 patch Topical Once    lidocaine (LIDODERM) 5 % patch 1 patch  1 patch Topical Daily    LORazepam (ATIVAN) tablet 0 5 mg  0 5 mg Oral Q8H PRN    methocarbamol (ROBAXIN) tablet 500 mg  500 mg Oral Q6H [facility]    metoprolol tartrate (LOPRESSOR) partial tablet 12 5 mg  12 5 mg Oral Q12H [facility]    morphine injection 2 mg  2 mg Intravenous Q4H PRN    ondansetron (ZOFRAN) injection 4 mg  4 mg Intravenous Q6H PRN    oxyCODONE (ROXICODONE) IR tablet 5 mg  5 mg Oral Q4H PRN    polyethylene glycol (MIRALAX) packet 17 g  17 g Oral Daily    potassium chloride (K-DUR,KLOR-CON) CR tablet 10 mEq  10 mEq Oral Daily     Allergies   Allergen Reactions    Ciprofloxacin      Reaction Date: 01Jul2011;     Keflex [Cephalexin] Dizziness    Nitrofurantoin      Reaction Date: 01Jul2011;     Penicillins Rash, Other (See Comments) and Throat Swelling     Throat swells       Objective   Vitals: Blood pressure (!) 174/90, pulse 96, temperature 97 8 °F (36 6 °C), temperature source Temporal, resp  rate 22, height 5' 2" (1 575 m), weight 75 5 kg (166 lb 7 2 oz), SpO2 94 %, not currently breastfeeding  ,Body mass index is 30 44 kg/m²  No intake or output data in the 24 hours ending 11/10/19 1203  No intake/output data recorded  Invasive Devices     Peripheral Intravenous Line            Peripheral IV 11/09/19 Left Antecubital less than 1 day                Physical Exam  Ortho Exam     Spine is midline  There is no acute trauma  No spasticity is present  There is tenderness along her lower lumbar spine, around the L4/L5 region  There is no tenderness proximal   Her bilateral lower extremities are neurovascular intact  Toes are pink and mobile  Compartments are soft  Sensation, motor, and reflexes are intact  Pulses are intact distally  There is a kyphosis deformity present along her thoracic spine    There is a negative straight leg and a negative contralateral straight leg raising test       Lab Results:   CBC:   Lab Results   Component Value Date    WBC 10 20 11/10/2019    HGB 15 4 11/10/2019    HCT 47 7 (H) 11/10/2019    MCV 92 11/10/2019     11/10/2019    MCH 29 7 11/10/2019    MCHC 32 3 11/10/2019    RDW 14 1 11/10/2019    MPV 8 6 11/10/2019     CMP:   Lab Results   Component Value Date    SODIUM 135 11/10/2019    CL 94 (L) 11/10/2019    CO2 34 (H) 11/10/2019    BUN 18 11/10/2019    CREATININE 0 86 11/10/2019    CALCIUM 9 9 11/10/2019    AST 24 11/10/2019    ALT 18 11/10/2019    ALKPHOS 49 (L) 11/10/2019    EGFR 58 11/10/2019     PT/INR: No results found for: PT, INR  Imaging Studies: I have personally reviewed pertinent films in PACS     X-rays and CT of the lumbar spine show a chronic compression fracture at L1   This is chronic based on the sclerotic densities present  This was also unchanged from her previous x-ray of 2018    EKG, Pathology, and Other Studies: I have personally reviewed pertinent reports  VTE Prophylaxis: Sequential compression device Ragena Marker)     Code Status: Level 3 - DNAR and DNI  Advance Directive and Living Will:      Power of :    POLST:      Counseling / Coordination of Care  Total floor / unit time spent today 30 minutes  Greater than 50% of total time was spent with the patient and / or family counseling and / or coordination of care   A description of the counseling / coordination of care:

## 2019-11-10 NOTE — ED NOTES
Patient returned from [address] [place] [address] and Surinder Fitzgerald RN  11/10/19 0025

## 2019-11-11 NOTE — PLAN OF CARE
Problem: Potential for Falls  Goal: Patient will remain free of falls  Description  INTERVENTIONS:  - Assess patient frequently for physical needs  -  Identify cognitive and physical deficits and behaviors that affect risk of falls    -  [place] fall precautions as indicated by assessment   - Educate patient/family on patient safety including physical limitations  - Instruct patient to call for assistance with activity based on assessment  - Modify environment to reduce risk of injury  - Consider OT/PT consult to assist with strengthening/mobility  Outcome: Progressing     Problem: PAIN - ADULT  Goal: Verbalizes/displays adequate comfort level or baseline comfort level  Description  Interventions:  - Encourage patient to monitor pain and request assistance  - Assess pain using appropriate pain scale  - Administer analgesics based on type and severity of pain and evaluate response  - Implement non-pharmacological measures as appropriate and evaluate response  - Consider cultural and social influences on pain and pain management  - Notify physician/advanced practitioner if interventions unsuccessful or patient reports new pain  Outcome: Progressing     Problem: INFECTION - ADULT  Goal: Absence or prevention of progression during hospitalization  Description  INTERVENTIONS:  - Assess and monitor for signs and symptoms of infection  - Monitor lab/diagnostic results  - Monitor all insertion sites, i e  indwelling lines, tubes, and drains  - Monitor endotracheal if appropriate and nasal secretions for changes in amount and color  - [place] appropriate cooling/warming therapies per order  - Administer medications as ordered  - Instruct and encourage patient and family to use good hand hygiene technique  - Identify and instruct in appropriate isolation precautions for identified infection/condition  Outcome: Progressing  Goal: Absence of fever/infection during neutropenic period  Description  INTERVENTIONS:  - Monitor WBC    Outcome: Progressing     Problem: SAFETY ADULT  Goal: Maintain or return to baseline ADL function  Description  INTERVENTIONS:  -  Assess patient's ability to carry out ADLs; assess patient's baseline for ADL function and identify physical deficits which impact ability to perform ADLs (bathing, care of mouth/teeth, toileting, grooming, dressing, etc )  - Assess/evaluate cause of self-care deficits   - Assess range of motion  - Assess patient's mobility; develop plan if impaired  - Assess patient's need for assistive devices and provide as appropriate  - Encourage maximum independence but intervene and supervise when necessary  - Involve family in performance of ADLs  - Assess for home care needs following discharge   - Consider OT consult to assist with ADL evaluation and planning for discharge  - Provide patient education as appropriate  Outcome: Progressing  Goal: Maintain or return mobility status to optimal level  Description  INTERVENTIONS:  - Assess patient's baseline mobility status (ambulation, transfers, stairs, etc )    - Identify cognitive and physical deficits and behaviors that affect mobility  - Identify mobility aids required to assist with transfers and/or ambulation (gait belt, sit-to-stand, lift, walker, cane, etc )  - [place] fall precautions as indicated by assessment  - Record patient progress and toleration of activity level on Mobility SBAR; progress patient to next Phase/Stage  - Instruct patient to call for assistance with activity based on assessment  - Consider rehabilitation consult to assist with strengthening/weightbearing, etc   Outcome: Progressing     Problem: DISCHARGE PLANNING  Goal: Discharge to home or other facility with appropriate resources  Description  INTERVENTIONS:  - Identify barriers to discharge w/patient and caregiver  - Arrange for needed discharge resources and transportation as appropriate  - Identify discharge learning needs (meds, wound care, etc )  - Arrange for interpretive services to assist at discharge as needed  - Refer to Case Management Department for coordinating discharge planning if the patient needs post-hospital services based on physician/advanced practitioner order or complex needs related to functional status, cognitive ability, or social support system  Outcome: Progressing     Problem: Knowledge Deficit  Goal: Patient/family/caregiver demonstrates understanding of disease process, treatment plan, medications, and discharge instructions  Description  Complete learning assessment and assess knowledge base    Interventions:  - Provide teaching at level of understanding  - Provide teaching via preferred learning methods  Outcome: Progressing

## 2019-11-11 NOTE — SOCIAL WORK
CM met with pt at bedside and explained role  CM also spoke with pt grand daughter Eladia Boggs via phone to discuss discharge planing  Pt take turns living with each of her grand children Berenice and Skye Presume  She is currently staying with Pierce Phillips in a 2 story house but stays on 1st floor; 0 [address]  Pt does not use an assistive device to ambulate  She has RW, cane, commode and shower chair at home  Pt PCP is Dr Maggy Webb  Pt uses [facility] pharmacy for medications and denies any difficulty obtaining them  Pt has a [facility] of H[facility]  Pt has a history of STR at [facility]  History of MH and D&A treatment was denied  STR recommended by Pt after evaluation  A post acute care recommendation was made by your care team for STR  Discussed Freedom of Choice with both patient and caregiver  List of facilities given to both patient and caregiver via in person  both patient and caregiver aware the list is custom filtered for them by preference  and that [facility] post acute providers are designated  Referrals sent to [facility] and [facility] as requested  CM to follow  CM reviewed d/c planning process including the following: identifying help at home, patient preference for d/c planning needs, availability of treatment team to discuss questions or concerns patient and/or family may have regarding understanding medications and recognizing signs and symptoms once discharged  CM also encouraged patient to follow up with all recommended appointments after discharge  Patient advised of importance for patient and family to participate in managing patients medical well being

## 2019-11-11 NOTE — PLAN OF CARE
Problem: OCCUPATIONAL THERAPY ADULT  Goal: Performs self-care activities at highest level of function for planned discharge setting  See evaluation for individualized goals  Description  Treatment Interventions: ADL retraining, Functional transfer training, UE strengthening/ROM, Endurance training, Cognitive reorientation, Patient/family training, Equipment evaluation/education, Compensatory technique education, Energy conservation, Activityengagement          See flowsheet documentation for full assessment, interventions and recommendations  Note:   Limitation: Decreased ADL status, Decreased UE strength, Decreased Safe judgement during ADL, Decreased cognition, Decreased endurance, Decreased self-care trans, Decreased high-level ADLs  Prognosis: Fair  Assessment: Pt is a 80 y o  female who was admitted to [address] [place] [address] on 11/9/2019 with 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  At this time, patient is also affected by the comorbidities of: constipation, CAD, hiatal hernia, hyperlipidemia, and HTN  Additionally, patient is affected by the following personal factors:steps to enter environment, difficulty performing ADLS and difficulty performing IADLS   Orders placed for OT evaluation/treatment with up with assistance orders  Prior to admission, Patient reporting requiring assistance with ADLs/IADLs, ambulatory with RW, and lives with granddaughter in a one story house with 1 DANIEL  Upon OT evaluation, patient requires minimum assist and moderate assist for UB ADLs and moderate assist and maximum assist for LB ADLs  Occupational performance is affected by the following deficits: decreased strength, decreased balance, decreased tolerance, impaired attention, impaired initiation, impaired memory, impaired sequencing, impaired problem solving, decreased safety awareness and increased pain   Based on the following information, patient would benefit from continued skilled OT treatment while in the hospital to address deficits and maximize level of functional independence with ADL's and functional mobility  Occupational Performance areas to address include: eating, grooming, bathing/shower, toilet hygiene, dressing, functional mobility and clothing management  From OT standpoint, recommendation at time of d/c would be short term rehab       OT Discharge Recommendation: Short Term Rehab  OT - OK to Discharge: Yes(Once medically cleared)     Patricia Villa OT

## 2019-11-11 NOTE — PLAN OF CARE
Problem: PHYSICAL THERAPY ADULT  Goal: Performs mobility at highest level of function for planned discharge setting  See evaluation for individualized goals  Description  Treatment/Interventions: Functional transfer training, LE strengthening/ROM, Therapeutic exercise, Endurance training, Patient/family training, Cognitive reorientation, Equipment eval/education, Bed mobility, Gait training, Spoke to nursing  Equipment Recommended: Walker(continue RW use)       See flowsheet documentation for full assessment, interventions and recommendations  Note:   Prognosis: Good  Problem List: Decreased strength, Decreased endurance, Impaired balance, Decreased mobility, Decreased safety awareness, Pain, Orthopedic restrictions  Assessment: Pt is 80 y o  female seen for PT evaluation on 11/11/2019 s/p admit to [address] [address] on 11/9/2019 w/ 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  PT consulted to assess pt's functional mobility and d/c needs  Order placed for PT eval and tx, w/ up w/ A order  Performed at least 2 patient identifiers during session: Name and wristband  Comorbidities affecting pt's physical performance at time of assessment include: essential HTN, HLD, hiatal hernia, CAD, constipation  PTA, pt was independent w/ all functional mobility w/ RW per pt report, ambulates household distances, lives w/ granddtr in 1 level home and retired  Personal factors affecting pt at time of IE include: ambulating w/ assistive device, inability to navigate level surfaces w/o external assistance, unable to perform dynamic tasks in community, decreased cognition, positive fall history, decreased initiation and engagement, unable to perform physical activity, limited insight into impairments, inability to perform IADLs and inability to perform ADLs   Please find objective findings from PT assessment regarding body systems outlined above with impairments and limitations including weakness, impaired balance, decreased endurance, pain, decreased activity tolerance, decreased functional mobility tolerance, fall risk and decreased cognition, as well as mobility assessment (need for cueing for mobility technique)  The following objective measures performed on IE also reveal limitations: Barthel Index: 30/100  Pt's clinical presentation is currently unstable/unpredictable seen in pt's presentation of need for input for task focus and mobility technique and ongoing medical assessment  Pt to benefit from continued PT tx to address deficits as defined above and maximize level of functional independent mobility and consistency  From PT/mobility standpoint, recommendation at time of d/c would be STR pending progress in order to facilitate return to PLOF  Barriers to Discharge: Inaccessible home environment     Recommendation: Short-term skilled PT     PT - OK to Discharge: No    See flowsheet documentation for full assessment

## 2019-11-11 NOTE — OCCUPATIONAL THERAPY NOTE
Occupational Therapy Evaluation      Rachell Fleming    11/11/2019    Patient Active Problem List   Diagnosis    Allergic rhinitis    Benign colon polyp    Cataract    Constipation    Coronary artery disease involving native coronary artery of native heart without angina pectoris    Fibrocystic breast disease, unspecified laterality    Gait abnormality    Hiatal hernia with GERD without esophagitis    Glucose intolerance (no malabsorption)    Hyperlipidemia, mixed    Essential hypertension    Iron deficiency anemia    Malignant melanoma of skin ([facility])    Urinary incontinence    Vitamin D deficiency    COPD (chronic obstructive pulmonary disease) ([facility])    Anxiety    Compression fracture of thoracic vertebra, closed, initial encounter ([facility] )    Proteinuria    Coronary angioplasty status    Old MI (myocardial infarction)    Chronic diastolic heart failure ([facility])    Chronic left shoulder pain    Chronic right shoulder pain    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       Past Medical History:   Diagnosis Date    Abnormal blood sugar 03/13/2017    Abnormal carotid duplex scan     Carotid Duplex (Plaque formation is quite prominent bilaterally  Despite these changes, no evidence for greater than 50% diameter reducing lesions in the cervical portion of either carotid artery hemisystem ) - 6/13/2017    Anxiety     Cervical radiculopathy 08/08/2017    CHF (congestive heart failure) ([facility])     diastolic    COPD (chronic obstructive pulmonary disease) ([facility] ) 2/6/2018    Coronary angioplasty status     Coronary artery disease 4/7/2017    Costochondritis 02/05/2013    suspect MS in origin given relationship to ROM and location  Start mobic and if persists, reeval  Refused xrayat this time        Dyslipidemia     GERD without esophagitis 8/30/2012    H/O CT scan of chest      (No PE, minimal interstitial change left lower lobe and right upper lobe, which may be acute ) - 5/19/2017    History of Holter monitoring     Holter (Sinus rhythm with rates ranging from 52 to 118 bpm   No afib or heart block  Rare atrial and ventricular ectopic beats  One six-beat atrial run noted  No pauses  ) - 5/31/2017    Hx of echocardiogram     Echo (EF 0 60 (60%), Biatrial enlargement ) - 3/24/2017 Echo (EF 0 55 (55%), mild LVH  Aortic valvular sclerosis  Trace MI with mild left atrial dilatation, mild MAC  Mild TI with mild pulmonary hypertension  ) - 5/22/2017 Echo (EF 0 60 (60%), Normal left vent chamber size and systolic function  Mild diastolic dysfunction of LV w/normal filling pressures  Mild mitral regurg ) - 7/26/2017 Echo (EF 0    Hx of echocardiogram 08/16/2017    EF0 60 (60%) Normal left vent chamber size and systolic function of LV w/normal filling presures  Mild mitral regurg  / EF0 50 (50%) Mild LVH  MIld MR 10/6/17     Hypertension     Iron deficiency anemia 4/21/2016    Macular degeneration, right eye     Malignant melanoma of skin ([facility] ) 9/17/2012    Mixed hyperlipidemia     NSTEMI (non-ST elevated myocardial infarction) ([facility] ) 7/25/2017    Old MI (myocardial infarction)     Osteoarthritis 9/17/2012    Osteoporosis 3/13/2017    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[facility] ) 04/07/2017    Urinary tract infection     frequent UTI's       Past Surgical History:   Procedure Laterality Date    ABDOMINAL SURGERY      APPENDECTOMY      BREAST SURGERY      Lumpectomy    CARDIAC CATHETERIZATION  03/24/2017    ARNIE to 100% occluded prox RCA, chronic 99% ostial LCfx, 60% mid LAD, discrete 40% distal LM / EF0 60 (60%) 100% occluded proximal RCA s/p ARNIE, chronic 99% ostial LCX, 60% mid LAD, discrete 40% dital LM  4/5/17    CHOLECYSTECTOMY      COLONOSCOPY N/A 7/25/2018    Procedure: COLONOSCOPY;  Surgeon:  Niru Castelan MD;  Location: [facility] MAIN OR;  Service: Gastroenterology    CORONARY STENT PLACEMENT  03/25/2017    ARNIE RCA    HEMORRHOID SURGERY      INSERTION STENT ARTERY  04/07/2017    Cardio vascular agents drug-eluting stent    SKIN BIOPSY      TONSILLECTOMY          11/11/19 0857   Note Type   Note type Eval only   Restrictions/Precautions   Weight Bearing Precautions Per Order Yes   RLE Weight Bearing Per Order WBAT   LLE Weight Bearing Per Order WBAT   Braces or Orthoses TLSO  (PT phoned in brace order to [facility], no ETA provided)   Other Precautions Fall Risk;Pain;Bed Alarm;Cognitive;O2;Spinal precautions   Pain Assessment   Pain Assessment 0-10   Pain Score Worst Possible Pain   Pain Type Acute pain   Pain Location Back   Pain Orientation Lower   Pain Descriptors Aching   Pain Frequency Constant/continuous   Pain Onset Ongoing   Home Living   Type of [address] One level;Performs ADLs on one level; Able to live on main level with bedroom/bathroom;Stairs to enter with rails  (1 DANIEL)   Bathroom Shower/Tub Tub/shower unit   [facility]   Additional Comments Question reliability of info obtained from pt as pt is poor historian  PLOF and social history also obtained from chart  pt reports she lives in a house, 1 DANIEL, lives c granddtr who A c ADLs/IADLs  Pt states she hasn't had any recent falls, but (+) fall history   Prior Function   Level of Rhea Needs assistance with ADLs and functional mobility; Needs assistance with IADLs   Lives With Family   Receives Help From Family   ADL Assistance Needs assistance  (granddtr assists with bathing and dressing )   IADLs Needs assistance  (granddtr assist with cooking, laundry and transportation )   Falls in the last 6 months   ((+) Fall histoy per pt report)   Vocational Retired   Lifestyle   Autonomy Patient reporting requiring assistance with ADLs/IADLs, ambulatory with RW, and lives with granddaughter in a one story house with 1 DANIEL   Reciprocal Relationships family   ADL   Eating Assistance 5  Supervision/Setup   Grooming Assistance 4  Minimal Assistance   UB Bathing Assistance 4  Minimal Assistance   LB Bathing Assistance 3  Moderate Assistance   UB Dressing Assistance 3  Moderate Assistance   LB Dressing Assistance 2  Maximal Assistance   Toileting Assistance  2  Maximal Assistance   Bed Mobility   Rolling R 3  Moderate assistance   Additional items Assist x 1;Bedrails; Increased time required;Verbal cues   Supine to Sit 3  Moderate assistance   Additional items Assist x 1; Increased time required;Verbal cues   Sit to Supine 3  Moderate assistance   Additional items Assist x 2; Increased time required;Verbal cues;LE management   Transfers   Sit to Stand 4  Minimal assistance   Additional items Assist x 2; Increased time required;Verbal cues   Stand to Sit 4  Minimal assistance   Additional items Assist x 2; Increased time required;Verbal cues   Functional Mobility   Additional Comments Further functional mobility was deferred at this time due awaiting TLSO brace   Balance   Static Sitting Good   Dynamic Sitting Fair   Static Standing Poor +   Activity Tolerance   Activity Tolerance Patient limited by pain   Medical Staff [address] [place] [address] and  Juana Diana made aware   Nurse Made Aware ELENA Crain verbalized pt appropriate for therapy and made aware of outcomes    RUE Assessment   RUE Assessment WFL  (grossly 4-/5 MMT)   LUE Assessment   LUE Assessment WFL  (grossly 4-/5 MMT)   Hand Function   Gross Motor Coordination Functional   Fine Motor Coordination Functional   Cognition   Overall Cognitive Status Impaired   Arousal/Participation Alert; Responsive   Attention Attends with cues to redirect   Orientation Level Oriented to person;Oriented to situation;Disoriented to place; Disoriented to time   Memory Decreased recall of biographical information;Decreased recall of precautions;Decreased recall of recent events   Following Commands Follows one step commands without difficulty   Comments Pt agreeable to OT session    Assessment   Limitation Decreased ADL status; Decreased UE strength;Decreased Safe judgement during ADL;Decreased cognition;Decreased endurance;Decreased self-care trans;Decreased high-level ADLs   Prognosis Fair   Assessment Pt is a 80 y o  female who was admitted to [address] [place] [address] on 11/9/2019 with 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  At this time, patient is also affected by the comorbidities of: constipation, CAD, hiatal hernia, hyperlipidemia, and HTN  Additionally, patient is affected by the following personal factors:steps to enter environment, difficulty performing ADLS and difficulty performing IADLS   Orders placed for OT evaluation/treatment with up with assistance orders  Prior to admission, Patient reporting requiring assistance with ADLs/IADLs, ambulatory with RW, and lives with granddaughter in a one story house with 1 DANIEL  Upon OT evaluation, patient requires minimum assist and moderate assist for UB ADLs and moderate assist and maximum assist for LB ADLs  Occupational performance is affected by the following deficits: decreased strength, decreased balance, decreased tolerance, impaired attention, impaired initiation, impaired memory, impaired sequencing, impaired problem solving, decreased safety awareness and increased pain  Based on the following information, patient would benefit from continued skilled OT treatment while in the hospital to address deficits and maximize level of functional independence with ADL's and functional mobility  Occupational Performance areas to address include: eating, grooming, bathing/shower, toilet hygiene, dressing, functional mobility and clothing management  From OT standpoint, recommendation at time of d/c would be short term rehab  Goals   Patient Goals to have less pain and get out of bed    Plan   Treatment Interventions ADL retraining;Functional transfer training;UE strengthening/ROM; Endurance training;Cognitive reorientation;Patient/family training;Equipment evaluation/education; Compensatory technique education; Energy conservation; Activityengagement   Goal Expiration Date 11/21/19   OT Treatment Day 0   OT Frequency 3-5x/wk   Recommendation   OT Discharge Recommendation Short Term Rehab   OT - OK to Discharge Yes  (Once medically cleared)   Barthel Index   Feeding 5   Bathing 0   Grooming Score 0   Dressing Score 5   Bladder Score 5   Bowels Score 5   Toilet Use Score 5   Transfers (Bed/Chair) Score 5   Mobility (Level Surface) Score 0   Stairs Score 0   Barthel Index Score 30     GOALS:    Pt will achieve the following within specified time frame: STG  Pt will achieve the following goals within 5 days    *ADL transfers with CGA for inc'd independence with ADLs/purposeful tasks    *UB ADL with (S) for inc'd independence with self cares    *LB ADL with Min (A) using AE prn for inc'd independence with self cares    *Toileting with Min (A) for clothing management and hygiene for return to PLOF with personal care    *Increase static stand balance to fair and dyn stand balance to fair- for inc'd safety with standing purposeful tasks    *Increase stand tolerance x5 m for inc'd tolerance with standing purposeful tasks    *Participate in 10m UE therex to increase overall stamina/activity tolerance for purposeful tasks    *Bed mobility- Min (A) for inc'd independence to manage own comfort and initiate EOB & OOB purposeful tasks    *Patient will verbalize 3 safety awareness/ principles to prevent falls in the home setting  *Patient will verbalize and demonstrate use of energy conservation/deep breathing techniques and work simplification skills during functional activities with no verbal cues         Pt will achieve the following within specified time frame: LTG  Pt will achieve the following goals within 10 days    *ADL transfers with (S) for inc'd independence with ADLs/purposeful tasks    *Self Feeding- (I) for inc'd independence with providing self nourishment    *UB ADL with (I) for inc'd independence with self cares    *LB ADL with (S) using AE prn for inc'd independence with self cares    *Toileting with (S) for clothing management and hygiene for return to PLOF with personal care    *Increase static stand balance to fair+ and dyn stand balance to fair for inc'd safety with standing purposeful tasks    *Increase stand tolerance x10 m for inc'd tolerance with standing purposeful tasks    *Bed mobility- CGA for inc'd independence to manage own comfort and initiate EOB & OOB purposeful tasks    *Patient will increase OOB/sitting tolerance to 2-4 hours per day to increase participation in self-care and leisure tasks with no s/s of exertion           Dane Castro MS, OTR/L

## 2019-11-11 NOTE — PLAN OF CARE
Problem: PHYSICAL THERAPY ADULT  Goal: Performs mobility at highest level of function for planned discharge setting  See evaluation for individualized goals  Description  Treatment/Interventions: Functional transfer training, LE strengthening/ROM, Therapeutic exercise, Endurance training, Patient/family training, Cognitive reorientation, Equipment eval/education, Bed mobility, Gait training, Spoke to nursing  Equipment Recommended: Walker(continue RW use)       See flowsheet documentation for full assessment, interventions and recommendations  11/11/2019 1407 by Paris Mendoza PT  Outcome: Not Progressing  Note:   Prognosis: Good  Problem List: Decreased strength, Decreased endurance, Impaired balance, Decreased mobility, Decreased safety awareness, Pain, Orthopedic restrictions  Assessment: Pt seen for PT treatment session this date with interventions consisting of therapeutic activity consisting of training: bed mobility, supine<>sit transfers and vc and tactile cues for static sitting posture faciliation  Pt agreeable to PT treatment session upon arrival, pt found supine in bed w/ HOB elevated, in no apparent distress  In comparison to previous session, pt with no improvements as evidenced by pt c increased lethargy at this time, did not tolerate EOB dangling in order to fit TLSO brace  PT will continue to monitor and fit as appropriate  Post session: pt returned BTB, bed alarm engaged, all needs in reach and RN notified of session findings/recommendations  Continue to recommend STR at time of d/c in order to maximize pt's functional independence and safety w/ mobility  Pt continues to be functioning below baseline level, and remains limited 2* factors listed above  PT will continue to see pt while here in order to address the deficits listed above and provide interventions consistent w/ POC in effort to achieve STGs    Barriers to Discharge: Inaccessible home environment     Recommendation: Short-term skilled PT     PT - OK to Discharge: No    See flowsheet documentation for full assessment  11/11/2019 1103 by Eran Herrera PT  Note:   Prognosis: Good  Problem List: Decreased strength, Decreased endurance, Impaired balance, Decreased mobility, Decreased safety awareness, Pain, Orthopedic restrictions  Assessment: Pt is 80 y o  female seen for PT evaluation on 11/11/2019 s/p admit to [address] on 11/9/2019 w/ 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  PT consulted to assess pt's functional mobility and d/c needs  Order placed for PT eval and tx, w/ up w/ A order  Performed at least 2 patient identifiers during session: Name and wristband  Comorbidities affecting pt's physical performance at time of assessment include: essential HTN, HLD, hiatal hernia, CAD, constipation  PTA, pt was independent w/ all functional mobility w/ RW per pt report, ambulates household distances, lives w/ granddtr in 1 level home and retired  Personal factors affecting pt at time of IE include: ambulating w/ assistive device, inability to navigate level surfaces w/o external assistance, unable to perform dynamic tasks in community, decreased cognition, positive fall history, decreased initiation and engagement, unable to perform physical activity, limited insight into impairments, inability to perform IADLs and inability to perform ADLs  Please find objective findings from PT assessment regarding body systems outlined above with impairments and limitations including weakness, impaired balance, decreased endurance, pain, decreased activity tolerance, decreased functional mobility tolerance, fall risk and decreased cognition, as well as mobility assessment (need for cueing for mobility technique)  The following objective measures performed on IE also reveal limitations: Barthel Index: 30/100   Pt's clinical presentation is currently unstable/unpredictable seen in pt's presentation of need for input for task focus and mobility technique and ongoing medical assessment  Pt to benefit from continued PT tx to address deficits as defined above and maximize level of functional independent mobility and consistency  From PT/mobility standpoint, recommendation at time of d/c would be STR pending progress in order to facilitate return to PLOF  Barriers to Discharge: Inaccessible home environment     Recommendation: Short-term skilled PT     PT - OK to Discharge: No    See flowsheet documentation for full assessment

## 2019-11-11 NOTE — PHYSICAL THERAPY NOTE
Physical Therapy Evaluation     Patient's Name: Jorge Guillen    Admitting Diagnosis  Low back pain [M54 5]  Constipation [K59 00]  Lumbar compression fracture ([facility] ) [S32 000A]    Problem List  Patient Active Problem List   Diagnosis    Allergic rhinitis    Benign colon polyp    Cataract    Constipation    Coronary artery disease involving native coronary artery of native heart without angina pectoris    Fibrocystic breast disease, unspecified laterality    Gait abnormality    Hiatal hernia with GERD without esophagitis    Glucose intolerance (no malabsorption)    Hyperlipidemia, mixed    Essential hypertension    Iron deficiency anemia    Malignant melanoma of skin ([facility])    Urinary incontinence    Vitamin D deficiency    COPD (chronic obstructive pulmonary disease) ([facility] )    Anxiety    Compression fracture of thoracic vertebra, closed, initial encounter ([facility] )    Proteinuria    Coronary angioplasty status    Old MI (myocardial infarction)    Chronic diastolic heart failure ([facility])    Chronic left shoulder pain    Chronic right shoulder pain    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       Past Medical History  Past Medical History:   Diagnosis Date    Abnormal blood sugar 03/13/2017    Abnormal carotid duplex scan     Carotid Duplex (Plaque formation is quite prominent bilaterally  Despite these changes, no evidence for greater than 50% diameter reducing lesions in the cervical portion of either carotid artery hemisystem ) - 6/13/2017    Anxiety     Cervical radiculopathy 08/08/2017    CHF (congestive heart failure) ([facility])     diastolic    COPD (chronic obstructive pulmonary disease) ([facility] ) 2/6/2018    Coronary angioplasty status     Coronary artery disease 4/7/2017    Costochondritis 02/05/2013    suspect MS in origin given relationship to ROM and location  Start mobic and if persists, reeval  Refused xrayat this time        Dyslipidemia     GERD without esophagitis 8/30/2012    H/O CT scan of chest      (No PE, minimal interstitial change left lower lobe and right upper lobe, which may be acute ) - 5/19/2017    History of Holter monitoring     Holter (Sinus rhythm with rates ranging from 52 to 118 bpm   No afib or heart block  Rare atrial and ventricular ectopic beats  One six-beat atrial run noted  No pauses  ) - 5/31/2017    Hx of echocardiogram     Echo (EF 0 60 (60%), Biatrial enlargement ) - 3/24/2017 Echo (EF 0 55 (55%), mild LVH  Aortic valvular sclerosis  Trace MI with mild left atrial dilatation, mild MAC  Mild TI with mild pulmonary hypertension  ) - 5/22/2017 Echo (EF 0 60 (60%), Normal left vent chamber size and systolic function  Mild diastolic dysfunction of LV w/normal filling pressures  Mild mitral regurg ) - 7/26/2017 Echo (EF 0    Hx of echocardiogram 08/16/2017    EF0 60 (60%) Normal left vent chamber size and systolic function of LV w/normal filling presures  Mild mitral regurg  / EF0 50 (50%) Mild LVH  MIld MR 10/6/17     Hypertension     Iron deficiency anemia 4/21/2016    Macular degeneration, right eye     Malignant melanoma of skin ([facility] ) 9/17/2012    Mixed hyperlipidemia     NSTEMI (non-ST elevated myocardial infarction) ([facility] [address] ) 7/25/2017    Old MI (myocardial infarction)     Osteoarthritis 9/17/2012    Osteoporosis 3/13/2017    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[facility] ) 04/07/2017    Urinary tract infection     frequent UTI's       Past Surgical History  Past Surgical History:   Procedure Laterality Date    ABDOMINAL SURGERY      APPENDECTOMY      BREAST SURGERY      Lumpectomy    CARDIAC CATHETERIZATION  03/24/2017    ARNIE to 100% occluded prox RCA, chronic 99% ostial LCfx, 60% mid LAD, discrete 40% distal LM / EF0 60 (60%) 100% occluded proximal RCA s/p ARNIE, chronic 99% ostial LCX, 60% mid LAD, discrete 40% dital LM  4/5/17    CHOLECYSTECTOMY      COLONOSCOPY N/A 7/25/2018    Procedure: COLONOSCOPY;  Surgeon:  Erika Quiñones MD; Location: [address] [place] [address] MAIN OR;  Service: Gastroenterology    CORONARY STENT PLACEMENT  03/25/2017    ARNIE RCA    HEMORRHOID SURGERY      INSERTION STENT ARTERY  04/07/2017    Cardio vascular agents drug-eluting stent    SKIN BIOPSY      TONSILLECTOMY          11/11/19 0326   Note Type   Note type Eval/Treat   Pain Assessment   Pain Assessment 0-10   Pain Score Worst Possible Pain   Pain Type Acute pain   Pain Location Back   Pain Orientation Lower   Pain Descriptors Aching   Pain Frequency Constant/continuous   Pain Onset Ongoing   Home Living   Type of [address] One level;Performs ADLs on one level; Able to live on main level with bedroom/bathroom;Stairs to enter with rails  (1 DANIEL)   Bathroom Shower/Tub Tub/shower unit   [facility]   Additional Comments Question reliability of info obtained from pt as pt is poor historian  PLOF and social history also obtained from chart  pt reports she lives in a house, 1 SH, lives c granddtr who A c ADLs/IADLs  Pt states she hasn't had any recent falls, but (+) fall history   Prior Function   Level of [place] Needs assistance with ADLs and functional mobility; Needs assistance with IADLs   Lives With Family   Receives Help From Family   ADL Assistance Needs assistance   IADLs Needs assistance   Falls in the last 6 months   ((+) Fall histoy per pt report)   Vocational Retired   Restrictions/Precautions   [place] Bearing Precautions Per Order Yes   RLE [place] Bearing Per Order WBAT   LLE Weight Bearing Per Order WBAT   Braces or Orthoses TLSO  (PT phoned in brace order to [facility], no ETA provided)   Other Precautions Fall Risk;Pain;Bed Alarm;Cognitive;O2;Spinal precautions   General   Family/Caregiver Present No   Cognition   Overall Cognitive Status Impaired   Arousal/Participation Alert   Orientation Level Oriented to person;Oriented to situation;Disoriented to place; Disoriented to time   Memory Decreased recall of biographical information;Decreased recall of precautions;Decreased recall of recent events   Following Commands Follows one step commands without difficulty   Comments pt agreeable to PT session   RLE Assessment   RLE Assessment WFL  (grossly 4-/5 throughout)   LLE Assessment   LLE Assessment WFL  (grossly 4-/5 throughout)   Coordination   Movements are Fluid and Coordinated 1   Sensation WFL   Bed Mobility   Rolling R 3  Moderate assistance   Additional items Assist x 1;Bedrails; Increased time required;Verbal cues   Supine to Sit 3  Moderate assistance   Additional items Assist x 1; Increased time required;Verbal cues   Sit to Supine 3  Moderate assistance   Additional items Assist x 2; Increased time required;Verbal cues   Additional Comments log roll technique performed 2/2 spinal precautions   Transfers   Sit to Stand 4  Minimal assistance   Additional items Assist x 2; Increased time required;Verbal cues   Stand to Sit 4  Minimal assistance   Additional items Assist x 2; Increased time required;Verbal cues   Ambulation/Elevation   Gait pattern Not tested  (DNT as patient does not have TLSO brace yet)   Balance   Static Sitting Good   Dynamic Sitting Fair   Static Standing Poor +   Endurance Deficit   Endurance Deficit Yes   Activity Tolerance   Activity Tolerance Patient limited by fatigue;Patient limited by pain   Medical Staff [address] [place] [address] and  Kevin made aware   Nurse Made Aware Yes, RN Raymond Nogueira verbalized pt appropriate for PT session, made aware of outcomes/recs   Assessment   Prognosis Good   Problem List Decreased strength;Decreased endurance; Impaired balance;Decreased mobility; Decreased safety awareness;Pain;Orthopedic restrictions   Assessment Pt is 80 y o  female seen for PT evaluation on 11/11/2019 s/p admit to [address] [place] [address] on 11/9/2019 w/ 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  PT consulted to assess pt's functional mobility and d/c needs   Order placed for PT eval and tx, w/ up w/ A order  Performed at least 2 patient identifiers during session: Name and wristband  Comorbidities affecting pt's physical performance at time of assessment include: essential HTN, HLD, hiatal hernia, CAD, constipation  PTA, pt was independent w/ all functional mobility w/ RW per pt report, ambulates household distances, lives w/ granddtr in 1 level home and retired  Personal factors affecting pt at time of IE include: ambulating w/ assistive device, inability to navigate level surfaces w/o external assistance, unable to perform dynamic tasks in community, decreased cognition, positive fall history, decreased initiation and engagement, unable to perform physical activity, limited insight into impairments, inability to perform IADLs and inability to perform ADLs  Please find objective findings from PT assessment regarding body systems outlined above with impairments and limitations including weakness, impaired balance, decreased endurance, pain, decreased activity tolerance, decreased functional mobility tolerance, fall risk and decreased cognition, as well as mobility assessment (need for cueing for mobility technique)  The following objective measures performed on IE also reveal limitations: Barthel Index: 30/100  Pt's clinical presentation is currently unstable/unpredictable seen in pt's presentation of need for input for task focus and mobility technique and ongoing medical assessment  Pt to benefit from continued PT tx to address deficits as defined above and maximize level of functional independent mobility and consistency  From PT/mobility standpoint, recommendation at time of d/c would be STR pending progress in order to facilitate return to PLOF     Barriers to Discharge Inaccessible home environment   Goals   Patient Goals "to have less pain"   [facility] Expiration Date 11/21/19   Short Term Goal #1 In 7-10 days: Increase bilateral LE strength 1/2 grade to facilitate independent mobility, Perform all bed mobility tasks with distant S to decrease caregiver burden, Perform all transfers with distant S to improve independence, Ambulate > 75 ft  with least restrictive assistive device with distant S w/o LOB and w/ normalized gait pattern 100% of the time, Navigate 1 stairs with close S with unilateral handrail to facilitate return to previous living environment, Increase all balance 1/2 grade to decrease risk for falls, Complete exercise program independently and Tolerate 4 hr OOB to faciliate upright tolerance   PT Treatment Day 0   Plan   Treatment/Interventions Functional transfer training;LE strengthening/ROM; Therapeutic exercise; Endurance training;Patient/family training;Cognitive reorientation;Equipment eval/education; Bed mobility;Gait training;Spoke to nursing   PT Frequency 5x/wk   Recommendation   Recommendation Short-term skilled PT   Equipment Recommended Walker  (continue RW use)   PT - OK to Discharge No   Barthel Index   Feeding 5   Bathing 0   Grooming Score 0   Dressing Score 5   Bladder Score 5   Bowels Score 5   Toilet Use Score 5   Transfers (Bed/Chair) Score 5   Mobility (Level Surface) Score 0   Stairs Score 0   Barthel Index Score 30           Oralia Hatch, PT

## 2019-11-11 NOTE — PHYSICAL THERAPY NOTE
Physical Therapy Treatment Note       11/11/19 2982   Pain Assessment   Pain Assessment 0-10   Pain Rating: FLACC (Rest) - Face 0   Pain Rating: FLACC (Rest) - Legs 0   Pain Rating: FLACC (Rest) - Activity 0   Pain Rating: FLACC (Rest) - Cry 0   Pain Rating: FLACC (Rest) - Consolability 0   Score: FLACC (Rest) 0   Pain Rating: FLACC (Activity) - Face 1   Pain Rating: FLACC (Activity) - Legs 0   Pain Rating: FLACC (Activity) - Activity 0   Pain Rating: FLACC (Activity) - Cry 1   Pain Rating: FLACC (Activity) - Consolability 0   Score: FLACC (Activity) 2   Restrictions/Precautions   Weight Bearing Precautions Per Order Yes   RLE Weight Bearing Per Order WBAT   LLE Weight Bearing Per Order WBAT   Braces or Orthoses TLSO  (PT attempted to fit, pt lethargic, can't tolerate EOB sit)   Other Precautions Fall Risk;Bed Alarm;Cognitive;Spinal precautions   General   Chart Reviewed Yes   Response to Previous Treatment Patient reporting fatigue but able to participate  Family/Caregiver Present No   Cognition   Overall Cognitive Status Impaired   Arousal/Participation Persistent stimuli required;Lethargic   Attention Difficulty attending to directions   Orientation Level Oriented to person  (intermittently arouses to name)   Memory Decreased recall of biographical information;Decreased recall of precautions;Decreased recall of recent events   Following Commands Follows one step commands inconsistently   Subjective   Subjective pt c minimal verbalizations during session   Bed Mobility   Rolling R 2  Maximal assistance   Additional items Assist x 2;Bedrails; Increased time required;Verbal cues;LE management   Rolling L 2  Maximal assistance   Additional items Assist x 2;Bedrails; Increased time required;Verbal cues;LE management   Supine to Sit 2  Maximal assistance   Additional items Assist x 2; Increased time required;Verbal cues;LE management   Sit to Supine 2  Maximal assistance   Additional items Assist x 2; Increased time required;Verbal cues;LE management   Additional Comments log roll technique performed, pt unable to tolerate EOB sitting attempt as pt c increased lethargy and ability to maintain neutral trunk posture   Transfers   Sit to Stand Unable to assess   Ambulation/Elevation   Gait pattern Not tested; Not appropriate   Balance   Static Sitting Poor -   Dynamic Sitting   (DNT)   Endurance Deficit   Endurance Deficit Yes   Activity Tolerance   Activity Tolerance Patient limited by fatigue   Nurse Made Aware Yes, RN Jason Mims verbalized pt appropriate for PT session, made aware of outcomes/recs; PT will attempt to fit TLSO at later time when pt is more appropriate for EOB and OOB activity   Assessment   Prognosis Good   Problem List Decreased strength;Decreased endurance; Impaired balance;Decreased mobility; Decreased safety awareness;Pain;Orthopedic restrictions   Assessment Pt seen for PT treatment session this date with interventions consisting of therapeutic activity consisting of training: bed mobility, supine<>sit transfers and vc and tactile cues for static sitting posture faciliation  Pt agreeable to PT treatment session upon arrival, pt found supine in bed w/ HOB elevated, in no apparent distress  In comparison to previous session, pt with no improvements as evidenced by pt c increased lethargy at this time, did not tolerate EOB dangling in order to fit TLSO brace  PT will continue to monitor and fit as appropriate  Post session: pt returned BTB, bed alarm engaged, all needs in reach and RN notified of session findings/recommendations  Continue to recommend STR at time of d/c in order to maximize pt's functional independence and safety w/ mobility  Pt continues to be functioning below baseline level, and remains limited 2* factors listed above  PT will continue to see pt while here in order to address the deficits listed above and provide interventions consistent w/ POC in effort to achieve STGs     Barriers to Discharge Inaccessible home environment   Goals   Patient Goals none expressed as pt c minimal participation in session   STG Expiration Date 11/21/19   Short Term Goal #1 goals remain appropriate   PT Treatment Day 1   Plan   Treatment/Interventions Functional transfer training;LE strengthening/ROM; Therapeutic exercise; Endurance training;Patient/family training;Cognitive reorientation;Equipment eval/education; Bed mobility;Gait training;Spoke to nursing   Progress Slow progress, decreased activity tolerance   PT Frequency 5x/wk   Recommendation   Recommendation Short-term skilled PT   Equipment Recommended Walker  (continue RW use)   PT - OK to Discharge No   Additional Comments TLSO brace to be fitted once pt is medically appropriate       [facility], PT    Time of PT treatment session: [id number]

## 2019-11-11 NOTE — PROGRESS NOTES
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  Assessment & Plan  · ER provider contacted neurosurgery Dr Ryan Biggs  · Recommended pain control, PT OT, brace (TLSO)  · Orthopedics input appreciated- recommends the same   · CM is working on STR placement  · Lidoderm patch, Robaxin, Oxy IR, morphine IV  Essential hypertension  Assessment & Plan  · Continue metoprolol and Lasix  · Monitor BP closely and will adjust as needed     Hyperlipidemia, mixed  Assessment & Plan  · Continue Lipitor      Hiatal hernia with GERD without esophagitis  Assessment & Plan  · Continue Pepcid      Coronary artery disease involving native coronary artery of native heart without angina pectoris  Assessment & Plan  · Continue aspirin, metoprolol      Constipation  Assessment & Plan  · Continue with Colace, MiraLax, suppository        VTE Pharmacologic Prophylaxis: Pharmacologic: Enoxaparin (Lovenox)    Patient Centered Rounds: I have performed bedside rounds with nursing staff today  Discussions with Specialists or Other Care Team Provider: ortho, nursing, PT OT, case management, pharmacy  Education and Discussions with Family / Patient:  Patient    Current Length of Stay: 1 day(s)    Current Patient Status: Inpatient   Certification Statement: The patient will continue to require additional inpatient hospital stay due to L1 fracture     Discharge Plan:  Pending hospital course    Code Status: Level 3 - DNAR and DNI    Subjective:   Is having a lot of pain on her lower back  The patient which is working with PT and OT  She denies any shortness of breath or chest pain      Objective:     Vitals:   Temp (24hrs), Av 8 °F (36 6 °C), Min:97 5 °F (36 4 °C), Max:98 1 °F (36 7 °C)    Temp:  [97 5 °F (36 4 °C)-98 1 °F (36 7 °C)] 98 1 °F (36 7 °C)  HR:  [77-99] 99  Resp:  [16-21] 20  BP: (136-170)/(62-80) 170/80  SpO2:  [96 %-98 %] 98 %  Body mass index is 30 85 kg/m²  Input and Output Summary (last 24 hours): Intake/Output Summary (Last 24 hours) at 11/11/2019 0905  Last data filed at 11/10/2019 1743  Gross per 24 hour   Intake 480 ml   Output 200 ml   Net 280 ml       Physical Exam:     Physical Exam   Constitutional: She is oriented to person, place, and time  She appears well-developed  No distress  Frail appearing    HENT:   Head: Normocephalic and atraumatic  Mouth/Throat: Oropharynx is clear and moist    Eyes: Pupils are equal, round, and reactive to light  Conjunctivae and EOM are normal    Neck: Normal range of motion  Neck supple  No thyromegaly present  Cardiovascular: Normal rate, regular rhythm and normal heart sounds  Exam reveals no gallop and no friction rub  No murmur heard  Pulmonary/Chest: Effort normal  She has no wheezes  She has no rales  Decreased in bases   Abdominal: Soft  Bowel sounds are normal  She exhibits no distension  There is no tenderness  There is no rebound  Musculoskeletal: She exhibits no edema or deformity  Lumbar back: She exhibits decreased range of motion, tenderness and bony tenderness  Neurological: She is alert and oriented to person, place, and time  No cranial nerve deficit  Skin: Skin is warm and dry  No rash noted  No erythema  Psychiatric: She has a normal mood and affect  Her behavior is normal  Thought content normal    Vitals reviewed        Additional Data:     Labs:    Results from last 7 days   Lab Units 11/10/19  0507   WBC Thousand/uL 10 20   HEMOGLOBIN g/dL 15 4   HEMATOCRIT % 47 7*   PLATELETS Thousands/uL 234   NEUTROS PCT % 77*   LYMPHS PCT % 10*   MONOS PCT % 12   EOS PCT % 1     Results from last 7 days   Lab Units 11/10/19  0507   POTASSIUM mmol/L 4 3   CHLORIDE mmol/L 94*   CO2 mmol/L 34*   BUN mg/dL 18   CREATININE mg/dL 0 86   CALCIUM mg/dL 9 9   ALK PHOS U/L 49*   ALT U/L 18   AST U/L 24                   * I Have Reviewed All Lab Data Listed Above  * Additional Pertinent Lab Tests Reviewed: Amanda 66 Admission  Reviewed    Imaging:  Imaging Reports Reviewed Today Include: CT a/p, xray spine    Recent Cultures (last 7 days):           Last 24 Hours Medication List:     Current Facility-Administered Medications:  acetaminophen 650 mg Oral Q6H PRN Mavis A Meckes, CRNP   aspirin 81 mg Oral Daily Mavis A Meckes, CRNP   atorvastatin 40 mg Oral Daily Mavis A Meckes, CRNP   bisacodyl 10 mg Rectal Daily PRN Mavis A Meckes, CRNP   calcium carbonate-vitamin D 2 tablet Oral BID With Meals Mavis A Joseluiskes, CRNP   docusate sodium 100 mg Oral BID Mavis A Meckes, CRNP   enoxaparin 40 mg Subcutaneous Daily Mavis A Meckes, CRNP   famotidine 20 mg Oral BID Mavis A Meckes, CRNP   ferrous sulfate 162 5 mg Oral Daily Mavis A Meckes, CRNP   furosemide 20 mg Oral Daily Mavis A Meckes, CRNP   LORazepam 0 5 mg Oral Q8H PRN Mavis A Meckes, CRNP   methocarbamol 500 mg Oral Q6H [address] Mavis A Meckes, CRNP   metoprolol tartrate 12 5 mg Oral Q12H [address] Mavis A Meckes, CRNP   morphine injection 2 mg Intravenous Q4H PRN Mavis A Meckes, CRNP   ondansetron 4 mg Intravenous Q6H PRN Mavis A Meckes, CRNP   oxyCODONE 5 mg Oral Q4H PRN Mavis A Meckes, CRNP   polyethylene glycol 17 g Oral Daily Mavis A Meckes, CRNP   potassium chloride 10 mEq Oral Daily Mavis A Joseluiskes, CRNP        Today, Patient Was Seen By: ROGELIO Mosqueda    ** Please Note: Dictation voice to text software may have been used in the creation of this document   **

## 2019-11-11 NOTE — ASSESSMENT & PLAN NOTE
· ER provider contacted neurosurgery Dr Torey Agudelo  · Recommended pain control, PT OT, brace (TLSO)  · Orthopedics input appreciated- recommends the same   · CM is working on STR placement  · Lidoderm patch, Robaxin, Oxy IR, morphine IV

## 2019-11-11 NOTE — PROGRESS NOTES
Assisted physical therapy with attempting to fit back brace on patient  Patients family in room at this time  Completed a full bed strip at this time  Patient was incontinent of urine and bowel  Patient did not want to sit at the side of the bed at this time

## 2019-11-11 NOTE — NURSING NOTE
Upon entering the pt room at 1500, pt appears disheveled  Leg hanging out of bed and pt is naked  Pt is tachypneic and skin is cool and clammy  VS T 96 3 HR 55 BP 72/40 RR 22 SPO2 78% on RA  Pt placed on 3L and MD made aware  Dr Meryle Bolus and hospitalist at bedside  EKG done, blood work drawn, bolus started  Pt states "she just doesn't feel well " Denies any chest pain  CT of chest ordered  Respiratory called and made aware of ABG order  Will continue to monitor

## 2019-11-11 NOTE — NURSING NOTE
Patient received via bed from med / surgical floor with Acute onset of respiratory distress; SAO2- 82% prior to starting Bi-Pap at 12/5; FIO2- 60%; Patient wakes to verbal command; has increased confusion to current time and situation  Lungs clear, diminished bilaterally; abdomen is mild diffuse tenderness; PVR-189mls straight cathed for >600 mls junaid urine  Family at bedside for support; safety measures maintained

## 2019-11-12 PROBLEM — J96.01 ACUTE RESPIRATORY FAILURE WITH HYPOXIA AND HYPERCAPNIA (HCC): Status: ACTIVE | Noted: 2019-01-01

## 2019-11-12 PROBLEM — J96.02 ACUTE RESPIRATORY FAILURE WITH HYPOXIA AND HYPERCAPNIA (HCC): Status: ACTIVE | Noted: 2019-01-01

## 2019-11-12 PROBLEM — N17.9 ACUTE KIDNEY INJURY (HCC): Status: ACTIVE | Noted: 2019-01-01

## 2019-11-12 PROBLEM — G92.8 TOXIC METABOLIC ENCEPHALOPATHY: Status: ACTIVE | Noted: 2019-01-01

## 2019-11-12 PROBLEM — J15.4 STREPTOCOCCAL PNEUMONIA (HCC): Status: ACTIVE | Noted: 2019-01-01

## 2019-11-12 NOTE — NURSING NOTE
Patient no longer able to tolerate the bi-pap mask  Begging for it to be removed  Patient removed from bi-pap at this time and placed on 4 liters n/c  Sat 89% @ present   Bilateral wrist restraints removed at this time

## 2019-11-12 NOTE — RESPIRATORY THERAPY NOTE
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    Streptococcal pneumonia (HCC)    Toxic metabolic encephalopathy    Acute kidney injury [facility])      Home Pulmonary Medications:    Home Devices/Therapy: (None)    Past Medical History:   Diagnosis Date    Abnormal blood sugar 03/13/2017    Abnormal carotid duplex scan     Carotid Duplex (Plaque formation is quite prominent bilaterally  Despite these changes, no evidence for greater than 50% diameter reducing lesions in the cervical portion of either carotid artery hemisystem ) - 6/13/2017    Anxiety     Cervical radiculopathy 08/08/2017    CHF (congestive heart failure) ([facility])     diastolic    COPD (chronic obstructive pulmonary disease) ([facility] [address] ) 2/6/2018    Coronary angioplasty status     Coronary artery disease 4/7/2017    Costochondritis 02/05/2013    suspect MS in origin given relationship to ROM and location  Start mobic and if persists, reeval  Refused xrayat this time   Dyslipidemia     GERD without esophagitis 8/30/2012    H/O CT scan of chest      (No PE, minimal interstitial change left lower lobe and right upper lobe, which may be acute ) - 5/19/2017    History of Holter monitoring     Holter (Sinus rhythm with rates ranging from 52 to 118 bpm   No afib or heart block  Rare atrial and ventricular ectopic beats  One six-beat atrial run noted  No pauses  ) - 5/31/2017    Hx of echocardiogram     Echo (EF 0 60 (60%), Biatrial enlargement ) - 3/24/2017 Echo (EF 0 55 (55%), mild LVH   Aortic valvular sclerosis  Trace MI with mild left atrial dilatation, mild MAC  Mild TI with mild pulmonary hypertension  ) - 5/22/2017 Echo (EF 0 60 (60%), Normal left vent chamber size and systolic function  Mild diastolic dysfunction of LV w/normal filling pressures  Mild mitral regurg ) - 7/26/2017 Echo (EF 0    Hx of echocardiogram 08/16/2017    EF0 60 (60%) Normal left vent chamber size and systolic function of LV w/normal filling presures  Mild mitral regurg  / EF0 50 (50%) Mild LVH  MIld MR 10/6/17     Hypertension     Iron deficiency anemia 4/21/2016    Macular degeneration, right eye     Malignant melanoma of skin ([facility] [address] ) 9/17/2012    Mixed hyperlipidemia     [facility]STEM[facility]non-ST elevated [facility] infarction[facility] 7/25/2017    Old MI (myocardial infarction)     Osteoarthritis 9/17/2012    Osteoporosis 3/13/2017    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[facility] ) 04/07/2017    Urinary tract infection     frequent UTI's     Social History     Socioeconomic History    Marital status:       Spouse name: None    Number of children: None    Years of education: None    Highest education level: None   Occupational History    Occupation: Retired   Social Needs    Financial resource strain: None    Food insecurity:     Worry: None     Inability: None    Transportation needs:     Medical: None     Non-medical: None   Tobacco Use    Smoking status: Former Smoker     Types: Cigarettes    Smokeless tobacco: Never Used   Substance and Sexual Activity    Alcohol use: Not Currently    Drug use: Never    Sexual activity: Not Currently   Lifestyle    Physical activity:     Days per week: None     Minutes per session: None    Stress: None   Relationships    Social connections:     Talks on phone: None     Gets together: None     Attends [employer] service: None     Active member of club or organization: None     Attends meetings of clubs or organizations: None     Relationship status: None    Intimate partner violence: Fear of current or ex partner: None     Emotionally abused: None     Physically abused: None     Forced sexual activity: None   Other Topics Concern    None   Social History Narrative    No caffeine use    Uses safety equipment-seatbelts    Primary language is English  Subjective         Objective    Physical Exam:   Assessment Type: Pre-treatment  General Appearance: Alert, Awake  Respiratory Pattern: Normal  Chest Assessment: Chest expansion symmetrical  Bilateral Breath Sounds: Other (Comment)(decrease slightly coarse)  Cough: Strong, Moist, Non-productive    Vitals:  Blood pressure 157/73, pulse 80, temperature 97 8 °F (36 6 °C), temperature source Tympanic, resp  rate 19, height 5' 2" (1 575 m), weight 76 5 kg (168 lb 10 4 oz), SpO2 94 %, not currently breastfeeding  Results from last 7 days   Lab Units 11/12/19  0604   PH ART  7 320*   PCO2 ART mm Hg 69 8*   PO2 ART mm Hg 86 0   HCO3 ART mmol/L 34 9*   BASE EXC ART mmol/L 6 8*   O2 CONTENT ART mL/dL 17 9   O2 HGB, ARTERIAL % 96 2   ABG SOURCE  Radial, Left   MORGAN TEST  Yes       Imaging and other studies: I have personally reviewed pertinent reports              Plan    Respiratory Plan: Mild Distress pathway  Airway Clearance Plan: Flutter     Resp Comments: Post treatment evaluation

## 2019-11-12 NOTE — NURSING NOTE
Bi-Pap reapplied at 12/5; FIO2-50%  Patient has increased confusion and agitation; Bilateral soft wrist restraints reapplied for patient interfering with medical treatment

## 2019-11-12 NOTE — NURSING NOTE
Jacques ASCENCIO contacted regarding patient's confusion and continual removal of bipap mask  Restraint order requested and was written for  Bilateral soft limb restraints were applied for this reason  Reason explained to patient as well

## 2019-11-12 NOTE — ASSESSMENT & PLAN NOTE
· Improved  · Likely secondary to dehydration  · Continue IV fluids as needed  · Case discussed with Nephrology

## 2019-11-12 NOTE — NURSING NOTE
Bipap removed at 01 72 64 30 83  Nasal O2, 5 L,  reapplied  Patient alert,oriented to person and time, but remains disoriented to situation  Continues to pull at lines and wires  Forgetful and anxious  Reassured and reoriented  Patient allowed to rest for 10 minutes with nasal O2  Po meds given, crushed, with pudding, without choking nor coughing  Patient fed half of her evening meal, and juice  HOB elevated

## 2019-11-12 NOTE — ASSESSMENT & PLAN NOTE
· ER provider contacted neurosurgery Dr Mares Organ  · Recommended pain control, PT OT, brace (TLSO)  · Orthopedics input appreciated- recommends the same   · CM is working on STR placement once patient is medically stable  · Lidoderm patch, Robaxin, Oxy IR, morphine IV

## 2019-11-12 NOTE — RESPIRATORY THERAPY NOTE
Pt  Found on 4lpm via nasal cannula with an Sa02 of 85%  Pt's hands untied at this time  Pt  Is confused and disoriented  Attempted to reapply the BiPAP and Hands restrained again  [facility] PA-C into see the pt  And encouraged the pt  As well too try and tolerate the BiPAP  Order for Ativan denied  Pt  Continues to yell out and be confused  Sa02 increased to 91% on the current BiPAP settings  Nursing to call if further asst  Is needed

## 2019-11-12 NOTE — PROGRESS NOTES
Progress Note - Jonathan Benson 2/25/1925, 80 y o  female MRN: [id number]    Unit/Bed#: ICU 02 Encounter: [id number]    Primary Care Provider: Maggy Webb DO   Date and time admitted to hospital: 11/9/2019  7:42 PM      * Acute respiratory failure with hypoxia and hypercapnia ([facility] [address] )  Assessment & Plan  · Appears to be secondary to pneumonia as CT scan of the chest shows multifocal findings  · Will continue IV antibiotics  · Continue BiPAP and titrate as tolerated  · ABG result appreciated  · Trend procalcitonin  · Will follow up cultures  · Strep pneumonia positive    Streptococcal pneumonia (HCC)  Assessment & Plan  · Urine antigen positive for strep pneumonia  · Continue IV antibiotics  · Follow up cultures  · Supportive care    Acute kidney injury [facility])  Assessment & Plan  · Improved  · Likely secondary to dehydration  · Continue IV fluids as needed  · Case discussed with Nephrology    Toxic metabolic encephalopathy  Assessment & Plan  · Multifactorial due to pneumonia and hypercapnia  · Mental status is improving  · Continue treatment as above    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  Assessment & Plan  · ER provider contacted neurosurgery Dr Demario Mcmillan  · Recommended pain control, PT OT, brace (TLSO)  · Orthopedics input appreciated- recommends the same   · CM is working on STR placement once patient is medically stable  · Lidoderm patch, Robaxin, Oxy IR, morphine IV    Anxiety  Assessment & Plan  · Patient is on Ativan 0 5 mg 3 times daily as needed  · Will resume Ativan 0 25 mg b i d  And titrate as tolerated to avoid any withdrawal    VTE Pharmacologic Prophylaxis: Pharmacologic: Enoxaparin (Lovenox)    Patient Centered Rounds: I have performed bedside rounds with nursing staff today      Discussions with Specialists or Other Care Team Provider: yes  Education and Discussions with Family / Patient: Discussed plan of care with grand daughter Berenice    Current Length of Stay: 2 day(s)    Current Patient Status: Inpatient   Certification Statement: The patient will continue to require additional inpatient hospital stay due to pneumonia    Discharge Plan: pending hospital course    Code Status: Level 3 - DNAR and DNI    Subjective:   Patient was transferred to the ICU yesterday due to respiratory failure and altered mental status  Patient was on BiPAP over night  This morning appears to be improving  Patient denies any pain complaints at this time  Objective:     Vitals:   Temp (24hrs), Av 4 °F (36 3 °C), Min:96 3 °F (35 7 °C), Max:98 4 °F (36 9 °C)    Temp:  [96 3 °F (35 7 °C)-98 4 °F (36 9 °C)] 97 8 °F (36 6 °C)  HR:  [58-93] 80  Resp:  [16-46] 19  BP: ()/(40-90) 157/73  SpO2:  [79 %-99 %] 94 %  Body mass index is 30 85 kg/m²  Input and Output Summary (last 24 hours): Intake/Output Summary (Last 24 hours) at 2019 0941  Last data filed at 2019 1670  Gross per 24 hour   Intake 1312 ml   Output 250 ml   Net 1062 ml       Physical Exam:     Physical Exam   Constitutional: No distress  Frail elderly female   HENT:   Head: Normocephalic and atraumatic  Eyes: Conjunctivae and EOM are normal    Neck: Normal range of motion  Neck supple  Cardiovascular: Normal rate and regular rhythm  Pulmonary/Chest:   On BiPAP with scattered rhonchi   Abdominal: Soft  She exhibits no distension  There is no tenderness  Musculoskeletal: She exhibits no edema  Generalized weakness   Neurological:   Patient is awake following simple commands   Skin: Skin is warm and dry  Psychiatric: She has a normal mood and affect   Her behavior is normal        Additional Data:     Labs:    Results from last 7 days   Lab Units 19  0449  11/10/19  0507   WBC Thousand/uL 10 40   < > 10 20   HEMOGLOBIN g/dL 12 8   < > 15 4   HEMATOCRIT % 38 7*   < > 47 7*   PLATELETS Thousands/uL 209   < > 234   NEUTROS PCT %  --   --  77*   LYMPHS PCT %  --   --  10*   MONOS PCT %  --   --  12   EOS PCT %  -- --  1    < > = values in this interval not displayed  Results from last 7 days   Lab Units 11/12/19  0449  11/10/19  0507   POTASSIUM mmol/L 4 6   < > 4 3   CHLORIDE mmol/L 98   < > 94*   CO2 mmol/L 37*   < > 34*   BUN mg/dL 32*   < > 18   CREATININE mg/dL 1 15   < > 0 86   CALCIUM mg/dL 9 4   < > 9 9   ALK PHOS U/L  --   --  49*   ALT U/L  --   --  18   AST U/L  --   --  24    < > = values in this interval not displayed  Results from last 7 days   Lab Units 11/11/19  1502   POC GLUCOSE mg/dl 228*           * I Have Reviewed All Lab Data Listed Above  * Additional Pertinent Lab Tests Reviewed:  Amanda [facility] Admission  Reviewed    Imaging:  Imaging Reports Reviewed Today Include:  No new imaging    Recent Cultures (last 7 days):     Results from last 7 days   Lab Units 11/11/19  1749   LEGIONELLA URINARY ANTIGEN  Negative       Last 24 Hours Medication List:     Current Facility-Administered Medications:  acetaminophen 650 mg Oral Q6H PRN Venora Loan, CRNP    aspirin 81 mg Oral Daily Venora Loan, CRNP    atorvastatin 40 mg Oral Daily Venora Loan, CRNP    azithromycin 500 mg Intravenous Q24H Venora Loan, CRNP Last Rate: Stopped (11/11/19 1913)   bisacodyl 10 mg Rectal Daily PRN Venora Loan, CRNP    calcium carbonate-vitamin D 2 tablet Oral BID With Meals Venora Loan, CRNP    cefTRIAXone 1,000 mg Intravenous Q24H Venora Loan, CRNP Last Rate: Stopped (11/11/19 1809)   docusate sodium 100 mg Oral BID Venora Loan, CRNP    enoxaparin 30 mg Subcutaneous Daily Venora Loan, CRNP    famotidine 20 mg Oral BID Venora Loan, CRNP    ferrous sulfate 162 5 mg Oral Daily Venora Loan, CRNP    furosemide 20 mg Oral Daily Venora Loan, CRNP    LORazepam 0 25 mg Oral BID Liza Birmingham MD    metoprolol tartrate 12 5 mg Oral Q12H [address], CRNP    ondansetron 4 mg Intravenous Q6H PRN Venora Loan, CRNP    polyethylene glycol 17 g Oral Daily ROGELIO Barraza    senna 1 tablet Oral HS ROGELIO Barraza    sodium chloride 60 mL/hr Intravenous Continuous ROGELIO Barraza Last Rate: 60 mL/hr (11/12/19 1600)        Today, Patient Was Seen By: Marcos Lemus MD    ** Please Note: Dictation voice to text software may have been used in the creation of this document   **

## 2019-11-12 NOTE — SOCIAL WORK
Chart reivewed, pt remains in ICU and now is back on bipap, [facility], I did speak aby Ng at 16:50 pm to # [phone] to make her aware that I am the pt;s cm now, I reviewed the d/c plan that was started yesterday for the pt to go for rehab when stable , pt is not stable, [facility] requested a pasar and she was able to help answer the questions so the form could be completed and [facility] to [facility], [facility] os reviewing this case too, d/c plan was discussed at care coordination rounds today, no d/c planned for today pt remains in ICU on Bipap

## 2019-11-12 NOTE — ASSESSMENT & PLAN NOTE
· Patient is on Ativan 0 5 mg 3 times daily as needed  · Will resume Ativan 0 25 mg b i d   And titrate as tolerated to avoid any withdrawal

## 2019-11-12 NOTE — CONSULTS
Harvinder Pugh 80 y o  female MRN: [id number]  Unit/Bed#: ICU 02 Encounter: [id number]      -------------------------------------------------------------------------------------------------------------  Chief Complaint: "It's my breathing"    History of Present Illness   HX and PE limited by:  Patient participation/confusion  Shaye Gil is a 80 y o  female with PMH of COPD, diastolic CHF, CAD s/p MI, pulmonary HTN, HTN, HLD, and GERD who initially presented to ED 11/9 with constipation and low back pain, found to have L1 compression fracture and admitted to the floor  Transferred to ICU evening of 11/11 due to altered mental status and increased work of breathing with hypercarbia and hypoxia requiring BiPAP, concern for pneumonia  History obtained from chart review and the patient  History from patient limited 2/2 patient confusion   -------------------------------------------------------------------------------------------------------------  Assessment and Plan:    Neuro:    Diagnosis: Toxic-metabolic encephalopathy  o Plan: Likely multifactorial and may be related to pneumonia, hypercarbia, pain medications, and/or delirium  CAM ICU+, delirium precautions with lights on/shades up during day with frequent reorientation  Sleep hygiene at night  Patient with hx of anxiety, takes chronic ativan at home-- granddaugther reports she takes at least daily and often twice daily (prescribed at lorazepam 0 5 mg PO TID prn)  Given potential for withdraw will resume low dose ativan here, ordered as lorazepam 0 25 mg PO BID prn  May increase dose, however hesitant at this point given DELIO and potential for delayed clearance in setting of encephalopathy   Diagnosis:  L1 compression fracture, age-indeterminate from imaging but new since 7/2018  o Plan: Unclear if this is pathologic versus traumatic compression fracture  Appreciate neurosurgery and orthopedic input  TLSO brace    Pain control with scheduled tylenol and lidoderm patch  Given confusion overnight will attempt to minimize opioid requirement  NSAIDs on hold in setting of DELIO  CV:    Diagnosis: CAD s/p MI  Elevated troponin this admission  o Plan: Continue ASA and metoprolol  Troponin elevation likely demand ischemia in setting of sepsis and increased work of breathing  Peaked at 0 09 and overall plateau of 3 serial measurements  No EKG changes, TTE completed this AM to assess for any new RWMA, awaiting official read   Diagnosis: Diastolic CHF:  o Plan: Continue home dose lasix  Repeat echocardiogram obtained today, awaiting official read  TTE 8/16/17:  LVEF 60%, normal left ventricular chamber size and systolic function of LV w/normal filling pressures  Mild MR  Mild LVH  TTE 3/24/17:  LVEF 55%, mild LVH  Aortic valvular sclerosis  Trace MI with mild left atrial dilatation, mild MAC  Mild TI with mild pulmonary hypertension  Pulm:   Diagnosis:  Acute on chronic respiratory failure with hypoxia and hypercapnea  ABG at time of decompensation yesterday evening consistent with respiratory acidosis  Does have baseline diagnosis of COPD (smoker for 20+ years) and interstitial changes noted on previous imaging  o Plan: May be multifactorial   Suspect pneumonia may play role given multifocal infiltrates on chest CT and urinary antigen positive for strep pneumoniae  However, given back pain and immobility, will attempt to rule out PE with LE duplex, V/Q scan, and TTE  Continue prophylactic dosing of lovenox pending results of these studies  Unclear if mild degree of volume overload may be playing a role but this seems less likely, BNP is elevated but has been significantly higher in past, does not appear volume overloaded on exam   Official read on repeat TTE pending from this AM   No new EKG changes and mild plateaued elevation of troponins  Hypercarbia may also be related to pain medications    Does have history of COPD although not on any home inhalers or supplemental oxygen  Bicarb elevated on BMP suggests element of chronic CO2 retention  Will add scheduled nebulizer treatments  BiPAP prn and QHS  Wean supplemental O2 to maintain saturations >90%   Diagnosis: Streptococcal pneumonia  o Plan:  Multifocal infiltrates on CT chest as well as urinary antigen positive for strep pneumoniae  Continue azithromycin and ceftriaxone  Airway clearance protocol and mucinex  Patient with weak cough on exam, seems to have some trouble managing oral secretions-- will order formal speech/swallow evaluation  Scheduled nebulizers  Encourage pulmonary toilet  GI:    Diagnosis: Constipation  Resolved, BM x1 yesterday  o Plan: Continue daily colace, senna, and miralax  Minimize opioids as able   Diagnosis: Hx of GERD with hiatal hernia  o Plan: Continue home pepcid  :    Diagnosis: Acute kidney injury, improving  o Plan:  May have pre-renal component versus 2/2 ATN  BUN and Cr continue to improve, mild oliguria by I/O but does have unmeasured UOP x5 yesterday  Continue to monitor  Avoid nephrotoxins as able, maintain MAP>65 mmHg  F/E/N:    Plan:  Replacement of electrolytes as indicated  Mild hyperkalemia yesterday evening has since resolved and is likely attributable to DELIO  Heme/Onc:    Diagnosis: Iron deficiency anemia  o Plan: Continue oral iron supplementation      Endo:   o No active issues, BG control acceptable in past 24H and no hx of DM2  ID:    Diagnosis: Streptococcal pneumonia:  o Plan:  Continue azithromycin and ceftriaxone  Encourage pulmonary toilet as above        Disposition: Continue Critical Care   Code Status: Level 3 - DNAR and DNI  --------------------------------------------------------------------------------------------------------------  Review of Systems    Review of systems was unable to be performed secondary to patient cooperation/confusion    Physical Exam   Constitutional: No distress  Elderly woman sitting up in bed, appears slightly restless (pulling at gown and adjusting nasal canula) but in no acute distress   HENT:   Head: Normocephalic and atraumatic  Eyes: Pupils are equal, round, and reactive to light  Conjunctivae are normal    Neck: Normal range of motion  No JVD present  Cardiovascular: Normal rate, regular rhythm and intact distal pulses  Pulmonary/Chest:   tachypneic on 6L nc but no use of accessory muscles and no increased work of breathing  Able to speak in full sentences without pausing to catch her breath  Decreased breath sounds, scattered wheezing   Abdominal: Soft  She exhibits no distension  There is no tenderness  There is no guarding  Genitourinary:   Genitourinary Comments: deferred   Musculoskeletal: She exhibits no edema  Neurological: She is alert  Oriented to self and year, knew she was in a hospital   Could not state month, city, or name of hospital and could not describe events of past few days or events leading up to hospitalization   Skin: Skin is warm and dry  There is pallor  Psychiatric:   confused     --------------------------------------------------------------------------------------------------------------  Historical Information   Past Medical History:   Diagnosis Date    Abnormal blood sugar 03/13/2017    Abnormal carotid duplex scan     Carotid Duplex (Plaque formation is quite prominent bilaterally     Despite these changes, no evidence for greater than 50% diameter reducing lesions in the cervical portion of either carotid artery hemisystem ) - 6/13/2017    Anxiety     Cervical radiculopathy 08/08/2017    [facility]est[facility])     diastolic    [facility]ch[facility] 2/6/2018    Coronary angioplasty status     Coronary artery disease 4/7/2017    Costochondritis 02/05/2013    suspect MS in origin given relationship to ROM and location  Start mobic and if persists, reeval  Refused xrayat this time   Dyslipidemia     GERD without esophagitis 8/30/2012    H/O CT scan of chest      (No PE, minimal interstitial change left lower lobe and right upper lobe, which may be acute ) - 5/19/2017    History of Holter monitoring     Holter (Sinus rhythm with rates ranging from 52 to 118 bpm   No afib or heart block  Rare atrial and ventricular ectopic beats  One six-beat atrial run noted  No pauses  ) - 5/31/2017    Hx of echocardiogram     Echo (EF 0 60 (60%), Biatrial enlargement ) - 3/24/2017 Echo (EF 0 55 (55%), mild LVH  Aortic valvular sclerosis  Trace MI with mild left atrial dilatation, mild MAC  Mild TI with mild pulmonary hypertension  ) - 5/22/2017 Echo (EF 0 60 (60%), Normal left vent chamber size and systolic function  Mild diastolic dysfunction of LV w/normal filling pressures  Mild mitral regurg ) - 7/26/2017 Echo (EF 0    Hx of echocardiogram 08/16/2017    EF0 60 (60%) Normal left vent chamber size and systolic function of LV w/normal filling presures  Mild mitral regurg  / EF0 50 (50%) Mild LVH   MIld MR 10/6/17     Hypertension     Iron deficiency anemia 4/21/2016    Macular degeneration, right eye     Malignant melanoma of skin ([facility] ) 9/17/2012    Mixed hyperlipidemia     NSTEMI (non-ST elevated myocardial infarction) ([facility] ) 7/25/2017    Old MI (myocardial infarction)     Osteoarthritis 9/17/2012    Osteoporosis 3/13/2017    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[address] ) 04/07/2017    Urinary tract infection     frequent UTI's     Past Surgical History:   Procedure Laterality Date    ABDOMINAL SURGERY      APPENDECTOMY      BREAST SURGERY      Lumpectomy    CARDIAC CATHETERIZATION  03/24/2017    ARNIE to 100% occluded prox RCA, chronic 99% ostial LCfx, 60% mid LAD, discrete 40% distal LM / EF0 60 (60%) 100% occluded proximal RCA s/p ARNIE, chronic 99% ostial LCX, 60% mid LAD, discrete 40% dital LM  4/5/17    CHOLECYSTECTOMY      COLONOSCOPY N/A 7/25/2018    Procedure: COLONOSCOPY;  Surgeon: Johnathon Brown MD;  Location: [address] MAIN OR;  Service: Gastroenterology    CORONARY STENT PLACEMENT  03/25/2017    ARNIE RCA    HEMORRHOID SURGERY      INSERTION STENT ARTERY  04/07/2017    Cardio vascular agents drug-eluting stent    SKIN BIOPSY      TONSILLECTOMY       Social History   Social History     Substance and Sexual Activity   Alcohol Use Not Currently     Social History     Substance and Sexual Activity   Drug Use Never     Social History     Tobacco Use   Smoking Status Former Smoker    Types: Cigarettes   Smokeless Tobacco Never Used     Family History:   Family History   Problem Relation Age of Onset    Heart failure Mother     Prostate cancer Father     Stroke Family        Vitals:   Vitals:    11/12/19 0522 11/12/19 0530 11/12/19 0852 11/12/19 0855   BP:  (!) 198/74  157/73   BP Location:       Pulse: 79 75  80   Resp: (!) 29 19     Temp: 97 8 °F (36 6 °C)      TempSrc: Tympanic      SpO2: 95% 92% 94%    Weight:       Height:         Temp  Min: 96 3 °F (35 7 °C)  Max: 98 5 °F (36 9 °C)  IBW: 50 1 kg  Height: 5' 2" (157 5 cm)  Body mass index is 30 85 kg/m²    N/A    Medications:  Current Facility-Administered Medications   Medication Dose Route Frequency    acetaminophen (TYLENOL) tablet 975 mg  975 mg Oral Q8H [address]    aspirin chewable tablet 81 mg  81 mg Oral Daily    atorvastatin (LIPITOR) tablet 40 mg  40 mg Oral Daily    azithromycin (ZITHROMAX) 500 mg in sodium chloride 0 9 % 250 mL IVPB  500 mg Intravenous Q24H    bisacodyl (DULCOLAX) rectal suppository 10 mg  10 mg Rectal Daily PRN    calcium carbonate-vitamin D (OSCAL-D) 500 mg-200 units per tablet 2 tablet  2 tablet Oral BID With Meals    cefTRIAXone (ROCEPHIN) IVPB (premix) 1,000 mg  1,000 mg Intravenous Q24H    docusate sodium (COLACE) capsule 100 mg  100 mg Oral BID    enoxaparin (LOVENOX) subcutaneous injection 30 mg  30 mg Subcutaneous Daily    famotidine (PEPCID) tablet 20 mg  20 mg Oral BID    ferrous sulfate tablet 162 5 mg  162 5 mg Oral Daily    furosemide (LASIX) tablet 20 mg  20 mg Oral Daily    ipratropium (ATROVENT) 0 02 % inhalation solution 0 5 mg  0 5 mg Nebulization 4x Daily    levalbuterol (XOPENEX) inhalation solution 0 63 mg  0 63 mg Nebulization 4x Daily    lidocaine (LIDODERM) 5 % patch 1 patch  1 patch Topical Daily    LORazepam (ATIVAN) tablet 0 25 mg  0 25 mg Oral BID    metoprolol tartrate (LOPRESSOR) partial tablet 12 5 mg  12 5 mg Oral Q12H [address]    ondansetron (ZOFRAN) injection 4 mg  4 mg Intravenous Q6H PRN    polyethylene glycol (MIRALAX) packet 17 g  17 g Oral Daily    senna (SENOKOT) tablet 8 6 mg  1 tablet Oral HS    sodium chloride 0 9 % infusion  60 mL/hr Intravenous Continuous     Home medications:  Prior to Admission Medications   Prescriptions Last Dose Informant Patient Reported? Taking?    Calcium Carbonate-Vitamin D 600-125 MG-UNIT TABS 2019 at Unknown time  Yes Yes   Sig: Take by mouth daily     LORazepam (ATIVAN) 0 5 mg tablet Unknown at Unknown time  No No   Sig: Take 1 tablet (0 5 mg total) by mouth every 8 (eight) hours as needed for anxiety   MULTIPLE VITAMINS-CALCIUM PO 2019 at Unknown time  Yes Yes   Sig: Take by mouth daily   Potassium 99 MG TABS 2019 at Unknown time  Yes Yes   Sig: Take by mouth daily   aspirin 81 mg chewable tablet 2019 at Unknown time  No Yes   Sig: Chew 1 tablet (81 mg total) daily   atorvastatin (LIPITOR) 40 mg tablet 2019 at Unknown time  No Yes   Sig: Take 1 tablet (40 mg total) by mouth daily   ferrous sulfate 325 (65 Fe) mg tablet 2019 at Unknown time  Yes Yes   Sig: Take 0 5 tablets by mouth daily     furosemide (LASIX) 20 mg tablet 2019 at Unknown time  No Yes   Sig: Take 1 tablet (20 mg total) by mouth daily   metoprolol tartrate (LOPRESSOR) 25 mg tablet 2019 at Unknown time  No Yes   Si 5 tablets twice a day nitroglycerin (NITROSTAT) 0 4 mg SL tablet Unknown at Unknown time  No No   Sig: Place 1 tablet (0 4 mg total) under the tongue every 5 (five) minutes as needed for chest pain   ranitidine (ZANTAC) 150 mg tablet 11/9/2019 at Unknown time  No Yes   Sig: Take 1 tablet (150 mg total) by mouth 2 (two) times a day      Facility-Administered Medications: None     Allergies:   Allergies   Allergen Reactions    Ciprofloxacin      Reaction Date: [id number];     Keflex [Cephalexin] Dizziness    Nitrofurantoin      Reaction Date: [id number];     Penicillins Rash, Other (See Comments) and Throat Swelling     Throat swells         Laboratory and Diagnostics:  Results from last 7 days   Lab Units 11/12/19 0449 11/11/19  1516 11/10/19  0507 11/09/19  2151   WBC Thousand/uL 10 40 12 50* 10 20 10 50   HEMOGLOBIN g/dL 12 8 14 0 15 4 15 2   HEMATOCRIT % 38 7* 43 6 47 7* 45 7   PLATELETS Thousands/uL 209 248 234 228   NEUTROS PCT %  --   --  77* 80*   MONOS PCT %  --   --  12 10     Results from last 7 days   Lab Units 11/12/19 0449 11/11/19  1516 11/10/19  0507 11/09/19  2151   SODIUM mmol/L 138 133* 135 134   POTASSIUM mmol/L 4 6 5 5 4 3 4 6   CHLORIDE mmol/L 98 94* 94* 96*   CO2 mmol/L 37* 33* 34* 33*   ANION GAP mmol/L 3* 6 7 5   BUN mg/dL 32* 29* 18 21   CREATININE mg/dL 1 15 1 42* 0 86 0 85   CALCIUM mg/dL 9 4 10 1 9 9 9 9   GLUCOSE RANDOM mg/dL 101* 202* 128* 152*   ALT U/L  --   --  18 15   AST U/L  --   --  24 25   ALK PHOS U/L  --   --  49* 47*   ALBUMIN g/dL  --   --  3 8 3 7   TOTAL BILIRUBIN mg/dL  --   --  1 00 0 90               Results from last 7 days   Lab Units 11/11/19  1915 11/11/19  1742 11/11/19  1516 11/09/19  2215   TROPONIN I ng/mL 0 07* 0 09* 0 07* <0 03         ABG:  Results from last 7 days   Lab Units 11/12/19  0604   PH ART  7 320*   PCO2 ART mm Hg 69 8*   PO2 ART mm Hg 86 0   HCO3 ART mmol/L 34 9*   BASE EXC ART mmol/L 6 8*   ABG SOURCE  Radial, Left     VBG:  Results from last 7 days   Lab Units 11/12/19  0604   ABG SOURCE  Radial, Left     Results from last 7 days   Lab Units 11/11/19  1742   PROCALCITONIN ng/ml 0 33*       Micro:  Results from last 7 days   Lab Units 11/11/19  1749   LEGIONELLA URINARY ANTIGEN  Negative   STREP PNEUMONIAE ANTIGEN, URINE  Positive*         EKG: Reviewed  Imaging:   XR chest portable   Final Result      No acute cardiopulmonary disease  Large hiatal hernia  Workstation performed: [id number]         CT chest wo contrast   Final Result      Multifocal patchy areas of groundglass and consolidation within the lungs bilaterally a few of which appear nodular  Findings are favored to be infectious in nature and should be followed to resolution  Cardiomegaly  Trace bilateral pleural effusions  Large hiatal hernia  Redemonstration of an age-indeterminate L1 compression fracture, new since 7/23/2018  Workstation performed: [id number]         CT Abdomen pelvis with contrast   Final Result      Age-indeterminate L1 compression deformity with 45% loss of height  There is minimal retropulsion of posterior fracture fragments  I personally discussed this study with Dr Dayami Holland on 11/10/2019 at 1:37 AM       Unchanged large hiatal hernia  Workstation performed: [id number]         XR chest 2 views   Final Result      Large hiatal hernia without evidence of acute cardiopulmonary disease        Workstation performed: [id number]         XR spine lumbar 2 or 3 views injury   Final Result      Chronic T12 and L1 compression abnormalities similar to the prior 2018 CT         Workstation performed: [id number]         VAS lower limb venous duplex study, complete bilateral    (Results Pending)   NM lung ventilation / perfusion    (Results Pending)         ------------------------------------------------------------------------------------------------------------  Advance Directive and Living Will:      Power of :    POLST: ------------------------------------------------------------------------------------------------------------  Anticipated Length of Stay is > 2 midnights    Counseling / Coordination of Care  Total Critical Care time spent 32 minutes excluding procedures, teaching and family updates  Na Edwards MD        Portions of the record may have been created with voice recognition software  Occasional wrong word or "sound a like" substitutions may have occurred due to the inherent limitations of voice recognition software    Read the chart carefully and recognize, using context, where substitutions have occurred

## 2019-11-12 NOTE — OCCUPATIONAL THERAPY NOTE
Occupational Therapy Cancellation Note     Chart review performed and OT treatment attepted  At this time, OT treatment cancelled due to patient not medically appropriate at this time as pt current on BiPap and restrained  OT will follow and treat as appropriate        Aakash Amor MS, OTR/L

## 2019-11-12 NOTE — CONSULTS
[facility] [address]  [place], [address]  [phone]     Tax ID: [id number]      NPI: [id number]                                                Cardiology Keren Hunter 80 y o  female   YOB: 1925 MRN: [id number]  Unit/Bed#: ICU 02 Encounter: [id number]      Physician Requesting Consult: Myron Nunez MD  Reason for Consult / Principal Problem:  Shortness of breath , Hypoxia, history of heart failure    Assessment and Plan  1  Elevated troponins  2  CAD  · S/p ARNIE to RCA in 3/2017  3  Acute on chronic respiratory failure  · Presenting ABG - ph 7 27, pCO2 71, PO2 74, HCO3 31  4  Delio on CKD  · Baseline Cr 0 8-1  5  COPD  6  Hypertension  7  Hyperlipidemia    Plan  Elevated troponin / non-MI troponin elevation  · ECG - possible ectopic atrial rhythm, nonspecific  · Troponin <0 03 --> 0 07 --> 0 09 --> 0 07  · Echo today - dilated LV, LVIDd 6 2, LVEF of 50% possible inferolateral hypokinesis - similar to prior echo from earlier this year, dilated IVC, moderate pulmonary hypertension  · On oral lasix 20  · Procalcitonin 0 33  · Likely related to infection, DELIO    Acute on chronic respiratory failure, Pneumonia  · 2/2 COPD exacerbation - pCO2 70 at presentation  · IVC was mildly dilated on echo  · But no evidence of volume overload on clinical exam    DELIO on CKD  · Improving with IV fluids  · Cr down from 1 42 to 1 15  · Can hold oral lasix temporarily if needed from infeciotn or Delio standpoint    CAD s/p PCI  · On aspirin, atorvastatin 40 mg, metoprolol 12 5 mg b i d      Ischemic cardiomyopathy, LVIDD 6 2 cm  · Has history of chronic heart failure, without any acute exacerbation  · LVEF is presently normal  · Continue home medications metoprolol 12 5 b i d   · On lasix 20 daily at home, can hold temporarily if needed    We will sign off for now, please re-consult as needed    History of Present Illness   HPI: Lebron Plascencia is a 80y o  year old female who presents to the ED on 11/09 with complains of back pain  She was initially found to have an L1 compression fracture and was admitted to the floors  On 11/11, patient developed altered mental status and had increased work of breathing  She was subsequently found to have hyper cap knee a on ABG, and was placed on BiPAP  She has continued to be altered mental status and has been confused and unable to provide any significant history  She was subsequently diagnosed with streptococcal pneumoniae pneumonia, and has been placed on antibiotics for the same  She additionally had some hypoxia and given her history of heart failure, Cardiology was consulted for the same  Patient continues to be confused during this evaluation, and is unable to provide any meaningful history  Most of the history is obtained from the chart and supplemented with information from her primary care team    Past Medical History:   Diagnosis Date    Abnormal blood sugar 03/13/2017    Abnormal carotid duplex scan     Carotid Duplex (Plaque formation is quite prominent bilaterally  Despite these changes, no evidence for greater than 50% diameter reducing lesions in the cervical portion of either carotid artery hemisystem ) - 6/13/2017    Anxiety     Cervical radiculopathy 08/08/2017    [facility]congest[facility])     diastolic    COPD (chronic obstructive pulmonary disease) ([facility] [address] ) 2/6/2018    Coronary angioplasty status     Coronary artery disease 4/7/2017    Costochondritis 02/05/2013    suspect MS in origin given relationship to ROM and location  Start mobic and if persists, reeval  Refused xrayat this time        Dyslipidemia     GERD without esophagitis 8/30/2012    H/O CT scan of chest      (No PE, minimal interstitial change left lower lobe and right upper lobe, which may be acute ) - 5/19/2017    History of Holter monitoring     Holter (Sinus rhythm with rates ranging from 52 to 118 bpm   No afib or heart block  Rare atrial and ventricular ectopic beats  One six-beat atrial run noted  No pauses  ) - 5/31/2017    Hx of echocardiogram     Echo (EF 0 60 (60%), Biatrial enlargement ) - 3/24/2017 Echo (EF 0 55 (55%), mild LVH  Aortic valvular sclerosis  Trace MI with mild left atrial dilatation, mild MAC  Mild TI with mild pulmonary hypertension  ) - 5/22/2017 Echo (EF 0 60 (60%), Normal left vent chamber size and systolic function  Mild diastolic dysfunction of LV w/normal filling pressures  Mild mitral regurg ) - 7/26/2017 Echo (EF 0    Hx of echocardiogram 08/16/2017    EF0 60 (60%) Normal left vent chamber size and systolic function of LV w/normal filling presures  Mild mitral regurg  / EF0 50 (50%) Mild LVH  MIld MR 10/6/17     Hypertension     Iron deficiency anemia 4/21/2016    Macular degeneration, right eye     Malignant melanoma of skin ([facility] [address] ) 9/17/2012    Mixed hyperlipidemia     NSTEMI (non-ST elevated myocardial infarction) ([address] ) 7/25/2017    Old MI (myocardial infarction)     Osteoarthritis 9/17/2012    Osteoporosis 3/13/2017    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[address] ) 04/07/2017    Urinary tract infection     frequent UTI's     Past Surgical History:   Procedure Laterality Date    ABDOMINAL SURGERY      APPENDECTOMY      BREAST SURGERY      Lumpectomy    CARDIAC CATHETERIZATION  03/24/2017    ARNIE to 100% occluded prox RCA, chronic 99% ostial LCfx, 60% mid LAD, discrete 40% distal LM / EF0 60 (60%) 100% occluded proximal RCA s/p ARNIE, chronic 99% ostial LCX, 60% mid LAD, discrete 40% dital LM  4/5/17    CHOLECYSTECTOMY      COLONOSCOPY N/A 7/25/2018    Procedure: COLONOSCOPY;  Surgeon:  Dino Osman MD;  Location: [address] [place], [place] [address];  Service: Gastroenterology    CORONARY STENT PLACEMENT  03/25/2017    ARNIE RCA    HEMORRHOID SURGERY      INSERTION STENT ARTERY  04/07/2017    Cardio vascular agents drug-eluting stent    SKIN BIOPSY      TONSILLECTOMY       Family History   Problem Relation Age of Onset    Heart failure Mother     Prostate cancer Father     Stroke Family      Meds/Allergies   all current active meds have been reviewed and current meds:   Current Facility-Administered Medications   Medication Dose Route Frequency    acetaminophen (TYLENOL) tablet 975 mg  975 mg Oral Q8H [address]    aspirin chewable tablet 81 mg  81 mg Oral Daily    atorvastatin (LIPITOR) tablet 40 mg  40 mg Oral Daily    azithromycin (ZITHROMAX) 500 mg in sodium chloride 0 9 % 250 mL IVPB  500 mg Intravenous Q24H    bisacodyl (DULCOLAX) rectal suppository 10 mg  10 mg Rectal Daily PRN    calcium carbonate-vitamin D (OSCAL-D) 500 mg-200 units per tablet 2 tablet  2 tablet Oral BID With Meals    cefTRIAXone (ROCEPHIN) IVPB (premix) 1,000 mg  1,000 mg Intravenous Q24H    docusate sodium (COLACE) capsule 100 mg  100 mg Oral BID    enoxaparin (LOVENOX) subcutaneous injection 30 mg  30 mg Subcutaneous Daily    famotidine (PEPCID) tablet 20 mg  20 mg Oral BID    ferrous sulfate tablet 162 5 mg  162 5 mg Oral Daily    furosemide (LASIX) tablet 20 mg  20 mg Oral Daily    ipratropium (ATROVENT) 0 02 % inhalation solution 0 5 mg  0 5 mg Nebulization 4x Daily    levalbuterol (XOPENEX) inhalation solution 0 63 mg  0 63 mg Nebulization 4x Daily    lidocaine (LIDODERM) 5 % patch 1 patch  1 patch Topical Daily    LORazepam (ATIVAN) tablet 0 25 mg  0 25 mg Oral BID    metoprolol tartrate (LOPRESSOR) partial tablet 12 5 mg  12 5 mg Oral Q12H LIZZY    ondansetron (ZOFRAN) injection 4 mg  4 mg Intravenous Q6H PRN    polyethylene glycol (MIRALAX) packet 17 g  17 g Oral Daily    senna (SENOKOT) tablet 8 6 mg  1 tablet Oral HS    sodium chloride 0 9 % infusion  60 mL/hr Intravenous Continuous     Medications Prior to Admission   Medication    aspirin 81 mg chewable tablet    atorvastatin (LIPITOR) 40 mg tablet    Calcium Carbonate-Vitamin D 600-125 MG-UNIT TABS    ferrous sulfate 325 (65 Fe) mg tablet    furosemide (LASIX) 20 mg tablet    metoprolol tartrate (LOPRESSOR) 25 mg tablet    MULTIPLE VITAMINS-CALCIUM PO    Potassium 99 MG TABS    ranitidine (ZANTAC) 150 mg tablet    LORazepam (ATIVAN) 0 5 mg tablet    nitroglycerin (NITROSTAT) 0 4 mg SL tablet     Allergies   Allergen Reactions    Ciprofloxacin      Reaction Date: 01Jul2011;     Keflex [Cephalexin] Dizziness    Nitrofurantoin      Reaction Date: 01Jul2011;     Penicillins Rash, Other (See Comments) and Throat Swelling     Throat swells     Social History     Socioeconomic History    Marital status:      Spouse name: None    Number of children: None    Years of education: None    Highest education level: None   Occupational History    Occupation: Retired   Social Needs    Financial resource strain: None    Food insecurity:     Worry: None     Inability: None    Transportation needs:     Medical: None     Non-medical: None   Tobacco Use    Smoking status: Former Smoker     Types: Cigarettes    Smokeless tobacco: Never Used   Substance and Sexual Activity    Alcohol use: Not Currently    Drug use: Never    Sexual activity: Not Currently   Lifestyle    Physical activity:     Days per week: None     Minutes per session: None    Stress: None   Relationships    Social connections:     Talks on phone: None     Gets together: None     Attends Hinduism service: None     Active member of club or organization: None     Attends meetings of clubs or organizations: None     Relationship status: None    Intimate partner violence:     Fear of current or ex partner: None     Emotionally abused: None     Physically abused: None     Forced sexual activity: None   Other Topics Concern    None   Social History Narrative    No caffeine use    Uses safety equipment-seatbelts    Primary language is English            Review of Systems  Unable to obtained as patient is confused  All other systems negative    Vitals:    11/12/19 0522 11/12/19 0530 11/12/19 7854 11/12/19 0855   BP:  (!) 198/74  157/73   BP Location:       Pulse: 79 75  80   Resp: (!) 29 19     Temp: 97 8 °F (36 6 °C)      TempSrc: Tympanic      SpO2: 95% 92% 94%    Weight:       Height:         Orthostatic Blood Pressures      Most Recent Value   Blood Pressure  157/73 filed at 11/12/2019 0855   Patient Position - Orthostatic VS  Lying filed at 11/12/2019 0500        Body mass index is 30 85 kg/m²  Wt Readings from Last 5 Encounters:   11/11/19 76 5 kg (168 lb 10 4 oz)   11/10/19 73 kg (160 lb 15 oz)   07/17/19 73 5 kg (162 lb)   07/12/19 73 1 kg (161 lb 4 oz)   06/03/19 73 8 kg (162 lb 12 8 oz)     I/O last 3 completed shifts: In: 1272 [P O :200; I V :772; IV Piggyback:300]  Out: 550 [Urine:550]      Physical Exam    Constitutional:  Rachell Fleming appears frail, awake, confused, pleasant and cooperative  No acute distress   HEENT:    Normocephalic, atraumatic   Neck:  Supple, No JVD   Lungs:  B/l scattered wheezing+, respirations unlabored    Chest Wall:  No tenderness or deformity    Heart[de-identified]  Regular rate, Regular rhythm, S1 and S2 normal, no murmur, rub or gallop    Abdomen:  Soft, non-tender, non-distended   Neurological:  Awake, alert, oriented x3   Non-focal exam    Extremities:  No pedal edema, No calf tenderness         Labs:  Results from last 7 days   Lab Units 11/12/19  0449 11/11/19  1516 11/10/19  0507 11/09/19  2151   WBC Thousand/uL 10 40 12 50* 10 20 10 50   HEMOGLOBIN g/dL 12 8 14 0 15 4 15 2   HEMATOCRIT % 38 7* 43 6 47 7* 45 7   RDW % 13 9 14 4 14 1 14 0   PLATELETS Thousands/uL 209 248 234 228     Results from last 7 days   Lab Units 11/12/19 0449 11/11/19  1516 11/10/19  0507 11/09/19  2151   POTASSIUM mmol/L 4 6 5 5 4 3 4 6   CHLORIDE mmol/L 98 94* 94* 96*   CO2 mmol/L 37* 33* 34* 33*   BUN mg/dL 32* 29* 18 21   CREATININE mg/dL 1 15 1 42* 0 86 0 85   CALCIUM mg/dL 9 4 10 1 9 9 9 9   AST U/L  --   --  24 25   ALT U/L  --   --  18 15   ALK PHOS U/L  --   --  49* 47*     Results from last 7 days   Lab Units 11/11/19  1915 11/11/19  1742 11/11/19  1516 11/09/19  2215   TROPONIN I ng/mL 0 07* 0 09* 0 07* <0 03                     Telemetry:  No significant events  Imaging: Xr Chest Portable    Result Date: 11/12/2019  Narrative: CHEST INDICATION:   hypoxia  COMPARISON:  11/9/2019 EXAM PERFORMED/VIEWS:  XR CHEST PORTABLE FINDINGS: Cardiomegaly is noted  The lungs are clear  No pneumothorax or pleural effusion  Osseous structures appear within normal limits for patient age  Impression: No acute cardiopulmonary disease  Large hiatal hernia  Workstation performed: [id number]     Xr Chest 2 Views    Result Date: 11/10/2019  Narrative: CHEST INDICATION:   poor o2 saturation, ro infiltrate  COMPARISON:  5/30/2019 EXAM PERFORMED/VIEWS:  XR CHEST PA & LATERAL FINDINGS: Cardiomediastinal silhouette appears unremarkable  Large hiatal hernia noted  Small lung volumes  The lungs are clear  No pneumothorax or pleural effusion  Osseous structures appear within normal limits for patient age  Impression: Large hiatal hernia without evidence of acute cardiopulmonary disease  Workstation performed: [id number]     Xr Spine Lumbar 2 Or 3 Views Injury    Result Date: 11/10/2019  Narrative: LUMBAR SPINE INDICATION:   pain over the L3 area, ro fx  COMPARISON:  7/23/2018 VIEWS:  XR SPINE LUMBAR 2 OR 3 VIEWS INJURY FINDINGS: Chronic compression abnormalities of T12 and L1 are similar to the prior CT with slight kyphotic angulation this region  Limbus vertebra anterior superior endplate of L4 incidentally noted  No subluxation  Mild scattered spondylotic changes  The pedicles appear intact  Right hip replacement noted  There are atherosclerotic calcifications  Soft tissues are otherwise unremarkable       Impression: Chronic T12 and L1 compression abnormalities similar to the prior 2018 CT Workstation performed: [id number]     Ct Chest Wo Contrast    Result Date: 11/11/2019  Narrative: CT CHEST WITHOUT IV CONTRAST INDICATION:   Shortness of breath hypoxia  COMPARISON:  7/23/2018 and recent CT of the abdomen from 11/10/2019  TECHNIQUE: CT examination of the chest was performed without intravenous contrast   Axial, sagittal, and coronal 2D reformatted images were created from the source data and submitted for interpretation  Radiation dose length product (DLP) for this visit:  375 3 mGy-cm   This examination, like all CT scans performed in the [facility], was performed utilizing techniques to minimize radiation dose exposure, including the use of iterative reconstruction and automated exposure control  FINDINGS: LUNGS:  There are multifocal areas of consolidation, patchy and nodular within the bilateral upper lobes and lower lobes  Findings are favored to be infectious in nature  PLEURA:  There are trace bilateral pleural effusions  HEART/GREAT VESSELS:  The heart is enlarged  There is coronary artery calcification  There is thoracic aortic calcification  MEDIASTINUM AND AWILDA:  There is a large hiatal hernia with a partially intrathoracic stomach  CHEST WALL AND LOWER NECK:   There is coarse calcification within the bilateral thyroid lobes  VISUALIZED STRUCTURES IN THE UPPER ABDOMEN:  Unremarkable  OSSEOUS STRUCTURES:  There is stable moderate loss of height of the T10 and T12 vertebral bodies  The bones are diffusely osteopenic  There is an age-indeterminate compression fracture involving the L1 vertebral body, which is new since the exam from 7/23/2018     Impression: Multifocal patchy areas of groundglass and consolidation within the lungs bilaterally a few of which appear nodular  Findings are favored to be infectious in nature and should be followed to resolution  Cardiomegaly  Trace bilateral pleural effusions  Large hiatal hernia  Redemonstration of an age-indeterminate L1 compression fracture, new since 7/23/2018   Workstation performed: [id number]     Ct Abdomen Pelvis With Contrast    Result Date: 11/10/2019  Narrative: CT ABDOMEN AND PELVIS WITH IV CONTRAST INDICATION:   Nausea/vomiting question dilated bowel loops  COMPARISON:  None  TECHNIQUE:  CT examination of the abdomen and pelvis was performed  Axial, sagittal, and coronal 2D reformatted images were created from the source data and submitted for interpretation  Radiation dose length product (DLP) for this visit:  678 5 mGy-cm   This examination, like all CT scans performed in the [facility], was performed utilizing techniques to minimize radiation dose exposure, including the use of iterative reconstruction and automated exposure control  IV Contrast:  100 mL of iohexol (OMNIPAQUE) 50 mL of iohexol (OMNIPAQUE) Enteric Contrast:  Enteric contrast was not administered  FINDINGS: ABDOMEN LOWER CHEST:  Large hiatal hernia noted  No other clinically significant abnormality identified in the visualized lower chest  LIVER/BILIARY TREE:  Unremarkable  GALLBLADDER:  No calcified gallstones  No pericholecystic inflammatory change  SPLEEN:  Unremarkable  PANCREAS:  Unremarkable  ADRENAL GLANDS:  Unremarkable  KIDNEYS/URETERS:  One or more simple renal cyst(s) is noted  Otherwise unremarkable kidneys  No hydronephrosis  STOMACH AND BOWEL:  There is colonic diverticulosis without evidence of acute diverticulitis  APPENDIX:  No findings to suggest appendicitis  ABDOMINOPELVIC CAVITY:  No ascites or free intraperitoneal air  No lymphadenopathy  VESSELS:  Unremarkable for patient's age  PELVIS: Right hip arthroplasty results in streak artifact limiting evaluation of the pelvis  REPRODUCTIVE ORGANS:  Myomatous uterus with calcified leiomyomas  URINARY BLADDER:  Unremarkable  ABDOMINAL WALL/INGUINAL REGIONS:  Unremarkable  OSSEOUS STRUCTURES:  Age-indeterminate L1 compression deformity with 45% loss of height  Impression: Age-indeterminate L1 compression deformity with 45% loss of height    There is minimal retropulsion of posterior fracture fragments  I personally discussed this study with Dr Fred Durand on 11/10/2019 at 1:37 AM  Unchanged large hiatal hernia  Workstation performed: [id number]       Cardiac testing:   Results for orders placed during the hospital encounter of 19   Echo complete with contrast if indicated    Narrative [address]  [address]    Transthoracic Echocardiogram  2D, M-mode, Doppler, and Color Doppler    Study date:  2019    Patient: Cristo Aguilar  MR number: [id number]  Account number: [de-identified]  : 1925  Age: 80 years  Gender: Female  Status: Inpatient  [facility]   Height: 62 in  Weight: 167 6 lb  BP: 62/ 168 mmHg    Indications: Dyspnea  Diagnoses: R06 00 - Dyspnea, unspecified    Sonographer:  Sammy Massey RDCS  Referring Physician:  ROGELIO Mantilla  Group:  [facility]  Interpreting Physician:  Virgil Zambrano MD    SUMMARY    PROCEDURE INFORMATION:  This was a technically difficult study  LEFT VENTRICLE:  The ventricle was mildly dilated  Systolic function was at the lower limits of normal  Ejection fraction was estimated to be 50 %  This study was inadequate for the evaluation of regional wall motion  There was mild concentric hypertrophy  Doppler parameters were consistent with abnormal left ventricular relaxation (grade 1 diastolic dysfunction)  MITRAL VALVE:  There was mild annular calcification  There was trace regurgitation  TRICUSPID VALVE:  There was mild regurgitation  The findings suggest moderate pulmonary hypertension  IVC, HEPATIC VEINS:  The inferior vena cava was dilated  Respirophasic changes were blunted (less than 50% variation)  HISTORY: PRIOR HISTORY: CAD, GERD Previous (remote) myocardial infarction  Congestive heart failure  Risk factors: hypertension and hypercholesterolemia  Chronic lung disease      PROCEDURE: The study was performed in the [facility]  This was a routine study  The transthoracic approach was used  The study included complete 2D imaging, M-mode, complete spectral Doppler, and color Doppler  The  heart rate was 71 bpm, at the start of the study  Images were obtained from the parasternal, apical, subcostal, and suprasternal notch acoustic windows  Echocardiographic views were limited due to restricted patient mobility, poor acoustic  window availability, and lung interference  This was a technically difficult study  LEFT VENTRICLE: The ventricle was mildly dilated  Systolic function was at the lower limits of normal  Ejection fraction was estimated to be 50 %  This study was inadequate for the evaluation of regional wall motion  Wall thickness was  mildly increased  There was mild concentric hypertrophy  DOPPLER: Doppler parameters were consistent with abnormal left ventricular relaxation (grade 1 diastolic dysfunction)  RIGHT VENTRICLE: The size was normal  Systolic function was normal  Wall thickness was normal     LEFT ATRIUM: Size was normal     RIGHT ATRIUM: Size was normal     MITRAL VALVE: There was mild annular calcification  Valve structure was normal  There was normal leaflet separation  DOPPLER: The transmitral velocity was within the normal range  There was no evidence for stenosis  There was trace  regurgitation  AORTIC VALVE: The valve was trileaflet  Leaflets exhibited normal thickness and normal cuspal separation  DOPPLER: Transaortic velocity was within the normal range  There was no evidence for stenosis  There was no significant  regurgitation  TRICUSPID VALVE: The valve structure was normal  There was normal leaflet separation  DOPPLER: The transtricuspid velocity was within the normal range  There was no evidence for stenosis  There was mild regurgitation  Estimated peak PA  pressure was 54 mmHg   The findings suggest moderate pulmonary hypertension  PULMONIC VALVE: Leaflets exhibited normal thickness, no calcification, and normal cuspal separation  DOPPLER: The transpulmonic velocity was within the normal range  There was trace regurgitation  PERICARDIUM: There was no pericardial effusion  The pericardium was normal in appearance  AORTA: The root exhibited normal size  SYSTEMIC VEINS: IVC: The inferior vena cava was dilated  Respirophasic changes were blunted (less than 50% variation)  SYSTEM MEASUREMENT TABLES    2D  %FS: 26 11 %  AV Diam: 3 24 cm  Ao asc: 3 43 cm  EDV(Teich): 194 49 ml  EF(Teich): 50 3 %  ESV(Teich): 96 66 ml  IVSd: 1 32 cm  LA Area: 20 88 cm2  LA Diam: 4 2 cm  LVEDV MOD A4C: 129 57 ml  LVEF MOD A4C: 55 68 %  LVESV MOD A4C: 57 42 ml  LVIDd: 6 21 cm  LVIDs: 4 59 cm  LVLd A4C: 7 36 cm  LVLs A4C: 6 61 cm  LVOT Diam: 1 99 cm  LVPWd: 1 2 cm  RA Area: 18 24 cm2  RV Diam : 3 78 cm  SI(Teich): 54 96 ml/m2  SV MOD A4C: 72 14 ml  SV(Cube): 142 68 ml  SV(Teich): 97 84 ml    CW  AV Vmax: 1 63 m/s  AV maxPG: 10 57 mmHg  MR VTI: 188 63 cm  MR Vmax: 4 94 m/s  MR Vmean: 4 11 m/s  MR maxP 78 mmHg  MR meanP 9 mmHg  TR Vmax: 3 07 m/s  TR maxP 77 mmHg    MM  TAPSE: 1 99 cm    PW  E': 0 06 m/s  E/E': 14 24  MV A [place]: 1 06 m/s  MV Dec [place]: 5 65 m/s2  MV DecT: 146 35 ms  MV E [place]: 0 83 m/s  MV E/A Ratio: 0 78    [employer] Accredited Echocardiography [facility]    Prepared and electronically signed by    Kika Whyte MD  Signed 2019 10:00:34       No results found for this or any previous visit  No results found for this or any previous visit  No results found for this or any previous visit  Counseling / Coordination of Care  Total floor / unit time spent today 30 minutes  Concepción Magallanes

## 2019-11-12 NOTE — NURSING NOTE
Patient unable to maintain adequate SPO2 on 4 liters n /c  O2 sat 85-88%  Neville Bowser came on unit to evaluate patient and situation  Requesting ativan IV to help patient tolerate mask  No new orders received  Bi-pap placed back on patient in addition to bilateral soft wrist restraints

## 2019-11-12 NOTE — NURSING NOTE
SBP elevated for two consecutive readings  Randall Carrel PA made aware of same and po dose of beta blocker being held r/t bipap administration  Awaiting new orders

## 2019-11-12 NOTE — NURSING NOTE
Patient requesting bedpan  Patient already incontinent of urine  Patient placed on bedpan  Patient voided a scant amount of junaid urine  Patient still feeling as though she needs to void  Patient bladder scanned to ensure no problems with retention  No urine was detected in the bladder;bladder non-distended  Will continue to monitor

## 2019-11-12 NOTE — NURSING NOTE
Patient transferred via bed to Nuclear medicine for scheduled Lung ventilation scan  Vital signs stable at time of transfer  Denies pain or nausea

## 2019-11-12 NOTE — ASSESSMENT & PLAN NOTE
· Appears to be secondary to pneumonia as CT scan of the chest shows multifocal findings  · Will continue IV antibiotics  · Continue BiPAP and titrate as tolerated  · ABG result appreciated  · Trend procalcitonin  · Will follow up cultures  · Strep pneumonia positive

## 2019-11-12 NOTE — ASSESSMENT & PLAN NOTE
· Urine antigen positive for strep pneumonia  · Continue IV antibiotics  · Follow up cultures  · Supportive care

## 2019-11-12 NOTE — PHYSICAL THERAPY NOTE
Physical Therapy Cancellation Note    Chart review performed  At this time, PT treatment session cancelled as patient is not medically appropriate for PT treatment session - pt currently on BiPap and restrained  Pt also pending NM lung V/Q study later this date per RN report and chart review  PT will follow and provide PT interventions as appropriate including delivery and fitting of TLSO brace      Della Pettit, PT

## 2019-11-12 NOTE — ASSESSMENT & PLAN NOTE
· Multifactorial due to pneumonia and hypercapnia  · Mental status is improving  · Continue treatment as above

## 2019-11-12 NOTE — CONSULTS
Consultation - Nephrology   Yariel Torres 80 y o  female MRN: [id number]  Unit/Bed#: ICU 02 Encounter: [id number]      A/P:  1  Acute kidney injury atop chronic kidney disease    Renal function improved, continue with supportive care  With respect etiology, this point most likely due to urinary retention which continues the resolved  Unfortunately do not have documentation for postvoid residuals  However given patient's clinical circumstances, is very common to have urinary retention with spontaneous resolution  Will place postvoid residual studies to continue to observe for now  2  Chronic kidney disease stage IIIA baseline creatinine around 0 9 mg/dL  3  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   Avoid nephro toxins, continue to avoid nonsteroidal inflammatory medications  4  Streptococcal pneumonia with acute respiratory failure on BiPAP   Patient remains ceftriaxone and azithromycin, continue with BiPAP according to hospitalist     5  Hypercapnic respiratory failure   Continue monitor patient's respiratory status, she showed only mild improvement with BiPAP  Patient is being diuresed which may be appropriate from the respiratory status, but may eventually cause the patient become prerenal   Patient to be seen by intensivist, further interventions as indicated by their care  6  Compression fracture L1  7  Anxiety with long-term use of benzodiazepines   Patient has been placed on low-dose Ativan twice a day to help with withdrawal symptoms  Thank you for allowing us to participate in the care of your patient  Please feel free to contact us regarding the care of this patient, or any other questions/concerns that may be applicable      Patient Active Problem List   Diagnosis    Allergic rhinitis    Benign colon polyp    Cataract    Constipation    Coronary artery disease involving native coronary artery of native heart without angina pectoris    Fibrocystic breast disease, unspecified laterality    Gait abnormality    Hiatal hernia with GERD without esophagitis    Glucose intolerance (no malabsorption)    Hyperlipidemia, mixed    Essential hypertension    Iron deficiency anemia    Malignant melanoma of skin ([facility])    Urinary incontinence    Vitamin D deficiency    COPD (chronic obstructive pulmonary disease) ([facility])    Anxiety    Compression fracture of thoracic vertebra, closed, initial encounter ([facility])    Proteinuria    Coronary angioplasty status    Old MI (myocardial infarction)    Chronic diastolic heart failure (HCC)    Chronic left shoulder pain    Chronic right shoulder pain    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    Acute respiratory failure with hypoxia and hypercapnia ([facility])    Streptococcal pneumonia ([facility])    Toxic metabolic encephalopathy    Acute kidney injury ([facility] [address] )       History of Present Illness   Physician Requesting Consult: Pippa Lazo MD  Reason for Consult / Principal Problem:  Acute renal failure  Hx and PE limited by:   HPI: Yariel Torres is a 80y o  year old female who presented to the emergency department November 9th with complaints of low back pain which had been present for more than a week  Patient had imaging done which noted an L1 compression fracture it was admitted for further evaluation, orthopedic consultation, as well as pain management  Patient currently takes benzodiazepines in the outpatient setting  Yesterday, November 11th, after receiving additional doses of opioid, patient went into respiratory distress was transferred to the intensive care unit for close monitoring, on BiPAP, and was noted to respond to Narcan  Condition this, it has been noted the patient has not been receiving any benzodiazepines since presentation, she has been receiving Robaxin 500 mg every 6 hours which was discontinued yesterday      This morning, the patient's mental status appears to be improved, she remains on BiPAP with good oxygenation  Patient has been attempting to remove the mask and as a consequence is now restrained  From the renal standpoint, patient appears to have baseline creatinine around 0 8 mg/dL, which was present at initial evaluation as well as on November 10th  By November 11th, yesterday, patient's creatinine increased up to 1 42 mg/dL  This was associated very mild decrease in sodium at 133 millimole/L as well as an increase in potassium of 5 5 millimole/L, after appropriate mentions with the patient with improved hemodynamics as well as pulmonary function, patient's potassium is improved down to 4 6 millimole/L as with creatinine down to 1 15 mg/dL  Arterial blood gas from November 11th was noted to initially be 7 27, this morning it is 7 32, patient's partial pressure from dioxide has improved from 71 4 down to 69 8 this morning  History obtained from chart review and the patient    Review of Systems - Negative except as mentioned above in HPI, more specifics as mentioned below  Review of Systems - General ROS: positive for  - fatigue  Psychological ROS: negative  Ophthalmic ROS: negative  ENT ROS: negative  Allergy and Immunology ROS: negative  Hematological and Lymphatic ROS: negative  Endocrine ROS: negative  Respiratory ROS:  As mentioned above  Cardiovascular ROS: negative  Gastrointestinal ROS: no abdominal pain, change in bowel habits, or black or bloody stools  Genito-Urinary ROS: no dysuria, trouble voiding, or hematuria  Musculoskeletal ROS: negative  Neurological ROS: no TIA or stroke symptoms  Dermatological ROS: negative    Historical Information   Past Medical History:   Diagnosis Date    Abnormal blood sugar 03/13/2017    Abnormal carotid duplex scan     Carotid Duplex (Plaque formation is quite prominent bilaterally     Despite these changes, no evidence for greater than 50% diameter reducing lesions in the cervical portion of either carotid artery hemisystem ) - 6/13/2017    Anxiety     Cervical radiculopathy 08/08/2017    CH[facility]congestive heart failure) [facility])     diastolic    [facility]ch[facility] 2/6/2018    Coronary angioplasty status     Coronary artery disease 4/7/2017    Costochondritis 02/05/2013    suspect MS in origin given relationship to ROM and location  Start mobic and if persists, reeval  Refused xrayat this time   Dyslipidemia     GERD without esophagitis 8/30/2012    H/O CT scan of chest      (No PE, minimal interstitial change left lower lobe and right upper lobe, which may be acute ) - 5/19/2017    History of Holter monitoring     Holter (Sinus rhythm with rates ranging from 52 to 118 bpm   No afib or heart block  Rare atrial and ventricular ectopic beats  One six-beat atrial run noted  No pauses  ) - 5/31/2017    Hx of echocardiogram     Echo (EF 0 60 (60%), Biatrial enlargement ) - 3/24/2017 Echo (EF 0 55 (55%), mild LVH  Aortic valvular sclerosis  Trace MI with mild left atrial dilatation, mild MAC  Mild TI with mild pulmonary hypertension  ) - 5/22/2017 Echo (EF 0 60 (60%), Normal left vent chamber size and systolic function  Mild diastolic dysfunction of LV w/normal filling pressures  Mild mitral regurg ) - 7/26/2017 Echo (EF 0    Hx of echocardiogram 08/16/2017    EF0 60 (60%) Normal left vent chamber size and systolic function of LV w/normal filling presures  Mild mitral regurg  / EF0 50 (50%) Mild LVH   MIld MR 10/6/17     Hypertension     Iron deficiency anemia 4/21/2016    Macular degeneration, right eye     Malignant melanoma of skin ([facility] ) 9/17/2012    Mixed hyperlipidemia     NSTEMI (non-ST elevated myocardial infarction) ([facility] [address] ) 7/25/2017    Old MI (myocardial infarction)     Osteoarthritis 9/17/2012    Osteoporosis 3/13/2017    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[facility] ) 04/07/2017    Urinary tract infection     frequent UTI's     Past Surgical History:   Procedure Laterality Date  ABDOMINAL SURGERY      APPENDECTOMY      BREAST SURGERY      Lumpectomy    CARDIAC CATHETERIZATION  03/24/2017    ARNIE to 100% occluded prox RCA, chronic 99% ostial LCfx, 60% mid LAD, discrete 40% distal LM / EF0 60 (60%) 100% occluded proximal RCA s/p ARNIE, chronic 99% ostial LCX, 60% mid LAD, discrete 40% dital LM  4/5/17    CHOLECYSTECTOMY      COLONOSCOPY N/A 7/25/2018    Procedure: COLONOSCOPY;  Surgeon:  Ermias Sharpe MD;  Location: [facility] MAIN OR;  Service: Gastroenterology    CORONARY STENT PLACEMENT  03/25/2017    ARNIE RCA    HEMORRHOID SURGERY      INSERTION STENT ARTERY  04/07/2017    Cardio vascular agents drug-eluting stent    SKIN BIOPSY      TONSILLECTOMY       Social History   Social History     Substance and Sexual Activity   Alcohol Use Not Currently     Social History     Substance and Sexual Activity   Drug Use Never     Social History     Tobacco Use   Smoking Status Former Smoker    Types: Cigarettes   Smokeless Tobacco Never Used     Family History   Problem Relation Age of Onset    Heart failure Mother     Prostate cancer Father     Stroke Family        Meds/Allergies   all current active meds have been reviewed, current meds:   Current Facility-Administered Medications   Medication Dose Route Frequency    acetaminophen (TYLENOL) tablet 650 mg  650 mg Oral Q6H PRN    aspirin chewable tablet 81 mg  81 mg Oral Daily    atorvastatin (LIPITOR) tablet 40 mg  40 mg Oral Daily    azithromycin (ZITHROMAX) 500 mg in sodium chloride 0 9 % 250 mL IVPB  500 mg Intravenous Q24H    bisacodyl (DULCOLAX) rectal suppository 10 mg  10 mg Rectal Daily PRN    calcium carbonate-vitamin D (OSCAL-D) 500 mg-200 units per tablet 2 tablet  2 tablet Oral BID With Meals    cefTRIAXone (ROCEPHIN) IVPB (premix) 1,000 mg  1,000 mg Intravenous Q24H    docusate sodium (COLACE) capsule 100 mg  100 mg Oral BID    enoxaparin (LOVENOX) subcutaneous injection 30 mg  30 mg Subcutaneous Daily    famotidine (PEPCID) tablet 20 mg  20 mg Oral BID    ferrous sulfate tablet 162 5 mg  162 5 mg Oral Daily    furosemide (LASIX) tablet 20 mg  20 mg Oral Daily    LORazepam (ATIVAN) tablet 0 25 mg  0 25 mg Oral BID    metoprolol tartrate (LOPRESSOR) partial tablet 12 5 mg  12 5 mg Oral Q12H [address]    ondansetron (ZOFRAN) injection 4 mg  4 mg Intravenous Q6H PRN    polyethylene glycol (MIRALAX) packet 17 g  17 g Oral Daily    senna (SENOKOT) tablet 8 6 mg  1 tablet Oral HS    sodium chloride 0 9 % infusion  60 mL/hr Intravenous Continuous    and PTA meds:    Medications Prior to Admission   Medication    aspirin 81 mg chewable tablet    atorvastatin (LIPITOR) 40 mg tablet    Calcium Carbonate-Vitamin D 600-125 MG-UNIT TABS    ferrous sulfate 325 (65 Fe) mg tablet    furosemide (LASIX) 20 mg tablet    metoprolol tartrate (LOPRESSOR) 25 mg tablet    MULTIPLE VITAMINS-CALCIUM PO    Potassium 99 MG TABS    ranitidine (ZANTAC) 150 mg tablet    LORazepam (ATIVAN) 0 5 mg tablet    nitroglycerin (NITROSTAT) 0 4 mg SL tablet         Allergies   Allergen Reactions    Ciprofloxacin      Reaction Date: 01Jul2011;     Keflex [Cephalexin] Dizziness    Nitrofurantoin      Reaction Date: [id number];     Penicillins Rash, Other (See Comments) and Throat Swelling     Throat swells       Objective     Intake/Output Summary (Last 24 hours) at 11/12/2019 0939  Last data filed at 11/12/2019 7257  Gross per 24 hour   Intake 1312 ml   Output 250 ml   Net 1062 ml       Invasive Devices:        Physical Exam      I/O last 3 completed shifts: In: 1272 [P O :200;  I V :772; IV Piggyback:300]  Out: 550 [Urine:550]    Vitals:    11/12/19 0855   BP: 157/73   Pulse: 80   Resp:    Temp:    SpO2:        Gen: in NAD, Alert/Awake  HEENT: no sclerous icterus, MMM, neck supple  CV: +S1/S2, RRR  Lungs:  Reduced bilaterally with rhonchi  Abd: +BS, soft NT/ND  Ext: all four extremities are warm  Skin: no rashes noted  Neuro: CN II-XII intact    Current Weight: Weight - Scale: 76 5 kg (168 lb 10 4 oz)  First Weight: Weight - Scale: 73 kg (161 lb)    Lab Results:  I have personally reviewed pertinent labs      CBC:   Lab Results   Component Value Date    WBC 10 40 11/12/2019    HGB 12 8 11/12/2019    HCT 38 7 (L) 11/12/2019    MCV 90 11/12/2019     11/12/2019    MCH 30 0 11/12/2019    MCHC 33 1 11/12/2019    RDW 13 9 11/12/2019    MPV 9 0 11/12/2019     CMP:   Lab Results   Component Value Date    K 4 6 11/12/2019    CL 98 11/12/2019    CO2 37 (H) 11/12/2019    BUN 32 (H) 11/12/2019    CREATININE 1 15 11/12/2019    CALCIUM 9 4 11/12/2019    EGFR 41 11/12/2019     Phosphorus: No results found for: PHOS  Magnesium: No results found for: MG  Urinalysis:   Lab Results   Component Value Date    COLORU Yellow 11/11/2019    CLARITYU Slightly Cloudy (A) 11/11/2019    SPECGRAV 1 025 11/11/2019    PHUR 5 0 11/11/2019    LEUKOCYTESUR Negative 11/11/2019    NITRITE Negative 11/11/2019    GLUCOSEU Negative 11/11/2019    KETONESU Negative 11/11/2019    BILIRUBINUR Negative 11/11/2019    BLOODU Negative 11/11/2019     Ionized Calcium: No results found for: CAION  Coagulation: No results found for: PT, INR, APTT  Troponin:   Lab Results   Component Value Date    TROPONINI 0 07 (H) 11/11/2019     ABG:   Lab Results   Component Value Date    PHART 7 320 (L) 11/12/2019    DVN1AQK 69 8 (HH) 11/12/2019    PO2ART 86 0 11/12/2019    AJH9WAP 34 9 (H) 11/12/2019    BEART 6 8 (H) 11/12/2019    SOURCE Radial, Left 11/12/2019       Results from last 7 days   Lab Units 11/12/19  0449 11/11/19  1516 11/10/19  0507 11/09/19  2151   POTASSIUM mmol/L 4 6 5 5 4 3 4 6   CHLORIDE mmol/L 98 94* 94* 96*   CO2 mmol/L 37* 33* 34* 33*   BUN mg/dL 32* 29* 18 21   CREATININE mg/dL 1 15 1 42* 0 86 0 85   CALCIUM mg/dL 9 4 10 1 9 9 9 9   ALK PHOS U/L  --   --  49* 47*   ALT U/L  --   --  18 15   AST U/L  --   --  24 25       Radiology review:  Procedure: Xr Chest Portable    Result Date: 11/12/2019  Narrative: CHEST INDICATION:   hypoxia  COMPARISON:  11/9/2019 EXAM PERFORMED/VIEWS:  XR CHEST PORTABLE FINDINGS: Cardiomegaly is noted  The lungs are clear  No pneumothorax or pleural effusion  Osseous structures appear within normal limits for patient age  Impression: No acute cardiopulmonary disease  Large hiatal hernia  Workstation performed: [id number]     Procedure: Xr Chest 2 Views    Result Date: 11/10/2019  Narrative: CHEST INDICATION:   poor o2 saturation, ro infiltrate  COMPARISON:  5/30/2019 EXAM PERFORMED/VIEWS:  XR CHEST PA & LATERAL FINDINGS: Cardiomediastinal silhouette appears unremarkable  Large hiatal hernia noted  Small lung volumes  The lungs are clear  No pneumothorax or pleural effusion  Osseous structures appear within normal limits for patient age  Impression: Large hiatal hernia without evidence of acute cardiopulmonary disease  Workstation performed: [id number]     Procedure: Xr Spine Lumbar 2 Or 3 Views Injury    Result Date: 11/10/2019  Narrative: LUMBAR SPINE INDICATION:   pain over the L3 area, ro fx  COMPARISON:  7/23/2018 VIEWS:  XR SPINE LUMBAR 2 OR 3 VIEWS INJURY FINDINGS: Chronic compression abnormalities of T12 and L1 are similar to the prior CT with slight kyphotic angulation this region  Limbus vertebra anterior superior endplate of L4 incidentally noted  No subluxation  Mild scattered spondylotic changes  The pedicles appear intact  Right hip replacement noted  There are atherosclerotic calcifications  Soft tissues are otherwise unremarkable  Impression: Chronic T12 and L1 compression abnormalities similar to the prior 2018 CT Workstation performed: [id number]     Procedure: Ct Chest Wo Contrast    Result Date: 11/11/2019  Narrative: CT CHEST WITHOUT IV CONTRAST INDICATION:   Shortness of breath hypoxia  COMPARISON:  7/23/2018 and recent CT of the abdomen from 11/10/2019   TECHNIQUE: CT examination of the chest was performed without intravenous contrast   Axial, sagittal, and coronal 2D reformatted images were created from the source data and submitted for interpretation  Radiation dose length product (DLP) for this visit:  375 3 mGy-cm   This examination, like all CT scans performed in the [facility], was performed utilizing techniques to minimize radiation dose exposure, including the use of iterative reconstruction and automated exposure control  FINDINGS: LUNGS:  There are multifocal areas of consolidation, patchy and nodular within the bilateral upper lobes and lower lobes  Findings are favored to be infectious in nature  PLEURA:  There are trace bilateral pleural effusions  HEART/GREAT VESSELS:  The heart is enlarged  There is coronary artery calcification  There is thoracic aortic calcification  MEDIASTINUM AND AWILDA:  There is a large hiatal hernia with a partially intrathoracic stomach  CHEST WALL AND LOWER NECK:   There is coarse calcification within the bilateral thyroid lobes  VISUALIZED STRUCTURES IN THE UPPER ABDOMEN:  Unremarkable  OSSEOUS STRUCTURES:  There is stable moderate loss of height of the T10 and T12 vertebral bodies  The bones are diffusely osteopenic  There is an age-indeterminate compression fracture involving the L1 vertebral body, which is new since the exam from 7/23/2018     Impression: Multifocal patchy areas of groundglass and consolidation within the lungs bilaterally a few of which appear nodular  Findings are favored to be infectious in nature and should be followed to resolution  Cardiomegaly  Trace bilateral pleural effusions  Large hiatal hernia  Redemonstration of an age-indeterminate L1 compression fracture, new since 7/23/2018  Workstation performed: [id number]     Procedure: Ct Abdomen Pelvis With Contrast    Result Date: 11/10/2019  Narrative: CT ABDOMEN AND PELVIS WITH IV CONTRAST INDICATION:   Nausea/vomiting question dilated bowel loops  COMPARISON:  None   TECHNIQUE:  CT examination of the abdomen and pelvis was performed  Axial, sagittal, and coronal 2D reformatted images were created from the source data and submitted for interpretation  Radiation dose length product (DLP) for this visit:  678 5 mGy-cm   This examination, like all CT scans performed in the [facility], was performed utilizing techniques to minimize radiation dose exposure, including the use of iterative reconstruction and automated exposure control  IV Contrast:  100 mL of iohexol (OMNIPAQUE) 50 mL of iohexol (OMNIPAQUE) Enteric Contrast:  Enteric contrast was not administered  FINDINGS: ABDOMEN LOWER CHEST:  Large hiatal hernia noted  No other clinically significant abnormality identified in the visualized lower chest  LIVER/BILIARY TREE:  Unremarkable  GALLBLADDER:  No calcified gallstones  No pericholecystic inflammatory change  SPLEEN:  Unremarkable  PANCREAS:  Unremarkable  ADRENAL GLANDS:  Unremarkable  KIDNEYS/URETERS:  One or more simple renal cyst(s) is noted  Otherwise unremarkable kidneys  No hydronephrosis  STOMACH AND BOWEL:  There is colonic diverticulosis without evidence of acute diverticulitis  APPENDIX:  No findings to suggest appendicitis  ABDOMINOPELVIC CAVITY:  No ascites or free intraperitoneal air  No lymphadenopathy  VESSELS:  Unremarkable for patient's age  PELVIS: Right hip arthroplasty results in streak artifact limiting evaluation of the pelvis  REPRODUCTIVE ORGANS:  Myomatous uterus with calcified leiomyomas  URINARY BLADDER:  Unremarkable  ABDOMINAL WALL/INGUINAL REGIONS:  Unremarkable  OSSEOUS STRUCTURES:  Age-indeterminate L1 compression deformity with 45% loss of height  Impression: Age-indeterminate L1 compression deformity with 45% loss of height  There is minimal retropulsion of posterior fracture fragments  I personally discussed this study with Dr Ed Avalos on 11/10/2019 at 1:37 AM  [facility] large hiatal hernia   Workstation performed: [id number] Ana Talbert, DO

## 2019-11-13 NOTE — SOCIAL WORK
Pt remains in ICU , pt not stable for d/c as discussed at care coordination room , [facility], they are reviewing and will let me know if they can accept

## 2019-11-13 NOTE — NURSING NOTE
Patient transferred to ICU room 6  Curtains open to lighten up the room  Turned patient's bed so she could look out of the window  Just had extra large soft brown BM  Repositioned in bed for comfort

## 2019-11-13 NOTE — NURSING NOTE
Restless/agitated(picking  Sheets, attempting to sit upand get OOB, Pulling @ purewick); Hsopitalist notified; deferred to Intensivist  Intensivist(Susan)called and situation discussed  Will give josefina 2 5mg Haldol

## 2019-11-13 NOTE — OCCUPATIONAL THERAPY NOTE
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    Streptococcal pneumonia ([facility] [address] )    Toxic metabolic encephalopathy    Acute kidney injury ([facility] [address] )          11/13/19 3894   Restrictions/Precautions   Weight Bearing Precautions Per Order Yes   RLE Weight Bearing Per Order WBAT   LLE Weight Bearing Per Order WBAT   Braces or Orthoses TLSO  (TLSO fitted by PT, please see PT assessment)   Other Precautions Fall Risk;Pain;Spinal precautions;Cognitive; Restraints   Pain Assessment   Pain Assessment No/denies pain   Pain Score No Pain   Pain Rating: FLACC (Rest) - Face 0   Pain Rating: FLACC (Rest) - Legs 0   Pain Rating: FLACC (Rest) - Activity 0   Pain Rating: FLACC (Rest) - Cry 0   Pain Rating: FLACC (Rest) - Consolability 0   Score: FLACC (Rest) 0   Pain Rating: FLACC (Activity) - Face 1   Pain Rating: FLACC (Activity) - Legs 0   Pain Rating: FLACC (Activity) - Activity 0   Pain Rating: FLACC (Activity) - Cry 1   Pain Rating: FLACC (Activity) - Consolability 0   Score: FLACC (Activity) 2   ADL   Toileting Assistance  1  Total Assistance   Toileting Deficit Perineal hygiene;Grab bar use; Increased time to complete;Steadying   Bed Mobility   Rolling R 2  Maximal assistance   Additional items Assist x 2;Bedrails; Increased time required;Verbal cues   Rolling L 2  Maximal assistance   Additional items Assist x 2;Bedrails; Increased time required;Verbal cues;LE management   Supine to Sit 2  Maximal assistance   Additional items Assist x 2; Increased time required;Verbal cues;LE management; Bedrails Sit to Supine 2  Maximal assistance   Additional items Assist x 2; Increased time required;Verbal cues;LE management   Additional Comments log roll technique performed 2/2 spinal precautions   Transfers   Sit to Stand 2  Maximal assistance   Additional items Assist x 2; Increased time required;Verbal cues   Stand to Sit 2  Maximal assistance   Additional items Assist x 2; Increased time required;Verbal cues   Stand pivot 2  Maximal assistance   Additional items Assist x 2; Increased time required;Verbal cues   Functional Mobility   Additional Comments Further functional mobility was deferred 2/2 safety concerns   Cognition   Overall Cognitive Status Impaired   Arousal/Participation Persistent stimuli required;Lethargic   Attention Difficulty attending to directions   Orientation Level Oriented to person   Memory Decreased recall of biographical information;Decreased recall of precautions;Decreased recall of recent events   Following Commands Follows one step commands inconsistently   Comments Pt agreeable to OT session    Activity Tolerance   Activity Tolerance Patient limited by fatigue; Other (Comment)  (decreased cognition )   Medical Staff Made Aware ELENA Ng verbalized pt appropraite for therapy and was present during assessment   Assessment   Assessment Patient participated in Skilled OT session this date with interventions consisting of  therapeutic activities to: increase activity tolerance, increase dynamic sit/ stand balance during functional activity  and increase OOB/ sitting tolerance   Patient agreeable to OT treatment session, upon arrival patient was found supine in bed and in no apparent distress  Patient requiring frequent re direction, verbal cues for safety, cognitive assistance to anticipate next step and one step directives  Patient continues to be functioning below baseline level, occupational performance remains limited secondary to factors listed above and increased risk for falls and injury  From OT standpoint, recommendation at time of d/c would be Short Term Rehab  Patient to benefit from continued Occupational Therapy treatment while in the hospital to address deficits as defined above and maximize level of functional independence with ADLs and functional mobility  Plan   Treatment Interventions ADL retraining;Functional transfer training; Endurance training; Compensatory technique education   Goal Expiration Date 11/21/19   OT Treatment Day 1   OT Frequency 3-5x/wk   Recommendation   OT Discharge Recommendation Short Term Rehab   OT - OK to Discharge Yes  (Once medically cleared)     Charity Powers, OT

## 2019-11-13 NOTE — ASSESSMENT & PLAN NOTE
· Patient is on Ativan 0 5 mg 3 times daily as needed  · Currently on Ativan 0 25 mg b i d    · Will monitor for withdrawal and adjust dose as needed

## 2019-11-13 NOTE — PROGRESS NOTES
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               avoid nonsteroidal anti-inflammatory medications, blood pressures have been appropriate  4  Streptococcal pneumonia with acute respiratory failure on BiPAP              Patient remains ceftriaxone and azithromycin, continue with BiPAP according to hospitalist     5  Hypercapnic respiratory failure               patient remains on BiPAP, continue care according hospitalist/intensivist  6  Compression fracture L1  7  Anxiety with long-term use of benzodiazepines               anxiety remains an issue for the patient, she is status post Haldol which was given overnight        Follow up reason for today's visit:  Acute kidney injury/chronic kidney disease/hypertension    Acute respiratory failure with hypoxia and hypercapnia (HCC)    Patient Active Problem List   Diagnosis    Allergic rhinitis    Benign colon polyp    Cataract    Constipation    Coronary artery disease involving native coronary artery of native heart without angina pectoris    Fibrocystic breast disease, unspecified laterality    Gait abnormality    Hiatal hernia with GERD without esophagitis    Glucose intolerance (no malabsorption)    Hyperlipidemia, mixed    Essential hypertension    Iron deficiency anemia    Malignant melanoma of skin (HCC)    Urinary incontinence    Vitamin D deficiency    COPD (chronic obstructive pulmonary disease) (HCC)    Anxiety    Compression fracture of thoracic vertebra, closed, initial encounter ([facility] [address] )    Proteinuria    Coronary angioplasty status    Old MI (myocardial infarction)    Chronic diastolic heart failure (HCC)    Chronic left shoulder pain    Chronic right shoulder pain    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    Acute respiratory failure with hypoxia and hypercapnia (HCC)    Streptococcal pneumonia (HCC)    Toxic metabolic encephalopathy    Acute kidney injury ([facility] [address] )         Subjective:   No acute events overnight, patient was on BiPAP when I evaluated her this morning    Objective:     Vitals: Blood pressure 115/63, pulse 89, temperature (!) 97 2 °F (36 2 °C), temperature source Temporal, resp  rate (!) 31, height 5' 2" (1 575 m), weight 76 8 kg (169 lb 5 oz), SpO2 98 %, not currently breastfeeding  ,Body mass index is 30 97 kg/m²  Weight (last 2 days)     Date/Time   Weight    11/13/19 0552   76 8 (169 31)    11/11/19 0600   76 5 (168 65)                Intake/Output Summary (Last 24 hours) at 11/13/2019 0738  Last data filed at 11/13/2019 0530  Gross per 24 hour   Intake 1526 67 ml   Output 200 ml   Net 1326 67 ml     I/O last 3 completed shifts: In: 2598 7 [P O :370;  I V :1607; IV Piggyback:621 7]  Out: 450 [Urine:450]         Physical Exam: /63 (BP Location: Right arm)   Pulse 89   Temp (!) 97 2 °F (36 2 °C) (Temporal)   Resp (!) 31   Ht 5' 2" (1 575 m)   Wt 76 8 kg (169 lb 5 oz)   SpO2 98%   BMI 30 97 kg/m²     General Appearance:    Alert, cooperative, no distress, appears stated age   Head:    Normocephalic, without obvious abnormality, atraumatic   Eyes:    Conjunctiva/corneas clear   Ears:    Normal external ears   Nose:   Nares normal, septum midline, mucosa normal, no drainage    or sinus tenderness   Throat:   Lips, mucosa, and tongue normal; teeth and gums normal   Neck:   Supple   Back:     Symmetric, no curvature, ROM normal, no CVA tenderness   Lungs:     Clear to auscultation bilaterally, respirations unlabored   Chest wall:    No tenderness or deformity   Heart:    Regular rate and rhythm, S1 and S2 normal, no murmur, rub   or gallop   Abdomen:     Soft, non-tender, bowel sounds active   Extremities:   Extremities normal, atraumatic, no cyanosis or edema   Skin:   Skin color, texture, turgor normal, no rashes or lesions   Lymph nodes:   Cervical normal   Neurologic:   CNII-XII intact            Lab, Imaging and other studies: I have personally reviewed pertinent labs  CBC:   Lab Results   Component Value Date    WBC 8 70 11/13/2019    HGB 12 9 11/13/2019    HCT 39 4 (L) 11/13/2019    MCV 91 11/13/2019     11/13/2019    MCH 29 7 11/13/2019    MCHC 32 7 11/13/2019    RDW 13 9 11/13/2019    MPV 8 9 11/13/2019     CMP:   Lab Results   Component Value Date    K 4 5 11/13/2019    CL 97 (L) 11/13/2019    CO2 33 (H) 11/13/2019    BUN 30 (H) 11/13/2019    CREATININE 0 86 11/13/2019    CALCIUM 9 8 11/13/2019    EGFR 58 11/13/2019         Results from last 7 days   Lab Units 11/13/19  0507 11/12/19  0449 11/11/19  1516 11/10/19  0507 11/09/19  2151   POTASSIUM mmol/L 4 5 4 6 5 5 4 3 4 6   CHLORIDE mmol/L 97* 98 94* 94* 96*   CO2 mmol/L 33* 37* 33* 34* 33*   BUN mg/dL 30* 32* 29* 18 21   CREATININE mg/dL 0 86 1 15 1 42* 0 86 0 85   CALCIUM mg/dL 9 8 9 4 10 1 9 9 9 9   ALK PHOS U/L  --   --   --  49* 47*   ALT U/L  --   --   --  18 15   AST U/L  --   --   --  24 25         Phosphorus: No results found for: PHOS  Magnesium:   Lab Results   Component Value Date    MG 2 1 11/13/2019     Urinalysis: No results found for: COLORU, CLARITYU, SPECGRAV, PHUR, LEUKOCYTESUR, NITRITE, PROTEINUA, GLUCOSEU, KETONESU, BILIRUBINUR, BLOODU  Ionized Calcium: No results found for: CAION  Coagulation: No results found for: PT, INR, APTT  Troponin: No results found for: TROPONINI  ABG: No results found for: PHART, CKV0VLK, PO2ART, SPD5VPK, X2FYQOUJ, BEART, SOURCE  Radiology review:     IMAGING  Procedure: Xr Chest Portable    Result Date: 11/12/2019  Narrative: CHEST INDICATION:   hypoxia   COMPARISON:  11/9/2019 EXAM PERFORMED/VIEWS:  XR CHEST PORTABLE FINDINGS: Cardiomegaly is noted  The lungs are clear  No pneumothorax or pleural effusion  Osseous structures appear within normal limits for patient age  Impression: No acute cardiopulmonary disease  Large hiatal hernia  Workstation performed: [id number]     Procedure: Ct Chest Wo Contrast    Result Date: 11/11/2019  Narrative: CT CHEST WITHOUT IV CONTRAST INDICATION:   Shortness of breath hypoxia  COMPARISON:  7/23/2018 and recent CT of the abdomen from 11/10/2019  TECHNIQUE: CT examination of the chest was performed without intravenous contrast   Axial, sagittal, and coronal 2D reformatted images were created from the source data and submitted for interpretation  Radiation dose length product (DLP) for this visit:  375 3 mGy-cm   This examination, like all CT scans performed in the [facility], was performed utilizing techniques to minimize radiation dose exposure, including the use of iterative reconstruction and automated exposure control  FINDINGS: LUNGS:  There are multifocal areas of consolidation, patchy and nodular within the bilateral upper lobes and lower lobes  Findings are favored to be infectious in nature  PLEURA:  There are trace bilateral pleural effusions  HEART/GREAT VESSELS:  The heart is enlarged  There is coronary artery calcification  There is thoracic aortic calcification  MEDIASTINUM AND AWILDA:  There is a large hiatal hernia with a partially intrathoracic stomach  CHEST WALL AND LOWER NECK:   There is coarse calcification within the bilateral thyroid lobes  VISUALIZED STRUCTURES IN THE UPPER ABDOMEN:  Unremarkable  OSSEOUS STRUCTURES:  There is stable moderate loss of height of the T10 and T12 vertebral bodies  The bones are diffusely osteopenic    There is an age-indeterminate compression fracture involving the L1 vertebral body, which is new since the exam from 7/23/2018     Impression: Multifocal patchy areas of groundglass and consolidation within the lungs bilaterally a few of which appear nodular  Findings are favored to be infectious in nature and should be followed to resolution  Cardiomegaly  Trace bilateral pleural effusions  Large hiatal hernia  Redemonstration of an age-indeterminate L1 compression fracture, new since 7/23/2018  Workstation performed: [id number]     Procedure: Nm Lung Ventilation / Perfusion    Result Date: 11/12/2019  Narrative: VENTILATION AND PERFUSION SCAN INDICATION: Dyspnea on exertion COMPARISON:  Chest radiograph  11/12/2019, CT chest 11/11/2019 TECHNIQUE: Patient attempted to inhale 32 8 mCi nebulized Tc-99m DTPA but was unsuccessful  No ventilation images acquired  Multiplanar perfusion imaging was next performed following the intravenous administration of 4 4 mCi Tc-99m labeled MAA  FINDINGS:  Lung ventilation images could not be obtained  Perfusion imaging demonstrates heterogeneous distribution of the radiotracer with nonsegmental defects  Possible moderate sized segmental defect in the left upper lobe posteriorly  Moderate sized segmental defect suggested in the right lower lobe posteriorly  Possible moderate-sized defect in the lingular region  There are some corresponding airspace opacities seen on recent chest CT exam   This could be related to an airways process but pulmonary embolism is not excluded  Impression: Limited study without ventilation  The probability for pulmonary embolus is intermediate or indeterminate  See above comments  The study was marked in [facility] for immediate notification  Workstation performed: [id number]     Procedure: Vas Lower Limb Venous Duplex Study, Complete Bilateral    Result Date: 11/12/2019  Narrative:  [facility] REPORT CLINICAL: Indications: Patient presents with shortness of breath on exertion  Acute hypoxia  Risk Factors The patient has history of HTN, HLD and CAD     CONCLUSION:  Impression: RIGHT LOWER LIMB: No evidence of acute or chronic deep vein thrombosis  No evidence of superficial thrombophlebitis noted  Doppler evaluation shows a normal response to augmentation maneuvers  Popliteal, posterior tibial and anterior tibial arterial Doppler waveforms are triphasic  LEFT LOWER LIMB: No evidence of acute or chronic deep vein thrombosis  No evidence of superficial thrombophlebitis noted  Doppler evaluation shows a normal response to augmentation maneuvers  Popliteal, posterior tibial and anterior tibial arterial Doppler waveforms are triphasic  Pulsatile waveforms in the venous system may suggest heart failure or tricuspid valve insufficiency    SIGNATURE: Electronically Signed by: Solo Mcallister on 2019-11-12 07:35:31 PM      Current Facility-Administered Medications   Medication Dose Route Frequency    acetaminophen (TYLENOL) tablet 975 mg  975 mg Oral Q8H [address]    aspirin chewable tablet 81 mg  81 mg Oral Daily    atorvastatin (LIPITOR) tablet 40 mg  40 mg Oral Daily    azithromycin (ZITHROMAX) 500 mg in sodium chloride 0 9 % 250 mL IVPB  500 mg Intravenous Q24H    bisacodyl (DULCOLAX) rectal suppository 10 mg  10 mg Rectal Daily PRN    calcium carbonate-vitamin D (OSCAL-D) 500 mg-200 units per tablet 2 tablet  2 tablet Oral BID With Meals    cefTRIAXone (ROCEPHIN) IVPB (premix) 1,000 mg  1,000 mg Intravenous Q24H    docusate sodium (COLACE) capsule 100 mg  100 mg Oral BID    enoxaparin (LOVENOX) subcutaneous injection 30 mg  30 mg Subcutaneous Daily    famotidine (PEPCID) tablet 20 mg  20 mg Oral BID    ferrous sulfate tablet 162 5 mg  162 5 mg Oral Daily    furosemide (LASIX) tablet 20 mg  20 mg Oral Daily    guaiFENesin (MUCINEX) 12 hr tablet 600 mg  600 mg Oral Q12H LIZZY    haloperidol lactate (HALDOL) injection 2 5 mg  2 5 mg Intramuscular Q6H PRN    ipratropium (ATROVENT) 0 02 % inhalation solution 0 5 mg  0 5 mg Nebulization 4x Daily    levalbuterol (XOPENEX) inhalation solution 0 63 mg  0 63 mg Nebulization 4x Daily    lidocaine (LIDODERM) 5 % patch 1 patch  1 patch Topical Daily    LORazepam (ATIVAN) tablet 0 25 mg  0 25 mg Oral BID    melatonin tablet 3 mg  3 mg Oral HS    metoprolol tartrate (LOPRESSOR) partial tablet 12 5 mg  12 5 mg Oral Q12H [facility]    ondansetron (ZOFRAN) injection 4 mg  4 mg Intravenous Q6H PRN    polyethylene glycol (MIRALAX) packet 17 g  17 g Oral Daily    senna (SENOKOT) tablet 8 6 mg  1 tablet Oral HS     Medications Discontinued During This Encounter   Medication Reason    ipratropium-albuterol (DUO-NEB) 0 5-2 5 mg/3 mL inhalation solution 3 mL     nitroglycerin (NITROSTAT) SL tablet 0 4 mg     polyethylene glycol (GLYCOLAX) bowel prep 17 g     morphine injection 2 mg     sodium chloride 0 9 % infusion     LORazepam (ATIVAN) tablet 0 5 mg     oxyCODONE (ROXICODONE) IR tablet 5 mg     morphine injection 1 mg     methocarbamol (ROBAXIN) tablet 500 mg     enoxaparin (LOVENOX) subcutaneous injection 40 mg     potassium chloride (K-DUR,KLOR-CON) CR tablet 10 mEq     acetaminophen (TYLENOL) tablet 650 mg        Stephen Saint, DO

## 2019-11-13 NOTE — NURSING NOTE
Patient ate 1/2 dish of pudding and c/o upset stomach,medicated with Zofran at this time  Confused, repeatedly trying to remove clothing  Needs constant redirection  Daughter at bedside

## 2019-11-13 NOTE — NURSING NOTE
Restless and confused  Restraints maintained  Incontinent small amount of urine  Attempts to reorient unsuccessful

## 2019-11-13 NOTE — NURSING NOTE
Less restless now; but continues to pick @ sheets and purewick and attempt to get OOB, combative @ times Attempts to reorient unsuccessful

## 2019-11-13 NOTE — PROGRESS NOTES
Daily Progress Note - K[address]i Erwin [address]  80 y o  female MRN: [id number]  Unit/Bed#: ICU 02 Encounter: [id number]        ----------------------------------------------------------------------------------------  HPI/24hr events:  Patient significantly agitated overnight, required IV haldol and overall with very poor sleep  Desaturation with removal of BiPAP this AM       ---------------------------------------------------------------------------------------  SUBJECTIVE  "I have to get up"    Review of Systems  Review of systems was unable to be performed secondary to patient confusion/cooperation  ---------------------------------------------------------------------------------------  Assessment and Plan:    Neuro:    Diagnosis: Toxic-metabolic encephalopathy  o Plan: Suspect this is multifactorial and related to pneumonia with significant component of delirium contributing  CAM ICU+ on exam   Patient will get out of bed to chair today, discussed with nursing moving patient room to one with window to help restore normal day/night cycle  Continue delirium precautions with lights on/shades up/TV on during the day and sleep hygiene at night with clustered care  Patient has history of anxiety and takes chronic benzodiazepines at home, continue lower than baseline dose while inpatient with lorazepam 0 25 mg PO BID prn  Will increase scheduled dose of melatonin and add ODT zyprexa for tonight in an attempt to normalize day/night cycle  Increased drowsiness this AM may be related to overnight dosing of haldol, will decrease prn dose to 1 mg  On scheduled tylenol and lidoderm patch to help address pain while avoiding opioids, also on bowel regimen to address constipation  CV:    Diagnosis: CAD s/p MI 2017 ([facility]IE to [facility] at that time) with non-MI troponin elevation this admission  o Plan: Appreciate cardiology consult  Continue ASA and metoprolol as well as statin    Tropinin elevation was at time of respiratory decompensation and likely 2/2 demand ischemia  No EKG changes, TTE was inadequate for evaluation of regional wall motion but LVEF of 50% is at patient's previous baseline   Diagnosis: Hx of ischemic cardiomyopathy  o Plan: Not in acute exacerbation, continue daily oral lasix      Pulm:   Diagnosis: Acute on chronic respiratory failure with hypoxia and hypercapnea  ABG at time of decompensation 11/11 consistent with respiratory acidosis  o Plan: May be multifactorial   Pneumonia playing a role, treatment as below  Patient does not clinically appear volume overloaded despite dilated IVC on echocardiogram, continue daily oral lasix  No new EKG changes and LVEF at previous baseline on echocardiogram 11/12  Possibility of PE cannot be excluded, DVU scans negative for acute DVT and V/Q scan is intermediate or indeterminate possibility  Hesitant to commit elderly woman at risk for falls for systemic anticoagulation without officially diagnosing PE, especially as she has other reasons for respiratory failure  Renal function has now returned to baseline, could consider CTA if she does not improve   Diagnosis:  COPD exacerbation  - Plan:  Has baseline diagnosis of COPD (smoker for 20+ years) and interstitial changes noted on previous imaging  Bicarb elevation on BMP suggests element of chronic CO2 retention, however respiration acidosis present on ABG at time of decompensation  Hypercarbia on ABG at time of decompensation initially suspected 2/2 pain medications, however may also have element of COPD exacerbation in setting of pneumonia  Continue scheduled nebulizers  Steroid taper  BiPAP prn and QHS, however BiPAP may be contributing to agitation  May be beneficial to trial vapotherm if this is better tolerated by patient  Wean supplemental O2 to maintain saturations >90%     Diagnosis:  Streptococcal pneumonia  o Plan: Multifocal infiltrates on CT chest, elevated procalcitonin, and urinary antigen positive for strep pneumonia  Continue azithromycin and ceftriaxone  Airway clearance protocol and mucinex  Awaiting formal swallow eval   Encourage pulmonary toilet and OOB  GI:    Diagnosis: Constipation  BM x1 documented 11/11, however with hard impacted stool noted by nursing yesterday evening  o Plan: Continue daily colace, senna, and miralax  Dulcolax suppository today   Diagnosis: Hx of GERD with hiatal hernia  o Plan: Continue pepcid      :    Diagnosis: DELIO on CKD3  o Plan: Cr has returned to baseline, appreciate nephrology input  I/O not accurate 2/2 unmeasured UOP  Will continue to avoid nephrotoxins as able, maintain MAP>65%  F/E/N:    Plan:  Only small amounts oral intake, appreciate dietary consult  Awaiting speech/swallow eval   Electrolytes within acceptable limits  Heme/Onc:    Diagnosis: Hx of iron deficiency anemia  o Plan: H/H within normal range this admission, continue oral iron supplementation      Endo:   o No active issues      ID:    Diagnosis: Streptococcal pneumonia  o Plan: Continue azithromycin and ceftriaxone  Encourage pulmonary toilet and mobility as above  MSK/Skin:    Diagnosis:  L1 compression fracture, age-indeterminate from imaging but new from 7/2018  o Plan:  Pathologic versus traumatic compression fracture  Appreciate neurosurgical and orthopedic input  TLSO brace with weight-bearing as tolerated  Scheduled tylenol and lidoderm patch for pain control  Avoiding NSAIDs in setting of DELIO on CKD  Minimize opioids as much as possible given confusion and delirium        Disposition: Continue Critical Care   Code Status: Level 3 - DNAR and DNI  ---------------------------------------------------------------------------------------  ICU CORE MEASURES    Prophylaxis   VTE Pharmacologic Prophylaxis: Enoxaparin (Lovenox)  VTE Mechanical Prophylaxis: sequential compression device  Stress Ulcer Prophylaxis: Famotidine PO    ABCDE Protocol (if indicated)  Plan to perform spontaneous awakening trial today? Not applicable  Plan to perform spontaneous breathing trial today? Not applicable  Obvious barriers to extubation? Not applicable  CAM-ICU: Positive    Invasive Devices Review  Invasive Devices     Peripheral Intravenous Line            Peripheral IV 11/12/19 Left Antecubital less than 1 day    Peripheral IV 11/12/19 Left;Upper;Ventral (anterior) Arm less than 1 day              Can any invasive devices be discontinued today? Not applicable  ---------------------------------------------------------------------------------------  OBJECTIVE    Physical Exam   Constitutional:   Elderly woman lying propped up in bed, eyes closed and appears restless but in no acute distress   HENT:   Head: Normocephalic  Eyes: Pupils are equal, round, and reactive to light  Conjunctivae are normal    Neck: No JVD present  No tracheal deviation present  Cardiovascular: Normal rate, regular rhythm and intact distal pulses  Pulmonary/Chest:   Tachypneic on 6L nc, no increased work of breathing or use of accessory muscles  Decreased breath sounds throughout   Abdominal: Soft  Bowel sounds are normal  There is no tenderness  There is no guarding  Genitourinary:   Genitourinary Comments: deferred   Musculoskeletal: She exhibits edema (trace bilateral LE)  Neurological: She is alert  Oriented only to self  Moves all four extremities and follows commands  Responds to questions although answers do not always make sense   Skin: Skin is warm and dry  There is pallor     Psychiatric:   confused       Vitals   Vitals:    11/13/19 0700 11/13/19 0800 11/13/19 0900 11/13/19 1000   BP: (!) 182/82 139/98 165/72 162/74   BP Location: Right arm Right arm Right arm Right arm   Pulse: 101 99 88 94   Resp: (!) 35 (!) 37 (!) 35 (!) 23   Temp: 98 9 °F (37 2 °C)      TempSrc: Temporal      SpO2: 92% 98% 95% 96%   Weight:       Height:         Temp (24hrs), Av 9 °F (36 6 °C), Min:97 2 °F (36 2 °C), Max:98 9 °F (37 2 °C)  Current: Temperature: 98 9 °F (37 2 °C)  HR: 94  BP: 162/74  RR: 23  SpO2: 92% on 6L nc    Invasive/non-invasive ventilation settings   Respiratory    Lab Data (Last 4 hours)    None         O2/Vent Data (Last 4 hours)       0800          Non-Invasive Ventilation Mode BiPAP                   Height and Weights   Height: 5' 2" (157 5 cm)  IBW: 50 1 kg  Body mass index is 30 97 kg/m²  Weight (last 2 days)     Date/Time   Weight    19 0552   76 8 (169 31)    19 0600   76 5 (168 65)                Intake and Output  I/O        07 -  07 07 -  07 07 -  0700    P  O  200 370 120    I V  (mL/kg) 772 (10 1) 835 (10 9)     IV Piggyback 300 321 7     Total Intake(mL/kg) 1272 (16 6) 1526 7 (19 9) 120 (1 6)    Urine (mL/kg/hr) 550 (0 3) 200 (0 1) 150 (0 6)    Stool 0      Total Output 550 200 150    Net +722 +1326 7 -30           Unmeasured Urine Occurrence 5 x 3 x     Unmeasured Stool Occurrence 1 x            Nutrition       Diet Orders   (From admission, onward)             Start     Ordered    11/10/19 0340  Diet Regular; Regular House  Diet effective now     Question Answer Comment   Diet Type Regular    Regular Regular House    RD to adjust diet per protocol?  Yes        11/10/19 034                Laboratory and Diagnostics:  Results from last 7 days   Lab Units 19  0507 19  0449 19  1516 11/10/19  0507 19  2151   WBC Thousand/uL 8 70 10 40 12 50* 10 20 10 50   HEMOGLOBIN g/dL 12 9 12 8 14 0 15 4 15 2   HEMATOCRIT % 39 4* 38 7* 43 6 47 7* 45 7   PLATELETS Thousands/uL 250 209 248 234 228   NEUTROS PCT % 73  --   --  77* 80*   MONOS PCT % 14*  --   --  12 10     Results from last 7 days   Lab Units 19  0507 19  0449 19  1516 11/10/19  0507 19  2151   SODIUM mmol/L 135 138 133* 135 134   POTASSIUM mmol/L 4 5 4 6 5 5 4 3 4 6   CHLORIDE mmol/L 97* 98 94* 94* 96*   CO2 mmol/L 33* 37* 33* 34* 33*   ANION GAP mmol/L 5 3* 6 7 5   BUN mg/dL 30* 32* 29* 18 21   CREATININE mg/dL 0 86 1 15 1 42* 0 86 0 85   CALCIUM mg/dL 9 8 9 4 10 1 9 9 9 9   GLUCOSE RANDOM mg/dL 120* 101* 202* 128* 152*   ALT U/L  --   --   --  18 15   AST U/L  --   --   --  24 25   ALK PHOS U/L  --   --   --  49* 47*   ALBUMIN g/dL  --   --   --  3 8 3 7   TOTAL BILIRUBIN mg/dL  --   --   --  1 00 0 90     Results from last 7 days   Lab Units 11/13/19  0507   MAGNESIUM mg/dL 2 1           Results from last 7 days   Lab Units 11/11/19  1915 11/11/19  1742 11/11/19  1516 11/09/19  2215   TROPONIN I ng/mL 0 07* 0 09* 0 07* <0 03         ABG:  Results from last 7 days   Lab Units 11/12/19  0604   PH ART  7 320*   PCO2 ART mm Hg 69 8*   PO2 ART mm Hg 86 0   HCO3 ART mmol/L 34 9*   BASE EXC ART mmol/L 6 8*   ABG SOURCE  Radial, Left     VBG:  Results from last 7 days   Lab Units 11/12/19  0604   ABG SOURCE  Radial, Left     Results from last 7 days   Lab Units 11/11/19  1742   PROCALCITONIN ng/ml 0 33*       Micro  Results from last 7 days   Lab Units 11/11/19  1750 11/11/19  1749   URINE CULTURE  No Growth <1000 cfu/mL  --    LEGIONELLA URINARY ANTIGEN   --  Negative   STREP PNEUMONIAE ANTIGEN, URINE   --  Positive*       EKG: NSR as of 11/12/19  Imaging:   NM lung ventilation / perfusion   Final Result      Limited study without ventilation  The probability for pulmonary embolus is intermediate or indeterminate  See above comments  The study was marked in [facility] for immediate notification  Workstation performed: [id number]         VAS lower limb venous duplex study, complete bilateral   Final Result      XR chest portable   Final Result      No acute cardiopulmonary disease  Large hiatal hernia              Workstation performed: [id number]         CT chest wo contrast   Final Result      Multifocal patchy areas of groundglass and consolidation within the lungs bilaterally a few of which appear nodular  Findings are favored to be infectious in nature and should be followed to resolution  Cardiomegaly  Trace bilateral pleural effusions  Large hiatal hernia  Redemonstration of an age-indeterminate L1 compression fracture, new since 7/23/2018  Workstation performed: [id number]         CT Abdomen pelvis with contrast   Final Result      Age-indeterminate L1 compression deformity with 45% loss of height  There is minimal retropulsion of posterior fracture fragments  I personally discussed this study with Dr Alex Hernandez on 11/10/2019 at 1:37 AM       Unchanged large hiatal hernia  Workstation performed: [id number]         XR chest 2 views   Final Result      Large hiatal hernia without evidence of acute cardiopulmonary disease  Workstation performed: [id number]         XR spine lumbar 2 or 3 views injury   Final Result      Chronic T12 and L1 compression abnormalities similar to the prior 2018 CT         Workstation performed: [id number]            I have personally reviewed pertinent reports     and I have personally reviewed pertinent films in PACS    Active Medications  Scheduled Meds:  Current Facility-Administered Medications:  acetaminophen 975 mg Oral Q8H [address] Rich Philippe MD    aspirin 81 mg Oral Daily Avtar Beat, CRNP    atorvastatin 40 mg Oral Daily Avtar Beat, CRNP    azithromycin 500 mg Intravenous Q24H Rich Philippe MD Last Rate: Stopped (11/12/19 1915)   bisacodyl 10 mg Rectal Daily PRN Avtar Beat, CRNP    calcium carbonate-vitamin D 2 tablet Oral BID With Meals Avtar Beat, CRNP    cefTRIAXone 1,000 mg Intravenous Q24H Avtar Beat, CRNP Last Rate: Stopped (11/12/19 1838)   docusate sodium 100 mg Oral BID Avtar Beat, CRNP    enoxaparin 30 mg Subcutaneous Daily Avtar Beat, CRNP    famotidine 20 mg Oral BID Avtar Beat, CRNP    ferrous sulfate 162 5 mg Oral Daily Avtar Beat, CRNP    furosemide 20 mg Oral Daily ROGELIO Aguilera    guaiFENesin 600 mg Oral Q12H Socorro Kelley MD    haloperidol lactate 2 5 mg Intramuscular Q6H PRN Marizol Cowan PA-C    ipratropium 0 5 mg Nebulization 4x Daily Sai Rosas MD    levalbuterol 0 63 mg Nebulization 4x Daily Sai Rosas MD    lidocaine 1 patch Topical Daily Sai Rosas MD    LORazepam 0 25 mg Oral BID Marilu Benites MD    melatonin 3 mg Oral HS Sai Rosas MD    metoprolol tartrate 12 5 mg Oral Q12H [facility] ROGELIO Aguilera    ondansetron 4 mg Intravenous Q6H PRN ROGELIO Aguilera    polyethylene glycol 17 g Oral Daily ROGELIO Aguilera    senna 1 tablet Oral HS ROGELIO Aguilera      Continuous Infusions:     PRN Meds:     bisacodyl 10 mg Daily PRN   haloperidol lactate 2 5 mg Q6H PRN   ondansetron 4 mg Q6H PRN       Allergies   Allergies   Allergen Reactions    Ciprofloxacin      Reaction Date: 01Jul2011;     Keflex [Cephalexin] Dizziness    Nitrofurantoin      Reaction Date: [id number];     Penicillins Rash, Other (See Comments) and Throat Swelling     Throat swells       Advance Directive and Living Will:      Power of :    POLST:        Counseling / Coordination of Care  Total Critical Care time spent 32 minutes excluding procedures, teaching and family updates  Sai Rosas MD        Portions of the record may have been created with voice recognition software  Occasional wrong word or "sound a like" substitutions may have occurred due to the inherent limitations of voice recognition software    Read the chart carefully and recognize, using context, where substitutions have occurred

## 2019-11-13 NOTE — PLAN OF CARE
Problem: OCCUPATIONAL THERAPY ADULT  Goal: Performs self-care activities at highest level of function for planned discharge setting  See evaluation for individualized goals  Description  Treatment Interventions: ADL retraining, Functional transfer training, UE strengthening/ROM, Endurance training, Cognitive reorientation, Patient/family training, Equipment evaluation/education, Compensatory technique education, Energy conservation, Activityengagement          See flowsheet documentation for full assessment, interventions and recommendations  Outcome: Not Progressing  Note:   Limitation: Decreased ADL status, Decreased UE strength, Decreased Safe judgement during ADL, Decreased cognition, Decreased endurance, Decreased self-care trans, Decreased high-level ADLs  Prognosis: Fair  Assessment: Patient participated in Skilled OT session this date with interventions consisting of  therapeutic activities to: increase activity tolerance, increase dynamic sit/ stand balance during functional activity  and increase OOB/ sitting tolerance   Patient agreeable to OT treatment session, upon arrival patient was found supine in bed and in no apparent distress  Patient requiring frequent re direction, verbal cues for safety, cognitive assistance to anticipate next step and one step directives  Patient continues to be functioning below baseline level, occupational performance remains limited secondary to factors listed above and increased risk for falls and injury  From OT standpoint, recommendation at time of d/c would be Short Term Rehab  Patient to benefit from continued Occupational Therapy treatment while in the hospital to address deficits as defined above and maximize level of functional independence with ADLs and functional mobility        OT Discharge Recommendation: Short Term Rehab  OT - OK to Discharge: Yes(Once medically cleared)     Elder Charles OT

## 2019-11-13 NOTE — NURSING NOTE
AM bath  Lab specimens obtained  BiPap off(for trial)and NC reapplied @ 5L/min  OOn bedpan for small amount partially evacuated hard stool; digitally disimpacted for moderate amount hard formed stool

## 2019-11-13 NOTE — PLAN OF CARE
Problem: PHYSICAL THERAPY ADULT  Goal: Performs mobility at highest level of function for planned discharge setting  See evaluation for individualized goals  Description  Treatment/Interventions: Functional transfer training, LE strengthening/ROM, Therapeutic exercise, Endurance training, Patient/family training, Cognitive reorientation, Equipment eval/education, Bed mobility, Gait training, Spoke to nursing  Equipment Recommended: Walker(continue RW use)       See flowsheet documentation for full assessment, interventions and recommendations  Outcome: Progressing  Note:   Prognosis: Guarded  Problem List: Decreased strength, Decreased endurance, Impaired balance, Decreased mobility, Decreased safety awareness, Pain, Orthopedic restrictions  Assessment: Pt seen for PT treatment session this date with interventions consisting of gait training w/ emphasis on improving pt's ability to ambulate level surfaces x 3 ft with max A of 2 provided by therapist with HHA and therapeutic activity consisting of training: bed mobility, supine<>sit transfers, sit<>stand transfers and vc and tactile cues for static standing posture faciliation  Pt agreeable to PT treatment session upon arrival, pt found supine in bed w/ HOB elevated, in no apparent distress and responsive  In comparison to previous session, pt with improvements in tolerating OOB activity, however limited 2/2 cog status and increased lethargy c prolonged activity, RN present during all mobility tasks at this time  Post session: pt returned BTB and RN notified of session findings/recommendations, restraints donned  Continue to recommend STR at time of d/c in order to maximize pt's functional independence and safety w/ mobility  Pt continues to be functioning below baseline level, and remains limited 2* factors listed above   PT will continue to see pt while here in order to address the deficits listed above and provide interventions consistent w/ POC in effort to achieve STGs  Barriers to Discharge: Inaccessible home environment     Recommendation: Short-term skilled PT     PT - OK to Discharge: No    See flowsheet documentation for full assessment

## 2019-11-13 NOTE — SPEECH THERAPY NOTE
Pt currently on Bipap, unable to tolerate nasal cannula at this time  Will initiate swallow evaluation when respiratory status stabilizes  Sofia Blackwell  [place], [place], CCC/SLP  [id number]  [email or web address]  Available via tiger text

## 2019-11-13 NOTE — QUICK NOTE
Called by the ICU nurse that the patient is very restless, agitated, and is potentially dangerous to herself and others  Patient has been in restraints  Case reviewed with the critical care consulting physician Dr Taylor Preston  - will give a 1 time dose of 2 5 mg of IV Haldol    EKG reviewed, patient does not have a QT prolongation

## 2019-11-13 NOTE — ASSESSMENT & PLAN NOTE
· ER provider contacted neurosurgery Dr Severiano Pesa  · Recommended pain control, PT OT, brace (TLSO)  · Orthopedics input appreciated- recommends the same   · CM is working on STR placement once patient is medically stable  · Lidoderm patch, Robaxin, Oxy IR, morphine IV

## 2019-11-13 NOTE — ASSESSMENT & PLAN NOTE
· Appears to be secondary to pneumonia as CT scan of the chest shows multifocal findings  · Will continue IV antibiotics  · Patient continues require BiPAP  · ABG result appreciated  · Initial procalcitonin slightly elevated, will continue to trend  · Will follow up cultures  · Strep pneumonia positive

## 2019-11-13 NOTE — NURSING NOTE
Respiratory Care in to place pt on nocturnal BiPap @ 12/5x50%  Intermittent periods of restlessness/agitation; wrist restraints maintained

## 2019-11-13 NOTE — ASSESSMENT & PLAN NOTE
· Urine antigen positive for strep pneumonia  · Continue IV antibiotics  · Continue oxygen/BiPAP as needed  · Critical care input appreciated  · Supportive care

## 2019-11-13 NOTE — NURSING NOTE
Hand fed patient lunch, ate 50% of meal without swallowing difficulty  No coughing  C/o some nausea post eating,medicated for same at this time  No emesis

## 2019-11-13 NOTE — PHYSICAL THERAPY NOTE
Physical Therapy Cancellation Note    Chart review performed  At this time, PT treatment session cancelled as patient is not medically appropriate for therapy interventions 2/2 currently on Bipap  PT will follow and provide PT interventions as appropriate      Freddie Ackerman PT

## 2019-11-13 NOTE — NURSING NOTE
NC maintained; intermittent sat check  Awake and alert; orientation fluctates  Oriented to person and place consistantly; but time and situation vary  Wilton huber  IV infusing  Continues with restlessness/agitation; attempts to calm pt via reorientation and reducing room stimuli unsuccessful

## 2019-11-13 NOTE — PROGRESS NOTES
Progress Note - Laya Ruff 2/25/1925, 80 y o  female MRN: [id number]    Unit/Bed#: ICU 02 Encounter: [id number]    Primary Care Provider: Dawit Blount DO   Date and time admitted to hospital: 11/9/2019  7:42 PM        * Acute respiratory failure with hypoxia and hypercapnia ([facility] [address] )  Assessment & Plan  · Appears to be secondary to pneumonia as CT scan of the chest shows multifocal findings  · Will continue IV antibiotics  · Patient continues require BiPAP  · ABG result appreciated  · Initial procalcitonin slightly elevated, will continue to trend  · Will follow up cultures  · Strep pneumonia positive    Streptococcal pneumonia (HCC)  Assessment & Plan  · Urine antigen positive for strep pneumonia  · Continue IV antibiotics  · Continue oxygen/BiPAP as needed  · Critical care input appreciated  · Supportive care    Acute kidney injury [facility])  Assessment & Plan  · Improved  · Likely secondary to dehydration  · Continue IV fluids as needed  · Nephrology following    Toxic metabolic encephalopathy  Assessment & Plan  · Multifactorial due to pneumonia and hypercapnia  · Mental status fluctuating  · Patient did receive a dose of Haldol overnight due to agitation/delirium  · Continue treatment as above    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  Assessment & Plan  · ER provider contacted neurosurgery Dr Ephraim Leong  · Recommended pain control, PT OT, brace (TLSO)  · Orthopedics input appreciated- recommends the same   · CM is working on STR placement once patient is medically stable  · Lidoderm patch, Robaxin, Oxy IR, morphine IV    Anxiety  Assessment & Plan  · Patient is on Ativan 0 5 mg 3 times daily as needed  · Currently on Ativan 0 25 mg b i d  · Will monitor for withdrawal and adjust dose as needed      VTE Pharmacologic Prophylaxis: Pharmacologic: Enoxaparin (Lovenox)    Patient Centered Rounds: I have performed bedside rounds with nursing staff today      Discussions with Specialists or Other Care Team Provider: yes  Education and Discussions with Family / Patient: yes    Current Length of Stay: 3 day(s)    Current Patient Status: Inpatient   Certification Statement: The patient will continue to require additional inpatient hospital stay due to Respiratory failure    Discharge Plan:  Pending hospital course    Code Status: Level 3 - DNAR and DNI    Subjective:   Patient was agitated overnight requiring Haldol  Patient still on intermittent BiPAP, currently on BiPAP  Objective:     Vitals:   Temp (24hrs), Av °F (36 7 °C), Min:97 2 °F (36 2 °C), Max:98 9 °F (37 2 °C)    Temp:  [97 2 °F (36 2 °C)-98 9 °F (37 2 °C)] 98 9 °F (37 2 °C)  HR:  [] 99  Resp:  [24-55] 37  BP: (107-197)/() 139/98  SpO2:  [89 %-100 %] 98 %  Body mass index is 30 97 kg/m²  Input and Output Summary (last 24 hours): Intake/Output Summary (Last 24 hours) at 2019 0925  Last data filed at 2019 3732  Gross per 24 hour   Intake 1286 67 ml   Output 350 ml   Net 936 67 ml       Physical Exam:     Physical Exam   Constitutional:   Frail elderly female currently on BiPAP   HENT:   Head: Normocephalic and atraumatic  Eyes: Conjunctivae and EOM are normal    Neck: Normal range of motion  Neck supple  Cardiovascular: Normal rate and regular rhythm  Pulmonary/Chest:   Decreased breath sounds bilaterally, currently on BiPAP   Abdominal: Soft  She exhibits no distension  There is no tenderness  Musculoskeletal:   Generalized weakness   Neurological:   Drowsy but arousable  Follows simple commands   Skin: Skin is warm and dry     Psychiatric:   Intermittent agitation       Additional Data:     Labs:    Results from last 7 days   Lab Units 19  0507   WBC Thousand/uL 8 70   HEMOGLOBIN g/dL 12 9   HEMATOCRIT % 39 4*   PLATELETS Thousands/uL 250   NEUTROS PCT % 73   LYMPHS PCT % 12*   MONOS PCT % 14*   EOS PCT % 1     Results from last 7 days   Lab Units 19  0507  11/10/19  0507   POTASSIUM mmol/L 4 5   < > 4 3   CHLORIDE mmol/L 97*   < > 94*   CO2 mmol/L 33*   < > 34*   BUN mg/dL 30*   < > 18   CREATININE mg/dL 0 86   < > 0 86   CALCIUM mg/dL 9 8   < > 9 9   ALK PHOS U/L  --   --  49*   ALT U/L  --   --  18   AST U/L  --   --  24    < > = values in this interval not displayed  Results from last 7 days   Lab Units 11/11/19  1502   POC GLUCOSE mg/dl 228*           * I Have Reviewed All Lab Data Listed Above  * Additional Pertinent Lab Tests Reviewed:  Amanda 66 Admission  Reviewed    Imaging:  Imaging Reports Reviewed Today Include:  No new imaging    Recent Cultures (last 7 days):     Results from last 7 days   Lab Units 11/11/19  1750 11/11/19  1749   URINE CULTURE  No Growth <1000 cfu/mL  --    LEGIONELLA URINARY ANTIGEN   --  Negative       Last 24 Hours Medication List:     Current Facility-Administered Medications:  acetaminophen 975 mg Oral Q8H [facility] Marti Figueroa MD    aspirin 81 mg Oral Daily Roselinejanette Martin, WINSTONNP    atorvastatin 40 mg Oral Daily Roseline Bigger, CRNP    azithromycin 500 mg Intravenous Q24H Marti Figueroa MD Last Rate: Stopped (11/12/19 1915)   bisacodyl 10 mg Rectal Daily PRN Roseline Martin, ROGELIO    calcium carbonate-vitamin D 2 tablet Oral BID With Meals Roseline Martin, CRNP    cefTRIAXone 1,000 mg Intravenous Q24H Roseline Martin, ROGELIO Last Rate: Stopped (11/12/19 1838)   docusate sodium 100 mg Oral BID Roseline Bigger, CRNP    enoxaparin 30 mg Subcutaneous Daily Roseline Bigger, CRNP    famotidine 20 mg Oral BID Roseline Bigger, CRNP    ferrous sulfate 162 5 mg Oral Daily Roseline Bigger, CRNP    furosemide 20 mg Oral Daily Roseline Bigger, CRMIAH    guaiFENesin 600 mg Oral Q12H [facility] Marti Figueroa MD    haloperidol lactate 2 5 mg Intramuscular Q6H PRN Gordon Hackett PA-C    ipratropium 0 5 mg Nebulization 4x Daily Marti Figueroa MD    levalbuterol 0 63 mg Nebulization 4x Daily Marti Figueroa MD    lidocaine 1 patch Topical Daily [place] MD Susan    LORazepam 0 25 mg Oral BID Alex Grande MD    melatonin 3 mg Oral HS Humberto Toscano MD    metoprolol tartrate 12 5 mg Oral Q12H [address] ROGELIO Leonard    ondansetron 4 mg Intravenous Q6H PRN ROGELIO Leonard    polyethylene glycol 17 g Oral Daily ROGELIO Leonard    senna 1 tablet Oral HS ROGELIO Leonard         Today, Patient Was Seen By: Alex Grande MD    ** Please Note: Dictation voice to text software may have been used in the creation of this document   **

## 2019-11-13 NOTE — NURSING NOTE
Hospitalist(MARIA ELENA Lopez)called and made aware of increased restlessness/agitation;Haldol ordered  Dr Jakob Carmona @ bedside to see pt; treatment plan discussed  Will give Haldol, continue monitoring and evaluate agitation

## 2019-11-13 NOTE — ASSESSMENT & PLAN NOTE
· Multifactorial due to pneumonia and hypercapnia  · Mental status fluctuating  · Patient did receive a dose of Haldol overnight due to agitation/delirium  · Continue treatment as above

## 2019-11-13 NOTE — ASSESSMENT & PLAN NOTE
· Improved  · Likely secondary to dehydration  · Continue IV fluids as needed  · Nephrology following

## 2019-11-13 NOTE — NURSING NOTE
Patient upset with bipap mask, requesting it be removed  Placed on O2 5L via nasal cannula, SaO2 dropped to 75%  Titrated up to 6L via nasal cannula SaO2 82% on same  Respiratory rate 38/min  Placed back on bipap 12/5 FIO2 50%  SaO2 latisha to 89% on same  Lungs decreased B/L on auscultation  No cough  Sinus tach on monitor   Voided 100cc's junaid urine on bedpan  Repositioned for comfort

## 2019-11-13 NOTE — NURSING NOTE
O2 sats 82-89% on NC  Cannula removed and BiPap reapplied @ 12/5x50%  Sats immediately improved to 90-96%; will maintain BiPap

## 2019-11-13 NOTE — OCCUPATIONAL THERAPY NOTE
Occupational Therapy Cancellation Note      Chart review performed and OT treatment attepted  At this time, OT treatment cancelled due to patient not medically appropriate as pt current on BiPap   OT will follow and treat as appropriate         Ana Fontaine MS, OTR/L

## 2019-11-13 NOTE — PHYSICAL THERAPY NOTE
Physical Therapy Treatment Note       11/13/19 1455   Pain Assessment   Pain Assessment FLACC   Pain Rating: FLACC (Rest) - Face 0   Pain Rating: FLACC (Rest) - Legs 0   Pain Rating: FLACC (Rest) - Activity 0   Pain Rating: FLACC (Rest) - Cry 0   Pain Rating: FLACC (Rest) - Consolability 0   Score: FLACC (Rest) 0   Pain Rating: FLACC (Activity) - Face 1   Pain Rating: FLACC (Activity) - Legs 0   Pain Rating: FLACC (Activity) - Activity 0   Pain Rating: FLACC (Activity) - Cry 1   Pain Rating: FLACC (Activity) - Consolability 0   Score: FLACC (Activity) 2   Restrictions/Precautions   Weight Bearing Precautions Per Order Yes   RLE Weight Bearing Per Order WBAT   LLE Weight Bearing Per Order WBAT   Braces or Orthoses TLSO  (fitted by PT prior to OOB mobility this date)   Other Precautions Fall Risk;WBS;Chair Alarm; Bed Alarm;Cognitive; Restraints   General   Chart Reviewed Yes   Response to Previous Treatment Patient unable to report, no changes reported from family or staff   Family/Caregiver Present No   Cognition   Overall Cognitive Status Impaired   Arousal/Participation Persistent stimuli required   Attention Difficulty attending to directions   Orientation Level Oriented to person;Disoriented to place; Disoriented to time;Disoriented to situation  (knew name and birthday)   Memory Unable to assess   Following Commands Follows one step commands inconsistently   Comments pt agreeable to PT session, eager to get OOB   Bed Mobility   Rolling R 2  Maximal assistance   Additional items Assist x 2;Bedrails; Increased time required;Verbal cues;LE management   Rolling L 2  Maximal assistance   Additional items Assist x 2;Bedrails; Increased time required;Verbal cues;LE management   Supine to Sit 2  Maximal assistance   Additional items Assist x 2;Bedrails; Increased time required;Verbal cues;LE management   Sit to Supine 2  Maximal assistance   Additional items Assist x 2;Bedrails; Increased time required;Verbal cues;LE management   Additional Comments log roll technique performed 2/2 spinal precautions   Transfers   Sit to Stand 2  Maximal assistance   Additional items Assist x 2; Increased time required;Verbal cues  (x 2 attempts)   Stand to Sit 2  Maximal assistance   Additional items Assist x 2; Increased time required;Verbal cues   Stand pivot 2  Maximal assistance   Additional items Assist x 2; Increased time required;Verbal cues  (from w/c to bed)   Ambulation/Elevation   Gait pattern Improper Weight shift;Decreased foot clearance;Shuffling; Step to;Excessively slow   Gait Assistance 2  Maximal assist   Additional items Assist x 2;Verbal cues; Tactile cues   Assistive Device Other (Comment)  (HHA)   Distance 3 ft from bed>w/c   Balance   Static Sitting Fair -  (ranged from fair(-) to poor)   Dynamic Sitting Poor   Static Standing Poor   Dynamic Standing Poor -   Ambulatory Poor -   Endurance Deficit   Endurance Deficit Yes   Activity Tolerance   Activity Tolerance Patient limited by fatigue;Treatment limited secondary to medical complications (Comment)   Nurse Made Aware ELENA Ng verbalized pt appropriate for therapy and made aware of outcomes    Assessment   Prognosis Guarded   Problem List Decreased strength;Decreased endurance; Impaired balance;Decreased mobility; Decreased safety awareness;Pain;Orthopedic restrictions   Assessment Pt seen for PT treatment session this date with interventions consisting of gait training w/ emphasis on improving pt's ability to ambulate level surfaces x 3 ft with max A of 2 provided by therapist with HHA and therapeutic activity consisting of training: bed mobility, supine<>sit transfers, sit<>stand transfers and vc and tactile cues for static standing posture faciliation  Pt agreeable to PT treatment session upon arrival, pt found supine in bed w/ HOB elevated, in no apparent distress and responsive   In comparison to previous session, pt with improvements in tolerating OOB activity, however limited 2/2 cog status and increased lethargy c prolonged activity, RN present during all mobility tasks at this time  Post session: pt returned BTB and RN notified of session findings/recommendations, restraints donned  Continue to recommend STR at time of d/c in order to maximize pt's functional independence and safety w/ mobility  Pt continues to be functioning below baseline level, and remains limited 2* factors listed above  PT will continue to see pt while here in order to address the deficits listed above and provide interventions consistent w/ POC in effort to achieve STGs  Barriers to Discharge Inaccessible home environment   Goals   Patient Goals none expressed 2/2 medical status   STG Expiration Date 11/21/19   Short Term Goal #1 goals remain appropriate   PT Treatment Day 2   Plan   Treatment/Interventions Functional transfer training;LE strengthening/ROM; Therapeutic exercise; Endurance training;Patient/family training;Cognitive reorientation;Equipment eval/education; Bed mobility;Gait training;Spoke to nursing   Progress Slow progress, decreased activity tolerance   PT Frequency   (3-5x/wk)   Recommendation   Recommendation Short-term skilled PT   PT - OK to Discharge No     TLSO brace fitting:  Order placed for TLSO brace per hospitalist  Pt sized w/ [place] TLSO brace prior to PT mobility assessment, see PT evaluation for assessment findings  PT to continue to follow pt and educate pt on brace components, wearing schedule when OOB per MD recommendation, maintenance of brace, donning/doffing, skin inspection once pt is appropriate for teaching of such  Educated nsg staff that brace may need to be repositioned throughout the day  Education to [facility] staff on same, including don/doffing, wearing schedule, skin inspection of NSG q shift as well as assessing circulation for appropriate fit of brace   PT requested that ELENA Ng obtain signature on TLSO brace sheet from family member on their arrival       Zoran Shankar, PT    Time of PT treatment session: 2912-0312

## 2019-11-14 NOTE — PHYSICAL THERAPY NOTE
Physical Therapy Treatment Note       11/14/19 7530   Pain Assessment   Pain Assessment No/denies pain   Pain Score No Pain   Restrictions/Precautions   Weight Bearing Precautions Per Order Yes   RLE Weight Bearing Per Order WBAT   LLE Weight Bearing Per Order WBAT   Braces or Orthoses TLSO  (donned prior to OOB mobility)   Other Precautions Cognitive; Chair Alarm; Bed Alarm;Multiple lines;Telemetry;O2;Fall Risk   General   Chart Reviewed Yes   Response to Previous Treatment Patient unable to report, no changes reported from family or staff   Family/Caregiver Present No   Cognition   Overall Cognitive Status Impaired   Arousal/Participation Persistent stimuli required   Attention Difficulty attending to directions   Orientation Level Oriented to person;Disoriented to place; Disoriented to time;Disoriented to situation   Memory Decreased recall of recent events  (pt does not recall getting OOB c PT on previous date)   Following Commands Follows one step commands without difficulty   Comments pt agreeable to PT session, eager to get OOB   Subjective   Subjective "I need to go to the bathroom, but can't go on the bedpan"   Bed Mobility   Rolling R 4  Minimal assistance   Additional items Assist x 2;Bedrails; Increased time required;Verbal cues   Supine to Sit 3  Moderate assistance   Additional items Assist x 2; Increased time required;Verbal cues;LE management   Additional Comments log roll technique performed 2/2 spinal precautions   Transfers   Sit to Stand 3  Moderate assistance   Additional items Assist x 2; Increased time required;Verbal cues  (x 3 total trials during session)   Stand to Sit 3  Moderate assistance   Additional items Assist x 2;Armrests; Increased time required;Verbal cues   Toilet transfer 3  Moderate assistance   Additional items Assist x 2;Commode;Verbal cues; Increased time required;Armrests   Additional Comments static standing tolerance: 1-2 mins c B UE support on RW   Ambulation/Elevation   Gait pattern Improper Weight shift;Decreased foot clearance;Shuffling; Step to;Excessively slow   Gait Assistance 3  Moderate assist   Additional items Assist x 2;Verbal cues; Tactile cues   Assistive Device Rolling walker   Distance 2 ft x 2   Stair Management Assistance Not tested   Balance   Static Sitting Fair   Dynamic Sitting Poor +   Static Standing Poor +   Dynamic Standing Poor   Ambulatory Poor   Endurance Deficit   Endurance Deficit Yes   Activity Tolerance   Activity Tolerance Patient limited by Micky Mojica from critical care made aware of PT treat outcomes   Nurse Made Aware RN Berenice verbalized pt appropriate for therapy and made aware of outcomes  Pt seated OOB in recliner c shades open, lights on, TV on for sleep hygiene  All needs within reach and chair alarm engaged  Assessment   Prognosis Fair   Problem List Decreased strength;Decreased endurance; Impaired balance;Decreased mobility; Decreased safety awareness;Pain;Orthopedic restrictions   Assessment Pt seen for PT treatment session this date with interventions consisting of gait training w/ emphasis on improving pt's ability to ambulate level surfaces x 2 ft x 2 with mod A of 2 provided by therapist with RW and therapeutic activity consisting of training: supine<>sit transfers, sit<>stand transfers, static standing tolerance for 1-2 minutes w/ mod Ax1 to maintain and B UE support and vc and tactile cues for static standing posture faciliation  Pt agreeable to PT treatment session upon arrival, pt found supine in bed w/ HOB elevated, in no apparent distress, A&O x 1-2 and responsive  In comparison to previous session, pt with significant improvements in increased engagement in session and therapist + appropriate answering of questions throughout session  Post session: pt returned back to recliner, chair alarm engaged, all needs in reach and RN notified of session findings/recommendations   Continue to recommend STR at time of d/c in order to maximize pt's functional independence and safety w/ mobility  Pt continues to be functioning below baseline level, and remains limited 2* factors listed above  PT will continue to see pt while here in order to address the deficits listed above and provide interventions consistent w/ POC in effort to achieve STGs  Barriers to Discharge Inaccessible home environment   Goals   Patient Goals "to rest"   STG Expiration Date 11/21/19   Short Term Goal #1 goals remain appropriate   PT Treatment Day 3   Plan   Treatment/Interventions Functional transfer training;LE strengthening/ROM; Therapeutic exercise; Endurance training;Patient/family training;Cognitive reorientation;Equipment eval/education; Bed mobility;Gait training;Spoke to nursing   Progress Slow progress, decreased activity tolerance   PT Frequency   (3-5x/wk)   Recommendation   Recommendation Short-term skilled PT   Equipment Recommended Walker  (continue RW use)   PT - OK to Discharge No       Logan Soulier, PT    Time of PT treatment session: 8869-4609

## 2019-11-14 NOTE — ASSESSMENT & PLAN NOTE
· Urine antigen positive for strep pneumonia  · Continue IV ceftriaxone  · Continue oxygen/BiPAP as needed  · Critical care input appreciated  · Supportive care

## 2019-11-14 NOTE — SPEECH THERAPY NOTE
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    Streptococcal pneumonia ([facility] [address] )    Toxic metabolic encephalopathy    Acute kidney injury [facility])      Past Medical History  Past Medical History:   Diagnosis Date    Abnormal blood sugar 03/13/2017    Abnormal carotid duplex scan     Carotid Duplex (Plaque formation is quite prominent bilaterally  Despite these changes, no evidence for greater than 50% diameter reducing lesions in the cervical portion of either carotid artery hemisystem ) - 6/13/2017    Anxiety     Cervical radiculopathy 08/08/2017    CHF (congestive heart failure) ([facility])     diastolic    COPD (chronic obstructive pulmonary disease) ([facility] [address] ) 2/6/2018    Coronary angioplasty status     Coronary artery disease 4/7/2017    Costochondritis 02/05/2013    suspect MS in origin given relationship to ROM and location  Start mobic and if persists, reeval  Refused xrayat this time   Dyslipidemia     GERD without esophagitis 8/30/2012    H/O CT scan of chest      (No PE, minimal interstitial change left lower lobe and right upper lobe, which may be acute ) - 5/19/2017    History of Holter monitoring     Holter (Sinus rhythm with rates ranging from 52 to 118 bpm   No afib or heart block  Rare atrial and ventricular ectopic beats  One six-beat atrial run noted  No pauses  ) - 5/31/2017    Hx of echocardiogram     Echo (EF 0 60 (60%), Biatrial enlargement ) - 3/24/2017 Echo (EF 0 55 (55%), mild LVH  Aortic valvular sclerosis   Trace MI with mild left atrial dilatation, mild MAC  Mild TI with mild pulmonary hypertension  ) - 5/22/2017 Echo (EF 0 60 (60%), Normal left vent chamber size and systolic function  Mild diastolic dysfunction of LV w/normal filling pressures  Mild mitral regurg ) - 7/26/2017 Echo (EF 0    Hx of echocardiogram 08/16/2017    EF0 60 (60%) Normal left vent chamber size and systolic function of LV w/normal filling presures  Mild mitral regurg  / EF0 50 (50%) Mild LVH  MIld MR 10/6/17     Hypertension     Iron deficiency anemia 4/21/2016    Macular degeneration, right eye     Malignant melanoma of skin ([facility] [address] ) 9/17/2012    Mixed hyperlipidemia     NSTEMI (non-ST elevated myocardial infarction) ([facility] [address] ) 7/25/2017    Old MI (myocardial infarction)     Osteoarthritis 9/17/2012    Osteoporosis 3/13/2017    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[facility] [address] ) 04/07/2017    Urinary tract infection     frequent UTI's       Past Surgical History  Past Surgical History:   Procedure Laterality Date    ABDOMINAL SURGERY      APPENDECTOMY      BREAST SURGERY      Lumpectomy    CARDIAC CATHETERIZATION  03/24/2017    ARNIE to 100% occluded prox RCA, chronic 99% ostial LCfx, 60% mid LAD, discrete 40% distal LM / EF0 60 (60%) 100% occluded proximal RCA s/p ARNIE, chronic 99% ostial LCX, 60% mid LAD, discrete 40% dital LM  4/5/17    CHOLECYSTECTOMY      COLONOSCOPY N/A 7/25/2018    Procedure: COLONOSCOPY;  Surgeon: Dino Osman MD;  Location: [facility] MAIN OR;  Service: Gastroenterology    CORONARY STENT PLACEMENT  03/25/2017    ARNIE RCA    HEMORRHOID SURGERY      INSERTION STENT ARTERY  04/07/2017    Cardio vascular agents drug-eluting stent    SKIN BIOPSY      TONSILLECTOMY         Summary   Pt presented with s/s suggestive of mild oral and suspected mild pharyngeal dysphagia  Symptoms or concerns included decreased mastication suspected decreased hyolaryngeal elevation upon palpation   With upper and lower dentures in place, mastication was prolonged but adequate  Weak cough noted occasionally throughout evaluation, however no immediate cough after PO trials  Risk/s for Aspiration: suspect low    Recommended Diet: soft/level 3 diet and thin liquids   Recommended Form of Meds: whole with liquid or as tolerated  Aspiration precautions and swallowing strategies: upright posture, only feed when fully alert, slow rate of feeding and small bites/sips      Current Medical Status per [employer]' H&P 11/10/2019  Marlen Tubbs is a 80 y o  female who presents with low back pain  This patient has no known injury  Patient complains of midline low back pain that does not radiate anywhere and is worse when she moves  She states that this is been going on for greater than 1 week  In the emergency department she was given morphine, fentanyl, and a Lidoderm patch  And upon reviewing CT of abdomen and pelvis does show an L1 compression deformity with 45% decrease in height, with minimal retropulsion of posterior fracture fragments  Neurosurgery was consulted in the emergency department, and stated that pain control PT OT and a brace would be appropriate  There will be a consult for Orthopedics  The patient also states that along with her low back pain she has not had a bowel movement in 4 days since Wednesday, we will order the patient Colace and suppository in an attempt to help alleviate her constipation  Current Precautions:  none    Past medical history:  Please see H&P for details    Special Studies:  CXR 11/14/2019 IMPRESSION:  Moderate pulmonary edema  Small effusions  Large hiatal hernia        Social/Education/Vocational Hx:  Pt lives with family    Swallow Information   Current Symptoms/Concerns: change in respiratory status  Current Diet: regular diet and thin liquids   Baseline Diet: regular diet and thin liquids      Baseline Assessment   Behavior/Cognition: alert  Speech/Language Status: able to participate in basic conversation and able to follow commands  Patient Positioning: upright in bed  Pain Status/Interventions/Response to Interventions: No report of or nonverbal indications of pain  Swallow Mechanism Exam  Facial: symmetrical  Labial: decreased strength  Lingual: bilateral decreased strength  Velum: symmetrical  Mandible: adequate ROM  Dentition: full dentures  Vocal quality:weak   Volitional Cough: weak     Consistencies Assessed and Performance   Consistencies Administered: thin liquids, puree, soft solids and hard solids  Materials administered included water, pudding, chopped peaches, ayush cracker    Oral Stage: mild  Mastication was prolonged but adequate with hard solids with upper and lower dentures in place  Bolus formation and transfer were functional with no significant oral residue noted  No overt s/s reduced oral control  Pharyngeal Stage: mild  Swallow Mechanics: Swallowing initiation appeared prompt  Laryngeal rise was palpated and judged to be mildly reduced  No coughing, throat clearing, change in vocal quality or respiratory status noted today  Esophageal Concerns: hx hiatal hernia with GERD      Summary and Recommendations (see above)    Results Reviewed with: patient and RN     Education: initiated  Pt and caregivers would benefit from/require continued education  Treatment Recommended: ST f/u for analysis of diet tolerance    Frequency of treatment: 3-5x    Dysphagia LTG per SLP  -Patient will demonstrate optimum safety and efficacy of oral intake and swallowing function without overt s/sx of penetration or aspiration for the highest appropriate diet level  Short Term Goals:  -Pt will tolerate Dysphagia 2/mechanical soft diet and thin liquid with no significant s/s oral or pharyngeal dysphagia across 1-3 diagnostic session/s

## 2019-11-14 NOTE — PROGRESS NOTES
Progress Note - Ami Masha 2/25/1925, 80 y o  female MRN: [id number]    Unit/Bed#: ICU 06 Encounter: [id number]    Primary Care Provider: Magdy Madrid DO   Date and time admitted to hospital: 11/9/2019  7:42 PM        * Acute respiratory failure with hypoxia and hypercapnia ([facility] [address] )  Assessment & Plan  · Appears to be secondary to pneumonia as CT scan of the chest shows multifocal findings  · Will continue IV antibiotics  · Patient continues require BiPAP  · ABG result appreciated  · Procalcitonin trending up  · Strep pneumonia positive    Streptococcal pneumonia (H[facility])  Assessment & Plan  · Urine antigen positive for strep pneumonia  · Continue IV ceftriaxone  · Continue oxygen/BiPAP as needed  · Critical care input appreciated  · Supportive care    Acute kidney injury [facility])  Assessment & Plan  · Improved  · Likely secondary to dehydration  · Continue IV fluids as needed  · Nephrology following    Toxic metabolic encephalopathy  Assessment & Plan  · Multifactorial due to pneumonia and hypercapnia  · Mental status fluctuating  · Continue treatment as above    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  Assessment & Plan  · ER provider contacted neurosurgery Dr Paresh Bell  · Recommended pain control, PT OT, brace (TLSO)  · Orthopedics input appreciated- recommends the same   · CM is working on STR placement once patient is medically stable  · Lidoderm patch, Robaxin, Oxy IR, morphine IV    Anxiety  Assessment & Plan  · Patient is on Ativan 0 5 mg 3 times daily as needed at home  · Currently on Ativan 0 25 mg b i d  · Will monitor for withdrawal and adjust dose as needed    Hiatal hernia with GERD without esophagitis  Assessment & Plan  · Continue Pepcid        VTE Pharmacologic Prophylaxis: Pharmacologic: Enoxaparin (Lovenox)    Patient Centered Rounds: I have performed bedside rounds with nursing staff today      Discussions with Specialists or Other Care Team Provider: yes  Education and Discussions with Family / Patient:  Extensive discussion with granddakoko Ng and Sugar Griffin that bedside  All questions answered  Current Length of Stay: 4 day(s)    Current Patient Status: Inpatient   Certification Statement: The patient will continue to require additional inpatient hospital stay due to Respiratory failure    Discharge Plan:  Pending hospital course    Code Status: Level 3 - DNAR and DNI    Subjective:   Patient continues with intermittent agitation  Patient currently on BiPAP  Denies any pain complaints  Objective:     Vitals:   Temp (24hrs), Av 9 °F (36 6 °C), Min:97 4 °F (36 3 °C), Max:98 4 °F (36 9 °C)    Temp:  [97 4 °F (36 3 °C)-98 4 °F (36 9 °C)] 97 4 °F (36 3 °C)  HR:  [] 99  Resp:  [23-49] 26  BP: (124-180)/() 142/63  SpO2:  [75 %-100 %] 97 %  Body mass index is 31 9 kg/m²  Input and Output Summary (last 24 hours): Intake/Output Summary (Last 24 hours) at 2019 09  Last data filed at 2019  Gross per 24 hour   Intake 490 ml   Output 350 ml   Net 140 ml       Physical Exam:     Physical Exam   Constitutional: No distress  Frail elderly female   HENT:   Head: Normocephalic and atraumatic  Eyes: Conjunctivae and EOM are normal    Neck: Neck supple  Cardiovascular: Normal rate and regular rhythm  Pulmonary/Chest:   Bilateral basilar rhonchi, currently on BiPAP   Abdominal: Soft  She exhibits no distension  There is no tenderness  Musculoskeletal: She exhibits no edema  Generalized weakness   Neurological:   Drowsy but arousable  Follows simple commands   Skin: Skin is warm and dry     Psychiatric:   Intermittent agitation       Additional Data:     Labs:    Results from last 7 days   Lab Units 19  0619   WBC Thousand/uL 8 10   HEMOGLOBIN g/dL 13 3   HEMATOCRIT % 41 3*   PLATELETS Thousands/uL 273   NEUTROS PCT % 76*   LYMPHS PCT % 10*   MONOS PCT % 13*   EOS PCT % 0     Results from last 7 days   Lab Units 19  0619  11/10/19  0507 POTASSIUM mmol/L 4 8   < > 4 3   CHLORIDE mmol/L 100   < > 94*   CO2 mmol/L 36*   < > 34*   BUN mg/dL 24   < > 18   CREATININE mg/dL 0 71   < > 0 86   CALCIUM mg/dL 9 4   < > 9 9   ALK PHOS U/L  --   --  49*   ALT U/L  --   --  18   AST U/L  --   --  24    < > = values in this interval not displayed  Results from last 7 days   Lab Units 11/11/19  1502   POC GLUCOSE mg/dl 228*           * I Have Reviewed All Lab Data Listed Above  * Additional Pertinent Lab Tests Reviewed:  Amanda 66 Admission  Reviewed    Imaging:  Imaging Reports Reviewed Today Include:  No new imaging    Recent Cultures (last 7 days):     Results from last 7 days   Lab Units 11/11/19  1750 11/11/19  1749   URINE CULTURE  No Growth <1000 cfu/mL  --    LEGIONELLA URINARY ANTIGEN   --  Negative       Last 24 Hours Medication List:     Current Facility-Administered Medications:  acetaminophen 975 mg Oral Q8H [facility] Wei Ching MD    aspirin 81 mg Oral Daily Arben Recinoss, ROGELIO    atorvastatin 40 mg Oral Daily Arben De La Rosa, ROGELIO    bisacodyl 10 mg Rectal Once Wei Ching MD    calcium carbonate-vitamin D 2 tablet Oral BID With Meals ROGELIO Wright    cefTRIAXone 1,000 mg Intravenous Q24H ROGELIO Wright Last Rate: 1,000 mg (11/13/19 1658)   docusate sodium 100 mg Oral BID Arben De La Rosa, ROGELIO    enoxaparin 30 mg Subcutaneous Daily Arben Recinoss, ROGELIO    famotidine 20 mg Oral BID Arben Recinoss, ROGELIO    ferrous sulfate 162 5 mg Oral Daily Arben Recinoss, CRMIAH    furosemide 20 mg Oral Daily Arben Recinoss, ROGELIO    guaiFENesin 600 mg Oral Q12H Brittany Engel MD    haloperidol lactate 1 mg Intravenous Q6H PRN Wei Ching MD    ipratropium 0 5 mg Nebulization 4x Daily Wei Ching MD    Labetalol HCl 5 mg Intravenous Q6H PRN Wei Ching MD    levalbuterol 0 63 mg Nebulization 4x Daily Wei Ching MD    lidocaine 1 patch Topical Daily Wei Ching MD LORazepam 0 25 mg Oral BID lSy Vazquez MD    melatonin 6 mg Oral HS Judi Peñaloza MD    metoprolol tartrate 12 5 mg Oral Q12H [facility] ROGELIO Mosqueda    OLANZapine 10 mg Oral HS Judi Peñaloza MD    ondansetron 4 mg Intravenous Q6H PRN ROGELIO Mosqueda    polyethylene glycol 17 g Oral Daily ROGELIO Mosqueda    predniSONE 40 mg Oral Daily Judi Peñaloza MD    Followed by        Nazia Robles ON 11/15/2019] predniSONE 30 mg Oral Daily Judi Peñaloza MD    Followed by        Naiza Robles ON 11/17/2019] predniSONE 20 mg Oral Daily Judi Peñaloza MD    Followed by        Nazia Robles ON 11/19/2019] predniSONE 10 mg Oral Daily Judi Peñaloza MD    senna 1 tablet Oral HS ROGELIO Mosqueda         Today, Patient Was Seen By: Sly Vazquez MD    ** Please Note: Dictation voice to text software may have been used in the creation of this document   **

## 2019-11-14 NOTE — SOCIAL WORK
Pt remains, pt continues to need Bipap, pt not stable for d/c today as discussed at care coordination rounds, [facility] is reviewing the case, referral was also sent to [facility], cm will continue, rn stated the pt has some improvement today

## 2019-11-14 NOTE — ASSESSMENT & PLAN NOTE
· Multifactorial due to pneumonia and hypercapnia  · Mental status fluctuating  · Continue treatment as above

## 2019-11-14 NOTE — PROGRESS NOTES
Daily Progress Note - [address]  80 y o  female MRN: [id number]  Unit/Bed#: ICU 06 Encounter: [id number]        ----------------------------------------------------------------------------------------  HPI/24hr events:  Improved sleep overnight but did have episode of agitation  Still sleepy this AM but has awoken more as morning went on  More re-directable and interactive with family     ---------------------------------------------------------------------------------------  SUBJECTIVE  "I just don't feel that good"    Review of Systems  Review of systems was reviewed and negative unless stated above in HPI/24-hour events   ---------------------------------------------------------------------------------------  Assessment and Plan:    Neuro:    Diagnosis: Toxic-metabolic encephalopathy  o Plan: Most likely multifactorial with largest contributors being pneumonia and delirium  Still CAM ICU+ on exam, although is much more cooperative and interactive this AM   OOB to chair again today, lights on, shades up with TV on during the day with frequent reorientation  Sleep hygiene  Will continue melatonin but decrease dose of zyprexa for tonight to 5mg  Continue low dose lorazepam 0 25 mg PO BID to prevent withdraw as patient was taking this chronically at home, granddaughter reports 0 5mg PO at least 2-3x daily  Continue scheduled tylenol and lidoderm patch to help address pain while avoiding opioids, also on bowel regimen to address constipation  CV:    Diagnosis: CAD s/p MI 2017 ([facility] to [facility] 3/2017) with non-MI troponin elevation this admission  o Plan: Appreciate cardiology consult  Continue ASA, metoprolol, statin  Troponin elevation at time of clinical decompensation likely 2/2 demand ischemia  No new EKG changes, TTE not adequate for assessment of RWMA but LVEF of 50% is at patient's previously known baseline     Diagnosis: Hx of ischemic CMP  o Plan: Not in acute exacerbation although with some faint crackles on exam and increased hilar congestion on CXR today, will increase home dose lasix to BID  Pulm:   Diagnosis: Acute on chronic respiratory failure with hypoxia and hypercapnea  ABG at time of decompensation consistent with respiratory acidosis  o Plan:  Improving  Suspect largely related to combination of pneumonia and possible COPD exacerbation, also now may have some component of pulmonary edema  Although cannot exclude possibility of PE, DVU scans negative for acute DVT and V/Q scan reported as intermediate or indeterminate probability and patient has clinically improved since admission making this less likely  Treatment of pneumonia and possible COPD exacerbation as below  Increase home dose lasix to BID for today   Streptococcal pneumonia  - Plan:  Multifocal infiltrates on CT chest, elevated procal and urinary antigen positive for strep pneumonia with some low temps and initial leukocytosis  Continue ceftriaxone, currently day 4  Trend procal, currently downtrending   Diagnosis: COPD exacerbation  o Plan: Has baseline diagnosis of COPD (20+ pack year smoking history) and interstitial changes noted on previous imaging  Bicarb elevation on admission BMP suggests chronic CO2 retention at baseline  Treating for COPD exacerbation, continue scheduled nebulizers and prednisone taper  At this point QHS BiPAP may be of more risk in terms of provoking agitation, than benefit as oxygenation continues to improve  Will order prn vapotherm at night, wean supplemental O2 to maintain saturations >90%  GI:    Diagnosis: Constipation:  Resolved  Large brown BM charted yesterday although not documented in I/O   o Tolerating diet      :    Diagnosis: DELIO on CKD3  o Plan: Cr has returned to baseline, I/O not entirely accurate as she is incontinent of urine  Continue to avoid nephrotoxins as able, maintain MAP>65 mmHg    Will monitor closely with increase in diuretic dosing  F/E/N:    Plan: Continue to replace electrolytes as indicated  Tolerating diet with occasional prn zofran, denies nausea this AM       Heme/Onc:    Diagnosis: Hx of iron deficiency anemia  o Plan: H/H within normal range this admission, continue oral iron supplementation      Endo:   o No active issues      ID:    Diagnosis: Streptococcal pneumonia  o Plan: Continue ceftriaxone  Encourage pulmonary toilet and mobility as above  Procal downtrending, will continue to trend  Afebrile and leukocytosis has resolved  Mental status continues to improve  MSK/Skin:    Diagnosis: L1 compression fracture, age-indeterminate from imaging but new from 7/2018  o Plan: Pathologic versus traumatic compression fracture  Appreciate neurosurg and orthopedic input  TLSO brace with weight-bearing as tolerated  Scheduled tylenol and lidoderm patch for pain control  Avoid NSAIDs given DELIO this admission  Continue to minimize opioids as much as possible  Disposition: Continue Critical Care   Code Status: Level 3 - DNAR and DNI  ---------------------------------------------------------------------------------------  ICU CORE MEASURES    Prophylaxis   VTE Pharmacologic Prophylaxis: Enoxaparin (Lovenox)  VTE Mechanical Prophylaxis: sequential compression device  Stress Ulcer Prophylaxis: Famotidine PO    ABCDE Protocol (if indicated)  Plan to perform spontaneous awakening trial today? Not applicable  Plan to perform spontaneous breathing trial today? Not applicable  Obvious barriers to extubation? Not applicable  CAM-ICU: Positive    Invasive Devices Review  Invasive Devices     Peripheral Intravenous Line            Peripheral IV 11/13/19 Dorsal (posterior); Right Hand less than 1 day              Can any invasive devices be discontinued today?  Not applicable  ---------------------------------------------------------------------------------------  OBJECTIVE    Physical Exam    Vitals   Vitals: 19 1000 19 1100 19 1200 19 1300   BP: 147/65 146/66 134/64 146/63   BP Location: Right arm Right arm Right arm Right arm   Pulse: 91 85 80 97   Resp: (!) 29 (!) 29 (!) 25 (!) 28   Temp:  97 6 °F (36 4 °C)     TempSrc:  Tympanic     SpO2: 93% 93% 95% 92%   Weight:       Height:         Temp (24hrs), Av 7 °F (36 5 °C), Min:97 4 °F (36 3 °C), Max:98 4 °F (36 9 °C)  Current: Temperature: 97 6 °F (36 4 °C)  HR: 80  BP: 134/64  RR: 25  SpO2: 95% on 3L nc    Invasive/non-invasive ventilation settings   Respiratory    Lab Data (Last 4 hours)    None         O2/Vent Data (Last 4 hours)    None                Height and Weights   Height: 5' 2" (157 5 cm)  IBW: 50 1 kg  Body mass index is 31 9 kg/m²  Weight (last 2 days)     Date/Time   Weight    19 0600   79 1 (174 38)    19 0552   76 8 (169 31)                Intake and Output  I/O        0701 -  0700  0701 -  0700  07 - 11/15 0700    P  O  370 390     I V  (mL/kg) 835 (10 9)      IV Piggyback 321 7 100     Total Intake(mL/kg) 1526 7 (19 9) 490 (6 2)     Urine (mL/kg/hr) 200 (0 1) 350 (0 2)     Stool       Total Output 200 350     Net +1326 7 +140            Unmeasured Urine Occurrence 3 x 2 x           Nutrition       Diet Orders   (From admission, onward)             Start     Ordered    11/10/19 0340  Diet Regular; Regular House  Diet effective now     Question Answer Comment   Diet Type Regular    Regular Regular House    RD to adjust diet per protocol?  Yes        11/10/19 0344                Laboratory and Diagnostics:  Results from last 7 days   Lab Units 19  0619 19  0507 19  0449 19  1516 11/10/19  0507 19  2151   WBC Thousand/uL 8 10 8 70 10 40 12 50* 10 20 10 50   HEMOGLOBIN g/dL 13 3 12 9 12 8 14 0 15 4 15 2   HEMATOCRIT % 41 3* 39 4* 38 7* 43 6 47 7* 45 7   PLATELETS Thousands/uL 273 250 209 248 234 228   NEUTROS PCT % 76* 73  --   --  77* 80*   MONOS PCT % 13* 14* --   --  12 10     Results from last 7 days   Lab Units 11/14/19  0619 11/13/19  0507 11/12/19  0449 11/11/19  1516 11/10/19  0507 11/09/19  2151   SODIUM mmol/L 139 135 138 133* 135 134   POTASSIUM mmol/L 4 8 4 5 4 6 5 5 4 3 4 6   CHLORIDE mmol/L 100 97* 98 94* 94* 96*   CO2 mmol/L 36* 33* 37* 33* 34* 33*   ANION GAP mmol/L 3* 5 3* 6 7 5   BUN mg/dL 24 30* 32* 29* 18 21   CREATININE mg/dL 0 71 0 86 1 15 1 42* 0 86 0 85   CALCIUM mg/dL 9 4 9 8 9 4 10 1 9 9 9 9   GLUCOSE RANDOM mg/dL 104* 120* 101* 202* 128* 152*   ALT U/L  --   --   --   --  18 15   AST U/L  --   --   --   --  24 25   ALK PHOS U/L  --   --   --   --  49* 47*   ALBUMIN g/dL  --   --   --   --  3 8 3 7   TOTAL BILIRUBIN mg/dL  --   --   --   --  1 00 0 90     Results from last 7 days   Lab Units 11/14/19  0619 11/13/19  0507   MAGNESIUM mg/dL 2 3 2 1           Results from last 7 days   Lab Units 11/11/19  1915 11/11/19  1742 11/11/19  1516 11/09/19  2215   TROPONIN I ng/mL 0 07* 0 09* 0 07* <0 03         ABG:  Results from last 7 days   Lab Units 11/12/19  0604   PH ART  7 320*   PCO2 ART mm Hg 69 8*   PO2 ART mm Hg 86 0   HCO3 ART mmol/L 34 9*   BASE EXC ART mmol/L 6 8*   ABG SOURCE  Radial, Left     VBG:  Results from last 7 days   Lab Units 11/12/19  0604   ABG SOURCE  Radial, Left     Results from last 7 days   Lab Units 11/14/19  0619 11/13/19  0507 11/11/19  1742   PROCALCITONIN ng/ml 0 32* 0 56* 0 33*       Micro  Results from last 7 days   Lab Units 11/11/19  1750 11/11/19  1749   URINE CULTURE  No Growth <1000 cfu/mL  --    LEGIONELLA URINARY ANTIGEN   --  Negative   STREP PNEUMONIAE ANTIGEN, URINE   --  Positive*       Imaging:   XR chest portable   Final Result      Moderate pulmonary edema  Small effusions  Large hiatal hernia  Workstation performed: [id number]         NM lung ventilation / perfusion   Final Result      Limited study without ventilation    The probability for pulmonary embolus is intermediate or indeterminate  See above comments  The study was marked in [facility] for immediate notification  Workstation performed: [id number]         VAS lower limb venous duplex study, complete bilateral   Final Result      XR chest portable   Final Result      No acute cardiopulmonary disease  Large hiatal hernia  Workstation performed: [id number]         CT chest wo contrast   Final Result      Multifocal patchy areas of groundglass and consolidation within the lungs bilaterally a few of which appear nodular  Findings are favored to be infectious in nature and should be followed to resolution  Cardiomegaly  Trace bilateral pleural effusions  Large hiatal hernia  Redemonstration of an age-indeterminate L1 compression fracture, new since 7/23/2018  Workstation performed: [id number]         CT Abdomen pelvis with contrast   Final Result      Age-indeterminate L1 compression deformity with 45% loss of height  There is minimal retropulsion of posterior fracture fragments  I personally discussed this study with Dr Fred Durand on 11/10/2019 at 1:37 AM       Unchanged large hiatal hernia  Workstation performed: [id number]         XR chest 2 views   Final Result      Large hiatal hernia without evidence of acute cardiopulmonary disease  Workstation performed: [id number]         XR spine lumbar 2 or 3 views injury   Final Result      Chronic T12 and L1 compression abnormalities similar to the prior 2018 CT         Workstation performed: [id number]            I have personally reviewed pertinent reports     and I have personally reviewed pertinent films in PACS    Active Medications  Scheduled Meds:  Current Facility-Administered Medications:  acetaminophen 975 mg Oral Q8H Mary Fulton MD    aspirin 81 mg Oral Daily Rajani Slates, CRNP    atorvastatin 40 mg Oral Daily Rajani Slates, CRNP    bisacodyl 10 mg Rectal Once Tuyet Kwon MD    calcium carbonate-vitamin D 2 tablet Oral BID With Meals Avtar Beat, CRNP    cefTRIAXone 1,000 mg Intravenous Q24H Avtar Beat, CRNP Last Rate: 1,000 mg (11/13/19 1658)   docusate sodium 100 mg Oral BID Avtar Beat, CRNP    enoxaparin 30 mg Subcutaneous Daily Avtar Beat, CRNP    famotidine 20 mg Oral BID Avtar Beat, CRNP    ferrous sulfate 162 5 mg Oral Daily Avtar Beat, CRNP    [START ON 11/15/2019] furosemide 20 mg Oral BID (diuretic) Rich Philippe MD    guaiFENesin 600 mg Oral Q12H [address] Rich Philippe MD    ipratropium 0 5 mg Nebulization 4x Daily Rich Philippe MD    Labetalol HCl 5 mg Intravenous Q6H PRN Rich Philippe MD    levalbuterol 0 63 mg Nebulization 4x Daily Rich Philippe MD    lidocaine 1 patch Topical Daily Rich Philippe MD    LORazepam 0 25 mg Oral BID Paris Munson MD    melatonin 6 mg Oral HS Rich Philippe MD    metoprolol tartrate 12 5 mg Oral Q12H [address] Avtar Beat, CRNP    OLANZapine 5 mg Oral HS Rich Philippe MD    ondansetron 4 mg Intravenous Q6H PRN Avtar Beat, CRNP    polyethylene glycol 17 g Oral Daily Avtar Beat, CRNP    [START ON 11/15/2019] predniSONE 30 mg Oral Daily Rich Philippe MD    Followed by        Lynnette Conte ON 11/17/2019] predniSONE 20 mg Oral Daily Rich Philippe MD    Followed by        Lynnette Conte ON 11/19/2019] predniSONE 10 mg Oral Daily Rich Philippe MD    senna 1 tablet Oral HS Avtar Beat, CRNP      Continuous Infusions:     PRN Meds:     Labetalol HCl 5 mg Q6H PRN   ondansetron 4 mg Q6H PRN       Allergies   Allergies   Allergen Reactions    Ciprofloxacin      Reaction Date: 01Jul2011;     Keflex [Cephalexin] Dizziness    Nitrofurantoin      Reaction Date: [id number];     Penicillins Rash, Other (See Comments) and Throat Swelling     Throat swells       Advance Directive and Living Will:      Power of :    POLST:        Counseling / Coordination of Care  Total time spent today 31 minutes  Greater than 50% of total time was spent with the patient and / or family counseling and / or coordination of care  A description of the counseling / coordination of care: Discussion with patient's great grandson, bedside RN, patient, and hospitalist      Elie Hashimoto, MD        Portions of the record may have been created with voice recognition software  Occasional wrong word or "sound a like" substitutions may have occurred due to the inherent limitations of voice recognition software    Read the chart carefully and recognize, using context, where substitutions have occurred

## 2019-11-14 NOTE — ASSESSMENT & PLAN NOTE
· Patient is on Ativan 0 5 mg 3 times daily as needed at home  · Currently on Ativan 0 25 mg b i d    · Will monitor for withdrawal and adjust dose as needed

## 2019-11-14 NOTE — ASSESSMENT & PLAN NOTE
· Appears to be secondary to pneumonia as CT scan of the chest shows multifocal findings  · Will continue IV antibiotics  · Patient continues require BiPAP  · ABG result appreciated  · Procalcitonin trending up  · Strep pneumonia positive

## 2019-11-14 NOTE — NURSING NOTE
Patient in re  Wrist restraints removed bipap woundnt keep bipap on face removed bipap and put on o nasal cannula

## 2019-11-14 NOTE — PROGRESS NOTES
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                monitor blood pressures as well as medications, avoid nephrotoxins  4  Streptococcal pneumonia with acute respiratory failure on BiPAP              Patient has been able to tolerate nasal cannula, being off of BiPAP  Continue monitor closely  5  Hypercapnic respiratory failure                continue treatment according to hospitalist/intensivist  6  Compression fracture L1  7   Anxiety with long-term use of benzodiazepines                treatment and withdrawal prevention according to hospitalist/intensivist      Follow up reason for today's visit:  Chronic kidney disease    Acute respiratory failure with hypoxia and hypercapnia (HCC)    Patient Active Problem List   Diagnosis    Allergic rhinitis    Benign colon polyp    Cataract    Constipation    Coronary artery disease involving native coronary artery of native heart without angina pectoris    Fibrocystic breast disease, unspecified laterality    Gait abnormality    Hiatal hernia with GERD without esophagitis    Glucose intolerance (no malabsorption)    Hyperlipidemia, mixed    Essential hypertension    Iron deficiency anemia    Malignant melanoma of skin (HCC)    Urinary incontinence    Vitamin D deficiency    COPD (chronic obstructive pulmonary disease) ([facility] [address]    Anxiety    Compression fracture of thoracic vertebra, closed, initial encounter ([facility] [address] [facility]    Proteinuria    Coronary angioplasty status    Old MI (myocardial infarction)    Chronic diastolic heart failure (HCC)    Chronic left shoulder pain    Chronic right shoulder pain    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    Acute respiratory failure with hypoxia and hypercapnia (HCC)    Streptococcal pneumonia (HCC)    Toxic metabolic encephalopathy    Acute kidney injury ([facility] [address] )         Subjective:   No acute events overnight    Objective:     Vitals: Blood pressure 151/66, pulse 89, temperature 97 9 °F (36 6 °C), temperature source Temporal, resp  rate (!) 27, height 5' 2" (1 575 m), weight 79 1 kg (174 lb 6 1 oz), SpO2 94 %, not currently breastfeeding  ,Body mass index is 31 9 kg/m²  Weight (last 2 days)     Date/Time   Weight    11/14/19 0600   79 1 (174 38)    11/13/19 0552   76 8 (169 31)                Intake/Output Summary (Last 24 hours) at 11/14/2019 0826  Last data filed at 11/13/2019 2012  Gross per 24 hour   Intake 490 ml   Output 350 ml   Net 140 ml     I/O last 3 completed shifts:   In: 80 [P O :490; I V :82; IV Piggyback:350]  Out: 450 [Urine:450]         Physical Exam: /66   Pulse 89   Temp 97 9 °F (36 6 °C) (Temporal)   Resp (!) 27   Ht 5' 2" (1 575 m)   Wt 79 1 kg (174 lb 6 1 oz)   SpO2 94%   BMI 31 90 kg/m²     General Appearance:    Lethargic this morning, no distress, appears stated age   Head:    Normocephalic, without obvious abnormality, atraumatic   Eyes:    Conjunctiva/corneas clear   Ears:    Normal external ears   Nose:   Nares normal, septum midline, mucosa normal, no drainage    or sinus tenderness   Throat:   Lips, mucosa, and tongue normal; teeth and gums normal   Neck:   Supple   Back:     Symmetric, no curvature, ROM normal, no CVA tenderness   Lungs:     Reduced bilaterally   Chest wall:    No tenderness or deformity   Heart:    Regular rate and rhythm, S1 and S2 normal, no murmur, rub   or gallop   Abdomen:     Soft, non-tender, bowel sounds active   Extremities:   Extremities normal, atraumatic, no cyanosis or edema   Skin:   Skin color, texture, turgor normal, no rashes or lesions   Lymph nodes:   Cervical normal   Neurologic:   CNII-XII intact            Lab, Imaging and other studies: I have personally reviewed pertinent labs  CBC:   Lab Results   Component Value Date    WBC 8 10 11/14/2019    HGB 13 3 11/14/2019    HCT 41 3 (L) 11/14/2019    MCV 92 11/14/2019     11/14/2019    MCH 29 4 11/14/2019    MCHC 32 1 11/14/2019    RDW 13 7 11/14/2019    MPV 8 5 (L) 11/14/2019     CMP:   Lab Results   Component Value Date    K 4 8 11/14/2019     11/14/2019    CO2 36 (H) 11/14/2019    BUN 24 11/14/2019    CREATININE 0 71 11/14/2019    CALCIUM 9 4 11/14/2019    EGFR 73 11/14/2019         Results from last 7 days   Lab Units 11/14/19  0619 11/13/19  0507 11/12/19  0449  11/10/19  0507 11/09/19  2151   POTASSIUM mmol/L 4 8 4 5 4 6   < > 4 3 4 6   CHLORIDE mmol/L 100 97* 98   < > 94* 96*   CO2 mmol/L 36* 33* 37*   < > 34* 33*   BUN mg/dL 24 30* 32*   < > 18 21   CREATININE mg/dL 0 71 0 86 1 15   < > 0 86 0 85   CALCIUM mg/dL 9 4 9 8 9 4   < > 9 9 9 9   ALK PHOS U/L  --   --   --   --  49* 47*   ALT U/L  --   --   --   --  18 15   AST U/L  --   --   --   --  24 25    < > = values in this interval not displayed  Phosphorus: No results found for: PHOS  Magnesium:   Lab Results   Component Value Date    MG 2 3 11/14/2019     Urinalysis: No results found for: Yanna Dad, SPECGRAV, PHUR, LEUKOCYTESUR, NITRITE, PROTEINUA, GLUCOSEU, KETONESU, BILIRUBINUR, BLOODU  Ionized Calcium: No results found for: CAION  Coagulation: No results found for: PT, INR, APTT  Troponin: No results found for: TROPONINI  ABG: No results found for: PHART, RKN4QYW, PO2ART, WUX4XQK, K9GOTQWD, BEART, SOURCE  Radiology review:     IMAGING  Procedure: Xr Chest Portable    Result Date: 11/12/2019  Narrative: CHEST INDICATION:   hypoxia  COMPARISON:  11/9/2019 EXAM PERFORMED/VIEWS:  XR CHEST PORTABLE FINDINGS: Cardiomegaly is noted  The lungs are clear  No pneumothorax or pleural effusion  Osseous structures appear within normal limits for patient age       Impression: No acute cardiopulmonary disease  Large hiatal hernia  Workstation performed: [id number]     Procedure: Ct Chest Wo Contrast    Result Date: 11/11/2019  Narrative: CT CHEST WITHOUT IV CONTRAST INDICATION:   Shortness of breath hypoxia  COMPARISON:  7/23/2018 and recent CT of the abdomen from 11/10/2019  TECHNIQUE: CT examination of the chest was performed without intravenous contrast   Axial, sagittal, and coronal 2D reformatted images were created from the source data and submitted for interpretation  Radiation dose length product (DLP) for this visit:  375 3 mGy-cm   This examination, like all CT scans performed in the [facility], was performed utilizing techniques to minimize radiation dose exposure, including the use of iterative reconstruction and automated exposure control  FINDINGS: LUNGS:  There are multifocal areas of consolidation, patchy and nodular within the bilateral upper lobes and lower lobes  Findings are favored to be infectious in nature  PLEURA:  There are trace bilateral pleural effusions  HEART/GREAT VESSELS:  The heart is enlarged  There is coronary artery calcification  There is thoracic aortic calcification  MEDIASTINUM AND AWILDA:  There is a large hiatal hernia with a partially intrathoracic stomach  CHEST WALL AND LOWER NECK:   There is coarse calcification within the bilateral thyroid lobes  VISUALIZED STRUCTURES IN THE UPPER ABDOMEN:  Unremarkable  OSSEOUS STRUCTURES:  There is stable moderate loss of height of the T10 and T12 vertebral bodies  The bones are diffusely osteopenic  There is an age-indeterminate compression fracture involving the L1 vertebral body, which is new since the exam from 7/23/2018     Impression: Multifocal patchy areas of groundglass and consolidation within the lungs bilaterally a few of which appear nodular  Findings are favored to be infectious in nature and should be followed to resolution  Cardiomegaly  Trace bilateral pleural effusions   Large hiatal hernia  Redemonstration of an age-indeterminate L1 compression fracture, new since 7/23/2018  Workstation performed: [id number]     Procedure: Nm Lung Ventilation / Perfusion    Result Date: 11/12/2019  Narrative: VENTILATION AND PERFUSION SCAN INDICATION: Dyspnea on exertion COMPARISON:  Chest radiograph  11/12/2019, CT chest 11/11/2019 TECHNIQUE: Patient attempted to inhale 32 8 mCi nebulized Tc-99m DTPA but was unsuccessful  No ventilation images acquired  Multiplanar perfusion imaging was next performed following the intravenous administration of 4 4 mCi Tc-99m labeled MAA  FINDINGS:  Lung ventilation images could not be obtained  Perfusion imaging demonstrates heterogeneous distribution of the radiotracer with nonsegmental defects  Possible moderate sized segmental defect in the left upper lobe posteriorly  Moderate sized segmental defect suggested in the right lower lobe posteriorly  Possible moderate-sized defect in the lingular region  There are some corresponding airspace opacities seen on recent chest CT exam   This could be related to an airways process but pulmonary embolism is not excluded  Impression: Limited study without ventilation  The probability for pulmonary embolus is intermediate or indeterminate  See above comments  The study was marked in [facility] for immediate notification  Workstation performed: [id number]     Procedure: Vas Lower Limb Venous Duplex Study, Complete Bilateral    Result Date: 11/12/2019  Narrative:  [facility] REPORT CLINICAL: Indications: Patient presents with shortness of breath on exertion  Acute hypoxia  Risk Factors The patient has history of HTN, HLD and CAD  CONCLUSION:  Impression: RIGHT LOWER LIMB: No evidence of acute or chronic deep vein thrombosis  No evidence of superficial thrombophlebitis noted  Doppler evaluation shows a normal response to augmentation maneuvers   Popliteal, posterior tibial and anterior tibial arterial Doppler waveforms are triphasic  LEFT LOWER LIMB: No evidence of acute or chronic deep vein thrombosis  No evidence of superficial thrombophlebitis noted  Doppler evaluation shows a normal response to augmentation maneuvers  Popliteal, posterior tibial and anterior tibial arterial Doppler waveforms are triphasic  Pulsatile waveforms in the venous system may suggest heart failure or tricuspid valve insufficiency    SIGNATURE: Electronically Signed by: Danilo Chawla on 2019-11-12 07:35:31 PM      Current Facility-Administered Medications   Medication Dose Route Frequency    acetaminophen (TYLENOL) tablet 975 mg  975 mg Oral Q8H [facility]    aspirin chewable tablet 81 mg  81 mg Oral Daily    atorvastatin (LIPITOR) tablet 40 mg  40 mg Oral Daily    bisacodyl (DULCOLAX) rectal suppository 10 mg  10 mg Rectal Once    calcium carbonate-vitamin D (OSCAL-D) 500 mg-200 units per tablet 2 tablet  2 tablet Oral BID With Meals    cefTRIAXone (ROCEPHIN) IVPB (premix) 1,000 mg  1,000 mg Intravenous Q24H    docusate sodium (COLACE) capsule 100 mg  100 mg Oral BID    enoxaparin (LOVENOX) subcutaneous injection 30 mg  30 mg Subcutaneous Daily    famotidine (PEPCID) tablet 20 mg  20 mg Oral BID    ferrous sulfate tablet 162 5 mg  162 5 mg Oral Daily    furosemide (LASIX) tablet 20 mg  20 mg Oral Daily    guaiFENesin (MUCINEX) 12 hr tablet 600 mg  600 mg Oral Q12H LIZZY    haloperidol lactate (HALDOL) injection 1 mg  1 mg Intravenous Q6H PRN    ipratropium (ATROVENT) 0 02 % inhalation solution 0 5 mg  0 5 mg Nebulization 4x Daily    Labetalol HCl (NORMODYNE) injection 5 mg  5 mg Intravenous Q6H PRN    levalbuterol (XOPENEX) inhalation solution 0 63 mg  0 63 mg Nebulization 4x Daily    lidocaine (LIDODERM) 5 % patch 1 patch  1 patch Topical Daily    LORazepam (ATIVAN) tablet 0 25 mg  0 25 mg Oral BID    melatonin tablet 6 mg  6 mg Oral HS    metoprolol tartrate (LOPRESSOR) partial tablet 12 5 mg  12 5 mg Oral Q12H [facility]    OLANZapine (ZyPREXA ZYDIS) dispersible tablet 10 mg  10 mg Oral HS    ondansetron (ZOFRAN) injection 4 mg  4 mg Intravenous Q6H PRN    polyethylene glycol (MIRALAX) packet 17 g  17 g Oral Daily    predniSONE tablet 40 mg  40 mg Oral Daily    Followed by   David Ortiz ON 11/15/2019] predniSONE tablet 30 mg  30 mg Oral Daily    Followed by   David Ortiz ON 11/17/2019] predniSONE tablet 20 mg  20 mg Oral Daily    Followed by   David Ortiz ON 11/19/2019] predniSONE tablet 10 mg  10 mg Oral Daily    senna (SENOKOT) tablet 8 6 mg  1 tablet Oral HS     Medications Discontinued During This Encounter   Medication Reason    ipratropium-albuterol (DUO-NEB) 0 5-2 5 mg/3 mL inhalation solution 3 mL     nitroglycerin (NITROSTAT) SL tablet 0 4 mg     polyethylene glycol (GLYCOLAX) bowel prep 17 g     morphine injection 2 mg     sodium chloride 0 9 % infusion     LORazepam (ATIVAN) tablet 0 5 mg     oxyCODONE (ROXICODONE) IR tablet 5 mg     morphine injection 1 mg     methocarbamol (ROBAXIN) tablet 500 mg     enoxaparin (LOVENOX) subcutaneous injection 40 mg     potassium chloride (K-DUR,KLOR-CON) CR tablet 10 mEq     acetaminophen (TYLENOL) tablet 650 mg     melatonin tablet 3 mg     haloperidol lactate (HALDOL) injection 2 5 mg     haloperidol lactate (HALDOL) injection 2 5 mg     bisacodyl (DULCOLAX) rectal suppository 10 mg     azithromycin (ZITHROMAX) 500 mg in sodium chloride 0 9 % 250 mL IVPB        Sydna Bosworth, DO

## 2019-11-14 NOTE — TELEPHONE ENCOUNTER
Ephraim Mcclain is requesting a call from Angela Dobson regarding Murel Number, she states she is in the [facility] and would like to discuss her staus

## 2019-11-14 NOTE — PLAN OF CARE
Problem: PHYSICAL THERAPY ADULT  Goal: Performs mobility at highest level of function for planned discharge setting  See evaluation for individualized goals  Description  Treatment/Interventions: Functional transfer training, LE strengthening/ROM, Therapeutic exercise, Endurance training, Patient/family training, Cognitive reorientation, Equipment eval/education, Bed mobility, Gait training, Spoke to nursing  Equipment Recommended: Walker(continue RW use)       See flowsheet documentation for full assessment, interventions and recommendations  Outcome: Progressing  Note:   Prognosis: Fair  Problem List: Decreased strength, Decreased endurance, Impaired balance, Decreased mobility, Decreased safety awareness, Pain, Orthopedic restrictions  Assessment: Pt seen for PT treatment session this date with interventions consisting of gait training w/ emphasis on improving pt's ability to ambulate level surfaces x 2 ft x 2 with mod A of 2 provided by therapist with RW and therapeutic activity consisting of training: supine<>sit transfers, sit<>stand transfers, static standing tolerance for 1-2 minutes w/ mod Ax1 to maintain and B UE support and vc and tactile cues for static standing posture faciliation  Pt agreeable to PT treatment session upon arrival, pt found supine in bed w/ HOB elevated, in no apparent distress, A&O x 1-2 and responsive  In comparison to previous session, pt with significant improvements in increased engagement in session and therapist + appropriate answering of questions throughout session  Post session: pt returned back to recliner, chair alarm engaged, all needs in reach and RN notified of session findings/recommendations  Continue to recommend STR at time of d/c in order to maximize pt's functional independence and safety w/ mobility  Pt continues to be functioning below baseline level, and remains limited 2* factors listed above   PT will continue to see pt while here in order to address the deficits listed above and provide interventions consistent w/ POC in effort to achieve STGs  Barriers to Discharge: Inaccessible home environment     Recommendation: Short-term skilled PT     PT - OK to Discharge: No    See flowsheet documentation for full assessment

## 2019-11-14 NOTE — ASSESSMENT & PLAN NOTE
· ER provider contacted neurosurgery Dr Fam Britt  · Recommended pain control, PT OT, brace (TLSO)  · Orthopedics input appreciated- recommends the same   · CM is working on STR placement once patient is medically stable  · Lidoderm patch, Robaxin, Oxy IR, morphine IV

## 2019-11-15 NOTE — PROGRESS NOTES
Progress Note - Connor Hays 2/25/1925, 80 y o  female MRN: [id number]    Unit/Bed#: ICU 06 Encounter: [id number]    Primary Care Provider: Jose Eduardo Mcnamara DO   Date and time admitted to hospital: 11/9/2019  7:42 PM        * [facility] with [facility]  Assessment & Plan  · Appears to be secondary to pneumonia as CT scan of the chest shows multifocal findings  · Will continue IV antibiotics day 5  · Currently off BiPAP and saturating well on nasal cannula  · Procalcitonin improving  · Strep pneumonia positive    Streptococcal pneumonia (HCC)  Assessment & Plan  · Urine antigen positive for strep pneumonia  · Continue IV ceftriaxone day 5 today  · Continue oxygen/BiPAP as needed  · Critical care input appreciated  · Procalcitonin improvement  · Supportive care    Acute kidney injury [facility])  Assessment & Plan  · Improved  · Likely secondary to dehydration  · Continue IV fluids as needed  · Nephrology following    Toxic metabolic encephalopathy  Assessment & Plan  · Multifactorial due to pneumonia and hypercapnia  · Significantly improved today as patient is sitting up in chair awake alert oriented eating breakfast  · Continue with current treatment    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  Assessment & Plan  · ER provider contacted neurosurgery Dr Letty Vázquez  · Recommended pain control, PT OT, brace (TLSO)  · Orthopedics input appreciated- recommends the same   · CM is working on STR placement once patient is medically stable  · Will continue with Lidoderm patch and Tylenol  · A void narcotics due to patient's fluctuating mental status and avoid NSAIDs due to acute kidney injury    Anxiety  Assessment & Plan  · Patient is on Ativan 0 5 mg 3 times daily as needed at home  · Currently on Ativan 0 25 mg b i d  · Will continue melatonin q h s    · Zyprexa dose decreased to avoid over-sedation    COPD (chronic obstructive pulmonary disease) ([facility] )  Assessment & Plan  · Will continue prednisone taper  · Continue oxygen and titrate as tolerated  · Currently off BiPAP    Hiatal hernia with GERD without esophagitis  Assessment & Plan  · History of large hiatal hernia  · Continue Pepcid      Constipation  Assessment & Plan  · Continue with current bowel regimen    VTE Pharmacologic Prophylaxis: Pharmacologic: Enoxaparin (Lovenox)    Patient Centered Rounds: I have performed bedside rounds with nursing staff today  Discussions with Specialists or Other Care Team Provider: yes  Education and Discussions with Family / Patient: yes    Current Length of Stay: 5 day(s)    Current Patient Status: Inpatient   Certification Statement: The patient will continue to require additional inpatient hospital stay due to Respiratory failure    Discharge Plan:  Pending hospital course    Code Status: Level 3 - DNAR and DNI    Subjective:   Patient doing well this morning as she is sitting up in chair eating breakfast off of BiPAP saturating well on nasal cannula  Objective:     Vitals:   Temp (24hrs), Av 2 °F (36 8 °C), Min:97 2 °F (36 2 °C), Max:99 °F (37 2 °C)    Temp:  [97 2 °F (36 2 °C)-99 °F (37 2 °C)] 97 2 °F (36 2 °C)  HR:  [68-99] 71  Resp:  [23-49] 26  BP: (121-187)/(53-91) 181/83  SpO2:  [78 %-95 %] 93 %  Body mass index is 31 49 kg/m²  Input and Output Summary (last 24 hours): Intake/Output Summary (Last 24 hours) at 11/15/2019 0915  Last data filed at 11/15/2019 0913  Gross per 24 hour   Intake 120 ml   Output 300 ml   Net -180 ml       Physical Exam:     Physical Exam   Constitutional: No distress  Frail elderly female currently sitting up in chair eating breakfast   HENT:   Head: Normocephalic and atraumatic  Eyes: Conjunctivae and EOM are normal    Neck: Normal range of motion  Neck supple  Cardiovascular: Normal rate and regular rhythm  Pulmonary/Chest: Effort normal  No respiratory distress  Abdominal: Soft  She exhibits no distension  There is no tenderness  Musculoskeletal: Normal range of motion  Generalized weakness   Neurological: She is alert  No cranial nerve deficit  Skin: Skin is warm and dry  Psychiatric: She has a normal mood and affect  Her behavior is normal        Additional Data:     Labs:    Results from last 7 days   Lab Units 11/15/19  0513   WBC Thousand/uL 7 60   HEMOGLOBIN g/dL 13 1   HEMATOCRIT % 40 2*   PLATELETS Thousands/uL 314   NEUTROS PCT % 76*   LYMPHS PCT % 11*   MONOS PCT % 13*   EOS PCT % 0     Results from last 7 days   Lab Units 11/15/19  0513  11/10/19  0507   POTASSIUM mmol/L 4 1   < > 4 3   CHLORIDE mmol/L 96*   < > 94*   CO2 mmol/L 40*   < > 34*   BUN mg/dL 22   < > 18   CREATININE mg/dL 0 75   < > 0 86   CALCIUM mg/dL 8 9   < > 9 9   ALK PHOS U/L  --   --  49*   ALT U/L  --   --  18   AST U/L  --   --  24    < > = values in this interval not displayed  Results from last 7 days   Lab Units 11/11/19  1502   POC GLUCOSE mg/dl 228*           * I Have Reviewed All Lab Data Listed Above  * Additional Pertinent Lab Tests Reviewed:  [address] Admission  Reviewed    Imaging:  Imaging Reports Reviewed Today Include:  No new imaging    Recent Cultures (last 7 days):     Results from last 7 days   Lab Units 11/11/19  1750 11/11/19  1749   URINE CULTURE  No Growth <1000 cfu/mL  --    LEGIONELLA URINARY ANTIGEN   --  Negative       Last 24 Hours Medication List:     Current Facility-Administered Medications:  acetaminophen 975 mg Oral Q8H [address] Marti Figueroa MD    aspirin 81 mg Oral Daily Roseline Bigger, CRNP    atorvastatin 40 mg Oral Daily Rsoeline Bigger, CRNP    calcium carbonate-vitamin D 2 tablet Oral BID With Meals Roseline Bigger, CRNP    cefTRIAXone 1,000 mg Intravenous Q24H Roseline Bigger, CRNP Last Rate: 1,000 mg (11/14/19 1757)   docusate sodium 100 mg Oral BID Roseline Bigger, CRNP    enoxaparin 30 mg Subcutaneous Daily Roseline Bigger, CRNP    famotidine 20 mg Oral BID Roseline Bigger, ROGELIO    ferrous sulfate 162 5 mg Oral Daily ROGELIO Cuadra    [START ON 11/16/2019] furosemide 20 mg Oral Daily Alecia Osman MD    guaiFENesin 600 mg Oral Q12H [address] Farhad Yadav MD    ipratropium 0 5 mg Nebulization 4x Daily Farhad Yadav MD    Labetalol HCl 5 mg Intravenous Q6H PRN Farhad Yadav MD    levalbuterol 0 63 mg Nebulization 4x Daily Farhad Yadav MD    lidocaine 1 patch Topical Daily Farhad Yadav MD    LORazepam 0 25 mg Oral BID Alecia Osman MD    melatonin 6 mg Oral HS Farhad Yadav MD    metoprolol tartrate 12 5 mg Oral Q12H [address] ROGELIO Cuadra    OLANZapine 5 mg Oral HS Farhad Yadav MD    ondansetron 4 mg Intravenous Q6H PRN ROGELIO Cuadra    polyethylene glycol 17 g Oral Daily ROGELIO Cuadra    predniSONE 30 mg Oral Daily Farhad Yadav MD    Followed by        Mireille Kruger ON 11/17/2019] predniSONE 20 mg Oral Daily Farhad Yadav MD    Followed by        Mireille Kruger ON 11/19/2019] predniSONE 10 mg Oral Daily Farhad Yadav MD    senna 1 tablet Oral HS ROGELIO Cuadra         Today, Patient Was Seen By: Alecia Osman MD    ** Please Note: Dictation voice to text software may have been used in the creation of this document   **

## 2019-11-15 NOTE — ASSESSMENT & PLAN NOTE
· Patient is on Ativan 0 5 mg 3 times daily as needed at home  · Currently on Ativan 0 25 mg b i d  · Will continue melatonin q h s    · Zyprexa dose decreased to avoid over-sedation

## 2019-11-15 NOTE — ASSESSMENT & PLAN NOTE
· Urine antigen positive for strep pneumonia  · Continue IV ceftriaxone day 5 today  · Continue oxygen/BiPAP as needed  · Critical care input appreciated  · Procalcitonin improvement  · Supportive care

## 2019-11-15 NOTE — PROGRESS NOTES
Daily Progress Note - Kofi Erwin [address] 78  80 y o  female MRN: [id number]  Unit/Bed#: ICU 06 Encounter: [id number]        ----------------------------------------------------------------------------------------  HPI/24hr events:  Patient reports poor sleep overnight, but no agitation per nursing and no desaturation requiring HFNC or BiPAP       ---------------------------------------------------------------------------------------  SUBJECTIVE  Feels improved but had difficulty sleeping, she reports she feels this was related to her legs feeling "ruchy"  She also reports some ongoing back pain but denies that it is worse with deep inspiration  Ongoing cough  Review of Systems  Review of systems was reviewed and negative unless stated above in HPI/24-hour events   ---------------------------------------------------------------------------------------  Assessment and Plan:    Neuro:    Diagnosis: Toxic-metabolic encephalopathy, now resolved  o Plan:  Suspect this was related to delirium in setting of hospitalization and acute infection  CAM ICU negative this AM, cooperative and redirectable  Continue delirium precautions, continue scheduled melatonin at night  Will continue low dose ativan, consider returning to TID prn dosing as she was taking at home  Continue scheduled tylenol for additional 24H and then will switch to prn dosing  Continue bowel regimen to prevent constipation  Continue lidoderm patch and avoid opioids as much as possible  CV:    Diagnosis:  CAD s/p MI ([facility] to [facility] 3/2017) with non-MI troponin elevation this admission  o Plan: Continue ASA, metoprolol, statin  Appreciate cardiology input  Elevation in tropoin this admission is likely related to demand ischemia  No new EKG changes, TTE inadequate for assessment of regional wall motion abnormalities but left ventricular ejection fraction of 50% is at the patient's previously known baseline     Diagnosis:  History of ischemic cardiomyopathy  o Plan:  Does have trace lower extremity edema with faint crackles at bases on exam   Will dose oral Lasix BID for today and return to daily dosing as she takes as an outpatient tomorrow  Pulm:   Diagnosis:  Acute on chronic respiratory failure with hypoxia and hypercapnia  o Plan:  Continues to improve  Suspect that this is largely related to combination of pneumonia, possible COPD exacerbation, and may have some component of pulmonary edema  Cannot fully exclude possibility of PE and indeterminate/intermediate probability of PE on V/Q scan  However, patient has clinically improved with treatment of other reasons for respiratory decompensation  Treatment of pneumonia, COPD exacerbation, and diuresis as mentioned elsewhere   COPD exacerbation  - Plan:  Does have diagnosis of COPD at baseline and 20+ pack year smoking history, however was not on scheduled inhalers or oxygen at home prior to admission  Bicarb elevation on admission BMP suggests some element of chronic CO2 retention  Treating for COPD exacerbation in setting of CAP, on prednisone taper with scheduled nebulizers   Diagnosis: Streptococcal pneumonia  o Plan: Multifocal infiltrates on CT chest, elevated procal and urinary antigen positive for strep pneumonia with several recorded low temperatures, leukocytosis on admission, and elevated procalcitonin  Continue ceftriaxone, currently day 5  Procal pending for today  GI:    Diagnosis: Constipation:  Resolved, has had several bowel movements since admission, most recently in nursing documentation 11/13 however not documented in I/O     o Plan: Continue scheduled bowel regimen, encourage mobility      :    Diagnosis: DELIO on CKD3  DELIO has resolved, Cr at baseline  o Plan: Continue to avoid nephrotoxins and NSAIDs as able, maintain MAP>65 mmHg    No increase in BUN or Cr with BID lasix dosing, will repeat BID dosing for today and return to daily home dose tomorrow  F/E/N:    Plan: Tolerating diet, electrolyte replacement as indicated  Will change to soft diet with thin liquid as per speech therapy recommendations  Heme/Onc:    Diagnosis: Hx of iron deficiency anemia  o Plan: H/H within normal range this admission, continue oral iron supplementation      Endo:   o No active issues at present  ID:    Diagnosis: Streptoccocal pneumonia  o Plan: Continue ceftriaxone, currently day 5  Encourage pulmonary toilet and mobility as able  Procal downtrending, pending from this AM   Afebrile, leukocytosis has resolved  Ongoing cough  Continue mucinex, encourage IS       MSK/Skin:    Diagnosis: L1 compression fracture, age-indeterminate from imaging but new from 7/2018  o Plan: Pathologic versus traumatic compression fracture  Appreciate neurosurg and orthopedic input  TLSO brace with weight-bearing as tolerated  Scheduled tylenol and lidoderm patch for pain control  Disposition: Transfer to Med-Surg   Code Status: Level 3 - DNAR and DNI  ---------------------------------------------------------------------------------------  ICU CORE MEASURES    Prophylaxis   VTE Pharmacologic Prophylaxis: Enoxaparin (Lovenox)  VTE Mechanical Prophylaxis: sequential compression device  Stress Ulcer Prophylaxis: Famotidine PO    ABCDE Protocol (if indicated)  Plan to perform spontaneous awakening trial today? Not applicable  Plan to perform spontaneous breathing trial today? Not applicable  Obvious barriers to extubation? Not applicable  CAM-ICU: Negative    Invasive Devices Review  Invasive Devices     Peripheral Intravenous Line            Peripheral IV 11/13/19 Dorsal (posterior); Right Hand 1 day              Can any invasive devices be discontinued today? Not applicable  ---------------------------------------------------------------------------------------  OBJECTIVE    Physical Exam   Constitutional: She is oriented to person, place, and time   She appears well-developed and well-nourished  No distress  HENT:   Head: Normocephalic and atraumatic  Mouth/Throat: Oropharynx is clear and moist    Eyes: Conjunctivae are normal    Neck: No JVD present  No tracheal deviation present  Cardiovascular: Normal rate, regular rhythm, normal heart sounds and intact distal pulses  Pulmonary/Chest: Effort normal    Scattered rales at bases, no wheezing appreciated   Abdominal: Soft  Bowel sounds are normal  She exhibits no distension  There is no tenderness  Genitourinary:   Genitourinary Comments: deferred   Musculoskeletal: She exhibits edema (trace)  Neurological: She is alert and oriented to person, place, and time  Skin: Skin is warm and dry  There is pallor  Psychiatric: She has a normal mood and affect  Vitals   Vitals:    11/15/19 0800 11/15/19 0818 11/15/19 0900 11/15/19 0917   BP: (!) 181/83  (!) 192/95 (!) 192/95   BP Location: Right arm      Pulse: 71  93 79   Resp: (!) 26  (!) 46    Temp: (!) 97 2 °F (36 2 °C)      TempSrc: Tympanic      SpO2: 93% 93% (!) 88%    Weight:       Height:         Temp (24hrs), Av 2 °F (36 8 °C), Min:97 2 °F (36 2 °C), Max:99 °F (37 2 °C)  Current: Temperature: (!) 97 2 °F (36 2 °C)  HR: 93  BP: 192/95  RR: 24  SpO2: 90% on 4L nc    Invasive/non-invasive ventilation settings   Respiratory    Lab Data (Last 4 hours)    None         O2/Vent Data (Last 4 hours)    None                Height and Weights   Height: 5' 2" (157 5 cm)  IBW: 50 1 kg  Body mass index is 31 49 kg/m²  Weight (last 2 days)     Date/Time   Weight    11/15/19 0547   78 1 (172 18)    19 0600   79 1 (174 38)    19 0552   76 8 (169 31)                Intake and Output  I/O        07 -  0700  07 - 11/15 0700 11/15 07 -  0700    P  O  390  120    I V  (mL/kg)       IV Piggyback 100      Total Intake(mL/kg) 490 (6 2)  120 (1 5)    Urine (mL/kg/hr) 350 (0 2) 300 (0 2)     Total Output 350 300     Net +140 -300 +120           Unmeasured Urine Occurrence 2 x 2 x 2 x          Nutrition       Diet Orders   (From admission, onward)             Start     Ordered    11/10/19 0340  Diet Regular; Regular House  Diet effective now     Question Answer Comment   Diet Type Regular    Regular Regular House    RD to adjust diet per protocol?  Yes        11/10/19 0344                Laboratory and Diagnostics:  Results from last 7 days   Lab Units 11/15/19  0513 11/14/19  0619 11/13/19  0507 11/12/19  0449 11/11/19  1516 11/10/19  0507 11/09/19  2151   WBC Thousand/uL 7 60 8 10 8 70 10 40 12 50* 10 20 10 50   HEMOGLOBIN g/dL 13 1 13 3 12 9 12 8 14 0 15 4 15 2   HEMATOCRIT % 40 2* 41 3* 39 4* 38 7* 43 6 47 7* 45 7   PLATELETS Thousands/uL 314 273 250 209 248 234 228   NEUTROS PCT % 76* 76* 73  --   --  77* 80*   MONOS PCT % 13* 13* 14*  --   --  12 10     Results from last 7 days   Lab Units 11/15/19  0513 11/14/19  0619 11/13/19  0507 11/12/19 0449 11/11/19  1516 11/10/19  0507 11/09/19  2151   SODIUM mmol/L 137 139 135 138 133* 135 134   POTASSIUM mmol/L 4 1 4 8 4 5 4 6 5 5 4 3 4 6   CHLORIDE mmol/L 96* 100 97* 98 94* 94* 96*   CO2 mmol/L 40* 36* 33* 37* 33* 34* 33*   ANION GAP mmol/L 1* 3* 5 3* 6 7 5   BUN mg/dL 22 24 30* 32* 29* 18 21   CREATININE mg/dL 0 75 0 71 0 86 1 15 1 42* 0 86 0 85   CALCIUM mg/dL 8 9 9 4 9 8 9 4 10 1 9 9 9 9   GLUCOSE RANDOM mg/dL 99 104* 120* 101* 202* 128* 152*   ALT U/L  --   --   --   --   --  18 15   AST U/L  --   --   --   --   --  24 25   ALK PHOS U/L  --   --   --   --   --  49* 47*   ALBUMIN g/dL  --   --   --   --   --  3 8 3 7   TOTAL BILIRUBIN mg/dL  --   --   --   --   --  1 00 0 90     Results from last 7 days   Lab Units 11/14/19  0619 11/13/19  0507   MAGNESIUM mg/dL 2 3 2 1           Results from last 7 days   Lab Units 11/11/19  1915 11/11/19  1742 11/11/19  1516 11/09/19  2215   TROPONIN I ng/mL 0 07* 0 09* 0 07* <0 03         ABG:  Results from last 7 days   Lab Units 11/12/19  0604   PH ART  7 320*   PCO2 ART mm Hg 69 8*   PO2 ART mm Hg 86 0   HCO3 ART mmol/L 34 9*   BASE EXC ART mmol/L 6 8*   ABG SOURCE  Radial, Left     VBG:  Results from last 7 days   Lab Units 11/12/19  0604   ABG SOURCE  Radial, Left     Results from last 7 days   Lab Units 11/14/19  0619 11/13/19  0507 11/11/19  1742   PROCALCITONIN ng/ml 0 32* 0 56* 0 33*       Micro  Results from last 7 days   Lab Units 11/11/19  1750 11/11/19  1749   URINE CULTURE  No Growth <1000 cfu/mL  --    LEGIONELLA URINARY ANTIGEN   --  Negative   STREP PNEUMONIAE ANTIGEN, URINE   --  Positive*       EKG: NSR  Imaging:   XR chest portable   Final Result      Moderate pulmonary edema  Small effusions  Large hiatal hernia  Workstation performed: [id number]         NM lung ventilation / perfusion   Final Result      Limited study without ventilation  The probability for pulmonary embolus is intermediate or indeterminate  See above comments  The study was marked in [facility] for immediate notification  Workstation performed: [id number]         VAS lower limb venous duplex study, complete bilateral   Final Result      XR chest portable   Final Result      No acute cardiopulmonary disease  Large hiatal hernia  Workstation performed: [id number]         CT chest wo contrast   Final Result      Multifocal patchy areas of groundglass and consolidation within the lungs bilaterally a few of which appear nodular  Findings are favored to be infectious in nature and should be followed to resolution  Cardiomegaly  Trace bilateral pleural effusions  Large hiatal hernia  Redemonstration of an age-indeterminate L1 compression fracture, new since 7/23/2018  Workstation performed: [id number]         CT Abdomen pelvis with contrast   Final Result      Age-indeterminate L1 compression deformity with 45% loss of height  There is minimal retropulsion of posterior fracture fragments        I personally discussed this study with Dr Omid Miranda on 11/10/2019 at 1:37 AM       Unchanged large hiatal hernia  Workstation performed: [id number]         XR chest 2 views   Final Result      Large hiatal hernia without evidence of acute cardiopulmonary disease  Workstation performed: [id number]         XR spine lumbar 2 or 3 views injury   Final Result      Chronic T12 and L1 compression abnormalities similar to the prior 2018 CT         Workstation performed: [id number]            I have personally reviewed pertinent reports     and I have personally reviewed pertinent films in PACS    Active Medications  Scheduled Meds:  Current Facility-Administered Medications:  acetaminophen 975 mg Oral Q8H [address] Elie Hashimoto, MD    aspirin 81 mg Oral Daily Linda Plane, CRNP    atorvastatin 40 mg Oral Daily Linda Plane, CRNP    calcium carbonate-vitamin D 2 tablet Oral BID With Meals Linda Plane, CRNP    cefTRIAXone 1,000 mg Intravenous Q24H Linda Plane, CRNP Last Rate: 1,000 mg (11/14/19 0433)   docusate sodium 100 mg Oral BID Linda Plane, CRNP    enoxaparin 30 mg Subcutaneous Daily Linda Plane, CRNP    famotidine 20 mg Oral BID Linda Plane, CRNP    ferrous sulfate 162 5 mg Oral Daily Linda Plane, CRNP    [START ON 11/16/2019] furosemide 20 mg Oral Daily Anne Mckeon MD    furosemide 20 mg Oral Once Elie Hashimoto, MD    guaiFENesin 600 mg Oral Q12H [address] Elie Hashimoto, MD    ipratropium 0 5 mg Nebulization 4x Daily Elie Hashimoto, MD    Labetalol HCl 5 mg Intravenous Q6H PRN Elie Hashimoto, MD    levalbuterol 0 63 mg Nebulization 4x Daily Elie Hashimoto, MD    lidocaine 1 patch Topical Daily Elie Hashimoto, MD    LORazepam 0 25 mg Oral BID Anne Mckeon MD    melatonin 6 mg Oral HS Elie Hashimoto, MD    metoprolol tartrate 12 5 mg Oral Q12H [address] Linda Plane, CRNP    OLANZapine 5 mg Oral HS Elie Hashimoto, MD    ondansetron 4 mg Intravenous Q6H PRN Renetta Mcmullen ROGELIO Cedeño    polyethylene glycol 17 g Oral Daily ROGELIO Nelson    predniSONE 30 mg Oral Daily Re Mckay MD    Followed by        Rosita Lao ON 11/17/2019] predniSONE 20 mg Oral Daily Re Mckay MD    Followed by        Rosita Lao ON 11/19/2019] predniSONE 10 mg Oral Daily Re Mckay MD    senna 1 tablet Oral HS ROGELIO Nelson      Continuous Infusions:     PRN Meds:     Labetalol HCl 5 mg Q6H PRN   ondansetron 4 mg Q6H PRN       Allergies   Allergies   Allergen Reactions    Ciprofloxacin      Reaction Date: 01Jul2011;     Keflex [Cephalexin] Dizziness    Nitrofurantoin      Reaction Date: [id number];     Penicillins Rash, Other (See Comments) and Throat Swelling     Throat swells       Advance Directive and Living Will:      Power of :    POLST:        Counseling / Coordination of Care  Total time spent today 28 minutes  Greater than 50% of total time was spent with the patient and / or family counseling and / or coordination of care  A description of the counseling / coordination of care: Discussion with patient, bedside nurse, and hospitalist      Re Mckay MD        Portions of the record may have been created with voice recognition software  Occasional wrong word or "sound a like" substitutions may have occurred due to the inherent limitations of voice recognition software    Read the chart carefully and recognize, using context, where substitutions have occurred

## 2019-11-15 NOTE — PLAN OF CARE
Problem: PHYSICAL THERAPY ADULT  Goal: Performs mobility at highest level of function for planned discharge setting  See evaluation for individualized goals  Description  Treatment/Interventions: Functional transfer training, LE strengthening/ROM, Therapeutic exercise, Endurance training, Patient/family training, Cognitive reorientation, Equipment eval/education, Bed mobility, Gait training, Spoke to nursing  Equipment Recommended: Walker(continue RW use)       See flowsheet documentation for full assessment, interventions and recommendations  Outcome: Progressing  Note:   Prognosis: Fair  Problem List: Decreased strength, Decreased endurance, Impaired balance, Decreased mobility, Decreased safety awareness, Pain, Orthopedic restrictions  Assessment: Pt seen for PT treatment session this date with interventions consisting of gait training w/ emphasis on improving pt's ability to ambulate level surfaces x 5 ft with min A of 2 provided by therapist with RW and therapeutic activity consisting of training: bed mobility, supine<>sit transfers, sit<>stand transfers, static standing tolerance for 2-3 minutes w/ B UE support and vc and tactile cues for static standing posture faciliation  Pt agreeable to PT treatment session upon arrival, pt found supine in bed w/ HOB elevated, in no apparent distress, A&O x 4 and responsive  In comparison to previous session, pt with significant improvements in mentation and cog status this date c ability to recall previous events from therapy session yesterday  Pt requires max VCs for log roll technique 2/2 spinal precautions and is TD for donning of TLSO  Pt reports (+) dizziness sitting at EOB, BP taken and relayed to RN  Pt requires min Ax2 for all phases of functional OOB mobility at this time  Post session: pt returned back to recliner, chair alarm engaged, all needs in reach and RN notified of session findings/recommendations   Continue to recommend STR at time of d/c in order to maximize pt's functional independence and safety w/ mobility  Pt continues to be functioning below baseline level, and remains limited 2* factors listed above  PT will continue to see pt while here in order to address the deficits listed above and provide interventions consistent w/ POC in effort to achieve STGs  Barriers to Discharge: Inaccessible home environment     Recommendation: Short-term skilled PT     PT - OK to Discharge: Yes(when medically cleared, if to STR)    See flowsheet documentation for full assessment

## 2019-11-15 NOTE — NURSING NOTE
Bed request made for transfer to medical surgical status  Patient and family made aware of patient's change in status

## 2019-11-15 NOTE — ASSESSMENT & PLAN NOTE
· Multifactorial due to pneumonia and hypercapnia  · Significantly improved today as patient is sitting up in chair awake alert oriented eating breakfast  · Continue with current treatment

## 2019-11-15 NOTE — PHYSICAL THERAPY NOTE
Physical Therapy Treatment Note       11/15/19 0902   Pain Assessment   Pain Assessment No/denies pain   Pain Score No Pain   Pain Rating: FLACC (Rest) - Face 0   Pain Rating: FLACC (Rest) - Legs 0   Pain Rating: FLACC (Rest) - Activity 0   Pain Rating: FLACC (Rest) - Cry 0   Pain Rating: FLACC (Rest) - Consolability 0   Score: FLACC (Rest) 0   Pain Rating: FLACC (Activity) - Face 0   Pain Rating: FLACC (Activity) - Legs 0   Pain Rating: FLACC (Activity) - Activity 0   Pain Rating: FLACC (Activity) - Cry 0   Pain Rating: FLACC (Activity) - Consolability 0   Score: FLACC (Activity) 0   Restrictions/Precautions   Weight Bearing Precautions Per Order Yes   RLE Weight Bearing Per Order WBAT   LLE Weight Bearing Per Order WBAT   Braces or Orthoses TLSO  (donned prior to OOB mobility)   Other Precautions Chair Alarm; Bed Alarm;Multiple lines;Telemetry;O2;Fall Risk   General   Chart Reviewed Yes   Response to Previous Treatment Patient unable to report, no changes reported from family or staff   Family/Caregiver Present No   Cognition   Arousal/Participation Alert; Responsive; Cooperative   Attention Attends with cues to redirect   Orientation Level Oriented X4   Memory Decreased recall of precautions   Following Commands Follows one step commands without difficulty   Comments pt agreeable to PT session   Subjective   Subjective "I need to go to the bathroom and I can't use the bedpan"   Bed Mobility   Rolling R 4  Minimal assistance   Additional items Assist x 1;Bedrails; Increased time required;Verbal cues   Supine to Sit 4  Minimal assistance   Additional items Assist x 2;HOB elevated; Bedrails; Increased time required;Verbal cues   Transfers   Sit to Stand 4  Minimal assistance   Additional items Assist x 2; Increased time required;Verbal cues   Stand to Sit 4  Minimal assistance   Additional items Assist x 2; Increased time required;Verbal cues;Armrests   Toilet transfer 4  Minimal assistance   Additional items Assist x 2;Commode;Verbal cues; Increased time required   Additional Comments BP seated at EOB prior to transfer to [facility] = 192/91, further mobility deferred 2/2 elevated BP  Post session pt seated OOB in recliner c chair alarm engaged   Ambulation/Elevation   Gait pattern Improper Weight shift;Decreased foot clearance;Shuffling; Step to;Excessively slow   Gait Assistance 4  Minimal assist   Additional items Assist x 2;Verbal cues; Tactile cues   Assistive Device Rolling walker   Distance 5 ft   Stair Management Assistance Not tested   Balance   Static Sitting Fair +   Dynamic Sitting Fair -   Static Standing Poor +   Dynamic Standing Poor +   Ambulatory Poor +   Endurance Deficit   Endurance Deficit Yes   Activity Tolerance   Activity Tolerance Patient limited by fatigue   Nurse Made Aware ELENA Contreras verbalized pt appropriate for therapy and made aware of outcomes    Assessment   Prognosis Fair   Problem List Decreased strength;Decreased endurance; Impaired balance;Decreased mobility; Decreased safety awareness;Pain;Orthopedic restrictions   Assessment Pt seen for PT treatment session this date with interventions consisting of gait training w/ emphasis on improving pt's ability to ambulate level surfaces x 5 ft with min A of 2 provided by therapist with RW and therapeutic activity consisting of training: bed mobility, supine<>sit transfers, sit<>stand transfers, static standing tolerance for 2-3 minutes w/ B UE support and vc and tactile cues for static standing posture faciliation  Pt agreeable to PT treatment session upon arrival, pt found supine in bed w/ HOB elevated, in no apparent distress, A&O x 4 and responsive  In comparison to previous session, pt with significant improvements in mentation and cog status this date c ability to recall previous events from therapy session yesterday  Pt requires max VCs for log roll technique 2/2 spinal precautions and is TD for donning of TLSO   Pt reports (+) dizziness sitting at EOB, BP taken and relayed to RN  Pt requires min Ax2 for all phases of functional OOB mobility at this time  Post session: pt returned back to recliner, chair alarm engaged, all needs in reach and RN notified of session findings/recommendations  Continue to recommend STR at time of d/c in order to maximize pt's functional independence and safety w/ mobility  Pt continues to be functioning below baseline level, and remains limited 2* factors listed above  PT will continue to see pt while here in order to address the deficits listed above and provide interventions consistent w/ POC in effort to achieve STGs  Barriers to Discharge Inaccessible home environment   Goals   Patient Goals "to eat breakfast"   STG Expiration Date 11/21/19   Short Term Goal #1 goals remain appropriate   PT Treatment Day 4   Plan   Treatment/Interventions Functional transfer training;LE strengthening/ROM; Therapeutic exercise; Endurance training;Patient/family training;Cognitive reorientation;Equipment eval/education; Bed mobility;Gait training;Spoke to nursing   Progress Slow progress, decreased activity tolerance   PT Frequency   (3-5x/wk)   Recommendation   Recommendation Short-term skilled PT   Equipment Recommended Walker  (continue RW use)   PT - OK to Discharge Yes  (when medically cleared, if to STR)       Papo Hearn PT    Time of PT treatment session: [id number]

## 2019-11-15 NOTE — NURSING NOTE
Patient lethargic at this time,opens eyes to voice,then back to sleep  O2 3L/min via nasal cannula  SaO2 93% on same  NSR on monitor  Complete physical assessment as noted  Call in bed in reach

## 2019-11-15 NOTE — NURSING NOTE
Patient was OOB in recliner  Incontinent of urine  Cleansed and assisted to bed at patient's request max assist of 2 to transfer  Legs weak per pt  Made comfortable in bed  Completely awake, alert and oriented x4  Verbalizing needs

## 2019-11-15 NOTE — SOCIAL WORK
Pt discussed during care coordination rounds  Pt not stable for discharge per Dr Brittany Xiong  CM spoke with Jorge Stacy from [facility] from [facility]  Both [place] and ARU still reviewing and will continue to follow  CM will also follow

## 2019-11-15 NOTE — ASSESSMENT & PLAN NOTE
· Appears to be secondary to pneumonia as CT scan of the chest shows multifocal findings  · Will continue IV antibiotics day 5  · Currently off BiPAP and saturating well on nasal cannula  · Procalcitonin improving  · Strep pneumonia positive

## 2019-11-15 NOTE — NURSING NOTE
Patient awake and alert, oriented x4  Set up for supper, family assisted with feeding  Pleased that she "is so much better "  Patient is telling me all about her family, anxious to go home and see her great great granddaughter  Granddaughter at bedside able to verify the things patient was telling me were accurate

## 2019-11-15 NOTE — ASSESSMENT & PLAN NOTE
· ER provider contacted neurosurgery Dr Paresh Bell  · Recommended pain control, PT OT, brace (TLSO)  · Orthopedics input appreciated- recommends the same   · CM is working on STR placement once patient is medically stable  · Will continue with Lidoderm patch and Tylenol  · A void narcotics due to patient's fluctuating mental status and avoid NSAIDs due to acute kidney injury

## 2019-11-15 NOTE — SPEECH THERAPY NOTE
Speech/Language Pathology Progress Note    Patient Name: Dajuan Weller  WBPES'J Date: 11/15/2019    Subjective:  Pt awake and alert, sitting up in chair at bedside  Objective:  Pt seen for diagnostic swallow tx  She reported eating her breakfast this morning without difficulty  Upper and lower dentures in place  Pt took meds with RN and SLP present, whole 1 at a time with thin liquid  No overt s/s aspiration observed  Clear vocal quality throughout session  Pt continues to have intermittent congested cough, however not felt to be caused by swallowing  Assessment:  Appropriate for current diet, dysphagia 3 and thin liquids  Plan/Recommendations:  Continue dysphagia 3, thin liquids  Brief ST f/u x1 additional session as able prior to d/c

## 2019-11-15 NOTE — PROGRESS NOTES
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                 blood pressure a little elevated this morning, continue monitor blood pressure and add medications as indicated  Patient is currently on low-dose of metoprolol twice a day, her heart rate is borderline low and is unclear if increasing metoprolol will in fact blood pressure enough and continue to allow an appropriate heart rate  Consider addition of a low-dose of a calcium channel blocker  4  Streptococcal pneumonia with acute respiratory failure on BiPAP              Patient remains on nasal cannula but otherwise doing okay  Continues to have occasional dry cough  5  Hypercapnic respiratory failure                 patient significantly improved, has remained off of BiPAP  6  Compression fracture L1  7   Anxiety with long-term use of benzodiazepines                 patient appears to be controlled on lorazepam 0 25 mg twice a day      Follow up reason for today's visit:  Chronic kidney disease/hypertension    Acute respiratory failure with hypoxia and hypercapnia (HCC)    Patient Active Problem List   Diagnosis    Allergic rhinitis    Benign colon polyp    Cataract    Constipation    Coronary artery disease involving native coronary artery of native heart without angina pectoris    Fibrocystic breast disease, unspecified laterality    Gait abnormality    Hiatal hernia with GERD without esophagitis    Glucose intolerance (no malabsorption)    Hyperlipidemia, mixed    Essential hypertension    Iron deficiency anemia    Malignant melanoma of skin (HCC)    Urinary incontinence    Vitamin D deficiency    COPD (chronic obstructive pulmonary disease) (HCC)    Anxiety    Compression fracture of thoracic vertebra, closed, initial encounter ([facility] [address] )    Proteinuria    Coronary angioplasty status    Old MI (myocardial infarction)    Chronic diastolic heart failure (HCC)    Chronic left shoulder pain    Chronic right shoulder pain    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    Acute respiratory failure with hypoxia and hypercapnia (HCC)    Streptococcal pneumonia (HCC)    Toxic metabolic encephalopathy    Acute kidney injury ([facility] [address] )         Subjective:   No acute events overnight, patient continues to be maintained on nasal cannula    Objective:     Vitals: Blood pressure (!) 181/83, pulse 71, temperature (!) 97 2 °F (36 2 °C), temperature source Tympanic, resp  rate (!) 26, height 5' 2" (1 575 m), weight 78 1 kg (172 lb 2 9 oz), SpO2 93 %, not currently breastfeeding  ,Body mass index is 31 49 kg/m²  Weight (last 2 days)     Date/Time   Weight    11/15/19 0547   78 1 (172 18)    11/14/19 0600   79 1 (174 38)    11/13/19 0552   76 8 (169 31)                Intake/Output Summary (Last 24 hours) at 11/15/2019 0857  Last data filed at 11/15/2019 0501  Gross per 24 hour   Intake --   Output 300 ml   Net -300 ml     I/O last 3 completed shifts:   In: 130 [P O :30; IV Piggyback:100]  Out: 500 [Urine:500]         Physical Exam: BP (!) 181/83 (BP Location: Right arm)   Pulse 71   Temp (!) 97 2 °F (36 2 °C) (Tympanic)   Resp (!) 26   Ht 5' 2" (1 575 m)   Wt 78 1 kg (172 lb 2 9 oz)   SpO2 93%   BMI 31 49 kg/m²     General Appearance:    Alert, cooperative, no distress, appears stated age   Head:    Normocephalic, without obvious abnormality, atraumatic   Eyes:    Conjunctiva/corneas clear   Ears:    Normal external ears   Nose:   Nares normal, septum midline, mucosa normal, no drainage    or sinus tenderness   Throat:   Lips, mucosa, and tongue normal; teeth and gums normal   Neck:   Supple   Back:     Symmetric, no curvature, ROM normal, no CVA tenderness   Lungs:     Clear to auscultation bilaterally, respirations unlabored   Chest wall:    No tenderness or deformity   Heart:    Regular rate and rhythm, S1 and S2 normal, no murmur, rub   or gallop   Abdomen:     Soft, non-tender, bowel sounds active   Extremities:   Extremities normal, atraumatic, no cyanosis or edema   Skin:   Skin color, texture, turgor normal, no rashes or lesions   Lymph nodes:   Cervical normal   Neurologic:   CNII-XII intact            Lab, Imaging and other studies: I have personally reviewed pertinent labs  CBC:   Lab Results   Component Value Date    WBC 7 60 11/15/2019    HGB 13 1 11/15/2019    HCT 40 2 (L) 11/15/2019    MCV 91 11/15/2019     11/15/2019    MCH 29 5 11/15/2019    MCHC 32 7 11/15/2019    RDW 13 7 11/15/2019    MPV 8 3 (L) 11/15/2019     CMP:   Lab Results   Component Value Date    K 4 1 11/15/2019    CL 96 (L) 11/15/2019    CO2 40 (H) 11/15/2019    BUN 22 11/15/2019    CREATININE 0 75 11/15/2019    CALCIUM 8 9 11/15/2019    EGFR 68 11/15/2019         Results from last 7 days   Lab Units 11/15/19  0513 11/14/19  0619 11/13/19  0507  11/10/19  0507 11/09/19  2151   POTASSIUM mmol/L 4 1 4 8 4 5   < > 4 3 4 6   CHLORIDE mmol/L 96* 100 97*   < > 94* 96*   CO2 mmol/L 40* 36* 33*   < > 34* 33*   BUN mg/dL 22 24 30*   < > 18 21   CREATININE mg/dL 0 75 0 71 0 86   < > 0 86 0 85   CALCIUM mg/dL 8 9 9 4 9 8   < > 9 9 9 9   ALK PHOS U/L  --   --   --   --  49* 47*   ALT U/L  --   --   --   --  18 15   AST U/L  --   --   --   --  24 25    < > = values in this interval not displayed           Phosphorus: No results found for: PHOS  Magnesium: No results found for: MG  Urinalysis: No results found for: COLORU, CLARITYU, SPECGRAV, PHUR, LEUKOCYTESUR, NITRITE, PROTEINUA, GLUCOSEU, KETONESU, BILIRUBINUR, BLOODU  Ionized Calcium: No results found for: CAION  Coagulation: No results found for: PT, INR, APTT  Troponin: No results found for: TROPONINI  ABG: No results found for: PHART, NAC1ORF, PO2ART, DUN8QJF, B3JPNJGQ, BEART, SOURCE  Radiology review:     IMAGING  Procedure: Xr Chest Portable    Result Date: 11/14/2019  Narrative: CHEST INDICATION:   hypoxia  COMPARISON:  Chest CT from 11/11/2019  Chest radiograph from 11/12/2019 and 11/9/2019  EXAM PERFORMED/VIEWS:  XR CHEST PORTABLE FINDINGS: Moderate cardiomegaly  Large hiatal hernia  Moderate pulmonary edema  Small effusions  Osseous structures appear within normal limits for patient age  Impression: Moderate pulmonary edema  Small effusions  Large hiatal hernia  Workstation performed: [id number]     Procedure: Nm Lung Ventilation / Perfusion    Result Date: 11/12/2019  Narrative: VENTILATION AND PERFUSION SCAN INDICATION: Dyspnea on exertion COMPARISON:  Chest radiograph  11/12/2019, CT chest 11/11/2019 TECHNIQUE: Patient attempted to inhale 32 8 mCi nebulized Tc-99m DTPA but was unsuccessful  No ventilation images acquired  Multiplanar perfusion imaging was next performed following the intravenous administration of 4 4 mCi Tc-99m labeled MAA  FINDINGS:  Lung ventilation images could not be obtained  Perfusion imaging demonstrates heterogeneous distribution of the radiotracer with nonsegmental defects  Possible moderate sized segmental defect in the left upper lobe posteriorly  Moderate sized segmental defect suggested in the right lower lobe posteriorly  Possible moderate-sized defect in the lingular region  There are some corresponding airspace opacities seen on recent chest CT exam   This could be related to an airways process but pulmonary embolism is not excluded  Impression: Limited study without ventilation  The probability for pulmonary embolus is intermediate or indeterminate  See above comments  The [facility] was [facility] in [facility] for immediate notification   Workstation performed: [id number]     Procedure: Vas Lower Limb Venous Duplex Study, Complete Bilateral    Result Date: 11/12/2019  Narrative:  [facility] REPORT CLINICAL: Indications: Patient presents with shortness of breath on exertion  Acute hypoxia  Risk Factors The patient has history of HTN, HLD and CAD  CONCLUSION:  Impression: RIGHT LOWER LIMB: No evidence of acute or chronic deep vein thrombosis  No evidence of superficial thrombophlebitis noted  Doppler evaluation shows a normal response to augmentation maneuvers  Popliteal, posterior tibial and anterior tibial arterial Doppler waveforms are triphasic  LEFT LOWER LIMB: No evidence of acute or chronic deep vein thrombosis  No evidence of superficial thrombophlebitis noted  Doppler evaluation shows a normal response to augmentation maneuvers  Popliteal, posterior tibial and anterior tibial arterial Doppler waveforms are triphasic  Pulsatile waveforms in the venous system may suggest heart failure or tricuspid valve insufficiency    SIGNATURE: Electronically Signed by: Danilo Chawla on 2019-11-12 07:35:31 PM      Current Facility-Administered Medications   Medication Dose Route Frequency    acetaminophen (TYLENOL) tablet 975 mg  975 mg Oral Q8H [address]    aspirin chewable tablet 81 mg  81 mg Oral Daily    atorvastatin (LIPITOR) tablet 40 mg  40 mg Oral Daily    calcium carbonate-vitamin D (OSCAL-D) 500 mg-200 units per tablet 2 tablet  2 tablet Oral BID With Meals    cefTRIAXone (ROCEPHIN) IVPB (premix) 1,000 mg  1,000 mg Intravenous Q24H    docusate sodium (COLACE) capsule 100 mg  100 mg Oral BID    enoxaparin (LOVENOX) subcutaneous injection 30 mg  30 mg Subcutaneous Daily    famotidine (PEPCID) tablet 20 mg  20 mg Oral BID    ferrous sulfate tablet 162 5 mg  162 5 mg Oral Daily    furosemide (LASIX) tablet 20 mg  20 mg Oral BID (diuretic)    guaiFENesin (MUCINEX) 12 hr tablet 600 mg  600 mg Oral Q12H LIZZY    ipratropium (ATROVENT) 0 02 % inhalation solution 0 5 mg  0 5 mg Nebulization 4x Daily    Labetalol HCl (NORMODYNE) injection 5 mg  5 mg Intravenous Q6H PRN    levalbuterol (XOPENEX) inhalation solution 0 63 mg  0 63 mg Nebulization 4x Daily    lidocaine (LIDODERM) 5 % patch 1 patch  1 patch Topical Daily    LORazepam (ATIVAN) tablet 0 25 mg  0 25 mg Oral BID    melatonin tablet 6 mg  6 mg Oral HS    metoprolol tartrate (LOPRESSOR) partial tablet 12 5 mg  12 5 mg Oral Q12H [address]    OLANZapine (ZyPREXA ZYDIS) dispersible tablet 5 mg  5 mg Oral HS    ondansetron (ZOFRAN) injection 4 mg  4 mg Intravenous Q6H PRN    polyethylene glycol (MIRALAX) packet 17 g  17 g Oral Daily    predniSONE tablet 30 mg  30 mg Oral Daily    Followed by   Johanna Mendoza ON 11/17/2019] predniSONE tablet 20 mg  20 mg Oral Daily    Followed by   Johanna Mendoza ON 11/19/2019] predniSONE tablet 10 mg  10 mg Oral Daily    senna (SENOKOT) tablet 8 6 mg  1 tablet Oral HS     Medications Discontinued During This Encounter   Medication Reason    ipratropium-albuterol (DUO-NEB) 0 5-2 5 mg/3 mL inhalation solution 3 mL     nitroglycerin (NITROSTAT) SL tablet 0 4 mg     polyethylene glycol (GLYCOLAX) bowel prep 17 g     morphine injection 2 mg     sodium chloride 0 9 % infusion     LORazepam (ATIVAN) tablet 0 5 mg     oxyCODONE (ROXICODONE) IR tablet 5 mg     morphine injection 1 mg     methocarbamol (ROBAXIN) tablet 500 mg     enoxaparin (LOVENOX) subcutaneous injection 40 mg     potassium chloride (K-DUR,KLOR-CON) CR tablet 10 mEq     acetaminophen (TYLENOL) tablet 650 mg     melatonin tablet 3 mg     haloperidol lactate (HALDOL) injection 2 5 mg     haloperidol lactate (HALDOL) injection 2 5 mg     bisacodyl (DULCOLAX) rectal suppository 10 mg     azithromycin (ZITHROMAX) 500 mg in sodium chloride 0 9 % 250 mL IVPB     furosemide (LASIX) tablet 20 mg     haloperidol lactate (HALDOL) injection 1 mg     OLANZapine (ZyPREXA ZYDIS) dispersible tablet 10 mg     bisacodyl (DULCOLAX) rectal suppository 10 mg        Toñito Nick DO

## 2019-11-15 NOTE — NURSING NOTE
Patient received from ICU to room 104-1  Patient accompanied by family  Oriented to room/ call bell system  Placed on 4L n/c  Offers no complaints at this time  Call bell and belongings within reach, will continue to monitor

## 2019-11-15 NOTE — OCCUPATIONAL THERAPY NOTE
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    Streptococcal pneumonia ([facility] [address] )    Toxic metabolic encephalopathy    Acute kidney injury ([facility] [address] )          11/15/19 0901   Restrictions/Precautions   Weight Bearing Precautions Per Order Yes   RLE Weight Bearing Per Order WBAT   LLE Weight Bearing Per Order WBAT   Braces or Orthoses TLSO  (donned prior to OOB mobility)   Other Precautions Cognitive; Chair Alarm;Multiple lines;Telemetry;O2;Fall Risk   Pain Assessment   Pain Assessment No/denies pain   Pain Score No Pain   ADL   Where Assessed Chair   Eating Assistance 5  Supervision/Setup   Eating Deficit Setup; Increased time to complete   LB Dressing Assistance 1  Total Assistance  (for socks only)   LB Dressing Deficit Setup; Increased time to complete; Don/doff R sock; Don/doff L sock   Toileting Assistance  2  Maximal Assistance   Toileting Deficit Setup;Steadying;Verbal cueing;Supervison/safety; Increased time to complete; Bedside commode;Perineal hygiene   Functional Standing Tolerance   Time ~2 minutes    Activity While standing for perineal hygiene with RW   Bed Mobility   Supine to Sit 4  Minimal assistance   Additional items Assist x 2;HOB elevated; Bedrails; Increased time required;Verbal cues;LE management   Additional Comments Pt OOB and seated in recliner at end of session with call bell within reach    Transfers   Sit to Stand 4  Minimal assistance   Additional items Assist x 2; Increased time required;Verbal cues   Stand to Sit 4  Minimal assistance   Additional items Assist x 2; Increased time required;Verbal cues   Stand pivot 4  Minimal assistance   Additional items Assist x 2; Increased time required;Verbal cues   Toilet transfer 4  Minimal assistance   Additional items Assist x 2; Increased time required;Verbal cues; Commode   Functional Mobility   Functional Mobility 4  Minimal assistance   Additional Comments Assist x 2 for stand-pivot only   Additional items Rolling walker   Toilet Transfers   Toilet Transfer From Bed   Toilet Transfer Type To and from   Toilet Transfer to Standard bedside commode   Toilet Transfer Technique Stand pivot   Toilet Transfers Minimal assistance  (Assist x 2 )   Cognition   Overall Cognitive Status Impaired   Arousal/Participation Alert; Responsive; Cooperative   Attention Attends with cues to redirect   Orientation Level Oriented X4   Memory Decreased recall of recent events   Following Commands Follows one step commands without difficulty   Comments Pt agreeable to OT session    Activity Tolerance   Activity Tolerance Patient limited by fatigue   Medical Staff Made Aware ELENA Reyes verbalized pt appropriate for therapy and made aware of outcomes    Assessment   Assessment Patient participated in Skilled OT session this date with interventions consisting of ADL re training with the use of correct body mechnaics,  therapeutic activities to: increase activity tolerance, increase dynamic sit/ stand balance during functional activity  and increase OOB/ sitting tolerance   Patient agreeable to OT treatment session, upon arrival patient was found supine in bed and in no apparent distress  In comparison to previous session, patient with improvements in functional mobility, strength, and orientation  Patient requiring verbal cues for safety, verbal cues for correct technique, one step directives and frequent rest periods   Patient continues to be functioning below baseline level, occupational performance remains limited secondary to factors listed above and increased risk for falls and injury  From OT standpoint, recommendation at time of d/c would be Short Term Rehab  Patient to benefit from continued Occupational Therapy treatment while in the hospital to address deficits as defined above and maximize level of functional independence with ADLs and functional mobility  Plan   Treatment Interventions ADL retraining;Functional transfer training; Endurance training; Compensatory technique education;Cognitive reorientation   Goal Expiration Date 11/21/19   OT Treatment Day 2   OT Frequency 3-5x/wk   Recommendation   OT Discharge Recommendation Short Term Rehab   OT - OK to Discharge Yes  (Once medically cleared)        Charity Dec, OT

## 2019-11-15 NOTE — PLAN OF CARE
Problem: OCCUPATIONAL THERAPY ADULT  Goal: Performs self-care activities at highest level of function for planned discharge setting  See evaluation for individualized goals  Description  Treatment Interventions: ADL retraining, Functional transfer training, UE strengthening/ROM, Endurance training, Cognitive reorientation, Patient/family training, Equipment evaluation/education, Compensatory technique education, Energy conservation, Activityengagement          See flowsheet documentation for full assessment, interventions and recommendations  Outcome: Progressing  Note:   Limitation: Decreased ADL status, Decreased UE strength, Decreased Safe judgement during ADL, Decreased cognition, Decreased endurance, Decreased self-care trans, Decreased high-level ADLs  Prognosis: Fair  Assessment: Patient participated in Skilled OT session this date with interventions consisting of ADL re training with the use of correct body mechnaics,  therapeutic activities to: increase activity tolerance, increase dynamic sit/ stand balance during functional activity  and increase OOB/ sitting tolerance   Patient agreeable to OT treatment session, upon arrival patient was found supine in bed and in no apparent distress  In comparison to previous session, patient with improvements in functional mobility, strength, and orientation  Patient requiring verbal cues for safety, verbal cues for correct technique, one step directives and frequent rest periods  Patient continues to be functioning below baseline level, occupational performance remains limited secondary to factors listed above and increased risk for falls and injury  From OT standpoint, recommendation at time of d/c would be Short Term Rehab  Patient to benefit from continued Occupational Therapy treatment while in the hospital to address deficits as defined above and maximize level of functional independence with ADLs and functional mobility        OT Discharge Recommendation: Short Term Rehab  OT - OK to Discharge: Yes(Once medically cleared)     Tamiko Reina, OT

## 2019-11-16 NOTE — ASSESSMENT & PLAN NOTE
· ER provider contacted neurosurgery Dr Nasreen Rascon  · Recommended pain control, PT OT, brace (TLSO)  · Orthopedics input appreciated- recommends the same   · CM is working on STR placement once patient is medically stable  · Will continue with Lidoderm patch and Tylenol  · A void narcotics due to patient's fluctuating mental status and avoid NSAIDs due to acute kidney injury

## 2019-11-16 NOTE — PROGRESS NOTES
Progress Note - Norrine Alert 2/25/1925, 80 y o  female MRN: [id number]    Unit/Bed#: -01 Encounter: [id number]    Primary Care Provider: Reyes Ganser, DO   Date and time admitted to hospital: 11/9/2019  7:42 PM        * Acute respiratory failure with hypoxia and hypercapnia ([facility] )  Assessment & Plan  · Appears to be secondary to pneumonia as CT scan of the chest shows multifocal findings  · Will continue IV antibiotics day 6/7  · Currently off BiPAP and saturating well on nasal cannula  · Procalcitonin improving  · Strep pneumonia positive      Toxic metabolic encephalopathy  Assessment & Plan  · Multifactorial due to pneumonia and hypercapnia  · Significantly improved   · Continue with current treatment  · Continue to monitor mental status closely       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  Assessment & Plan  · ER provider contacted neurosurgery Dr Tucker Dillard  · Recommended pain control, PT OT, brace (TLSO)  · Orthopedics input appreciated- recommends the same   · CM is working on STR placement once patient is medically stable  · Will continue with Lidoderm patch and Tylenol  · A void narcotics due to patient's fluctuating mental status and avoid NSAIDs due to acute kidney injury      Streptococcal pneumonia (HCC)  Assessment & Plan  · Urine antigen positive for strep pneumonia  · Continue IV ceftriaxone day 6/7 today  · Continue oxygen/BiPAP as needed  · Procalcitonin improvement  · Supportive care      Acute kidney injury [facility])  Assessment & Plan  · Improved  · Likely secondary to dehydration  · Continue IV fluids as needed  · Nephrology following      Anxiety  Assessment & Plan  · Patient is on Ativan 0 5 mg 3 times daily as needed at home  · Currently on Ativan 0 25 mg b i d  · Will continue melatonin q h s    · Zyprexa dose decreased to avoid over-sedation      COPD (chronic obstructive pulmonary disease) ([facility] )  Assessment & Plan  · Will continue prednisone taper  · Continue oxygen and titrate as tolerated  · Currently off BiPAP      Essential hypertension  Assessment & Plan  · Continue metoprolol and Lasix  · Monitor BP closely and will adjust as needed       Hyperlipidemia, mixed  Assessment & Plan  · Continue Lipitor        Hiatal hernia with GERD without esophagitis  Assessment & Plan  · History of large hiatal hernia  · Continue Pepcid        Coronary artery disease involving native coronary artery of native heart without angina pectoris  Assessment & Plan  · Continue aspirin, metoprolol        Constipation  Assessment & Plan  · Continue with current bowel regimen          VTE Pharmacologic Prophylaxis: Pharmacologic: Enoxaparin (Lovenox)    Patient Centered Rounds: I have performed bedside rounds with nursing staff today  Discussions with Specialists or Other Care Team Provider: nephrology, PT/OT, respiratory therapist, pharmacy, case management, pharmacy  Education and Discussions with Family / Patient:  Patient    Current Length of Stay: 6 day(s)    Current Patient Status: Inpatient   Certification Statement: The patient will continue to require additional inpatient hospital stay due to acute respiratory failure    Discharge Plan: pending hospital course     Code Status: Level 3 - DNAR and DNI    Subjective:   Denies any pain  Feeling okay  Staff reports no acute event overnight  Objective:     Vitals:   Temp (24hrs), Av 2 °F (36 8 °C), Min:98 °F (36 7 °C), Max:98 5 °F (36 9 °C)    Temp:  [98 °F (36 7 °C)-98 5 °F (36 9 °C)] 98 2 °F (36 8 °C)  HR:  [73-81] 81  Resp:  [18-23] 23  BP: (143-190)/(70-85) 190/85  SpO2:  [92 %-98 %] 92 %  Body mass index is 31 85 kg/m²  Input and Output Summary (last 24 hours): Intake/Output Summary (Last 24 hours) at 2019 1225  Last data filed at 2019 1200  Gross per 24 hour   Intake 720 ml   Output --   Net 720 ml       Physical Exam:     Physical Exam   Constitutional: She appears well-developed  No distress     Frail      HENT: Head: Normocephalic and atraumatic  Mouth/Throat: Oropharynx is clear and moist    Eyes: Pupils are equal, round, and reactive to light  Conjunctivae and EOM are normal    Neck: Normal range of motion  Neck supple  No thyromegaly present  Cardiovascular: Normal rate and regular rhythm  Exam reveals no gallop and no friction rub  Murmur heard  Pulmonary/Chest: Effort normal  She has no wheezes  She has no rales  Coarse     Abdominal: Soft  Bowel sounds are normal  She exhibits no distension  There is no tenderness  There is no rebound  Musculoskeletal: Normal range of motion  She exhibits no edema, tenderness or deformity  Neurological: She is alert  No cranial nerve deficit  With period of confusion and forgetfulness     Skin: Skin is warm and dry  No rash noted  No erythema  Psychiatric: She has a normal mood and affect  Her behavior is normal  Thought content normal    Vitals reviewed  Additional Data:     Labs:    Results from last 7 days   Lab Units 11/16/19  0610   WBC Thousand/uL 7 50   HEMOGLOBIN g/dL 13 6   HEMATOCRIT % 40 9*   PLATELETS Thousands/uL 325   NEUTROS PCT % 66   LYMPHS PCT % 17*   MONOS PCT % 14*   EOS PCT % 2     Results from last 7 days   Lab Units 11/16/19  0610  11/10/19  0507   POTASSIUM mmol/L 4 1   < > 4 3   CHLORIDE mmol/L 96*   < > 94*   CO2 mmol/L 43*   < > 34*   BUN mg/dL 18   < > 18   CREATININE mg/dL 0 74   < > 0 86   CALCIUM mg/dL 8 9   < > 9 9   ALK PHOS U/L  --   --  49*   ALT U/L  --   --  18   AST U/L  --   --  24    < > = values in this interval not displayed  Results from last 7 days   Lab Units 11/11/19  1502   POC GLUCOSE mg/dl 228*           * I Have Reviewed All Lab Data Listed Above  * Additional Pertinent Lab Tests Reviewed:  [facility]  Reviewed    Imaging:  Imaging Reports Reviewed Today Include: cxr    Recent Cultures (last 7 days):     Results from last 7 days   Lab Units 11/11/19  1750 11/11/19  9554 URINE CULTURE  No Growth <1000 cfu/mL  --    LEGIONELLA URINARY ANTIGEN   --  Negative       Last 24 Hours Medication List:     Current Facility-Administered Medications:  acetaminophen 650 mg Oral Q6H PRN Francisco Laurent, ROGELIO    amLODIPine 2 5 mg Oral Daily Rubens Palomino, DO    aspirin 81 mg Oral Daily Francisco Laurent, ROGELIO    atorvastatin 40 mg Oral Daily Francisco Laurent, ROGELIO    calcium carbonate-vitamin D 2 tablet Oral BID With Meals Francisco Laurent, ROGELIO    cefTRIAXone 1,000 mg Intravenous Q24H Francisco Laurent, ROGELIO Last Rate: 1,000 mg (11/15/19 1625)   docusate sodium 100 mg Oral BID Francisco Laurent, ROGELIO    enoxaparin 30 mg Subcutaneous Daily Francisco Laurent, ROGELIO    famotidine 20 mg Oral BID Francisco Laurent, ROGELIO    ferrous sulfate 162 5 mg Oral Daily Francisco Laurent, ROGELIO    furosemide 20 mg Oral Daily [facility], ROGELIO    guaiFENesin 600 mg Oral Q12H [facility] Francisco Laurent, ROGELIO    ipratropium 0 5 mg Nebulization 4x Daily Francisco Laurent, ROGELIO    Labetalol HCl 5 mg Intravenous Q6H PRN Francisco Laurent, ROGELIO    levalbuterol 0 63 mg Nebulization 4x Daily Francisco Laurent, ROGELIO    lidocaine 1 patch Topical Daily Francisco Laurent, ROGELIO    LORazepam 0 25 mg Oral BID Francisco Laurent, ROGELIO    melatonin 6 mg Oral HS Francisco Laurent, ROGELIO    metoprolol tartrate 12 5 mg Oral Q12H [facility] Francisco Laurent, ROGELIO    OLANZapine 5 mg Oral HS Francisco Laurent, ROGELIO    ondansetron 4 mg Intravenous Q6H PRN Francisco Laurent, ROGELIO    polyethylene glycol 17 g Oral Daily ROGELIO Leonard    [START ON 11/17/2019] predniSONE 20 mg Oral Daily ROGELIO Leonard    Followed by        Emma Muñoz ON 11/19/2019] predniSONE 10 mg Oral Daily ROGELIO Leonard    senna 1 tablet Oral HS ROGELIO Leonard         Today, Patient Was Seen By: ROGELIO Leonard    ** Please Note: Dictation voice to text software may have been used in the creation of this document   **

## 2019-11-16 NOTE — ASSESSMENT & PLAN NOTE
· Urine antigen positive for strep pneumonia  · Continue IV ceftriaxone day 6/7 today  · Continue oxygen/BiPAP as needed  · Procalcitonin improvement  · Supportive care

## 2019-11-16 NOTE — ASSESSMENT & PLAN NOTE
· Multifactorial due to pneumonia and hypercapnia  · Significantly improved   · Continue with current treatment  · Continue to monitor mental status closely

## 2019-11-16 NOTE — ASSESSMENT & PLAN NOTE
· Appears to be secondary to pneumonia as CT scan of the chest shows multifocal findings  · Will continue IV antibiotics day 6/7  · Currently off BiPAP and saturating well on nasal cannula  · Procalcitonin improving  · Strep pneumonia positive

## 2019-11-16 NOTE — PROGRESS NOTES
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                  will add a low-dose of amlodipine, monitor patient's blood pressure for goal of no lower than 069 systolic  4  Streptococcal pneumonia with acute respiratory failure on nasal cannula              Patient continues to improve  5  Hypercapnic respiratory failure                  resolved  6  Compression fracture L1  7   Anxiety with long-term use of benzodiazepines                 patient appears to be controlled on lorazepam 0 25 mg twice a day      Follow up reason for today's visit:  Acute kidney injury    Acute respiratory failure with hypoxia and hypercapnia (HCC)    Patient Active Problem List   Diagnosis    Allergic rhinitis    Benign colon polyp    Cataract    Constipation    Coronary artery disease involving native coronary artery of native heart without angina pectoris    Fibrocystic breast disease, unspecified laterality    Gait abnormality    Hiatal hernia with GERD without esophagitis    Glucose intolerance (no malabsorption)    Hyperlipidemia, mixed    Essential hypertension    Iron deficiency anemia    Malignant melanoma of skin (HCC)    Urinary incontinence    Vitamin D deficiency    COPD [facility]chronic obstructive pulmonary disease) ([facility] )    Anxiety    Compression fracture of thoracic vertebra, closed, initial encounter ([facility] )    Proteinuria    Coronary angioplasty status    Old MI (myocardial infarction)    Chronic diastolic heart failure (HCC)    Chronic left shoulder pain    Chronic right shoulder pain    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    Acute respiratory failure with hypoxia and hypercapnia ([facility] )    Streptococcal pneumonia ([facility] )    Toxic metabolic encephalopathy    Acute kidney injury ([facility] )         Subjective:   No acute events    Objective:     Vitals: Blood pressure 162/82, pulse 80, temperature 98 2 °F (36 8 °C), temperature source Temporal, resp  rate (!) 23, height 5' 2" (1 575 m), weight 79 kg (174 lb 2 6 oz), SpO2 98 %, not currently breastfeeding  ,Body mass index is 31 85 kg/m²  Weight (last 2 days)     Date/Time   Weight    11/16/19 0600   79 (174 16)    11/15/19 0547   78 1 (172 18)    11/14/19 0600   79 1 (174 38)                Intake/Output Summary (Last 24 hours) at 11/16/2019 1002  Last data filed at 11/15/2019 1656  Gross per 24 hour   Intake 240 ml   Output --   Net 240 ml     I/O last 3 completed shifts:   In: 5 [P O :720]  Out: 300 [Urine:300]         Physical Exam: /82 (BP Location: Left arm)   Pulse 80   Temp 98 2 °F (36 8 °C) (Temporal)   Resp (!) 23   Ht 5' 2" (1 575 m)   Wt 79 kg (174 lb 2 6 oz)   SpO2 98%   BMI 31 85 kg/m²     General Appearance:    Alert, cooperative, no distress, appears stated age   Head:    Normocephalic, without obvious abnormality, atraumatic   Eyes:    Conjunctiva/corneas clear   Ears:    Normal external ears   Nose:   Nares normal, septum midline, mucosa normal, no drainage    or sinus tenderness   Throat:   Lips, mucosa, and tongue normal; teeth and gums normal   Neck:   Supple   Back:     Symmetric, no curvature, ROM normal, no CVA tenderness   Lungs:     Clear to auscultation bilaterally, respirations unlabored   Chest wall:    No tenderness or deformity   Heart:    Regular rate and rhythm, S1 and S2 normal, no murmur, rub   or gallop   Abdomen:     Soft, non-tender, bowel sounds active   Extremities:   Extremities normal, atraumatic, no cyanosis or edema   Skin:   Skin color, texture, turgor normal, no rashes or lesions   Lymph nodes:   Cervical normal   Neurologic:   CNII-XII intact            Lab, Imaging and other studies: I have personally reviewed pertinent labs  CBC:   Lab Results   Component Value Date    WBC 7 50 11/16/2019    HGB 13 6 11/16/2019    HCT 40 9 (L) 11/16/2019    MCV 90 11/16/2019     11/16/2019    MCH 29 9 11/16/2019    MCHC 33 2 11/16/2019    RDW 13 6 11/16/2019    MPV 7 9 (L) 11/16/2019     CMP:   Lab Results   Component Value Date    K 4 1 11/16/2019    CL 96 (L) 11/16/2019    CO2 43 (H) 11/16/2019    BUN 18 11/16/2019    CREATININE 0 74 11/16/2019    CALCIUM 8 9 11/16/2019    EGFR 70 11/16/2019         Results from last 7 days   Lab Units 11/16/19  0610 11/15/19  0513 11/14/19  0619  11/10/19  0507 11/09/19  2151   POTASSIUM mmol/L 4 1 4 1 4 8   < > 4 3 4 6   CHLORIDE mmol/L 96* 96* 100   < > 94* 96*   CO2 mmol/L 43* 40* 36*   < > 34* 33*   BUN mg/dL 18 22 24   < > 18 21   CREATININE mg/dL 0 74 0 75 0 71   < > 0 86 0 85   CALCIUM mg/dL 8 9 8 9 9 4   < > 9 9 9 9   ALK PHOS U/L  --   --   --   --  49* 47*   ALT U/L  --   --   --   --  18 15   AST U/L  --   --   --   --  24 25    < > = values in this interval not displayed  Phosphorus: No results found for: PHOS  Magnesium: No results found for: MG  Urinalysis: No results found for: Opal Overcast, SPECGRAV, PHUR, LEUKOCYTESUR, NITRITE, PROTEINUA, GLUCOSEU, KETONESU, BILIRUBINUR, BLOODU  Ionized Calcium: No results found for: CAION  Coagulation: No results found for: PT, INR, APTT  Troponin: No results found for: TROPONINI  ABG: No results found for: PHART, VRO2XQW, PO2ART, XGR4NMD, U2JRZBPF, BEART, SOURCE  Radiology review:     IMAGING  Procedure: Xr Chest Portable    Result Date: 11/14/2019  Narrative: CHEST INDICATION:   hypoxia  COMPARISON:  Chest CT from 11/11/2019  Chest radiograph from 11/12/2019 and 11/9/2019  EXAM PERFORMED/VIEWS:  XR CHEST PORTABLE FINDINGS: Moderate cardiomegaly  Large hiatal hernia  Moderate pulmonary edema  Small effusions  Osseous structures appear within normal limits for patient age  Impression: Moderate pulmonary edema  Small effusions  Large hiatal hernia    Workstation performed: [id number]       Current Facility-Administered Medications   Medication Dose Route Frequency    acetaminophen (TYLENOL) tablet 650 mg  650 mg Oral Q6H PRN    aspirin chewable tablet 81 mg  81 mg Oral Daily    atorvastatin (LIPITOR) tablet 40 mg  40 mg Oral Daily    calcium carbonate-vitamin D (OSCAL-D) 500 mg-200 units per tablet 2 tablet  2 tablet Oral BID With Meals    cefTRIAXone (ROCEPHIN) IVPB (premix) 1,000 mg  1,000 mg Intravenous Q24H    docusate sodium (COLACE) capsule 100 mg  100 mg Oral BID    enoxaparin (LOVENOX) subcutaneous injection 30 mg  30 mg Subcutaneous Daily    famotidine (PEPCID) tablet 20 mg  20 mg Oral BID    ferrous sulfate tablet 162 5 mg  162 5 mg Oral Daily    furosemide (LASIX) tablet 20 mg  20 mg Oral Daily    guaiFENesin (MUCINEX) 12 hr tablet 600 mg  600 mg Oral Q12H LIZZY    ipratropium (ATROVENT) 0 02 % inhalation solution 0 5 mg  0 5 mg Nebulization 4x Daily    Labetalol HCl (NORMODYNE) injection 5 mg  5 mg Intravenous Q6H PRN    levalbuterol (XOPENEX) inhalation solution 0 63 mg  0 63 mg Nebulization 4x Daily    lidocaine (LIDODERM) 5 % patch 1 patch  1 patch Topical Daily    LORazepam (ATIVAN) tablet 0 25 mg  0 25 mg Oral BID    melatonin tablet 6 mg  6 mg Oral HS    metoprolol tartrate (LOPRESSOR) partial tablet 12 5 mg  12 5 mg Oral Q12H LIZZY    OLANZapine (ZyPREXA ZYDIS) dispersible tablet 5 mg  5 mg Oral HS    ondansetron (ZOFRAN) injection 4 mg  4 mg Intravenous Q6H PRN    polyethylene glycol (MIRALAX) packet 17 g  17 g Oral Daily    [START ON 11/17/2019] predniSONE tablet 20 mg  20 mg Oral Daily    Followed by   Juan Morgan ON 11/19/2019] predniSONE tablet 10 mg  10 mg Oral Daily    senna (SENOKOT) tablet 8 6 mg  1 tablet Oral HS     Medications Discontinued During This Encounter   Medication Reason    ipratropium-albuterol (DUO-NEB) 0 5-2 5 mg/3 mL inhalation solution 3 mL     nitroglycerin (NITROSTAT) SL tablet 0 4 mg     polyethylene glycol (GLYCOLAX) bowel prep 17 g     morphine injection 2 mg     sodium chloride 0 9 % infusion     LORazepam (ATIVAN) tablet 0 5 mg     oxyCODONE (ROXICODONE) IR tablet 5 mg     morphine injection 1 mg     methocarbamol (ROBAXIN) tablet 500 mg     enoxaparin (LOVENOX) subcutaneous injection 40 mg     potassium chloride (K-DUR,KLOR-CON) CR tablet 10 mEq     acetaminophen (TYLENOL) tablet 650 mg     melatonin tablet 3 mg     haloperidol lactate (HALDOL) injection 2 5 mg     haloperidol lactate (HALDOL) injection 2 5 mg     bisacodyl (DULCOLAX) rectal suppository 10 mg     azithromycin (ZITHROMAX) 500 mg in sodium chloride 0 9 % 250 mL IVPB     furosemide (LASIX) tablet 20 mg     haloperidol lactate (HALDOL) injection 1 mg     OLANZapine (ZyPREXA ZYDIS) dispersible tablet 10 mg     bisacodyl (DULCOLAX) rectal suppository 10 mg     furosemide (LASIX) tablet 20 mg     acetaminophen (TYLENOL) tablet 975 mg     cefTRIAXone (ROCEPHIN) IVPB (premix) 1,000 mg        Pinkey Bump, DO      This progress note was produced in part using a dictation device which may document imprecise wording from author's original intent

## 2019-11-17 NOTE — ASSESSMENT & PLAN NOTE
· Resolved   · Likely secondary to volume depletion, acute infection   · Continue IV fluids as needed  · Nephrology following

## 2019-11-17 NOTE — ASSESSMENT & PLAN NOTE
· ER provider contacted neurosurgery Dr Macrina Agustin  · Recommended pain control, PT OT, brace (TLSO)  · Orthopedics input appreciated- recommends the same   · CM is working on STR placement once patient is medically stable  · Will continue with Lidoderm patch and Tylenol  · Patient is in a lot of pain during ambulation and is requesting something else for pain rather than just tylenol   · Avoid narcotics due to patient's fluctuating mental status and avoid NSAIDs due to acute kidney injury

## 2019-11-17 NOTE — ASSESSMENT & PLAN NOTE
· Continue metoprolol and Lasix  · Low dose amlodipine added on 11/16  · Monitor BP closely and will adjust as needed

## 2019-11-17 NOTE — PROGRESS NOTES
Progress Note - Suzi Mckenzie 2/25/1925, 80 y o  female MRN: [id number]    Unit/Bed#: -01 Encounter: [id number]    Primary Care Provider: Taye Orlando DO   Date and time admitted to hospital: 11/9/2019  7:42 PM        * Acute respiratory failure with hypoxia and hypercapnia (HCC)  Assessment & Plan  · Appears to be secondary to pneumonia as CT scan of the chest shows multifocal findings  · Today is last day of IV ceftriaxone (7 days therapy)  · Currently off BiPAP and saturating well on nasal cannula  · Procalcitonin improving  · Strep pneumonia positive      Toxic metabolic encephalopathy  Assessment & Plan  · Multifactorial due to pneumonia and hypercapnia  · Significantly improved and currently at baseline   · Continue with current treatment  · Continue to monitor mental status closely       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  Assessment & Plan  · ER provider contacted neurosurgery Dr Diallo Foster  · Recommended pain control, PT OT, brace (TLSO)  · Orthopedics input appreciated- recommends the same   · CM is working on STR placement once patient is medically stable  · Will continue with Lidoderm patch and Tylenol  · Patient is in a lot of pain during ambulation and is requesting something else for pain rather than just tylenol   · Avoid narcotics due to patient's fluctuating mental status and avoid NSAIDs due to acute kidney injury      Streptococcal pneumonia (HCC)  Assessment & Plan  · Urine antigen positive for strep pneumonia  · Finish a 7-day course of IV ceftriaxone today   · Continue oxygen/BiPAP as needed  · Procalcitonin improvement  · Supportive care      Acute kidney injury [facility])  Assessment & Plan  · Resolved   · Likely secondary to volume depletion, acute infection   · Continue IV fluids as needed  · Nephrology following      Anxiety  Assessment & Plan  · Patient is on Ativan 0 5 mg 3 times daily as needed at home  · Currently on Ativan 0 25 mg b i d    · Will continue melatonin q h s  · Zyprexa dose decreased to avoid over-sedation        COPD (chronic obstructive pulmonary disease) (HCC)  Assessment & Plan  · Will continue prednisone taper  · Continue oxygen and titrate as tolerated  · Currently off BiPAP        Essential hypertension  Assessment & Plan  · Continue metoprolol and Lasix  · Low dose amlodipine added on   · Monitor BP closely and will adjust as needed       Hyperlipidemia, mixed  Assessment & Plan  · Continue Lipitor          Hiatal hernia with GERD without esophagitis  Assessment & Plan  · History of large hiatal hernia  · Continue Pepcid          Coronary artery disease involving native coronary artery of native heart without angina pectoris  Assessment & Plan  · Continue aspirin, metoprolol          Constipation  Assessment & Plan  · Continue with current bowel regimen            VTE Pharmacologic Prophylaxis: Pharmacologic: Heparin    Patient Centered Rounds: I have performed bedside rounds with nursing staff today  Discussions with Specialists or Other Care Team Provider: renal, nursing, PT/OT, Rx  Education and Discussions with Family / Patient: patient     Current Length of Stay: 7 day(s)    Current Patient Status: Inpatient   Certification Statement: The patient will continue to require additional inpatient hospital stay due to acute respiratory failure hypoxia    Discharge Plan:  Pending hospital course  PT and OT recommend short-term rehab  Patient awaits to be evaluated by ARU  She refuses to go to the [place]  Code Status: Level 3 - DNAR and DNI    Subjective:   Feeling okay but back pain is severe with ambulation/changing position  She requests stronger pain medications       Objective:     Vitals:   Temp (24hrs), Av 7 °F (36 5 °C), Min:97 5 °F (36 4 °C), Max:97 8 °F (36 6 °C)    Temp:  [97 5 °F (36 4 °C)-97 8 °F (36 6 °C)] 97 5 °F (36 4 °C)  HR:  [72-84] 72  Resp:  [18-22] 18  BP: (142-190)/(74-85) 180/83  SpO2:  [92 %-96 %] 96 %  Body mass index is 31 65 kg/m²  Input and Output Summary (last 24 hours): Intake/Output Summary (Last 24 hours) at 11/17/2019 0946  Last data filed at 11/16/2019 1700  Gross per 24 hour   Intake 460 ml   Output --   Net 460 ml       Physical Exam:     Physical Exam   Constitutional: She is oriented to person, place, and time  She appears well-developed  No distress  Frail      HENT:   Head: Normocephalic and atraumatic  Mouth/Throat: Oropharynx is clear and moist    Eyes: Pupils are equal, round, and reactive to light  Conjunctivae and EOM are normal    Neck: Normal range of motion  Neck supple  No thyromegaly present  Cardiovascular: Normal rate, regular rhythm and normal heart sounds  Exam reveals no gallop and no friction rub  No murmur heard  Pulmonary/Chest: Effort normal  She has no wheezes  Rales: right base greater than left  Abdominal: Soft  Bowel sounds are normal  She exhibits no distension  There is no tenderness  There is no rebound  Musculoskeletal: Normal range of motion  She exhibits edema (trace)  She exhibits no tenderness or deformity  Neurological: She is alert and oriented to person, place, and time  No cranial nerve deficit  Skin: Skin is warm and dry  No rash noted  No erythema  Psychiatric: She has a normal mood and affect  Her behavior is normal  Judgment and thought content normal    Vitals reviewed        Additional Data:     Labs:    Results from last 7 days   Lab Units 11/17/19  0537 11/16/19  0610   WBC Thousand/uL 10 10 7 50   HEMOGLOBIN g/dL 14 4 13 6   HEMATOCRIT % 44 8 40 9*   PLATELETS Thousands/uL 337 325   NEUTROS PCT %  --  66   LYMPHS PCT %  --  17*   MONOS PCT %  --  14*   EOS PCT %  --  2     Results from last 7 days   Lab Units 11/17/19  0537   POTASSIUM mmol/L 4 1   CHLORIDE mmol/L 94*   CO2 mmol/L 43*   BUN mg/dL 16   CREATININE mg/dL 0 66   CALCIUM mg/dL 9 3         Results from last 7 days   Lab Units 11/11/19  1502   POC GLUCOSE mg/dl 228* * I Have Reviewed All Lab Data Listed Above  * Additional Pertinent Lab Tests Reviewed:  Amanda 66 Admission  Reviewed    Imaging:  Imaging Reports Reviewed Today Include: n/a     Recent Cultures (last 7 days):     Results from last 7 days   Lab Units 11/11/19  1750 11/11/19  1749   URINE CULTURE  No Growth <1000 cfu/mL  --    LEGIONELLA URINARY ANTIGEN   --  Negative       Last 24 Hours Medication List:     Current Facility-Administered Medications:  acetaminophen 650 mg Oral Q6H PRN Anais Pack, CRNP    amLODIPine 2 5 mg Oral Daily Gaylan Pole, DO    aspirin 81 mg Oral Daily Anais Pack, CRNP    atorvastatin 40 mg Oral Daily Anais Pack, CRNP    calcium carbonate-vitamin D 2 tablet Oral BID With Meals Anais Pack, CRNP    cefTRIAXone 1,000 mg Intravenous Q24H Anais Pack, CRNP Last Rate: 1,000 mg (11/16/19 1729)   docusate sodium 100 mg Oral BID Anais Pack, CRNP    enoxaparin 30 mg Subcutaneous Daily Anais Pack, CRNP    famotidine 20 mg Oral BID Anais Pack, CRNP    ferrous sulfate 162 5 mg Oral Daily Anais Pack, CRNP    furosemide 20 mg Oral Daily Anais Pack, CRNP    guaiFENesin 600 mg Oral Q12H [facility] Anais Pack, CRNP    ipratropium 0 5 mg Nebulization 4x Daily Anais Pack, CRNP    Labetalol HCl 5 mg Intravenous Q6H PRN Anais Pack, CRNP    levalbuterol 0 63 mg Nebulization 4x Daily Anais Pack, CRNP    lidocaine 1 patch Topical Daily Anais Pack, CRNP    LORazepam 0 25 mg Oral BID Anais Pack, CRNP    melatonin 6 mg Oral HS Anais Pack, CRNP    metoprolol tartrate 12 5 mg Oral Q12H [facility] Cayladario Cedeño, CRNP    OLANZapine 5 mg Oral HS Anais Pack, CRNP    ondansetron 4 mg Intravenous Q6H PRN Anais Pack, CRNP    polyethylene glycol 17 g Oral Daily Anais Pack, CRNP    predniSONE 20 mg Oral Daily Anais Pack, CRNP    Followed by        Vadim John ON 11/19/2019] predniSONE 10 mg Oral Daily ROGELIO Cuadra    senna 1 tablet Oral HS ROGELIO Cuadra         Today, Patient Was Seen By: ROGELIO Cuadra    ** Please Note: Dictation voice to text software may have been used in the creation of this document   **

## 2019-11-17 NOTE — ASSESSMENT & PLAN NOTE
· Multifactorial due to pneumonia and hypercapnia  · Significantly improved and currently at baseline   · Continue with current treatment  · Continue to monitor mental status closely

## 2019-11-17 NOTE — ASSESSMENT & PLAN NOTE
· Urine antigen positive for strep pneumonia  · Finish a 7-day course of IV ceftriaxone today   · Continue oxygen/BiPAP as needed  · Procalcitonin improvement  · Supportive care

## 2019-11-17 NOTE — PROGRESS NOTES
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                   I will add another 2 5 mg to the Amlodipine vertebral 5 mg once daily now  Continue monitor patient's blood pressure closely  4  Streptococcal pneumonia with acute respiratory failure on nasal cannula              Patient continues to improve  5  Hypercapnic respiratory failure                  resolved  6  Compression fracture L1   Patient continues to complain of low back pain, Tramadol will be used with care taken for the patient not to have side effects from more potent opioid medications  Patient may be candidate for Forteo in the outpatient setting    7  Anxiety with long-term use of benzodiazepines                 patient appears to be controlled on lorazepam 0 25 mg twice a day      Follow up reason for today's visit:  Acute kidney injury/chronic kidney disease/hypertensive management    Acute respiratory failure with hypoxia and hypercapnia (HCC)    Patient Active Problem List   Diagnosis    Allergic rhinitis    Benign colon polyp    Cataract    Constipation    Coronary artery disease involving native coronary artery of native heart without angina pectoris    Fibrocystic breast disease, unspecified laterality    Gait abnormality    Hiatal hernia with GERD without esophagitis    Glucose intolerance (no malabsorption)    Hyperlipidemia, mixed    Essential hypertension    Iron deficiency anemia    Malignant melanoma of skin (HCC)    Urinary incontinence    Vitamin D deficiency    COPD (chronic obstructive pulmonary disease) (HCC)    Anxiety    [facility]    Proteinuria    Coronary angioplasty status    Old MI (myocardial infarction)    Chronic diastolic heart failure (HCC)    Chronic left shoulder pain    Chronic right shoulder pain    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    Acute respiratory failure with hypoxia and hypercapnia (HCC)    Streptococcal pneumonia (HCC)    Toxic metabolic encephalopathy    [facility] kidney [facility] ([facility] )         Subjective:   No acute events, patient continues to complain of low back pain  Objective:     Vitals: Blood pressure (!) 180/83, pulse 72, temperature 97 5 °F (36 4 °C), temperature source Temporal, resp  rate 18, height 5' 2" (1 575 m), weight 78 5 kg (173 lb 1 oz), SpO2 96 %, not currently breastfeeding  ,Body mass index is 31 65 kg/m²  Weight (last 2 days)     Date/Time   Weight    11/17/19 0600   78 5 (173 06)    11/16/19 0600   79 (174 16)    11/15/19 0547   78 1 (172 18)                Intake/Output Summary (Last 24 hours) at 11/17/2019 1043  Last data filed at 11/16/2019 1700  Gross per 24 hour   Intake 460 ml   Output --   Net 460 ml     I/O last 3 completed shifts:   In: 700 [P O :700]  Out: -          Physical Exam: BP (!) 180/83 (BP Location: Right arm)   Pulse 72   Temp 97 5 °F (36 4 °C) (Temporal)   Resp 18   Ht 5' 2" (1 575 m)   Wt 78 5 kg (173 lb 1 oz)   SpO2 96%   BMI 31 65 kg/m²     General Appearance:    Alert, cooperative, no distress, appears stated age   Head:    Normocephalic, without obvious abnormality, atraumatic   Eyes:    Conjunctiva/corneas clear   Ears:    Normal external ears   Nose:   Nares normal, septum midline, mucosa normal, no drainage    or sinus tenderness   Throat:   Lips, mucosa, and tongue normal; teeth and gums normal   Neck:   Supple   Back:     Symmetric, no curvature, ROM normal, no CVA tenderness   Lungs:     Clear to auscultation bilaterally, respirations unlabored   Chest wall:    No tenderness or deformity   Heart:    Regular rate and rhythm, S1 and S2 normal, no murmur, rub   or gallop   Abdomen:     Soft, non-tender, bowel sounds active   Extremities:   Extremities normal, atraumatic, no cyanosis or edema   Skin:   Skin color, texture, turgor normal, no rashes or lesions   Lymph nodes:   Cervical normal   Neurologic:   CNII-XII intact            Lab, Imaging and other studies: I have personally reviewed pertinent labs  CBC:   Lab Results   Component Value Date    WBC 10 10 11/17/2019    HGB 14 4 11/17/2019    HCT 44 8 11/17/2019    MCV 91 11/17/2019     11/17/2019    MCH 29 2 11/17/2019    MCHC 32 1 11/17/2019    RDW 13 6 11/17/2019    MPV 8 0 (L) 11/17/2019     CMP:   Lab Results   Component Value Date    K 4 1 11/17/2019    CL 94 (L) 11/17/2019    CO2 43 (H) 11/17/2019    BUN 16 11/17/2019    CREATININE 0 66 11/17/2019    CALCIUM 9 3 11/17/2019    EGFR 76 11/17/2019         Results from last 7 days   Lab Units 11/17/19  0537 11/16/19  0610 11/15/19  0513   POTASSIUM mmol/L 4 1 4 1 4 1   CHLORIDE mmol/L 94* 96* 96*   CO2 mmol/L 43* 43* 40*   BUN mg/dL 16 18 22   CREATININE mg/dL 0 66 0 74 0 75   CALCIUM mg/dL 9 3 8 9 8 9         Phosphorus: No results found for: PHOS  Magnesium: No results found for: MG  Urinalysis: No results found for: COLORU, CLARITYU, SPECGRAV, PHUR, LEUKOCYTESUR, NITRITE, PROTEINUA, GLUCOSEU, KETONESU, BILIRUBINUR, BLOODU  Ionized Calcium: No results found for: CAION  Coagulation: No results found for: PT, INR, APTT  Troponin: No results found for: TROPONINI  ABG: No results found for: PHART, VDK8XMW, PO2ART, HHR4BPZ, X8RSDCHD, BEART, SOURCE  Radiology review:     IMAGING  Procedure: Xr Chest Portable    Result Date: 11/14/2019  Narrative: CHEST INDICATION:   hypoxia  COMPARISON:  Chest CT from 11/11/2019  Chest radiograph from 11/12/2019 and 11/9/2019  EXAM PERFORMED/VIEWS:  XR CHEST PORTABLE FINDINGS: Moderate cardiomegaly  Large hiatal hernia  Moderate pulmonary edema  Small effusions   Osseous structures appear within normal limits for patient age  Impression: Moderate pulmonary edema  Small effusions  Large hiatal hernia    Workstation performed: [id number]       Current Facility-Administered Medications   Medication Dose Route Frequency    acetaminophen (TYLENOL) tablet 650 mg  650 mg Oral Q6H PRN    amLODIPine (NORVASC) tablet 2 5 mg  2 5 mg Oral Once    [START ON 11/18/2019] amLODIPine (NORVASC) tablet 5 mg  5 mg Oral Daily    aspirin chewable tablet 81 mg  81 mg Oral Daily    atorvastatin (LIPITOR) tablet 40 mg  40 mg Oral Daily    calcium carbonate-vitamin D (OSCAL-D) 500 mg-200 units per tablet 2 tablet  2 tablet Oral BID With Meals    cefTRIAXone (ROCEPHIN) IVPB (premix) 1,000 mg  1,000 mg Intravenous Q24H    docusate sodium (COLACE) capsule 100 mg  100 mg Oral BID    enoxaparin (LOVENOX) subcutaneous injection 30 mg  30 mg Subcutaneous Daily    famotidine (PEPCID) tablet 20 mg  20 mg Oral BID    ferrous sulfate tablet 162 5 mg  162 5 mg Oral Daily    furosemide (LASIX) tablet 20 mg  20 mg Oral Daily    guaiFENesin (MUCINEX) 12 hr tablet 600 mg  600 mg Oral Q12H LIZZY    ipratropium (ATROVENT) 0 02 % inhalation solution 0 5 mg  0 5 mg Nebulization 4x Daily    Labetalol HCl (NORMODYNE) injection 5 mg  5 mg Intravenous Q6H PRN    levalbuterol (XOPENEX) inhalation solution 0 63 mg  0 63 mg Nebulization 4x Daily    lidocaine (LIDODERM) 5 % patch 1 patch  1 patch Topical Daily    LORazepam (ATIVAN) tablet 0 25 mg  0 25 mg Oral BID    melatonin tablet 6 mg  6 mg Oral HS    metoprolol tartrate (LOPRESSOR) partial tablet 12 5 mg  12 5 mg Oral Q12H LIZZY    OLANZapine (ZyPREXA ZYDIS) dispersible tablet 5 mg  5 mg Oral HS    ondansetron (ZOFRAN) injection 4 mg  4 mg Intravenous Q6H PRN    polyethylene glycol (MIRALAX) packet 17 g  17 g Oral Daily    predniSONE tablet 20 mg  20 mg Oral Daily    Followed by   Marge Cardenas ON 11/19/2019] predniSONE tablet 10 mg  10 mg Oral Daily    senna (SENOKOT) tablet 8 6 mg  1 tablet Oral HS    traMADol (ULTRAM) tablet 50 mg  50 mg Oral Q6H PRN     Medications Discontinued During This Encounter   Medication Reason    ipratropium-albuterol (DUO-NEB) 0 5-2 5 mg/3 mL inhalation solution 3 mL     nitroglycerin (NITROSTAT) SL tablet 0 4 mg     polyethylene glycol (GLYCOLAX) bowel prep 17 g     morphine injection 2 mg     sodium chloride 0 9 % infusion     LORazepam (ATIVAN) tablet 0 5 mg     oxyCODONE (ROXICODONE) IR tablet 5 mg     morphine injection 1 mg     methocarbamol (ROBAXIN) tablet 500 mg     enoxaparin (LOVENOX) subcutaneous injection 40 mg     potassium chloride (K-DUR,KLOR-CON) CR tablet 10 mEq     acetaminophen (TYLENOL) tablet 650 mg     melatonin tablet 3 mg     haloperidol lactate (HALDOL) injection 2 5 mg     haloperidol lactate (HALDOL) injection 2 5 mg     bisacodyl (DULCOLAX) rectal suppository 10 mg     azithromycin (ZITHROMAX) 500 mg in sodium chloride 0 9 % 250 mL IVPB     furosemide (LASIX) tablet 20 mg     haloperidol lactate (HALDOL) injection 1 mg     OLANZapine (ZyPREXA ZYDIS) dispersible tablet 10 mg     bisacodyl (DULCOLAX) rectal suppository 10 mg     furosemide (LASIX) tablet 20 mg     acetaminophen (TYLENOL) tablet 975 mg     cefTRIAXone (ROCEPHIN) IVPB (premix) 1,000 mg     amLODIPine (NORVASC) tablet 2 5 mg        Tamsen Saint, DO      This progress note was produced in part using a dictation device which may document imprecise wording from author's original intent

## 2019-11-17 NOTE — ASSESSMENT & PLAN NOTE
· Appears to be secondary to pneumonia as CT scan of the chest shows multifocal findings  · Today is last day of IV ceftriaxone (7 days therapy)  · Currently off BiPAP and saturating well on nasal cannula  · Procalcitonin improving  · Strep pneumonia positive

## 2019-11-18 NOTE — SPEECH THERAPY NOTE
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    Streptococcal pneumonia ([facility] )    Toxic metabolic encephalopathy    Acute kidney injury [facility])       Past Medical History  Past Medical History:   Diagnosis Date    Abnormal blood sugar 03/13/2017    Abnormal carotid duplex scan     Carotid Duplex (Plaque formation is quite prominent bilaterally  Despite these changes, no evidence for greater than 50% diameter reducing lesions in the cervical portion of either carotid artery hemisystem ) - 6/13/2017    Anxiety     Cervical radiculopathy 08/08/2017    CHF (congestive heart failure) ([facility])     diastolic    COPD (chronic obstructive pulmonary disease) ([facility] ) 2/6/2018    Coronary angioplasty status     Coronary artery disease 4/7/2017    Costochondritis 02/05/2013    suspect MS in origin given relationship to ROM and location  Start mobic and if persists, reeval  Refused xrayat this time   Dyslipidemia     GERD without esophagitis 8/30/2012    H/O CT scan of chest      (No PE, minimal interstitial change left lower lobe and right upper lobe, which may be acute ) - 5/19/2017    History of Holter monitoring     Holter (Sinus rhythm with rates ranging from 52 to 118 bpm   No afib or heart block  Rare atrial and ventricular ectopic beats  One six-beat atrial run noted  No pauses  ) - 5/31/2017    Hx of echocardiogram     Echo (EF 0 60 (60%), Biatrial enlargement ) - 3/24/2017 Echo (EF 0 55 (55%), mild LVH  Aortic valvular sclerosis  Trace MI with mild left atrial dilatation, mild MAC  Mild TI with mild pulmonary hypertension  ) - 5/22/2017 Echo (EF 0 60 (60%), Normal left vent chamber size and systolic function  Mild diastolic dysfunction of LV w/normal filling pressures  Mild mitral regurg ) - 7/26/2017 Echo (EF 0    Hx of echocardiogram 08/16/2017    EF0 60 (60%) Normal left vent chamber size and systolic function of LV w/normal filling presures  Mild mitral regurg  / EF0 50 (50%) Mild LVH  MIld MR 10/6/17     Hypertension     Iron deficiency anemia 4/21/2016    Macular degeneration, right eye     Malignant melanoma of skin ([facility] ) 9/17/2012    Mixed hyperlipidemia     NSTEMI (non-ST elevated myocardial infarction) ([facility] ) 7/25/2017    Old MI (myocardial infarction)     Osteoarthritis 9/17/2012    Osteoporosis 3/13/2017    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[facility] ) 04/07/2017    Urinary tract infection     frequent UTI's        Past Surgical History  Past Surgical History:   Procedure Laterality Date    ABDOMINAL SURGERY      APPENDECTOMY      BREAST SURGERY      Lumpectomy    CARDIAC CATHETERIZATION  03/24/2017    ARNIE to 100% occluded prox RCA, chronic 99% ostial LCfx, 60% mid LAD, discrete 40% distal LM / EF0 60 (60%) 100% occluded proximal RCA s/p ARNIE, chronic 99% ostial LCX, 60% mid LAD, discrete 40% dital LM  4/5/17    CHOLECYSTECTOMY      COLONOSCOPY N/A 7/25/2018    Procedure: COLONOSCOPY;  Surgeon: Francisco Elias MD;  Location: [address] [place] [address] OR;  Service: Gastroenterology    CORONARY STENT PLACEMENT  03/25/2017    ARNIE RCA    HEMORRHOID SURGERY      INSERTION STENT ARTERY  04/07/2017    Cardio vascular agents drug-eluting stent    SKIN BIOPSY      TONSILLECTOMY           Subjective:  Awake, alert, OOB in chair, just finished lunch "I eat fast "  Objective:  Seen for swallow therapy  Only ordered blended soup and applesauce (smooth foods), "I have difficulty swallowing "   Dry foods/ solids stick in my throat and I have had an endoscopy in the past " I could not find this if was seen in ER  Reports liquids and smooth foods go down well  Does not want further diet modifications  Pt will choose , softer, moister foods from current menu  Tolerated liquids via cup and straw w/ overt difficulty  Refused to try crackers that are tableside, but stated she ate one w/ the soup  Appears to be aware of what she can/cannot tolerate  Assessment:  Current diet is probably functional, pt states she eats soft cooked chicken patties and cut up hotdogs and hamburgers at home  Plan/Recommendations:  Continue current diet, advised pt is eat slower ( she is aware)  Re-consult as needed  ST to sign off  Ben Cooper MA, CCC/SLP  [id number]  [email or web address]  Available via tiger text

## 2019-11-18 NOTE — ASSESSMENT & PLAN NOTE
· Urine antigen positive for strep pneumonia  · Finished a 7-day course of IV ceftriaxone today   · Continue oxygen/BiPAP as needed  · Procalcitonin improvement  · Supportive care

## 2019-11-18 NOTE — OCCUPATIONAL THERAPY NOTE
11/18/19 1028   Restrictions/Precautions   Weight Bearing Precautions Per Order Yes   RLE Weight Bearing Per Order WBAT   LLE Weight Bearing Per Order WBAT   Braces or Orthoses TLSO   Other Precautions Fall Risk;Spinal precautions   Lifestyle   Reciprocal Relationships "my granddaughter cleaned things out - I'm not sure where my things (Stephen Poor) are from my hip (event))"   Pain Assessment   Pain Assessment   (no complaints at this time)   ADL   Where Assessed Chair  (patient unable to verbalize spinal precautions)   Grooming Comments patient reports that she did mouth care earlier   UB Bathing Assistance 5  Supervision/Setup   UB Bathing Deficit Setup;Supervision/safety   LB Bathing Assistance 2  Maximal Assistance   LB Bathing Deficit Setup;Verbal cueing;Supervision/safety; Increased time to complete;Perineal area; Buttocks;Right lower leg including foot; Left lower leg including foot   UB Dressing Assistance 4  Minimal Assistance   UB Dressing Deficit Setup;Verbal cueing;Supervision/safety; Increased time to complete   UB Dressing Comments hospital gown    LB Dressing Assistance 2  Maximal Assistance   LB Dressing Deficit Setup;Verbal cueing;Supervision/safety; Increased time to complete;Tie shoes; Don/doff R sock; Don/doff L sock; Thread RLE into pants; Thread LLE into pants; Thread RLE into underwear; Thread LLE into underwear;Pull up over hips;Don/doff R shoe;Don/doff L shoe   LB Dressing Comments * did not use AE at this time   Transfers   Sit to Stand 3  Moderate assistance   Additional items Assist x 1; Armrests; Increased time required;Verbal cues   Stand to Sit 4  Minimal assistance   Additional items Assist x 1; Armrests; Impulsive;Verbal cues   Additional Comments patient stood for < 1 minute before impulsively returning to sit      Coordination   Gross Motor WFL   Dexterity WFL   Cognition   Overall Cognitive Status Impaired   Arousal/Participation Responsive  (patient stated "they (staff) just don't leave you alone" **)   Comments ** she declined some participation    Activity Tolerance   Activity Tolerance Patient limited by fatigue   Assessment   Assessment Patient participated in Skilled OT session this date with interventions consisting of ADL re training with the use of correct body mechnaics, safety awareness and fall prevention techniques and  therapeutic activities to: increase activity tolerance   Patient agreeable to OT treatment session, upon arrival patient was found seated OOB to Chair  Patient requiring verbal cues for safety, verbal cues for correct technique and cognitive assistance to anticipate next step and occasional rest periods and self-care assistance as noted in flow sheet  Patient continues to be functioning below baseline level, occupational performance remains limited secondary to factors listed above and increased risk for falls and injury  From OT standpoint, recommendation at time of d/c would be Short Term Rehab  Patient to benefit from continued Occupational Therapy treatment while in the hospital to address deficits as defined above and maximize level of functional independence with ADLs and functional mobility      Plan   Treatment Interventions ADL retraining;Patient/family training;Equipment evaluation/education   Goal Expiration Date 11/21/19   OT Treatment Day [id number] [facility]/

## 2019-11-18 NOTE — PLAN OF CARE
Problem: PHYSICAL THERAPY ADULT  Goal: Performs mobility at highest level of function for planned discharge setting  See evaluation for individualized goals  Description  Treatment/Interventions: Functional transfer training, LE strengthening/ROM, Therapeutic exercise, Endurance training, Patient/family training, Cognitive reorientation, Equipment eval/education, Bed mobility, Gait training, Spoke to nursing  Equipment Recommended: Walker(continue RW use)       See flowsheet documentation for full assessment, interventions and recommendations  Outcome: Progressing  Note:   Prognosis: Fair  Problem List: Decreased strength, Decreased endurance, Impaired balance, Decreased mobility, Decreased safety awareness, Pain, Orthopedic restrictions  Assessment: Pt seen for PT treatment session this date with interventions consisting of gait training w/ emphasis on improving pt's ability to ambulate level surfaces x 105 ft with min A provided by therapist with RW, Therapeutic exercise consisting of: AROM 20-30 reps B LE in sitting position and therapeutic activity consisting of training: sit<>stand transfers, static standing tolerance for 2-3 minutes w/ B UE support and vc and tactile cues for static standing posture faciliation  Pt agreeable to PT treatment session upon arrival, pt found seated OOB in chair, in no apparent distress, A&O x 4 and responsive  In comparison to previous session, pt with improvements in tolerating increased amb with less physical A required  Pt demonstrating safe technique c transfers from various surfaces and using RW proper hand placement  Post session: pt returned back to recliner, all needs in reach and RN notified of session findings/recommendations  Continue to recommend STR at time of d/c in order to maximize pt's functional independence and safety w/ mobility  Pt continues to be functioning below baseline level, and remains limited 2* factors listed above   PT will continue to see pt while here in order to address the deficits listed above and provide interventions consistent w/ POC in effort to achieve STGs  Barriers to Discharge: Inaccessible home environment     Recommendation: Short-term skilled PT     PT - OK to Discharge: Yes(when medically cleared, if to STR)    See flowsheet documentation for full assessment

## 2019-11-18 NOTE — PLAN OF CARE
Problem: Nutrition/Hydration-ADULT  Goal: Nutrient/Hydration intake appropriate for improving, restoring or maintaining nutritional needs  Description  Monitor and assess patient's nutrition/hydration status for malnutrition  Collaborate with interdisciplinary team and initiate plan and interventions as ordered  Monitor patient's weight and dietary intake as ordered or per policy  Utilize nutrition screening tool and intervene as necessary  Determine patient's food preferences and provide high-protein, high-caloric foods as appropriate  INTERVENTIONS:  - Monitor oral intake, urinary output, labs, and treatment plans  - Assess nutrition and hydration status and recommend course of action  - Evaluate amount of meals eaten  - Assist patient with eating if necessary   - Allow adequate time for meals  - Recommend/ encourage appropriate diets, oral nutritional supplements, and vitamin/mineral supplements  - Order, calculate, and assess calorie counts as needed  - Recommend, monitor, and adjust tube feedings and TPN/PPN based on assessed needs  - Assess need for intravenous fluids  - Provide specific nutrition/hydration education as appropriate  - Include patient/family/caregiver in decisions related to nutrition  Outcome: Progressing   Patient diet downgraded to level 3 dysphagia thin liquids  Patient seen at lunch, limited food on tray soup, applesauce  Patient evaluted by speech, no diet changes noted  Recommend ensure compact BID at meals to improve energy intake  Continue current diet

## 2019-11-18 NOTE — PROGRESS NOTES
Progress Note - Nephrology   Moncho Grey 80 y o  female MRN: [id number]  Unit/Bed#: -01 Encounter: [id number]    A/P:  1  Acute kidney injury:  Resolved  2  Chronic kidney disease stage 3 a baseline creatinine about 0 9 mg/dL  Baseline has improved  3  Hypertensive nephrosclerosis:  Blood pressure is intermittently controlled  Will increase amlodipine to 7 5 mg daily  Will start low-dose losartan daily as her kidney function has returned to normal  4  Strep pneumonia with acute respiratory failure only nasal cannula:  Resolving  5  Compression fracture L1:  Receiving tramadol and has a back      Follow up reason for today's visit:  Acute kidney injury    Acute respiratory failure with hypoxia and hypercapnia (HCC)    Patient Active Problem List   Diagnosis    Allergic rhinitis    Benign colon polyp    Cataract    Constipation    Coronary artery disease involving native coronary artery of native heart without angina pectoris    Fibrocystic breast disease, unspecified laterality    Gait abnormality    Hiatal hernia with GERD without esophagitis    Glucose intolerance (no malabsorption)    Hyperlipidemia, mixed    Essential hypertension    Iron deficiency anemia    Malignant melanoma of skin (HCC)    Urinary incontinence    Vitamin D deficiency    [facility]    Anxiety    Compression fracture of thoracic vertebra, closed, initial encounter ([facility]    Proteinuria    Coronary angioplasty status    Old MI (myocardial infarction)    Chronic diastolic heart failure (HCC)    Chronic left shoulder pain    Chronic right shoulder pain    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    Acute respiratory failure with hypoxia and hypercapnia (HCC)    Streptococcal pneumonia (HCC)    Toxic metabolic encephalopathy    Acute kidney injury ([facility] )         Subjective:   Says she does not feel "too bad"    Denies chest pain shortness of breath abdominal pain nausea vomiting    Objective:     Vitals: Blood pressure (!) 175/72, pulse 78, temperature (!) 96 8 °F (36 °C), temperature source Tympanic, resp  rate 20, height 5' 2" (1 575 m), weight 77 7 kg (171 lb 4 8 oz), SpO2 95 %, not currently breastfeeding  ,Body mass index is 31 33 kg/m²  Weight (last 2 days)     Date/Time   Weight    11/18/19 0600   77 7 (171 3)    11/17/19 0600   78 5 (173 06)    11/16/19 0600   79 (174 16)                Intake/Output Summary (Last 24 hours) at 11/18/2019 0944  Last data filed at 11/18/2019 0856  Gross per 24 hour   Intake 360 ml   Output --   Net 360 ml     I/O last 3 completed shifts:   In: 120 [P O :120]  Out: -          Physical Exam: BP (!) 175/72 (BP Location: Left arm)   Pulse 78   Temp (!) 96 8 °F (36 °C) (Tympanic)   Resp 20   Ht 5' 2" (1 575 m)   Wt 77 7 kg (171 lb 4 8 oz)   SpO2 95%   BMI 31 33 kg/m²     General Appearance:    Alert, frail cooperative, no distress, appears stated age   Head:    Normocephalic, without obvious abnormality, atraumatic   Eyes:    Conjunctiva/corneas clear   Ears:    Normal external ears   Nose:   Nares normal, septum midline, mucosa normal, no drainage    or sinus tenderness   Throat:   Lips, mucosa, and tongue normal; teeth and gums normal   Neck:   Supple, symmetrical, trachea midline, no adenopathy;        thyroid:  No enlargement/tenderness/nodules; no carotid    bruit or JVD   Back:     Wearing a brace   Lungs:     Clear to auscultation bilaterally, respirations unlabored   Chest wall:    No tenderness or deformity    Heart:    Regular rate and rhythm, S1 and S2 normal, no murmur, rub   or gallop   Abdomen:     Soft, non-tender, bowel sounds active   Extremities:   Extremities normal, atraumatic, no cyanosis or edema   Skin:   Skin color, texture, turgor normal, no rashes or lesions   Lymph nodes:   Cervical normal   Neurologic:   CNII-XII intact            Lab, Imaging and other studies: I have personally reviewed pertinent labs   CBC:   Lab Results   Component Value Date    WBC 8 10 11/18/2019    HGB 13 7 11/18/2019    HCT 41 0 (L) 11/18/2019    MCV 89 11/18/2019     11/18/2019    MCH 29 6 11/18/2019    MCHC 33 4 11/18/2019    RDW 13 6 11/18/2019    MPV 8 1 (L) 11/18/2019     CMP:   Lab Results   Component Value Date    K 4 0 11/18/2019    CL 94 (L) 11/18/2019    CO2 41 (H) 11/18/2019    BUN 17 11/18/2019    CREATININE 0 69 11/18/2019    CALCIUM 9 2 11/18/2019    EGFR 75 11/18/2019         Results from last 7 days   Lab Units 11/18/19  0500 11/17/19  0537 11/16/19  0610   POTASSIUM mmol/L 4 0 4 1 4 1   CHLORIDE mmol/L 94* 94* 96*   CO2 mmol/L 41* 43* 43*   BUN mg/dL 17 16 18   CREATININE mg/dL 0 69 0 66 0 74   CALCIUM mg/dL 9 2 9 3 8 9         Phosphorus: No results found for: PHOS  Magnesium: No results found for: MG  Urinalysis: No results found for: COLORU, CLARITYU, SPECGRAV, PHUR, LEUKOCYTESUR, NITRITE, PROTEINUA, GLUCOSEU, KETONESU, BILIRUBINUR, BLOODU  Ionized Calcium: No results found for: CAION  Coagulation: No results found for: PT, INR, APTT  Troponin: No results found for: TROPONINI  ABG: No results found for: PHART, UIW7DNV, PO2ART, KYL8NMA, Z4KHZVMV, BEART, SOURCE  Radiology review:     IMAGING  No results found      Current Facility-Administered Medications   Medication Dose Route Frequency    acetaminophen (TYLENOL) tablet 650 mg  650 mg Oral Q6H PRN    amLODIPine (NORVASC) tablet 5 mg  5 mg Oral Daily    aspirin chewable tablet 81 mg  81 mg Oral Daily    atorvastatin (LIPITOR) tablet 40 mg  40 mg Oral Daily    calcium carbonate-vitamin D (OSCAL-D) 500 mg-200 units per tablet 2 tablet  2 tablet Oral BID With Meals    cefTRIAXone (ROCEPHIN) IVPB (premix) 1,000 mg  1,000 mg Intravenous Q24H    docusate sodium (COLACE) capsule 100 mg  100 mg Oral BID    enoxaparin (LOVENOX) subcutaneous injection 30 mg  30 mg Subcutaneous Daily    famotidine (PEPCID) tablet 20 mg  20 mg Oral BID    ferrous sulfate tablet 162 5 mg  162 5 mg Oral Daily    furosemide (LASIX) tablet 20 mg  20 mg Oral Daily    guaiFENesin (MUCINEX) 12 hr tablet 600 mg  600 mg Oral Q12H [facility]    ipratropium (ATROVENT) 0 02 % inhalation solution 0 5 mg  0 5 mg Nebulization 4x Daily    Labetalol HCl (NORMODYNE) injection 5 mg  5 mg Intravenous Q6H PRN    levalbuterol (XOPENEX) inhalation solution 0 63 mg  0 63 mg Nebulization 4x Daily    lidocaine (LIDODERM) 5 % patch 1 patch  1 patch Topical Daily    LORazepam (ATIVAN) tablet 0 25 mg  0 25 mg Oral BID    melatonin tablet 6 mg  6 mg Oral HS    metoprolol tartrate (LOPRESSOR) partial tablet 12 5 mg  12 5 mg Oral Q12H [facility]    OLANZapine (ZyPREXA ZYDIS) dispersible tablet 5 mg  5 mg Oral HS    ondansetron (ZOFRAN) injection 4 mg  4 mg Intravenous Q6H PRN    polyethylene glycol (MIRALAX) packet 17 g  17 g Oral Daily    [START ON 11/19/2019] predniSONE tablet 10 mg  10 mg Oral Daily    senna (SENOKOT) tablet 8 6 mg  1 tablet Oral HS    traMADol (ULTRAM) tablet 50 mg  50 mg Oral Q6H PRN     Medications Discontinued During This Encounter   Medication Reason    ipratropium-albuterol (DUO-NEB) 0 5-2 5 mg/3 mL inhalation solution 3 mL     nitroglycerin (NITROSTAT) SL tablet 0 4 mg     polyethylene glycol (GLYCOLAX) bowel prep 17 g     morphine injection 2 mg     sodium chloride 0 9 % infusion     LORazepam (ATIVAN) tablet 0 5 mg     oxyCODONE (ROXICODONE) IR tablet 5 mg     morphine injection 1 mg     methocarbamol (ROBAXIN) tablet 500 mg     enoxaparin (LOVENOX) subcutaneous injection 40 mg     potassium chloride (K-DUR,KLOR-CON) CR tablet 10 mEq     acetaminophen (TYLENOL) tablet 650 mg     melatonin tablet 3 mg     haloperidol lactate (HALDOL) injection 2 5 mg     haloperidol lactate (HALDOL) injection 2 5 mg     bisacodyl (DULCOLAX) rectal suppository 10 mg     azithromycin (ZITHROMAX) 500 mg in sodium chloride 0 9 % 250 mL IVPB     furosemide (LASIX) tablet 20 mg     haloperidol lactate (HALDOL) injection 1 mg     OLANZapine (ZyPREXA ZYDIS) dispersible tablet 10 mg     bisacodyl (DULCOLAX) rectal suppository 10 mg     furosemide (LASIX) tablet 20 mg     acetaminophen (TYLENOL) tablet 975 mg     cefTRIAXone (ROCEPHIN) IVPB (premix) 1,000 mg     amLODIPine (NORVASC) tablet 2 5 mg        Neil Reyes MD      This progress note was produced in part using a dictation device which may document imprecise wording from author's original intent

## 2019-11-18 NOTE — PHYSICAL THERAPY NOTE
Physical Therapy Treatment Note       11/18/19 0922   Pain Assessment   Pain Assessment 0-10   Pain Score 3   Pain Type Acute pain;Chronic pain   Pain Location Back   Pain Orientation Lower   Pain Descriptors Aching;Discomfort   Pain Frequency Constant/continuous   Pain Onset Ongoing   Clinical Progression Not changed   Patient's Stated Pain Goal No pain   Hospital Pain Intervention(s) Repositioned; Ambulation/increased activity; Emotional support; Rest  (TLSO readjusted)   Restrictions/Precautions   Weight Bearing Precautions Per Order Yes   RLE Weight Bearing Per Order WBAT   LLE Weight Bearing Per Order WBAT   Braces or Orthoses TLSO   Other Precautions Fall Risk;Pain;Spinal precautions;O2  (3 L O2 via NC)   General   Chart Reviewed Yes   Response to Previous Treatment Patient with no complaints from previous session  Family/Caregiver Present Yes   Cognition   Arousal/Participation Alert; Responsive; Cooperative   Attention Attends with cues to redirect   Orientation Level Oriented X4   Memory Decreased recall of biographical information;Decreased recall of recent events;Decreased recall of precautions   Following Commands Follows one step commands without difficulty   Comments pt agreeable to PT session   Subjective   Subjective "I can try to walk alittle bit"   Bed Mobility   Supine to Sit Unable to assess  (pt received OOB in chair at bedside)   Transfers   Sit to Stand 3  Moderate assistance   Additional items Assist x 1; Armrests; Increased time required;Verbal cues   Stand to Sit 4  Minimal assistance   Additional items Assist x 1; Armrests; Increased time required;Verbal cues   Toilet transfer 3  Moderate assistance   Additional items Assist x 1;Standard toilet;Verbal cues   Ambulation/Elevation   Gait pattern Improper Weight shift;Decreased foot clearance;Shuffling; Short stride; Excessively slow   Gait Assistance 4  Minimal assist  (mod Ax1 for safety and unsteadines during 2 turns in hallway)   Additional items Assist x 1;Verbal cues; Tactile cues   Assistive Device Rolling walker   Distance 105 ft   Stair Management Assistance Not tested   Balance   Static Sitting Fair +   Dynamic Sitting Fair   Static Standing Fair -   Dynamic Standing Poor +   Ambulatory Poor +   Endurance Deficit   Endurance Deficit Yes   Activity Tolerance   Activity Tolerance Patient limited by fatigue   Nurse Made Aware ELENA Carmen verbalized pt appropriate for therapy and made aware of outcomes    Exercises   Hip Abduction Sitting;20 reps;AROM; Bilateral   Hip Adduction Sitting;20 reps;AROM; Bilateral   Knee AROM [employer]; Right;Left  (30 reps)   Ankle Pumps Sitting;AROM; Bilateral  (30 reps)   Marching Sitting;AROM; Right;Left  (30 reps)   Assessment   Prognosis Fair   Problem List Decreased strength;Decreased endurance; Impaired balance;Decreased mobility; Decreased safety awareness;Pain;Orthopedic restrictions   Assessment Pt seen for PT treatment session this date with interventions consisting of gait training w/ emphasis on improving pt's ability to ambulate level surfaces x 105 ft with min A provided by therapist with RW, Therapeutic exercise consisting of: AROM 20-30 reps B LE in sitting position and therapeutic activity consisting of training: sit<>stand transfers, static standing tolerance for 2-3 minutes w/ B UE support and vc and tactile cues for static standing posture faciliation  Pt agreeable to PT treatment session upon arrival, pt found seated OOB in chair, in no apparent distress, A&O x 4 and responsive  In comparison to previous session, pt with improvements in tolerating increased amb with less physical A required  Pt demonstrating safe technique c transfers from various surfaces and using RW proper hand placement  Post session: pt returned back to recliner, all needs in reach and RN notified of session findings/recommendations   Continue to recommend STR at time of d/c in order to maximize pt's functional independence and safety w/ mobility  Pt continues to be functioning below baseline level, and remains limited 2* factors listed above  PT will continue to see pt while here in order to address the deficits listed above and provide interventions consistent w/ POC in effort to achieve STGs  Barriers to Discharge Inaccessible home environment   Goals   Patient Goals "to get stronger"   STG Expiration Date 11/21/19   Short Term Goal #1 goals remain appropriate   PT Treatment Day 5   Plan   Treatment/Interventions Functional transfer training;LE strengthening/ROM; Therapeutic exercise; Endurance training;Patient/family training;Cognitive reorientation;Equipment eval/education; Bed mobility;Gait training;Spoke to nursing   Progress Slow progress, decreased activity tolerance   PT Frequency   (3-5x/wk)   Recommendation   Recommendation Short-term skilled PT   Equipment Recommended Walker  (continue RW use)   PT - OK to Discharge Yes  (when medically cleared, if to STR)       Rosy Muñoz PT    Time of PT treatment session: [id number]

## 2019-11-18 NOTE — PLAN OF CARE
Problem: OCCUPATIONAL THERAPY ADULT  Goal: Performs self-care activities at highest level of function for planned discharge setting  See evaluation for individualized goals  Description  Treatment Interventions: ADL retraining, Functional transfer training, UE strengthening/ROM, Endurance training, Cognitive reorientation, Patient/family training, Equipment evaluation/education, Compensatory technique education, Energy conservation, Activityengagement          See flowsheet documentation for full assessment, interventions and recommendations  Outcome: Progressing, slowly  Note:   Limitation: Decreased ADL status, Decreased UE strength, Decreased Safe judgement during ADL, Decreased cognition, Decreased endurance, Decreased self-care trans, Decreased high-level ADLs  Prognosis: Fair  Assessment: Patient participated in Skilled OT session this date with interventions consisting of ADL re training with the use of correct body mechnaics, safety awareness and fall prevention techniques and  therapeutic activities to: increase activity tolerance   Patient agreeable to OT treatment session, upon arrival patient was found seated OOB to Chair  Patient requiring verbal cues for safety, verbal cues for correct technique and cognitive assistance to anticipate next step and occasional rest periods and self-care assistance as noted in flow sheet  Patient continues to be functioning below baseline level, occupational performance remains limited secondary to factors listed above and increased risk for falls and injury  From OT standpoint, recommendation at time of d/c would be Short Term Rehab  Patient to benefit from continued Occupational Therapy treatment while in the hospital to address deficits as defined above and maximize level of functional independence with ADLs and functional mobility        OT Discharge Recommendation: Short Term Rehab  OT - OK to Discharge: Yes(Once medically cleared)  Janann Galeazzi, COTA/YUKO

## 2019-11-18 NOTE — ASSESSMENT & PLAN NOTE
· POA - Multifactorial due to pneumonia and hypercapnia  · Significantly improved and currently at baseline   · Continue with current treatment  · Continue to monitor mental status closely

## 2019-11-18 NOTE — SOCIAL WORK
Spoke to Ely Flores from [facility] who states the pt is still on 3L of oxygen and Dr Janis Easley states the pt should be titrated  Spoke to Manfred ASCENCIO of same  A formal de-sat study has been ordered  Spoke to MARIA ELENA Bruner from [facility] to review the PT/OT notes from today to determine if they can accept her to the SNF

## 2019-11-18 NOTE — ASSESSMENT & PLAN NOTE
· Appears to be secondary to pneumonia as CT scan of the chest shows multifocal findings  · Antibiotics have been completed IV ceftriaxone (7 days therapy)  · Currently off BiPAP and saturating well on nasal cannula  · Procalcitonin improving  · Strep pneumonia positive  · Continue to wean oxygen as able formal desat study has been ordered

## 2019-11-18 NOTE — RESPIRATORY THERAPY NOTE
Home Oxygen Qualifying Test       Patient name: Nilsa Finney        : 1925   Date of Test:  2019  Diagnosis:      Home Oxygen Test:    **Medicare Guidelines require item(s) 1-5 on all ambulatory patients or 1 and 2 on non-ambulatory patients  1   Baseline SPO2 on Room Air at rest 87 %  2   SPO2 during exercise on Room Air 82 %  During exercise monitor SpO2  If SPO2 increases >=89% with ambulation do not add supplemental             oxygen  If <= 88% on room air add O2 via NC and titrate patient  Patient must be ambulated with O2 and titrated to > 88% with exertion  3   SPO2 on Oxygen at rest 94 % 2 lpm     4   SPO2 during exercise on Oxygen  92% a liter flow of 3 lpm     5   Exercise performed:          walking  40 ft w/ walker        [x]  Supplemental Home Oxygen is indicated  []  Client does not qualify for home oxygen        Respiratory Additional Notes- Pt returned to baseline of 94% on 3l after 5min recovery time    Олег & Bird, RT

## 2019-11-18 NOTE — PROGRESS NOTES
Progress Note - Sol Kc 2/25/1925, 80 y o  female MRN: [id number]    Unit/Bed#: -01 Encounter: [id number]    Primary Care Provider: Ludy Louie DO   Date and time admitted to hospital: 11/9/2019  7:42 PM        Coronary artery disease involving native coronary artery of native heart without angina pectoris  Assessment & Plan  · Continue aspirin, metoprolol    Essential hypertension  Assessment & Plan  · Continue metoprolol and Lasix  · Low dose amlodipine added on 11/16  · Monitor BP closely and will adjust as needed       Hiatal hernia with GERD without esophagitis  Assessment & Plan  · History of large hiatal hernia  · Continue Pepcid     Hyperlipidemia, mixed  Assessment & Plan  · Continue Lipitor     Constipation  Assessment & Plan  · Continue with current bowel regimen     Acute kidney injury ([facility] [address] )  Assessment & Plan  · POA - Resolved   · Likely secondary to volume depletion, acute infection   · Continue IV fluids as needed  · Nephrology following      Toxic metabolic encephalopathy  Assessment & Plan  · POA - Multifactorial due to pneumonia and hypercapnia  · Significantly improved and currently at baseline   · Continue with current treatment  · Continue to monitor mental status closely       Streptococcal pneumonia (HCC)  Assessment & Plan  · Urine antigen positive for strep pneumonia  · Finished a 7-day course of IV ceftriaxone today   · Continue oxygen/BiPAP as needed  · Procalcitonin improvement  · Supportive care      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  Assessment & Plan  · ER provider contacted neurosurgery Dr Misty Veronica  · Recommended pain control, PT OT, brace (TLSO)  · Orthopedics input appreciated- recommends the same   · CM is working on STR placement once patient is medically stable  · Will continue with Lidoderm patch and Tylenol, trial Tramadol for severe pain  · Avoid narcotics due to patient's fluctuating mental status and avoid NSAIDs due to acute kidney injury      Anxiety  Assessment & Plan  · Patient is on Ativan 0 5 mg 3 times daily as needed at home  · Currently on Ativan 0 25 mg b i d  · Will continue melatonin q h s  · Zyprexa dose decreased to avoid over-sedation      COPD (chronic obstructive pulmonary disease) ([facility])  Assessment & Plan  · Will continue prednisone taper  · Continue oxygen and titrate as tolerated  · Currently off BiPAP    * Acute respiratory failure with hypoxia and hypercapnia (HCC)  Assessment & Plan  · Appears to be secondary to pneumonia as CT scan of the chest shows multifocal findings  · Antibiotics have been completed IV ceftriaxone (7 days therapy)  · Currently off BiPAP and saturating well on nasal cannula  · Procalcitonin improving  · Strep pneumonia positive  · Continue to wean oxygen as able formal desat study has been ordered        VTE Pharmacologic Prophylaxis: Pharmacologic: Enoxaparin (Lovenox)    Patient Centered Rounds: I have performed bedside rounds with nursing staff today  Discussions with Specialists or Other Care Team Provider: Case Management, Care Coordination Team  Education and Discussions with Family / Patient: patient and daughter in room  Current Length of Stay: 8 day(s)    Current Patient Status: Inpatient   Certification Statement: The patient will continue to require additional inpatient hospital stay due to d/c planning w/ CM, oxygen desat study    Discharge Plan: ARU vs SNF in AM depending acceptance    Code Status: Level 3 - DNAR and DNI    Subjective:   Patient seen in bed  She reports having her back pain  No complaints of breathing issues  She had had a bowel movement for 2 days  Nursing reports they just gave her Colace  She did work with Respiratory therapy she requires 2 at rest and 3 L with exertion      Objective:     Vitals:   Temp (24hrs), Av 7 °F (36 5 °C), Min:96 8 °F (36 °C), Max:98 5 °F (36 9 °C)    Temp:  [96 8 °F (36 °C)-98 5 °F (36 9 °C)] 98 5 °F (36 9 °C)  HR:  Libia Owens 79  Resp:  [20] 20  BP: (136-175)/(65-72) 138/67  SpO2:  [92 %-97 %] 94 %  Body mass index is 31 33 kg/m²  Input and Output Summary (last 24 hours): Intake/Output Summary (Last 24 hours) at 11/18/2019 1604  Last data filed at 11/18/2019 1237  Gross per 24 hour   Intake 600 ml   Output --   Net 600 ml       Physical Exam:     Physical Exam   Constitutional: She is oriented to person, place, and time  She appears well-developed and well-nourished  Pleasant elderly female   HENT:   Head: Normocephalic and atraumatic  Eyes: Conjunctivae and EOM are normal  Right eye exhibits no discharge  Left eye exhibits no discharge  Neck: Normal range of motion  No tracheal deviation present  Cardiovascular: Normal rate, regular rhythm and normal heart sounds  Exam reveals no gallop and no friction rub  No murmur heard  Pulmonary/Chest: Effort normal  No respiratory distress  She has no wheezes  She has rales in the left lower field  Abdominal: Soft  Bowel sounds are normal  She exhibits no distension and no mass  There is no tenderness  There is no guarding  Musculoskeletal: Normal range of motion  She exhibits no edema, tenderness or deformity  Spinal brace in place  Lower extremity in vena dynes   Neurological: She is alert and oriented to person, place, and time  Skin: Skin is warm and dry  No rash noted  No erythema  No pallor  Multiple areas ecchymosis on her extremities, dry skin, crusted areas on extremities from pulling her arms with the restraint   Psychiatric: She has a normal mood and affect  Her behavior is normal  Judgment and thought content normal    Nursing note and vitals reviewed        Additional Data:   Labs:  Results from last 7 days   Lab Units 11/18/19  0500  11/16/19  0610   WBC Thousand/uL 8 10   < > 7 50   HEMOGLOBIN g/dL 13 7   < > 13 6   HEMATOCRIT % 41 0*   < > 40 9*   PLATELETS Thousands/uL 322   < > 325   NEUTROS PCT %  --   --  66   LYMPHS PCT %  --   --  17*   MONOS PCT %  --   --  14*   EOS PCT %  --   --  2    < > = values in this interval not displayed  Results from last 7 days   Lab Units 11/18/19  1107   POTASSIUM mmol/L 4 1   CHLORIDE mmol/L 91*   CO2 mmol/L 42*   BUN mg/dL 18   CREATININE mg/dL 0 72   CALCIUM mg/dL 9 6     * I Have Reviewed All Lab Data Listed Above  * Additional Pertinent Lab Tests Reviewed:  Amanda Khoury Admission  Reviewed    Recent Cultures (last 7 days):     Results from last 7 days   Lab Units 11/11/19  1750 11/11/19  1749   URINE CULTURE  No Growth <1000 cfu/mL  --    LEGIONELLA URINARY ANTIGEN   --  Negative       Last 24 Hours Medication List:     Current Facility-Administered Medications:  acetaminophen 650 mg Oral Q6H PRN [place] Nya, CRNP    amLODIPine 5 mg Oral Daily Bettyjo Maxon, DO    aspirin 81 mg Oral Daily [place] Nya, CRNP    atorvastatin 40 mg Oral Daily [place] Nya, CRNP    calcium carbonate-vitamin D 2 tablet Oral BID With Meals [place] Nya, CRNP    cefTRIAXone 1,000 mg Intravenous Q24H [place] Nya, CRNP Last Rate: 1,000 mg (11/17/19 1707)   docusate sodium 100 mg Oral BID [place] Nya, CRNP    enoxaparin 30 mg Subcutaneous Daily [place] Nya, CRNP    famotidine 20 mg Oral BID [place] Nya, CRNP    ferrous sulfate 162 5 mg Oral Daily [place] Nya, CRNP    furosemide 20 mg Oral Daily [place] Nya, CRNP    guaiFENesin 600 mg Oral Q12H [address] [place] Nya, CRNP    ipratropium 0 5 mg Nebulization 4x Daily [place] Nya, CRNP    Labetalol HCl 5 mg Intravenous Q6H PRN [place] Nya, CRNP    levalbuterol 0 63 mg Nebulization 4x Daily [place] Nya, CRNP    lidocaine 1 patch Topical Daily [place] Nya, CRNP    LORazepam 0 25 mg Oral BID [place] Nya, CRNP    losartan 25 mg Oral Daily Julieta Slater MD    melatonin 6 mg Oral HS [place] Nya, CRNP    metoprolol tartrate 12 5 mg Oral Q12H [address] Cayla Cedeño, CRNP    OLANZapine 5 mg Oral HS Paula Edd ROGELIO Cedeño    ondansetron 4 mg Intravenous Q6H PRN Rajani Slates, CRNP    polyethylene glycol 17 g Oral Daily Rajani Slates, ROGELIO    [START ON 11/19/2019] predniSONE 10 mg Oral Daily Rajani Slates, CRNP    senna 1 tablet Oral HS Rajani Slates, CRNP    traMADol 50 mg Oral Q6H PRN Rajani Slates, WINSTONNP         Today, Patient Was Seen By: Ray Wilde PA-C    ** Please Note: Dictation voice to text software may have been used in the creation of this document   **

## 2019-11-18 NOTE — ASSESSMENT & PLAN NOTE
· ER provider contacted neurosurgery Dr Mares Organ  · Recommended pain control, PT OT, brace (TLSO)  · Orthopedics input appreciated- recommends the same   · CM is working on STR placement once patient is medically stable  · Will continue with Lidoderm patch and Tylenol, trial Tramadol for severe pain  · Avoid narcotics due to patient's fluctuating mental status and avoid NSAIDs due to acute kidney injury

## 2019-11-18 NOTE — ASSESSMENT & PLAN NOTE
· POA - Resolved   · Likely secondary to volume depletion, acute infection   · Continue IV fluids as needed  · Nephrology following

## 2019-11-19 PROBLEM — J18.9 PNA (PNEUMONIA): Status: ACTIVE | Noted: 2019-01-01

## 2019-11-19 PROBLEM — N18.9 CKD (CHRONIC KIDNEY DISEASE): Status: ACTIVE | Noted: 2019-01-01

## 2019-11-19 PROBLEM — R13.10 DYSPHAGIA: Status: ACTIVE | Noted: 2019-01-01

## 2019-11-19 PROBLEM — M43.9 COMPRESSION DEFORMITY OF VERTEBRA: Status: ACTIVE | Noted: 2019-01-01

## 2019-11-19 NOTE — DISCHARGE SUMMARY
Discharge- Paige Powers 2/25/1925, 80 y o  female MRN: [id number]    Unit/Bed#: -01 Encounter: [id number]    Primary Care Provider: Rober De La O DO   Date and time admitted to hospital: 11/9/2019  7:42 PM        * Acute respiratory failure with hypoxia and hypercapnia ([facility] [address] )  Assessment & Plan  · Appears to be secondary to pneumonia as CT scan of the chest shows multifocal findings  · Antibiotics have been completed IV ceftriaxone (7 days therapy)  · Currently off BiPAP and saturating well on nasal cannula  · Procalcitonin improving  · Strep pneumonia positive  · Desaturation study performed on 11/18 that time patient is requiring 2 L of oxygen at rest and 3 L of oxygen with exertion      Streptococcal pneumonia (HCC)  Assessment & Plan  · Urine antigen positive for strep pneumonia  · Finished a 7-day course of IV ceftriaxone   · Wean oxygen as able    Toxic metabolic encephalopathy  Assessment & Plan  · POA - Multifactorial due to pneumonia and hypercapnia  · Significantly improved and currently at baseline   · Continue with current treatment  · Continue to monitor mental status closely       Coronary artery disease involving native coronary artery of native heart without angina pectoris  Assessment & Plan  · Continue aspirin, metoprolol  · Patient has no chest pain    Essential hypertension  Assessment & Plan  · Continue metoprolol and Lasix  · Low dose amlodipine added on 11/16  · Monitor BP and will adjust medications as needed       Hiatal hernia with GERD without esophagitis  Assessment & Plan  · History of large hiatal hernia  · Continue Pepcid     Hyperlipidemia, mixed  Assessment & Plan  · Continue Lipitor     Constipation  Assessment & Plan  · Continue with current bowel regimen     Acute kidney injury ([facility] )  Assessment & Plan  · POA - Resolved   · Likely secondary to volume depletion, acute infection         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  Assessment & Plan  · ER provider contacted neurosurgery Dr Janay Unger  · Recommended pain control, PT OT, brace (TLSO)  · Orthopedics input appreciated- recommends the same   · Will continue with Lidoderm patch and Tylenol, trial Tramadol for severe pain  · Avoid narcotics due to patient's fluctuating mental status and avoid NSAIDs due to acute kidney injury      Anxiety  Assessment & Plan  · Patient is on Ativan 0 5 mg 3 times daily as needed at home  · Currently on Ativan 0 25 mg b i d  · Will continue melatonin q h s  · Zyprexa dose decreased to avoid over-sedation      COPD (chronic obstructive pulmonary disease) ([facility] [address] )  Assessment & Plan  · Will continue prednisone taper  · Continue oxygen and titrate as tolerated  · Currently off BiPAP      Discharging Physician / Practitioner: Timoteo Sepulveda PA-C  PCP: Anny Reece DO  Admission Date:   Admission Orders (From admission, onward)     Ordered        11/10/19 0249  Inpatient Admission (expected length of stay for this patient Order details is greater than two midnights)  Once                   Discharge Date: 11/19/19    Resolved Problems  Date Reviewed: 11/19/2019    None          Consultations During Hospital Stay:  · Dr Andrews Tomlinson regarding the compression fracture  · Dr Autumn Ladd Nephrology regarding acute kidney injury  · Anesthesia/critical care secondary to the ICU stay requiring BiPAP  · Cardiology regarding elevated troponins, CAD    Procedures Performed:   · X-ray lumbar spine with chronic T12 and L1 compression abnormality  · Chest x-ray with large hiatal hernia without evidence of acute cardiopulmonary disease  · CT abdomen pelvis with age indeterminate L1 compression deformity with 45% loss of height  · CT chest with out contrast showing multifocal patchy areas of ground-glass and consolidation within the lungs bilaterally few appeared nodular  Findings favored to be infectious  Cardiomegaly  Trace bilateral pleural effusions  Large hiatal hernia    · V/Q scan showing intermediate are indeterminate probability for pulmonary emboli  · Follow-up chest x-ray showing moderate pulmonary edema  Large hiatal hernia  · Echo EF 50% there is grade 1 diastolic dysfunction mitral valve tricuspid valve mild regurg  Moderate pulmonary hypertension    Significant Findings / Test Results:   · Acute respiratory failure requiring BiPAP secondary to pneumonia    Incidental Findings:   · None    Test Results Pending at Discharge (will require follow up): · None     Outpatient Tests Requested:  · None    Complications:     None    Reason for Admission:  Low back pain    Hospital Course: Frandy Fowler is a 80 y o  female patient who originally presented to the hospital on 11/9/2019 due to low back pain  Patient with multiple medical problems including hypertension, hyperlipidemia, CAD, COPD presented to the emergency room secondary to low back pain she was found to have L1 compression fracture, she was seen by Orthopedics recommended brace  Patient required transfer to the ICU secondary to acute respiratory failure with hypoxia and hypercapnia requiring BiPAP  She this was felt secondary to pneumonia  She had streptococcal pneumoniae positive bandage in  She was on IV antibiotics she was followed by critical care  Patient had toxic metabolic encephalopathy secondary to the respiratory failure and infection  Her mentation improved  She has completed the course of antibiotics  She is requiring oxygen she had a desaturation study performed by Respiratory therapy 2 L at rest 3 L with exertion  Nephrology also evaluated the patient secondary to acute kidney injury with chronic kidney disease stage 3 a with a baseline of 0 9  Acute kidney injury is likely secondary to urinary retention  PT OT evaluated the patient they recommended short-term rehab  Case management has arranged for discharge to the acute rehab unit at [address] [place] [address]    Please see encounter notes for full details of recent hospitalization  Please see above list of diagnoses and related plan for additional information  Condition at Discharge: stable     Discharge Day Visit / Exam:     Subjective:  Patient seen in the chair  She reports back pain  No real relief with the Tylenol  She also reports no bowel movement yet  She ate lunch without issues  Vitals: Blood Pressure: 160/70 (11/19/19 0715)  Pulse: 76 (11/19/19 0713)  Temperature: 97 9 °F (36 6 °C) (11/19/19 0713)  Temp Source: Tympanic (11/19/19 0713)  Respirations: 16 (11/19/19 0713)  Height: 5' 2" (157 5 cm) (11/10/19 0310)  Weight - Scale: 79 4 kg (175 lb) (11/19/19 0600)  SpO2: 91 % (11/19/19 0725)     Exam:   Physical Exam   Constitutional: She is oriented to person, place, and time  She appears well-developed and well-nourished  Pleasant elderly  female sitting in the chair   HENT:   Head: Normocephalic and atraumatic  Eyes: Conjunctivae and EOM are normal  Right eye exhibits no discharge  Left eye exhibits no discharge  Neck: Normal range of motion  No tracheal deviation present  Cardiovascular: Normal rate, regular rhythm and normal heart sounds  Exam reveals no gallop and no friction rub  No murmur heard  Pulmonary/Chest: Effort normal  No respiratory distress  She has no wheezes  She has rales in the right lower field and the left lower field  Abdominal: Soft  Bowel sounds are normal  She exhibits no distension and no mass  There is no tenderness  There is no guarding  Musculoskeletal: Normal range of motion  She exhibits no edema, tenderness or deformity  Spinal brace in place  Lower extremity in vena dynes   Neurological: She is alert and oriented to person, place, and time  Skin: Skin is warm and dry  No rash noted  No erythema  No pallor  Multiple areas of ecchymosis on extremities  Crusted areas on her wrist   Psychiatric: She has a normal mood and affect   Her behavior is normal  Judgment and thought content normal  Nursing note and vitals reviewed  Discussion with patient, case management, care coordination team, granddaughter- Fuentes Norbertotunde updated on the phone    Discharge instructions/Information to patient and family:   See after visit summary for information provided to patient and family  Provisions for Follow-Up Care:  See after visit summary for information related to follow-up care and any pertinent home health orders  Disposition:     Acute Rehab at [facility] Readmission:    None     Discharge Statement:  I spent 40 minutes discharging the patient  This time was spent on the day of discharge  I had direct contact with the patient on the day of discharge  Greater than 50% of the total time was spent examining patient, answering all patient questions, arranging and discussing plan of care with patient as well as directly providing post-discharge instructions  Additional time then spent on discharge activities  Discharge Medications:  See after visit summary for reconciled discharge medications provided to patient and family        ** Please Note: This note has been constructed using a voice recognition system **

## 2019-11-19 NOTE — ASSESSMENT & PLAN NOTE
- modified diet of mechanical soft and thin liquids per ST  - continued ST upon dc (referral provided through CM)

## 2019-11-19 NOTE — PROGRESS NOTES
Progress Note - Nephrology   Connor Hays 80 y o  female MRN: [id number]  Unit/Bed#: -01 Encounter: [id number]    A/P:  1  Acute kidney injury:  Resolved  2  Chronic kidney disease stage 3:  Baseline creatinine had been 0 9 and she is improved to a new baseline  3  Hypertension:  Blood pressure has been a bit labile between 140 and 170  Losartan has been started and dose can be increased to tolerance  4  Strep pneumonia with acute respiratory failure resolving  5  Metabolic alkalosis:  She is receiving furosemide 20 mg daily    She is not appear to be volume depleted but will continue to follow closely       Follow up reason for today's visit:  Acute kidney injury    Acute respiratory failure with hypoxia and hypercapnia (HCC)    Patient Active Problem List   Diagnosis    Allergic rhinitis    Benign colon polyp    Cataract    Constipation    Coronary artery disease involving native coronary artery of native heart without angina pectoris    Fibrocystic breast disease, unspecified laterality    Gait abnormality    Hiatal hernia with GERD without esophagitis    Glucose intolerance (no malabsorption)    Hyperlipidemia, mixed    Essential hypertension    Iron deficiency anemia    Malignant melanoma of skin (HCC)    Urinary incontinence    Vitamin D deficiency    COPD (chronic obstructive pulmonary disease) (HCC)    Anxiety    Compression fracture of thoracic vertebra, closed, initial encounter ([facility] [address] )    Proteinuria    Coronary angioplasty status    Old MI (myocardial infarction)    Chronic diastolic heart failure (HCC)    Chronic left shoulder pain    Chronic right shoulder pain    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    Acute respiratory failure with hypoxia and hypercapnia ([facility]    Streptococcal pneumonia (HCC)    Toxic metabolic encephalopathy    Acute kidney injury ([facility] [address] )         Subjective:   Feels well denies headaches dizziness chest pain shortness of breath abdominal pain nausea vomiting  She is eating  She does have back pain    Objective:     Vitals: Blood pressure 160/70, pulse 76, temperature 97 9 °F (36 6 °C), temperature source Tympanic, resp  rate 16, height 5' 2" (1 575 m), weight 79 4 kg (175 lb), SpO2 91 %, not currently breastfeeding  ,Body mass index is 32 01 kg/m²  Weight (last 2 days)     Date/Time   Weight    11/19/19 0600   79 4 (175)    11/18/19 0600   77 7 (171 3)    11/17/19 0600   78 5 (173 06)                Intake/Output Summary (Last 24 hours) at 11/19/2019 0842  Last data filed at 11/18/2019 1700  Gross per 24 hour   Intake 720 ml   Output --   Net 720 ml     I/O last 3 completed shifts:   In: 5 [P O :720]  Out: -          Physical Exam: /70 (BP Location: Right arm)   Pulse 76   Temp 97 9 °F (36 6 °C) (Tympanic)   Resp 16   Ht 5' 2" (1 575 m)   Wt 79 4 kg (175 lb)   SpO2 91%   BMI 32 01 kg/m²     General Appearance:    Alert, cooperative, no distress, appears stated age   Head:    Normocephalic, without obvious abnormality, atraumatic   Eyes:    Conjunctiva/corneas clear   Ears:    Normal external ears   Nose:   Nares normal, septum midline, mucosa normal, no drainage    or sinus tenderness   Throat:   Lips, mucosa, and tongue normal; teeth and gums normal   Neck:   Supple, symmetrical, trachea midline, no adenopathy;        thyroid:  No enlargement/tenderness/nodules; no carotid    bruit or JVD   Back:     Symmetric, no curvature, ROM normal, no CVA tenderness   Lungs:     Clear to auscultation bilaterally, respirations unlabored   Chest wall:    No tenderness or deformity   Heart:    Regular rate and rhythm, S1 and S2 normal, no murmur, rub   or gallop   Abdomen:     Soft, non-tender, bowel sounds active   Extremities:   Extremities normal, atraumatic, no cyanosis or edema   Skin:   Skin color, texture, turgor normal, no rashes or lesions   Lymph nodes:   Cervical normal   Neurologic:   CNII-XII intact            Lab, Imaging and other studies: I have personally reviewed pertinent labs  CBC: No results found for: WBC, HGB, HCT, MCV, PLT, ADJUSTEDWBC, MCH, MCHC, RDW, MPV, NRBC  CMP:   Lab Results   Component Value Date    K 4 1 11/18/2019    CL 91 (L) 11/18/2019    CO2 42 (H) 11/18/2019    BUN 18 11/18/2019    CREATININE 0 72 11/18/2019    CALCIUM 9 6 11/18/2019    EGFR 72 11/18/2019         Results from last 7 days   Lab Units 11/18/19  1107 11/18/19  0500 11/17/19  0537   POTASSIUM mmol/L 4 1 4 0 4 1   CHLORIDE mmol/L 91* 94* 94*   CO2 mmol/L 42* 41* 43*   BUN mg/dL 18 17 16   CREATININE mg/dL 0 72 0 69 0 66   CALCIUM mg/dL 9 6 9 2 9 3         Phosphorus: No results found for: PHOS  Magnesium: No results found for: MG  Urinalysis: No results found for: COLORU, CLARITYU, SPECGRAV, PHUR, LEUKOCYTESUR, NITRITE, PROTEINUA, GLUCOSEU, KETONESU, BILIRUBINUR, BLOODU  Ionized Calcium: No results found for: CAION  Coagulation: No results found for: PT, INR, APTT  Troponin: No results found for: TROPONINI  ABG: No results found for: PHART, YVJ7YXN, PO2ART, WTR9FSJ, P7SGAVGV, BEART, SOURCE  Radiology review:     IMAGING  No results found      Current Facility-Administered Medications   Medication Dose Route Frequency    acetaminophen (TYLENOL) tablet 650 mg  650 mg Oral Q6H PRN    amLODIPine (NORVASC) tablet 5 mg  5 mg Oral Daily    aspirin chewable tablet 81 mg  81 mg Oral Daily    atorvastatin (LIPITOR) tablet 40 mg  40 mg Oral Daily    calcium carbonate-vitamin D (OSCAL-D) 500 mg-200 units per tablet 2 tablet  2 tablet Oral BID With Meals    docusate sodium (COLACE) capsule 100 mg  100 mg Oral BID    enoxaparin (LOVENOX) subcutaneous injection 30 mg  30 mg Subcutaneous Daily    famotidine (PEPCID) tablet 20 mg  20 mg Oral BID    ferrous sulfate tablet 162 5 mg  162 5 mg Oral Daily    furosemide (LASIX) tablet 20 mg  20 mg Oral Daily    guaiFENesin (MUCINEX) 12 hr tablet 600 mg  600 mg Oral Q12H LIZZY    ipratropium (ATROVENT) 0 02 % inhalation solution 0 5 mg  0 5 mg Nebulization 4x Daily    Labetalol HCl (NORMODYNE) injection 5 mg  5 mg Intravenous Q6H PRN    levalbuterol (XOPENEX) inhalation solution 0 63 mg  0 63 mg Nebulization 4x Daily    lidocaine (LIDODERM) 5 % patch 1 patch  1 patch Topical Daily    LORazepam (ATIVAN) tablet 0 25 mg  0 25 mg Oral BID    losartan (COZAAR) tablet 25 mg  25 mg Oral Daily    melatonin tablet 6 mg  6 mg Oral HS    metoprolol tartrate (LOPRESSOR) partial tablet 12 5 mg  12 5 mg Oral Q12H LIZZY    OLANZapine (ZyPREXA ZYDIS) dispersible tablet 5 mg  5 mg Oral HS    ondansetron (ZOFRAN) injection 4 mg  4 mg Intravenous Q6H PRN    polyethylene glycol (MIRALAX) packet 17 g  17 g Oral Daily    predniSONE tablet 10 mg  10 mg Oral Daily    senna (SENOKOT) tablet 8 6 mg  1 tablet Oral HS    traMADol (ULTRAM) tablet 50 mg  50 mg Oral Q6H PRN     Medications Discontinued During This Encounter   Medication Reason    ipratropium-albuterol (DUO-NEB) 0 5-2 5 mg/3 mL inhalation solution 3 mL     nitroglycerin (NITROSTAT) SL tablet 0 4 mg     polyethylene glycol (GLYCOLAX) bowel prep 17 g     morphine injection 2 mg     sodium chloride 0 9 % infusion     LORazepam (ATIVAN) tablet 0 5 mg     oxyCODONE (ROXICODONE) IR tablet 5 mg     morphine injection 1 mg     methocarbamol (ROBAXIN) tablet 500 mg     enoxaparin (LOVENOX) subcutaneous injection 40 mg     potassium chloride (K-DUR,KLOR-CON) CR tablet 10 mEq     acetaminophen (TYLENOL) tablet 650 mg     melatonin tablet 3 mg     haloperidol lactate (HALDOL) injection 2 5 mg     haloperidol lactate (HALDOL) injection 2 5 mg     bisacodyl (DULCOLAX) rectal suppository 10 mg     azithromycin (ZITHROMAX) 500 mg in sodium chloride 0 9 % 250 mL IVPB     furosemide (LASIX) tablet 20 mg     haloperidol lactate (HALDOL) injection 1 mg     OLANZapine (ZyPREXA ZYDIS) dispersible tablet 10 mg     bisacodyl (DULCOLAX) rectal suppository 10 mg     furosemide (LASIX) tablet 20 mg     acetaminophen (TYLENOL) tablet 975 mg     cefTRIAXone (ROCEPHIN) IVPB (premix) 1,000 mg     amLODIPine (NORVASC) tablet 2 5 mg        Liane Barry MD      This progress note was produced in part using a dictation device which may document imprecise wording from author's original intent

## 2019-11-19 NOTE — PLAN OF CARE
Reviewed care plan with pt  Verbalized understanding  Will continue to monitor pt and follow plan of care

## 2019-11-19 NOTE — ASSESSMENT & PLAN NOTE
· Urine antigen positive for strep pneumonia  · Finished a 7-day course of IV ceftriaxone   · Wean oxygen as able

## 2019-11-19 NOTE — ASSESSMENT & PLAN NOTE
- at home on lasix 20 mg qd, lopressor 25 mg q12 (confirmed with patient's pharmacy)   - IM managing and recommend patient be dc'd on current inpatient regimen of lasix 20 mg qd, lopressor 12 5 mg q12, norvasc 5 mg qd, and losartan 50 mg qd

## 2019-11-19 NOTE — ASSESSMENT & PLAN NOTE
- was not on home O2 prior to admission however per IM based on desat study in acute care they are recommending 2 L at rest and 3 L with exertion at this time however was successfully titrated off O2 while in acute rehab unit   - changes of COPD not noted on most recent CT chest unknown if this patient has had this diagnosis made formally

## 2019-11-19 NOTE — CONSULTS
Please see discharge summary from earlier today regarding plan of care    Please do not hesitate to call Hospital Medicine with any questions or concerns

## 2019-11-19 NOTE — ASSESSMENT & PLAN NOTE
· Continue metoprolol and Lasix  · Low dose amlodipine added on 11/16  · Monitor BP and will adjust medications as needed

## 2019-11-19 NOTE — H&P
PHYSICAL MEDICINE AND REHABILITATION H&P/ADMISSION NOTE  Marlen Tubbs 80 y o  female MRN: [id number]  Unit/Bed#: [facility] 211-01 Encounter: [id number]     Rehab Diagnosis: debility     History of Present Illness:   Marlen Tubbs is a 80 y o  female who presented to the [address] [place] [address] back pain in the setting of compression deformity and patient was evaluated by ortho and neurosx who both recommended conservative management  Course complicated by respiratory failure 2/2 to PNA which was treated with abx  Subjective: patient states she does not feel SOB     Review of Systems: A 10-point review of systems was performed  Negative except as listed above      Plan:     PNA (pneumonia)  Assessment & Plan  - urine strep antigen positive in acute care   - s/p abx course in acute care     Compression deformity of vertebra  Assessment & Plan  - T10, T12, & L1   - evaluated by ortho through consultation (and remotely by neurosx) and both recommended bracing with TLSO and no further intervention   - OP FU with Dr Monika Khan     CKD (chronic kidney disease)  Assessment & Plan  - baseline Cr appears to be approximately 0 9  - Cr currently 0 81  - IM monitoring     Dysphagia  Assessment & Plan  - modified diet per ST     Chronic diastolic heart failure (HCC)  Assessment & Plan  - grade 1  - on lasix  - follows with Dr Walton Nyhan  - per PAPDMP gets regular prescriptions for ativan 0 5 mg tabs last filled on 09/19/19 for 90 tabs with 0 refills; per patient uses sparingly on an as needed basis     COPD (chronic obstructive pulmonary disease) ([facility] [address] )  Assessment & Plan  - was not on home O2 prior to admission however per IM based on desat study in acute care they are recommending 2 L at rest and 3 L with exertion at this time     GERD (gastroesophageal reflux disease)  Assessment & Plan  - with hiatal hernia   - therapeutic substitution for home zantac 150 mg BID    CAD (coronary artery disease)  Assessment & Plan  - h/o stent  - troponins peaked in acute care to 0 09  - evaluated by cards in acute care and attributed elevation in troponins to infxn and patient being in DELIO at the time   - on home ASA 81 mg qd   - on home lipitor 40 mg qpm  - on BB  - follows with Dr Art Brought     * Essential hypertension  Assessment & Plan  - at home on lasix 10 mg qd, lopressor 25 mg q12 (will need to confirm these doses as there appears to be a discrepancy in the EMR)   - IM managing        Drug regimen reviewed, all potential adverse effects identified and addressed:    Scheduled Meds:  Current Facility-Administered Medications:  acetaminophen 650 mg Oral Q6H PRN MD Hank Davidson ON 11/20/2019] amLODIPine 5 mg Oral Daily MD Hank Davidson ON 11/20/2019] aspirin 81 mg Oral Daily MD Hank Davidson ON 11/20/2019] atorvastatin 40 mg Oral Daily With Virginia Curry MD   bisacodyl 10 mg Rectal Daily PRN Andre Sy MD   famotidine 20 mg Oral BID MD Hank Davidson ON 11/20/2019] furosemide 20 mg Oral Daily Andre Sy MD   [START ON 11/20/2019] heparin (porcine) 5,000 Units Subcutaneous Q8H [address] Andre Sy MD   ipratropium 0 5 mg Nebulization Q6H PRN Andre Sy MD   levalbuterol 0 63 mg Nebulization Q6H PRN MD Hank Davidson ON 11/20/2019] lidocaine 1 patch Topical Daily MD Hank Davidson ON 11/20/2019] losartan 50 mg Oral Daily Andre Sy MD   melatonin 6 mg Oral HS Andre Sy MD   metoprolol tartrate 12 5 mg Oral Q12H Yariel Burns MD   OLANZapine 5 mg Oral HS Andre Sy MD   oxyCODONE 2 5 mg Oral Q4H PRN MD Hank Davidson ON 11/20/2019] polyethylene glycol 17 g Oral Daily Andre Sy MD          Incidental Findings:    1) hiatal hernia: at home on zantac 150 mg BID (therapeutic substitution) for associated GERD; OP FU with PCP with further testing/treatment and/or specialist referral at PCP's discretion     2) diverticulosis: OP FU with PCP with further testing/treatment and/or specialist referral at PCP's discretion     3) elevated peak PA pressure: OP FU with Dr Vanna Osei    4) renal cysts: OP FU with PCP with further testing/treatment and/or specialist referral at PCP's discretion     5) decreased alk phos of 49 (LLN 55): per IM consultant unlikely of clinical significance and recommended OP FU with PCP with further testing/treatment and/or specialist referral at PCP's discretion     6) myomatous uterus with calcified leiomyomas: asymptomatic, OP FU with PCP with further testing/treatment and/or specialist referral at PCP's discretion     DVT ppx: HSQ   Code: confirmed with patient that she wishes to be DNR/DNI and patient verbalized her understanding of what this means           Functional History - Prior to Admission:      I PTA     Functional Status Upon Admission to ARC:  Mobility: sup   Transfers: min   ADLs: michelle Interiano lives with their family  She lives in [facility]) single family home  The living area: can live on one level  There 1 step to enter the home  The patient will not have 24 hour supervision available upon discharge      Physical Exam:        General: alert, no apparent distress, cooperative and comfortable  HEENT:  Head: Normal, normocephalic, atraumatic    CARDIAC:  +S1/2  LUNGS:  respirations unlabored   ABDOMEN:  soft NT   EXTREMITIES:  volume status currently stable   NEURO:   cranial nerves 2-12 intact, muscle tone and strength normal and symmetric, sensation grossly normal and finger to nose and cerebellar exam normal  PSYCH:  mood/affect currently stable       Laboratory:    Results from last 7 days   Lab Units 11/18/19  0500 11/17/19  0537 11/16/19  0610   HEMOGLOBIN g/dL 13 7 14 4 13 6   HEMATOCRIT % 41 0* 44 8 40 9*   WBC Thousand/uL 8 10 10 10 7 50     Results from last 7 days   Lab Units 11/19/19  0909 11/18/19  1107 11/18/19  0500   BUN mg/dL 18 18 17   SODIUM mmol/L 138 137 139   POTASSIUM mmol/L 4 0 4 1 4 0 CHLORIDE mmol/L 93* 91* 94*   CREATININE mg/dL 0 81 0 72 0 69                  Past Medical History:   Past Surgical History:   Family History:   Social history:   Past Medical History:   Diagnosis Date    Abnormal blood sugar 03/13/2017    Abnormal carotid duplex scan     Carotid Duplex (Plaque formation is quite prominent bilaterally  Despite these changes, no evidence for greater than 50% diameter reducing lesions in the cervical portion of either carotid artery hemisystem ) - 6/13/2017    Anxiety     Cervical radiculopathy 08/08/2017    CH[facility]congestive heart failure) ([facility])     diastolic    COPD (chronic obstructive pulmonary disease) ([facility] [address] [facility] ) 2/6/2018    Coronary angioplasty status     Coronary artery disease 4/7/2017    Costochondritis 02/05/2013    suspect MS in origin given relationship to ROM and location  Start mobic and if persists, reeval  Refused xrayat this time   Dyslipidemia     GERD without esophagitis 8/30/2012    H/O CT scan of chest      (No PE, minimal interstitial change left lower lobe and right upper lobe, which may be acute ) - 5/19/2017    History of Holter monitoring     Holter (Sinus rhythm with rates ranging from 52 to 118 bpm   No afib or heart block  Rare atrial and ventricular ectopic beats  One six-beat atrial run noted  No pauses  ) - 5/31/2017    Hx of echocardiogram     Echo (EF 0 60 (60%), Biatrial enlargement ) - 3/24/2017 Echo (EF 0 55 (55%), mild LVH  Aortic valvular sclerosis  Trace MI with mild left atrial dilatation, mild MAC  Mild TI with mild pulmonary hypertension  ) - 5/22/2017 Echo (EF 0 60 (60%), Normal left vent chamber size and systolic function  Mild diastolic dysfunction of LV w/normal filling pressures  Mild mitral regurg ) - 7/26/2017 Echo (EF 0    Hx of echocardiogram 08/16/2017    EF0 60 (60%) Normal left vent chamber size and systolic function of LV w/normal filling presures  Mild mitral regurg  / EF0 50 (50%) Mild LVH   MIld MR 10/6/17  Hypertension     Iron deficiency anemia 4/21/2016    Macular degeneration, right eye     Malignant melanoma of skin ([facility] ) 9/17/2012    Mixed hyperlipidemia     [facility] 7/25/2017    Old MI (myocardial infarction)     Osteoarthritis 9/17/2012    Osteoporosis 3/13/2017    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    Urinary tract infection     frequent UTI's    Past Surgical History:   Procedure Laterality Date    ABDOMINAL SURGERY      APPENDECTOMY      BREAST SURGERY      Lumpectomy    CARDIAC CATHETERIZATION  03/24/2017    ARNIE to 100% occluded prox RCA, chronic 99% ostial LCfx, 60% mid LAD, discrete 40% distal LM / EF0 60 (60%) 100% occluded proximal RCA s/p ARNIE, chronic 99% ostial LCX, 60% mid LAD, discrete 40% dital LM  4/5/17    CHOLECYSTECTOMY      COLONOSCOPY N/A 7/25/2018    Procedure: COLONOSCOPY;  Surgeon: Valentina Sidhu MD;  Location: [address] [place] [address] MAIN OR;  Service: Gastroenterology    CORONARY STENT PLACEMENT  03/25/2017    ARNIE RCA    HEMORRHOID SURGERY      INSERTION STENT ARTERY  04/07/2017    Cardio vascular agents drug-eluting stent    SKIN BIOPSY      TONSILLECTOMY       Family History   Problem Relation Age of Onset    Heart failure Mother     Prostate cancer Father     Stroke Family       Social History     Socioeconomic History    Marital status:       Spouse name: Not on file    Number of children: Not on file    Years of education: Not on file    Highest education level: Not on file   Occupational History    Occupation: Retired   Social Needs    Financial resource strain: Not on file    Food insecurity:     Worry: Not on file     Inability: Not on file   [employer] needs:     Medical: Not on file     Non-medical: Not on file   Tobacco Use    Smoking status: Former Smoker     Types: Cigarettes    Smokeless tobacco: Never Used   Substance and Sexual Activity    Alcohol use: Not Currently    Drug use: Never  Sexual activity: Not Currently   Lifestyle    Physical activity:     Days per week: Not on file     Minutes per session: Not on file    Stress: Not on file   Relationships    Social connections:     Talks on phone: Not on file     Gets together: Not on file     Attends Nondenominational service: Not on file     Active member of club or organization: Not on file     Attends meetings of clubs or organizations: Not on file     Relationship status: Not on file    Intimate partner violence:     Fear of current or ex partner: Not on file     Emotionally abused: Not on file     Physically abused: Not on file     Forced sexual activity: Not on file   Other Topics Concern    Not on file   Social History Narrative    No caffeine use    Uses safety equipment-seatbelts    Primary language is [place]            Current Medical Diagnosis Allergies   Patient Active Problem List   Diagnosis    CAD (coronary artery disease)    GERD (gastroesophageal reflux disease)    Essential hypertension    [facility]    Anxiety    Chronic diastolic heart failure (HCC)    Compression deformity of vertebra    PNA (pneumonia)    CKD (chronic kidney disease)    Dysphagia    Allergies   Allergen Reactions    Ciprofloxacin      Reaction Date: 01Jul2011;     Keflex [Cephalexin] Dizziness    Nitrofurantoin      Reaction Date: 01Jul2011;     Penicillins Rash, Other (See Comments) and Throat Swelling     Throat swells           Medical Necessity Criteria for [facility] Admission: CKD  In addition, the preadmission screen, post-admission physical evaluation, overall plan of care and admissions order demonstrate a reasonable expectation that the following criteria were met at the time of admission to the [facility]    1  The patient requires active and ongoing therapeutic intervention of multiple therapy disciplines (physical therapy, occupational therapy, speech-language pathology, or prosthetics/orthotics), one of which is physical or occupational therapy  2  Patient requires an intensive rehabilitation therapy program, as defined in Chapter 1, section 110 2 2 of the CMS Medicare Policy Manual  This intensive rehabilitation therapy program will consist of at least 3 hours of therapy per day at least 5 days per week or at least 15 hours of intensive rehabilitation therapy within a 7 consecutive day period, beginning with the date of admission to the [facility]  3  The patient is reasonably expected to actively participate in, and benefit significantly from, the intensive rehabilitation therapy program as defined in Chapter 1, section 110 2 2 of the CMS Medicare Policy Manual at this time of admission to the [facility]  She can reasonably be expected to make measurable improvement (that will be of practical value to improve the patients functional capacity or adaptation to impairments) as a result of the rehabilitation treatment, as defined in section 110 3, and such improvement can be expected to be made within the prescribed period of time  As noted in the CMS Medicare Policy Manual, the patient need not be expected to achieve complete independence in the domain of self-care nor be expected to return to his or her prior level of functioning in order to meet this standard  4  The patient must require physician supervision by a rehabilitation physician  As such, a rehabilitation physician will conduct face-to-face visits with the patient at least 3 days per week throughout the patients stay in the [facility] to assess the patient both medically and functionally, as well as to modify the course of treatment as needed to maximize the patients capacity to benefit from the rehabilitation process    5  The patient requires an intensive and coordinated interdisciplinary approach to providing rehabilitation, as defined in Chapter 1, section 110 2 5 of the CMS Medicare Policy Manual  This will be achieved through periodic team conferences, conducted at least once in a 7-day period, and comprising of an interdisciplinary team of medical professionals consisting of: a rehabilitation physician, registered nurse,  and/or , and a licensed/certified therapist from each therapy discipline involved in treating the patient  Changes Since Pre-admission Assessment: None -This patient's participation in rehab continues to be reasonable, necessary and appropriate  CMS Required Post-Admission Physician Evaluation Elements  History and Physical, including medical history, functional history and active comorbidities as in above text      PostAdmission Physician Evaluation:  The patient has the potential to make improvement and is in need of physical, occupational, and/or therapy services  The patient may also need nutritional services  Given the patient's complex medical condition and risk of further medical complications, rehabilitative services cannot be safely provided at a lower level of care, such as a skilled nursing facility  I have reviewed the patient's functional and medical status at the time of the preadmission screening and they are the same as on the day of this admission  I acknowledge that I have personally performed a full physical examination on this patient within 24 hours of admission   The patient and/or family demonstrated understanding the rehabilitation program and the discharge process after we discussed them      Agree in entirety: yes  Minor adaptions: none    Major changes: none     Sohail Pike MD  Physical Medicine and Rehabilitation

## 2019-11-19 NOTE — ASSESSMENT & PLAN NOTE
· ER provider contacted neurosurgery Dr Diallo Foster  · Recommended pain control, PT OT, brace (TLSO)  · Orthopedics input appreciated- recommends the same   · Will continue with Lidoderm patch and Tylenol, trial Tramadol for severe pain  · Avoid narcotics due to patient's fluctuating mental status and avoid NSAIDs due to acute kidney injury

## 2019-11-19 NOTE — ASSESSMENT & PLAN NOTE
- baseline Cr appears to be approximately 0 9  - Cr currently 0 81  - IM monitoring and have cleared patient for dc

## 2019-11-19 NOTE — ASSESSMENT & PLAN NOTE
· Appears to be secondary to pneumonia as CT scan of the chest shows multifocal findings  · Antibiotics have been completed IV ceftriaxone (7 days therapy)  · Currently off BiPAP and saturating well on nasal cannula  · Procalcitonin improving  · Strep pneumonia positive  · Desaturation study performed on 11/18 that time patient is requiring 2 L of oxygen at rest and 3 L of oxygen with exertion

## 2019-11-19 NOTE — ASSESSMENT & PLAN NOTE
- per PAPDMP gets regular prescriptions for ativan 0 5 mg tabs last filled on 09/19/19 for 90 tabs with 0 refills; per patient uses sparingly on an as needed basis and has not required during inpatient rehab admission and given patient's age and other risk factors advised patient to discontinue use of this medication

## 2019-11-19 NOTE — ASSESSMENT & PLAN NOTE
- T10, T12, & L1   - evaluated by ortho through consultation (and remotely by neurosx) and both recommended bracing with TLSO and no further intervention   - OP FU with Dr Kaycee Akbar

## 2019-11-19 NOTE — ASSESSMENT & PLAN NOTE
- h/o stent  - troponins peaked in acute care to 0 09  - evaluated by cards in acute care and attributed elevation in troponins to infxn and patient being in DELIO at the time   - on home ASA 81 mg qd   - on home lipitor 40 mg qpm  - on BB  - follows with Dr Michael Sullivan

## 2019-11-19 NOTE — SOCIAL WORK
Pt has been accepted by ARU  Pt is agreeable to same  Spoke to pt granddaughter Jesse Chen to bring in some clothing for pt  Family will bring clothing in tonight

## 2019-11-19 NOTE — PHYSICAL THERAPY NOTE
Physical Therapy Treatment Note       11/19/19 1018   Pain Assessment   Pain Assessment 0-10   Pain Score Worst Possible Pain   Pain Type Acute pain;Chronic pain   Pain Location Back   Pain Orientation Lower   Pain Descriptors Aching; Sharp   Pain Frequency Constant/continuous   Pain Onset Ongoing   Patient's Stated Pain Goal No pain   Hospital Pain Intervention(s) Ambulation/increased activity; Rest;Emotional support;Repositioned   Restrictions/Precautions   Weight Bearing Precautions Per Order Yes   RLE Weight Bearing Per Order WBAT   LLE Weight Bearing Per Order WBAT   Braces or Orthoses TLSO  (removed when pt returned to bed)   Other Precautions Fall Risk;Pain;O2;Spinal precautions  (3 L O2 via NC)   General   Chart Reviewed Yes   Response to Previous Treatment Patient with no complaints from previous session  Family/Caregiver Present No   Cognition   Arousal/Participation Alert; Responsive; Cooperative   Attention Attends with cues to redirect   Orientation Level Oriented X4   Memory Decreased recall of biographical information;Decreased recall of precautions   Following Commands Follows one step commands without difficulty   Comments pt agreeable to PT session   Subjective   Subjective "I can walk, but don't know how far I can go"   Bed Mobility   Sit to Supine 3  Moderate assistance   Additional items Assist x 1; Increased time required;Verbal cues;LE management  (log roll technique utilized, pt requires max VC/TCs for form)   Transfers   Sit to Stand 4  Minimal assistance   Additional items Assist x 1; Armrests; Increased time required;Verbal cues   Stand to Sit 4  Minimal assistance   Additional items Assist x 2; Increased time required;Verbal cues   Toilet transfer 4  Minimal assistance   Additional items Assist x 1; Increased time required;Standard toilet;Verbal cues;Armrests   Ambulation/Elevation   Gait pattern Shuffling; Short stride; Excessively slow   Gait Assistance 5  Supervision  (SBA)   Additional items Assist x 1;Verbal cues; Tactile cues   Assistive Device Rolling walker   Distance 115 ft   Stair Management Assistance Not tested   Balance   Static Sitting Fair +   Dynamic Sitting Fair   Static Standing Fair   Dynamic Standing Fair -   Ambulatory Fair -   Endurance Deficit   Endurance Deficit Yes   Activity Tolerance   Activity Tolerance Patient limited by pain   Nurse Made Aware RN Mita Medrano verbalized pt appropriate for therapy and made aware of outcomes    Exercises   Hip Abduction Sitting;20 reps;AROM; Bilateral   Hip Adduction Sitting;20 reps;AROM; Bilateral   Knee AROM Long Arc Quad Sitting;20 reps;AROM; Right;Left   Ankle Pumps Sitting;20 reps;AROM; Bilateral   Marching   (pt unable to tolerate 2/2 pain)   Assessment   Prognosis Good   Problem List Decreased strength;Decreased endurance; Impaired balance;Decreased mobility; Decreased safety awareness;Pain;Orthopedic restrictions   Assessment Pt seen for PT treatment session this date with interventions consisting of gait training w/ emphasis on improving pt's ability to ambulate level surfaces x 115 ft with SBA provided by therapist with RW, Therapeutic exercise consisting of: AROM 2 sets of 10 reps B LE in sitting position and therapeutic activity consisting of training: supine<>sit transfers, sit<>stand transfers and vc and tactile cues for static standing posture faciliation  Pt agreeable to PT treatment session upon arrival, pt found seated OOB in chair, in no apparent distress, A&O x 4 and responsive  In comparison to previous session, pt with improvements in tolerating increased physical activity c less assistance, no overt LOB noted  Post session: pt returned BTB, all needs in reach and RN notified of session findings/recommendations  Continue to recommend STR at time of d/c in order to maximize pt's functional independence and safety w/ mobility  Pt continues to be functioning below baseline level, and remains limited 2* factors listed above   PT will continue to see pt while here in order to address the deficits listed above and provide interventions consistent w/ POC in effort to achieve STGs  Barriers to Discharge Inaccessible home environment   Goals   Patient Goals "to keep getting stronger"   STG Expiration Date 11/21/19   Short Term Goal #1 goals remain appropriate   PT Treatment Day 6   Plan   Treatment/Interventions Functional transfer training;LE strengthening/ROM; Therapeutic exercise; Endurance training;Patient/family training;Equipment eval/education; Bed mobility;Gait training;Spoke to nursing   Progress Progressing toward goals   PT Frequency 5x/wk   Recommendation   Recommendation Short-term skilled PT   Equipment Recommended Walker   PT - OK to Discharge Yes  (when medically cleared, if to STR)       Gary Melendrez, PT    Time of PT treatment session: 9202-9223

## 2019-11-19 NOTE — PLAN OF CARE
Problem: PHYSICAL THERAPY ADULT  Goal: Performs mobility at highest level of function for planned discharge setting  See evaluation for individualized goals  Description  Treatment/Interventions: Functional transfer training, LE strengthening/ROM, Therapeutic exercise, Endurance training, Patient/family training, Cognitive reorientation, Equipment eval/education, Bed mobility, Gait training, Spoke to nursing  Equipment Recommended: Walker(continue RW use)       See flowsheet documentation for full assessment, interventions and recommendations  Outcome: Progressing  Note:   Prognosis: Good  Problem List: Decreased strength, Decreased endurance, Impaired balance, Decreased mobility, Decreased safety awareness, Pain, Orthopedic restrictions  Assessment: Pt seen for PT treatment session this date with interventions consisting of gait training w/ emphasis on improving pt's ability to ambulate level surfaces x 115 ft with SBA provided by therapist with RW, Therapeutic exercise consisting of: AROM 2 sets of 10 reps B LE in sitting position and therapeutic activity consisting of training: supine<>sit transfers, sit<>stand transfers and vc and tactile cues for static standing posture faciliation  Pt agreeable to PT treatment session upon arrival, pt found seated OOB in chair, in no apparent distress, A&O x 4 and responsive  In comparison to previous session, pt with improvements in tolerating increased physical activity c less assistance, no overt LOB noted  Post session: pt returned BTB, all needs in reach and RN notified of session findings/recommendations  Continue to recommend STR at time of d/c in order to maximize pt's functional independence and safety w/ mobility  Pt continues to be functioning below baseline level, and remains limited 2* factors listed above   PT will continue to see pt while here in order to address the deficits listed above and provide interventions consistent w/ POC in effort to achieve STGs   Barriers to Discharge: Inaccessible home environment     Recommendation: Short-term skilled PT     PT - OK to Discharge: Yes(when medically cleared, if to STR)    See flowsheet documentation for full assessment

## 2019-11-20 NOTE — NURSING NOTE
Needs encouragement to move, amb to BR with min assist , back brace applied with ambulation    Medicated for pain with good results

## 2019-11-20 NOTE — PROGRESS NOTES
OT eval and ADL note     11/20/19 9482   Patient Data   Rehab Impairment Debility   Etiologic Diagnosis Pneumonia, CAD, COPD, compression fx   Date of Onset 11/10/19   Support System   Name Yair Louis   Relationship Granddaughters   Home Setup   Type of Home Multi Level; Other  (pt lives between [facility])   Method of Entry Stairs   Number of Stairs 2   First Floor Bathroom Combo   First Floor Bathroom Accessibility Shower chair;Raised toilet seat   Available Equipment Rollator; Bedside Commode; Shower Chair   Baseline Information   Transportation Family/friends drive   Prior IADL Participation   Money Management Combine Bills;Manage Checkbook   Meal Preparation Partial Participation   Laundry   (granddaughters complete)   Home Cleaning Partial Participation   Prior Level of Function   Self-Care 3  Independent - Patient completed the activities by him/herself, with or without an assistive device, with no assistance from a helper  Functional Cognition 3  Independent - Patient completed the activities by him/herself, with or without an assistive device, with no assistance from a helper  Prior Device Used Z   None of the above   Falls in the Last Year   Number of falls in the past 12 months 1  (no injury however pt does recall sitting back in tub recentl)   Restrictions/Precautions   Precautions Spinal precautions;O2;Fall Risk;Aspiration;Pain  (new O2 use 3L/ min, back brace)   Pain Assessment   Pain Assessment 0-10   Pain Score 5  (pt reports better than earilier )   Pain Location Back   Pain Orientation Mid   Eating Assessment   Type of Assistance Needed Supervision   Eating CARE Score 4   Oral Hygiene   Type of Assistance Needed Supervision   Oral Hygiene CARE Score 4   Grooming   Able To Initiate Tasks;Comb/Brush Hair;Brush/Clean Teeth;Wash/Dry Face;Wash/Dry Hands   Limitation Noted In Safety   Tub/Shower Transfer   Reason Not Assessed Endurance;Sponge Bath   Shower/Bathe Self   Type of Assistance Needed Physical assistance   Amount of Physical Assistance Provided 25%-49%   Shower/Bathe Self CARE Score 3   Bathing   Assessed Bath Style Sponge Bath   Anticipated D/C Bath Style Sponge Bath   Able to Gather/Transport No   Able to [employer] Temperature No   Able to Wash/Rinse/Dry (body part) Left Arm;Right Arm;L Upper Leg;R Upper Leg;Chest;Abdomen;Perineal Area   Limitations Noted in Balance; Endurance;ROM;Safety;Strength   Dressing/Undressing Clothing   Remove UB Clothes Pullover Shirt   Don UB Clothes Pullover Shirt   Type of Assistance Needed Supervision   Upper Body Dressing CARE Score 4   Remove LB Clothes Socks   Don LB Clothes Pants; Undergarment;Socks; Shoes   Type of Assistance Needed Physical assistance   Amount of Physical Assistance Provided 75% or more   Lower Body Dressing CARE Score 2   Limitations Noted In Balance; Endurance; Safety;Strength;ROM  (new O2 use)   Putting On/Taking Off Footwear   Type of Assistance Needed Physical assistance   Amount of Physical Assistance Provided 75% or more   Putting On/Taking Off Footwear CARE Score 2   Toileting Hygiene   Type of Assistance Needed Physical assistance   Amount of Physical Assistance Provided 25%-49%   Toileting Hygiene CARE Score 3   Toilet Transfer   Surface Assessed Raised Toilet   Transfer Technique Stand Pivot   Limitations Noted In Balance; Endurance;ROM;Safety;LE Strength   Type of Assistance Needed Physical assistance   Amount of Physical Assistance Provided Less than 25%   Toilet Transfer CARE Score 3   Toileting   Able to Pull Clothing down no, up yes     Manage Hygiene Bladder   Limitations Noted In Balance;ROM;Safety;LE Strength   Transfer Bed/Chair/Wheelchair   Type of Assistance Needed Physical assistance   Amount of Physical Assistance Provided Less than 25%   Chair/Bed-to-Chair Transfer CARE Score 3   Sit to Stand   Type of Assistance Needed Physical assistance   Amount of Physical Assistance Provided Less than 25%   Sit to Stand CARE Score 3   Picking Up Object   Type of Assistance Needed Physical assistance   Amount of Physical Assistance Provided 75% or more   Picking Up Object CARE Score 2   RUE Assessment   RUE Assessment WFL   LUE Assessment   LUE Assessment WFL   Coordination   Movements are Fluid and Coordinated 1   Sensation   Light Touch No apparent deficits   Propioception No apparent deficits   Cognition   Overall Cognitive Status WFL   Orientation Level Oriented X4   Discharge Information   Vocational Plan Retired/not working   Patient's Discharge Plan enjoys looking at the tablet and doing search a word   Patient's Rehab Expectations "To do things by myself "   Impressions Pt presents following hospitalization due to debility admitted due to pneumonia and compression fracture with conservative treatment  Pt requires assist due to new O2 use at 3L/ min, decreased balance, safety, endurance and ROM at waist with spinal precautions and back brace  Pt tolerates evaluation well and will benefit from continued skilled OT services to increase independence with daily tasks     OT Therapy Minutes   OT Time In 0917   OT Time Out 1020   OT Total Time (minutes) 63   OT Mode of treatment - Individual (minutes) 63

## 2019-11-20 NOTE — TREATMENT PLAN
Individualized Plan of Carol Moore 4 80 y o  female MRN: [id number]  Unit/Bed#: -01 Encounter: [id number]     PATIENT INFORMATION  ADMISSION DATE: 11/19/2019  3:38 PM BARTOLO CATEGORY: other ortho   ADMISSION DIAGNOSIS: other ortho (compression deformity)  EXPECTED LOS: 1-2 wks      MEDICAL/FUNCTIONAL PROGNOSIS  Based on my assessment of the patient's medical conditions and current functional status, the prognosis for attaining medical and functional goals or the IRF stay is:  Fair     Medical Goals: pain control     ANTICIPATED DISCHARGE DISPOSITION AND SERVICES  COMMUNITY SETTING: home     ANTICIPATED FOLLOW-UP SERVICE:   Outpatient Therapy Services: PT/OT/ST      DISCIPLINE SPECIFIC PLANS:  Required Disciplines & Services: PT/OT/ST    REQUIRED THERAPY:  Therapy Hours per Day Days per Week Total Days   Physical Therapy 1 5 10   Occupational Therapy 1 5 10   Speech/Language Therapy 1 5 10   NOTE: Additional therapy time(s) may be added as appropriate to meet patient needs and to achieve functional goals        ANTICIPATED FUNCTIONAL OUTCOMES:  ADL:   Patient will maximize level for ADLs with least restrictive device upon completion of the rehab program     Bladder/Bowel: Patient will be independent with bladder/bowel management with least restrictive device upon completion of rehab program   Transfers: Patient will be independent with transfers with least restrictive device upon completion of rehab program   Locomotion: Patient will be independent with locomotion with least restrictive device upon completion of rehab program   Cognitive:  Patient appears to be at baseline      [address] needs: tbd       REHAB ANTICIPATED PARTICIPATION RESTRICTIONS:  None

## 2019-11-20 NOTE — PROGRESS NOTES
11/20/19 1333   Patient Data   Rehab Impairment Debility   Etiologic Diagnosis pneumonia; COPD; compression fx  Support System   Name Andrea Harrington grand dtr  Home Setup   Type of Home Multi Level   Method of Entry Stairs;Hand Rail Right   Number of Stairs 2   First Floor Setup Available Yes   Prior Level of Function   Indoor-Mobility (Ambulation) 3  Independent - Patient completed the activities by him/herself, with or without an assistive device, with no assistance from a helper  Stairs 3  Independent - Patient completed the activities by him/herself, with or without an assistive device, with no assistance from a helper  Prior Device Used Z  None of the above   Restrictions/Precautions   Precautions Fall Risk;O2   Braces or Orthoses TLSO   Pain Assessment   Pain Assessment No/denies pain   Pain Score 8   Pain Type Acute pain   Pain Location Back   Pain Orientation Lower   Transfer Bed/Chair/Wheelchair   Limitations Noted In Balance; Endurance;Pain Management;LE Strength   Adaptive Equipment Roller Walker   Type of Assistance Needed Physical assistance   Amount of Physical Assistance Provided 25%-49%   Comment min assist   Chair/Bed-to-Chair Transfer CARE Score 3   Roll Left and Right   Type of Assistance Needed Supervision   Roll Left and Right CARE Score 4   Sit to Lying   Type of Assistance Needed Supervision   Sit to Lying CARE Score 4   Lying to Sitting on Side of Bed   Type of Assistance Needed Physical assistance   Amount of Physical Assistance Provided 25%-49%   Comment min assist   Lying to Sitting on Side of Bed CARE Score 3   Sit to Stand   Type of Assistance Needed Physical assistance   Amount of Physical Assistance Provided 25%-49%   Comment min assist   Sit to Stand CARE Score 3   Car Transfer   Reason if not Attempted Safety concerns   Car Transfer CARE Score 88   Ambulation   Primary Mode of Locomotion Prior to Admission Walk   Distance Walked (feet) 68 ft   Assist Device Roller Walker   Gait Pattern Inconsistant Janell   Limitations Noted In Balance; Endurance; Safety;Strength   Provided Assistance with: Balance   Walk Assist Level Minimum Assist   Findings level and carpet   Walk 10 Feet   Type of Assistance Needed Physical assistance   Amount of Physical Assistance Provided 25%-49%   Comment min assist   Walk 10 Feet CARE Score 3   Walk 50 Feet with Two Turns   Type of Assistance Needed Physical assistance   Amount of Physical Assistance Provided 25%-49%   Comment min assist   Walk 50 Feet with Two Turns CARE Score 3   Walk 150 Feet   Reason if not Attempted Safety concerns   Walk 150 Feet CARE Score 88   Walking 10 Feet on Uneven Surfaces   Type of Assistance Needed Physical assistance   Amount of Physical Assistance Provided 25%-49%   Comment min assist   Walking 10 Feet on Uneven Surfaces CARE Score 3   Wheel 50 Feet with Two Turns   Reason if not Attempted Activity not applicable   Wheel 50 Feet with Two Turns CARE Score 9   Wheel 150 Feet   Reason if not Attempted Activity not applicable   Wheel 838 Feet CARE Score 9   Curb or Single Stair   Style negotiated Single stair   Type of Assistance Needed Physical assistance   Amount of Physical Assistance Provided 25%-49%   Comment min assist   1 Step (Curb) CARE Score 3   4 Steps   Type of Assistance Needed Physical assistance   Amount of Physical Assistance Provided 25%-49%   Comment min assist   4 Steps CARE Score 3   Stairs   Type Stairs   # of Steps 5   Weight Bearing Precautions WBAT   Assist Devices Bilateral Rail   Findings   (min assist)   Comprehension   Assist Devices Glasses   QI: Comprehension 4  Undestands: Clear comprehension without cues or repetitions   Comprehension (FIM) 6 - Understands complex/abstract but requires  glasses for visual comp   Expression   QI: Expression 4   Express complex messages without difficulty and with speech that is clear and easy to [place]   Expression (FIM) 7 - Expresses complex/abstract ideas in a reasonable time w/o devices or helper  Social Interaction   Social Interaction (FIM) 6 - Interacts appropriately with others BUT requires extra  time   Problem Solving   Problem solving (FIM) 6 - Solves complex problems BUT requires extra time   Memory   Memory (FIM) 6 - Recognizes with extra time   RLE Assessment   RLE Assessment X  (ROM WFL)   Strength RLE   R Hip Flexion 4/5   R Hip Extension 4/5   R Hip ABduction 3+/5   R Hip ADduction 3+/5   R Knee Flexion 4+/5   R Knee Extension 4+/5   R Ankle Dorsiflexion 3+/5   R Ankle Plantar Flexion 3+/5   LLE Assessment   LLE Assessment X  (ROM WFL)   Strength LLE   L Hip Flexion 4/5   L Hip Extension 4/5   L Hip ABduction 3+/5   L Hip ADduction 3+/5   L Knee Flexion 4+/5   L Knee Extension 4+/5   L Ankle Dorsiflexion 4/5   L Ankle Plantar Flexion 4/5   Coordination   Movements are Fluid and Coordinated 1   Sensation   Light Touch No apparent deficits   Propioception No apparent deficits   Cognition   Overall Cognitive Status WFL   Arousal/Participation Alert; Responsive; Cooperative   Attention Within functional limits   Orientation Level Oriented X4   Memory Within functional limits   Following Commands Follows all commands and directions without difficulty   PT Therapy Minutes   PT Time In 1333   PT Time Out 1440   PT Total Time (minutes) 67   PT Mode of treatment - Individual (minutes) 67   PT Mode of treatment - Concurrent (minutes) 0   PT Mode of treatment - Group (minutes) 0   PT Mode of treatment - Co-treat (minutes) 0   PT Mode of Treatment - Total time(minutes) 67 minutes   PT Cumulative Minutes 67   Cumulative Minutes   Cumulative therapy minutes 67

## 2019-11-20 NOTE — PROGRESS NOTES
Physical Medicine and Rehabilitation Progress Note  Shawn Thompson 80 y o  female MRN: [id number]  Unit/Bed#: [facility] 211-01 Encounter: [id number]    HPI: Shawn Thompson is a 80 y o  female who presented to the [address] [place] [address] back pain in the setting of compression deformity and patient was evaluated by ortho and neurosx who both recommended conservative management  Course complicated by respiratory failure 2/2 to PNA which was treated with abx  Chief Complaint: PNA and compression deformity    Interval/subjective: patient denies any events over night     ROS: A 10 point ROS was performed; negative except as noted above       Assessment/Plan:      PNA (pneumonia)  Assessment & Plan  - urine strep antigen positive in acute care   - s/p abx course in acute care     Compression deformity of vertebra  Assessment & Plan  - T10, T12, & L1   - evaluated by ortho through consultation (and remotely by neurosx) and both recommended bracing with TLSO and no further intervention   - OP FU with Dr Brett Garces     CKD (chronic kidney disease)  Assessment & Plan  - baseline Cr appears to be approximately 0 9  - Cr currently 0 81  - IM monitoring     Dysphagia  Assessment & Plan  - modified diet per ST     Chronic diastolic heart failure (HCC)  Assessment & Plan  - grade 1  - on lasix  - follows with Dr Vignesh Hagen  - per PAPDMP gets regular prescriptions for ativan 0 5 mg tabs last filled on 09/19/19 for 90 tabs with 0 refills; per patient uses sparingly on an as needed basis     COPD (chronic obstructive pulmonary disease) ([facility] [address] )  Assessment & Plan  - was not on home O2 prior to admission however per IM based on desat study in acute care they are recommending 2 L at rest and 3 L with exertion at this time     GERD (gastroesophageal reflux disease)  Assessment & Plan  - with hiatal hernia   - therapeutic substitution for home zantac 150 mg BID    CAD (coronary artery disease)  Assessment & Plan  - h/o stent  - troponins peaked in acute care to 0 09  - evaluated by cards in acute care and attributed elevation in troponins to infxn and patient being in DELIO at the time   - on home ASA 81 mg qd   - on home lipitor 40 mg qpm  - on BB  - follows with Dr Elizabet Rodarte     * Essential hypertension  Assessment & Plan  - at home on lasix 10 mg qd, lopressor 25 mg q12 (will need to confirm these doses as there appears to be a discrepancy in the EMR)   - IM managing        Scheduled Meds:  Current Facility-Administered Medications:  acetaminophen 650 mg Oral Q6H PRN Josefina Butt MD   amLODIPine 5 mg Oral Daily Josefina Butt MD   aspirin 81 mg Oral Daily Josefina Butt MD   atorvastatin 40 mg Oral Daily With Nadia De Paz MD   bisacodyl 10 mg Rectal Daily PRN Josefina Butt MD   famotidine 20 mg Oral BID Josefina Butt MD   furosemide 20 mg Oral Daily Josefina Butt MD   heparin (porcine) 5,000 Units Subcutaneous Q8H Zora Clark MD   ipratropium 0 5 mg Nebulization Q6H PRN Josefina Butt MD   levalbuterol 0 63 mg Nebulization Q6H PRN Josefina Butt MD   lidocaine 1 patch Topical Daily Josefina Butt MD   losartan 50 mg Oral Daily Josefina Butt MD   melatonin 6 mg Oral HS Josefina Butt MD   metoprolol tartrate 12 5 mg Oral Q12H [facility] Josefina Butt MD   OLANZapine 5 mg Oral HS Josefina Butt MD   oxyCODONE 2 5 mg Oral Q4H PRN Josefina Butt MD   polyethylene glycol 17 g Oral Daily Josefina Butt MD        Incidental Findings:     1) hiatal hernia: at home on zantac 150 mg BID (therapeutic substitution) for associated GERD; OP FU with PCP with further testing/treatment and/or specialist referral at PCP's discretion      2) diverticulosis: OP FU with PCP with further testing/treatment and/or specialist referral at PCP's discretion      3) elevated peak PA pressure: OP FU with Dr Elizabet Rodarte     4) renal cysts: OP FU with PCP with further testing/treatment and/or specialist referral at PCP's discretion      5) decreased alk phos of 49 (LLN 54): per IM consultant unlikely of clinical significance and recommended OP FU with PCP with further testing/treatment and/or specialist referral at PCP's discretion      6) myomatous uterus with calcified leiomyomas: asymptomatic, OP FU with PCP with further testing/treatment and/or specialist referral at PCP's discretion      DVT ppx: HSQ   Code: confirmed with patient that she wishes to be DNR/DNI and patient verbalized her understanding of what this means        Objective:    Functional Update:  Mobility: min  Transfers: min-mod   ADLs: mod         Physical Exam:        General: alert, no apparent distress, cooperative and comfortable  HEENT:  Head: Normal, normocephalic, atraumatic    CARDIAC:  +S1/2  LUNGS:  respirations unlabored   ABDOMEN:  soft NT   EXTREMITIES:  volume status currently stable   NEURO:   awake, alert, appropriately answering questions   PSYCH:  mood/affect currently stable     Laboratory:   Results from last 7 days   Lab Units 11/18/19  0500 11/17/19  0537 11/16/19  0610   HEMOGLOBIN g/dL 13 7 14 4 13 6   HEMATOCRIT % 41 0* 44 8 40 9*   WBC Thousand/uL 8 10 10 10 7 50     Results from last 7 days   Lab Units 11/19/19  0909 11/18/19  1107 11/18/19  0500   BUN mg/dL 18 18 17   SODIUM mmol/L 138 137 139   POTASSIUM mmol/L 4 0 4 1 4 0   CHLORIDE mmol/L 93* 91* 94*   CREATININE mg/dL 0 81 0 72 0 69

## 2019-11-21 NOTE — NURSING NOTE
Up to BR with walker and min assist  Back brace in place when OOB  Pt compliant with SCDs overnight

## 2019-11-21 NOTE — PCC SPEECH THERAPY
Brett Escalante a 80 y  o  female who presented to the [facility] with back pain in the setting of compression deformity and patient was evaluated by ortho and neurosx who both recommended conservative management  Course complicated by respiratory failure secondary to pneumonia which was treated with abx  Prior to admission, patient was reportedly independent with mobility, transfers and activities of daily living  Manpreet Crain lives with family in a single family home with one step to enter  First floor set up is available  The patient will not have 24 hour supervision available upon discharge  Skilled speech assessment was completed  Patient presents with mild oral pharyngeal and moderate esophageal phase dysphagia characterized by extended oral phase, cough with thin liquids and the feeling of food being stuck in her esophagus  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    Recommended diet texture is puree with thin liquids  Speech comprehension at the multi step level is supervision for instructions and safety precautions  Expression is mod I for verbal sequencing with familiar tasks  Cognitively patient presents as supervision for memory with breakdowns in short term memory noted in particular  Reasoning is supervision level as it can be affected by memory deficit  11/25/19  Patient is participating in skilled ST focusing on swallowing oral function and cognitive communication skills  Current recommended diet texture is puree with thin liquids and SLP supplying PO trials of more complex pleasure foods 1:1 secondary to esophageal phase dysphagia and resistance to complex solids  Speech comprehension and expression at the multi step level is mod I for conveying ideas and verbal sequencing  Memory is supervision for thought organization and recent recall  Reasoning is supervision as she looks to a helper for complex problem solving at this time

## 2019-11-21 NOTE — PROGRESS NOTES
Physical Medicine and Rehabilitation Progress Note  Bobby Gonzalez 80 y o  female MRN: [id number]  Unit/Bed#: -01 Encounter: [id number]    HPI: Bobby Gonzalez is a 80 y o  female who presented to the [address] [place] [address] back pain in the setting of compression deformity and patient was evaluated by ortho and neurosx who both recommended conservative management  Course complicated by respiratory failure 2/2 to PNA which was treated with abx  Chief Complaint: PNA and compression deformity    Interval/subjective: d/w patient pain management plan      ROS: A 10 point ROS was performed; negative except as noted above       Assessment/Plan:      PNA (pneumonia)  Assessment & Plan  - urine strep antigen positive in acute care   - s/p abx course in acute care     Compression deformity of vertebra  Assessment & Plan  - T10, T12, & L1   - evaluated by ortho through consultation (and remotely by neurosx) and both recommended bracing with TLSO and no further intervention   - OP FU with Dr Phillip Peterson     CKD (chronic kidney disease)  Assessment & Plan  - baseline Cr appears to be approximately 0 9  - Cr currently 0 81  - IM monitoring     Dysphagia  Assessment & Plan  - modified diet per ST     Chronic diastolic heart failure (HCC)  Assessment & Plan  - grade 1  - on lasix  - follows with Dr Perri Sprague  - per PAPDMP gets regular prescriptions for ativan 0 5 mg tabs last filled on 09/19/19 for 90 tabs with 0 refills; per patient uses sparingly on an as needed basis     COPD (chronic obstructive pulmonary disease) ([facility] [address] )  Assessment & Plan  - was not on home O2 prior to admission however per IM based on desat study in acute care they are recommending 2 L at rest and 3 L with exertion at this time     GERD (gastroesophageal reflux disease)  Assessment & Plan  - with hiatal hernia   - therapeutic substitution for home zantac 150 mg BID     CAD (coronary artery disease)  Assessment & Plan  - h/o stent  - troponins peaked in acute care to 0 09  - evaluated by cards in acute care and attributed elevation in troponins to infxn and patient being in DELIO at the time   - on home ASA 81 mg qd   - on home lipitor 40 mg qpm  - on BB  - follows with Dr Elder Jacobs     * Essential hypertension  Assessment & Plan  - at home on lasix 20 mg qd, lopressor 25 mg q12 (confirmed with patient's pharmacy)   - IM managing      Scheduled Meds:    Current Facility-Administered Medications:  acetaminophen 650 mg Oral Q6H PRN Boone Matos MD   al mag oxide-diphenhydramine-lidocaine viscous 10 mL Swish & Spit Q6H PRN Boone Matos MD   amLODIPine 5 mg Oral Daily Boone Matos MD   aspirin 81 mg Oral Daily Boone Matos MD   atorvastatin 40 mg Oral Daily With Jinny Tsai MD   bisacodyl 10 mg Rectal Daily PRN Boone Matos MD   famotidine 20 mg Oral BID Boone Matos MD   furosemide 20 mg Oral Daily Boone Matos MD   heparin (porcine) 5,000 Units Subcutaneous Q8H Yariel Burns MD   ipratropium 0 5 mg Nebulization Q6H PRN Boone Matos MD   levalbuterol 0 63 mg Nebulization Q6H PRN Boone Matos MD   lidocaine 1 patch Topical Daily Boone Matos MD   losartan 50 mg Oral Daily Boone Matos MD   melatonin 6 mg Oral HS Boone Matos MD   metoprolol tartrate 12 5 mg Oral Q12H Yariel Burns MD   OLANZapine 5 mg Oral HS Boone Matos MD   oxyCODONE 2 5 mg Oral Q4H PRN Boone Matos MD   oxyCODONE 5 mg Oral Q4H PRN Boone Matos MD   polyethylene glycol 17 g Oral Daily Boone Matos MD        Incidental Findings:     1) hiatal hernia: at home on zantac 150 mg BID (therapeutic substitution) for associated GERD; OP FU with PCP with further testing/treatment and/or specialist referral at PCP's discretion      2) diverticulosis: OP FU with PCP with further testing/treatment and/or specialist referral at PCP's discretion      3) elevated peak PA pressure: OP FU with Dr Elder Jacobs     4) renal cysts: OP FU with PCP with further testing/treatment and/or specialist referral at PCP's discretion      5) decreased alk phos of 49 (LLN 55): per IM consultant unlikely of clinical significance and recommended OP FU with PCP with further testing/treatment and/or specialist referral at PCP's discretion      6) myomatous uterus with calcified leiomyomas: asymptomatic, OP FU with PCP with further testing/treatment and/or specialist referral at PCP's discretion      DVT ppx: HSQ   Code: confirmed with patient that she wishes to be DNR/DNI and patient verbalized her understanding of what this means        Objective:    Functional Update:  Mobility: min  Transfers: min-mod   ADLs: mod         Physical Exam:          General: alert, no apparent distress, cooperative and comfortable  HEENT:  Head: Normal, normocephalic, atraumatic    CARDIAC:  +S1/2  LUNGS:  respirations unlabored   ABDOMEN:  soft NT   EXTREMITIES:  volume status currently stable   NEURO:   awake, alert, appropriately answering questions   PSYCH:  mood/affect currently stable     Laboratory:   Results from last 7 days   Lab Units 11/18/19  0500 11/17/19  0537 11/16/19  0610   HEMOGLOBIN g/dL 13 7 14 4 13 6   HEMATOCRIT % 41 0* 44 8 40 9*   WBC Thousand/uL 8 10 10 10 7 50     Results from last 7 days   Lab Units 11/19/19  0909 11/18/19  1107 11/18/19  0500   BUN mg/dL 18 18 17   SODIUM mmol/L 138 137 139   POTASSIUM mmol/L 4 0 4 1 4 0   CHLORIDE mmol/L 93* 91* 94*   CREATININE mg/dL 0 81 0 72 0 69

## 2019-11-21 NOTE — SPEECH THERAPY NOTE
Speech Pathology Discharge Summary  Darby Aguirre a 80 y  o  female who presented to the [facility] with back pain in the setting of compression deformity and patient was evaluated by ortho and neurosx who both recommended conservative management  Course complicated by respiratory failure secondary to pneumonia which was treated with abx  Prior to admission, patient was reportedly independent with mobility, transfers and activities of daily living  Arlin Whitlock lives with family in a single family home with one step to enter  First floor set up is available  The patient will not have 24 hour supervision available upon discharge  Skilled speech assessment was completed  Patient presents with mild oral pharyngeal and moderate esophageal phase dysphagia characterized by extended oral phase, cough with thin liquids and the feeling of food being stuck in her esophagus  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    Recommended diet texture is puree with thin liquids  Speech comprehension at the multi step level is supervision for instructions and safety precautions  Expression is mod I for verbal sequencing with familiar tasks  Cognitively patient presents as supervision for memory with breakdowns in short term memory noted in particular  Reasoning is supervision level as it can be affected by memory deficit  Patient has participated in skilled ST focusing on swallowing oral function and cognitive communication skills  Current recommended diet texture is Dysphagia #2, Mechanical Soft with thin liquids secondary to esophageal phase dysphagia and resistance to complex solids  Speech comprehension and expression at the multi step level is mod I for conveying ideas and verbal sequencing  Memory is Mod I for thought organization and recent recall  Reasoning is supervision as she looks to a helper for complex problem solving at this time

## 2019-11-21 NOTE — PCC CARE MANAGEMENT
Initial assessment & orientation to ARC with Pt & spoke with Pt's granddaughter, Berenice, via phone  Discussed [address] with Allan 9, who expressed understanding & agreement  Pt resides with Berenice half the time & her other granddaughter, Huey Lozoya, the other half of the time  Upon DC from [facility], the plan is for Pt to go to Berenice's home  Berenice works, but may be able to work from home if needed  DFamily has lift chairs that Pt can use if needed  Pt was not on O2 prior & is hopeful to be weaned off soon  SW will continue to monitor & assist as needed with [address]  IMM reviewed, signed & submitted for scanning  11/26/19 - Tx team recommendations & DC planning reviewed with patient & family, who expressed understanding & agreement  Target DC date is Friday, 11/29/19 with [facility] (PT, OT); a list of providers was provided to Pt & referral will be made to Pt's preference  Pt will be transported home at 1 PM by family via car, which Tx team has determined to be a safe mode of transport  Pt will be staying with family upon DC; she will be home alone for up to 6 hours at a time; Tx team is aware  Current therapy levels were read to Pt's family  SW will continue to monitor & assist as needed with Tx & DC planning   IMM reviewed, signed & submitted for scanning

## 2019-11-21 NOTE — PROGRESS NOTES
11/20/19 1710   Patient Data   Rehab Impairment Debility   Etiologic Diagnosis Pneumonia, COPD, Compression FX   Date of Onset 11/10/19   Support System   Name Christine Dale   Relationship Granddaughter   Home Setup   Type of Home Multi Level   Baseline Information   Transportation Family/friends drive   Prior IADL Participation   Money Management Combine Bills;Manage Checkbook   Meal Preparation Partial Participation   Home Cleaning Partial Participation   Prior Level of Function   Self-Care 3  Independent - Patient completed the activities by him/herself, with or without an assistive device, with no assistance from a helper  Indoor-Mobility (Ambulation) 3  Independent - Patient completed the activities by him/herself, with or without an assistive device, with no assistance from a helper  Stairs 3  Independent - Patient completed the activities by him/herself, with or without an assistive device, with no assistance from a helper  Functional Cognition 3  Independent - Patient completed the activities by him/herself, with or without an assistive device, with no assistance from a helper  Falls in the Last Year   Number of falls in the past 12 months 1  (in the bathtub)   Psychosocial   Needs Expressed Denies   Ability to Express Feelings Able to express   Ability to Express Needs Able to express   Ability to Express Thoughts Able to express   Ability to Understand Others Understands   Restrictions/Precautions   Precautions Fall Risk;O2   Eating Assessment   Swallow Precautions Yes   Bedside Swallow Results Yes   Positioning Upright;Out of Bed   Safety Needs Increase Time   Meal Assessed Dinner   QI: Swallowing/Nutritional Status Modified food consistency   Current Diet Dysphia I   Intake Mode PO   Dentures Upper; Lower   Type of Assistance Needed Supervision   Eating CARE Score 4   Comprehension   Assist Devices Glasses   QI: Comprehension 4   Undestands: Clear comprehension without cues or repetitions Comprehension (FIM) 5 - Needs help/cues, repetition only RARELY ( < 10% of the time)   Expression   Verbal Complex   QI: Expression 4  Express complex messages without difficulty and with speech that is clear and easy to [place]   Expression (FIM) 6 - Expresses complex/abstract but requires:  more time   Social Interaction   Social Interaction (FIM) 6 - Interacts appropriately with others BUT requires extra  time   Problem Solving   Problem solving (FIM) 5 - Solves complex problems But requires cues from [address] Yes   Remember Routine Yes   Initiates Tasks Yes   Memory (FIM) 5 - Needs cueing reminders <10%   Cognition   Overall Cognitive Status WFL   Arousal/Participation Alert; Responsive   Attention Within functional limits   Orientation Level Oriented X4   Memory Decreased short term memory   Following Commands Follows multistep commands with increased time or repetition   Objective Measure   ST Measure(s) bedside swallow, cognitive assessment   ST Findings mild to moderate dysphagia, mild memory deficits which affects comprehension of procedures  Therapeutic Exercise   Therapeutic Exercise/Activity oral pharygeal exercise program   Discharge Information   Vocational Plan Retired/not working   Impressions Gloris Najjar a 80 y  o  female who presented to the [facility] with back pain in the setting of compression deformity and patient was evaluated by ortho and neurosx who both recommended conservative management  Course complicated by respiratory failure secondary to pneumonia which was treated with abx  Prior to admission, patient was reportedly independent with mobility, transfers and activities of daily living  Lisa Thompson lives with family in a single family home with one step to enter  First floor set up is available  The patient will not have 24 hour supervision available upon discharge  Skilled speech assessment was completed    Patient presents with mild oral pharyngeal and moderate esophageal phase dysphagia characterized by extended oral phase, cough with thin liquids and the feeling of food being stuck in her esophagus  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

## 2019-11-21 NOTE — PROGRESS NOTES
11/21/19 1030   Pain Assessment   Pain Assessment 0-10   Pain Score 8   Pain Type Acute pain   Pain Location Back   Pain Orientation Lower   Restrictions/Precautions   Precautions Fall Risk;O2;Pain  (back precautions )   Bathing   Findings  issued a LHS to comply with back precautions    Putting On/Taking Off Footwear   Type of Assistance Needed Supervision;Verbal cues; Adaptive equipment   Putting On/Taking Off Footwear CARE Score 4   Dressing/Undressing Clothing   Remove LB Clothes Socks   Don LB Clothes Socks   Limitations Noted In Balance; Endurance; Safety;Strength;ROM   Adaptive Equipment Reacher;Dressing Stick; Sock Aide   Findings educated in AE for compliance with back precautions;fair knowledge demon by pt   Lying to Sitting on Side of Bed   Type of Assistance Needed Physical assistance   Amount of Physical Assistance Provided 25%-49%   Lying to Sitting on Side of Bed CARE Score 3   Sit to Stand   Type of Assistance Needed Incidental touching   Sit to Stand CARE Score 4   Bed-Chair Transfer   Type of Assistance Needed Incidental touching   Chair/Bed-to-Chair Transfer CARE Score 4   Transfer Bed/Chair/Wheelchair   Limitations Noted In Balance   Findings CGA bed to w/c    Functional Standing Tolerance   Activity standing at table with unilateral support while other UE completing ther act table top  Performed 3 x with approx 3 minutes each set  CS provided  Coordination   Gross Motor clothes pin activity in stance with B UEs   Cognition   Overall Cognitive Status WFL   Arousal/Participation Alert; Cooperative   Attention Within functional limits   Orientation Level Oriented X4   Memory Within functional limits   Following Commands Follows all commands and directions without difficulty   Additional Activities   Additional Activities   (fxnl recaher use for floor level item retrieval with S MI)   Activity Tolerance   Activity Tolerance Patient tolerated treatment well;Patient limited by pain   Assessment Treatment Assessment Pt c/o back pain throughout session, nurse aware  Pt compliant with back precautions; brace donned EOB  On O2 2l throughout session  Issued and instructed in AE with fair knowledge  Txf training and generalized UE strengthening for functional activity participation  Continue OT     Prognosis Good   Problem List Decreased strength;Decreased range of motion;Decreased endurance; Impaired balance;Decreased safety awareness;Orthopedic restrictions;Pain   Assessment pt participated in 30 minutes of concurrent tx addressing similar goals with a pt with similar deficits    Plan   Treatment/Interventions Functional transfer training;ADL retraining; Endurance training;Patient/family training;Equipment eval/education; Compensatory technique education;Gait training;Bed mobility   Progress Progressing toward goals   OT Therapy Minutes   OT Time In 1030   OT Time Out 1130   OT Total Time (minutes) 60   OT Mode of treatment - Individual (minutes) 30   OT Mode of treatment - Concurrent (minutes) 30   OT Mode of treatment - Group (minutes) 0   OT Mode of treatment - Co-treat (minutes) 0   OT Mode of Treatment - Total time(minutes) 60 minutes   OT Cumulative Minutes 60   Therapy Time missed   Time missed?  No

## 2019-11-21 NOTE — CASE MANAGEMENT
Initial assessment & orientation to ARC with Pt & spoke with Pt's granddaughter, Berenice, via phone  Discussed [address] with Paula Hou, who expressed understanding & agreement  Pt resides with Berenice half the time & her other granddaughter, Berta Han, the other half of the time  Upon DC from [facility], the plan is for Pt to go to Berenice's home  Berenice works, but may be able to work from home if needed  DFamily has lift chairs that Pt can use if needed  Pt was not on O2 prior & is hopeful to be weaned off soon  SW will continue to monitor & assist as needed with [address]  IMM reviewed, signed & submitted for scanning

## 2019-11-21 NOTE — NURSING NOTE
Patient ambulates with walker to bathroom with supervision  Back brace applied when OOB  Medicated for pain in back throughout shift  No numbness or tingling reported  Remains continant of bowel and bladder  Will continue to monitor and follow plan of care

## 2019-11-21 NOTE — PROGRESS NOTES
11/21/19 1210   Pain Assessment   Pain Assessment 0-10   Pain Score 8   Nash-Baker FACES Pain Rating 8   Pain Type Acute pain   Pain Location Back   Pain Orientation Lower   Pain Descriptors Aching   Restrictions/Precautions   Precautions Aspiration; Fall Risk;O2   Braces or Orthoses TLSO   Comprehension   Assist Devices Glasses   QI: Comprehension 4  Undestands: Clear comprehension without cues or repetitions   Comprehension (FIM) 5 - Needs help to apply glasses(visual)   Expression   Verbal Complex   QI: Expression 4  Express complex messages without difficulty and with speech that is clear and easy to [place]   Expression (FIM) 6 - Expresses complex/abstract but requires:  more time   Social Interaction   Social Interaction (FIM) 6 - Interacts appropriately with others BUT requires extra  time   Problem Solving   Problem solving (FIM) 5 - Solves basic problems 90% of time   Memory   Recognize People Yes   Remember Routine Yes   Initiates Tasks Yes   Memory (FIM) 5 - Needs cueing reminders <10%   Memory Skills   Orientation Level Oriented X4   Short Term Recent Recall Mild Impairment   Short Term Working Recall Mild Impairment   Auditory Comprehension   Comphrehends Conversation Complex   Interfering Components Working memory   EffectiveTechniques Repetition;Stressing words   Speech/Swallow Mechanism Exam   Lingual Strength Mild reduced   Lingual ROM Mild reduced   Mandible Other (Comment)  (reduced endurance for complex solids)   Dentition Dentures top;Dentures bottom   Volitional Swallow Delayed   Speech/Language/Cognition Assessmetn   Treatment Assessment Speech Pathology Daily Treatment Note - Speech/Cognitive Communication Skills - SLP focused on attention to cues and comprehension of directions during completion of ADL's as well as patient's own ability to give directions to increase their success  SLP utilized tray set up with meal prep as a familiar scenario in patient's environment    Patient was required to follow 2-3step directions related to safe posture and positioning in the wheelchair with ability to place them selves near enough to the table in order to reach all tray items  Attention was placed on wheel chair brakes pre and post meal   At the initiation of the meal, the patient was required to give directions for functional preparation of the tray in terms of bite size and moistening solids as well as opening and preparation of all items in terms of condiments, etc  to be eaten  This task has been designed to increase patient's understanding of the steps needed to ensure their ongoing functional success at mealtime  Current level is supervision for comprehension of task as this is new information  Further focus to be provided in the rehab setting  Swallow Information   Current Risks for Dysphagia & Aspiration Respiratory compromise;General debilitation; Other (Comment)  (esophageal hx)   Current Symptoms/Concerns Cough; With liquids;HX Dysphagia   Current Diet Dysphagia pureed; Thin liquid   Consistencies Assessed and Performance   Oral Stage Mild impaired   Oral Stage Comment reduced endurance   Phargngeal Stage Mild impaired   Pharyngeal Stage Comment mild delay and premature leakage   Esophageal Concerns Hx GERDS;C/O food dysphagia; Other (Comment)  (hiatal hernai and hx dilitation)   Recommendations   Risk for Aspiration Mild   Diet Solid Recommendation Level 1 Dysphagia/pureed   Diet Liquid Recommendation Thin liquid   General Precautions Aspiration precautions; Feed only when alert;Upright as possible for all oral intake;Remain upright for 45 mins after meals   Compensatory Swallowing Strategies Alternate solids and liquids; External pacing   Eating   Type of Assistance Needed Supervision   Eating CARE Score 4   Swallow Assessment   Swallow Treatment Assessment Speech Pathology Daily Treatment Note - Swallow Oral Function - Focus of dysphagia therapy this session was esophageal phase management of solids  Therapist initially discussed the phases of swallow and focus of the session being the esophageal phase  SLP stressed the importance of 90 degree posture during and post meal 30-45 minutes to avoid reflux issues post meal   Therapist targeted identification of appropriate bite and sip size for adequate management and safety as well as rate control to allow time for foods to pass safely through the esophagus  Therapist educated on the benefits of moist solids to ease oral phase breakdown which needs to be complete, via the use of gravy, broth or taking an initial sip to moisten the oral cavity  SLP educated on the importance of fully clearing the oral cavity prior to reintroduction of bolus and then alternating sips of liquid between bites  Current level is supervision for use of technique and adequate PO intake  Swallow Assessment Prognosis   Prognosis Good   Prognosis Considerations Family/community support;Participation level; Potential   SLP Therapy Minutes   SLP Time In 1210   SLP Time Out 1310   SLP Total Time (minutes) 60   SLP Mode of treatment - Individual (minutes) 60   Therapy Time missed   Time missed?  No   Daily FIM Score   Eating (FIM) 5 - Patient requires supervision, cueing or coaxing

## 2019-11-21 NOTE — PROGRESS NOTES
11/21/19 0900   Pain Assessment   Pain Assessment 0-10   Pain Score 8   Pain Type Acute pain   Pain Location Back   Pain Orientation Lower   Restrictions/Precautions   Precautions Fall Risk;O2   RLE Weight Bearing Per Order WBAT   LLE Weight Bearing Per Order WBAT   Braces or Orthoses TLSO   Cognition   Overall Cognitive Status WFL   Arousal/Participation Alert; Responsive; Cooperative   Attention Within functional limits   Orientation Level Oriented X4   Memory Decreased short term memory   Following Commands Follows one step commands without difficulty   Sit to Stand   Type of Assistance Needed Incidental touching   Amount of Physical Assistance Provided No physical assistance   Sit to Stand CARE Score 4   Bed-Chair Transfer   Type of Assistance Needed Incidental touching   Amount of Physical Assistance Provided No physical assistance   Chair/Bed-to-Chair Transfer CARE Score 4   Transfer Bed/Chair/Wheelchair   Limitations Noted In Balance; Endurance;Pain Management;UE Strength;LE Strength   Adaptive Equipment Roller Walker   Stand Pivot Contact Guard   Sit to [facility]   Stand to [facility]   All Transfer Contact Guard   Walk 10 Feet   Type of Assistance Needed Supervision   Amount of Physical Assistance Provided No physical assistance   Walk 10 Feet CARE Score 4   Walk 50 Feet with Two Turns   Type of Assistance Needed Supervision   Amount of Physical Assistance Provided No physical assistance   Walk 50 Feet with Two Turns CARE Score 4   Walk 150 Feet   Reason if not Attempted Safety concerns   Walk 150 Feet CARE Score 88   Walking 10 Feet on Uneven Surfaces   Reason if not Attempted Safety concerns   Walking 10 Feet on Uneven Surfaces CARE Score 88   Ambulation   Primary Mode of Locomotion Prior to Admission Walk   Distance Walked (feet) 71 ft  (40)   Assist Device Roller Walker   Gait Pattern Inconsistant Janell   Limitations Noted In Balance; Endurance; Safety;Strength   Provided Assistance with: Balance   Walk Assist Level Close Supervision   Curb or Single Stair   Style negotiated Single stair   Type of Assistance Needed Incidental touching   Amount of Physical Assistance Provided No physical assistance   1 Step (Curb) CARE Score 4   4 Steps   Type of Assistance Needed Incidental touching   Amount of Physical Assistance Provided No physical assistance   4 Steps CARE Score 4   12 Steps   Reason if not Attempted Safety concerns   12 Steps CARE Score 88   Stairs   Type Stairs   # of Steps 5   Weight Bearing Precautions WBAT   Assist Devices Bilateral Rail   Findings CGA   Therapeutic Interventions   Strengthening seated ther ex 30 reps   Other gait and community re-ed   Assessment   Treatment Assessment Pt is pleasant and cooperative with therapy; Pt is CGA for transfers with cues for hand placement; Pt amb up to 70' with RW and Close S; Pt is limited by pain in back with all functional mobility; Pt went up/down 5 steps with 2 HR and CGA; Rests needed throughout session; Pt performed seated ther ex for strengthening   PT Barriers   Physical Impairment Decreased strength;Decreased range of motion;Decreased endurance; Impaired balance;Decreased mobility;Orthopedic restrictions;Pain   Functional Limitation Walking;Transfers;Standing;Stair negotiation   Plan   Treatment/Interventions Functional transfer training;LE strengthening/ROM; Elevations; Therapeutic exercise;Gait training   Progress Progressing toward goals   PT Therapy Minutes   PT Time In 0900   PT Time Out 1000   PT Total Time (minutes) 60   PT Mode of treatment - Individual (minutes) 45   PT Mode of treatment - Concurrent (minutes) 15   PT Mode of treatment - Group (minutes) 0   PT Mode of treatment - Co-treat (minutes) 0   PT Mode of Treatment - Total time(minutes) 60 minutes   PT Cumulative Minutes 127   Therapy Time missed   Time missed?  No

## 2019-11-21 NOTE — RESPIRATORY THERAPY NOTE
RT Protocol Note  Frandy Fowler 80 y o  female MRN: [id number]  Unit/Bed#: -01 Encounter: [id number]    Assessment    Principal Problem:    Essential hypertension  Active Problems:    CAD (coronary artery disease)    GERD (gastroesophageal reflux disease)    COPD (chronic obstructive pulmonary disease) (HCC)    Anxiety    Chronic diastolic heart failure (HCC)    Compression deformity of vertebra    PNA (pneumonia)    CKD (chronic kidney disease)    Dysphagia        Home Devices/Therapy: Other (Comment)(Nebulizer treatments PRN/ MDI PRN ( Pt  unable to push down))    Past Medical History:   Diagnosis Date    Abnormal blood sugar 03/13/2017    Abnormal carotid duplex scan     Carotid Duplex (Plaque formation is quite prominent bilaterally  Despite these changes, no evidence for greater than 50% diameter reducing lesions in the cervical portion of either carotid artery hemisystem ) - 6/13/2017    Anxiety     Cervical radiculopathy 08/08/2017    CHF (congestive heart failure) (HCC)     diastolic    COPD [facility]chronic obstructive pulmonary disease) ([facility] [address] ) 2/6/2018    Coronary angioplasty status     Coronary artery disease 4/7/2017    Costochondritis 02/05/2013    suspect MS in origin given relationship to ROM and location  Start mobic and if persists, reeval  Refused xrayat this time   Dyslipidemia     GERD without esophagitis 8/30/2012    H/O CT scan of chest      (No PE, minimal interstitial change left lower lobe and right upper lobe, which may be acute ) - 5/19/2017    History of Holter monitoring     Holter (Sinus rhythm with rates ranging from 52 to 118 bpm   No afib or heart block  Rare atrial and ventricular ectopic beats  One six-beat atrial run noted  No pauses  ) - 5/31/2017    Hx of echocardiogram     Echo (EF 0 60 (60%), Biatrial enlargement ) - 3/24/2017 Echo (EF 0 55 (55%), mild LVH  Aortic valvular sclerosis  Trace MI with mild left atrial dilatation, mild MAC   Mild TI with mild pulmonary hypertension  ) - 5/22/2017 Echo (EF 0 60 (60%), Normal left vent chamber size and systolic function  Mild diastolic dysfunction of LV w/normal filling pressures  Mild mitral regurg ) - 7/26/2017 Echo (EF 0    Hx of echocardiogram 08/16/2017    EF0 60 (60%) Normal left vent chamber size and systolic function of LV w/normal filling presures  Mild mitral regurg  / EF0 50 (50%) Mild LVH  MIld MR 10/6/17     Hypertension     Iron deficiency anemia 4/21/2016    Macular degeneration, right eye     [facility] 9/17/2012    Mixed hyperlipidemia     NSTEMI (non-ST elevated myocardial infarction) ([facility] ) 7/25/2017    Old MI (myocardial infarction)     Osteoarthritis 9/17/2012    Osteoporosis 3/13/2017    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    Urinary tract infection     frequent UTI's     Social History     Socioeconomic History    Marital status:       Spouse name: Not on file    Number of children: Not on file    Years of education: Not on file    Highest education level: Not on file   Occupational History    Occupation: Retired   Social Needs    Financial resource strain: Not on file    Food insecurity:     Worry: Not on file     Inability: Not on file   [employer] needs:     Medical: Not on file     Non-medical: Not on file   Tobacco Use    Smoking status: Former Smoker     Types: Cigarettes    Smokeless tobacco: Never Used   Substance and Sexual Activity    Alcohol use: Not Currently    Drug use: Never    Sexual activity: Not Currently   Lifestyle    Physical activity:     Days per week: Not on file     Minutes per session: Not on file    Stress: Not on file   Relationships    Social connections:     Talks on phone: Not on file     Gets together: Not on file     Attends Sabianist service: Not on file     Active member of club or organization: Not on file     Attends meetings of clubs or organizations: Not on file     Relationship status: Not on file    Intimate partner violence:     Fear of current or ex partner: Not on file     Emotionally abused: Not on file     Physically abused: Not on file     Forced sexual activity: Not on file   Other Topics Concern    Not on file   Social History Narrative    No caffeine use    Uses safety equipment-seatbelts    Primary language is [place]  Subjective         Objective    Physical Exam:   Assessment Type: (P) Assess only  General Appearance: (P) Alert, Awake  Respiratory Pattern: (P) Normal  Chest Assessment: (P) Chest expansion symmetrical  Bilateral Breath Sounds: (P) Diminished, Rales(Bibasal rales)  Cough: (P) None  O2 Device: (P) 3 l/m NC(O2 decreased to 2l/m via NC @ this time  )    Vitals:  Blood pressure 160/62, pulse (P) 68, temperature 98 4 °F (36 9 °C), temperature source Temporal, resp  rate (P) 16, height 5' 2" (1 575 m), weight 75 3 kg (166 lb), SpO2 (P) 96 %, not currently breastfeeding  Imaging and other studies: I have personally reviewed pertinent reports  O2 Device: (P) 3 l/m NC(O2 decreased to 2l/m via NC @ this time  )     Plan    Respiratory Plan: Home Bronchodilator Patient pathway  Airway Clearance Plan: Incentive Spirometer     Resp Comments: (P) Pt  assessed  Pt  stated that she is not SOB or having any Respiratory difficulty  Nebulizer treatment was not required @ this time  Pt  returned adequate demonstration of her Incentive Spirometer  Pt  was reminded to continue using her IS Q1 hour w/a x 10 breaths on her own  Will continue to monitor Pt

## 2019-11-22 NOTE — PLAN OF CARE
Problem: Potential for Falls  Goal: Patient will remain free of falls  Description  INTERVENTIONS:  - Assess patient frequently for physical needs  -  Identify cognitive and physical deficits and behaviors that affect risk of falls    -  [place] fall precautions as indicated by assessment   - Educate patient/family on patient safety including physical limitations  - Instruct patient to call for assistance with activity based on assessment  - Modify environment to reduce risk of injury  - Consider OT/PT consult to assist with strengthening/mobility  Outcome: Progressing     Problem: RESPIRATORY - ADULT  Goal: Achieves optimal ventilation and oxygenation  Description  INTERVENTIONS:  - Assess for changes in respiratory status  - Assess for changes in mentation and behavior  - Position to facilitate oxygenation and minimize respiratory effort  - Oxygen administered by appropriate delivery if ordered  - Initiate smoking cessation education as indicated  - Encourage broncho-pulmonary hygiene including cough, deep breathe, Incentive Spirometry  - Assess the need for suctioning and aspirate as needed  - Assess and instruct to report SOB or any respiratory difficulty  - Respiratory Therapy support as indicated  Outcome: Progressing     Problem: GASTROINTESTINAL - ADULT  Goal: Maintains adequate nutritional intake  Description  INTERVENTIONS:  - Monitor percentage of each meal consumed  - Identify factors contributing to decreased intake, treat as appropriate  - Assist with meals as needed  - Monitor I&O, weight, and lab values if indicated  - Obtain nutrition services referral as needed  Outcome: Progressing     Problem: SKIN/TISSUE INTEGRITY - ADULT  Goal: Skin integrity remains intact  Description  INTERVENTIONS  - Identify patients at risk for skin breakdown  - Assess and monitor skin integrity  - Assess and monitor nutrition and hydration status  - Monitor labs (i e  albumin)  - Assess for incontinence   - Turn and reposition patient  - Assist with mobility/ambulation  - Relieve pressure over bony prominences  - Avoid friction and shearing  - Provide appropriate hygiene as needed including keeping skin clean and dry  - Evaluate need for skin moisturizer/barrier cream  - Collaborate with interdisciplinary team (i e  Nutrition, Rehabilitation, etc )   - Patient/family teaching  Outcome: Progressing     Problem: HEMATOLOGIC - ADULT  Goal: Maintains hematologic stability  Description  INTERVENTIONS  - Assess for signs and symptoms of bleeding or hemorrhage  - Monitor labs  - Administer supportive blood products/factors as ordered and appropriate  Outcome: Progressing     Problem: MUSCULOSKELETAL - ADULT  Goal: Maintain or return mobility to safest level of function  Description  INTERVENTIONS:  - Assess patient's ability to carry out ADLs; assess patient's baseline for ADL function and identify physical deficits which impact ability to perform ADLs (bathing, care of mouth/teeth, toileting, grooming, dressing, etc )  - Assess/evaluate cause of self-care deficits   - Assess range of motion  - Assess patient's mobility  - Assess patient's need for assistive devices and provide as appropriate  - Encourage maximum independence but intervene and supervise when necessary  - Involve family in performance of ADLs  - Assess for home care needs following discharge   - Consider OT consult to assist with ADL evaluation and planning for discharge  - Provide patient education as appropriate  Outcome: Progressing     Problem: PAIN - ADULT  Goal: Verbalizes/displays adequate comfort level or baseline comfort level  Description  Interventions:  - Encourage patient to monitor pain and request assistance  - Assess pain using appropriate pain scale  - Administer analgesics based on type and severity of pain and evaluate response  - Implement non-pharmacological measures as appropriate and evaluate response  - Consider cultural and social influences on pain and pain management  - Notify physician/advanced practitioner if interventions unsuccessful or patient reports new pain  Outcome: Progressing     Problem: INFECTION - ADULT  Goal: Absence or prevention of progression during hospitalization  Description  INTERVENTIONS:  - Assess and monitor for signs and symptoms of infection  - Monitor lab/diagnostic results  - Monitor all insertion sites, i e  indwelling lines, tubes, and drains  - Monitor endotracheal if appropriate and nasal secretions for changes in amount and color  - [place] appropriate cooling/warming therapies per order  - Administer medications as ordered  - Instruct and encourage patient and family to use good hand hygiene technique  - Identify and instruct in appropriate isolation precautions for identified infection/condition  Outcome: Progressing  Goal: Absence of fever/infection during neutropenic period  Description  INTERVENTIONS:  - Monitor WBC    Outcome: Progressing     Problem: SAFETY ADULT  Goal: Maintain or return to baseline ADL function  Description  INTERVENTIONS:  -  Assess patient's ability to carry out ADLs; assess patient's baseline for ADL function and identify physical deficits which impact ability to perform ADLs (bathing, care of mouth/teeth, toileting, grooming, dressing, etc )  - Assess/evaluate cause of self-care deficits   - Assess range of motion  - Assess patient's mobility; develop plan if impaired  - Assess patient's need for assistive devices and provide as appropriate  - Encourage maximum independence but intervene and supervise when necessary  - Involve family in performance of ADLs  - Assess for home care needs following discharge   - Consider OT consult to assist with ADL evaluation and planning for discharge  - Provide patient education as appropriate  Outcome: Progressing  Goal: Maintain or return mobility status to optimal level  Description  INTERVENTIONS:  - Assess patient's baseline mobility status (ambulation, transfers, stairs, etc )    - Identify cognitive and physical deficits and behaviors that affect mobility  - Identify mobility aids required to assist with transfers and/or ambulation (gait belt, sit-to-stand, lift, walker, cane, etc )  - [place] fall precautions as indicated by assessment  - Record patient progress and toleration of activity level on Mobility SBAR; progress patient to next Phase/Stage  - Instruct patient to call for assistance with activity based on assessment  - Consider rehabilitation consult to assist with strengthening/weightbearing, etc   Outcome: Progressing     Problem: DISCHARGE PLANNING  Goal: Discharge to home or other facility with appropriate resources  Description  INTERVENTIONS:  - Identify barriers to discharge w/patient and caregiver  - Arrange for needed discharge resources and transportation as appropriate  - Identify discharge learning needs (meds, wound care, etc )  - Arrange for interpretive services to assist at discharge as needed  - Refer to Case Management Department for coordinating discharge planning if the patient needs post-hospital services based on physician/advanced practitioner order or complex needs related to functional status, cognitive ability, or social support system  Outcome: Progressing     Problem: Knowledge Deficit  Goal: Patient/family/caregiver demonstrates understanding of disease process, treatment plan, medications, and discharge instructions  Description  Complete learning assessment and assess knowledge base  Interventions:  - Provide teaching at level of understanding  - Provide teaching via preferred learning methods  Outcome: Progressing     Problem: Nutrition/Hydration-ADULT  Goal: Nutrient/Hydration intake appropriate for improving, restoring or maintaining nutritional needs  Description  Monitor and assess patient's nutrition/hydration status for malnutrition   Collaborate with interdisciplinary team and initiate plan and interventions as ordered  Monitor patient's weight and dietary intake as ordered or per policy  Utilize nutrition screening tool and intervene as necessary  Determine patient's food preferences and provide high-protein, high-caloric foods as appropriate       INTERVENTIONS:  - Monitor oral intake, urinary output, labs, and treatment plans  - Assess nutrition and hydration status and recommend course of action  - Evaluate amount of meals eaten  - Assist patient with eating if necessary   - Allow adequate time for meals  - Recommend/ encourage appropriate diets, oral nutritional supplements, and vitamin/mineral supplements  - Order, calculate, and assess calorie counts as needed  - Recommend, monitor, and adjust tube feedings and TPN/PPN based on assessed needs  - Assess need for intravenous fluids  - Provide specific nutrition/hydration education as appropriate  - Include patient/family/caregiver in decisions related to nutrition  Outcome: Progressing

## 2019-11-22 NOTE — PROGRESS NOTES
PM&R Progress Note:    Subjective: Patient seen face to face  No acute issues  Feels her TLSO is uncomfortable at times  +BM    Review of Systems: Pertinent positives in subjective, all other ROS reviewed are negative  Objective:   /66 (BP Location: Left arm)   Pulse 76   Temp 97 6 °F (36 4 °C) (Temporal)   Resp 18   Ht 5' 2" (1 575 m)   Wt 75 3 kg (166 lb)   SpO2 91%   BMI 30 36 kg/m²     Physical Exam   Constitutional: She is oriented to person, place, and time  She appears well-developed and well-nourished  HENT:   Head: Normocephalic and atraumatic  Eyes: Pupils are equal, round, and reactive to light  EOM are normal    Cardiovascular: Normal rate and regular rhythm  Pulmonary/Chest: Breath sounds normal  She has no wheezes  She has no rales  Abdominal: Soft  Bowel sounds are normal  She exhibits no distension  There is no tenderness  Musculoskeletal:   TLSO in place    Neurological: She is alert and oriented to person, place, and time  Skin: Skin is warm  Psychiatric: She has a normal mood and affect  Nursing note and vitals reviewed  Assessment/Plan: Sadiq Sena a 80 y  o  female who presented to the [address] [place] [address] [address] pain in the setting of compression deformity (T10, T12, & L1) and patient was evaluated by ortho and neurosx who both recommended conservative management  Course complicated by respiratory failure 2/2 to PNA which was treated with abx      -Rehabilitation: Continue current rehabilitation plan of care to maximize function  TLSO in place while OOB  -Continue current medical plan of care          Lab Results   Component Value Date    WBC 8 10 11/18/2019    HGB 13 7 11/18/2019    HCT 41 0 (L) 11/18/2019    MCV 89 11/18/2019     11/18/2019     Lab Results   Component Value Date    SODIUM 138 11/19/2019    K 4 0 11/19/2019    CL 93 (L) 11/19/2019    CO2 42 (H) 11/19/2019    BUN 18 11/19/2019    CREATININE 0 81 11/19/2019 GLUC 164 (H) 11/19/2019    CALCIUM 10 0 11/19/2019     Lab Results   Component Value Date    INR 1 11 05/21/2019    INR 1 12 07/30/2018    INR 1 05 07/29/2018    PROTIME 12 9 05/21/2019    PROTIME 13 0 (H) 07/30/2018    PROTIME 12 2 07/29/2018           Current Facility-Administered Medications:     acetaminophen (TYLENOL) tablet 650 mg, 650 mg, Oral, Q6H PRN, Amish Montejo MD, 650 mg at 11/20/19 1523    al mag oxide-diphenhydramine-lidocaine viscous (MAGIC MOUTHWASH) suspension 10 mL, 10 mL, Swish & Spit, Q6H PRN, Amish Montejo MD    amLODIPine (NORVASC) tablet 5 mg, 5 mg, Oral, Daily, Amish Montejo MD, 5 mg at 11/21/19 0805    aspirin chewable tablet 81 mg, 81 mg, Oral, Daily, Amish Montejo MD, 81 mg at 11/21/19 0805    atorvastatin (LIPITOR) tablet 40 mg, 40 mg, Oral, Daily With Jayson Mahan MD, 40 mg at 11/21/19 1713    bisacodyl (DULCOLAX) rectal suppository 10 mg, 10 mg, Rectal, Daily PRN, Amish Montejo MD    famotidine (PEPCID) tablet 20 mg, 20 mg, Oral, BID, Amish Montejo MD, 20 mg at 11/21/19 1713    furosemide (LASIX) tablet 20 mg, 20 mg, Oral, Daily, Amish Montejo MD, 20 mg at 11/21/19 0805    heparin (porcine) subcutaneous injection 5,000 Units, 5,000 Units, Subcutaneous, Q8H [address], Amish Montejo MD, 5,000 Units at 11/22/19 0536    ipratropium (ATROVENT) 0 02 % inhalation solution 0 5 mg, 0 5 mg, Nebulization, Q6H PRN, Amish Montejo MD    levalbuterol [facility]) inhalation solution 0 63 mg, 0 63 mg, Nebulization, Q6H PRN, Amish Montejo MD    lidocaine (LIDODERM) 5 % patch 1 patch, 1 patch, Topical, Daily, Amish Montejo MD, 1 patch at 11/21/19 0806    losartan (COZAAR) tablet 50 mg, 50 mg, Oral, Daily, Amish Montejo MD, 50 mg at 11/21/19 0806    melatonin tablet 6 mg, 6 mg, Oral, HS, Amish Montejo MD, 6 mg at 11/21/19 2112    metoprolol tartrate (LOPRESSOR) partial tablet 12 5 mg, 12 5 mg, Oral, Q12H [address], Amish Montejo MD, 12 5 mg at 11/21/19 2112    OLANZapine (ZyPREXA ZYDIS) dispersible tablet 5 mg, 5 mg, Oral, HS, Thena Schlatter, MD, 5 mg at 11/21/19 2112    oxyCODONE (ROXICODONE) IR tablet 2 5 mg, 2 5 mg, Oral, Q4H PRN, Thena Schlatter, MD    oxyCODONE (ROXICODONE) IR tablet 5 mg, 5 mg, Oral, Q4H PRN, Thena Schlatter, MD, 5 mg at 11/22/19 035    polyethylene glycol (MIRALAX) packet 17 g, 17 g, Oral, Daily, Thena Schlatter, MD, 17 g at 11/21/19 9483    Past Medical History:   Diagnosis Date    Abnormal blood sugar 03/13/2017    Abnormal carotid duplex scan     Carotid Duplex (Plaque formation is quite prominent bilaterally  Despite these changes, no evidence for greater than 50% diameter reducing lesions in the cervical portion of either carotid artery hemisystem ) - 6/13/2017    Anxiety     Cervical radiculopathy 08/08/2017    CH[facility]congestive heart failure) ([facility])     diastolic    COPD (chronic obstructive pulmonary disease) [facility] ) 2/6/2018    Coronary angioplasty status     Coronary artery disease 4/7/2017    Costochondritis 02/05/2013    suspect MS in origin given relationship to ROM and location  Start mobic and if persists, reeval  Refused xrayat this time   Dyslipidemia     GERD without esophagitis 8/30/2012    H/O CT scan of chest      (No PE, minimal interstitial change left lower lobe and right upper lobe, which may be acute ) - 5/19/2017    History of Holter monitoring     Holter (Sinus rhythm with rates ranging from 52 to 118 bpm   No afib or heart block  Rare atrial and ventricular ectopic beats  One six-beat atrial run noted  No pauses  ) - 5/31/2017    Hx of echocardiogram     Echo (EF 0 60 (60%), Biatrial enlargement ) - 3/24/2017 Echo (EF 0 55 (55%), mild LVH  Aortic valvular sclerosis  Trace MI with mild left atrial dilatation, mild MAC  Mild TI with mild pulmonary hypertension  ) - 5/22/2017 Echo (EF 0 60 (60%), Normal left vent chamber size and systolic function  Mild diastolic dysfunction of LV w/normal filling pressures   Mild mitral regurg ) - 7/26/2017 Echo (EF 0    Hx of echocardiogram 08/16/2017    EF0 60 (60%) Normal left vent chamber size and systolic function of LV w/normal filling presures  Mild mitral regurg  / EF0 50 (50%) Mild LVH   MIld MR 10/6/17     Hypertension     Iron deficiency anemia 4/21/2016    Macular degeneration, right eye     [facility] 9/17/2012    Mixed hyperlipidemia     NSTEMI (non-ST elevated myocardial infarction) ([facility] ) 7/25/2017    Old MI (myocardial infarction)     Osteoarthritis 9/17/2012    Osteoporosis 3/13/2017    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[facility] ) 04/07/2017    Urinary tract infection     frequent UTI's       Patient Active Problem List    Diagnosis Date Noted    Dysphagia 11/19/2019    PNA (pneumonia) 11/12/2019    CKD (chronic kidney disease) 11/12/2019    Compression deformity of vertebra 11/10/2019    Chronic diastolic heart failure ([facility] ) 11/19/2018    Anxiety 05/07/2018    COPD (chronic obstructive pulmonary disease) ([facility] ) 02/06/2018    CAD (coronary artery disease) 04/07/2017    Essential hypertension 09/17/2012    GERD (gastroesophageal reflux disease) 08/30/2012          Macrina Olson MD  PM&R Attending

## 2019-11-22 NOTE — PROGRESS NOTES
11/22/19 0840   Pain Assessment   Pain Assessment 0-10   Pain Score Worst Possible Pain   Pain Location Hip; Buttocks   Pain Orientation Right   Restrictions/Precautions   Precautions Aspiration; Fall Risk;O2   Braces or Orthoses TLSO   Cognition   Overall Cognitive Status WFL   Arousal/Participation Alert; Responsive; Cooperative   Attention Within functional limits   Orientation Level Oriented X4   Memory Within functional limits   Following Commands Follows all commands and directions without difficulty   Sit to Stand   Type of Assistance Needed Supervision   Amount of Physical Assistance Provided No physical assistance   Sit to Stand CARE Score 4   Bed-Chair Transfer   Type of Assistance Needed Supervision   Amount of Physical Assistance Provided No physical assistance   Chair/Bed-to-Chair Transfer CARE Score 4   Transfer Bed/Chair/Wheelchair   Limitations Noted In Balance; Endurance;Pain Management;UE Strength;LE Strength   Adaptive Equipment Roller Walker   Stand Pivot Supervision   Sit to Stand Supervision   Stand to Sit Supervision   All Transfer Supervision   Walk 10 Feet   Type of Assistance Needed Supervision   Amount of Physical Assistance Provided No physical assistance   Walk 10 Feet CARE Score 4   Walk 50 Feet with Two Turns   Type of Assistance Needed Supervision   Amount of Physical Assistance Provided No physical assistance   Walk 50 Feet with Two Turns CARE Score 4   Walk 150 Feet   Reason if not Attempted Safety concerns   Walk 150 Feet CARE Score 88   Walking 10 Feet on Uneven Surfaces   Type of Assistance Needed Supervision   Amount of Physical Assistance Provided No physical assistance   Walking 10 Feet on Uneven Surfaces CARE Score 4   Ambulation   Does the patient walk? 2  Yes   Primary Mode of Locomotion Prior to Admission Walk   Distance Walked (feet) 84 ft  (62)   Assist Device Roller Walker   Gait Pattern Inconsistant Janell   Limitations Noted In Balance; Endurance; Safety;Strength Provided Assistance with: Balance   Walk Assist Level Supervision   Findings level and carpet   Curb or Single Stair   Style negotiated Single stair   Type of Assistance Needed Supervision   Amount of Physical Assistance Provided No physical assistance   1 Step (Curb) CARE Score 4   4 Steps   Type of Assistance Needed Supervision   Amount of Physical Assistance Provided No physical assistance   4 Steps CARE Score 4   12 Steps   Reason if not Attempted Safety concerns   12 Steps CARE Score 88   Stairs   Type Stairs   # of Steps 5   Weight Bearing Precautions WBAT   Assist Devices Bilateral Rail   Findings CGA   Therapeutic Interventions   Strengthening seated ther ex 30 reps   Other gait and community re-ed   Assessment   Treatment Assessment Pt is pleasant and cooperative with PT; Pt is S for transfers and amb up to 80' with RW; Pt is limited by back pain and fatigue; Pt was on RA today vs O2; Pt O2 was monitored throughout session, O2 would drop to 83% after amb but when resting would come back up to 90% after 30sec; Pt was at 93% at rest in w/c; Pt went up/down 5 steps with 2 HR and S; Pt peformed seated ther ex for strengthening    PT Barriers   Physical Impairment Decreased strength;Decreased range of motion;Decreased endurance; Impaired balance;Decreased mobility;Orthopedic restrictions;Pain   Functional Limitation Walking;Transfers;Standing;Stair negotiation   Plan   Treatment/Interventions Functional transfer training;LE strengthening/ROM; Elevations; Therapeutic exercise;Gait training   Progress Progressing toward goals   PT Therapy Minutes   PT Time In 0840   PT Time Out 0940   PT Total Time (minutes) 60   PT Mode of treatment - Individual (minutes) 48   PT Mode of treatment - Concurrent (minutes) 12   PT Mode of treatment - Group (minutes) 0   PT Mode of treatment - Co-treat (minutes) 0   PT Mode of Treatment - Total time(minutes) 60 minutes   PT Cumulative Minutes 187   Therapy Time missed   Time missed? No

## 2019-11-22 NOTE — PROGRESS NOTES
Tolerating therapy without any difficulty  Medications and side effects explained to pt  Continue same plan of care

## 2019-11-22 NOTE — PROGRESS NOTES
11/22/19 0721   Charting Type   Charting Type Shift assessment   Neurological   Neuro (WDL) X   Level of Consciousness Alert/awake   Orientation Level Oriented X4   Cognition Follows commands   Extraocular Movements Full   RLE Muscle Strength 4- Movement against gravity and limited resistance   LLE Muscle Strength 4- Movement against gravity and limited resistance   Neuro Symptoms Fatigue; Forgetful   Relieved by Rest   Delirium Assessment- CAM    Acute Onset and Fluctuating Course (1) No   [place] Coma Scale   Eye Opening 4   Best Verbal Response 5   Best Motor Response 6   [place] Coma Scale Score 15   HEENT   HEENT (WDL) X   R Eye Mildly impaired vision   L Eye Mildly impaired vision   Vision - Corrective Lenses (at bedside) Glasses   R Ear Mildly impaired hearing   L Ear Mildly impaired hearing   Teeth Dentures upper;Dentures lower   Respiratory   Respiratory (WDL) X   Respiratory Pattern Normal   Chest Assessment Chest expansion symmetrical   Bilateral Breath Sounds Clear;Diminished   Respiratory Additional Assessments No   Cough Description   Sputum Amount None   Respiratory Interventions   Respiratory Interventions Incentive spirometry;Cough and deep breathe   Cough and Deep Breathe   Cough and Deep Breathe Yes   Incentive Spirometry   IS level of assistance Needs encouragement   Frequency q1hr W/A   Respiratory Effort Normal   Treatment Tolerance Tolerated fairly well   Incentive Spirometry Goal (mL) 1500 mL   Incentive Spirometry Achieved (mL) 500 mL   Cardiac   Cardiac (WDL) WDL   Cardiac Regularity Regular   Heart Sounds No adventitious heart sounds   Jugular Venous Distention (JVD) No   Cardiac Symptoms None   Chest Pain Present No   Pacemaker/ICD No   Pain Assessment   Pain Assessment 0-10   Pain Score No Pain   Peripheral Vascular   Peripheral Vascular (WDL) X   Cyanosis None   PVS Additional Assessments No   RUE Neurovascular Assessment   R Radial Pulse +2   LUE Neurovascular Assessment   L Radial Pulse +2   RLE Neurovascular Assessment   R Pedal Pulse +2   LLE Neurovascular Assessment   L Pedal Pulse +2   Integumentary   Integumentary (WDL) X   Skin Condition/Temp Warm;Dry   Skin Integrity Bruising   Skin Location scattered   Skin Turgor Epidermis thin with loss of subcutaneous tissue   Integumentary Additional Assessments No   Tattoos/Piercings   Does patient have tattoos? No   Piercings Remaining No   Andi Scale   Sensory Perceptions 4   Moisture 4   Activity 3   Mobility 3   Nutrition 3   Friction and Shear 3   Andi Scale Score 20   Wound 11/18/19 Traumatic Other (Comment) Hand Right   Date First Assessed/Time First Assessed: 11/18/19 1130   Pre-Existing Wound: No  Primary Wound Type: Traumatic  Traumatic Wound Type: (c) Other (Comment)  Location: Hand  Wound Location Orientation: Right  Wound Description (Comments): Hematoma  Incis    Wound Description Clean;Dry   Dressing Open to air   Musculoskeletal   Musculoskeletal (WDL) X   RLE Limited movement   LLE Limited movement   Musculoskeletal Additional Assessments No   Gastrointestinal   Gastrointestinal (WDL) WDL   Bowel Sounds (All Quadrants) Normoactive   Gastrointestinal Additional Assessments No   Stool Assessment   Bowel Incontinence No   Genitourinary   Genitourinary (WDL) WDL   Bladder Shift Assessment   Bladder Device None   Bladder Incontinence No   Bladder Management Supervision/setup   Urine Assessment   Urinary Incontinence No   Genitalia   Female Genitalia Intact   Genitourinary Additional Assessments   Genitourinary Additional Assessments No   Anal/Rectal   Anal/Rectal (WDL) WDL   Psychosocial   Psychosocial (WDL) X   Patient Behaviors/Mood Calm; Cooperative   Needs Expressed Denies   Ability to Express Feelings Able to express   Ability to Express Needs Able to express   Ability to Express Thoughts Able to express   Ability to Understand Others Devyns   [place] Suicide Severity Rating Scale   1  Wish to be Dead No   2   Suicidal Thoughts Never   6   Suicide Behavior Question Never   *Risk Level* Low Risk   Cough   Cough None

## 2019-11-22 NOTE — PROGRESS NOTES
11/22/19 1116   Pain Assessment   Pain Assessment 0-10   Pain Score 8   Pain Type Acute pain   Pain Location Back   Restrictions/Precautions   Precautions Aspiration; Fall Risk;Pain   Braces or Orthoses TLSO   Comprehension   Assist Devices Glasses   QI: Comprehension 4  Undestands: Clear comprehension without cues or repetitions   Comprehension (FIM) 6 - Understands complex/abstract but requires more time   Expression   Verbal Complex   QI: Expression 4  Express complex messages without difficulty and with speech that is clear and easy to [place]   Expression (FIM) 6 - Expresses complex/abstract but requires:  more time   Social Interaction   Social Interaction (FIM) 6 - Interacts appropriately with others BUT requires extra  time   Problem Solving   Problem solving (FIM) 5 - Solves complex problems But requires cues from helper   Memory   Recognize People Yes   Remember Routine Yes   Initiates Tasks Yes   Memory (FIM) 5 - Needs cueing reminders <10%   Memory Skills   Orientation Level Oriented X4   Short Term Recent Recall Mild Impairment   Short Term Working Recall Mild Impairment   Auditory Comprehension   Comphrehends Conversation Complex   Interfering Components Working memory   EffectiveTechniques Extra processing time;Repetition;Stressing words;Visual/Gestural cues   Speech/Swallow Mechanism Exam   Lingual Strength Mild reduced   Lingual ROM Mild reduced   Dentition Dentures top   Volitional Swallow Delayed   Respratory Status Nasal Cannula   Speech/Language/Cognition Assessmetn   Treatment Assessment Speech Pathology Daily Treatment Note - Speech/Cognitive Communication Skills - SLP focused on memory (immediate, recent and remote)  Patient's meal tray was utilized for immediate memory of items eaten  The menu was utilized as a visual aid to check accuracy and aid retention    Patien'ts daily schedule was utilized as an aid for recent memory to recall the day's events and/or expectations of procedures performed during therapy sessions across disciplines  Remote memory was targeted using the sequence of events since initial onset of the event which brought them to the rehabilitation unit, as well as family dynamics and visits since admission  Visual aids were used for each task as available (ie pictures, schedule, etc ) to aid recall  Patient presented  Patient demonstrated improvement in comprehension in this task with use of visual aids to recall procedures  Swallow Information   Current Risks for Dysphagia & Aspiration Rapid respiratory rate   Current Symptoms/Concerns Cough; With liquids   Current Diet Dysphagia pureed   Consistencies Assessed and Performance   Oral Stage Mild impaired   Oral Stage Comment reduced endurace   Phargngeal Stage Mild impaired   Pharyngeal Stage Comment mild delay   Esophageal Concerns Hx GERDS   Recommendations   Risk for Aspiration Mild   Diet Solid Recommendation Level 1 Dysphagia/pureed   Diet Liquid Recommendation Thin liquid   General Precautions Aspiration precautions   Compensatory Swallowing Strategies Alternate solids and liquids   Eating   Type of Assistance Needed Supervision   Eating CARE Score 4   Swallow Assessment   Swallow Treatment Assessment Speech Pathology Daily Treatment Note - Swallow Oral Function - SLP targeted understanding and demonstration of aspiration precautions  Therapist educated on the importance of 90 degree head / neck/ trunk posture  SLP provided demonstration of safe verses unsafe posturing and how it relates to success with swallowing  SLP also addressed the effects of alert status as well distraction upon the level of safety with swallowing  SLP stressed the need for decreased speaking on oral phase as it contributes to increased attention to the swallow task and improves anterior to poster control within the oral cavity    Compensatory safe swallow techniques of prep to small bites, small and single sips of liquid,complete oral breakdown of solids with formation of a cohesive bolus, oral closure with spoon retrieval, oral clearance prior to reintroduction of bolus, alternate solids with liquids, and rate control were also introduced in this session    Current recommended diet texture is puree secondary to esophageal phase dysphagia   Swallow Assessment Prognosis   Prognosis Good   Prognosis Considerations Family/community support   SLP Therapy Minutes   SLP Time In 1116   SLP Time Out 1216   SLP Total Time (minutes) 60   SLP Mode of treatment - Individual (minutes) 60   Daily FIM Score   Eating (FIM) 5 - Patient requires supervision, cueing or coaxing

## 2019-11-22 NOTE — PROGRESS NOTES
11/22/19 1000   Pain Assessment   Pain Assessment 0-10   Pain Score Worst Possible Pain   Pain Location Hip; Buttocks   Pain Orientation Right   Restrictions/Precautions   Precautions Aspiration; Fall Risk;Pain   Braces or Orthoses TLSO   Sit to Stand   Type of Assistance Needed Supervision   Sit to Stand CARE Score 4   Toileting Hygiene   Type of Assistance Needed Incidental touching   Toileting Hygiene CARE Score 4   Toileting   Able to [address] down yes, up yes  Manage Hygiene Bladder; Bowel   Limitations Noted In Balance; Safety;UE Strength;LE Strength;ROM   Findings CGA;loosened TLSO to allow pt to try to have a BM; difficulty with CM due to brace    Toilet Transfer   Type of Assistance Needed Supervision;Verbal cues   Toilet Transfer CARE Score 4   Toilet Transfer   Surface Assessed Raised Toilet   Transfer Technique Standard   Limitations Noted In Balance;ROM;UE Strength;LE Strength   Findings CGA/S   Functional Standing Tolerance   Activity stance at table 2x with ther act with unilateral support CS; approx 2 minutes each set    Exercise Tools   Exercise Tools Yes   Hand Gripper 5# digi x 30 reps B hands    Other Exercise Tool 1 1# B UEs 3x10 reps in all available  planes in compliance with back precautions     Cognition   Overall Cognitive Status [facility]   Arousal/Participation Alert; Cooperative   Attention Within functional limits   Orientation Level Oriented X4   Memory Within functional limits   Following Commands Follows all commands and directions without difficulty   Additional Activities   Additional Activities   (fxnl mobility with RW  CS to and from  and in OT room )   Activity Tolerance   Activity Tolerance Patient tolerated treatment well;Patient limited by pain   Assessment   Treatment Assessment Pt limited by back pain and fatigue  Pt now on RA no s/s of SOB  TLSO donned for entire session  Pt performed generalized UE strength and endurance program within compliance of back precautions  Tolerance to activites in stance is limited by pain/decreased activity tolerance due to fatigue  Refer to respective sections for details  Pt then seated in lift recliner to rest once tx complete  Continue OT  Prognosis Good   Problem List Decreased strength;Decreased range of motion;Decreased endurance; Impaired balance;Decreased mobility; Decreased safety awareness;Orthopedic restrictions;Pain   Plan   Treatment/Interventions Functional transfer training; Therapeutic exercise; Endurance training;ADL retraining;Patient/family training; Compensatory technique education;Gait training   Progress Progressing toward goals   OT Therapy Minutes   OT Time In 1000   OT Time Out 1100   OT Total Time (minutes) 60   OT Mode of treatment - Individual (minutes) 60   OT Mode of treatment - Concurrent (minutes) 0   OT Mode of treatment - Group (minutes) 0   OT Mode of treatment - Co-treat (minutes) 0   OT Mode of Treatment - Total time(minutes) 60 minutes   OT Cumulative Minutes 120   Therapy Time missed   Time missed?  No

## 2019-11-23 NOTE — PROGRESS NOTES
11/23/19 3724   Pain Assessment   Pain Assessment 0-10   Pain Score 8   Pain Location Back   Restrictions/Precautions   Precautions Aspiration; Fall Risk;Pain   RLE Weight Bearing Per Order WBAT   LLE Weight Bearing Per Order WBAT   Braces or Orthoses TLSO   Sit to Stand   Type of Assistance Needed Supervision; Incidental touching   Amount of Physical Assistance Provided No physical assistance   Sit to Stand CARE Score 4   Bed-Chair Transfer   Type of Assistance Needed Supervision; Incidental touching   Amount of Physical Assistance Provided No physical assistance   Chair/Bed-to-Chair Transfer CARE Score 4   Transfer Bed/Chair/Wheelchair   Limitations Noted In Balance; Endurance;Pain Management;LE Strength;UE Strength   Adaptive Equipment Roller Walker   Stand Pivot Supervision   Sit to Stand Supervision   Stand to Sit Supervision   All Transfer Supervision   Walk 10 Feet   Type of Assistance Needed Supervision   Amount of Physical Assistance Provided No physical assistance   Comment to/ from bathroom   Walk 10 Feet CARE Score 4   Walk 50 Feet with Two Turns   Type of Assistance Needed Supervision   Amount of Physical Assistance Provided No physical assistance   Walk 50 Feet with Two Turns CARE Score 4   Ambulation   Does the patient walk? 2  Yes   Primary Mode of Locomotion Prior to Admission Walk   Distance Walked (feet) 92 ft  (85)   Assist Device Roller Walker   Gait Pattern Inconsistant Janell;[place]   Limitations Noted In Balance; Endurance; Safety;Strength;Speed   Provided Assistance with: Balance   Walk Assist Level Supervision   Curb or Single Stair   Style negotiated Single stair   Type of Assistance Needed Supervision; Incidental touching   Amount of Physical Assistance Provided No physical assistance   Comment CGA/supervision   1 Step (Curb) CARE Score 4   4 Steps   Type of Assistance Needed Supervision; Incidental touching   Amount of Physical Assistance Provided No physical assistance   Comment CGA/supervision   4 Steps CARE Score 4   12 Steps   Reason if not Attempted Safety concerns   12 Steps CARE Score 88   Stairs   Type Stairs   # of Steps 5   Weight Bearing Precautions WBAT   Assist Devices Bilateral Rail   Findings CGA/supervision with VCs for sequencing   Toilet Transfer   Type of Assistance Needed Supervision   Amount of Physical Assistance Provided No physical assistance   Toilet Transfer CARE Score 4   Toilet Transfer   Surface Assessed Standard Toilet   Limitations Noted In LE Strength   Adaptive Equipment Grab Bar   Findings Supervision/ supervision with LE dressing and hygiene   Therapeutic Interventions   Strengthening Seated TE 30 reps each   Other gait, transfers, community re-ed   Assessment   Treatment Assessment Patient activitiy limited by pain and decreased endurance  Patient only had min SOB post stair negotiation, able to recover within 1 minute from 82% to 94%  Patient fatgiues by end of session, challenged with R LE exercises rom  due to pain but did complete within tolerable ROM  able to ambulate today with RW multiple trials throughout unit  Transfers completed with supervision on toilet and WC  Problem List Decreased strength;Decreased range of motion;Decreased endurance; Impaired balance;Decreased mobility; Decreased safety awareness;Orthopedic restrictions;Pain   PT Barriers   Physical Impairment Decreased strength;Decreased range of motion;Decreased endurance; Impaired balance;Decreased mobility;Orthopedic restrictions;Pain   Functional Limitation Walking;Transfers;Standing;Stair negotiation   Plan   Treatment/Interventions Functional transfer training;LE strengthening/ROM; Elevations; Therapeutic exercise;Gait training   Progress Progressing toward goals   PT Therapy Minutes   PT Time In 0830   PT Time Out 0930   PT Total Time (minutes) 60   PT Mode of treatment - Individual (minutes) 60   PT Mode of treatment - Concurrent (minutes) 0   PT Mode of treatment - Group (minutes) 0 PT Mode of treatment - Co-treat (minutes) 0   PT Mode of Treatment - Total time(minutes) 60 minutes   PT Cumulative Minutes 247

## 2019-11-23 NOTE — PLAN OF CARE
Problem: Potential for Falls  Goal: Patient will remain free of falls  Description  INTERVENTIONS:  - Assess patient frequently for physical needs  -  Identify cognitive and physical deficits and behaviors that affect risk of falls    -  [place] fall precautions as indicated by assessment   - Educate patient/family on patient safety including physical limitations  - Instruct patient to call for assistance with activity based on assessment  - Modify environment to reduce risk of injury  - Consider OT/PT consult to assist with strengthening/mobility  Outcome: Progressing     Problem: RESPIRATORY - ADULT  Goal: Achieves optimal ventilation and oxygenation  Description  INTERVENTIONS:  - Assess for changes in respiratory status  - Assess for changes in mentation and behavior  - Position to facilitate oxygenation and minimize respiratory effort  - Oxygen administered by appropriate delivery if ordered  - Initiate smoking cessation education as indicated  - Encourage broncho-pulmonary hygiene including cough, deep breathe, Incentive Spirometry  - Assess the need for suctioning and aspirate as needed  - Assess and instruct to report SOB or any respiratory difficulty  - Respiratory Therapy support as indicated  Outcome: Progressing     Problem: GASTROINTESTINAL - ADULT  Goal: Maintains adequate nutritional intake  Description  INTERVENTIONS:  - Monitor percentage of each meal consumed  - Identify factors contributing to decreased intake, treat as appropriate  - Assist with meals as needed  - Monitor I&O, weight, and lab values if indicated  - Obtain nutrition services referral as needed  Outcome: Progressing     Problem: SKIN/TISSUE INTEGRITY - ADULT  Goal: Skin integrity remains intact  Description  INTERVENTIONS  - Identify patients at risk for skin breakdown  - Assess and monitor skin integrity  - Assess and monitor nutrition and hydration status  - Monitor labs (i e  albumin)  - Assess for incontinence   - Turn and reposition patient  - Assist with mobility/ambulation  - Relieve pressure over bony prominences  - Avoid friction and shearing  - Provide appropriate hygiene as needed including keeping skin clean and dry  - Evaluate need for skin moisturizer/barrier cream  - Collaborate with interdisciplinary team (i e  Nutrition, Rehabilitation, etc )   - Patient/family teaching  Outcome: Progressing     Problem: HEMATOLOGIC - ADULT  Goal: Maintains hematologic stability  Description  INTERVENTIONS  - Assess for signs and symptoms of bleeding or hemorrhage  - Monitor labs  - Administer supportive blood products/factors as ordered and appropriate  Outcome: Progressing     Problem: INFECTION - ADULT  Goal: Absence or prevention of progression during hospitalization  Description  INTERVENTIONS:  - Assess and monitor for signs and symptoms of infection  - Monitor lab/diagnostic results  - Monitor all insertion sites, i e  indwelling lines, tubes, and drains  - Monitor endotracheal if appropriate and nasal secretions for changes in amount and color  - [place] appropriate cooling/warming therapies per order  - Administer medications as ordered  - Instruct and encourage patient and family to use good hand hygiene technique  - Identify and instruct in appropriate isolation precautions for identified infection/condition  Outcome: Progressing  Goal: Absence of fever/infection during neutropenic period  Description  INTERVENTIONS:  - Monitor WBC    Outcome: Progressing     Problem: SAFETY ADULT  Goal: Maintain or return to baseline ADL function  Description  INTERVENTIONS:  -  Assess patient's ability to carry out ADLs; assess patient's baseline for ADL function and identify physical deficits which impact ability to perform ADLs (bathing, care of mouth/teeth, toileting, grooming, dressing, etc )  - Assess/evaluate cause of self-care deficits   - Assess range of motion  - Assess patient's mobility; develop plan if impaired  - Assess patient's need for assistive devices and provide as appropriate  - Encourage maximum independence but intervene and supervise when necessary  - Involve family in performance of ADLs  - Assess for home care needs following discharge   - Consider OT consult to assist with ADL evaluation and planning for discharge  - Provide patient education as appropriate  Outcome: Progressing  Goal: Maintain or return mobility status to optimal level  Description  INTERVENTIONS:  - Assess patient's baseline mobility status (ambulation, transfers, stairs, etc )    - Identify cognitive and physical deficits and behaviors that affect mobility  - Identify mobility aids required to assist with transfers and/or ambulation (gait belt, sit-to-stand, lift, walker, cane, etc )  - [place] fall precautions as indicated by assessment  - Record patient progress and toleration of activity level on Mobility SBAR; progress patient to next Phase/Stage  - Instruct patient to call for assistance with activity based on assessment  - Consider rehabilitation consult to assist with strengthening/weightbearing, etc   Outcome: Progressing     Problem: DISCHARGE PLANNING  Goal: Discharge to home or other facility with appropriate resources  Description  INTERVENTIONS:  - Identify barriers to discharge w/patient and caregiver  - Arrange for needed discharge resources and transportation as appropriate  - Identify discharge learning needs (meds, wound care, etc )  - Arrange for interpretive services to assist at discharge as needed  - Refer to Case Management Department for coordinating discharge planning if the patient needs post-hospital services based on physician/advanced practitioner order or complex needs related to functional status, cognitive ability, or social support system  Outcome: Progressing     Problem: Nutrition/Hydration-ADULT  Goal: Nutrient/Hydration intake appropriate for improving, restoring or maintaining nutritional needs  Description  Monitor and assess patient's nutrition/hydration status for malnutrition  Collaborate with interdisciplinary team and initiate plan and interventions as ordered  Monitor patient's weight and dietary intake as ordered or per policy  Utilize nutrition screening tool and intervene as necessary  Determine patient's food preferences and provide high-protein, high-caloric foods as appropriate       INTERVENTIONS:  - Monitor oral intake, urinary output, labs, and treatment plans  - Assess nutrition and hydration status and recommend course of action  - Evaluate amount of meals eaten  - Assist patient with eating if necessary   - Allow adequate time for meals  - Recommend/ encourage appropriate diets, oral nutritional supplements, and vitamin/mineral supplements  - Order, calculate, and assess calorie counts as needed  - Recommend, monitor, and adjust tube feedings and TPN/PPN based on assessed needs  - Assess need for intravenous fluids  - Provide specific nutrition/hydration education as appropriate  - Include patient/family/caregiver in decisions related to nutrition  Outcome: Progressing

## 2019-11-23 NOTE — NURSING NOTE
Patient ambulates with assist times one with walker  Maintains TLSO brace when OOB  Medicated for pain as ordered  Compliant with SCDs  Will continue to monitor and follow plan of care

## 2019-11-23 NOTE — PROGRESS NOTES
OT Daily Progress       11/23/19 0930   Pain Assessment   Pain Assessment 0-10   Pain Score 5   Pain Location Back   Sit to Stand   Type of Assistance Needed Supervision   Amount of Physical Assistance Provided No physical assistance   Sit to Stand CARE Score 4   Toilet Transfer   Type of Assistance Needed Incidental touching   Amount of Physical Assistance Provided No physical assistance   Comment supervision with cueing for safety   Toilet Transfer CARE Score 4   Toilet Transfer   Surface Assessed Raised Toilet   Transfer Technique Standard   Limitations Noted In Balance; Safety   Therapeutic Exercise - ROM   UE-ROM Yes   Therapeutic Excerise-Strength   UE Strength Yes   Cognition   Overall Cognitive Status WFL   Arousal/Participation Alert; Cooperative   Attention Within functional limits   Orientation Level Oriented X4   Activity Tolerance   Activity Tolerance Patient tolerated treatment well;Patient limited by pain   Medical Staff Made Aware RN aware   OT Therapy Minutes   OT Time In 0930   OT Time Out 1030   OT Total Time (minutes) 60   OT Mode of treatment - Individual (minutes) 60     Patient seen for follow up visit  Pt rec'd in wheelchair from PT session, Aspen brace intact  UE exercises for endurance/strengthening for self care tasks, including 1# free weight bilat UEs bicep curl, chest press 2 sets x 20 reps each  5# digiflex bilat hands 2 sewts x 20 reps with 5 second hold  Sit to stand with CG A - stands at table for unilateral activity of gripper/peg removal task without need for support to maintain standing  Stands approx 3 mins without difficulty  Yellow flex bar x 20 reps  Stands x 4 mins for washcloth folding activity  Reports some increased discomfort in back with standing  Returns to room - requests to use restroom  Toilet transfer as noted above  Left with needs in reach  Tolerating well  Limited by back pain, decreased endurance  Continue to progress treatment as status allows

## 2019-11-24 NOTE — PROGRESS NOTES
11/24/19 0840   Charting Type   Charting Type Shift assessment   Neurological   Neuro (WDL) X   Muscle Function/Sensation Assessment Muscle strength   RLE Muscle Strength 4- Movement against gravity and limited resistance   LLE Muscle Strength 4- Movement against gravity and limited resistance   Neuro Symptoms Fatigue; Forgetful   Relieved by Rest   [place] Coma Scale   Eye Opening 4   Best Verbal Response 5   Best Motor Response 6   [place] Coma Scale Score 15   HEENT   HEENT (WDL) X   R Eye Mildly impaired vision   L Eye Mildly impaired vision   Vision - Corrective Lenses (at bedside) Glasses   R Ear Mildly impaired hearing   L Ear Mildly impaired hearing   Teeth Dentures upper;Dentures lower   Respiratory   Respiratory (WDL) X   Bilateral Breath Sounds Clear;Diminished   Cardiac   Cardiac (WDL) WDL   Peripheral Vascular   Peripheral Vascular (WDL) WDL   Integumentary   Integumentary (WDL) X   Skin Condition/Temp Warm;Dry   Skin Integrity Bruising   Skin Location scattered   Skin Turgor Epidermis thin with loss of subcutaneous tissue   Andi Scale   Sensory Perceptions 4   Moisture 4   Activity 3   Mobility 3   Nutrition 3   Friction and Shear 3   Andi Scale Score 20   Wound 11/18/19 Traumatic Other (Comment) Hand Right   Date First Assessed/Time First Assessed: 11/18/19 1130   Pre-Existing Wound: No  Primary Wound Type: Traumatic  Traumatic Wound Type: (c) Other (Comment)  Location: Hand  Wound Location Orientation: Right  Wound Description (Comments): Hematoma  Incis    Wound Description Clean;Dry; Intact; Light purple   Comfort-wound Assessment Clean;Dry; Intact   Dressing Open to air   Musculoskeletal   Musculoskeletal (WDL) X   Level of Assistance Contact guard assist, steadying assist   Assistive Device Front wheel walker   RLE Limited movement   LLE Limited movement   Gastrointestinal   Gastrointestinal (WDL) WDL   Stool Assessment   Bowel Incontinence No   Genitourinary   Genitourinary (WDL) WDL   Urine Assessment   Urinary Incontinence No   Genitalia   Female Genitalia Intact   Genitourinary Additional Assessments   Genitourinary Additional Assessments No   Anal/Rectal   Anal/Rectal (WDL) WDL   Psychosocial   Psychosocial (WDL) WDL

## 2019-11-24 NOTE — PROGRESS NOTES
11/23/19 1540   Pain Assessment   Pain Assessment 0-10   Pain Score 7   Pain Type Acute pain   Pain Location Back   Pain Orientation Lower   Restrictions/Precautions   Precautions Aspiration; Fall Risk   RLE Weight Bearing Per Order WBAT   LLE Weight Bearing Per Order WBAT   Braces or Orthoses TLSO   Comprehension   Assist Devices Glasses   QI: Comprehension 4  Undestands: Clear comprehension without cues or repetitions   Comprehension (FIM) 6 - Understands complex/abstract but requires more time   Expression   Verbal Complex   QI: Expression 4  Express complex messages without difficulty and with speech that is clear and easy to [place]   Expression (FIM) 6 - Expresses complex/abstract but requires:  more time   Social Interaction   Social Interaction (FIM) 6 - Interacts appropriately with others BUT requires extra  time   Problem Solving   Problem solving (FIM) 5 - Solves basic problems 90% of time   Memory   Recognize People Yes   Remember Routine Yes   Initiates Tasks Yes   Memory (FIM) 5 - [place] cues patient   Memory Skills   Orientation Level Oriented X4   Short Term Recent Recall Mild Impairment   Short Term Working Recall Mild Impairment   Auditory Comprehension   Comphrehends Conversation Complex   Interfering Components Working memory   EffectiveTechniques Extra processing time   Speech/Swallow Mechanism Exam   Lingual Strength Mild reduced   Lingual ROM Mild reduced   [place] Dentures top   Volitional Swallow Delayed   Respratory Status Nasal Cannula   Speech/Language/Cognition Assessmetn   Treatment Assessment Speech Pathology Daily Treatment Note - Speech/Cognitive Communication Skills - SLP targeted comprehension and expression of current safety precautions and safe mobility sequences during sequencing of a safe toilet transfer  The task was discussed and then verbally sequenced into simplified salient steps to aid retention    At each step of the sequence, SLP stressed the level of necessary assistance which is currently required and focused on patient's own awareness of their limitations  Current level for successful recall is supervision as well as reasoning once engaged in large motor tasks  Swallow Information   Current Risks for Dysphagia & Aspiration Rapid respiratory rate   Current Symptoms/Concerns Cough; With liquids   Current Diet Dysphagia pureed   Consistencies Assessed and Performance   Oral Stage Mild impaired   Oral Stage Comment Reduced endurance   Phargngeal Stage Mild impaired   Pharyngeal Stage Comment mild delay   Esophageal Concerns Hx GERDS   Recommendations   Risk for Aspiration Mild   Diet Solid Recommendation Level 1 Dysphagia/pureed   Diet Liquid Recommendation Thin liquid   General Precautions Aspiration precautions   Compensatory Swallowing Strategies Alternate solids and liquids   Eating   Type of Assistance Needed Set-up / clean-up   Eating CARE Score 5   Swallow Assessment   Swallow Treatment Assessment Speech Pathology Daily Treatment Note - Swallow Oral Function - Oral exercises were completed 1:1 with SLP as a direct model and using a mirror for visual feedback to promote full ROM of oral articulators  Exercises included musculature of the jaw, cheeks, lips and tongue  Repetitions completed 7-10 as tolerated    Swallow Assessment Prognosis   Prognosis Good   Prognosis Considerations Family/community support   SLP Therapy Minutes   SLP Time In 1540   SLP Time Out 1640   SLP Total Time (minutes) 60   Therapy Time missed   Time missed?  No   Daily FIM Score   Eating (FIM) 5 - Patient requires supervision, cueing or coaxing

## 2019-11-24 NOTE — NURSING NOTE
Resting in bed no s/s of pain or distress noted at this time  Medicated for pain as ordered during shift  TLSO brace in place when OOB, ambulated to BR supervision  Encouraged pt to repo Q2Hr for pressure relief  Continuing to monitor and follow plan of care  Bed alarm intact for safety

## 2019-11-24 NOTE — PLAN OF CARE
Problem: Potential for Falls  Goal: Patient will remain free of falls  Description  INTERVENTIONS:  - Assess patient frequently for physical needs  -  Identify cognitive and physical deficits and behaviors that affect risk of falls    -  [place] fall precautions as indicated by assessment   - Educate patient/family on patient safety including physical limitations  - Instruct patient to call for assistance with activity based on assessment  - Modify environment to reduce risk of injury  - Consider OT/PT consult to assist with strengthening/mobility  Outcome: Progressing     Problem: RESPIRATORY - ADULT  Goal: Achieves optimal ventilation and oxygenation  Description  INTERVENTIONS:  - Assess for changes in respiratory status  - Assess for changes in mentation and behavior  - Position to facilitate oxygenation and minimize respiratory effort  - Oxygen administered by appropriate delivery if ordered  - Initiate smoking cessation education as indicated  - Encourage broncho-pulmonary hygiene including cough, deep breathe, Incentive Spirometry  - Assess the need for suctioning and aspirate as needed  - Assess and instruct to report SOB or any respiratory difficulty  - Respiratory Therapy support as indicated  Outcome: Progressing     Problem: GASTROINTESTINAL - ADULT  Goal: Maintains adequate nutritional intake  Description  INTERVENTIONS:  - Monitor percentage of each meal consumed  - Identify factors contributing to decreased intake, treat as appropriate  - Assist with meals as needed  - Monitor I&O, weight, and lab values if indicated  - Obtain nutrition services referral as needed  Outcome: Progressing     Problem: SKIN/TISSUE INTEGRITY - ADULT  Goal: Skin integrity remains intact  Description  INTERVENTIONS  - Identify patients at risk for skin breakdown  - Assess and monitor skin integrity  - Assess and monitor nutrition and hydration status  - Monitor labs (i e  albumin)  - Assess for incontinence   - Turn and reposition patient  - Assist with mobility/ambulation  - Relieve pressure over bony prominences  - Avoid friction and shearing  - Provide appropriate hygiene as needed including keeping skin clean and dry  - Evaluate need for skin moisturizer/barrier cream  - Collaborate with interdisciplinary team (i e  Nutrition, Rehabilitation, etc )   - Patient/family teaching  Outcome: Progressing     Problem: HEMATOLOGIC - ADULT  Goal: Maintains hematologic stability  Description  INTERVENTIONS  - Assess for signs and symptoms of bleeding or hemorrhage  - Monitor labs  - Administer supportive blood products/factors as ordered and appropriate  Outcome: Progressing     Problem: MUSCULOSKELETAL - ADULT  Goal: Maintain or return mobility to safest level of function  Description  INTERVENTIONS:  - Assess patient's ability to carry out ADLs; assess patient's baseline for ADL function and identify physical deficits which impact ability to perform ADLs (bathing, care of mouth/teeth, toileting, grooming, dressing, etc )  - Assess/evaluate cause of self-care deficits   - Assess range of motion  - Assess patient's mobility  - Assess patient's need for assistive devices and provide as appropriate  - Encourage maximum independence but intervene and supervise when necessary  - Involve family in performance of ADLs  - Assess for home care needs following discharge   - Consider OT consult to assist with ADL evaluation and planning for discharge  - Provide patient education as appropriate  Outcome: Progressing     Problem: PAIN - ADULT  Goal: Verbalizes/displays adequate comfort level or baseline comfort level  Description  Interventions:  - Encourage patient to monitor pain and request assistance  - Assess pain using appropriate pain scale  - Administer analgesics based on type and severity of pain and evaluate response  - Implement non-pharmacological measures as appropriate and evaluate response  - Consider cultural and social influences on pain and pain management  - Notify physician/advanced practitioner if interventions unsuccessful or patient reports new pain  Outcome: Progressing     Problem: INFECTION - ADULT  Goal: Absence or prevention of progression during hospitalization  Description  INTERVENTIONS:  - Assess and monitor for signs and symptoms of infection  - Monitor lab/diagnostic results  - Monitor all insertion sites, i e  indwelling lines, tubes, and drains  - Monitor endotracheal if appropriate and nasal secretions for changes in amount and color  - [place] appropriate cooling/warming therapies per order  - Administer medications as ordered  - Instruct and encourage patient and family to use good hand hygiene technique  - Identify and instruct in appropriate isolation precautions for identified infection/condition  Outcome: Progressing  Goal: Absence of fever/infection during neutropenic period  Description  INTERVENTIONS:  - Monitor WBC    Outcome: Progressing     Problem: SAFETY ADULT  Goal: Maintain or return to baseline ADL function  Description  INTERVENTIONS:  -  Assess patient's ability to carry out ADLs; assess patient's baseline for ADL function and identify physical deficits which impact ability to perform ADLs (bathing, care of mouth/teeth, toileting, grooming, dressing, etc )  - Assess/evaluate cause of self-care deficits   - Assess range of motion  - Assess patient's mobility; develop plan if impaired  - Assess patient's need for assistive devices and provide as appropriate  - Encourage maximum independence but intervene and supervise when necessary  - Involve family in performance of ADLs  - Assess for home care needs following discharge   - Consider OT consult to assist with ADL evaluation and planning for discharge  - Provide patient education as appropriate  Outcome: Progressing  Goal: Maintain or return mobility status to optimal level  Description  INTERVENTIONS:  - Assess patient's baseline mobility status (ambulation, transfers, stairs, etc )    - Identify cognitive and physical deficits and behaviors that affect mobility  - Identify mobility aids required to assist with transfers and/or ambulation (gait belt, sit-to-stand, lift, walker, cane, etc )  - [place] fall precautions as indicated by assessment  - Record patient progress and toleration of activity level on Mobility SBAR; progress patient to next Phase/Stage  - Instruct patient to call for assistance with activity based on assessment  - Consider rehabilitation consult to assist with strengthening/weightbearing, etc   Outcome: Progressing     Problem: DISCHARGE PLANNING  Goal: Discharge to home or other facility with appropriate resources  Description  INTERVENTIONS:  - Identify barriers to discharge w/patient and caregiver  - Arrange for needed discharge resources and transportation as appropriate  - Identify discharge learning needs (meds, wound care, etc )  - Arrange for interpretive services to assist at discharge as needed  - Refer to Case Management Department for coordinating discharge planning if the patient needs post-hospital services based on physician/advanced practitioner order or complex needs related to functional status, cognitive ability, or social support system  Outcome: Progressing     Problem: Knowledge Deficit  Goal: Patient/family/caregiver demonstrates understanding of disease process, treatment plan, medications, and discharge instructions  Description  Complete learning assessment and assess knowledge base  Interventions:  - Provide teaching at level of understanding  - Provide teaching via preferred learning methods  Outcome: Progressing     Problem: Nutrition/Hydration-ADULT  Goal: Nutrient/Hydration intake appropriate for improving, restoring or maintaining nutritional needs  Description  Monitor and assess patient's nutrition/hydration status for malnutrition   Collaborate with interdisciplinary team and initiate plan and interventions as ordered  Monitor patient's weight and dietary intake as ordered or per policy  Utilize nutrition screening tool and intervene as necessary  Determine patient's food preferences and provide high-protein, high-caloric foods as appropriate       INTERVENTIONS:  - Monitor oral intake, urinary output, labs, and treatment plans  - Assess nutrition and hydration status and recommend course of action  - Evaluate amount of meals eaten  - Assist patient with eating if necessary   - Allow adequate time for meals  - Recommend/ encourage appropriate diets, oral nutritional supplements, and vitamin/mineral supplements  - Order, calculate, and assess calorie counts as needed  - Recommend, monitor, and adjust tube feedings and TPN/PPN based on assessed needs  - Assess need for intravenous fluids  - Provide specific nutrition/hydration education as appropriate  - Include patient/family/caregiver in decisions related to nutrition  Outcome: Progressing

## 2019-11-25 NOTE — PROGRESS NOTES
11/25/19 0928   Charting Type   Charting Type Shift assessment   Neurological   Neuro (WDL) X   RLE Muscle Strength 4- Movement against gravity and limited resistance   LLE Muscle Strength 4- Movement against gravity and limited resistance   Neuro Symptoms Fatigue; Forgetful   Relieved by Rest   Delirium Assessment- CAM    Acute Onset and Fluctuating Course (1) No   Inattention (2) No   Disorganized Thinking (3) No   Rate Patient's Level of Consciousness (4) Alert (Normal), No   Delirium Present No   Shanda Coma Scale   Eye Opening 4   Best Verbal Response 5   Best Motor Response 6   Shanda Coma Scale Score 15   HEENT   HEENT (WDL) X   R Eye Mildly impaired vision   L Eye Mildly impaired vision   Vision - Corrective Lenses (at bedside) Glasses   R Ear Mildly impaired hearing   L Ear Mildly impaired hearing   Teeth Dentures upper;Dentures lower   Respiratory   Respiratory (WDL) X   Bilateral Breath Sounds Clear;Diminished   Cardiac   Cardiac (WDL) WDL   Peripheral Vascular   Peripheral Vascular (WDL) WDL   Integumentary   Integumentary (WDL) X   Skin Condition/Temp Warm;Dry   Skin Integrity Bruising   Skin Location scattered   Skin Turgor Epidermis thin with loss of subcutaneous tissue   Andi Scale   Sensory Perceptions 4   Moisture 4   Activity 3   Mobility 3   Nutrition 3   Friction and Shear 3   Andi Scale Score 20   Wound 11/18/19 Traumatic Other (Comment) Hand Right   Date First Assessed/Time First Assessed: 11/18/19 1130   Pre-Existing Wound: No  Primary Wound Type: Traumatic  Traumatic Wound Type: (c) Other (Comment)  Location: Hand  Wound Location Orientation: Right  Wound Description (Comments): Hematoma  Incis    Wound Description Clean;Dry; Intact; Light purple   Comfort-wound Assessment Clean;Dry; Intact   Dressing Open to air   Musculoskeletal   Musculoskeletal (WDL) X   Level of Assistance Standby assist, set-up cues, supervision of patient - no hands on   Assistive Device Front wheel walker   RLE Limited movement   LLE Limited movement   Gastrointestinal   Gastrointestinal (WDL) WDL   Stool Assessment   Bowel Incontinence No   Genitourinary   Genitourinary (WDL) WDL   Genitalia   Female Genitalia Intact   Genitourinary Additional Assessments   Genitourinary Additional Assessments No   Anal/Rectal   Anal/Rectal (WDL) WDL   Psychosocial   Psychosocial (WDL) WDL

## 2019-11-25 NOTE — PCC PHYSICAL THERAPY
11/25/19: Pt is participating in therapy and progressing towards PT goals; Pt is S for bed mobility and transfers; Pt amb up to 189' with RW and S; Up/down 10 steps with 2 HR and S; Pt is limited by pain in back and decreased endurance;  Pt would benefit from continued skilled PT to increase strength and independence for safe D/C home

## 2019-11-25 NOTE — PROGRESS NOTES
Physical Medicine and Rehabilitation Progress Note  Pancho Browne 80 y o  female MRN: [id number]  Unit/Bed#: -01 Encounter: [id number]    HPI: Pancho Browne is a 80 y o  female who presented to the [address] [place] [address] back pain in the setting of compression deformity and patient was evaluated by ortho and neurosx who both recommended conservative management  Course complicated by respiratory failure 2/2 to PNA which was treated with abx  Chief Complaint: PNA and compression deformity    Interval/subjective: patient denies any events over the weekend     ROS: A 10 point ROS was performed; negative except as noted above       Assessment/Plan:      PNA (pneumonia)  Assessment & Plan  - urine strep antigen positive in acute care   - s/p abx course in acute care     Compression deformity of vertebra  Assessment & Plan  - T10, T12, & L1   - evaluated by ortho through consultation (and remotely by neurosx) and both recommended bracing with TLSO and no further intervention   - OP FU with Dr Abdias Lane     CKD (chronic kidney disease)  Assessment & Plan  - baseline Cr appears to be approximately 0 9  - Cr currently 0 81  - IM monitoring     Dysphagia  Assessment & Plan  - modified diet per ST     Chronic diastolic heart failure (HCC)  Assessment & Plan  - grade 1  - on lasix  - follows with Dr Adama Osborn  - per PAPDMP gets regular prescriptions for ativan 0 5 mg tabs last filled on 09/19/19 for 90 tabs with 0 refills; per patient uses sparingly on an as needed basis     COPD (chronic obstructive pulmonary disease) ([facility] [address] )  Assessment & Plan  - was not on home O2 prior to admission however per IM based on desat study in acute care they are recommending 2 L at rest and 3 L with exertion at this time     GERD (gastroesophageal reflux disease)  Assessment & Plan  - with hiatal hernia   - therapeutic substitution for home zantac 150 mg BID     CAD (coronary artery disease)  Assessment & Plan  - h/o stent  - troponins peaked in acute care to 0 09  - evaluated by cards in acute care and attributed elevation in troponins to infxn and patient being in [place] at the time   - on home ASA 81 mg qd   - on home lipitor 40 mg qpm  - on BB  - follows with Dr Benetta Gaucher     * Essential hypertension  Assessment & Plan  - at home on lasix 20 mg qd, lopressor 25 mg q12 (confirmed with patient's pharmacy)   - IM managing      Scheduled Meds:    Current Facility-Administered Medications:  acetaminophen 650 mg Oral Q6H PRN Debi Wahl MD   al mag oxide-diphenhydramine-lidocaine viscous 10 mL Swish & Spit Q6H PRN Debi Wahl MD   amLODIPine 5 mg Oral Daily Debi Wahl MD   aspirin 81 mg Oral Daily Debi Wahl MD   atorvastatin 40 mg Oral Daily With [place] Watson MD   bisacodyl 10 mg Rectal Daily PRN Debi Wahl MD   dextran 70-hypromellose 1 drop Both Eyes Q3H PRN Debi Wahl MD   famotidine 20 mg Oral BID Debi Wahl MD   furosemide 20 mg Oral Daily Debi Wahl MD   heparin (porcine) 5,000 Units Subcutaneous Q8H Lynn Vu MD   ipratropium 0 5 mg Nebulization Q6H PRN Debi Wahl MD   levalbuterol 0 63 mg Nebulization Q6H PRN Debi Wahl MD   lidocaine 1 patch Topical Daily Debi Wahl MD   losartan 50 mg Oral Daily Debi Wahl MD   melatonin 6 mg Oral HS Debi Wahl MD   metoprolol tartrate 12 5 mg Oral Q12H Lynn Vu MD   OLANZapine 5 mg Oral HS Debi Wahl MD   oxyCODONE 2 5 mg Oral Q4H PRN Debi Wahl MD   oxyCODONE 5 mg Oral Q4H PRN Debi Wahl MD   polyethylene glycol 17 g Oral Daily Debi Wahl MD        Incidental Findings:     1) hiatal hernia: at home on zantac 150 mg BID (therapeutic substitution) for associated GERD; OP FU with PCP with further testing/treatment and/or specialist referral at PCP's discretion      2) diverticulosis: OP FU with PCP with further testing/treatment and/or specialist referral at PCP's discretion      3) elevated peak PA pressure: OP FU with Dr Harpreet Toussaint     4) renal cysts: OP FU with PCP with further testing/treatment and/or specialist referral at PCP's discretion      5) decreased alk phos of 49 (LLN 55): per IM consultant unlikely of clinical significance and recommended OP FU with PCP with further testing/treatment and/or specialist referral at PCP's discretion      6) myomatous uterus with calcified leiomyomas: asymptomatic, OP FU with PCP with further testing/treatment and/or specialist referral at PCP's discretion      DVT ppx: HSQ   Code: confirmed with patient that she wishes to be DNR/DNI and patient verbalized her understanding of what this means        Objective:    Functional Update:  Mobility: min  Transfers: min-mod   ADLs: mod         Physical Exam:    T: 97 9  HR: 82  BP: 130/60  RR: 18  POx: 95%       General: alert, no apparent distress, cooperative and comfortable  HEENT:  Head: Normal, normocephalic, atraumatic    CARDIAC:  +S1/2  LUNGS:  respirations unlabored   ABDOMEN:  soft NT   EXTREMITIES:  volume status currently stable   NEURO:   awake, alert, appropriately answering questions   PSYCH:  mood/affect currently stable     Laboratory:         Invalid input(s): PLTCT  Results from last 7 days   Lab Units 11/19/19  0909   BUN mg/dL 18   SODIUM mmol/L 138   POTASSIUM mmol/L 4 0   CHLORIDE mmol/L 93*   CREATININE mg/dL 0 81

## 2019-11-25 NOTE — PROGRESS NOTES
11/25/19 [facility]   Pain Assessment   Pain Assessment 0-10   Pain Score 7   Pain Type Acute pain   Pain Location Back   Pain Orientation Right; Lower   Restrictions/Precautions   Precautions Aspiration; Fall Risk  (back precautions )   Braces or Orthoses TLSO   Oral Hygiene   Type of Assistance Needed Set-up / clean-up   Oral Hygiene CARE Score 5   Grooming   Able To Initiate Tasks;Comb/Brush Hair;Wash/Dry Face;Brush/Clean Teeth;Wash/Dry Hands   Findings seated in w/c at sink    Shower/Bathe Self   Type of Assistance Needed Set-up / clean-up;Supervision;Verbal cues; Adaptive equipment   Shower/Bathe Self CARE Score 4   Bathing   Assessed Bath Style Shower   Able to [employer] Temperature Yes   Able to Wash/Rinse/Dry (body part) Left Arm;Right Arm;L Upper Leg;R Upper Leg;R Lower Leg/Foot;L Lower Leg/Foot;Chest;Abdomen;Perineal Area; Buttocks   Limitations Noted in Balance; Safety;Timeliness   Adaptive Equipment Longhand Sponge;Longhand Reacher; Shower Bars;Tub Bench;Hand Held [employer]   Findings  educated to sit for buttock bathing due to TLSO not donned and unable to stand when removed; Pt managed with supervision;good knowledge with AE for LB self care and comply with precautions    Tub/Shower Transfer   Limitations Noted In Balance;ROM;Safety;LE Strength   Adaptive Equipment Grab Bars;Transfer Bench   Findings entered and exited shower with TLSO donned and educ to not stand without it donned    Upper Body Dressing   Type of Assistance Needed Supervision;Verbal cues   Upper Body Dressing CARE Score 4   Lower Body Dressing   Type of Assistance Needed Supervision; Adaptive equipment;Verbal cues   Lower Body Dressing CARE Score 4   Putting On/Taking Off Footwear   Type of Assistance Needed Adaptive equipment;Verbal cues; Supervision   Putting On/Taking Off Footwear CARE Score 4   Dressing/Undressing Clothing   Remove UB Clothes Pullover Shirt   Don UB Clothes Pullover Shirt   Remove LB Clothes Pants; Undergarment;Socks   Sara Krabbe LB Clothes Pants; Undergarment;Socks; Shoes   Limitations Noted In Balance; Safety;ROM;Strength   Adaptive Equipment Reacher;Dressing Stick;LH [place];Sock Aide   Findings good recall with AE depsite pt reporting she is unsure of self; educ to sit for dressing and only don LB garments when TLSO is donned due to need to stand to manage clothes over buttocks   Sit to Stand   Type of Assistance Needed Supervision   Sit to Stand CARE Score 4   [address] [facility] [address] Score 4   Toileting   Able to [address] down yes, up yes  Manage Hygiene Bladder   Limitations Noted In Balance;ROM;Safety;LE Strength   Findings supervision;comfort height toilet allwos improved txf;pt unsure of set up at home and will discuss with GD   Toilet Transfer   Type of Assistance Needed Supervision   Toilet Transfer CARE Score 4   Toilet Transfer   Surface Assessed Raised Toilet   Transfer Technique Standard   Limitations Noted In Balance; Safety;LE Strength   Adaptive Equipment Grab Bar;Walker   Light Housekeeping   Light Housekeeping Level [employer] Housekeeping Level of Assistance Close supervision   Light Housekeeping s/u supplies for shower    Cognition   Overall Cognitive Status WFL   Arousal/Participation Alert; Cooperative   Attention Within functional limits   Orientation Level Oriented X4   Memory Within functional limits   Following Commands Follows all commands and directions without difficulty   Additional Activities   Additional Activities   (fxnl mobility to shower with supervision with brace donned )   Activity Tolerance   Activity Tolerance Patient tolerated treatment well;Patient limited by pain   Assessment   Treatment Assessment D/C planning with PTA and SW  Pt is at supervision level as noted in respective ADL sections  Pt needs assistance with donning/doffing TLSO  D/C plan is to go to [facility]  Pt has pain throughout session   Brace donned to enter shower then removed once seated and completed ADL sitting with use of AE  No standing performed without TLSO donned  Pt participated in 30 minutes of concurrent tx addressing similar goals with a pt with similar deficits  Prognosis Good   Problem List Decreased strength;Decreased endurance; Impaired balance;Decreased mobility;Pain;Orthopedic restrictions   Plan   Treatment/Interventions ADL retraining;Functional transfer training; Endurance training;Patient/family training;Equipment eval/education;Gait training; Compensatory technique education   Progress Progressing toward goals   OT Therapy Minutes   OT Time In 1100   OT Time Out 1200   OT Total Time (minutes) 60   OT Mode of treatment - Individual (minutes) 30   OT Mode of treatment - Concurrent (minutes) 30   OT Mode of treatment - Group (minutes) 0   OT Mode of treatment - Co-treat (minutes) 0   OT Mode of Treatment - Total time(minutes) 60 minutes   OT Cumulative Minutes 180   Therapy Time missed   Time missed?  No

## 2019-11-25 NOTE — PROGRESS NOTES
11/25/19 0900   Pain Assessment   Pain Assessment 0-10   Pain Score 9   Pain Type Acute pain   Pain Location Back   Pain Orientation Lower;Right   Restrictions/Precautions   Precautions Aspiration; Fall Risk   Braces or Orthoses TLSO   Cognition   Overall Cognitive Status WFL   Arousal/Participation Alert; Responsive; Cooperative   Attention Within functional limits   Orientation Level Oriented X4   Memory Within functional limits   Following Commands Follows one step commands without difficulty   Sit to Lying   Type of Assistance Needed Supervision   Amount of Physical Assistance Provided No physical assistance   Sit to Lying CARE Score 4   Lying to Sitting on Side of Bed   Type of Assistance Needed Supervision   Amount of Physical Assistance Provided No physical assistance   Lying to Sitting on Side of Bed CARE Score 4   Sit to Stand   Type of Assistance Needed Supervision   Amount of Physical Assistance Provided No physical assistance   Sit to Stand CARE Score 4   Bed-Chair Transfer   Type of Assistance Needed Supervision   Amount of Physical Assistance Provided No physical assistance   Chair/Bed-to-Chair Transfer CARE Score 4   Transfer Bed/Chair/Wheelchair   Limitations Noted In Balance; Endurance;Pain Management;UE Strength;LE Strength   Adaptive Equipment Roller Walker   Stand Pivot Supervision   Sit to Stand Supervision   Stand to Sit Supervision   Supine to Sit Supervision   Sit to Supine Supervision   All Transfer Supervision   Walk 10 Feet   Type of Assistance Needed Supervision   Amount of Physical Assistance Provided No physical assistance   Walk 10 Feet CARE Score 4   Walk 50 Feet with Two Turns   Type of Assistance Needed Supervision   Amount of Physical Assistance Provided No physical assistance   Walk 50 Feet with Two Turns CARE Score 4   Walk 150 Feet   Type of Assistance Needed Supervision   Amount of Physical Assistance Provided No physical assistance   Walk 150 Feet CARE Score 4   Walking 10 Feet on Uneven Surfaces   Type of Assistance Needed Supervision   Amount of Physical Assistance Provided No physical assistance   Walking 10 Feet on Uneven Surfaces CARE Score 4   Ambulation   Does the patient walk? 2  Yes   Primary Mode of Locomotion Prior to Admission Walk   Distance Walked (feet) 189 ft  (100)   Assist Device Roller Walker   Gait Pattern Inconsistant Janell   Limitations Noted In Balance; Endurance; Safety;Strength   Provided Assistance with: Balance   Walk Assist Level Supervision   Findings level and carpet   Curb or Single Stair   Style negotiated Single stair   Type of Assistance Needed Supervision   Amount of Physical Assistance Provided No physical assistance   1 Step (Curb) CARE Score 4   4 Steps   Type of Assistance Needed Supervision   Amount of Physical Assistance Provided No physical assistance   4 Steps CARE Score 4   12 Steps   Reason if not Attempted Safety concerns   12 Steps CARE Score 88   Stairs   Type Stairs   # of Steps 10   Weight Bearing Precautions WBAT   Assist Devices Bilateral Rail   Findings Close S   Therapeutic Interventions   Strengthening seated ther ex 30 reps   Other gait and community re-ed   Assessment   Treatment Assessment Pt is progressing well in PT; Pt is S for all transfers and bed mobility; Pt is S for amb up to 189' with RW; Up/down 10 steps today with 2 HR and S; Rests needed throughout session due to pain and decreased endurance; Pt performed seated ther ex for strengthening B LE   PT Barriers   Physical Impairment Decreased strength;Decreased range of motion;Decreased endurance; Impaired balance;Decreased mobility;Orthopedic restrictions;Pain   Functional Limitation Walking;Transfers;Standing;Stair negotiation   Plan   Treatment/Interventions Functional transfer training;LE strengthening/ROM; Therapeutic exercise;Gait training   Progress Progressing toward goals   PT Therapy Minutes   PT Time In 0900   PT Time Out 1000   PT Total Time (minutes) 60   PT Mode of treatment - Individual (minutes) 60   PT Mode of treatment - Concurrent (minutes) 0   PT Mode of treatment - Group (minutes) 0   PT Mode of treatment - Co-treat (minutes) 0   PT Mode of Treatment - Total time(minutes) 60 minutes   PT Cumulative Minutes 307   Therapy Time missed   Time missed?  No

## 2019-11-25 NOTE — PLAN OF CARE
Problem: Potential for Falls  Goal: Patient will remain free of falls  Description  INTERVENTIONS:  - Assess patient frequently for physical needs  -  Identify cognitive and physical deficits and behaviors that affect risk of falls    -  [place] fall precautions as indicated by assessment   - Educate patient/family on patient safety including physical limitations  - Instruct patient to call for assistance with activity based on assessment  - Modify environment to reduce risk of injury  - Consider OT/PT consult to assist with strengthening/mobility  Outcome: Progressing     Problem: RESPIRATORY - ADULT  Goal: Achieves optimal ventilation and oxygenation  Description  INTERVENTIONS:  - Assess for changes in respiratory status  - Assess for changes in mentation and behavior  - Position to facilitate oxygenation and minimize respiratory effort  - Oxygen administered by appropriate delivery if ordered  - Initiate smoking cessation education as indicated  - Encourage broncho-pulmonary hygiene including cough, deep breathe, Incentive Spirometry  - Assess the need for suctioning and aspirate as needed  - Assess and instruct to report SOB or any respiratory difficulty  - Respiratory Therapy support as indicated  Outcome: Progressing     Problem: GASTROINTESTINAL - ADULT  Goal: Maintains adequate nutritional intake  Description  INTERVENTIONS:  - Monitor percentage of each meal consumed  - Identify factors contributing to decreased intake, treat as appropriate  - Assist with meals as needed  - Monitor I&O, weight, and lab values if indicated  - Obtain nutrition services referral as needed  Outcome: Progressing     Problem: SKIN/TISSUE INTEGRITY - ADULT  Goal: Skin integrity remains intact  Description  INTERVENTIONS  - Identify patients at risk for skin breakdown  - Assess and monitor skin integrity  - Assess and monitor nutrition and hydration status  - Monitor labs (i e  albumin)  - Assess for incontinence   - Turn and reposition patient  - Assist with mobility/ambulation  - Relieve pressure over bony prominences  - Avoid friction and shearing  - Provide appropriate hygiene as needed including keeping skin clean and dry  - Evaluate need for skin moisturizer/barrier cream  - Collaborate with interdisciplinary team (i e  Nutrition, Rehabilitation, etc )   - Patient/family teaching  Outcome: Progressing     Problem: HEMATOLOGIC - ADULT  Goal: Maintains hematologic stability  Description  INTERVENTIONS  - Assess for signs and symptoms of bleeding or hemorrhage  - Monitor labs  - Administer supportive blood products/factors as ordered and appropriate  Outcome: Progressing     Problem: MUSCULOSKELETAL - ADULT  Goal: Maintain or return mobility to safest level of function  Description  INTERVENTIONS:  - Assess patient's ability to carry out ADLs; assess patient's baseline for ADL function and identify physical deficits which impact ability to perform ADLs (bathing, care of mouth/teeth, toileting, grooming, dressing, etc )  - Assess/evaluate cause of self-care deficits   - Assess range of motion  - Assess patient's mobility  - Assess patient's need for assistive devices and provide as appropriate  - Encourage maximum independence but intervene and supervise when necessary  - Involve family in performance of ADLs  - Assess for home care needs following discharge   - Consider OT consult to assist with ADL evaluation and planning for discharge  - Provide patient education as appropriate  Outcome: Progressing     Problem: PAIN - ADULT  Goal: Verbalizes/displays adequate comfort level or baseline comfort level  Description  Interventions:  - Encourage patient to monitor pain and request assistance  - Assess pain using appropriate pain scale  - Administer analgesics based on type and severity of pain and evaluate response  - Implement non-pharmacological measures as appropriate and evaluate response  - Consider cultural and social influences on pain and pain management  - Notify physician/advanced practitioner if interventions unsuccessful or patient reports new pain  Outcome: Progressing     Problem: INFECTION - ADULT  Goal: Absence or prevention of progression during hospitalization  Description  INTERVENTIONS:  - Assess and monitor for signs and symptoms of infection  - Monitor lab/diagnostic results  - Monitor all insertion sites, i e  indwelling lines, tubes, and drains  - Monitor endotracheal if appropriate and nasal secretions for changes in amount and color  - [place] appropriate cooling/warming therapies per order  - Administer medications as ordered  - Instruct and encourage patient and family to use good hand hygiene technique  - Identify and instruct in appropriate isolation precautions for identified infection/condition  Outcome: Progressing  Goal: Absence of fever/infection during neutropenic period  Description  INTERVENTIONS:  - Monitor WBC    Outcome: Progressing     Problem: SAFETY ADULT  Goal: Maintain or return to baseline ADL function  Description  INTERVENTIONS:  -  Assess patient's ability to carry out ADLs; assess patient's baseline for ADL function and identify physical deficits which impact ability to perform ADLs (bathing, care of mouth/teeth, toileting, grooming, dressing, etc )  - Assess/evaluate cause of self-care deficits   - Assess range of motion  - Assess patient's mobility; develop plan if impaired  - Assess patient's need for assistive devices and provide as appropriate  - Encourage maximum independence but intervene and supervise when necessary  - Involve family in performance of ADLs  - Assess for home care needs following discharge   - Consider OT consult to assist with ADL evaluation and planning for discharge  - Provide patient education as appropriate  Outcome: Progressing  Goal: Maintain or return mobility status to optimal level  Description  INTERVENTIONS:  - Assess patient's baseline mobility status (ambulation, transfers, stairs, etc )    - Identify cognitive and physical deficits and behaviors that affect mobility  - Identify mobility aids required to assist with transfers and/or ambulation (gait belt, sit-to-stand, lift, walker, cane, etc )  - [place] fall precautions as indicated by assessment  - Record patient progress and toleration of activity level on Mobility SBAR; progress patient to next Phase/Stage  - Instruct patient to call for assistance with activity based on assessment  - Consider rehabilitation consult to assist with strengthening/weightbearing, etc   Outcome: Progressing     Problem: DISCHARGE PLANNING  Goal: Discharge to home or other facility with appropriate resources  Description  INTERVENTIONS:  - Identify barriers to discharge w/patient and caregiver  - Arrange for needed discharge resources and transportation as appropriate  - Identify discharge learning needs (meds, wound care, etc )  - Arrange for interpretive services to assist at discharge as needed  - Refer to Case Management Department for coordinating discharge planning if the patient needs post-hospital services based on physician/advanced practitioner order or complex needs related to functional status, cognitive ability, or social support system  Outcome: Progressing     Problem: Knowledge Deficit  Goal: Patient/family/caregiver demonstrates understanding of disease process, treatment plan, medications, and discharge instructions  Description  Complete learning assessment and assess knowledge base  Interventions:  - Provide teaching at level of understanding  - Provide teaching via preferred learning methods  Outcome: Progressing     Problem: Nutrition/Hydration-ADULT  Goal: Nutrient/Hydration intake appropriate for improving, restoring or maintaining nutritional needs  Description  Monitor and assess patient's nutrition/hydration status for malnutrition   Collaborate with interdisciplinary team and initiate plan and interventions as ordered  Monitor patient's weight and dietary intake as ordered or per policy  Utilize nutrition screening tool and intervene as necessary  Determine patient's food preferences and provide high-protein, high-caloric foods as appropriate       INTERVENTIONS:  - Monitor oral intake, urinary output, labs, and treatment plans  - Assess nutrition and hydration status and recommend course of action  - Evaluate amount of meals eaten  - Assist patient with eating if necessary   - Allow adequate time for meals  - Recommend/ encourage appropriate diets, oral nutritional supplements, and vitamin/mineral supplements  - Order, calculate, and assess calorie counts as needed  - Recommend, monitor, and adjust tube feedings and TPN/PPN based on assessed needs  - Assess need for intravenous fluids  - Provide specific nutrition/hydration education as appropriate  - Include patient/family/caregiver in decisions related to nutrition  Outcome: Progressing

## 2019-11-25 NOTE — PCC OCCUPATIONAL THERAPY
11/25/2019 Pt participating in self care education with compensatory strategies using AE, educ in back precautions with use of TLSO for all activities, safety with txfs and fxnl mobility in kitchen  LOF noted above  Progressing toward OT goals  D/C planning ongoing  Pt needs assistance with donning/doffing TLSO  Barriers include pain/safety and impaired higher level balance and mgmt of TLSO  Plan to continue OT to address established goals in preparation for D/C to [facility] home

## 2019-11-25 NOTE — NURSING NOTE
Pt awake resting in bed  Oxygen was reapplied during shift due to SOB and desat 84 % on RA  2 LPM NC 94%  No signs of distress at this time  Ambulates assist x1 TLSO brace when OOB  PRN pain medication admin as ordered  Assessment otherwise unchanged  Continuing to monitor and follow plan of care

## 2019-11-26 NOTE — NURSING NOTE
Pt granddaughter, Joslyn Simental, inquired about home services when pt is d/c'd on Friday  Plans are for pt to split living arrangements between granddaughters Erick riley and Berenice Simental will have pt Friday to Saturday and During the week at times, but states that pt will be home alone for several hours in the morning and will have to "fend for herself for breakfast and lunch and get herself out of bed "  Concerns that pt may bnot be able to use toilet and shower as there are no grab bars in granddaughters' BR   SW has left for the day, but family would like to speak with SW about what home services are lined up for pt upon d/c  Will leave note for SW to call family  All needs are currently met  Family at bedside  Call bell and trangs within reach  Will continue to monitor and  follow plan of care

## 2019-11-26 NOTE — NURSING NOTE
Patient resting comfortably in bed at this time  No signs of distress noted  Patient remains alert and oriented forgetful at times bed alarm and close supervision in place to maintain patient safety  Patient wears back brace when out of bed  Patient requires assist to don/doff brace  Patient able to ambulate with supervision to the bathroom  Patient wore SCDs as ordered for DVT prophylaxis  Call bell within reach  Will continue to monitor patient and follow plan of care

## 2019-11-26 NOTE — PCC NURSING
11/26/19  Patient admitted to [facility] following hospitalization which found compression fractures of the spine  Patient continues with back brace when out of bed  Patient remains alert and oriented but forgetful at times bed alarm and close supervision in place to maintain patient safety  Trace lower extremity edema continues  Skin remains intact with scattered bruises noted  One assist required for ambulating to the bathroom and minimal assist for some self care activities

## 2019-11-26 NOTE — PROGRESS NOTES
11/26/19 1200   Pain Assessment   Pain Assessment 0-10   Pain Score 7   Pain Location Back   Pain Orientation Right; Lower   Restrictions/Precautions   Precautions Aspiration; Fall Risk  (back precautions )   Putting On/Taking Off Footwear   Type of Assistance Needed Supervision; Adaptive equipment   Putting On/Taking Off Footwear CARE Score 4   Dressing/Undressing Clothing   Remove LB Clothes Socks   Don LB Clothes Socks; Shoes   Limitations Noted In Strength;ROM   Adaptive Equipment Reacher;Dressing Stick;LH Shoehorn;Sock Aide   Findings OT encouraged pt to have shoes donned for mobilty    Sit to Lying   Type of Assistance Needed Independent   Sit to Lying CARE Score 6   Sit to Stand   Type of Assistance Needed Independent;Supervision   Sit to [address]   Type of Assistance Needed Independent;Supervision   Ernesto Zachariah Falk 83 Score -   Toileting   Able to [address] down yes, up yes  Manage Hygiene Bladder   Limitations Noted In ROM;LE Strength   Findings S/MI   Toilet Transfer   Type of Assistance Needed Independent;Supervision   Toilet Transfer CARE Score -   Toilet Transfer   Surface Assessed Raised Toilet   Transfer Technique Standard   Findings S/MI   Light Housekeeping   Light Housekeeping Level Walker   Light Housekeeping Level of Assistance Distant supervision   Light Housekeeping stood at table for laundry folding which encouraged ROM B UEs and unsupported activity in stance    Functional Standing Tolerance   Activity standing at table with no support while performing B UE task    Exercise Tools   Hand Gripper 3# digi x 30 reps B hands    Other Exercise Tool 1 alternating UEs with 1# weight within available planes 3x10 reps pacing activity    Cognition   Overall Cognitive Status WFL   Arousal/Participation Alert; Cooperative   Attention Within functional limits   Orientation Level Oriented X4   Memory Within functional limits   Following Commands Follows all commands and directions without difficulty   Additional Activities   Additional Activities Comments fxnl mobility S/MI RW from pt room to OT room and back     Activity Tolerance   Activity Tolerance Patient tolerated treatment well;Patient limited by pain   Assessment   Treatment Assessment Pt participated in OT focusing on back protection technqiues following back precautions  TLSO donned for entire session  Pt has good knowledge of AE although will report she isnt sure how to use them when OT places them by her side  Pt ambulated to OT with S/MI  Pt reporting if it wasnt for her pain in the back she could do just about anything  D/C planning ongoing  Pt takes approp rest periods during session  Pt feels like sh eis SOB today  OT assessed it at 90-94% thoughout session  Educ in DB and pacing technqiues  Prognosis Good   Problem List Decreased strength;Decreased range of motion;Decreased endurance; Impaired balance;Decreased mobility; Decreased safety awareness;Pain;Orthopedic restrictions   Plan   Treatment/Interventions ADL retraining;Functional transfer training; Endurance training; Therapeutic exercise;Patient/family training;Equipment eval/education;Gait training; Compensatory technique education   Progress Improving as expected   OT Therapy Minutes   OT Time In 1200   OT Time Out 1312   OT Total Time (minutes) 72   OT Mode of treatment - Individual (minutes) 72   OT Mode of treatment - Concurrent (minutes) 0   OT Mode of treatment - Group (minutes) 0   OT Mode of treatment - Co-treat (minutes) 0   OT Mode of Treatment - Total time(minutes) 72 minutes   OT Cumulative Minutes 252   Therapy Time missed   Time missed?  No

## 2019-11-26 NOTE — TEAM CONFERENCE
Acute RehabilitationTeam Conference Note  Date: 11/26/2019   Time: 11:51 AM       Patient Name:  Blossom Carlson       Medical Record Number: [id number]   YOB: 1925  Sex: Female          Room/Bed:  [facility] 211/[facility] 211-01  Payor Info:  Payor: Cristine Cabral / Plan: MEDICARE A AND B / Product Type: Medicare A & B Fee for Service /      Admitting Diagnosis: Respiratory failure ([facility] 75 ) [J96 [id number]]   Admit Date/Time:  11/19/2019  3:38 PM  Admission Comments: No comment available     Primary Diagnosis:  Essential hypertension  Principal Problem: Essential hypertension    Patient Active Problem List    Diagnosis Date Noted    Dysphagia 11/19/2019    PNA (pneumonia) 11/12/2019    CKD (chronic kidney disease) 11/12/2019    Compression deformity of vertebra 11/10/2019    Chronic diastolic heart failure ([facility] ) 11/19/2018    Anxiety 05/07/2018    COPD (chronic obstructive pulmonary disease) ([facility] ) 02/06/2018    CAD (coronary artery disease) 04/07/2017    Essential hypertension 09/17/2012    GERD (gastroesophageal reflux disease) 08/30/2012       Physical Therapy:    Weight Bearing Status: Weight Bearing as Tolerated  Transfers: Supervision  Bed Mobility: Supervision  Amulation Distance (ft): 189 feet  Ambulation: Supervision  Assistive Device for Ambulation: Roller Walker  Number of Stairs: 10  Assistive Device for Stairs: Bilateral [place]  Stair Assistance: Supervision  Discharge Recommendations: Home with:  [address] [facility] Mario Brito with[de-identified] Family Support, First Floor Setup, Home Physical Therapy    11/25/19: Pt is participating in therapy and progressing towards PT goals; Pt is S for bed mobility and transfers; Pt amb up to 189' with RW and S; Up/down 10 steps with 2 HR and S; Pt is limited by pain in back and decreased endurance;  Pt would benefit from continued skilled PT to increase strength and independence for safe D/C home      Occupational Therapy:  Eating: Independent  Grooming: Supervision  Bathing: Supervision  Bathing: Supervision  Upper Body Dressing: Supervision  Lower Body Dressing: Supervision  Toileting: Supervision  Tub/Shower Transfer: Supervision  Toilet Transfer: Supervision  Cognition: Within Defined Limits  Orientation: Place, Time, Situation  Discharge Recommendations: Home with:  [address] with[de-identified] First Floor Setup, Home Occupational Therapy, Family Support       11/25/2019 Pt participating in self care education with compensatory strategies using AE, educ in back precautions with use of TLSO for all activities, safety with txfs and fxnl mobility in kitchen  LOF noted above  Progressing toward OT goals  D/C planning ongoing  Pt needs assistance with donning/doffing TLSO  Barriers include pain/safety and impaired higher level balance and mgmt of TLSO  Plan to continue OT to address established goals in preparation for D/C to  home  Speech Therapy:  Mode of Communication: Verbal  Cognition: Exceptions to WNL  Cognition: Decreased Executive Functions, Decreased Memory  Orientation: Person, Time, Situation  Swallowing: Exceptions to WNL  Swallowing: Pharyngeal Dysphagia, Esophageal Dysphagia  Diet Recommendations: Level 1/Puree, Thin  Discharge Recommendations: Home with:  [address] with[de-identified] Family Support  Diane Ochoa a 80 y  o  female who presented to the [facility] with back pain in the setting of compression deformity and patient was evaluated by ortho and neurosx who both recommended conservative management  Course complicated by respiratory failure secondary to pneumonia which was treated with abx  Prior to admission, patient was reportedly independent with mobility, transfers and activities of daily living  Gerda Nance lives with family in a single family home with one step to enter  First floor set up is available  The patient will not have 24 hour supervision available upon discharge  Skilled speech assessment was completed    Patient presents with mild oral pharyngeal and moderate esophageal phase dysphagia characterized by extended oral phase, cough with thin liquids and the feeling of food being stuck in her esophagus  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    Recommended diet texture is puree with thin liquids  Speech comprehension at the multi step level is supervision for instructions and safety precautions  Expression is mod I for verbal sequencing with familiar tasks  Cognitively patient presents as supervision for memory with breakdowns in short term memory noted in particular  Reasoning is supervision level as it can be affected by memory deficit  11/25/19  Patient is participating in skilled ST focusing on swallowing oral function and cognitive communication skills  Current recommended diet texture is puree with thin liquids and SLP supplying PO trials of more complex pleasure foods 1:1 secondary to esophageal phase dysphagia and resistance to complex solids  Speech comprehension and expression at the multi step level is mod I for conveying ideas and verbal sequencing  Memory is supervision for thought organization and recent recall  Reasoning is supervision as she looks to a helper for complex problem solving at this time  Nursing Notes:  Appetite: Good  Diet Type: Dysphagia I                      Diet Patient/Family Education Complete: Yes                            Bladder: 3 - Moderate Assistance     Bladder Patient/Family Education: Yes  Bowel: 3 - Moderate Assistance     Bowel Patient/Family Education: Yes  Pain Location: Back  Pain Orientation: Right, Lower  Pain Score: 6                       Hospital Pain Intervention(s): Medication (See MAR), Repositioned  Pain Patient/Family Education: Yes  Medication Management/Safety  Safe Administration: Yes  Medication Patient/Family Education Complete: Yes    11/26/19  Patient admitted to [facility] following hospitalization which found compression fractures of the spine    Patient continues with back brace when out of bed  Patient remains alert and oriented but forgetful at times bed alarm and close supervision in place to maintain patient safety  Trace lower extremity edema continues  Skin remains intact with scattered bruises noted  One assist required for ambulating to the bathroom and minimal assist for some self care activities  Case Management:     Discharge Planning  Living Arrangements: Family members  Support Systems: Family members  Assistance Needed: ADLs  Type of Current Residence: Private residence  Current Home Care Services: No  Initial assessment & orientation to [facility] with Pt & spoke with Pt's granddaughter, Lisa Liriano, via phone  Discussed [address] with Paula Hou, who expressed understanding & agreement  Pt resides with Berenice half the time & her other granddaughter, Janette Khalil, the other half of the time  Upon DC from [facility], the plan is for Pt to go to Berenice's home  Berenice works, but may be able to work from home if needed  DFamily has lift chairs that Pt can use if needed  Pt was not on O2 prior & is hopeful to be weaned off soon  SW will continue to monitor & assist as needed with [address]  IMM reviewed, signed & submitted for scanning  Is the patient actively participating in therapies? yes  List any modifications to the treatment plan: na      Barriers Interventions   pain Medication managment   Back precautions- TLSO Cues, ADL, transfer, gait training                 Is the patient making expected progress toward goals?  yes  List any update or changes to goals: na    Medical Goals: Patient will be medically stable for discharge to [facility] restrictive envrionment upon completion of rehab program    Weekly Team Goals:   Rehab Team Goals  ADL Team Goal: Patient will be independent with ADLs with least restrictive device upon completion of rehab program  Bowel/Bladder Team Goal: Patient will return to premorbid level for bladder/bowel management upon completion of rehab program  Transfer Team Goal: Patient will be independent with transfers with least restrictive device upon completion of rehab program  Locomotion Team Goal: Patient will be independent with locomotion with least restrictive device upon completion of rehab program  Cognitive Team Goal: Patient will return to premorbid level of cognitive activity upon completion of rehab program     Mod I self care, transfers, and mobility    Health and wellness: to be able to return to assisting with homemaking tasks    Discussion: Plan for return home with grand-daughter with Miri Fang for PT and OT    Anticipated Discharge Date: Nov 29, 2019

## 2019-11-26 NOTE — PROGRESS NOTES
11/25/19 1805   Pain Assessment   Pain Assessment 0-10   Pain Score 8   Pain Type Acute pain   Pain Location Back   Pain Orientation Right; Lower   Pain Descriptors Aching   Restrictions/Precautions   Precautions Aspiration; Fall Risk   RLE Weight Bearing Per Order WBAT   LLE Weight Bearing Per Order WBAT   Braces or Orthoses TLSO   Comprehension   Assist Devices Glasses   QI: Comprehension 4  Undestands: Clear comprehension without cues or repetitions   Comprehension (FIM) 6 - Has only MILD difficulty with complex/abstract info   Expression   Verbal Complex   QI: Expression 4  Express complex messages without difficulty and with speech that is clear and easy to [place]   Expression (FIM) 6 - Expresses complex/abstract but requires:  more time   Social Interaction   Social Interaction (FIM) 6 - Interacts appropriately with others BUT requires extra  time   Problem Solving   Problem solving (FIM) 5 - Solves basic problems 90% of time   Memory   Recognize People Yes   Remember Routine Yes   Initiates Tasks Yes   Memory (FIM) 5 - [place] cues patient   Memory Skills   Orientation Level Oriented X4   Short Term Recent Recall Mild Impairment   Short Term Working Recall Mild Impairment   Auditory Comprehension   Comphrehends Conversation Complex   Interfering Components Working memory   EffectiveTechniques Extra processing time   Speech/Swallow Mechanism Exam   Lingual Strength Mild reduced   Lingual ROM Mild reduced   Dentition Dentures top   Volitional Swallow Delayed   Respratory Status Nasal Cannula   Speech/Language/Cognition Assessmetn   Treatment Assessment Speech Pathology Daily Treatment Note - Speech/Cognitive Communication Skills - SLP focused on cognitive memory and reasoning using the skilled therapy goals as the stimulus    Patient was asked to recall the procedures performed during therapy across disciplines, how they relate to current goals and how they will facilitate the ability to return to the previous level of function  Patient's daily schedule was utilized as a visual aid to promote recall of the days events and procedures performed during treatment  SLP then targeted setting goals for the week in speech therapy as well as across disciplines  Focus was on reasoning the current level, anticipating the final outcome and planning goals with reasonable outcomes for this week  Patient presents at the level of mod I for reasoning with use of visual aids as memory is supervision for recall of specific procedures  Swallow Information   Current Symptoms/Concerns Cough; With liquids   Current Diet Dysphagia pureed   Consistencies Assessed and Performance   Oral Stage Mild impaired   Oral Stage Comment reduced endurance   Phargngeal Stage Mild impaired   Pharyngeal Stage Comment mild delay   Esophageal Concerns Hx GERDS   Recommendations   Risk for Aspiration Mild   Diet Solid Recommendation Level 1 Dysphagia/pureed   Diet Liquid Recommendation Thin liquid   General Precautions Aspiration precautions   Compensatory Swallowing Strategies Alternate solids and liquids   Eating   Type of Assistance Needed Set-up / clean-up   Eating CARE Score 5   Swallow Assessment   Swallow Treatment Assessment Speech Pathology Daily Treatment Note - Swallow Oral Function - Compensatory safe swallow education provided which included appropriate 90 degree head/neck/trunk posture, prep to small bites, small and single sips of liquid,complete oral breakdown of solids with formation of a cohesive bolus, oral closure with spoon retrieval, decreased speaking on oral phase, oral clearance prior to reintroduction of bolus, alternate solids with liquids, and rate control    Current diet texture is puree for meals and SLP supplying more complex solids 1:1 only   Swallow Assessment Prognosis   Prognosis Good   Prognosis Considerations Family/community support   SLP Therapy Minutes   SLP Time In 1805   SLP Time Out 1905   SLP Total Time (minutes) 60   SLP Mode of treatment - Individual (minutes) 60   Therapy Time missed   Time missed?  No   Daily FIM Score   Eating (FIM) 5 - Patient requires supervision, cueing or coaxing

## 2019-11-26 NOTE — PROGRESS NOTES
11/26/19 6943   Pain Assessment   Pain Assessment 0-10   Pain Score 7   Nash-Baker FACES Pain Rating 6   Pain Type Acute pain   Pain Location Back   Pain Orientation Right; Lower   Comprehension   Assist Devices Glasses   QI: Comprehension 4  Undestands: Clear comprehension without cues or repetitions   Comprehension (FIM) 6 - Has only MILD difficulty with complex/abstract info   Expression   Verbal Complex   QI: Expression 4  Express complex messages without difficulty and with speech that is clear and easy to [place]   Expression (FIM) 6 - Has only MILD difficulty with complex/abstract info   Social Interaction   Social Interaction (FIM) 6 - Interacts appropriately with others BUT requires extra  time   Problem Solving   Problem solving (FIM) 5 - Solves basic problems 90% of time   Memory   Recognize People Yes   Remember Routine Yes   Initiates Tasks Yes   Memory (FIM) 6 - Recognizes with extra time   Memory Skills   Orientation Level Oriented X4   Short Term Recent Recall Mild Impairment   Short Term Working Recall Mild Impairment   Auditory Comprehension   Comphrehends Conversation Complex   Interfering Components Working memory   EffectiveTechniques Extra processing time   Speech/Swallow Mechanism Exam   Lingual Strength Mild reduced   Lingual ROM Mild reduced   Dentition Dentures top   Volitional Swallow Delayed   Respratory Status Nasal Cannula   Speech/Language/Cognition Assessmetn   Treatment Assessment Speech Pathology Daily Treatment Note - Speech/Cognitive Communication Skills - SLP targeted understanding of current therapeutic levels across disciplines and recall of procedures and safety precautions which are based on the current level of necessary assistance    The therapy board in patient's rooms was used as a visual guide indicating the patient's current levels during ambulation and completion of ADL's  SLP posed problems which may occur during walking, transfers, and completion of ADL's and encouraged the reasoning of solutions based on the current level of assistance  In addition, SLP discussed the current safety precautions which included call bell usage( ie asking for help)   Swallow Information   Current Risks for Dysphagia & Aspiration Rapid respiratory rate   Current Symptoms/Concerns Cough; With liquids   Current Diet Dysphagia pureed; Thin liquid   Consistencies Assessed and Performance   Oral Stage Mild impaired   Phargngeal Stage Mild impaired   Esophageal Concerns Hx GERDS   Recommendations   Risk for Aspiration Mild   Diet Solid Recommendation Level 1 Dysphagia/pureed  (SLP beginning trials of [facility] Soft)   Diet Liquid Recommendation Thin liquid   General Precautions Aspiration precautions   Compensatory Swallowing Strategies Alternate solids and liquids   Eating   Type of Assistance Needed Set-up / clean-up   Eating CARE Score 5   Swallow Assessment   Swallow Treatment Assessment Speech Pathology Daily Treatment Note - Swallow Oral Function - SLP targeted pharyngeal phase swallow function via instruction on wet verses dry vocal quality to determine adequate airway protection  Ice cold liquid was utilized in this task as the stimulus  Small sips were provided  Post swallow, patient was asked to produce a sustained /a/   Judgements were made, initially by skilled therapist and then patient was encouraged to  themselves, if the vocal quality was wet or dry  Compensatory techniques of throat clear and cough were introduced as methods to protect themselves from food or liquids entering the airway  A visual diagram was utilized depicting the Oral and Pharyngeal phases of swallow to increase comprehension of task     Swallow Assessment Prognosis   Prognosis Good   Prognosis Considerations Family/community support   SLP Therapy Minutes   SLP Time In 0898   SLP Time Out 0727   SLP Total Time (minutes) 60   SLP Mode of treatment - Individual (minutes) 60   Daily FIM Score   Eating (FIM) 5 - Patient requires supervision, cueing or coaxing

## 2019-11-26 NOTE — PROGRESS NOTES
11/26/19 0845   Pain Assessment   Pain Assessment 0-10   Pain Score 6   Pain Location Back   Pain Orientation Right; Lower   Restrictions/Precautions   Precautions Aspiration; Fall Risk   Braces or Orthoses TLSO   Cognition   Overall Cognitive Status WFL   Arousal/Participation Alert; Responsive; Cooperative   Attention Within functional limits   Orientation Level Oriented X4   Memory Within functional limits   Following Commands Follows all commands and directions without difficulty   Sit to Stand   Type of Assistance Needed Independent   Amount of Physical Assistance Provided No physical assistance   Sit to Stand CARE Score 6   Bed-Chair Transfer   Type of Assistance Needed Independent   Amount of Physical Assistance Provided No physical assistance   Chair/Bed-to-Chair Transfer CARE Score 6   Transfer Bed/Chair/Wheelchair   Limitations Noted In Endurance;Pain Management;UE Strength;LE Strength;Balance   Adaptive Equipment Roller Walker   Stand Pivot Modified Independent   Sit to Stand Modified Independent   Stand to Sit Modified Independent   All Transfer Modified Independent   Walk 10 Feet   Type of Assistance Needed Supervision   Amount of Physical Assistance Provided No physical assistance   Walk 10 Feet CARE Score 4   Walk 50 Feet with Two Turns   Type of Assistance Needed Supervision   Amount of Physical Assistance Provided No physical assistance   Walk 50 Feet with Two Turns CARE Score 4   Walk 150 Feet   Type of Assistance Needed Supervision   Amount of Physical Assistance Provided No physical assistance   Walk 150 Feet CARE Score 4   Walking 10 Feet on Uneven Surfaces   Type of Assistance Needed Supervision   Amount of Physical Assistance Provided No physical assistance   Walking 10 Feet on Uneven Surfaces CARE Score 4   Ambulation   Does the patient walk? 2   Yes   Primary Mode of Locomotion Prior to Admission Walk   Distance Walked (feet) 190 ft  (95)   Assist Device Roller Walker   Gait Pattern Inconsistant Jan[place]   Limitations Noted In Endurance;Strength;Balance   Walk Assist Level Supervision;Modified Independent   Findings level and carpet   Curb or Single Stair   Style negotiated Single stair   Type of Assistance Needed Supervision   Amount of Physical Assistance Provided No physical assistance   1 Step (Curb) CARE Score 4   4 Steps   Type of Assistance Needed Supervision   Amount of Physical Assistance Provided No physical assistance   4 Steps CARE Score 4   12 Steps   Reason if not Attempted Safety concerns   12 Steps CARE Score 88   Stairs   Type Stairs   # of Steps 10   Weight Bearing Precautions WBAT   Assist Devices Bilateral Rail   Findings S   Therapeutic Interventions   Strengthening seated ther ex 30 reps   Other gait and community re-ed   Assessment   Treatment Assessment Pt is progressing well in PT; Pt is at S/MI level for all transfers and amb; Pt is amb up to 190' with RW; Rests needed throughout session due to pain in back; Pt went up/down 10 steps with 2 HR and S; D/C being discussed with pt and she states if it wasnt for the pain in her back she wouldnt need to be here; Pt performed seated ther ex for strengthening B LE; Pt appropriate for concurrent PT to focus on similar exercises with another pt   PT Barriers   Physical Impairment Decreased strength;Decreased endurance; Impaired balance;Decreased mobility;Orthopedic restrictions;Pain   Functional Limitation Walking;Transfers;Standing;Stair negotiation   Plan   Treatment/Interventions Functional transfer training;LE strengthening/ROM; Elevations; Therapeutic exercise;Gait training   Progress Progressing toward goals   PT Therapy Minutes   PT Time In 0845   PT Time Out 0945   PT Total Time (minutes) 60   PT Mode of treatment - Individual (minutes) 45   PT Mode of treatment - Concurrent (minutes) 15   PT Mode of treatment - Group (minutes) 0   PT Mode of treatment - Co-treat (minutes) 0   PT Mode of Treatment - Total time(minutes) 60 minutes   PT Cumulative Minutes 367   Therapy Time missed   Time missed?  No

## 2019-11-26 NOTE — PROGRESS NOTES
Physical Medicine and Rehabilitation Progress Note  Radha Bhakta 80 y o  female MRN: [id number]  Unit/Bed#: -01 Encounter: [id number]    HPI: Radha Bhakta is a 80 y o  female who presented to the [address] [place] [address] back pain in the setting of compression deformity and patient was evaluated by ortho and neurosx who both recommended conservative management  Course complicated by respiratory failure 2/2 to PNA which was treated with abx  Chief Complaint: PNA and compression deformity    Interval/subjective: d/w patient potential dc date and patient is agreeable     ROS: A 10 point ROS was performed; negative except as noted above       Assessment/Plan:      PNA (pneumonia)  Assessment & Plan  - urine strep antigen positive in acute care   - s/p abx course in acute care     Compression deformity of vertebra  Assessment & Plan  - T10, T12, & L1   - evaluated by ortho through consultation (and remotely by neurosx) and both recommended bracing with TLSO and no further intervention   - OP FU with Dr Josiane Lyon     CKD (chronic kidney disease)  Assessment & Plan  - baseline Cr appears to be approximately 0 9  - Cr currently 0 81  - IM monitoring     Dysphagia  Assessment & Plan  - modified diet per ST     Chronic diastolic heart failure (HCC)  Assessment & Plan  - grade 1  - on lasix  - follows with Dr Perez Apo  - per PAPDMP gets regular prescriptions for ativan 0 5 mg tabs last filled on 09/19/19 for 90 tabs with 0 refills; per patient uses sparingly on an as needed basis     COPD (chronic obstructive pulmonary disease) ([facility] [address] )  Assessment & Plan  - was not on home O2 prior to admission however per IM based on desat study in acute care they are recommending 2 L at rest and 3 L with exertion at this time     GERD (gastroesophageal reflux disease)  Assessment & Plan  - with hiatal hernia   - therapeutic substitution for home zantac 150 mg BID     CAD (coronary artery disease)  Assessment & Plan  - h/o stent  - troponins peaked in acute care to 0 09  - evaluated by cards in acute care and attributed elevation in troponins to infxn and patient being in DELIO at the time   - on home ASA 81 mg qd   - on home lipitor 40 mg qpm  - on BB  - follows with Dr Harpreet Toussaint     * Essential hypertension  Assessment & Plan  - at home on lasix 20 mg qd, lopressor 25 mg q12 (confirmed with patient's pharmacy)   - IM managing      Scheduled Meds:    Current Facility-Administered Medications:  furosemide       acetaminophen 650 mg Oral Q6H PRN Jenny Villagran MD   al mag oxide-diphenhydramine-lidocaine viscous 10 mL Swish & Spit Q6H PRN Jenny Villagran MD   amLODIPine 5 mg Oral Daily Jenny Villagran MD   aspirin 81 mg Oral Daily Jenny Villagran MD   atorvastatin 40 mg Oral Daily With Rosette Garrido MD   bisacodyl 10 mg Rectal Daily PRN Jenny Villagran MD   dextran 70-hypromellose 1 drop Both Eyes Q3H PRN Jenny Villagran MD   famotidine 20 mg Oral BID Jenny Villagran MD   furosemide 20 mg Oral Daily Jenny Vilalgran MD   heparin (porcine) 5,000 Units Subcutaneous Q8H Yariel Burns MD   lidocaine 1 patch Topical Daily Jenny Villagran MD   losartan 50 mg Oral Daily Jenny Villagran MD   melatonin 6 mg Oral HS Jenny Villagran MD   metoprolol tartrate 12 5 mg Oral Q12H Yariel Burns MD   OLANZapine 5 mg Oral HS Jenny Villagran MD   oxyCODONE 2 5 mg Oral Q4H PRN Jenny Villagran MD   oxyCODONE 5 mg Oral Q4H PRN Jenny Villagran MD   polyethylene glycol 17 g Oral Daily Jenny Villagran MD        Incidental Findings:     1) hiatal hernia: at home on zantac 150 mg BID (therapeutic substitution) for associated GERD; OP FU with PCP with further testing/treatment and/or specialist referral at PCP's discretion      2) diverticulosis: OP FU with PCP with further testing/treatment and/or specialist referral at PCP's discretion      3) elevated peak PA pressure: OP FU with Dr Harpreet Toussaint     4) renal cysts: OP FU with PCP with further testing/treatment and/or specialist referral at PCP's discretion      5) decreased alk phos of 49 (LLN 55): per IM consultant unlikely of clinical significance and recommended OP FU with PCP with further testing/treatment and/or specialist referral at PCP's discretion      6) myomatous uterus with calcified leiomyomas: asymptomatic, OP FU with PCP with further testing/treatment and/or specialist referral at PCP's discretion      DVT ppx: HSQ   Code: confirmed with patient that she wishes to be DNR/DNI and patient verbalized her understanding of what this means        Objective:    Functional Update:  Mobility: sup   Transfers: mod I   ADLs: sup         Physical Exam:  Vitals:    11/26/19 0712   BP: 140/80   Pulse: 83   Resp: 18   Temp: 98 3 °F (36 8 °C)   SpO2: 91%           General: alert, no apparent distress, cooperative and comfortable  HEENT:  Head: Normal, normocephalic, atraumatic  CARDIAC:  +S1/2  LUNGS:  respirations unlabored   ABDOMEN:  soft NT   EXTREMITIES:  volume status currently stable   NEURO:   awake, alert, appropriately answering questions   PSYCH:  mood/affect currently stable     Laboratory:         Invalid input(s): PLTCT        Invalid input(s): CA         This patient was discussed by the Interdisciplinary Team in weekly case conference today  The care of the patient was extensively discussed with all care providers and an appropriate rehabilitation plan was formulated unique for this patient  Barriers were identified preventing progression of therapy and appropriate interventions were discussed with each discipline  Please see the team note for input from all disciplines regarding barriers, intervention, and discharge planning  [ x ] Total time spent: 35 Mins, and greater than 50% of this time was spent counseling/coordinating care

## 2019-11-26 NOTE — PLAN OF CARE
Problem: Nutrition/Hydration-ADULT  Goal: Nutrient/Hydration intake appropriate for improving, restoring or maintaining nutritional needs  Description  Monitor and assess patient's nutrition/hydration status for malnutrition  Collaborate with interdisciplinary team and initiate plan and interventions as ordered  Monitor patient's weight and dietary intake as ordered or per policy  Utilize nutrition screening tool and intervene as necessary  Determine patient's food preferences and provide high-protein, high-caloric foods as appropriate  INTERVENTIONS:  - Monitor oral intake, urinary output, labs, and treatment plans  - Assess nutrition and hydration status and recommend course of action  - Evaluate amount of meals eaten  - Assist patient with eating if necessary   - Allow adequate time for meals  - Recommend/ encourage appropriate diets, oral nutritional supplements, and vitamin/mineral supplements  - Order, calculate, and assess calorie counts as needed  - Recommend, monitor, and adjust tube feedings and TPN/PPN based on assessed needs  - Assess need for intravenous fluids  - Provide specific nutrition/hydration education as appropriate  - Include patient/family/caregiver in decisions related to nutrition  Outcome: Progressing   Patient continues on level 1 dysphagia diet with thin liquids  Meal completions %  Patient wt  169lb 11/25/19, 3lb increase from 11/19/19, 1 8%, not significant  No edema noted by nursing  Continue current diet

## 2019-11-27 NOTE — PROGRESS NOTES
11/27/19 0700   Pain Assessment   Pain Assessment 0-10   Pain Score 8   Pain Location Back   Pain Orientation Right;Left   Restrictions/Precautions   Precautions Aspiration; Fall Risk;Pain  (back precautions )   Eating   Type of Assistance Needed Independent   Eating CARE Score 6   Oral Hygiene   Type of Assistance Needed Set-up / clean-up   Oral Hygiene CARE Score 5   Grooming   Findings seated at sink after set up;decreased initiation    Shower/Bathe Self   Type of Assistance Needed Supervision;Verbal cues; Adaptive equipment   Shower/Bathe Self CARE Score 4   Bathing   Assessed Bath Style Sponge Bath   Able to Wash/Rinse/Dry (body part) Left Arm;Right Arm;L Upper Leg;R Upper Leg;L Lower Leg/Foot;R Lower Leg/Foot;Chest;Abdomen;Perineal Area; Buttocks   Limitations Noted in Balance;ROM;Problem Solving; Safety;Strength   Adaptive Equipment Longhand Sponge;Longhand Reacher   Findings  declined shower;Verbal reminders to avoid stance to bathe buttocks without brace donned; need for continuous reassurance    Tub/Shower Transfer   Findings declined today due to pain in back and R hip    Upper Body Dressing   Type of Assistance Needed Set-up / clean-up   Upper Body Dressing CARE Score 5   Lower Body Dressing   Type of Assistance Needed Supervision;Verbal cues; Adaptive equipment   Lower Body Dressing CARE Score 4   Putting On/Taking Off Footwear   Type of Assistance Needed Supervision;Verbal cues; Adaptive equipment   Putting On/Taking Off Footwear CARE Score 4   Dressing/Undressing Clothing   Don [address]  (VCs for mgmt of TLSO during UB dsg )   Remove LB Clothes Undergarment;Socks   Don LB Clothes Undergarment;Socks; Shoes;Pants   Limitations Noted In Balance; Endurance;Problem Solving; Safety; Sequencing;Strength;ROM   Adaptive Equipment Reacher;Dressing Stick;LH Shoehorn;Sock Aide   Findings Pt needing Increased VCs today with use of AE;initially pt not recog device for socks, she threw socks onto floor and thought she used dsg stick to manage them;increased pain with hip flexion duirng LB dsg;   Lying to Sitting on Side of Bed   Type of Assistance Needed Independent   Lying to Sitting on Side of Bed CARE Score 6   Sit to Stand   Type of Assistance Needed Independent   Amount of Physical Assistance Provided No physical assistance  (VCs to not stand without TLSO)   Sit to Stand CARE Score 6   Bed-Chair Transfer   Type of Assistance Needed Supervision; Independent   Chair/Bed-to-Chair Transfer CARE Score -   Transfer Bed/Chair/Wheelchair   Limitations Noted In Balance;Confidence;Pain Management;Problem Solving; Endurance;UE Strength;LE Strength   Findings S/MI VCs to not txf without TLSO donned; pt fearful of falling as per pt reprot when txfing without OT right by her side    Toileting Hygiene   Type of Assistance Needed Supervision; Independent;Verbal cues   Amount of Physical Assistance Provided No physical assistance   Toileting Hygiene CARE Score -   Toileting   Able to [address] down yes, up yes  Manage Hygiene Bladder   Limitations Noted In Balance;Problem Solving;ROM;Safety;UE Strength;LE Strength   Findings S/MI pt lacks confidence and needs reassurance; OT cued pt to complete tasks on her own and she reprots she is afraid she will fall;pt also unsure if she can wipe herself and needs assurance to do so   Toilet Transfer   Type of Assistance Needed Independent;Supervision;Verbal cues   Amount of Physical Assistance Provided No physical assistance   Toilet Transfer CARE Score -   Toilet Transfer   Surface Assessed Raised Toilet   Transfer Technique Standard   Findings S/MI due to increased pain/SOB   Cognition   Overall Cognitive Status [facility]   Arousal/Participation Alert; Cooperative   Attention Within functional limits   Orientation Level Oriented X4   Memory Decreased recall of precautions   Following Commands Follows all commands and directions without difficulty   Activity Tolerance   Activity Tolerance Patient limited by pain   Assessment   Treatment Assessment OT tx focused on ADL training with focus on baclk precautions and compensatory strategies for safe completion  Pt repporting upon entry to room thet she doesnt feel well and she feels sick to her stomach; Pt able to exit bed on her own  Declined shower but agreeable to basin bathing  OT noted throughout session increased SOB with minimal exertion of ADL  Assessed vital at O2 86%-92% RA with educ in DBng, HR at   BP assessed at 122/64  OT educ pt to pace self with activity  OT notes during ADL she lacks confidence and needs reassurance to use AE correctly  Pt having increased pain in R hip described as needles jabbing her hip when flexing and applying pressure  Nurse made aware of all barriers today  Prognosis Good   Problem List Decreased strength;Decreased range of motion;Decreased endurance; Impaired balance;Decreased mobility; Impaired judgement;Decreased safety awareness;Orthopedic restrictions;Pain   Plan   Treatment/Interventions ADL retraining;Functional transfer training; Endurance training;Patient/family training;Equipment eval/education; Bed mobility;Gait training; Compensatory technique education   Progress Progressing toward goals   OT Therapy Minutes   OT Time In 0730   OT Time Out 0834   OT Total Time (minutes) 64   OT Mode of treatment - Individual (minutes) 64   OT Mode of treatment - Concurrent (minutes) 0   OT Mode of treatment - Group (minutes) 0   OT Mode of treatment - Co-treat (minutes) 0   OT Mode of Treatment - Total time(minutes) 64 minutes   OT Cumulative Minutes 316   Therapy Time missed   Time missed?  No

## 2019-11-27 NOTE — PROGRESS NOTES
11/27/19 0930   Pain Assessment   Pain Assessment 0-10   Pain Score 8   Pain Type Acute pain   Pain Location Back   Pain Orientation Lower   Restrictions/Precautions   Precautions Aspiration; Fall Risk;Pain   Braces or Orthoses TLSO   Cognition   Overall Cognitive Status WFL   Arousal/Participation Alert; Responsive; Cooperative   Attention Within functional limits   Orientation Level Oriented X4   Memory Within functional limits   Following Commands Follows all commands and directions without difficulty   Sit to Stand   Type of Assistance Needed Independent   Amount of Physical Assistance Provided No physical assistance   Sit to Stand CARE Score 6   Bed-Chair Transfer   Type of Assistance Needed Independent   Amount of Physical Assistance Provided No physical assistance   Chair/Bed-to-Chair Transfer CARE Score 6   Transfer Bed/Chair/Wheelchair   Limitations Noted In Endurance;Pain Management;UE Strength;LE Strength   Adaptive Equipment Roller Walker   Stand Pivot Modified Independent   Sit to Stand Modified Independent   Stand to Sit Modified Independent   All Transfer Modified Independent   Walk 10 Feet   Type of Assistance Needed Supervision   Amount of Physical Assistance Provided No physical assistance   Walk 10 Feet CARE Score 4   Walk 50 Feet with Two Turns   Type of Assistance Needed Supervision   Amount of Physical Assistance Provided No physical assistance   Walk 50 Feet with Two Turns CARE Score 4   Walk 150 Feet   Type of Assistance Needed Supervision   Amount of Physical Assistance Provided No physical assistance   Walk 150 Feet CARE Score 4   Walking 10 Feet on Uneven Surfaces   Type of Assistance Needed Supervision   Amount of Physical Assistance Provided No physical assistance   Walking 10 Feet on Uneven Surfaces CARE Score 4   Ambulation   Does the patient walk? 2   Yes   Primary Mode of Locomotion Prior to Admission Walk   Distance Walked (feet) 150 ft  (72)   Assist Device [employer]   Gait Pattern Inconsistant Janell   Limitations Noted In Balance; Endurance; Safety;Strength   Provided Assistance with: Balance   Walk Assist Level Supervision   Findings level and carpet3   Curb or Single Stair   Style negotiated Single stair   Type of Assistance Needed Supervision   Amount of Physical Assistance Provided No physical assistance   1 Step (Curb) CARE Score 4   4 Steps   Type of Assistance Needed Supervision   Amount of Physical Assistance Provided No physical assistance   4 Steps CARE Score 4   12 Steps   Reason if not Attempted Safety concerns   12 Steps CARE Score 88   Stairs   Type Stairs   # of Steps 10   Weight Bearing Precautions WBAT   Assist Devices Bilateral Rail   Findings S   Therapeutic Interventions   Strengthening seated ther ex 30 reps   Other gait and community re-ed   Assessment   Treatment Assessment Pt had increased pain today in her low back limiting her poncho for alltasks and she needed increased rests due to pain; Pt was MI for transfers and S for amb up to 150' with RW; Pt had no SOb throughout session; Pt was able to go up/down 10 steps with 2HR and S; Pt performed seated ther ex for strengthening B LE as able with rests; Pt appropriate for concurrent therapy to increase motivation while working on similar exercises   PT Barriers   Physical Impairment Decreased strength;Decreased range of motion;Decreased endurance; Impaired balance;Decreased mobility;Orthopedic restrictions;Pain   Functional Limitation Walking;Transfers;Standing;Stair negotiation   Plan   Treatment/Interventions Functional transfer training;LE strengthening/ROM; Elevations; Therapeutic exercise;Gait training   Progress Progressing toward goals   PT Therapy Minutes   PT Time In 0930   PT Time Out 1030   PT Total Time (minutes) 60   PT Mode of treatment - Individual (minutes) 45   PT Mode of treatment - Concurrent (minutes) 15   PT Mode of treatment - Group (minutes) 0   PT Mode of treatment - Co-treat (minutes) 0   PT Mode of Treatment - Total time(minutes) 60 minutes   PT Cumulative Minutes 427   Therapy Time missed   Time missed?  No

## 2019-11-27 NOTE — PROGRESS NOTES
Physical Medicine and Rehabilitation Progress Note  Moncho Grey 80 y o  female MRN: [id number]  Unit/Bed#: -01 Encounter: [id number]    HPI: Moncho Grey is a 80 y o  female who presented to the [address] [place] [address] back pain in the setting of compression deformity and patient was evaluated by ortho and neurosx who both recommended conservative management  Course complicated by respiratory failure 2/2 to PNA which was treated with abx  Chief Complaint: PNA and compression deformity    Interval/subjective: patient denies any events overnight      ROS: A 10 point ROS was performed; negative except as noted above       Assessment/Plan:      PNA (pneumonia)  Assessment & Plan  - urine strep antigen positive in acute care   - s/p abx course in acute care     Compression deformity of vertebra  Assessment & Plan  - T10, T12, & L1   - evaluated by ortho through consultation (and remotely by neurosx) and both recommended bracing with TLSO and no further intervention   - OP FU with Dr Quyen Barr     CKD (chronic kidney disease)  Assessment & Plan  - baseline Cr appears to be approximately 0 9  - Cr currently 0 81  - IM monitoring     Dysphagia  Assessment & Plan  - modified diet per ST     Chronic diastolic heart failure (HCC)  Assessment & Plan  - grade 1  - on lasix  - follows with Dr Mahendra Mireles  - per PAPDMP gets regular prescriptions for ativan 0 5 mg tabs last filled on 09/19/19 for 90 tabs with 0 refills; per patient uses sparingly on an as needed basis     COPD (chronic obstructive pulmonary disease) ([facility] [address] )  Assessment & Plan  - was not on home O2 prior to admission however per IM based on desat study in acute care they are recommending 2 L at rest and 3 L with exertion at this time     GERD (gastroesophageal reflux disease)  Assessment & Plan  - with hiatal hernia   - therapeutic substitution for home zantac 150 mg BID     CAD (coronary artery disease)  Assessment & Plan  - h/o stent  - troponins peaked in acute care to 0 09  - evaluated by cards in acute care and attributed elevation in troponins to infxn and patient being in DELIO at the time   - on home ASA 81 mg qd   - on home lipitor 40 mg qpm  - on BB  - follows with Dr Lemuel Amaya     * Essential hypertension  Assessment & Plan  - at home on lasix 20 mg qd, lopressor 25 mg q12 (confirmed with patient's pharmacy)   - IM managing      Scheduled Meds:    Current Facility-Administered Medications:  acetaminophen 650 mg Oral Q6H PRN Adrien Simons MD   al mag oxide-diphenhydramine-lidocaine viscous 10 mL Swish & Spit Q6H PRN Adrien Simons MD   amLODIPine 5 mg Oral Daily Adrien Simons MD   aspirin 81 mg Oral Daily Adrien Simons MD   atorvastatin 40 mg Oral Daily With MD Katherine   bisacodyl 10 mg Rectal Daily PRN Adrien Simons MD   dextran 70-hypromellose 1 drop Both Eyes Q3H PRN Adrien Simons MD   famotidine 20 mg Oral BID Adrien Simons MD   furosemide 20 mg Oral Daily Adrien Simons MD   heparin (porcine) 5,000 Units Subcutaneous Q8H Yariel Burns MD   lidocaine 1 patch Topical Daily Adrien Simons MD   losartan 50 mg Oral Daily Adrien Simons MD   melatonin 6 mg Oral HS Adrien Simons MD   metoprolol tartrate 12 5 mg Oral Q12H Yariel Burns MD   OLANZapine 5 mg Oral HS Adrien Simons MD   oxyCODONE 2 5 mg Oral Q4H PRN Adrien Simons MD   oxyCODONE 5 mg Oral Q4H PRN Adrien Simons MD   polyethylene glycol 17 g Oral Daily Adrien Simons MD        Incidental Findings:     1) hiatal hernia: at home on zantac 150 mg BID (therapeutic substitution) for associated GERD; OP FU with PCP with further testing/treatment and/or specialist referral at PCP's discretion      2) diverticulosis: OP FU with PCP with further testing/treatment and/or specialist referral at PCP's discretion      3) elevated peak PA pressure: OP FU with Dr Lemuel Amaya     4) renal cysts: OP FU with PCP with further testing/treatment and/or specialist referral at PCP's discretion      5) decreased alk phos of 49 (LLN 55): per IM consultant unlikely of clinical significance and recommended OP FU with PCP with further testing/treatment and/or specialist referral at PCP's discretion      6) myomatous uterus with calcified leiomyomas: asymptomatic, OP FU with PCP with further testing/treatment and/or specialist referral at PCP's discretion      DVT ppx: HSQ   Code: confirmed with patient that she wishes to be DNR/DNI and patient verbalized her understanding of what this means        Objective:    Functional Update:  Mobility: sup   Transfers: mod I   ADLs: sup         Physical Exam:  Vitals:    11/27/19 0825   BP: 122/64   Pulse: 84   Resp: 18   Temp: 98 9 °F (37 2 °C)   SpO2: 92%           General: alert, no apparent distress, cooperative and comfortable  HEENT:  Head: Normal, normocephalic, atraumatic    CARDIAC:  +S1/2  LUNGS:  respirations unlabored   ABDOMEN:  soft NT   EXTREMITIES:  volume status currently stable   NEURO:   awake, alert, appropriately answering questions   PSYCH:  mood/affect currently stable     Laboratory:         Invalid input(s): PLTCT        Invalid input(s): CA

## 2019-11-27 NOTE — CASE MANAGEMENT
Tx team recommendations & DC planning reviewed with patient & family, who expressed understanding & agreement  Target DC date is Friday, 11/29/19 with [facility] (PT, OT); a list of providers was provided to Pt & referral will be made to Pt's preference  Pt will be transported home at 1 PM by family via car, which Tx team has determined to be a safe mode of transport  Pt will be staying with family upon DC; she will be home alone for up to 6 hours at a time; Tx team is aware  Current therapy levels were read to Pt's family  SW will continue to monitor & assist as needed with Tx & DC planning  FU IMM reviewed, signed & submitted for scanning

## 2019-11-27 NOTE — PROGRESS NOTES
11/27/19 1605   Pain Assessment   Pain Assessment 0-10   Pain Score 7   Pain Type Acute pain   Pain Location Back   Pain Orientation Lower   Pain Descriptors Aching   Restrictions/Precautions   Precautions Aspiration; Fall Risk   Braces or Orthoses TLSO   Comprehension   Assist Devices Glasses   QI: Comprehension 4  Undestands: Clear comprehension without cues or repetitions   Comprehension (FIM) 6 - Understands complex/abstract but requires more time   Expression   Verbal Complex   QI: Expression 4  Express complex messages without difficulty and with speech that is clear and easy to [place]   Expression (FIM) 6 - Has only MILD difficulty with complex/abstract info   Social Interaction   Social Interaction (FIM) 6 - Interacts appropriately with others BUT requires medication for control   Problem Solving   Problem solving (FIM) 6 - Solves complex problems BUT requires extra time   Memory   Recognize People Yes   Remember Routine Yes   Initiates Tasks Yes   Memory (FIM) 6 - Recognizes with extra time   Memory Skills   Orientation Level Oriented X4   Short Term Recent Recall Mild Impairment   Auditory Comprehension   Comphrehends Conversation Complex   Interfering Components Working memory   EffectiveTechniques Extra processing time   Speech/Swallow Mechanism Exam   Dentition Dentures top   Speech/Language/Cognition Assessmetn   Treatment Assessment Speech Pathology Daily Treatment Note - Speech/Cognitive Communication Skills - For this session, SLP targeted comprehension of therapeutic levels and expression of precautions and safety sequences  Functional tasks were represented in a series of 5 action cards which depicted an activity which the patient would engage in regularly in the home setting  The task was presented in simplified salient steps to aid retention  The patient was initially requested to sequence the cards and explain the sequence verbally    At each step of the sequence, SLP stressed the level of necessary assistance and focused on patient's own awareness of how much assistance would be needed at that step within patient's current therapeutic levels, to perform the same task  Focus of this task was improving patient's awareness and ability to verbalize their current level of necessary assistance in daily tasks  Swallow Information   Current Diet Dysphagia pureed   Consistencies Assessed and Performance   Oral Stage Mild impaired   Phargngeal Stage Mild impaired   Esophageal Concerns Hx GERDS   Recommendations   Risk for Aspiration Mild   Diet Solid Recommendation Level 2 Dysphagia/ mechanical soft/altered   Diet Liquid Recommendation Thin liquid   General Precautions Aspiration precautions   Compensatory Swallowing Strategies Alternate solids and liquids   Eating   Type of Assistance Needed Independent   Eating CARE Score 6   Swallow Assessment   Swallow Treatment Assessment Speech Pathology Daily Treatment Note - Swallow Oral Function - Focus of dysphagia therapy this session was oral phase management of mechanical soft solids during intake of trial tray  Therapist targeted identification of appropriate bite and sip size for adequate management and safety  Therapist educated on the benefits of moist solids to ease oral phase breakdown and bolus formation via the use of gravy, broth or taking an initial sip to moisten the oral cavity  SLP targeted oral closure with spoon retrieval and during oral phase chewing to promote full rotary chew  SLP educated on the importance of using the tongue to transfer food anterior to posterior and to fully clear the oral cavity prior to reintroduction of bolus  Patient was very successful using the above techniques  SLP recommends upgrade to Dysphagia #2, Mechanical Soft with Thin liquids     Swallow Assessment Prognosis   Prognosis Good   Prognosis Considerations Family/community support   SLP Therapy Minutes   SLP Time In 2353   SLP Time Out 8671   SLP Total Time (minutes) 60   SLP Mode of treatment - Individual (minutes) 60   Daily FIM Score   Eating (FIM) 6 - Patient requires modified diet

## 2019-11-27 NOTE — NURSING NOTE
Pt ambulated with RW and supervision throughout the day  C/o moderate pain that was relieved by PRN oxy  Family at bedside at dinner  All needs are currently met  Call bell and belongings within reach  Will continue to monitor and follow plan of care

## 2019-11-27 NOTE — NURSING NOTE
Patient resting comfortably in bed at this time  No signs of distress noted  Patient remains alert and oriented forgetful at times bed alarm and close supervision in place to maintain patient safety  Patient wears back brace when out of bed  Patient requires assist to don/doff brace  Patient able to ambulate with supervision to the bathroom  Patient wore SCDs as ordered for DVT prophylaxis  Patient was encouraged to reposition frequently overnight to promote skin integrity  Call bell within reach  Will continue to monitor patient and follow plan of care

## 2019-11-28 NOTE — NURSING NOTE
Patient resting comfortably in bed at this time   No signs of distress noted   Patient remains alert and oriented forgetful at times bed alarm and close supervision in place to maintain patient safety   Patient wears back brace when out of bed   Patient requires assist to don/doff brace   Patient able to ambulate with supervision to the bathroom   Patient wore SCDs as ordered overnight for DVT prophylaxis   Patient was encouraged to reposition frequently overnight to promote skin integrity  Call bell within reach   Will continue to monitor patient and follow plan of care

## 2019-11-28 NOTE — INCIDENTAL FINDINGS
1) hiatal hernia: Please follow up with your primary care provider for further testing/treatment if any and/or specialist referral if any as they deem necessary     2) diverticulosis: Please follow up with your primary care provider for further testing/treatment if any and/or specialist referral if any as they deem necessary     3) elevated peak pulmonary artery pressure: Please follow up with Dr Kathreen Landau for further testing/treatment/monitoring if any as they deem necessary      4) renal cysts: Please follow up with your primary care provider for further testing/treatment if any and/or specialist referral if any as they deem necessary     5) mildly decreased alkaline phoshatase level: Please follow up with your primary care provider for further testing/treatment if any and/or specialist referral if any as they deem necessary     6) myomatous uterus with calcified leiomyomas: Please follow up with your primary care provider for further testing/treatment if any and/or specialist referral if any as they deem necessary     7) nodular pulmonary consolidation: per radiology report this is likely due to your pneumonia however please follow up with your primary care provider for further testing/treatment if any and/or specialist referral if any as they deem necessary    8) grade 1 diastolic dysfunction of your heart: Please follow up with Dr Kathreen Landau for further testing/treatment/monitoring if any as they deem necessary     9) chronic kidney disease: Please follow up with your primary care provider for further testing/treatment if any and/or specialist referral if any as they deem necessary

## 2019-11-28 NOTE — DISCHARGE SUMMARY
Physical Medicine and Rehabilitation Progress Note  Mikayla Barker 80 y o  female MRN: [id number]  Unit/Bed#: -01 Encounter: [id number]  Discharge Summary - PMR           Admission Date:    11/19/19  Discharge Date:   11/29/19    Diagnosis:   Compression deformity    Functional Status Upon Discharge: Mod I     Condition at Discharge: stable    Discharge instructions/Information to patient and family:   See after visit summary for information provided to patient and family  Provisions for Follow-Up Care:  See after visit summary for information related to follow-up care and any pertinent home health orders  Disposition: home with family     Planned Readmission: no        Discharge Medications:  See after visit summary for reconciled discharge medications provided to patient and family  Comorbidities:   See below for medical details     Hospital Course: The patient had an unremarkable rehab course with significant functional gains made, please see below for medical details:        Mikayla Barker is a 80 y o  female who presented to the [address] [place] [address] back pain in the setting of compression deformity and patient was evaluated by ortho and neurosx who both recommended conservative management  Course complicated by respiratory failure 2/2 to PNA which was treated with abx           PNA (pneumonia)  Assessment & Plan  - urine strep antigen positive in acute care   - s/p abx course in acute care     Compression deformity of vertebra  Assessment & Plan  - T10, T12, & L1   - evaluated by ortho through consultation (and remotely by neurosx) and both recommended bracing with TLSO and no further intervention   - OP FU with Dr Linus Sánchez     CKD (chronic kidney disease)  Assessment & Plan  - baseline Cr appears to be approximately 0 9  - Cr currently 0 81  - IM monitoring and have cleared patient for dc    Dysphagia  Assessment & Plan  - modified diet of mechanical soft and thin liquids per ST  - continued ST upon dc (referral provided through CM)      Chronic diastolic heart failure (HCC)  Assessment & Plan  - grade 1  - on lasix  - follows with Dr Chirag Schultz  - per PAPD[employer] gets regular prescriptions for ativan 0 5 mg tabs last filled on 09/19/19 for 90 tabs with 0 refills; per patient uses sparingly on an as needed basis and has not required during inpatient rehab admission and given patient's age and other risk factors advised patient to discontinue use of this medication     COPD (chronic obstructive pulmonary disease) ([facility] [address] )  Assessment & Plan  - was not on home O2 prior to admission however per IM based on desat study in acute care they are recommending 2 L at rest and 3 L with exertion at this time however was successfully titrated off O2 while in acute rehab unit   - changes of COPD not noted on most recent CT chest unknown if this patient has had this diagnosis made formally     GERD (gastroesophageal reflux disease)  Assessment & Plan  - with hiatal hernia   - therapeutic substitution for home zantac 150 mg BID     CAD (coronary artery disease)  Assessment & Plan  - h/o stent  - troponins peaked in acute care to 0 09  - evaluated by cards in acute care and attributed elevation in troponins to infxn and patient being in [facility] at the time   - on home ASA 81 mg qd   - on home lipitor 40 mg qpm  - on BB  - follows with Dr Sia Daigle     * Essential hypertension  Assessment & Plan  - at home on lasix 20 mg qd, lopressor 25 mg q12 (confirmed with patient's pharmacy)   - IM managing and recommend patient be dc'd on current inpatient regimen of lasix 20 mg qd, lopressor 12 5 mg q12, norvasc 5 mg qd, and losartan 50 mg qd      Scheduled Meds:    Current Facility-Administered Medications:  acetaminophen 650 mg Oral Q6H PRN Sohail Pike MD   al mag oxide-diphenhydramine-lidocaine viscous 10 mL Swish & Spit Q6H PRN Sohail Pike MD   amLODIPine 5 mg Oral Daily Sohail Pike MD aspirin 81 mg Oral Daily Debi Wahl MD   atorvastatin 40 mg Oral Daily With [place] Watson MD   bisacodyl 10 mg Rectal Daily PRN Debi Wahl MD   dextran 70-hypromellose 1 drop Both Eyes Q3H PRN Debi Wahl MD   famotidine 20 mg Oral BID Debi Wahl MD   furosemide 20 mg Oral Daily Debi Wahl MD   heparin (porcine) 5,000 Units Subcutaneous Q8H Lynn Vu MD   lidocaine 1 patch Topical Daily Debi Wahl MD   losartan 50 mg Oral Daily Debi Wahl MD   melatonin 6 mg Oral HS Debi Wahl MD   metoprolol tartrate 12 5 mg Oral Q12H [address] Debi Wahl MD   OLANZapine 5 mg Oral HS Debi Wahl MD   oxyCODONE 2 5 mg Oral Q4H PRN Debi Wahl MD   oxyCODONE 5 mg Oral Q4H PRN Debi Wahl MD   polyethylene glycol 17 g Oral Daily Debi Wahl MD        Incidental Findings:     1) hiatal hernia: at home on zantac 150 mg BID (therapeutic substitution) for associated GERD; OP FU with PCP with further testing/treatment and/or specialist referral at PCP's discretion      2) diverticulosis: OP FU with PCP with further testing/treatment and/or specialist referral at PCP's discretion      3) elevated peak PA pressure: OP FU with Dr Benetta Gaucher     4) renal cysts: OP FU with PCP with further testing/treatment and/or specialist referral at PCP's discretion      5) decreased alk phos of 49 (LLN 55): per IM consultant unlikely of clinical significance and recommended OP FU with PCP with further testing/treatment and/or specialist referral at PCP's discretion      6) myomatous uterus with calcified leiomyomas: asymptomatic, OP FU with PCP with further testing/treatment and/or specialist referral at PCP's discretion     7) nodular pulmonary consolidation: per radiology report likely infectious and recommended following to resolution, not noted on FU chest XR however will defer to PCP to determine if FU CT chest is required      DVT ppx: HSQ   Code: confirmed with patient that she wishes to be DNR/DNI and patient verbalized her understanding of what this means              Physical Exam:    Vitals:    11/29/19 0713   BP: 140/62   Pulse: 91   Resp: 18   Temp: 98 1 °F (36 7 °C)   SpO2: 92%           General: alert, no apparent distress, cooperative and comfortable  HEENT:  Head: Normal, normocephalic, atraumatic  CARDIAC:  +S1/2  LUNGS:  respirations unlabored   ABDOMEN:  soft NT   EXTREMITIES:  volume status currently stable   NEURO:   awake, alert, appropriately answering questions   PSYCH:  mood/affect currently stable        * 33 min was spent in preparation for patient discharge including discussion with patient, patient's nurse, therapy staff, and case management

## 2019-11-28 NOTE — PLAN OF CARE
Problem: Potential for Falls  Goal: Patient will remain free of falls  Description  INTERVENTIONS:  - Assess patient frequently for physical needs  -  Identify cognitive and physical deficits and behaviors that affect risk of falls    -  [place] fall precautions as indicated by assessment   - Educate patient/family on patient safety including physical limitations  - Instruct patient to call for assistance with activity based on assessment  - Modify environment to reduce risk of injury  - Consider OT/PT consult to assist with strengthening/mobility  Outcome: Progressing     Problem: RESPIRATORY - ADULT  Goal: Achieves optimal ventilation and oxygenation  Description  INTERVENTIONS:  - Assess for changes in respiratory status  - Assess for changes in mentation and behavior  - Position to facilitate oxygenation and minimize respiratory effort  - Oxygen administered by appropriate delivery if ordered  - Initiate smoking cessation education as indicated  - Encourage broncho-pulmonary hygiene including cough, deep breathe, Incentive Spirometry  - Assess the need for suctioning and aspirate as needed  - Assess and instruct to report SOB or any respiratory difficulty  - Respiratory Therapy support as indicated  Outcome: Progressing     Problem: GASTROINTESTINAL - ADULT  Goal: Maintains adequate nutritional intake  Description  INTERVENTIONS:  - Monitor percentage of each meal consumed  - Identify factors contributing to decreased intake, treat as appropriate  - Assist with meals as needed  - Monitor I&O, weight, and lab values if indicated  - Obtain nutrition services referral as needed  Outcome: Progressing     Problem: SKIN/TISSUE INTEGRITY - ADULT  Goal: Skin integrity remains intact  Description  INTERVENTIONS  - Identify patients at risk for skin breakdown  - Assess and monitor skin integrity  - Assess and monitor nutrition and hydration status  - Monitor labs (i e  albumin)  - Assess for incontinence   - Turn and reposition patient  - Assist with mobility/ambulation  - Relieve pressure over bony prominences  - Avoid friction and shearing  - Provide appropriate hygiene as needed including keeping skin clean and dry  - Evaluate need for skin moisturizer/barrier cream  - Collaborate with interdisciplinary team (i e  Nutrition, Rehabilitation, etc )   - Patient/family teaching  Outcome: Progressing     Problem: HEMATOLOGIC - ADULT  Goal: Maintains hematologic stability  Description  INTERVENTIONS  - Assess for signs and symptoms of bleeding or hemorrhage  - Monitor labs  - Administer supportive blood products/factors as ordered and appropriate  Outcome: Progressing     Problem: MUSCULOSKELETAL - ADULT  Goal: Maintain or return mobility to safest level of function  Description  INTERVENTIONS:  - Assess patient's ability to carry out ADLs; assess patient's baseline for ADL function and identify physical deficits which impact ability to perform ADLs (bathing, care of mouth/teeth, toileting, grooming, dressing, etc )  - Assess/evaluate cause of self-care deficits   - Assess range of motion  - Assess patient's mobility  - Assess patient's need for assistive devices and provide as appropriate  - Encourage maximum independence but intervene and supervise when necessary  - Involve family in performance of ADLs  - Assess for home care needs following discharge   - Consider OT consult to assist with ADL evaluation and planning for discharge  - Provide patient education as appropriate  Outcome: Progressing     Problem: PAIN - ADULT  Goal: Verbalizes/displays adequate comfort level or baseline comfort level  Description  Interventions:  - Encourage patient to monitor pain and request assistance  - Assess pain using appropriate pain scale  - Administer analgesics based on type and severity of pain and evaluate response  - Implement non-pharmacological measures as appropriate and evaluate response  - Consider cultural and social influences on pain and pain management  - Notify physician/advanced practitioner if interventions unsuccessful or patient reports new pain  Outcome: Progressing     Problem: INFECTION - ADULT  Goal: Absence or prevention of progression during hospitalization  Description  INTERVENTIONS:  - Assess and monitor for signs and symptoms of infection  - Monitor lab/diagnostic results  - Monitor all insertion sites, i e  indwelling lines, tubes, and drains  - Monitor endotracheal if appropriate and nasal secretions for changes in amount and color  - [place] appropriate cooling/warming therapies per order  - Administer medications as ordered  - Instruct and encourage patient and family to use good hand hygiene technique  - Identify and instruct in appropriate isolation precautions for identified infection/condition  Outcome: Progressing  Goal: Absence of fever/infection during neutropenic period  Description  INTERVENTIONS:  - Monitor WBC    Outcome: Progressing     Problem: SAFETY ADULT  Goal: Maintain or return to baseline ADL function  Description  INTERVENTIONS:  -  Assess patient's ability to carry out ADLs; assess patient's baseline for ADL function and identify physical deficits which impact ability to perform ADLs (bathing, care of mouth/teeth, toileting, grooming, dressing, etc )  - Assess/evaluate cause of self-care deficits   - Assess range of motion  - Assess patient's mobility; develop plan if impaired  - Assess patient's need for assistive devices and provide as appropriate  - Encourage maximum independence but intervene and supervise when necessary  - Involve family in performance of ADLs  - Assess for home care needs following discharge   - Consider OT consult to assist with ADL evaluation and planning for discharge  - Provide patient education as appropriate  Outcome: Progressing  Goal: Maintain or return mobility status to optimal level  Description  INTERVENTIONS:  - Assess patient's baseline mobility status (ambulation, transfers, stairs, etc )    - Identify cognitive and physical deficits and behaviors that affect mobility  - Identify mobility aids required to assist with transfers and/or ambulation (gait belt, sit-to-stand, lift, walker, cane, etc )  - [place] fall precautions as indicated by assessment  - Record patient progress and toleration of activity level on Mobility SBAR; progress patient to next Phase/Stage  - Instruct patient to call for assistance with activity based on assessment  - Consider rehabilitation consult to assist with strengthening/weightbearing, etc   Outcome: Progressing     Problem: DISCHARGE PLANNING  Goal: Discharge to home or other facility with appropriate resources  Description  INTERVENTIONS:  - Identify barriers to discharge w/patient and caregiver  - Arrange for needed discharge resources and transportation as appropriate  - Identify discharge learning needs (meds, wound care, etc )  - Arrange for interpretive services to assist at discharge as needed  - Refer to Case Management Department for coordinating discharge planning if the patient needs post-hospital services based on physician/advanced practitioner order or complex needs related to functional status, cognitive ability, or social support system  Outcome: Progressing     Problem: Knowledge Deficit  Goal: Patient/family/caregiver demonstrates understanding of disease process, treatment plan, medications, and discharge instructions  Description  Complete learning assessment and assess knowledge base  Interventions:  - Provide teaching at level of understanding  - Provide teaching via preferred learning methods  Outcome: Progressing     Problem: Nutrition/Hydration-ADULT  Goal: Nutrient/Hydration intake appropriate for improving, restoring or maintaining nutritional needs  Description  Monitor and assess patient's nutrition/hydration status for malnutrition   Collaborate with interdisciplinary team and initiate plan and interventions as ordered  Monitor patient's weight and dietary intake as ordered or per policy  Utilize nutrition screening tool and intervene as necessary  Determine patient's food preferences and provide high-protein, high-caloric foods as appropriate       INTERVENTIONS:  - Monitor oral intake, urinary output, labs, and treatment plans  - Assess nutrition and hydration status and recommend course of action  - Evaluate amount of meals eaten  - Assist patient with eating if necessary   - Allow adequate time for meals  - Recommend/ encourage appropriate diets, oral nutritional supplements, and vitamin/mineral supplements  - Order, calculate, and assess calorie counts as needed  - Recommend, monitor, and adjust tube feedings and TPN/PPN based on assessed needs  - Assess need for intravenous fluids  - Provide specific nutrition/hydration education as appropriate  - Include patient/family/caregiver in decisions related to nutrition  Outcome: Progressing

## 2019-11-28 NOTE — PROGRESS NOTES
11/28/19 0851   Charting Type   Charting Type Shift assessment   Neurological   Neuro (WDL) X   Muscle Function/Sensation Assessment ;Muscle strength   R Hand  Moderate   L Hand  Moderate   RLE Muscle Strength 4- Movement against gravity and limited resistance   LLE Muscle Strength 4- Movement against gravity and limited resistance   Neuro Symptoms Forgetful   Relieved by Rest   Delirium Assessment- CAM    Acute Onset and Fluctuating Course (1) No   Inattention (2) No   Disorganized Thinking (3) No   Rate Patient's Level of Consciousness (4) Alert (Normal), No   Delirium Present No   Shanda Coma Scale   Eye Opening 4   Best Verbal Response 5   Best Motor Response 6   [place] Coma Scale Score 15   HEENT   HEENT (WDL) X   R Eye Mildly impaired vision   L Eye Mildly impaired vision   Vision - Corrective Lenses (at bedside) Glasses   R Ear Mildly impaired hearing   L Ear Mildly impaired hearing   Teeth Dentures upper;Dentures lower   Respiratory   Respiratory (WDL) X   Bilateral Breath Sounds Clear;Diminished   Cardiac   Cardiac (WDL) WDL   Peripheral Vascular   Peripheral Vascular (WDL) WDL   Integumentary   Integumentary (WDL) X   Skin Color Appropriate for ethnicity   Skin Condition/Temp Warm;Dry   Skin Integrity Bruising   Skin Location scattered   Skin Turgor Epidermis thin with loss of subcutaneous tissue   Andi Scale   Sensory Perceptions 4   Moisture 4   Activity 3   Mobility 3   Nutrition 3   Friction and Shear 2   Andi Scale Score 19   Wound 11/18/19 Traumatic Other (Comment) Hand Right   Date First Assessed/Time First Assessed: 11/18/19 1130   Pre-Existing Wound: No  Primary Wound Type: Traumatic  Traumatic Wound Type: (c) Other (Comment)  Location: Hand  Wound Location Orientation: Right  Wound Description (Comments): Hematoma  Incis    Wound Description Clean;Dry; Intact   Comfort-wound Assessment Clean;Dry; Intact   Dressing Open to air   Musculoskeletal   Musculoskeletal (WDL) X   Level of Assistance Contact guard assist, steadying assist   Assistive Device Front wheel walker   RLE Limited movement   LLE Limited movement   Gastrointestinal   Gastrointestinal (WDL) WDL   Genitourinary   Genitourinary (WDL) WDL   Urine Assessment   Urinary Incontinence No   Genitalia   Female Genitalia Intact   Genitourinary Additional Assessments   Genitourinary Additional Assessments No   Anal/Rectal   Anal/Rectal (WDL) WDL   Psychosocial   Psychosocial (WDL) WDL

## 2019-11-29 NOTE — NURSING NOTE
Pt and granddaughter given discharge instructions and scripts for medications  All questions answered  All belongings accounted for and taken with pt  Escorted to [facility] in [facility] by RN  Pt to be transported to grand daughter's home in granddaughter's vehicle which the treatment team has deemed to be a safe mode of transportation for this pt  Pt was independent getting into vehicle

## 2019-11-29 NOTE — NURSING NOTE
Pt awake resting in bed  No complaints of pain  Supervision with transfers and walking to BR with RW  TLSO brace oob  Cont b&b  Assessment unchanged  D/C today  Continuing to monitor and follow plan of care

## 2019-11-29 NOTE — CASE MANAGEMENT
DC instructions reviewed with Pt & family  Pt being DC'd at 1 PM today, 11/29/19  Pt will be transported home by family via car, which Tx team has determined to be a safe mode of transport  HHC arranged with [facility] (PT, OT) per Pt preference  FU IMM reviewed, signed & submitted for scanning

## 2019-11-29 NOTE — PROGRESS NOTES
11/29/19 1100   Pain Assessment   Pain Assessment 0-10   Pain Score No Pain   Restrictions/Precautions   Precautions Fall Risk;Spinal precautions   RLE Weight Bearing Per Order WBAT   LLE Weight Bearing Per Order WBAT   Braces or Orthoses TLSO   Cognition   Overall Cognitive Status WFL   Arousal/Participation Alert; Responsive; Cooperative   Attention Within functional limits   Orientation Level Oriented X4   Memory Within functional limits   Following Commands Follows all commands and directions without difficulty   Roll Left and Right   Type of Assistance Needed Independent   Amount of Physical Assistance Provided No physical assistance   Roll Left and Right CARE Score 6   Sit to Lying   Type of Assistance Needed Independent   Amount of Physical Assistance Provided No physical assistance   Sit to Lying CARE Score 6   Lying to Sitting on Side of Bed   Type of Assistance Needed Independent   Amount of Physical Assistance Provided No physical assistance   Lying to Sitting on Side of Bed CARE Score 6   Sit to Stand   Type of Assistance Needed Independent   Amount of Physical Assistance Provided No physical assistance   Sit to Stand CARE Score 6   Bed-Chair Transfer   Type of Assistance Needed Independent   Amount of Physical Assistance Provided No physical assistance   Chair/Bed-to-Chair Transfer CARE Score 6   Transfer Bed/Chair/Wheelchair   Limitations Noted In Endurance;Pain Management;UE Strength;LE Strength   Adaptive Equipment Roller Walker   Stand Pivot Modified Independent   Sit to Stand Modified Independent   Stand to Sit Modified Independent   Supine to Sit Modified Independent   Sit to Supine Modified Independent   All Transfer Modified Independent   Walk 10 Feet   Type of Assistance Needed Independent   Amount of Physical Assistance Provided No physical assistance   Walk 10 Feet CARE Score 6   Walk 50 Feet with Two Turns   Type of Assistance Needed Independent   Amount of Physical Assistance Provided No physical assistance   Walk 50 Feet with Two Turns CARE Score 6   Walk 150 Feet   Type of Assistance Needed Independent   Amount of Physical Assistance Provided No physical assistance   Walk 150 Feet CARE Score 6   Walking 10 Feet on Uneven Surfaces   Type of Assistance Needed Independent   Amount of Physical Assistance Provided No physical assistance   Walking 10 Feet on Uneven Surfaces CARE Score 6   Ambulation   Does the patient walk? 2  Yes   Primary Mode of Locomotion Prior to Admission Walk   Distance Walked (feet) 169 ft  (118)   Assist Device Roller Walker   Gait Pattern Inconsistant Janell; Antalgic   Limitations Noted In Balance; Endurance; Safety;Strength   Provided Assistance with: Balance   Walk Assist Level Modified Independent   Findings level and carpet   Curb or Single Stair   Style negotiated Single stair   Type of Assistance Needed Independent   Amount of Physical Assistance Provided No physical assistance   1 Step (Curb) CARE Score 6   4 Steps   Type of Assistance Needed Independent   Amount of Physical Assistance Provided No physical assistance   4 Steps CARE Score 6   12 Steps   Reason if not Attempted Safety concerns   12 Steps CARE Score 88   Stairs   Type Stairs   # of Steps 10   Weight Bearing Precautions WBAT   Assist Devices Bilateral Rail   Findings MI   Therapeutic Interventions   Strengthening seated ther ex 30 reps   Other gait and community re-ed   Assessment   Treatment Assessment Pt met all PT goals of MI and is cleared to be D/C to [facility] today; Pt wears TLSO and needs assist to sandip; Pt feels ready to go home   PT Barriers   Physical Impairment Decreased strength;Decreased range of motion;Decreased endurance; Impaired balance;Orthopedic restrictions;Pain   Functional Limitation Walking;Transfers;Standing;Stair negotiation   Plan   Treatment/Interventions Functional transfer training;LE strengthening/ROM; Elevations; Therapeutic exercise;Gait training   Progress Progressing toward goals   Recommendation   PT - OK to Discharge Yes   Discharge Summary Pt is being D/C today to granddaughters house; Pt has met all PT goals and will be receiving [place] [id number] services; Pt has all DME    PT Therapy Minutes   PT Time In 1100   PT Time Out 1200   PT Total Time (minutes) 60   PT Mode of treatment - Individual (minutes) 45   PT Mode of treatment - Concurrent (minutes) 15   PT Mode of treatment - Group (minutes) 0   PT Mode of treatment - Co-treat (minutes) 0   PT Mode of Treatment - Total time(minutes) 60 minutes   PT Cumulative Minutes 487   Therapy Time missed   Time missed?  No

## 2019-11-29 NOTE — PROGRESS NOTES
11/29/19 0725   Pain Assessment   Pain Assessment 0-10   Pain Score 5   Pain Type Acute pain   Pain Location Back   Pain Orientation Lower   Restrictions/Precautions   Precautions Fall Risk;Spinal precautions   ROM Restrictions   (spinal precautions)   Braces or Orthoses TLSO   Eating   Type of Assistance Needed Independent   Amount of Physical Assistance Provided No physical assistance   Eating CARE Score 6   Eating Assessment   Food To Mouth Yes   Able To Cut   (N/A)   Positioning Upright;Out of Bed   Meal Assessed Breakfast   QI: Swallowing/Nutritional Status Modified food consistency   Intake Mode PO   Finishes Timely Yes   Opens Packages Yes   Oral Hygiene   Type of Assistance Needed Independent   Amount of Physical Assistance Provided No physical assistance   Oral Hygiene CARE Score 6   Grooming   Able To Initiate Tasks; Acquire Items;Comb/Brush Hair;Wash/Dry Face;Brush/Clean Teeth   Limitation Noted In Safety   Shower/Bathe Self   Type of Assistance Needed Independent   Amount of Physical Assistance Provided No physical assistance   Shower/Bathe Self CARE Score 6   Bathing   Assessed Bath Style Sponge Bath  (declined shower)   Anticipated D/C Bath Style Shower;Sponge Bath   Able to [place] Shon No   Able to Wash/Rinse/Dry (body part) Left Arm;Right Arm;L Upper Leg;R Upper Leg;L Lower Leg/Foot;R Lower Leg/Foot;Chest;Abdomen;Perineal Area; Buttocks   Limitations Noted in Balance; Endurance;ROM;Safety   Positioning Seated;Standing   Adaptive Equipment Longhand Sponge   Tub/Shower Transfer   Reason Not Assessed Patient refusal;Sponge Bath  (preferred sponge bath today 2* nausea)   Upper Body Dressing   Type of Assistance Needed Independent   Amount of Physical Assistance Provided No physical assistance   Comment assist to manage TLSO, but independent with donning and doffing shirt   Upper Body Dressing CARE Score 6   Lower Body Dressing   Type of Assistance Needed Independent; Adaptive equipment   Amount of Physical Assistance Provided No physical assistance   Lower Body Dressing CARE Score 6   Putting On/Taking Off Footwear   Type of Assistance Needed Independent; Adaptive equipment   Amount of Physical Assistance Provided No physical assistance   Putting On/Taking Off Footwear CARE Score 6   Picking Up Object   Type of Assistance Needed Independent; Adaptive equipment   Amount of Physical Assistance Provided No physical assistance   Picking Up Object CARE Score 6   Dressing/Undressing Clothing   Remove UB Clothes   (hospital robe)   Don UB Clothes Pullover Shirt   Remove LB Clothes Socks   Don LB Clothes Pants; Undergarment;Socks   Limitations Noted In Balance; Endurance; Safety;ROM   Adaptive Equipment Reacher;Dressing Stick;LH Shoehorn;Sock Aide   Positioning Supported Sit   Lying to Sitting on Side of Bed   Type of Assistance Needed Independent   Amount of Physical Assistance Provided No physical assistance   Comment log roll technique 2* spinal precautions   Lying to Sitting on Side of Bed CARE Score 6   Sit to Stand   Type of Assistance Needed Independent   Amount of Physical Assistance Provided No physical assistance   Sit to Stand CARE Score 6   Bed-Chair Transfer   Type of Assistance Needed Independent   Amount of Physical Assistance Provided No physical assistance   Chair/Bed-to-Chair Transfer CARE Score 6   Transfer Bed/Chair/Wheelchair   Limitations Noted In Balance; Endurance   Toileting Hygiene   Type of Assistance Needed Independent   Amount of Physical Assistance Provided No physical assistance   Toileting Hygiene CARE Score 6   Toileting   Able to [address] down yes, up yes     Manage Hygiene Bladder   Limitations Noted In Balance;ROM;Safety   Adaptive Equipment Grab Bar   Toilet Transfer   Type of Assistance Needed Independent   Amount of Physical Assistance Provided No physical assistance   Toilet Transfer CARE Score 6   Toilet Transfer   Surface Assessed Raised Toilet   Transfer Technique Standard Limitations Noted In Balance; Endurance;ROM;Safety   Adaptive Equipment Grab Bar   Therapeutic Excerise-Strength   UE Strength Yes   Right Upper Extremity- Strength   RUE Strength Comment x15 horizontal ABD/ADD using yellow stretch theraband; pt given band for home use   Left Upper Extremity-Strength   LUE Strength Comment x15 horizontal ABD/ADD using yellow stretch theraband; pt given band for home use   Cognition   Overall Cognitive Status WFL   Arousal/Participation Alert; Responsive; Cooperative   Attention Within functional limits   Orientation Level Oriented X4   Memory Within functional limits   Following Commands Follows one step commands without difficulty   Assessment   Treatment Assessment Pt agreeable to OT session this AM  Received lying supine in bed  ADL session completed; current LOF and details listed in respective sections  Pt is overall mod I for these tasks with effective use of AE for LB tasks  Pt declined shower today 2* nausea and back pain and was able to set up sponge bath materials  Assist to initially don TLSO while sitting EOB prior to ADL session  Pt given yellow stretch theraband for home exercise use, reported she is aware of exercises to complete with the band for UB  Set up breakfast tray with pt, mod I overall  Pt reported will be living with granddaughter for a brief period and then daughter prior to transitioning back to her home and will have assist as needed  Pt set to d/c today to granddaughter's home with family assist and HHC  Recommendation   OT - OK to Discharge Yes   Discharge Summary Pt cleared for d/c from [facility] to granddaughter's home with family assist and HHC  Pt is overall mod I for ADL tasks with use of AE; this includes functional transfers  Pt participated in ADL training, therapeutic exercises, therapeutic activities, functional mobility/transfers, and activity tolerance in order to progress towards goals   Pt does require occasional assist for TLSO donning once sitting EOB  DME is in the process of being obtained as well as at pt's home  OT Therapy Minutes   OT Time In 0725   OT Time Out 0826   OT Total Time (minutes) 61   OT Mode of treatment - Individual (minutes) 61   Therapy Time missed   Time missed?  No

## 2019-11-29 NOTE — DISCHARGE INSTRUCTIONS
Please continue your dietary consistency of Dysphagia 2/Mechanically altered diet (soft cooked, moist and minced foods) with Thin liquids until advised otherwise by your speech therapist or doctors    Please note you are restricted from driving/operating a motorized vehicle/operating heavy machinery/etc    Please see your doctors listed in the follow up providers section of your discharge paperwork, and take the discharge paperwork with you to your appointments    Please call the doctors listed in the follow up providers section to confirm office locations of your follow up appointments     Please note changes may have been made to your medications please refer to your discharge paperwork for your current medications and take this list with you to all your doctors appointments for your doctors to review    Please do not resume a home medication unless the medication reconciliation sheet indicates to do so, please do not assume that a medication that you were given a prescription for is the same as a medication you have at home based on both medications having the same name as dosages and frequency may have changed  Your nurse also reviewed with you just prior to your discharge from the hospital your medications, when to take them, how to take them, what they are for, how much to take, and when your next dose is due, please follow these instructions         Please check your blood pressure & heart rate/pulse prior to taking your blood pressure/heart rate medications and 1 hour after, please contact your family doctor or cardiologist immediately for a blood pressure below 100/60 and do not take the medications until speaking with them, please contact your family doctor or cardiologist immediately for blood pressure greater than 160/100; please contact your family doctor or cardiologist immediately for a heart rate/pulse lower than 60 and do not take the medications until speaking with them, please contact your family doctor or cardiologist immediately for heart rate/pulse greater than 100     Please note the following incidental findings were found during your recent hospitalization please discuss them with your doctors so that they may arrange any tests/referrals as they deem necessary:     1) hiatal hernia: Please follow up with your primary care provider for further testing/treatment if any and/or specialist referral if any as they deem necessary     2) diverticulosis: Please follow up with your primary care provider for further testing/treatment if any and/or specialist referral if any as they deem necessary     3) elevated peak pulmonary artery pressure: Please follow up with Dr Rula Flores for further testing/treatment/monitoring if any as they deem necessary      4) renal cysts: Please follow up with your primary care provider for further testing/treatment if any and/or specialist referral if any as they deem necessary     5) mildly decreased alkaline phoshatase level: Please follow up with your primary care provider for further testing/treatment if any and/or specialist referral if any as they deem necessary     6) myomatous uterus with calcified leiomyomas: Please follow up with your primary care provider for further testing/treatment if any and/or specialist referral if any as they deem necessary     7) nodular pulmonary consolidation: per radiology report this is likely due to your pneumonia however please follow up with your primary care provider for further testing/treatment if any and/or specialist referral if any as they deem necessary    8) grade 1 diastolic dysfunction of your heart: Please follow up with Dr Rula Flores for further testing/treatment/monitoring if any as they deem necessary     9) chronic kidney disease: Please follow up with your primary care provider for further testing/treatment if any and/or specialist referral if any as they deem necessary       Please avoid NSAID (including but not limited to nabumetone, celebrex, celecoxib, advil, aleve, motrin, naproxen, ibuprofen, mobic, meloxicam, diclofenac etc) medications as NSAID medications may delay bone healing unless cleared to do so by your doctors     Please avoid NSAID (including but not limited to nabumetone, celebrex, celecoxib, advil, aleve, motrin, naproxen, ibuprofen, mobic, meloxicam, diclofenac etc) medications unless cleared to do so by your doctors as these medications can potentially cause worsening of your chronic kidney disease unless cleared to do so by you doctors     Please avoid NSAID (including but not limited to nabumetone, celebrex, celecoxib, advil, aleve, motrin, naproxen, ibuprofen, mobic, meloxicam, diclofenac etc) medications, anti platelet medications (including but not limited to plavix and plavix containing products), and any prescription blood thinners due to your already being on aspirin and when combined with these other medications they can increase your risk of bleeding; you may be cleared to use these medications in the future but do not do so unless advised to do so by your doctors      Please continue your spine precautions as instructed until cleared by Dr Humberto Amaya    Please continue to use your spine brace as instructed until cleared by Dr Humberto Amaya    Please note a summary of your hospital stay with relevant information for your doctors has been sent to them, please confirm with your doctors at your follow up visits that they have received this summary and have them contact [address] [place] [address] if they have not received them along with any other medical records they may require

## 2019-11-29 NOTE — PROGRESS NOTES
11/29/19 1205   Pain Assessment   Pain Assessment 0-10   Pain Score No Pain   Restrictions/Precautions   Precautions Aspiration; Fall Risk;Spinal precautions   RLE Weight Bearing Per Order WBAT   LLE Weight Bearing Per Order WBAT   Braces or Orthoses TLSO   Comprehension   Assist Devices Glasses   QI: Comprehension 4  Undestands: Clear comprehension without cues or repetitions   Comprehension (FIM) 6 - Understands complex/abstract but requires more time   Expression   Verbal Complex   QI: Expression 4  Express complex messages without difficulty and with speech that is clear and easy to [place]   Expression (FIM) 6 - Expresses complex/abstract but requires:  more time   Social Interaction   Social Interaction (FIM) 6 - Interacts appropriately with others BUT requires extra  time   Problem Solving   Problem solving (FIM) 6 - Solves complex problems BUT requires extra time   Memory   Recognize People Yes   Remember Routine Yes   Initiates Tasks Yes   Memory (FIM) 6 - Recognizes with extra time   Memory Skills   Orientation Level Oriented X4   Auditory Comprehension   Comphrehends Conversation Complex   EffectiveTechniques Extra processing time   Speech/Swallow Mechanism Exam   Dentition Dentures top   Speech/Language/Cognition Assessmetn   Treatment Assessment Speech Pathology Daily Treatment Note - Speech/Cognitive Communication Skills - SLP targeted cognitive reasoning and problem solving  Therapist posed scenarios in which a problem occurs during completion of an ADL  Patient was required to provide a solution in a sequence of salient steps  Focus was on reasoning the current level of assistance as opposed to LT memory in considering a safe and reasonable solution  The level of assistance was discussed at each stage of the sequence and SLP provided verbal and gestural cues as necessary to facilitate comprehension of the current situation   Patient presented as Mod I for comprehension of task and reasoning solutions which included the current level of necessary assistance and precautions  Swallow Information   Current Diet Dysphagia mechanical soft   Consistencies Assessed and Performance   Phargngeal Stage Mild impaired   Esophageal Concerns Hx GERDS;C/O food dysphagia   Recommendations   Risk for Aspiration Mild   Diet Solid Recommendation Level 2 Dysphagia/ mechanical soft/altered   Diet Liquid Recommendation Thin liquid   General Precautions Aspiration precautions   Compensatory Swallowing Strategies Alternate solids and liquids   Eating   Type of Assistance Needed Independent   Eating CARE Score 6   Swallow Assessment   Swallow Treatment Assessment Speech Pathology Daily Treatment Note - Swallow Oral Function - Patient seen 1:1 plus concurrent as benefit is gained through receiving multisensory cues to improve comprehension, demonstration and retention of techniques and precautions  Compensatory safe swallow education provided which included appropriate 90 degree head/neck/trunk posture, prep to small bites, small and single sips of liquid,complete oral breakdown of solids with formation of a cohesive bolus, oral closure with spoon retrieval, decreased speaking on oral phase, oral clearance prior to reintroduction of bolus, alternate solids with liquids, and rate control  Patient's family was present and education provided on current diet texture as well as use of compensatory strategies for ongoing success in the home setting  Swallow Assessment Prognosis   Prognosis Good   Prognosis Considerations Family/community support   SLP Therapy Minutes   SLP Time In 6293   SLP Time Out 2050   SLP Total Time (minutes) 60   SLP Mode of treatment - Individual (minutes) 60   Therapy Time missed   Time missed?  No   Daily FIM Score   Eating (FIM) 6 - Patient requires modified diet

## 2019-11-29 NOTE — TELEPHONE ENCOUNTER
Pt needs ranitidine (ZANTAC) 150 mg tablet and metoprolol tartrate (LOPRESSOR) 25 mg tablet refilled, send to [facility] on [address]

## 2019-12-01 NOTE — PHYSICAL THERAPY NOTE
PT DISCHARGE SUMMARY:       Patient has met max benefit for inpatient [facility] PT  Patient  MOD I bed mobility, MOD I all transfers with walker; MOD I ambulation up to 169' level and carpet with walker; MOD I 10 steps with 2 handrails; MOD I car transfer with walker  Patient for d/c to home with continued PT services 11/29/19  Patient safe to transport home via car

## 2019-12-02 NOTE — TELEPHONE ENCOUNTER
PT called office stating pt was to start PT today, however, Danitza Merida is staying at her daughters in [facility] and will be start therapy next week  Just an FYI

## 2019-12-06 PROBLEM — I25.10 ATHEROSCLEROTIC HEART DISEASE OF NATIVE CORONARY ARTERY WITHOUT ANGINA PECTORIS: Status: ACTIVE | Noted: 2017-04-06

## 2019-12-06 PROBLEM — E78.2 MIXED HYPERLIPIDEMIA: Status: ACTIVE | Noted: 2017-04-06

## 2019-12-06 PROBLEM — I50.30 DIASTOLIC CONGESTIVE HEART FAILURE (HCC): Status: ACTIVE | Noted: 2017-10-09

## 2019-12-06 NOTE — PROGRESS NOTES
Assessment/Plan:     No problem-specific Assessment & Plan notes found for this encounter  Diagnoses and all orders for this visit:    Acute midline low back pain without sciatica  -     Bed  -     traMADol (ULTRAM) 50 mg tablet; Take 1 tablet (50 mg total) by mouth every 6 (six) hours as needed for moderate pain  -     Ambulatory referral to complex care management program; Future    Compression fracture of L1 vertebra, initial encounter ([facility])  -     Bed  -     traMADol (ULTRAM) 50 mg tablet; Take 1 tablet (50 mg total) by mouth every 6 (six) hours as needed for moderate pain  -     Ambulatory referral to complex care management program; Future    Gait abnormality  -     Bed  -     traMADol (ULTRAM) 50 mg tablet; Take 1 tablet (50 mg total) by mouth every 6 (six) hours as needed for moderate pain  -     Ambulatory referral to complex care management program; Future    Community acquired pneumonia, unspecified laterality    Metabolic encephalopathy    DELIO (acute kidney injury) ([facility] )    Other orders  -     ferrous sulfate 324 (65 Fe) mg; Take 324 mg by mouth 2 (two) times a day before meals  -     multivitamin (THERAGRAN) TABS; Take 1 tablet by mouth daily  -     Calcium Carbonate-Vit D-Min (CALCIUM 1200 PO); Take by mouth  -     Potassium Chloride BRISSA; by Does not apply route     A/P: doing ok,but considerable pain and only taking tylenol  Need to keep pt active and control her pain better  Avoid the NSAID's  Will add ultram as she tolerated this in the past  Lidoderm didn't work and wasn't covered in the past  Will be getting in home PT  Will continue current meds otherwise, but may need to adjust her BP meds if it starts to drop  Needs to increase her activity and fluids  Will get the  involved as the pt will need some DME and PT can eval when the go in  RTC four week for routine  Subjective:     Patient ID: Lebron Plascencia is a 80 y o  female      WF seen with her relative for f/u recent admission  Pt with acute onset LBP and presented to the ER and found to have a Lumbar compression fx  Was admitted and went into Acute respiratory failure due to CAP with strep and flare of COPD  Required bipap  Course complicated by DELIO and metabolic encephalopathy  Treated conservatively and and improved, but d/c to rehab that was uneventful  Just recently d/c'd from rehab  Since d/c, continues to have ongoing LBP at the site of the L1 compression fx  Notes fatigue, decreased appetite, lightheaded, etc  Denies CP, SOB, palpitations, orthopnea, or PND  Review of Systems   Constitutional: Positive for activity change and fatigue  Negative for chills, diaphoresis and fever  Respiratory: Negative for cough, chest tightness, shortness of breath and wheezing  Cardiovascular: Negative for chest pain, palpitations and leg swelling  Gastrointestinal: Negative for abdominal pain, constipation, diarrhea, nausea and vomiting  Genitourinary: Negative for difficulty urinating, dysuria and frequency  Musculoskeletal: Positive for back pain and gait problem  Negative for arthralgias and myalgias  Neurological: Positive for light-headedness  Negative for dizziness and headaches  Psychiatric/Behavioral: Negative for confusion  The patient is not nervous/anxious  Objective:     Physical Exam   Constitutional: She is oriented to person, place, and time  She appears well-developed and well-nourished  No distress  HENT:   Head: Normocephalic and atraumatic  Mouth/Throat: Oropharynx is clear and moist    Eyes: Pupils are equal, round, and reactive to light  Conjunctivae and EOM are normal    Neck: Neck supple  No JVD present  Cardiovascular: Normal rate, regular rhythm and normal heart sounds  Pulmonary/Chest: Effort normal and breath sounds normal  No respiratory distress  She has no wheezes  She has no rales  Abdominal: Soft  Bowel sounds are normal  She exhibits no distension   There is no tenderness  Musculoskeletal: She exhibits tenderness  She exhibits no edema  LS Spine w/o gross deformities, increase temp, erythema, swelling, or lesions  Tenderness over the lower T spine to S1  ROM decreased and not evaled due to brace pt is wearing  Spasms none  LE strength 5/5 with tone/ROM WNL  DTR 2/4  Neurological: She is alert and oriented to person, place, and time  Psychiatric: She has a normal mood and affect  Her behavior is normal  Judgment and thought content normal    Nursing note and vitals reviewed  Vitals:    12/06/19 1418   BP: 134/68   BP Location: Left arm   Patient Position: Sitting   Cuff Size: Adult   Pulse: 75   Resp: 18   Temp: (!) 97 °F (36 1 °C)   TempSrc: Tympanic   SpO2: 93%   Weight: 73 kg (161 lb)   Height: 5' 2" (1 575 m)       Transitional Care Management Review: Shaye Gil is a 80 y o  female here for TCM follow up  During the TCM phone call patient stated:    TCM Call (since 11/5/2019)     Date and time call was made  12/2/2019  4:26 PM    Hospital care reviewed  Records reviewed    Patient was hospitialized at  [address] [place] [address]    Date of Admission  11/09/19    Date of discharge  11/29/19    Diagnosis  Essential hypertension    Disposition  Home    Were the patients medications reviewed and updated  Yes    Current Symptoms  Dizziness    Dizziness severity  Mild    Quality Character  Lightheadedness; Faintness    Episode pattern  Constant    Cause  No known event    Cause certainty  Reportedly    What makes the symptoms better  Lying still    Clinical progress  Unchanged    Dizziness pertinent history  Coronary Artery Disesae      TCM Call (since 11/5/2019)     Post hospital issues  Poor ADL (Activities of Daily Living) performance    Should patient be enrolled in anticoag monitoring? No    Scheduled for follow up?   Patient Refused    Patient refusal reason  pt family refused to schedule a tcm    Did you obtain your prescribed medications  Yes Do you need help managing your prescriptions or medications  Yes    Why type of assitance do you need  medication management    Is transportation to your appointment needed  Yes    Specify why  she can not drive herself, relies on family members    I have advised the patient to call PCP with any new or worsening symptoms  Amarilys/HAZEL Sam Session, DO

## 2019-12-09 NOTE — TELEPHONE ENCOUNTER
Kady Arevalo from [facility] called office stating pt started care with them today   Just FYI

## 2019-12-09 NOTE — TELEPHONE ENCOUNTER
ABBY p/c from [facility] [place], they will be starting in-home PT today with pt, wanted you to be aware

## 2019-12-12 NOTE — PROGRESS NOTES
Outpatient Care Management Note:  Referral received  Call placed to Indu's, grand daughter, Rojelio Leventhal to introduce myself and my role  Berenice does not feel that they need outreach at this time  They have all DME needed  PT and OT through [facility] care started on 12/9/19  Provided my contact information should they have future needs

## 2019-12-19 NOTE — TELEPHONE ENCOUNTER
P/c from pt's [facility], states pt's BP is running low, pt feels dizzy, lt headed, asking if you will give her a call so she can go over her meds, ph # [phone]

## 2020-01-01 ENCOUNTER — TELEPHONE (OUTPATIENT)
Dept: INTERNAL MEDICINE CLINIC | Facility: CLINIC | Age: 85
End: 2020-01-01

## 2020-01-01 ENCOUNTER — TRANSITIONAL CARE MANAGEMENT (OUTPATIENT)
Dept: INTERNAL MEDICINE CLINIC | Facility: CLINIC | Age: 85
End: 2020-01-01

## 2020-01-01 ENCOUNTER — HOSPITAL ENCOUNTER (OUTPATIENT)
Facility: HOSPITAL | Age: 85
Setting detail: OBSERVATION
End: 2020-06-10
Attending: EMERGENCY MEDICINE | Admitting: HOSPITALIST
Payer: MEDICARE

## 2020-01-01 ENCOUNTER — TELEPHONE (OUTPATIENT)
Dept: CARDIOLOGY CLINIC | Facility: CLINIC | Age: 85
End: 2020-01-01

## 2020-01-01 ENCOUNTER — TELEMEDICINE (OUTPATIENT)
Dept: INTERNAL MEDICINE CLINIC | Facility: CLINIC | Age: 85
End: 2020-01-01
Payer: MEDICARE

## 2020-01-01 ENCOUNTER — PATIENT OUTREACH (OUTPATIENT)
Dept: INTERNAL MEDICINE CLINIC | Facility: CLINIC | Age: 85
End: 2020-01-01

## 2020-01-01 ENCOUNTER — APPOINTMENT (INPATIENT)
Dept: RADIOLOGY | Facility: HOSPITAL | Age: 85
DRG: 291 | End: 2020-01-01
Payer: MEDICARE

## 2020-01-01 ENCOUNTER — APPOINTMENT (INPATIENT)
Dept: RADIOLOGY | Facility: HOSPITAL | Age: 85
DRG: 291 | End: 2020-01-01
Attending: INTERNAL MEDICINE
Payer: MEDICARE

## 2020-01-01 ENCOUNTER — APPOINTMENT (EMERGENCY)
Dept: RADIOLOGY | Facility: HOSPITAL | Age: 85
DRG: 291 | End: 2020-01-01
Payer: MEDICARE

## 2020-01-01 ENCOUNTER — APPOINTMENT (EMERGENCY)
Dept: CT IMAGING | Facility: HOSPITAL | Age: 85
End: 2020-01-01
Payer: MEDICARE

## 2020-01-01 ENCOUNTER — APPOINTMENT (EMERGENCY)
Dept: RADIOLOGY | Facility: HOSPITAL | Age: 85
End: 2020-01-01
Payer: MEDICARE

## 2020-01-01 ENCOUNTER — HOSPITAL ENCOUNTER (INPATIENT)
Facility: HOSPITAL | Age: 85
LOS: 6 days | DRG: 551 | End: 2020-04-15
Attending: SURGERY | Admitting: SURGERY
Payer: MEDICARE

## 2020-01-01 ENCOUNTER — HOSPITAL ENCOUNTER (INPATIENT)
Facility: HOSPITAL | Age: 85
LOS: 13 days | Discharge: HOME HEALTH | DRG: 291 | End: 2020-02-19
Attending: FAMILY MEDICINE | Admitting: FAMILY MEDICINE
Payer: MEDICARE

## 2020-01-01 ENCOUNTER — APPOINTMENT (INPATIENT)
Dept: INTERVENTIONAL RADIOLOGY/VASCULAR | Facility: HOSPITAL | Age: 85
DRG: 291 | End: 2020-01-01
Payer: MEDICARE

## 2020-01-01 ENCOUNTER — APPOINTMENT (INPATIENT)
Dept: RADIOLOGY | Facility: HOSPITAL | Age: 85
DRG: 551 | End: 2020-01-01
Payer: MEDICARE

## 2020-01-01 ENCOUNTER — PATIENT OUTREACH (OUTPATIENT)
Dept: CASE MANAGEMENT | Facility: OTHER | Age: 85
End: 2020-01-01

## 2020-01-01 ENCOUNTER — APPOINTMENT (INPATIENT)
Dept: NON INVASIVE DIAGNOSTICS | Facility: HOSPITAL | Age: 85
DRG: 291 | End: 2020-01-01
Payer: MEDICARE

## 2020-01-01 ENCOUNTER — OFFICE VISIT (OUTPATIENT)
Dept: INTERNAL MEDICINE CLINIC | Facility: CLINIC | Age: 85
End: 2020-01-01
Payer: MEDICARE

## 2020-01-01 ENCOUNTER — APPOINTMENT (OUTPATIENT)
Dept: RADIOLOGY | Age: 85
End: 2020-01-01
Payer: MEDICARE

## 2020-01-01 ENCOUNTER — APPOINTMENT (INPATIENT)
Dept: RADIOLOGY | Facility: HOSPITAL | Age: 85
DRG: 559 | End: 2020-01-01
Payer: MEDICARE

## 2020-01-01 ENCOUNTER — HOSPITAL ENCOUNTER (INPATIENT)
Facility: HOSPITAL | Age: 85
LOS: 3 days | Discharge: HOME HEALTH | DRG: 291 | End: 2020-06-04
Attending: EMERGENCY MEDICINE | Admitting: INTERNAL MEDICINE
Payer: MEDICARE

## 2020-01-01 ENCOUNTER — HOSPITAL ENCOUNTER (INPATIENT)
Facility: HOSPITAL | Age: 85
LOS: 10 days | Discharge: HOME | DRG: 559 | End: 2020-04-25
Attending: PHYSICAL MEDICINE & REHABILITATION | Admitting: PHYSICAL MEDICINE & REHABILITATION
Payer: MEDICARE

## 2020-01-01 ENCOUNTER — EPISODE CHANGES (OUTPATIENT)
Dept: CASE MANAGEMENT | Facility: OTHER | Age: 85
End: 2020-01-01

## 2020-01-01 ENCOUNTER — TELEPHONE (OUTPATIENT)
Dept: NEUROSURGERY | Facility: CLINIC | Age: 85
End: 2020-01-01

## 2020-01-01 ENCOUNTER — APPOINTMENT (EMERGENCY)
Dept: CT IMAGING | Facility: HOSPITAL | Age: 85
DRG: 291 | End: 2020-01-01
Payer: MEDICARE

## 2020-01-01 ENCOUNTER — HOSPITAL ENCOUNTER (EMERGENCY)
Facility: HOSPITAL | Age: 85
End: 2020-04-09
Attending: EMERGENCY MEDICINE | Admitting: EMERGENCY MEDICINE
Payer: MEDICARE

## 2020-01-01 ENCOUNTER — HOSPITAL ENCOUNTER (INPATIENT)
Facility: HOSPITAL | Age: 85
LOS: 11 days | Discharge: NURSING, REHAB OR LONG-TERM CARE | DRG: 291 | End: 2020-02-06
Attending: EMERGENCY MEDICINE | Admitting: INTERNAL MEDICINE
Payer: MEDICARE

## 2020-01-01 VITALS
HEART RATE: 83 BPM | SYSTOLIC BLOOD PRESSURE: 164 MMHG | OXYGEN SATURATION: 94 % | DIASTOLIC BLOOD PRESSURE: 66 MMHG | WEIGHT: 148.25 LBS | HEIGHT: 62 IN | BODY MASS INDEX: 27.28 KG/M2 | TEMPERATURE: 97.4 F

## 2020-01-01 VITALS
DIASTOLIC BLOOD PRESSURE: 73 MMHG | RESPIRATION RATE: 19 BRPM | WEIGHT: 144.18 LBS | SYSTOLIC BLOOD PRESSURE: 128 MMHG | BODY MASS INDEX: 26.53 KG/M2 | OXYGEN SATURATION: 93 % | HEART RATE: 85 BPM | HEIGHT: 62 IN | TEMPERATURE: 98.4 F

## 2020-01-01 VITALS
BODY MASS INDEX: 26.89 KG/M2 | SYSTOLIC BLOOD PRESSURE: 130 MMHG | HEIGHT: 62 IN | WEIGHT: 146.13 LBS | OXYGEN SATURATION: 96 % | DIASTOLIC BLOOD PRESSURE: 70 MMHG | HEART RATE: 74 BPM

## 2020-01-01 VITALS
BODY MASS INDEX: 27.22 KG/M2 | HEART RATE: 74 BPM | DIASTOLIC BLOOD PRESSURE: 64 MMHG | SYSTOLIC BLOOD PRESSURE: 121 MMHG | WEIGHT: 147.9 LBS | OXYGEN SATURATION: 97 % | HEIGHT: 62 IN

## 2020-01-01 VITALS — SYSTOLIC BLOOD PRESSURE: 122 MMHG | DIASTOLIC BLOOD PRESSURE: 60 MMHG

## 2020-01-01 VITALS
SYSTOLIC BLOOD PRESSURE: 149 MMHG | BODY MASS INDEX: 26.86 KG/M2 | TEMPERATURE: 98.6 F | HEART RATE: 97 BPM | WEIGHT: 145.94 LBS | HEIGHT: 62 IN | OXYGEN SATURATION: 94 % | RESPIRATION RATE: 16 BRPM | DIASTOLIC BLOOD PRESSURE: 70 MMHG

## 2020-01-01 VITALS
TEMPERATURE: 98.4 F | OXYGEN SATURATION: 95 % | DIASTOLIC BLOOD PRESSURE: 60 MMHG | WEIGHT: 146.61 LBS | HEIGHT: 62 IN | HEART RATE: 89 BPM | RESPIRATION RATE: 18 BRPM | BODY MASS INDEX: 26.98 KG/M2 | SYSTOLIC BLOOD PRESSURE: 133 MMHG

## 2020-01-01 VITALS
HEART RATE: 80 BPM | OXYGEN SATURATION: 92 % | HEIGHT: 62 IN | DIASTOLIC BLOOD PRESSURE: 80 MMHG | TEMPERATURE: 98.5 F | SYSTOLIC BLOOD PRESSURE: 160 MMHG | WEIGHT: 154.38 LBS | BODY MASS INDEX: 28.41 KG/M2

## 2020-01-01 VITALS
TEMPERATURE: 98.9 F | OXYGEN SATURATION: 97 % | BODY MASS INDEX: 25.76 KG/M2 | SYSTOLIC BLOOD PRESSURE: 159 MMHG | DIASTOLIC BLOOD PRESSURE: 88 MMHG | HEIGHT: 62 IN | WEIGHT: 140 LBS | HEART RATE: 100 BPM | RESPIRATION RATE: 20 BRPM

## 2020-01-01 VITALS
SYSTOLIC BLOOD PRESSURE: 156 MMHG | HEART RATE: 90 BPM | BODY MASS INDEX: 25.76 KG/M2 | HEIGHT: 62 IN | DIASTOLIC BLOOD PRESSURE: 74 MMHG | WEIGHT: 140 LBS | OXYGEN SATURATION: 96 % | TEMPERATURE: 98 F | RESPIRATION RATE: 20 BRPM

## 2020-01-01 VITALS
RESPIRATION RATE: 18 BRPM | SYSTOLIC BLOOD PRESSURE: 122 MMHG | OXYGEN SATURATION: 96 % | DIASTOLIC BLOOD PRESSURE: 61 MMHG | HEART RATE: 69 BPM | BODY MASS INDEX: 26.98 KG/M2 | WEIGHT: 146.61 LBS | HEIGHT: 62 IN | TEMPERATURE: 98.2 F

## 2020-01-01 DIAGNOSIS — I50.9 ACUTE CONGESTIVE HEART FAILURE, UNSPECIFIED HEART FAILURE TYPE (HCC): ICD-10-CM

## 2020-01-01 DIAGNOSIS — E87.1 HYPONATREMIA: ICD-10-CM

## 2020-01-01 DIAGNOSIS — J18.9 COMMUNITY ACQUIRED PNEUMONIA, UNSPECIFIED LATERALITY: ICD-10-CM

## 2020-01-01 DIAGNOSIS — I10 ESSENTIAL (PRIMARY) HYPERTENSION: ICD-10-CM

## 2020-01-01 DIAGNOSIS — R77.8 ELEVATED TROPONIN: ICD-10-CM

## 2020-01-01 DIAGNOSIS — I10 ESSENTIAL (PRIMARY) HYPERTENSION: Primary | ICD-10-CM

## 2020-01-01 DIAGNOSIS — R63.4 WEIGHT DECREASE: ICD-10-CM

## 2020-01-01 DIAGNOSIS — R53.81 PHYSICAL DECONDITIONING: ICD-10-CM

## 2020-01-01 DIAGNOSIS — J44.1 CHRONIC OBSTRUCTIVE PULMONARY DISEASE WITH ACUTE EXACERBATION (HCC): ICD-10-CM

## 2020-01-01 DIAGNOSIS — F41.1 GAD (GENERALIZED ANXIETY DISORDER): ICD-10-CM

## 2020-01-01 DIAGNOSIS — Z99.81 REQUIRES CONTINUOUS AT HOME SUPPLEMENTAL OXYGEN: ICD-10-CM

## 2020-01-01 DIAGNOSIS — N18.2 STAGE 2 CHRONIC KIDNEY DISEASE: ICD-10-CM

## 2020-01-01 DIAGNOSIS — I50.43 ACUTE ON CHRONIC COMBINED SYSTOLIC AND DIASTOLIC HEART FAILURE (HCC): ICD-10-CM

## 2020-01-01 DIAGNOSIS — D64.9 ANEMIA, UNSPECIFIED TYPE: ICD-10-CM

## 2020-01-01 DIAGNOSIS — J44.9 COPD (CHRONIC OBSTRUCTIVE PULMONARY DISEASE) (HCC): Chronic | ICD-10-CM

## 2020-01-01 DIAGNOSIS — I50.32 CHRONIC DIASTOLIC CONGESTIVE HEART FAILURE (HCC): ICD-10-CM
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
## 2020-01-01 DIAGNOSIS — R26.9 GAIT ABNORMALITY: ICD-10-CM

## 2020-01-01 DIAGNOSIS — M94.0 COSTOCHONDRITIS: ICD-10-CM

## 2020-01-01 DIAGNOSIS — F32.A DEPRESSION, UNSPECIFIED DEPRESSION TYPE: ICD-10-CM

## 2020-01-01 DIAGNOSIS — S22.089A CLOSED T11 FRACTURE (HCC): ICD-10-CM

## 2020-01-01 DIAGNOSIS — J96.11 CHRONIC RESPIRATORY FAILURE WITH HYPOXIA AND HYPERCAPNIA (HCC): ICD-10-CM

## 2020-01-01 DIAGNOSIS — S22.080A TRAUMATIC COMPRESSION FRACTURE OF T11 THORACIC VERTEBRA, CLOSED, INITIAL ENCOUNTER (HCC): ICD-10-CM

## 2020-01-01 DIAGNOSIS — S22.080A: ICD-10-CM

## 2020-01-01 DIAGNOSIS — I25.10 ATHEROSCLEROSIS OF NATIVE CORONARY ARTERY OF NATIVE HEART WITHOUT ANGINA PECTORIS: ICD-10-CM

## 2020-01-01 DIAGNOSIS — E78.2 MIXED HYPERLIPIDEMIA: ICD-10-CM

## 2020-01-01 DIAGNOSIS — M62.838 MUSCLE SPASM: Primary | ICD-10-CM

## 2020-01-01 DIAGNOSIS — J18.9 PNEUMONIA: Primary | ICD-10-CM

## 2020-01-01 DIAGNOSIS — S22.080A TRAUMATIC COMPRESSION FRACTURE OF T11 THORACIC VERTEBRA, CLOSED, INITIAL ENCOUNTER (HCC): Primary | ICD-10-CM

## 2020-01-01 DIAGNOSIS — J44.9 CHRONIC OBSTRUCTIVE PULMONARY DISEASE, UNSPECIFIED COPD TYPE (HCC): Chronic | ICD-10-CM

## 2020-01-01 DIAGNOSIS — J90 CHRONIC BILATERAL PLEURAL EFFUSIONS: ICD-10-CM

## 2020-01-01 DIAGNOSIS — R06.89 HYPERCARBIA: ICD-10-CM

## 2020-01-01 DIAGNOSIS — K21.9 GASTROESOPHAGEAL REFLUX DISEASE WITHOUT ESOPHAGITIS: ICD-10-CM

## 2020-01-01 DIAGNOSIS — F41.9 ANXIETY: ICD-10-CM

## 2020-01-01 DIAGNOSIS — J18.9 PNEUMONIA OF BOTH LOWER LOBES DUE TO INFECTIOUS ORGANISM: ICD-10-CM

## 2020-01-01 DIAGNOSIS — Z13.31 POSITIVE DEPRESSION SCREENING: ICD-10-CM

## 2020-01-01 DIAGNOSIS — J96.21 ACUTE ON CHRONIC RESPIRATORY FAILURE WITH HYPOXIA AND HYPERCAPNIA (HCC): ICD-10-CM

## 2020-01-01 DIAGNOSIS — N18.9 CHRONIC KIDNEY DISEASE, UNSPECIFIED CKD STAGE: ICD-10-CM

## 2020-01-01 DIAGNOSIS — I50.33 ACUTE ON CHRONIC DIASTOLIC CONGESTIVE HEART FAILURE (HCC): ICD-10-CM

## 2020-01-01 DIAGNOSIS — Z00.00 MEDICARE ANNUAL WELLNESS VISIT, SUBSEQUENT: ICD-10-CM

## 2020-01-01 DIAGNOSIS — R11.0 NAUSEA: Primary | ICD-10-CM

## 2020-01-01 DIAGNOSIS — R41.89 COGNITIVE IMPAIRMENT: Primary | ICD-10-CM

## 2020-01-01 DIAGNOSIS — S32.010D CLOSED COMPRESSION FRACTURE OF L1 LUMBAR VERTEBRA WITH ROUTINE HEALING, SUBSEQUENT ENCOUNTER: ICD-10-CM

## 2020-01-01 DIAGNOSIS — J96.02 ACUTE RESPIRATORY FAILURE WITH HYPOXIA AND HYPERCARBIA (HCC): Primary | ICD-10-CM

## 2020-01-01 DIAGNOSIS — M81.0 AGE-RELATED OSTEOPOROSIS WITHOUT CURRENT PATHOLOGICAL FRACTURE: ICD-10-CM

## 2020-01-01 DIAGNOSIS — S32.010A COMPRESSION FRACTURE OF L1 VERTEBRA, INITIAL ENCOUNTER (HCC): ICD-10-CM

## 2020-01-01 DIAGNOSIS — I50.33 ACUTE ON CHRONIC HEART FAILURE WITH PRESERVED EJECTION FRACTION (HFPEF) (HCC): ICD-10-CM

## 2020-01-01 DIAGNOSIS — R73.9 HYPERGLYCEMIA: Primary | ICD-10-CM

## 2020-01-01 DIAGNOSIS — R09.02 HYPOXIA: Primary | ICD-10-CM

## 2020-01-01 DIAGNOSIS — S22.070A COMPRESSION FRACTURE OF T10 VERTEBRA, INITIAL ENCOUNTER (HCC): Primary | ICD-10-CM

## 2020-01-01 DIAGNOSIS — J96.12 CHRONIC RESPIRATORY FAILURE WITH HYPOXIA AND HYPERCAPNIA (HCC): ICD-10-CM

## 2020-01-01 DIAGNOSIS — I50.9 CHF EXACERBATION (HCC): ICD-10-CM

## 2020-01-01 DIAGNOSIS — S22.080D TRAUMATIC COMPRESSION FRACTURE OF T11 THORACIC VERTEBRA WITH ROUTINE HEALING, SUBSEQUENT ENCOUNTER: ICD-10-CM

## 2020-01-01 DIAGNOSIS — J96.22 ACUTE ON CHRONIC RESPIRATORY FAILURE WITH HYPOXIA AND HYPERCAPNIA (HCC): ICD-10-CM

## 2020-01-01 DIAGNOSIS — S22.41XA CLOSED FRACTURE OF MULTIPLE RIBS OF RIGHT SIDE, INITIAL ENCOUNTER: ICD-10-CM

## 2020-01-01 DIAGNOSIS — F41.8 ANXIETY WITH DEPRESSION: ICD-10-CM

## 2020-01-01 DIAGNOSIS — J44.1 COPD EXACERBATION (HCC): ICD-10-CM

## 2020-01-01 DIAGNOSIS — S32.000A CLOSED COMPRESSION FRACTURE OF LUMBOSACRAL SPINE, INITIAL ENCOUNTER (HCC): ICD-10-CM

## 2020-01-01 DIAGNOSIS — F41.1 GAD (GENERALIZED ANXIETY DISORDER): Primary | ICD-10-CM

## 2020-01-01 DIAGNOSIS — N39.0 URINARY TRACT INFECTION WITHOUT HEMATURIA, SITE UNSPECIFIED: ICD-10-CM
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
## 2020-01-01 DIAGNOSIS — E66.3 OVERWEIGHT (BMI 25.0-29.9): ICD-10-CM

## 2020-01-01 DIAGNOSIS — E78.2 MIXED HYPERLIPIDEMIA: Primary | ICD-10-CM

## 2020-01-01 DIAGNOSIS — F32.0 MILD MAJOR DEPRESSION, SINGLE EPISODE (HCC): ICD-10-CM

## 2020-01-01 DIAGNOSIS — R07.9 CHEST PAIN, UNSPECIFIED: ICD-10-CM

## 2020-01-01 DIAGNOSIS — I50.33 ACUTE ON CHRONIC DIASTOLIC CONGESTIVE HEART FAILURE (HCC): Primary | ICD-10-CM

## 2020-01-01 DIAGNOSIS — J96.01 ACUTE RESPIRATORY FAILURE WITH HYPOXIA AND HYPERCARBIA (HCC): Primary | ICD-10-CM

## 2020-01-01 DIAGNOSIS — R62.7 ADULT FAILURE TO THRIVE: ICD-10-CM

## 2020-01-01 LAB
ALBUMIN SERPL BCP-MCNC: 3.2 G/DL (ref 3.5–5.7)
ALBUMIN SERPL BCP-MCNC: 3.4 G/DL (ref 3–5.2)
ALBUMIN SERPL BCP-MCNC: 3.5 G/DL (ref 3.5–5)
ALBUMIN SERPL BCP-MCNC: 3.5 G/DL (ref 3.5–5.7)
ALBUMIN SERPL BCP-MCNC: 3.6 G/DL (ref 3.5–5.7)
ALBUMIN SERPL BCP-MCNC: 3.9 G/DL (ref 3.5–5.7)
ALP SERPL-CCNC: 114 U/L (ref 55–165)
ALP SERPL-CCNC: 116 U/L (ref 55–165)
ALP SERPL-CCNC: 144 U/L (ref 43–122)
ALP SERPL-CCNC: 61 U/L (ref 55–165)
ALP SERPL-CCNC: 78 U/L (ref 46–116)
ALP SERPL-CCNC: 90 U/L (ref 55–165)
ALT SERPL W P-5'-P-CCNC: 11 U/L (ref 7–52)
ALT SERPL W P-5'-P-CCNC: 15 U/L (ref 7–52)
ALT SERPL W P-5'-P-CCNC: 24 U/L (ref 12–78)
ALT SERPL W P-5'-P-CCNC: 25 U/L (ref 9–52)
ALT SERPL W P-5'-P-CCNC: 7 U/L (ref 7–52)
ALT SERPL W P-5'-P-CCNC: 9 U/L (ref 7–52)
ANION GAP SERPL CALCULATED.3IONS-SCNC: -1 MMOL/L (ref 4–13)
ANION GAP SERPL CALCULATED.3IONS-SCNC: 0 MMOL/L (ref 4–13)
ANION GAP SERPL CALCULATED.3IONS-SCNC: 1 MMOL/L (ref 4–13)
ANION GAP SERPL CALCULATED.3IONS-SCNC: 2 MMOL/L (ref 4–13)
ANION GAP SERPL CALCULATED.3IONS-SCNC: 3 MMOL/L (ref 4–13)
ANION GAP SERPL CALCULATED.3IONS-SCNC: 4 MMOL/L (ref 4–13)
ANION GAP SERPL CALCULATED.3IONS-SCNC: 4 MMOL/L (ref 4–13)
ANION GAP SERPL CALCULATED.3IONS-SCNC: 5 MMOL/L (ref 4–13)
ANION GAP SERPL CALCULATED.3IONS-SCNC: 5 MMOL/L (ref 4–13)
ANION GAP SERPL CALCULATED.3IONS-SCNC: 5 MMOL/L (ref 5–14)
ANION GAP SERPL CALCULATED.3IONS-SCNC: 5 MMOL/L (ref 5–14)
ANION GAP SERPL CALCULATED.3IONS-SCNC: 6 MMOL/L (ref 5–14)
ARTERIAL PATENCY WRIST A: YES
AST SERPL W P-5'-P-CCNC: 13 U/L (ref 13–39)
AST SERPL W P-5'-P-CCNC: 13 U/L (ref 5–45)
AST SERPL W P-5'-P-CCNC: 15 U/L (ref 13–39)
AST SERPL W P-5'-P-CCNC: 15 U/L (ref 13–39)
AST SERPL W P-5'-P-CCNC: 16 U/L (ref 13–39)
AST SERPL W P-5'-P-CCNC: 24 U/L (ref 14–36)
ATRIAL RATE: 77 BPM
ATRIAL RATE: 78 BPM
ATRIAL RATE: 79 BPM
ATRIAL RATE: 87 BPM
ATRIAL RATE: 88 BPM
ATRIAL RATE: 98 BPM
BACTERIA SPT RESP CULT: ABNORMAL
BACTERIA SPT RESP CULT: ABNORMAL
BACTERIA UR CULT: NORMAL
BACTERIA UR QL AUTO: ABNORMAL /HPF
BACTERIA UR QL AUTO: NORMAL /HPF
BASE EX.OXY STD BLDV CALC-SCNC: 75.7 % (ref 60–80)
BASE EX.OXY STD BLDV CALC-SCNC: 85.1 % (ref 60–80)
BASE EX.OXY STD BLDV CALC-SCNC: 96.3 %
BASE EXCESS BLDA CALC-SCNC: 12.8 MMOL/L
BASE EXCESS BLDA CALC-SCNC: 14 MMOL/L (ref -2–3)
BASE EXCESS BLDA CALC-SCNC: 20 MMOL/L (ref -2–3)
BASE EXCESS BLDA CALC-SCNC: 20.2 MMOL/L
BASE EXCESS BLDA CALC-SCNC: 23 MMOL/L
BASE EXCESS BLDV CALC-SCNC: 10 MMOL/L
BASE EXCESS BLDV CALC-SCNC: 14.8 MMOL/L
BASE EXCESS BLDV CALC-SCNC: 7.8 MMOL/L
BASOPHILS # BLD AUTO: 0 THOUSANDS/ΜL (ref 0–0.1)
BASOPHILS # BLD AUTO: 0.01 THOUSANDS/ΜL (ref 0–0.1)
BASOPHILS # BLD AUTO: 0.02 THOUSANDS/ΜL (ref 0–0.1)
BASOPHILS # BLD AUTO: 0.03 THOUSANDS/ΜL (ref 0–0.1)
BASOPHILS # BLD AUTO: 0.06 THOUSAND/UL (ref 0–0.1)
BASOPHILS NFR BLD AUTO: 0 % (ref 0–1)
BASOPHILS NFR BLD AUTO: 0 % (ref 0–2)
BASOPHILS NFR BLD AUTO: 0 % (ref 0–2)
BASOPHILS NFR BLD AUTO: 1 % (ref 0–1)
BASOPHILS NFR BLD AUTO: 1 % (ref 0–1)
BASOPHILS NFR BLD AUTO: 1 % (ref 0–2)
BASOPHILS NFR MAR MANUAL: 1 % (ref 0–1)
BILIRUB SERPL-MCNC: 0.5 MG/DL (ref 0.2–1)
BILIRUB SERPL-MCNC: 0.5 MG/DL (ref 0.2–1)
BILIRUB SERPL-MCNC: 0.6 MG/DL (ref 0.2–1)
BILIRUB SERPL-MCNC: 0.7 MG/DL
BILIRUB SERPL-MCNC: 0.75 MG/DL (ref 0.2–1)
BILIRUB SERPL-MCNC: 0.8 MG/DL (ref 0.2–1)
BILIRUB UR QL STRIP: NEGATIVE
BNP SERPL-MCNC: 862 PG/ML (ref 1–100)
BNP SERPL-MCNC: 967 PG/ML (ref 1–100)
BUN SERPL-MCNC: 10 MG/DL (ref 5–25)
BUN SERPL-MCNC: 11 MG/DL (ref 5–25)
BUN SERPL-MCNC: 11 MG/DL (ref 5–25)
BUN SERPL-MCNC: 11 MG/DL (ref 7–25)
BUN SERPL-MCNC: 12 MG/DL (ref 5–25)
BUN SERPL-MCNC: 13 MG/DL (ref 5–25)
BUN SERPL-MCNC: 13 MG/DL (ref 5–25)
BUN SERPL-MCNC: 13 MG/DL (ref 7–25)
BUN SERPL-MCNC: 14 MG/DL (ref 5–25)
BUN SERPL-MCNC: 15 MG/DL (ref 5–25)
BUN SERPL-MCNC: 15 MG/DL (ref 7–25)
BUN SERPL-MCNC: 16 MG/DL (ref 5–25)
BUN SERPL-MCNC: 16 MG/DL (ref 7–25)
BUN SERPL-MCNC: 17 MG/DL (ref 5–25)
BUN SERPL-MCNC: 17 MG/DL (ref 7–25)
BUN SERPL-MCNC: 18 MG/DL (ref 7–25)
BUN SERPL-MCNC: 19 MG/DL (ref 5–25)
BUN SERPL-MCNC: 20 MG/DL (ref 5–25)
BUN SERPL-MCNC: 21 MG/DL (ref 7–25)
BUN SERPL-MCNC: 9 MG/DL (ref 5–25)
CA-I BLD-SCNC: 1.23 MMOL/L (ref 1.12–1.32)
CALCIUM SERPL-MCNC: 8.5 MG/DL (ref 8.4–10.2)
CALCIUM SERPL-MCNC: 8.5 MG/DL (ref 8.4–10.2)
CALCIUM SERPL-MCNC: 8.5 MG/DL (ref 8.6–10.5)
CALCIUM SERPL-MCNC: 8.7 MG/DL (ref 8.3–10.1)
CALCIUM SERPL-MCNC: 8.7 MG/DL (ref 8.4–10.2)
CALCIUM SERPL-MCNC: 8.7 MG/DL (ref 8.6–10.5)
CALCIUM SERPL-MCNC: 8.8 MG/DL (ref 8.3–10.1)
CALCIUM SERPL-MCNC: 8.8 MG/DL (ref 8.3–10.1)
CALCIUM SERPL-MCNC: 8.9 MG/DL (ref 8.3–10.1)
CALCIUM SERPL-MCNC: 8.9 MG/DL (ref 8.6–10.5)
CALCIUM SERPL-MCNC: 9 MG/DL (ref 8.3–10.1)
CALCIUM SERPL-MCNC: 9 MG/DL (ref 8.6–10.5)
CALCIUM SERPL-MCNC: 9 MG/DL (ref 8.6–10.5)
CALCIUM SERPL-MCNC: 9.1 MG/DL (ref 8.3–10.1)
CALCIUM SERPL-MCNC: 9.1 MG/DL (ref 8.3–10.1)
CALCIUM SERPL-MCNC: 9.1 MG/DL (ref 8.6–10.5)
CALCIUM SERPL-MCNC: 9.3 MG/DL (ref 8.6–10.5)
CALCIUM SERPL-MCNC: 9.3 MG/DL (ref 8.6–10.5)
CALCIUM SERPL-MCNC: 9.5 MG/DL (ref 8.3–10.1)
CALCIUM SERPL-MCNC: 9.7 MG/DL (ref 8.3–10.1)
CALCIUM SERPL-MCNC: 9.7 MG/DL (ref 8.6–10.5)
CHLORIDE SERPL-SCNC: 100 MMOL/L (ref 98–107)
CHLORIDE SERPL-SCNC: 83 MMOL/L (ref 100–108)
CHLORIDE SERPL-SCNC: 83 MMOL/L (ref 100–108)
CHLORIDE SERPL-SCNC: 84 MMOL/L (ref 98–107)
CHLORIDE SERPL-SCNC: 85 MMOL/L (ref 98–107)
CHLORIDE SERPL-SCNC: 86 MMOL/L (ref 100–108)
CHLORIDE SERPL-SCNC: 87 MMOL/L (ref 100–108)
CHLORIDE SERPL-SCNC: 88 MMOL/L (ref 100–108)
CHLORIDE SERPL-SCNC: 88 MMOL/L (ref 97–108)
CHLORIDE SERPL-SCNC: 89 MMOL/L (ref 100–108)
CHLORIDE SERPL-SCNC: 89 MMOL/L (ref 100–108)
CHLORIDE SERPL-SCNC: 89 MMOL/L (ref 98–107)
CHLORIDE SERPL-SCNC: 90 MMOL/L (ref 98–107)
CHLORIDE SERPL-SCNC: 91 MMOL/L (ref 97–108)
CHLORIDE SERPL-SCNC: 92 MMOL/L (ref 98–107)
CHLORIDE SERPL-SCNC: 93 MMOL/L (ref 97–108)
CHLORIDE SERPL-SCNC: 93 MMOL/L (ref 98–107)
CHLORIDE SERPL-SCNC: 94 MMOL/L (ref 98–107)
CHLORIDE SERPL-SCNC: 94 MMOL/L (ref 98–107)
CHLORIDE SERPL-SCNC: 96 MMOL/L (ref 100–108)
CHLORIDE SERPL-SCNC: 96 MMOL/L (ref 100–108)
CHLORIDE SERPL-SCNC: 97 MMOL/L (ref 100–108)
CHLORIDE SERPL-SCNC: 97 MMOL/L (ref 100–108)
CHLORIDE SERPL-SCNC: 98 MMOL/L (ref 100–108)
CHLORIDE SERPL-SCNC: 99 MMOL/L (ref 100–108)
CLARITY UR: ABNORMAL
CLARITY UR: CLEAR
CO2 SERPL-SCNC: 34 MMOL/L (ref 21–31)
CO2 SERPL-SCNC: 35 MMOL/L (ref 21–32)
CO2 SERPL-SCNC: 35 MMOL/L (ref 21–32)
CO2 SERPL-SCNC: 36 MMOL/L (ref 21–32)
CO2 SERPL-SCNC: 36 MMOL/L (ref 22–30)
CO2 SERPL-SCNC: 37 MMOL/L (ref 21–32)
CO2 SERPL-SCNC: 37 MMOL/L (ref 22–30)
CO2 SERPL-SCNC: 39 MMOL/L (ref 21–32)
CO2 SERPL-SCNC: 39 MMOL/L (ref 22–30)
CO2 SERPL-SCNC: 40 MMOL/L (ref 21–32)
CO2 SERPL-SCNC: 41 MMOL/L (ref 21–31)
CO2 SERPL-SCNC: 42 MMOL/L (ref 21–32)
CO2 SERPL-SCNC: 42 MMOL/L (ref 21–32)
CO2 SERPL-SCNC: 43 MMOL/L (ref 21–31)
CO2 SERPL-SCNC: 44 MMOL/L (ref 21–31)
CO2 SERPL-SCNC: 45 MMOL/L (ref 21–31)
CO2 SERPL-SCNC: 45 MMOL/L (ref 21–31)
CO2 SERPL-SCNC: >45 MMOL/L (ref 21–31)
CO2 SERPL-SCNC: >45 MMOL/L (ref 21–32)
COLOR UR: YELLOW
CREAT SERPL-MCNC: 0.5 MG/DL (ref 0.6–1.3)
CREAT SERPL-MCNC: 0.56 MG/DL (ref 0.6–1.3)
CREAT SERPL-MCNC: 0.59 MG/DL (ref 0.6–1.3)
CREAT SERPL-MCNC: 0.6 MG/DL (ref 0.6–1.2)
CREAT SERPL-MCNC: 0.6 MG/DL (ref 0.6–1.2)
CREAT SERPL-MCNC: 0.6 MG/DL (ref 0.6–1.3)
CREAT SERPL-MCNC: 0.61 MG/DL (ref 0.6–1.3)
CREAT SERPL-MCNC: 0.61 MG/DL (ref 0.6–1.3)
CREAT SERPL-MCNC: 0.62 MG/DL (ref 0.6–1.2)
CREAT SERPL-MCNC: 0.62 MG/DL (ref 0.6–1.2)
CREAT SERPL-MCNC: 0.62 MG/DL (ref 0.6–1.3)
CREAT SERPL-MCNC: 0.62 MG/DL (ref 0.6–1.3)
CREAT SERPL-MCNC: 0.63 MG/DL (ref 0.6–1.2)
CREAT SERPL-MCNC: 0.63 MG/DL (ref 0.6–1.3)
CREAT SERPL-MCNC: 0.65 MG/DL (ref 0.6–1.2)
CREAT SERPL-MCNC: 0.65 MG/DL (ref 0.6–1.3)
CREAT SERPL-MCNC: 0.65 MG/DL (ref 0.6–1.3)
CREAT SERPL-MCNC: 0.66 MG/DL (ref 0.6–1.2)
CREAT SERPL-MCNC: 0.68 MG/DL (ref 0.6–1.2)
CREAT SERPL-MCNC: 0.69 MG/DL (ref 0.6–1.2)
CREAT SERPL-MCNC: 0.71 MG/DL (ref 0.6–1.3)
CREAT SERPL-MCNC: 0.72 MG/DL (ref 0.6–1.3)
CREAT SERPL-MCNC: 0.78 MG/DL (ref 0.6–1.2)
CREAT SERPL-MCNC: 0.78 MG/DL (ref 0.6–1.3)
CREAT SERPL-MCNC: 0.96 MG/DL (ref 0.6–1.3)
CREAT UR-MCNC: 70.5 MG/DL
D DIMER PPP FEU-MCNC: 0.64 UG/ML FEU
EOSINOPHIL # BLD AUTO: 0 THOUSAND/ΜL (ref 0–0.61)
EOSINOPHIL # BLD AUTO: 0 THOUSAND/ΜL (ref 0–0.61)
EOSINOPHIL # BLD AUTO: 0.01 THOUSAND/ΜL (ref 0–0.61)
EOSINOPHIL # BLD AUTO: 0.01 THOUSAND/ΜL (ref 0–0.61)
EOSINOPHIL # BLD AUTO: 0.08 THOUSAND/ΜL (ref 0–0.61)
EOSINOPHIL # BLD AUTO: 0.09 THOUSAND/ΜL (ref 0–0.61)
EOSINOPHIL # BLD AUTO: 0.1 THOUSAND/ΜL (ref 0–0.4)
EOSINOPHIL # BLD AUTO: 0.1 THOUSAND/ΜL (ref 0–0.61)
EOSINOPHIL # BLD AUTO: 0.11 THOUSAND/ΜL (ref 0–0.61)
EOSINOPHIL # BLD AUTO: 0.14 THOUSAND/ΜL (ref 0–0.61)
EOSINOPHIL # BLD AUTO: 0.14 THOUSAND/ΜL (ref 0–0.61)
EOSINOPHIL # BLD AUTO: 0.31 THOUSAND/UL (ref 0–0.4)
EOSINOPHIL NFR BLD AUTO: 0 % (ref 0–6)
EOSINOPHIL NFR BLD AUTO: 1 % (ref 0–5)
EOSINOPHIL NFR BLD AUTO: 1 % (ref 0–6)
EOSINOPHIL NFR BLD AUTO: 2 % (ref 0–5)
EOSINOPHIL NFR BLD AUTO: 2 % (ref 0–6)
EOSINOPHIL NFR BLD MANUAL: 5 % (ref 0–6)
ERYTHROCYTE [DISTWIDTH] IN BLOOD BY AUTOMATED COUNT: 13.1 % (ref 11.5–14.5)
ERYTHROCYTE [DISTWIDTH] IN BLOOD BY AUTOMATED COUNT: 13.4 % (ref 11.5–14.5)
ERYTHROCYTE [DISTWIDTH] IN BLOOD BY AUTOMATED COUNT: 13.5 % (ref 11.5–14.5)
ERYTHROCYTE [DISTWIDTH] IN BLOOD BY AUTOMATED COUNT: 13.5 % (ref 11.5–14.5)
ERYTHROCYTE [DISTWIDTH] IN BLOOD BY AUTOMATED COUNT: 13.7 % (ref 11.5–14.5)
ERYTHROCYTE [DISTWIDTH] IN BLOOD BY AUTOMATED COUNT: 13.8 % (ref 11.6–15.1)
ERYTHROCYTE [DISTWIDTH] IN BLOOD BY AUTOMATED COUNT: 13.9 % (ref 11.6–15.1)
ERYTHROCYTE [DISTWIDTH] IN BLOOD BY AUTOMATED COUNT: 13.9 % (ref 11.6–15.1)
ERYTHROCYTE [DISTWIDTH] IN BLOOD BY AUTOMATED COUNT: 14.1 % (ref 11.6–15.1)
ERYTHROCYTE [DISTWIDTH] IN BLOOD BY AUTOMATED COUNT: 14.2 % (ref 11.6–15.1)
ERYTHROCYTE [DISTWIDTH] IN BLOOD BY AUTOMATED COUNT: 14.2 % (ref 11.6–15.1)
ERYTHROCYTE [DISTWIDTH] IN BLOOD BY AUTOMATED COUNT: 14.4 % (ref 11.6–15.1)
ERYTHROCYTE [DISTWIDTH] IN BLOOD BY AUTOMATED COUNT: 14.5 % (ref 11.6–15.1)
ERYTHROCYTE [DISTWIDTH] IN BLOOD BY AUTOMATED COUNT: 14.6 % (ref 11.6–15.1)
ERYTHROCYTE [DISTWIDTH] IN BLOOD BY AUTOMATED COUNT: 14.6 % (ref 11.6–15.1)
ERYTHROCYTE [DISTWIDTH] IN BLOOD BY AUTOMATED COUNT: 14.7 %
ERYTHROCYTE [DISTWIDTH] IN BLOOD BY AUTOMATED COUNT: 14.8 %
ERYTHROCYTE [DISTWIDTH] IN BLOOD BY AUTOMATED COUNT: 14.8 % (ref 11.6–15.1)
ERYTHROCYTE [DISTWIDTH] IN BLOOD BY AUTOMATED COUNT: 14.9 %
ERYTHROCYTE [DISTWIDTH] IN BLOOD BY AUTOMATED COUNT: 15.3 % (ref 11.5–14.5)
EST. AVERAGE GLUCOSE BLD GHB EST-MCNC: 131 MG/DL
FIO2 GAS DIL.REBREATH: 28 L
FLUAV RNA NPH QL NAA+PROBE: DETECTED
FLUBV RNA NPH QL NAA+PROBE: ABNORMAL
GFR SERPL CREATININE-BSD FRML MDRD: 51 ML/MIN/1.73SQ M
GFR SERPL CREATININE-BSD FRML MDRD: 65 ML/MIN/1.73SQ M
GFR SERPL CREATININE-BSD FRML MDRD: 65 ML/MIN/1.73SQ M
GFR SERPL CREATININE-BSD FRML MDRD: 71 ML/MIN/1.73SQ M
GFR SERPL CREATININE-BSD FRML MDRD: 73 ML/MIN/1.73SQ M
GFR SERPL CREATININE-BSD FRML MDRD: 74 ML/MIN/1.73SQ M
GFR SERPL CREATININE-BSD FRML MDRD: 75 ML/MIN/1.73SQ M
GFR SERPL CREATININE-BSD FRML MDRD: 76 ML/MIN/1.73SQ M
GFR SERPL CREATININE-BSD FRML MDRD: 77 ML/MIN/1.73SQ M
GFR SERPL CREATININE-BSD FRML MDRD: 78 ML/MIN/1.73SQ M
GFR SERPL CREATININE-BSD FRML MDRD: 79 ML/MIN/1.73SQ M
GFR SERPL CREATININE-BSD FRML MDRD: 80 ML/MIN/1.73SQ M
GFR SERPL CREATININE-BSD FRML MDRD: 83 ML/MIN/1.73SQ M
GLUCOSE P FAST SERPL-MCNC: 91 MG/DL (ref 70–99)
GLUCOSE SERPL-MCNC: 100 MG/DL (ref 65–140)
GLUCOSE SERPL-MCNC: 100 MG/DL (ref 70–99)
GLUCOSE SERPL-MCNC: 101 MG/DL (ref 65–140)
GLUCOSE SERPL-MCNC: 102 MG/DL (ref 65–140)
GLUCOSE SERPL-MCNC: 103 MG/DL (ref 65–140)
GLUCOSE SERPL-MCNC: 104 MG/DL (ref 65–140)
GLUCOSE SERPL-MCNC: 104 MG/DL (ref 65–99)
GLUCOSE SERPL-MCNC: 107 MG/DL (ref 65–140)
GLUCOSE SERPL-MCNC: 108 MG/DL (ref 65–140)
GLUCOSE SERPL-MCNC: 109 MG/DL (ref 65–140)
GLUCOSE SERPL-MCNC: 112 MG/DL (ref 65–140)
GLUCOSE SERPL-MCNC: 114 MG/DL (ref 65–140)
GLUCOSE SERPL-MCNC: 117 MG/DL (ref 65–140)
GLUCOSE SERPL-MCNC: 117 MG/DL (ref 65–140)
GLUCOSE SERPL-MCNC: 117 MG/DL (ref 65–99)
GLUCOSE SERPL-MCNC: 118 MG/DL (ref 65–140)
GLUCOSE SERPL-MCNC: 118 MG/DL (ref 65–140)
GLUCOSE SERPL-MCNC: 119 MG/DL (ref 65–140)
GLUCOSE SERPL-MCNC: 119 MG/DL (ref 65–99)
GLUCOSE SERPL-MCNC: 120 MG/DL (ref 65–140)
GLUCOSE SERPL-MCNC: 120 MG/DL (ref 65–99)
GLUCOSE SERPL-MCNC: 122 MG/DL (ref 65–140)
GLUCOSE SERPL-MCNC: 122 MG/DL (ref 65–140)
GLUCOSE SERPL-MCNC: 124 MG/DL (ref 65–140)
GLUCOSE SERPL-MCNC: 128 MG/DL (ref 65–99)
GLUCOSE SERPL-MCNC: 132 MG/DL (ref 65–140)
GLUCOSE SERPL-MCNC: 132 MG/DL (ref 65–140)
GLUCOSE SERPL-MCNC: 141 MG/DL (ref 65–140)
GLUCOSE SERPL-MCNC: 148 MG/DL (ref 65–99)
GLUCOSE SERPL-MCNC: 83 MG/DL (ref 65–99)
GLUCOSE SERPL-MCNC: 89 MG/DL (ref 70–99)
GLUCOSE SERPL-MCNC: 91 MG/DL (ref 70–99)
GLUCOSE SERPL-MCNC: 94 MG/DL (ref 65–140)
GLUCOSE SERPL-MCNC: 95 MG/DL (ref 65–140)
GLUCOSE SERPL-MCNC: 99 MG/DL (ref 65–140)
GLUCOSE UR STRIP-MCNC: NEGATIVE MG/DL
GRAM STN SPEC: ABNORMAL
HBA1C MFR BLD: 6.2 % (ref 4.2–6.3)
HCO3 BLDA-SCNC: 42.7 MMOL/L (ref 22–28)
HCO3 BLDA-SCNC: 43.7 MMOL/L (ref 22–28)
HCO3 BLDA-SCNC: 49.2 MMOL/L (ref 22–28)
HCO3 BLDA-SCNC: 50.1 MMOL/L (ref 22–28)
HCO3 BLDA-SCNC: 56.6 MMOL/L (ref 22–28)
HCO3 BLDV-SCNC: 35.6 MMOL/L (ref 24–30)
HCO3 BLDV-SCNC: 39.8 MMOL/L (ref 24–30)
HCO3 BLDV-SCNC: 44.1 MMOL/L (ref 24–30)
HCT VFR BLD AUTO: 32.4 % (ref 42–47)
HCT VFR BLD AUTO: 33 % (ref 36–46)
HCT VFR BLD AUTO: 33.5 % (ref 36–46)
HCT VFR BLD AUTO: 33.7 % (ref 36–46)
HCT VFR BLD AUTO: 33.7 % (ref 42–47)
HCT VFR BLD AUTO: 33.8 % (ref 42–47)
HCT VFR BLD AUTO: 34 % (ref 42–47)
HCT VFR BLD AUTO: 34.4 % (ref 42–47)
HCT VFR BLD AUTO: 34.7 % (ref 34.8–46.1)
HCT VFR BLD AUTO: 34.7 % (ref 42–47)
HCT VFR BLD AUTO: 35.9 % (ref 34.8–46.1)
HCT VFR BLD AUTO: 36.2 % (ref 34.8–46.1)
HCT VFR BLD AUTO: 36.2 % (ref 42–47)
HCT VFR BLD AUTO: 37.1 % (ref 42–47)
HCT VFR BLD AUTO: 41.8 % (ref 34.8–46.1)
HCT VFR BLD AUTO: 42.2 % (ref 34.8–46.1)
HCT VFR BLD AUTO: 42.9 % (ref 34.8–46.1)
HCT VFR BLD AUTO: 43.4 % (ref 34.8–46.1)
HCT VFR BLD AUTO: 43.5 % (ref 34.8–46.1)
HCT VFR BLD AUTO: 43.9 % (ref 34.8–46.1)
HCT VFR BLD AUTO: 45 % (ref 34.8–46.1)
HCT VFR BLD AUTO: 45.9 % (ref 34.8–46.1)
HCT VFR BLD CALC: 40 % (ref 34.8–46.1)
HGB BLD-MCNC: 10.6 G/DL (ref 11.5–15.4)
HGB BLD-MCNC: 10.6 G/DL (ref 12–16)
HGB BLD-MCNC: 10.8 G/DL (ref 12–16)
HGB BLD-MCNC: 10.9 G/DL (ref 12–16)
HGB BLD-MCNC: 11 G/DL (ref 11.5–15.4)
HGB BLD-MCNC: 11.1 G/DL (ref 12–16)
HGB BLD-MCNC: 11.1 G/DL (ref 12–16)
HGB BLD-MCNC: 11.2 G/DL (ref 11.5–15.4)
HGB BLD-MCNC: 11.2 G/DL (ref 12–16)
HGB BLD-MCNC: 11.2 G/DL (ref 12–16)
HGB BLD-MCNC: 11.3 G/DL (ref 12–16)
HGB BLD-MCNC: 11.3 G/DL (ref 12–16)
HGB BLD-MCNC: 11.7 G/DL (ref 12–16)
HGB BLD-MCNC: 12.1 G/DL (ref 12–16)
HGB BLD-MCNC: 12.9 G/DL (ref 11.5–15.4)
HGB BLD-MCNC: 13.2 G/DL (ref 11.5–15.4)
HGB BLD-MCNC: 13.3 G/DL (ref 11.5–15.4)
HGB BLD-MCNC: 13.5 G/DL (ref 11.5–15.4)
HGB BLD-MCNC: 13.5 G/DL (ref 11.5–15.4)
HGB BLD-MCNC: 14 G/DL (ref 11.5–15.4)
HGB BLD-MCNC: 14.1 G/DL (ref 11.5–15.4)
HGB BLD-MCNC: 14.2 G/DL (ref 11.5–15.4)
HGB BLDA-MCNC: 13.6 G/DL (ref 11.5–15.4)
HGB UR QL STRIP.AUTO: ABNORMAL
HGB UR QL STRIP.AUTO: NEGATIVE
HYPERCHROMIA BLD QL SMEAR: PRESENT
IMM GRANULOCYTES # BLD AUTO: 0.01 THOUSAND/UL (ref 0–0.2)
IMM GRANULOCYTES # BLD AUTO: 0.02 THOUSAND/UL (ref 0–0.2)
IMM GRANULOCYTES NFR BLD AUTO: 0 % (ref 0–2)
IPAP: 14
KETONES UR STRIP-MCNC: NEGATIVE MG/DL
L PNEUMO1 AG UR QL IA.RAPID: NEGATIVE
LACTATE SERPL-SCNC: 0.7 MMOL/L (ref 0.5–2)
LEUKOCYTE ESTERASE UR QL STRIP: NEGATIVE
LYMPHOCYTES # BLD AUTO: 0.7 THOUSANDS/ΜL (ref 0.5–4)
LYMPHOCYTES # BLD AUTO: 0.7 THOUSANDS/ΜL (ref 0.6–4.47)
LYMPHOCYTES # BLD AUTO: 0.79 THOUSANDS/ΜL (ref 0.6–4.47)
LYMPHOCYTES # BLD AUTO: 0.9 THOUSANDS/ΜL (ref 0.6–4.47)
LYMPHOCYTES # BLD AUTO: 1 THOUSANDS/ΜL (ref 0.6–4.47)
LYMPHOCYTES # BLD AUTO: 1.06 THOUSANDS/ΜL (ref 0.6–4.47)
LYMPHOCYTES # BLD AUTO: 1.13 THOUSANDS/ΜL (ref 0.6–4.47)
LYMPHOCYTES # BLD AUTO: 1.2 THOUSANDS/ΜL (ref 0.6–4.47)
LYMPHOCYTES # BLD AUTO: 1.2 THOUSANDS/ΜL (ref 0.6–4.47)
LYMPHOCYTES # BLD AUTO: 1.23 THOUSANDS/ΜL (ref 0.6–4.47)
LYMPHOCYTES # BLD AUTO: 1.25 THOUSANDS/ΜL (ref 0.6–4.47)
LYMPHOCYTES # BLD AUTO: 1.29 THOUSANDS/ΜL (ref 0.6–4.47)
LYMPHOCYTES # BLD AUTO: 1.43 THOUSANDS/ΜL (ref 0.6–4.47)
LYMPHOCYTES # BLD AUTO: 1.53 THOUSAND/UL (ref 0.5–4)
LYMPHOCYTES # BLD AUTO: 1.75 THOUSANDS/ΜL (ref 0.6–4.47)
LYMPHOCYTES # BLD AUTO: 25 % (ref 25–45)
LYMPHOCYTES NFR BLD AUTO: 10 % (ref 14–44)
LYMPHOCYTES NFR BLD AUTO: 12 % (ref 14–44)
LYMPHOCYTES NFR BLD AUTO: 15 % (ref 14–44)
LYMPHOCYTES NFR BLD AUTO: 15 % (ref 14–44)
LYMPHOCYTES NFR BLD AUTO: 15 % (ref 25–45)
LYMPHOCYTES NFR BLD AUTO: 17 % (ref 21–51)
LYMPHOCYTES NFR BLD AUTO: 17 % (ref 21–51)
LYMPHOCYTES NFR BLD AUTO: 18 % (ref 21–51)
LYMPHOCYTES NFR BLD AUTO: 20 % (ref 21–51)
LYMPHOCYTES NFR BLD AUTO: 21 % (ref 14–44)
LYMPHOCYTES NFR BLD AUTO: 21 % (ref 21–51)
LYMPHOCYTES NFR BLD AUTO: 22 % (ref 14–44)
LYMPHOCYTES NFR BLD AUTO: 27 % (ref 14–44)
LYMPHOCYTES NFR BLD AUTO: 33 % (ref 14–44)
LYMPHOCYTES NFR BLD AUTO: 34 % (ref 14–44)
LYMPHOCYTES NFR BLD AUTO: 8 % (ref 21–51)
MAGNESIUM SERPL-MCNC: 1.9 MG/DL (ref 1.9–2.7)
MCH RBC QN AUTO: 28.5 PG (ref 26.8–34.3)
MCH RBC QN AUTO: 28.7 PG (ref 26.8–34.3)
MCH RBC QN AUTO: 28.8 PG (ref 26.8–34.3)
MCH RBC QN AUTO: 29 PG (ref 26.8–34.3)
MCH RBC QN AUTO: 29.2 PG (ref 26.8–34.3)
MCH RBC QN AUTO: 29.3 PG (ref 26.8–34.3)
MCH RBC QN AUTO: 29.4 PG (ref 26.8–34.3)
MCH RBC QN AUTO: 29.5 PG (ref 26.8–34.3)
MCH RBC QN AUTO: 29.5 PG (ref 26–34)
MCH RBC QN AUTO: 29.7 PG (ref 26–34)
MCH RBC QN AUTO: 29.8 PG (ref 26–34)
MCH RBC QN AUTO: 29.9 PG (ref 26–34)
MCH RBC QN AUTO: 30 PG (ref 26–34)
MCH RBC QN AUTO: 30.1 PG (ref 26.8–34.3)
MCH RBC QN AUTO: 30.1 PG (ref 26–34)
MCH RBC QN AUTO: 30.2 PG (ref 26.8–34.3)
MCH RBC QN AUTO: 30.2 PG (ref 26–34)
MCH RBC QN AUTO: 30.2 PG (ref 26–34)
MCHC RBC AUTO-ENTMCNC: 30.3 G/DL (ref 31.4–37.4)
MCHC RBC AUTO-ENTMCNC: 30.4 G/DL (ref 31.4–37.4)
MCHC RBC AUTO-ENTMCNC: 30.5 G/DL (ref 31.4–37.4)
MCHC RBC AUTO-ENTMCNC: 30.9 G/DL (ref 31.4–37.4)
MCHC RBC AUTO-ENTMCNC: 30.9 G/DL (ref 31.4–37.4)
MCHC RBC AUTO-ENTMCNC: 31.1 G/DL (ref 31.4–37.4)
MCHC RBC AUTO-ENTMCNC: 31.1 G/DL (ref 31.4–37.4)
MCHC RBC AUTO-ENTMCNC: 31.2 G/DL (ref 31.4–37.4)
MCHC RBC AUTO-ENTMCNC: 31.5 G/DL (ref 31.4–37.4)
MCHC RBC AUTO-ENTMCNC: 31.5 G/DL (ref 31.4–37.4)
MCHC RBC AUTO-ENTMCNC: 32.1 G/DL (ref 31.4–37.4)
MCHC RBC AUTO-ENTMCNC: 32.2 G/DL (ref 31–37)
MCHC RBC AUTO-ENTMCNC: 32.3 G/DL (ref 31–37)
MCHC RBC AUTO-ENTMCNC: 32.5 G/DL (ref 31–37)
MCHC RBC AUTO-ENTMCNC: 32.6 G/DL (ref 31–37)
MCHC RBC AUTO-ENTMCNC: 32.7 G/DL (ref 31–37)
MCHC RBC AUTO-ENTMCNC: 32.7 G/DL (ref 31–37)
MCHC RBC AUTO-ENTMCNC: 32.8 G/DL (ref 31.4–37.4)
MCHC RBC AUTO-ENTMCNC: 32.9 G/DL (ref 31–37)
MCHC RBC AUTO-ENTMCNC: 32.9 G/DL (ref 31–37)
MCHC RBC AUTO-ENTMCNC: 33.1 G/DL (ref 31.4–37.4)
MCHC RBC AUTO-ENTMCNC: 33.3 G/DL (ref 31.4–37.4)
MCV RBC AUTO: 90 FL (ref 80–100)
MCV RBC AUTO: 91 FL (ref 80–100)
MCV RBC AUTO: 91 FL (ref 80–100)
MCV RBC AUTO: 91 FL (ref 81–99)
MCV RBC AUTO: 91 FL (ref 81–99)
MCV RBC AUTO: 92 FL (ref 81–99)
MCV RBC AUTO: 92 FL (ref 82–98)
MCV RBC AUTO: 93 FL (ref 81–99)
MCV RBC AUTO: 93 FL (ref 82–98)
MCV RBC AUTO: 94 FL (ref 82–98)
MCV RBC AUTO: 97 FL (ref 82–98)
MONOCYTES # BLD AUTO: 0.5 THOUSAND/ΜL (ref 0.2–0.9)
MONOCYTES # BLD AUTO: 0.54 THOUSAND/ΜL (ref 0.17–1.22)
MONOCYTES # BLD AUTO: 0.58 THOUSAND/ΜL (ref 0.17–1.22)
MONOCYTES # BLD AUTO: 0.6 THOUSAND/ΜL (ref 0.17–1.22)
MONOCYTES # BLD AUTO: 0.61 THOUSAND/UL (ref 0.2–0.9)
MONOCYTES # BLD AUTO: 0.61 THOUSAND/ΜL (ref 0.17–1.22)
MONOCYTES # BLD AUTO: 0.7 THOUSAND/ΜL (ref 0.17–1.22)
MONOCYTES # BLD AUTO: 0.7 THOUSAND/ΜL (ref 0.17–1.22)
MONOCYTES # BLD AUTO: 0.83 THOUSAND/ΜL (ref 0.17–1.22)
MONOCYTES # BLD AUTO: 0.9 THOUSAND/ΜL (ref 0.17–1.22)
MONOCYTES # BLD AUTO: 0.95 THOUSAND/ΜL (ref 0.17–1.22)
MONOCYTES # BLD AUTO: 1.02 THOUSAND/ΜL (ref 0.17–1.22)
MONOCYTES # BLD AUTO: 1.03 THOUSAND/ΜL (ref 0.17–1.22)
MONOCYTES # BLD AUTO: 1.23 THOUSAND/ΜL (ref 0.17–1.22)
MONOCYTES # BLD AUTO: 1.59 THOUSAND/ΜL (ref 0.17–1.22)
MONOCYTES NFR BLD AUTO: 10 % (ref 1–10)
MONOCYTES NFR BLD AUTO: 10 % (ref 1–10)
MONOCYTES NFR BLD AUTO: 10 % (ref 2–12)
MONOCYTES NFR BLD AUTO: 10 % (ref 4–12)
MONOCYTES NFR BLD AUTO: 11 % (ref 2–12)
MONOCYTES NFR BLD AUTO: 11 % (ref 4–12)
MONOCYTES NFR BLD AUTO: 11 % (ref 4–12)
MONOCYTES NFR BLD AUTO: 12 % (ref 2–12)
MONOCYTES NFR BLD AUTO: 12 % (ref 4–12)
MONOCYTES NFR BLD AUTO: 13 % (ref 4–12)
MONOCYTES NFR BLD AUTO: 16 % (ref 4–12)
MONOCYTES NFR BLD AUTO: 17 % (ref 4–12)
MONOCYTES NFR BLD AUTO: 22 % (ref 4–12)
MONOCYTES NFR BLD AUTO: 22 % (ref 4–12)
MRSA NOSE QL CULT: NORMAL
NASAL CANNULA: 2
NASAL CANNULA: 4
NASAL CANNULA: 6
NASAL CANNULA: 6
NEUTROPHILS # BLD AUTO: 1.97 THOUSANDS/ΜL (ref 1.85–7.62)
NEUTROPHILS # BLD AUTO: 2.66 THOUSANDS/ΜL (ref 1.85–7.62)
NEUTROPHILS # BLD AUTO: 3.13 THOUSANDS/ΜL (ref 1.85–7.62)
NEUTROPHILS # BLD AUTO: 3.19 THOUSANDS/ΜL (ref 1.85–7.62)
NEUTROPHILS # BLD AUTO: 3.3 THOUSANDS/ΜL (ref 1.4–6.5)
NEUTROPHILS # BLD AUTO: 3.5 THOUSANDS/ΜL (ref 1.8–7.8)
NEUTROPHILS # BLD AUTO: 3.6 THOUSANDS/ΜL (ref 1.4–6.5)
NEUTROPHILS # BLD AUTO: 3.7 THOUSANDS/ΜL (ref 1.4–6.5)
NEUTROPHILS # BLD AUTO: 3.79 THOUSANDS/ΜL (ref 1.85–7.62)
NEUTROPHILS # BLD AUTO: 4.1 THOUSANDS/ΜL (ref 1.4–6.5)
NEUTROPHILS # BLD AUTO: 4.4 THOUSANDS/ΜL (ref 1.4–6.5)
NEUTROPHILS # BLD AUTO: 4.43 THOUSANDS/ΜL (ref 1.85–7.62)
NEUTROPHILS # BLD AUTO: 5.21 THOUSANDS/ΜL (ref 1.85–7.62)
NEUTROPHILS # BLD AUTO: 5.78 THOUSANDS/ΜL (ref 1.85–7.62)
NEUTROPHILS # BLD AUTO: 6.18 THOUSANDS/ΜL (ref 1.85–7.62)
NEUTROPHILS # BLD AUTO: 7.5 THOUSANDS/ΜL (ref 1.4–6.5)
NEUTS SEG # BLD: 3.6 THOUSAND/UL (ref 1.8–7.8)
NEUTS SEG NFR BLD AUTO: 50 % (ref 43–75)
NEUTS SEG NFR BLD AUTO: 53 % (ref 43–75)
NEUTS SEG NFR BLD AUTO: 56 % (ref 43–75)
NEUTS SEG NFR BLD AUTO: 59 %
NEUTS SEG NFR BLD AUTO: 61 % (ref 43–75)
NEUTS SEG NFR BLD AUTO: 62 % (ref 43–75)
NEUTS SEG NFR BLD AUTO: 63 % (ref 43–75)
NEUTS SEG NFR BLD AUTO: 66 % (ref 42–75)
NEUTS SEG NFR BLD AUTO: 67 % (ref 42–75)
NEUTS SEG NFR BLD AUTO: 69 % (ref 42–75)
NEUTS SEG NFR BLD AUTO: 69 % (ref 42–75)
NEUTS SEG NFR BLD AUTO: 70 % (ref 42–75)
NEUTS SEG NFR BLD AUTO: 71 % (ref 43–75)
NEUTS SEG NFR BLD AUTO: 73 % (ref 45–65)
NEUTS SEG NFR BLD AUTO: 74 % (ref 43–75)
NEUTS SEG NFR BLD AUTO: 77 % (ref 43–75)
NEUTS SEG NFR BLD AUTO: 80 % (ref 42–75)
NITRITE UR QL STRIP: NEGATIVE
NITRITE UR QL STRIP: POSITIVE
NON VENT- BIPAP: ABNORMAL
NON-SQ EPI CELLS URNS QL MICRO: ABNORMAL /HPF
NON-SQ EPI CELLS URNS QL MICRO: NORMAL /HPF
NRBC BLD AUTO-RTO: 0 /100 WBCS
O2 CT BLDA-SCNC: 14.4 ML/DL
O2 CT BLDA-SCNC: 18.5 ML/DL (ref 16–23)
O2 CT BLDA-SCNC: 19.1 ML/DL (ref 16–23)
O2 CT BLDA-SCNC: 19.4 ML/DL (ref 16–23)
O2 CT BLDV-SCNC: 14.2 ML/DL
O2 CT BLDV-SCNC: 14.6 ML/DL
O2 CT BLDV-SCNC: 17.1 ML/DL
OSMOLALITY UR/SERPL-RTO: 273 MMOL/KG (ref 282–298)
OSMOLALITY UR: 363 MMOL/KG
OXYHGB MFR BLDA: 93.1 % (ref 94–97)
OXYHGB MFR BLDA: 95.9 % (ref 94–100)
OXYHGB MFR BLDA: 96.2 % (ref 94–97)
OXYHGB MFR BLDA: 97.2 % (ref 94–97)
P AXIS: 102 DEGREES
P AXIS: 42 DEGREES
P AXIS: 72 DEGREES
P AXIS: 95 DEGREES
PCO2 BLD: 81.8 MM HG (ref 36–44)
PCO2 BLD: >45 MMOL/L (ref 21–32)
PCO2 BLDA: 116.9 MM HG (ref 36–44)
PCO2 BLDA: 83.7 MM HG (ref 36–44)
PCO2 BLDA: 84.1 MM HG (ref 36–44)
PCO2 BLDA: 89.7 MM HG (ref 35–45)
PCO2 BLDV: 64.5 MM HG (ref 42–50)
PCO2 BLDV: 81.1 MM HG (ref 42–50)
PCO2 BLDV: 91.2 MM HG
PEEP MAX SETTING VENT: 4 CM[H2O]
PH BLD: 7.34 [PH] (ref 7.35–7.45)
PH BLDA: 7.3 [PH] (ref 7.35–7.45)
PH BLDA: 7.32 [PH] (ref 7.35–7.45)
PH BLDA: 7.36 [PH] (ref 7.35–7.45)
PH BLDA: 7.39 [PH] (ref 7.35–7.45)
PH BLDV: 7.31 [PH] (ref 7.3–7.4)
PH BLDV: 7.31 [PH] (ref 7.3–7.4)
PH BLDV: 7.36 [PH] (ref 7.3–7.4)
PH UR STRIP.AUTO: 5.5 [PH]
PH UR STRIP.AUTO: 6.5 [PH]
PH UR STRIP.AUTO: 7 [PH]
PH UR STRIP.AUTO: 7 [PH]
PLATELET # BLD AUTO: 206 THOUSANDS/UL (ref 149–390)
PLATELET # BLD AUTO: 215 THOUSANDS/UL (ref 149–390)
PLATELET # BLD AUTO: 216 THOUSANDS/UL (ref 149–390)
PLATELET # BLD AUTO: 217 THOUSANDS/UL (ref 149–390)
PLATELET # BLD AUTO: 219 THOUSANDS/UL (ref 149–390)
PLATELET # BLD AUTO: 220 THOUSANDS/UL (ref 149–390)
PLATELET # BLD AUTO: 220 THOUSANDS/UL (ref 149–390)
PLATELET # BLD AUTO: 223 THOUSANDS/UL (ref 149–390)
PLATELET # BLD AUTO: 226 THOUSANDS/UL (ref 149–390)
PLATELET # BLD AUTO: 233 THOUSANDS/UL (ref 149–390)
PLATELET # BLD AUTO: 240 THOUSANDS/UL (ref 149–390)
PLATELET # BLD AUTO: 246 THOUSANDS/UL (ref 149–390)
PLATELET # BLD AUTO: 254 THOUSANDS/UL (ref 149–390)
PLATELET # BLD AUTO: 258 THOUSANDS/UL (ref 149–390)
PLATELET # BLD AUTO: 259 THOUSANDS/UL (ref 149–390)
PLATELET # BLD AUTO: 265 THOUSANDS/UL (ref 149–390)
PLATELET # BLD AUTO: 268 THOUSANDS/UL (ref 149–390)
PLATELET # BLD AUTO: 276 THOUSANDS/UL (ref 149–390)
PLATELET # BLD AUTO: 281 THOUSANDS/UL (ref 149–390)
PLATELET # BLD AUTO: 315 THOUSANDS/UL (ref 149–390)
PLATELET # BLD AUTO: 369 THOUSANDS/UL (ref 150–450)
PLATELET # BLD AUTO: 405 THOUSANDS/UL (ref 150–450)
PLATELET # BLD AUTO: 414 THOUSANDS/UL (ref 150–450)
PLATELET BLD QL SMEAR: ABNORMAL
PMV BLD AUTO: 10.1 FL (ref 8.9–12.7)
PMV BLD AUTO: 10.2 FL (ref 8.9–12.7)
PMV BLD AUTO: 10.3 FL (ref 8.9–12.7)
PMV BLD AUTO: 10.6 FL (ref 8.9–12.7)
PMV BLD AUTO: 7.1 FL (ref 8.6–11.7)
PMV BLD AUTO: 7.9 FL (ref 8.6–11.7)
PMV BLD AUTO: 8.1 FL (ref 8.6–11.7)
PMV BLD AUTO: 8.1 FL (ref 8.9–12.7)
PMV BLD AUTO: 8.3 FL (ref 8.6–11.7)
PMV BLD AUTO: 8.3 FL (ref 8.6–11.7)
PMV BLD AUTO: 8.3 FL (ref 8.9–12.7)
PMV BLD AUTO: 8.4 FL (ref 8.6–11.7)
PMV BLD AUTO: 8.4 FL (ref 8.9–12.7)
PMV BLD AUTO: 8.5 FL (ref 8.6–11.7)
PMV BLD AUTO: 8.5 FL (ref 8.6–11.7)
PMV BLD AUTO: 9.3 FL (ref 8.9–12.7)
PMV BLD AUTO: 9.3 FL (ref 8.9–12.7)
PMV BLD AUTO: 9.5 FL (ref 8.9–12.7)
PMV BLD AUTO: 9.7 FL (ref 8.9–12.7)
PMV BLD AUTO: 9.8 FL (ref 8.9–12.7)
PO2 BLD: 83 MM HG (ref 75–129)
PO2 BLDA: 103 MM HG (ref 80–100)
PO2 BLDA: 117.3 MM HG (ref 75–129)
PO2 BLDA: 71.7 MM HG (ref 75–129)
PO2 BLDA: 91.8 MM HG (ref 75–129)
PO2 BLDV: 147 MM HG (ref 35–45)
PO2 BLDV: 39.7 MM HG (ref 35–45)
PO2 BLDV: 55.7 MM HG (ref 35–45)
POTASSIUM BLD-SCNC: 3.6 MMOL/L (ref 3.5–5.3)
POTASSIUM SERPL-SCNC: 3.5 MMOL/L (ref 3.5–5.3)
POTASSIUM SERPL-SCNC: 3.5 MMOL/L (ref 3.5–5.3)
POTASSIUM SERPL-SCNC: 3.7 MMOL/L (ref 3.5–5.3)
POTASSIUM SERPL-SCNC: 3.8 MMOL/L (ref 3.5–5.5)
POTASSIUM SERPL-SCNC: 3.8 MMOL/L (ref 3.5–5.5)
POTASSIUM SERPL-SCNC: 3.9 MMOL/L (ref 3.5–5.3)
POTASSIUM SERPL-SCNC: 3.9 MMOL/L (ref 3.5–5.5)
POTASSIUM SERPL-SCNC: 4 MMOL/L (ref 3.5–5.3)
POTASSIUM SERPL-SCNC: 4 MMOL/L (ref 3.5–5.5)
POTASSIUM SERPL-SCNC: 4.1 MMOL/L (ref 3.6–5)
POTASSIUM SERPL-SCNC: 4.2 MMOL/L (ref 3.5–5.3)
POTASSIUM SERPL-SCNC: 4.2 MMOL/L (ref 3.5–5.5)
POTASSIUM SERPL-SCNC: 4.2 MMOL/L (ref 3.5–5.5)
POTASSIUM SERPL-SCNC: 4.2 MMOL/L (ref 3.6–5)
POTASSIUM SERPL-SCNC: 4.3 MMOL/L (ref 3.5–5.3)
POTASSIUM SERPL-SCNC: 4.3 MMOL/L (ref 3.5–5.5)
POTASSIUM SERPL-SCNC: 4.4 MMOL/L (ref 3.5–5.3)
POTASSIUM SERPL-SCNC: 4.4 MMOL/L (ref 3.5–5.5)
POTASSIUM SERPL-SCNC: 4.5 MMOL/L (ref 3.6–5)
POTASSIUM SERPL-SCNC: 4.6 MMOL/L (ref 3.5–5.3)
POTASSIUM SERPL-SCNC: 4.6 MMOL/L (ref 3.5–5.3)
POTASSIUM SERPL-SCNC: 4.6 MMOL/L (ref 3.5–5.5)
POTASSIUM SERPL-SCNC: 4.7 MMOL/L (ref 3.5–5.3)
PR INTERVAL: 142 MS
PR INTERVAL: 146 MS
PR INTERVAL: 150 MS
PR INTERVAL: 172 MS
PR INTERVAL: 188 MS
PROCALCITONIN SERPL-MCNC: 0.05 NG/ML
PROCALCITONIN SERPL-MCNC: 0.06 NG/ML
PROCALCITONIN SERPL-MCNC: 0.06 NG/ML
PROCALCITONIN SERPL-MCNC: 0.09 NG/ML
PROCALCITONIN SERPL-MCNC: <0.05 NG/ML
PROCALCITONIN SERPL-MCNC: <0.05 NG/ML
PROT SERPL-MCNC: 5.9 G/DL (ref 6.4–8.9)
PROT SERPL-MCNC: 6.6 G/DL (ref 5.9–8.4)
PROT SERPL-MCNC: 6.6 G/DL (ref 6.4–8.9)
PROT SERPL-MCNC: 6.9 G/DL (ref 6.4–8.9)
PROT SERPL-MCNC: 7 G/DL (ref 6.4–8.9)
PROT SERPL-MCNC: 7.6 G/DL (ref 6.4–8.2)
PROT UR STRIP-MCNC: NEGATIVE MG/DL
QRS AXIS: 12 DEGREES
QRS AXIS: 21 DEGREES
QRS AXIS: 23 DEGREES
QRS AXIS: 35 DEGREES
QRS AXIS: 7 DEGREES
QRS AXIS: 82 DEGREES
QRSD INTERVAL: 100 MS
QRSD INTERVAL: 86 MS
QRSD INTERVAL: 88 MS
QRSD INTERVAL: 92 MS
QRSD INTERVAL: 94 MS
QRSD INTERVAL: 94 MS
QT INTERVAL: 332 MS
QT INTERVAL: 352 MS
QT INTERVAL: 354 MS
QT INTERVAL: 362 MS
QT INTERVAL: 368 MS
QT INTERVAL: 374 MS
QTC INTERVAL: 413 MS
QTC INTERVAL: 415 MS
QTC INTERVAL: 419 MS
QTC INTERVAL: 423 MS
QTC INTERVAL: 423 MS
QTC INTERVAL: 428 MS
RBC # BLD AUTO: 3.5 MILLION/UL (ref 3.9–5.2)
RBC # BLD AUTO: 3.66 MILLION/UL (ref 3.9–5.2)
RBC # BLD AUTO: 3.66 MILLION/UL (ref 4–5.2)
RBC # BLD AUTO: 3.68 MILLION/UL (ref 3.81–5.12)
RBC # BLD AUTO: 3.69 MILLION/UL (ref 3.9–5.2)
RBC # BLD AUTO: 3.69 MILLION/UL (ref 4–5.2)
RBC # BLD AUTO: 3.71 MILLION/UL (ref 4–5.2)
RBC # BLD AUTO: 3.73 MILLION/UL (ref 3.9–5.2)
RBC # BLD AUTO: 3.75 MILLION/UL (ref 3.81–5.12)
RBC # BLD AUTO: 3.75 MILLION/UL (ref 3.9–5.2)
RBC # BLD AUTO: 3.79 MILLION/UL (ref 3.9–5.2)
RBC # BLD AUTO: 3.83 MILLION/UL (ref 3.81–5.12)
RBC # BLD AUTO: 3.97 MILLION/UL (ref 3.9–5.2)
RBC # BLD AUTO: 4.04 MILLION/UL (ref 3.9–5.2)
RBC # BLD AUTO: 4.52 MILLION/UL (ref 3.81–5.12)
RBC # BLD AUTO: 4.59 MILLION/UL (ref 3.81–5.12)
RBC # BLD AUTO: 4.63 MILLION/UL (ref 3.81–5.12)
RBC # BLD AUTO: 4.65 MILLION/UL (ref 3.81–5.12)
RBC # BLD AUTO: 4.66 MILLION/UL (ref 3.81–5.12)
RBC # BLD AUTO: 4.8 MILLION/UL (ref 3.81–5.12)
RBC # BLD AUTO: 4.91 MILLION/UL (ref 3.81–5.12)
RBC # BLD AUTO: 4.94 MILLION/UL (ref 3.81–5.12)
RBC #/AREA URNS AUTO: ABNORMAL /HPF
RBC #/AREA URNS AUTO: NORMAL /HPF
RBC MORPH BLD: PRESENT
RSV RNA NPH QL NAA+PROBE: ABNORMAL
S PNEUM AG UR QL: NEGATIVE
SAO2 % BLD FROM PO2: 95 % (ref 60–85)
SARS-COV-2 RNA RESP QL NAA+PROBE: NEGATIVE
SODIUM 24H UR-SCNC: 74 MOL/L
SODIUM BLD-SCNC: 129 MMOL/L (ref 136–145)
SODIUM SERPL-SCNC: 126 MMOL/L (ref 136–145)
SODIUM SERPL-SCNC: 127 MMOL/L (ref 136–145)
SODIUM SERPL-SCNC: 127 MMOL/L (ref 136–145)
SODIUM SERPL-SCNC: 129 MMOL/L (ref 136–145)
SODIUM SERPL-SCNC: 129 MMOL/L (ref 136–145)
SODIUM SERPL-SCNC: 131 MMOL/L (ref 136–145)
SODIUM SERPL-SCNC: 132 MMOL/L (ref 137–147)
SODIUM SERPL-SCNC: 133 MMOL/L (ref 137–147)
SODIUM SERPL-SCNC: 134 MMOL/L (ref 136–145)
SODIUM SERPL-SCNC: 134 MMOL/L (ref 136–145)
SODIUM SERPL-SCNC: 135 MMOL/L (ref 136–145)
SODIUM SERPL-SCNC: 135 MMOL/L (ref 137–147)
SODIUM SERPL-SCNC: 136 MMOL/L (ref 134–143)
SODIUM SERPL-SCNC: 137 MMOL/L (ref 134–143)
SODIUM SERPL-SCNC: 138 MMOL/L (ref 134–143)
SODIUM SERPL-SCNC: 139 MMOL/L (ref 134–143)
SODIUM SERPL-SCNC: 139 MMOL/L (ref 134–143)
SODIUM SERPL-SCNC: 140 MMOL/L (ref 134–143)
SODIUM SERPL-SCNC: 140 MMOL/L (ref 136–145)
SODIUM SERPL-SCNC: 141 MMOL/L (ref 134–143)
SP GR UR STRIP.AUTO: 1 (ref 1–1.03)
SP GR UR STRIP.AUTO: 1.01 (ref 1–1.03)
SP GR UR STRIP.AUTO: 1.01 (ref 1–1.03)
SP GR UR STRIP.AUTO: 1.02 (ref 1–1.03)
SPECIMEN SOURCE: ABNORMAL
T WAVE AXIS: -12 DEGREES
T WAVE AXIS: 13 DEGREES
T WAVE AXIS: 266 DEGREES
T WAVE AXIS: 34 DEGREES
T WAVE AXIS: 63 DEGREES
T WAVE AXIS: 8 DEGREES
TOTAL CELLS COUNTED SPEC: 100
TROPONIN I SERPL-MCNC: 0.03 NG/ML
TROPONIN I SERPL-MCNC: 0.04 NG/ML
TROPONIN I SERPL-MCNC: 0.08 NG/ML
TROPONIN I SERPL-MCNC: 0.09 NG/ML
TROPONIN I SERPL-MCNC: 0.1 NG/ML
TROPONIN I SERPL-MCNC: <0.03 NG/ML
TSH SERPL DL<=0.05 MIU/L-ACNC: 0.64 UIU/ML (ref 0.36–3.74)
TSH SERPL DL<=0.05 MIU/L-ACNC: 0.76 UIU/ML (ref 0.45–5.33)
UROBILINOGEN UR QL STRIP.AUTO: 0.2 E.U./DL
VENT BIPAP FIO2: 50 %
VENTRICULAR RATE: 77 BPM
VENTRICULAR RATE: 78 BPM
VENTRICULAR RATE: 79 BPM
VENTRICULAR RATE: 83 BPM
VENTRICULAR RATE: 88 BPM
VENTRICULAR RATE: 98 BPM
WBC # BLD AUTO: 3.91 THOUSAND/UL (ref 4.31–10.16)
WBC # BLD AUTO: 4.12 THOUSAND/UL (ref 4.31–10.16)
WBC # BLD AUTO: 4.5 THOUSAND/UL (ref 4.8–10.8)
WBC # BLD AUTO: 4.8 THOUSAND/UL (ref 4.31–10.16)
WBC # BLD AUTO: 5 THOUSAND/UL (ref 4.8–10.8)
WBC # BLD AUTO: 5.1 THOUSAND/UL (ref 4.31–10.16)
WBC # BLD AUTO: 5.3 THOUSAND/UL (ref 4.8–10.8)
WBC # BLD AUTO: 5.31 THOUSAND/UL (ref 4.31–10.16)
WBC # BLD AUTO: 5.4 THOUSAND/UL (ref 4.8–10.8)
WBC # BLD AUTO: 5.4 THOUSAND/UL (ref 4.8–10.8)
WBC # BLD AUTO: 5.62 THOUSAND/UL (ref 4.31–10.16)
WBC # BLD AUTO: 5.9 THOUSAND/UL (ref 4.31–10.16)
WBC # BLD AUTO: 5.9 THOUSAND/UL (ref 4.8–10.8)
WBC # BLD AUTO: 6.1 THOUSAND/UL (ref 4.31–10.16)
WBC # BLD AUTO: 6.1 THOUSAND/UL (ref 4.31–10.16)
WBC # BLD AUTO: 6.4 THOUSAND/UL (ref 4.8–10.8)
WBC # BLD AUTO: 6.74 THOUSAND/UL (ref 4.31–10.16)
WBC # BLD AUTO: 7.14 THOUSAND/UL (ref 4.31–10.16)
WBC # BLD AUTO: 7.42 THOUSAND/UL (ref 4.31–10.16)
WBC # BLD AUTO: 7.58 THOUSAND/UL (ref 4.31–10.16)
WBC # BLD AUTO: 8.36 THOUSAND/UL (ref 4.31–10.16)
WBC # BLD AUTO: 9.3 THOUSAND/UL (ref 4.8–10.8)
WBC #/AREA URNS AUTO: ABNORMAL /HPF
WBC #/AREA URNS AUTO: NORMAL /HPF

## 2020-01-01 PROCEDURE — 85025 COMPLETE CBC W/AUTO DIFF WBC: CPT | Performed by: EMERGENCY MEDICINE

## 2020-01-01 PROCEDURE — 94760 N-INVAS EAR/PLS OXIMETRY 1: CPT

## 2020-01-01 PROCEDURE — 94640 AIRWAY INHALATION TREATMENT: CPT

## 2020-01-01 PROCEDURE — 97530 THERAPEUTIC ACTIVITIES: CPT

## 2020-01-01 PROCEDURE — 97535 SELF CARE MNGMENT TRAINING: CPT

## 2020-01-01 PROCEDURE — 97112 NEUROMUSCULAR REEDUCATION: CPT

## 2020-01-01 PROCEDURE — 80053 COMPREHEN METABOLIC PANEL: CPT | Performed by: FAMILY MEDICINE

## 2020-01-01 PROCEDURE — 71046 X-RAY EXAM CHEST 2 VIEWS: CPT

## 2020-01-01 PROCEDURE — 70450 CT HEAD/BRAIN W/O DYE: CPT

## 2020-01-01 PROCEDURE — 93005 ELECTROCARDIOGRAM TRACING: CPT

## 2020-01-01 PROCEDURE — 94660 CPAP INITIATION&MGMT: CPT

## 2020-01-01 PROCEDURE — 99232 SBSQ HOSP IP/OBS MODERATE 35: CPT | Performed by: INTERNAL MEDICINE

## 2020-01-01 PROCEDURE — 85027 COMPLETE CBC AUTOMATED: CPT | Performed by: INTERNAL MEDICINE

## 2020-01-01 PROCEDURE — 80053 COMPREHEN METABOLIC PANEL: CPT | Performed by: EMERGENCY MEDICINE

## 2020-01-01 PROCEDURE — 94668 MNPJ CHEST WALL SBSQ: CPT

## 2020-01-01 PROCEDURE — 94664 DEMO&/EVAL PT USE INHALER: CPT

## 2020-01-01 PROCEDURE — 81003 URINALYSIS AUTO W/O SCOPE: CPT | Performed by: EMERGENCY MEDICINE

## 2020-01-01 PROCEDURE — 93010 ELECTROCARDIOGRAM REPORT: CPT | Performed by: INTERNAL MEDICINE

## 2020-01-01 PROCEDURE — 80048 BASIC METABOLIC PNL TOTAL CA: CPT | Performed by: NURSE PRACTITIONER

## 2020-01-01 PROCEDURE — 84484 ASSAY OF TROPONIN QUANT: CPT | Performed by: PHYSICIAN ASSISTANT

## 2020-01-01 PROCEDURE — 85025 COMPLETE CBC W/AUTO DIFF WBC: CPT | Performed by: NURSE PRACTITIONER

## 2020-01-01 PROCEDURE — 99232 SBSQ HOSP IP/OBS MODERATE 35: CPT | Performed by: PHYSICAL MEDICINE & REHABILITATION

## 2020-01-01 PROCEDURE — 97116 GAIT TRAINING THERAPY: CPT

## 2020-01-01 PROCEDURE — 1160F RVW MEDS BY RX/DR IN RCRD: CPT | Performed by: INTERNAL MEDICINE

## 2020-01-01 PROCEDURE — 99232 SBSQ HOSP IP/OBS MODERATE 35: CPT | Performed by: NURSE PRACTITIONER

## 2020-01-01 PROCEDURE — 97167 OT EVAL HIGH COMPLEX 60 MIN: CPT

## 2020-01-01 PROCEDURE — 99284 EMERGENCY DEPT VISIT MOD MDM: CPT | Performed by: EMERGENCY MEDICINE

## 2020-01-01 PROCEDURE — 92610 EVALUATE SWALLOWING FUNCTION: CPT

## 2020-01-01 PROCEDURE — 97110 THERAPEUTIC EXERCISES: CPT

## 2020-01-01 PROCEDURE — 85027 COMPLETE CBC AUTOMATED: CPT | Performed by: PHYSICAL MEDICINE & REHABILITATION

## 2020-01-01 PROCEDURE — 82570 ASSAY OF URINE CREATININE: CPT | Performed by: INTERNAL MEDICINE

## 2020-01-01 PROCEDURE — 85049 AUTOMATED PLATELET COUNT: CPT | Performed by: NURSE PRACTITIONER

## 2020-01-01 PROCEDURE — 83605 ASSAY OF LACTIC ACID: CPT | Performed by: EMERGENCY MEDICINE

## 2020-01-01 PROCEDURE — 99215 OFFICE O/P EST HI 40 MIN: CPT | Performed by: PHYSICIAN ASSISTANT

## 2020-01-01 PROCEDURE — 99024 POSTOP FOLLOW-UP VISIT: CPT | Performed by: RADIOLOGY

## 2020-01-01 PROCEDURE — 80053 COMPREHEN METABOLIC PANEL: CPT | Performed by: NURSE PRACTITIONER

## 2020-01-01 PROCEDURE — 85027 COMPLETE CBC AUTOMATED: CPT | Performed by: NURSE PRACTITIONER

## 2020-01-01 PROCEDURE — 80048 BASIC METABOLIC PNL TOTAL CA: CPT | Performed by: INTERNAL MEDICINE

## 2020-01-01 PROCEDURE — 99238 HOSP IP/OBS DSCHRG MGMT 30/<: CPT | Performed by: PHYSICIAN ASSISTANT

## 2020-01-01 PROCEDURE — 85025 COMPLETE CBC W/AUTO DIFF WBC: CPT | Performed by: PHYSICIAN ASSISTANT

## 2020-01-01 PROCEDURE — 87449 NOS EACH ORGANISM AG IA: CPT | Performed by: NURSE PRACTITIONER

## 2020-01-01 PROCEDURE — 85379 FIBRIN DEGRADATION QUANT: CPT | Performed by: EMERGENCY MEDICINE

## 2020-01-01 PROCEDURE — 71045 X-RAY EXAM CHEST 1 VIEW: CPT

## 2020-01-01 PROCEDURE — 99222 1ST HOSP IP/OBS MODERATE 55: CPT | Performed by: FAMILY MEDICINE

## 2020-01-01 PROCEDURE — 36415 COLL VENOUS BLD VENIPUNCTURE: CPT | Performed by: EMERGENCY MEDICINE

## 2020-01-01 PROCEDURE — 99214 OFFICE O/P EST MOD 30 MIN: CPT | Performed by: INTERNAL MEDICINE

## 2020-01-01 PROCEDURE — 87631 RESP VIRUS 3-5 TARGETS: CPT | Performed by: EMERGENCY MEDICINE

## 2020-01-01 PROCEDURE — 84145 PROCALCITONIN (PCT): CPT | Performed by: EMERGENCY MEDICINE

## 2020-01-01 PROCEDURE — 84300 ASSAY OF URINE SODIUM: CPT | Performed by: INTERNAL MEDICINE

## 2020-01-01 PROCEDURE — 1125F AMNT PAIN NOTED PAIN PRSNT: CPT | Performed by: INTERNAL MEDICINE

## 2020-01-01 PROCEDURE — 99222 1ST HOSP IP/OBS MODERATE 55: CPT | Performed by: INTERNAL MEDICINE

## 2020-01-01 PROCEDURE — 84295 ASSAY OF SERUM SODIUM: CPT

## 2020-01-01 PROCEDURE — 99223 1ST HOSP IP/OBS HIGH 75: CPT | Performed by: NURSE PRACTITIONER

## 2020-01-01 PROCEDURE — 93306 TTE W/DOPPLER COMPLETE: CPT | Performed by: INTERNAL MEDICINE

## 2020-01-01 PROCEDURE — 1124F ACP DISCUSS-NO DSCNMKR DOCD: CPT | Performed by: SURGERY

## 2020-01-01 PROCEDURE — 72125 CT NECK SPINE W/O DYE: CPT

## 2020-01-01 PROCEDURE — 85014 HEMATOCRIT: CPT

## 2020-01-01 PROCEDURE — 1036F TOBACCO NON-USER: CPT | Performed by: INTERNAL MEDICINE

## 2020-01-01 PROCEDURE — 82805 BLOOD GASES W/O2 SATURATION: CPT | Performed by: PHYSICIAN ASSISTANT

## 2020-01-01 PROCEDURE — 72080 X-RAY EXAM THORACOLMB 2/> VW: CPT

## 2020-01-01 PROCEDURE — 84443 ASSAY THYROID STIM HORMONE: CPT | Performed by: INTERNAL MEDICINE

## 2020-01-01 PROCEDURE — 99232 SBSQ HOSP IP/OBS MODERATE 35: CPT | Performed by: FAMILY MEDICINE

## 2020-01-01 PROCEDURE — 87081 CULTURE SCREEN ONLY: CPT | Performed by: NURSE PRACTITIONER

## 2020-01-01 PROCEDURE — 87070 CULTURE OTHR SPECIMN AEROBIC: CPT | Performed by: NURSE PRACTITIONER

## 2020-01-01 PROCEDURE — 82805 BLOOD GASES W/O2 SATURATION: CPT | Performed by: EMERGENCY MEDICINE

## 2020-01-01 PROCEDURE — 94669 MECHANICAL CHEST WALL OSCILL: CPT

## 2020-01-01 PROCEDURE — 99222 1ST HOSP IP/OBS MODERATE 55: CPT | Performed by: PHYSICIAN ASSISTANT

## 2020-01-01 PROCEDURE — 82803 BLOOD GASES ANY COMBINATION: CPT

## 2020-01-01 PROCEDURE — 99220 PR INITIAL OBSERVATION CARE/DAY 70 MINUTES: CPT | Performed by: PHYSICIAN ASSISTANT

## 2020-01-01 PROCEDURE — 82805 BLOOD GASES W/O2 SATURATION: CPT | Performed by: NURSE PRACTITIONER

## 2020-01-01 PROCEDURE — 83735 ASSAY OF MAGNESIUM: CPT | Performed by: NURSE PRACTITIONER

## 2020-01-01 PROCEDURE — 71275 CT ANGIOGRAPHY CHEST: CPT

## 2020-01-01 PROCEDURE — 97163 PT EVAL HIGH COMPLEX 45 MIN: CPT

## 2020-01-01 PROCEDURE — 99285 EMERGENCY DEPT VISIT HI MDM: CPT | Performed by: EMERGENCY MEDICINE

## 2020-01-01 PROCEDURE — 99222 1ST HOSP IP/OBS MODERATE 55: CPT | Performed by: NURSE PRACTITIONER

## 2020-01-01 PROCEDURE — 71250 CT THORAX DX C-: CPT

## 2020-01-01 PROCEDURE — 99495 TRANSJ CARE MGMT MOD F2F 14D: CPT | Performed by: INTERNAL MEDICINE

## 2020-01-01 PROCEDURE — 80048 BASIC METABOLIC PNL TOTAL CA: CPT | Performed by: PHYSICIAN ASSISTANT

## 2020-01-01 PROCEDURE — 99232 SBSQ HOSP IP/OBS MODERATE 35: CPT | Performed by: SURGERY

## 2020-01-01 PROCEDURE — 84145 PROCALCITONIN (PCT): CPT | Performed by: NURSE PRACTITIONER

## 2020-01-01 PROCEDURE — 82947 ASSAY GLUCOSE BLOOD QUANT: CPT

## 2020-01-01 PROCEDURE — 99285 EMERGENCY DEPT VISIT HI MDM: CPT

## 2020-01-01 PROCEDURE — 87086 URINE CULTURE/COLONY COUNT: CPT | Performed by: FAMILY MEDICINE

## 2020-01-01 PROCEDURE — 82948 REAGENT STRIP/BLOOD GLUCOSE: CPT

## 2020-01-01 PROCEDURE — 82805 BLOOD GASES W/O2 SATURATION: CPT | Performed by: INTERNAL MEDICINE

## 2020-01-01 PROCEDURE — 85007 BL SMEAR W/DIFF WBC COUNT: CPT | Performed by: NURSE PRACTITIONER

## 2020-01-01 PROCEDURE — 96374 THER/PROPH/DIAG INJ IV PUSH: CPT

## 2020-01-01 PROCEDURE — 84145 PROCALCITONIN (PCT): CPT | Performed by: INTERNAL MEDICINE

## 2020-01-01 PROCEDURE — 4040F PNEUMOC VAC/ADMIN/RCVD: CPT | Performed by: INTERNAL MEDICINE

## 2020-01-01 PROCEDURE — 99233 SBSQ HOSP IP/OBS HIGH 50: CPT | Performed by: NURSE PRACTITIONER

## 2020-01-01 PROCEDURE — NC001 PR NO CHARGE

## 2020-01-01 PROCEDURE — 87205 SMEAR GRAM STAIN: CPT | Performed by: NURSE PRACTITIONER

## 2020-01-01 PROCEDURE — 1170F FXNL STATUS ASSESSED: CPT | Performed by: INTERNAL MEDICINE

## 2020-01-01 PROCEDURE — 99239 HOSP IP/OBS DSCHRG MGMT >30: CPT | Performed by: NURSE PRACTITIONER

## 2020-01-01 PROCEDURE — 84484 ASSAY OF TROPONIN QUANT: CPT | Performed by: EMERGENCY MEDICINE

## 2020-01-01 PROCEDURE — G0439 PPPS, SUBSEQ VISIT: HCPCS | Performed by: INTERNAL MEDICINE

## 2020-01-01 PROCEDURE — 85027 COMPLETE CBC AUTOMATED: CPT | Performed by: PHYSICIAN ASSISTANT

## 2020-01-01 PROCEDURE — 80048 BASIC METABOLIC PNL TOTAL CA: CPT | Performed by: EMERGENCY MEDICINE

## 2020-01-01 PROCEDURE — 94762 N-INVAS EAR/PLS OXIMTRY CONT: CPT

## 2020-01-01 PROCEDURE — 99223 1ST HOSP IP/OBS HIGH 75: CPT | Performed by: INTERNAL MEDICINE

## 2020-01-01 PROCEDURE — 87635 SARS-COV-2 COVID-19 AMP PRB: CPT | Performed by: EMERGENCY MEDICINE

## 2020-01-01 PROCEDURE — 97166 OT EVAL MOD COMPLEX 45 MIN: CPT

## 2020-01-01 PROCEDURE — 81001 URINALYSIS AUTO W/SCOPE: CPT | Performed by: PHYSICIAN ASSISTANT

## 2020-01-01 PROCEDURE — 84132 ASSAY OF SERUM POTASSIUM: CPT

## 2020-01-01 PROCEDURE — 76604 US EXAM CHEST: CPT | Performed by: RADIOLOGY

## 2020-01-01 PROCEDURE — 99233 SBSQ HOSP IP/OBS HIGH 50: CPT | Performed by: ANESTHESIOLOGY

## 2020-01-01 PROCEDURE — 99223 1ST HOSP IP/OBS HIGH 75: CPT | Performed by: PHYSICAL MEDICINE & REHABILITATION

## 2020-01-01 PROCEDURE — 83930 ASSAY OF BLOOD OSMOLALITY: CPT | Performed by: INTERNAL MEDICINE

## 2020-01-01 PROCEDURE — 1111F DSCHRG MED/CURRENT MED MERGE: CPT | Performed by: INTERNAL MEDICINE

## 2020-01-01 PROCEDURE — 83036 HEMOGLOBIN GLYCOSYLATED A1C: CPT | Performed by: INTERNAL MEDICINE

## 2020-01-01 PROCEDURE — 99232 SBSQ HOSP IP/OBS MODERATE 35: CPT | Performed by: EMERGENCY MEDICINE

## 2020-01-01 PROCEDURE — 3008F BODY MASS INDEX DOCD: CPT | Performed by: INTERNAL MEDICINE

## 2020-01-01 PROCEDURE — 99222 1ST HOSP IP/OBS MODERATE 55: CPT | Performed by: SURGERY

## 2020-01-01 PROCEDURE — NC001 PR NO CHARGE: Performed by: SURGERY

## 2020-01-01 PROCEDURE — 99232 SBSQ HOSP IP/OBS MODERATE 35: CPT | Performed by: PHYSICIAN ASSISTANT

## 2020-01-01 PROCEDURE — 72131 CT LUMBAR SPINE W/O DYE: CPT

## 2020-01-01 PROCEDURE — 99239 HOSP IP/OBS DSCHRG MGMT >30: CPT | Performed by: INTERNAL MEDICINE

## 2020-01-01 PROCEDURE — 84443 ASSAY THYROID STIM HORMONE: CPT | Performed by: EMERGENCY MEDICINE

## 2020-01-01 PROCEDURE — 93306 TTE W/DOPPLER COMPLETE: CPT

## 2020-01-01 PROCEDURE — 83880 ASSAY OF NATRIURETIC PEPTIDE: CPT | Performed by: EMERGENCY MEDICINE

## 2020-01-01 PROCEDURE — 87040 BLOOD CULTURE FOR BACTERIA: CPT | Performed by: EMERGENCY MEDICINE

## 2020-01-01 PROCEDURE — 3078F DIAST BP <80 MM HG: CPT | Performed by: INTERNAL MEDICINE

## 2020-01-01 PROCEDURE — 99233 SBSQ HOSP IP/OBS HIGH 50: CPT | Performed by: PHYSICAL MEDICINE & REHABILITATION

## 2020-01-01 PROCEDURE — 99220 PR INITIAL OBSERVATION CARE/DAY 70 MINUTES: CPT | Performed by: NURSE PRACTITIONER

## 2020-01-01 PROCEDURE — 99223 1ST HOSP IP/OBS HIGH 75: CPT | Performed by: EMERGENCY MEDICINE

## 2020-01-01 PROCEDURE — 82330 ASSAY OF CALCIUM: CPT

## 2020-01-01 PROCEDURE — 83935 ASSAY OF URINE OSMOLALITY: CPT | Performed by: INTERNAL MEDICINE

## 2020-01-01 PROCEDURE — 99215 OFFICE O/P EST HI 40 MIN: CPT | Performed by: INTERNAL MEDICINE

## 2020-01-01 PROCEDURE — 36600 WITHDRAWAL OF ARTERIAL BLOOD: CPT

## 2020-01-01 PROCEDURE — 80048 BASIC METABOLIC PNL TOTAL CA: CPT | Performed by: PHYSICAL MEDICINE & REHABILITATION

## 2020-01-01 PROCEDURE — 3077F SYST BP >= 140 MM HG: CPT | Performed by: INTERNAL MEDICINE

## 2020-01-01 PROCEDURE — 85025 COMPLETE CBC W/AUTO DIFF WBC: CPT | Performed by: STUDENT IN AN ORGANIZED HEALTH CARE EDUCATION/TRAINING PROGRAM

## 2020-01-01 PROCEDURE — 36415 COLL VENOUS BLD VENIPUNCTURE: CPT | Performed by: PHYSICIAN ASSISTANT

## 2020-01-01 PROCEDURE — 36415 COLL VENOUS BLD VENIPUNCTURE: CPT | Performed by: INTERNAL MEDICINE

## 2020-01-01 PROCEDURE — 99285 EMERGENCY DEPT VISIT HI MDM: CPT | Performed by: PHYSICIAN ASSISTANT

## 2020-01-01 PROCEDURE — 99239 HOSP IP/OBS DSCHRG MGMT >30: CPT | Performed by: PHYSICAL MEDICINE & REHABILITATION

## 2020-01-01 PROCEDURE — 99231 SBSQ HOSP IP/OBS SF/LOW 25: CPT | Performed by: INTERNAL MEDICINE

## 2020-01-01 RX ORDER — GUAIFENESIN 600 MG
1200 TABLET, EXTENDED RELEASE 12 HR ORAL EVERY 12 HOURS SCHEDULED
Status: DISCONTINUED | OUTPATIENT
Start: 2020-01-01 | End: 2020-01-01 | Stop reason: HOSPADM

## 2020-01-01 RX ORDER — ONDANSETRON 2 MG/ML
4 INJECTION INTRAMUSCULAR; INTRAVENOUS EVERY 6 HOURS PRN
Status: DISCONTINUED | OUTPATIENT
Start: 2020-01-01 | End: 2020-01-01 | Stop reason: HOSPADM

## 2020-01-01 RX ORDER — OXYCODONE HYDROCHLORIDE 5 MG/1
2.5 TABLET ORAL EVERY 4 HOURS PRN
Status: DISCONTINUED | OUTPATIENT
Start: 2020-01-01 | End: 2020-01-01 | Stop reason: HOSPADM

## 2020-01-01 RX ORDER — GABAPENTIN 100 MG/1
100 CAPSULE ORAL 3 TIMES DAILY
Qty: 90 CAPSULE | Refills: 0 | Status: SHIPPED | OUTPATIENT
Start: 2020-01-01 | End: 2020-01-01

## 2020-01-01 RX ORDER — SODIUM CHLORIDE FOR INHALATION 0.9 %
3 VIAL, NEBULIZER (ML) INHALATION 2 TIMES DAILY
Status: DISCONTINUED | OUTPATIENT
Start: 2020-01-01 | End: 2020-01-01

## 2020-01-01 RX ORDER — AZITHROMYCIN 250 MG/1
500 TABLET, FILM COATED ORAL EVERY 24 HOURS
Status: COMPLETED | OUTPATIENT
Start: 2020-01-01 | End: 2020-01-01

## 2020-01-01 RX ORDER — FUROSEMIDE 10 MG/ML
40 INJECTION INTRAMUSCULAR; INTRAVENOUS
Status: DISCONTINUED | OUTPATIENT
Start: 2020-01-01 | End: 2020-01-01

## 2020-01-01 RX ORDER — OLANZAPINE 5 MG/1
2.5 TABLET, ORALLY DISINTEGRATING ORAL ONCE AS NEEDED
Status: DISCONTINUED | OUTPATIENT
Start: 2020-01-01 | End: 2020-01-01 | Stop reason: HOSPADM

## 2020-01-01 RX ORDER — ATORVASTATIN CALCIUM 40 MG/1
40 TABLET, FILM COATED ORAL
Status: DISCONTINUED | OUTPATIENT
Start: 2020-01-01 | End: 2020-01-01 | Stop reason: HOSPADM

## 2020-01-01 RX ORDER — ASPIRIN 81 MG/1
81 TABLET, CHEWABLE ORAL DAILY
Status: DISCONTINUED | OUTPATIENT
Start: 2020-01-01 | End: 2020-01-01 | Stop reason: HOSPADM

## 2020-01-01 RX ORDER — FUROSEMIDE 20 MG/1
20 TABLET ORAL 2 TIMES DAILY
Status: DISCONTINUED | OUTPATIENT
Start: 2020-01-01 | End: 2020-01-01 | Stop reason: HOSPADM

## 2020-01-01 RX ORDER — METOPROLOL SUCCINATE 25 MG/1
25 TABLET, EXTENDED RELEASE ORAL DAILY
Status: DISCONTINUED | OUTPATIENT
Start: 2020-01-01 | End: 2020-01-01

## 2020-01-01 RX ORDER — ALPRAZOLAM 0.25 MG/1
0.25 TABLET ORAL
Status: DISCONTINUED | OUTPATIENT
Start: 2020-01-01 | End: 2020-01-01

## 2020-01-01 RX ORDER — FUROSEMIDE 20 MG/1
20 TABLET ORAL DAILY
Status: DISCONTINUED | OUTPATIENT
Start: 2020-01-01 | End: 2020-01-01

## 2020-01-01 RX ORDER — ALBUTEROL SULFATE 2.5 MG/3ML
2.5 SOLUTION RESPIRATORY (INHALATION) ONCE
Status: COMPLETED | OUTPATIENT
Start: 2020-01-01 | End: 2020-01-01

## 2020-01-01 RX ORDER — SODIUM CHLORIDE 9 MG/ML
3 INJECTION INTRAVENOUS
Status: DISCONTINUED | OUTPATIENT
Start: 2020-01-01 | End: 2020-01-01 | Stop reason: HOSPADM

## 2020-01-01 RX ORDER — METHOCARBAMOL 500 MG/1
500 TABLET, FILM COATED ORAL EVERY 8 HOURS SCHEDULED
Status: DISCONTINUED | OUTPATIENT
Start: 2020-01-01 | End: 2020-01-01

## 2020-01-01 RX ORDER — ALBUTEROL SULFATE 2.5 MG/3ML
5 SOLUTION RESPIRATORY (INHALATION) ONCE
Status: COMPLETED | OUTPATIENT
Start: 2020-01-01 | End: 2020-01-01

## 2020-01-01 RX ORDER — ONDANSETRON 2 MG/ML
4 INJECTION INTRAMUSCULAR; INTRAVENOUS EVERY 4 HOURS PRN
Status: DISCONTINUED | OUTPATIENT
Start: 2020-01-01 | End: 2020-01-01 | Stop reason: HOSPADM

## 2020-01-01 RX ORDER — METHOCARBAMOL 500 MG/1
500 TABLET, FILM COATED ORAL EVERY 6 HOURS PRN
Qty: 120 TABLET | Refills: 0 | Status: SHIPPED | OUTPATIENT
Start: 2020-01-01 | End: 2020-01-01

## 2020-01-01 RX ORDER — OSELTAMIVIR PHOSPHATE 30 MG/1
30 CAPSULE ORAL EVERY 12 HOURS SCHEDULED
Status: DISCONTINUED | OUTPATIENT
Start: 2020-01-01 | End: 2020-01-01

## 2020-01-01 RX ORDER — AMOXICILLIN 250 MG
1 CAPSULE ORAL
Status: DISCONTINUED | OUTPATIENT
Start: 2020-01-01 | End: 2020-01-01 | Stop reason: HOSPADM

## 2020-01-01 RX ORDER — ALPRAZOLAM 0.25 MG/1
0.25 TABLET ORAL 2 TIMES DAILY PRN
Status: DISCONTINUED | OUTPATIENT
Start: 2020-01-01 | End: 2020-01-01 | Stop reason: HOSPADM

## 2020-01-01 RX ORDER — POLYETHYLENE GLYCOL 3350 17 G/17G
17 POWDER, FOR SOLUTION ORAL DAILY
Status: DISCONTINUED | OUTPATIENT
Start: 2020-01-01 | End: 2020-01-01 | Stop reason: HOSPADM

## 2020-01-01 RX ORDER — GABAPENTIN 100 MG/1
100 CAPSULE ORAL 3 TIMES DAILY
Status: DISCONTINUED | OUTPATIENT
Start: 2020-01-01 | End: 2020-01-01 | Stop reason: HOSPADM

## 2020-01-01 RX ORDER — ACETAMINOPHEN 325 MG/1
650 TABLET ORAL EVERY 6 HOURS PRN
Status: DISCONTINUED | OUTPATIENT
Start: 2020-01-01 | End: 2020-01-01 | Stop reason: HOSPADM

## 2020-01-01 RX ORDER — BISACODYL 10 MG
10 SUPPOSITORY, RECTAL RECTAL DAILY PRN
Qty: 12 SUPPOSITORY | Refills: 0 | Status: SHIPPED | OUTPATIENT
Start: 2020-01-01 | End: 2020-01-01 | Stop reason: HOSPADM

## 2020-01-01 RX ORDER — METHOCARBAMOL 500 MG/1
500 TABLET, FILM COATED ORAL EVERY 6 HOURS PRN
Status: DISCONTINUED | OUTPATIENT
Start: 2020-01-01 | End: 2020-01-01 | Stop reason: HOSPADM

## 2020-01-01 RX ORDER — POTASSIUM CHLORIDE 20 MEQ/1
20 TABLET, EXTENDED RELEASE ORAL DAILY
Status: DISCONTINUED | OUTPATIENT
Start: 2020-01-01 | End: 2020-01-01 | Stop reason: HOSPADM

## 2020-01-01 RX ORDER — FAMOTIDINE 20 MG/1
10 TABLET, FILM COATED ORAL DAILY
Status: DISCONTINUED | OUTPATIENT
Start: 2020-01-01 | End: 2020-01-01 | Stop reason: HOSPADM

## 2020-01-01 RX ORDER — FUROSEMIDE 10 MG/ML
40 INJECTION INTRAMUSCULAR; INTRAVENOUS ONCE
Status: COMPLETED | OUTPATIENT
Start: 2020-01-01 | End: 2020-01-01

## 2020-01-01 RX ORDER — LEVALBUTEROL 1.25 MG/.5ML
1.25 SOLUTION, CONCENTRATE RESPIRATORY (INHALATION)
Qty: 90 VIAL | Refills: 5 | Status: SHIPPED | OUTPATIENT
Start: 2020-01-01 | End: 2020-01-01 | Stop reason: HOSPADM

## 2020-01-01 RX ORDER — LIDOCAINE 50 MG/G
1 PATCH TOPICAL DAILY
Status: COMPLETED | OUTPATIENT
Start: 2020-01-01 | End: 2020-01-01

## 2020-01-01 RX ORDER — ACETAMINOPHEN 325 MG/1
975 TABLET ORAL EVERY 8 HOURS SCHEDULED
Status: DISCONTINUED | OUTPATIENT
Start: 2020-01-01 | End: 2020-01-01

## 2020-01-01 RX ORDER — DOCUSATE SODIUM 100 MG/1
100 CAPSULE, LIQUID FILLED ORAL 2 TIMES DAILY
Qty: 10 CAPSULE | Refills: 0 | Status: SHIPPED | OUTPATIENT
Start: 2020-01-01 | End: 2020-01-01 | Stop reason: HOSPADM

## 2020-01-01 RX ORDER — SODIUM CHLORIDE FOR INHALATION 0.9 %
3 VIAL, NEBULIZER (ML) INHALATION
Status: DISCONTINUED | OUTPATIENT
Start: 2020-01-01 | End: 2020-01-01 | Stop reason: HOSPADM

## 2020-01-01 RX ORDER — ALPRAZOLAM 0.5 MG/1
0.5 TABLET ORAL 3 TIMES DAILY PRN
Qty: 90 TABLET | Refills: 0 | Status: ON HOLD | OUTPATIENT
Start: 2020-01-01 | End: 2020-01-01 | Stop reason: SDUPTHER

## 2020-01-01 RX ORDER — ALBUTEROL SULFATE 90 UG/1
2 AEROSOL, METERED RESPIRATORY (INHALATION) EVERY 4 HOURS PRN
Status: DISCONTINUED | OUTPATIENT
Start: 2020-01-01 | End: 2020-01-01 | Stop reason: HOSPADM

## 2020-01-01 RX ORDER — ALBUTEROL SULFATE 2.5 MG/3ML
2.5 SOLUTION RESPIRATORY (INHALATION) EVERY 4 HOURS PRN
Status: DISCONTINUED | OUTPATIENT
Start: 2020-01-01 | End: 2020-01-01 | Stop reason: HOSPADM

## 2020-01-01 RX ORDER — ALPRAZOLAM 0.5 MG/1
0.5 TABLET ORAL 3 TIMES DAILY PRN
Status: DISCONTINUED | OUTPATIENT
Start: 2020-01-01 | End: 2020-01-01

## 2020-01-01 RX ORDER — ALBUTEROL SULFATE 90 UG/1
2 AEROSOL, METERED RESPIRATORY (INHALATION) EVERY 4 HOURS PRN
Refills: 0
Start: 2020-01-01 | End: 2020-01-01

## 2020-01-01 RX ORDER — LIDOCAINE 50 MG/G
1 PATCH TOPICAL ONCE
Status: COMPLETED | OUTPATIENT
Start: 2020-01-01 | End: 2020-01-01

## 2020-01-01 RX ORDER — HYDROMORPHONE HCL/PF 1 MG/ML
0.2 SYRINGE (ML) INJECTION EVERY 4 HOURS PRN
Status: DISCONTINUED | OUTPATIENT
Start: 2020-01-01 | End: 2020-01-01 | Stop reason: HOSPADM

## 2020-01-01 RX ORDER — XYLITOL/YERBA SANTA
5 AEROSOL, SPRAY WITH PUMP (ML) MUCOUS MEMBRANE 4 TIMES DAILY PRN
Status: DISCONTINUED | OUTPATIENT
Start: 2020-01-01 | End: 2020-01-01 | Stop reason: HOSPADM

## 2020-01-01 RX ORDER — OXYCODONE HYDROCHLORIDE 5 MG/1
5 TABLET ORAL EVERY 4 HOURS PRN
Status: DISCONTINUED | OUTPATIENT
Start: 2020-01-01 | End: 2020-01-01 | Stop reason: HOSPADM

## 2020-01-01 RX ORDER — FUROSEMIDE 40 MG/1
20 TABLET ORAL
Status: DISCONTINUED | OUTPATIENT
Start: 2020-01-01 | End: 2020-01-01 | Stop reason: HOSPADM

## 2020-01-01 RX ORDER — ONDANSETRON 4 MG/1
4 TABLET, ORALLY DISINTEGRATING ORAL EVERY 8 HOURS PRN
Status: DISCONTINUED | OUTPATIENT
Start: 2020-01-01 | End: 2020-01-01 | Stop reason: HOSPADM

## 2020-01-01 RX ORDER — DOCUSATE SODIUM 100 MG/1
100 CAPSULE, LIQUID FILLED ORAL 2 TIMES DAILY
Status: DISCONTINUED | OUTPATIENT
Start: 2020-01-01 | End: 2020-01-01 | Stop reason: HOSPADM

## 2020-01-01 RX ORDER — ACETAMINOPHEN 325 MG/1
975 TABLET ORAL EVERY 8 HOURS SCHEDULED
Qty: 30 TABLET | Refills: 0 | Status: SHIPPED | OUTPATIENT
Start: 2020-01-01 | End: 2020-01-01 | Stop reason: HOSPADM

## 2020-01-01 RX ORDER — FUROSEMIDE 20 MG/1
20 TABLET ORAL 2 TIMES DAILY
Qty: 90 TABLET | Refills: 1 | Status: CANCELLED | OUTPATIENT
Start: 2020-01-01

## 2020-01-01 RX ORDER — AMLODIPINE BESYLATE 5 MG/1
5 TABLET ORAL DAILY
Status: DISCONTINUED | OUTPATIENT
Start: 2020-01-01 | End: 2020-01-01 | Stop reason: HOSPADM

## 2020-01-01 RX ORDER — ALPRAZOLAM 0.25 MG/1
0.25 TABLET ORAL 3 TIMES DAILY PRN
Status: DISCONTINUED | OUTPATIENT
Start: 2020-01-01 | End: 2020-01-01 | Stop reason: HOSPADM

## 2020-01-01 RX ORDER — PREDNISONE 20 MG/1
40 TABLET ORAL DAILY
Status: DISCONTINUED | OUTPATIENT
Start: 2020-01-01 | End: 2020-01-01

## 2020-01-01 RX ORDER — LEVALBUTEROL 1.25 MG/.5ML
1.25 SOLUTION, CONCENTRATE RESPIRATORY (INHALATION)
Refills: 0
Start: 2020-01-01 | End: 2020-01-01 | Stop reason: SDUPTHER

## 2020-01-01 RX ORDER — LEVALBUTEROL 1.25 MG/.5ML
1.25 SOLUTION, CONCENTRATE RESPIRATORY (INHALATION)
Status: DISCONTINUED | OUTPATIENT
Start: 2020-01-01 | End: 2020-01-01 | Stop reason: HOSPADM

## 2020-01-01 RX ORDER — NYSTATIN 100000 [USP'U]/G
POWDER TOPICAL 2 TIMES DAILY
Status: DISCONTINUED | OUTPATIENT
Start: 2020-01-01 | End: 2020-01-01 | Stop reason: HOSPADM

## 2020-01-01 RX ORDER — ISOSORBIDE MONONITRATE 30 MG/1
30 TABLET, EXTENDED RELEASE ORAL DAILY
Status: DISCONTINUED | OUTPATIENT
Start: 2020-01-01 | End: 2020-01-01 | Stop reason: HOSPADM

## 2020-01-01 RX ORDER — TRAMADOL HYDROCHLORIDE 50 MG/1
50 TABLET ORAL EVERY 6 HOURS PRN
Status: DISCONTINUED | OUTPATIENT
Start: 2020-01-01 | End: 2020-01-01 | Stop reason: HOSPADM

## 2020-01-01 RX ORDER — ALPRAZOLAM 0.25 MG/1
0.25 TABLET ORAL
Status: DISCONTINUED | OUTPATIENT
Start: 2020-01-01 | End: 2020-01-01 | Stop reason: HOSPADM

## 2020-01-01 RX ORDER — LIDOCAINE 50 MG/G
1 PATCH TOPICAL DAILY
Status: DISCONTINUED | OUTPATIENT
Start: 2020-01-01 | End: 2020-01-01 | Stop reason: HOSPADM

## 2020-01-01 RX ORDER — ALBUTEROL SULFATE 2.5 MG/3ML
2.5 SOLUTION RESPIRATORY (INHALATION) EVERY 6 HOURS PRN
Status: DISCONTINUED | OUTPATIENT
Start: 2020-01-01 | End: 2020-01-01 | Stop reason: HOSPADM

## 2020-01-01 RX ORDER — ATORVASTATIN CALCIUM 40 MG/1
TABLET, FILM COATED ORAL
Qty: 90 TABLET | Refills: 1 | Status: SHIPPED | OUTPATIENT
Start: 2020-01-01 | End: 2020-01-01 | Stop reason: HOSPADM

## 2020-01-01 RX ORDER — BISACODYL 10 MG
10 SUPPOSITORY, RECTAL RECTAL DAILY PRN
Status: DISCONTINUED | OUTPATIENT
Start: 2020-01-01 | End: 2020-01-01 | Stop reason: HOSPADM

## 2020-01-01 RX ORDER — IPRATROPIUM BROMIDE AND ALBUTEROL SULFATE 2.5; .5 MG/3ML; MG/3ML
3 SOLUTION RESPIRATORY (INHALATION)
Status: DISCONTINUED | OUTPATIENT
Start: 2020-01-01 | End: 2020-01-01

## 2020-01-01 RX ORDER — CEFUROXIME AXETIL 250 MG/1
500 TABLET ORAL EVERY 12 HOURS SCHEDULED
Status: DISCONTINUED | OUTPATIENT
Start: 2020-01-01 | End: 2020-01-01

## 2020-01-01 RX ORDER — GABAPENTIN 100 MG/1
100 CAPSULE ORAL 3 TIMES DAILY
Qty: 270 CAPSULE | Refills: 1 | Status: SHIPPED | OUTPATIENT
Start: 2020-01-01 | End: 2020-01-01 | Stop reason: HOSPADM

## 2020-01-01 RX ORDER — LIDOCAINE 50 MG/G
1 PATCH TOPICAL DAILY
Refills: 0
Start: 2020-01-01 | End: 2020-01-01 | Stop reason: HOSPADM

## 2020-01-01 RX ORDER — ACETAZOLAMIDE 500 MG/5ML
250 INJECTION, POWDER, LYOPHILIZED, FOR SOLUTION INTRAVENOUS EVERY 8 HOURS SCHEDULED
Status: COMPLETED | OUTPATIENT
Start: 2020-01-01 | End: 2020-01-01

## 2020-01-01 RX ORDER — GABAPENTIN 100 MG/1
100 CAPSULE ORAL 3 TIMES DAILY
Refills: 0
Start: 2020-01-01 | End: 2020-01-01

## 2020-01-01 RX ORDER — B-COMPLEX WITH VITAMIN C
1 TABLET ORAL
Status: DISCONTINUED | OUTPATIENT
Start: 2020-01-01 | End: 2020-01-01 | Stop reason: HOSPADM

## 2020-01-01 RX ORDER — NITROGLYCERIN 0.4 MG/1
0.4 TABLET SUBLINGUAL
Status: DISCONTINUED | OUTPATIENT
Start: 2020-01-01 | End: 2020-01-01 | Stop reason: HOSPADM

## 2020-01-01 RX ORDER — FUROSEMIDE 20 MG/1
TABLET ORAL
Qty: 120 TABLET | Refills: 0 | Status: SHIPPED | OUTPATIENT
Start: 2020-01-01 | End: 2020-01-01

## 2020-01-01 RX ORDER — FAMOTIDINE 20 MG/1
10 TABLET, FILM COATED ORAL 2 TIMES DAILY
Status: DISCONTINUED | OUTPATIENT
Start: 2020-01-01 | End: 2020-01-01 | Stop reason: HOSPADM

## 2020-01-01 RX ORDER — ONDANSETRON 2 MG/ML
4 INJECTION INTRAMUSCULAR; INTRAVENOUS ONCE
Status: COMPLETED | OUTPATIENT
Start: 2020-01-01 | End: 2020-01-01

## 2020-01-01 RX ORDER — FUROSEMIDE 20 MG/1
TABLET ORAL
Qty: 360 TABLET | Refills: 1 | Status: SHIPPED | OUTPATIENT
Start: 2020-01-01 | End: 2020-01-01 | Stop reason: HOSPADM

## 2020-01-01 RX ORDER — AMLODIPINE BESYLATE 2.5 MG/1
2.5 TABLET ORAL DAILY
Status: DISCONTINUED | OUTPATIENT
Start: 2020-01-01 | End: 2020-01-01

## 2020-01-01 RX ORDER — ONDANSETRON 4 MG/1
4 TABLET, FILM COATED ORAL EVERY 12 HOURS PRN
Qty: 60 TABLET | Refills: 1 | Status: SHIPPED | OUTPATIENT
Start: 2020-01-01 | End: 2020-01-01 | Stop reason: HOSPADM

## 2020-01-01 RX ORDER — LEVALBUTEROL 1.25 MG/.5ML
1.25 SOLUTION, CONCENTRATE RESPIRATORY (INHALATION)
Status: DISCONTINUED | OUTPATIENT
Start: 2020-01-01 | End: 2020-01-01

## 2020-01-01 RX ORDER — FUROSEMIDE 20 MG/1
TABLET ORAL
Qty: 360 TABLET | Refills: 1 | Status: SHIPPED | OUTPATIENT
Start: 2020-01-01 | End: 2020-01-01 | Stop reason: SDUPTHER

## 2020-01-01 RX ORDER — SODIUM CHLORIDE FOR INHALATION 0.9 %
3 VIAL, NEBULIZER (ML) INHALATION
Status: DISCONTINUED | OUTPATIENT
Start: 2020-01-01 | End: 2020-01-01

## 2020-01-01 RX ORDER — METHOCARBAMOL 500 MG/1
500 TABLET, FILM COATED ORAL EVERY 6 HOURS PRN
Status: DISCONTINUED | OUTPATIENT
Start: 2020-01-01 | End: 2020-01-01

## 2020-01-01 RX ORDER — POTASSIUM CHLORIDE 20 MEQ/1
20 TABLET, EXTENDED RELEASE ORAL 2 TIMES DAILY
Status: COMPLETED | OUTPATIENT
Start: 2020-01-01 | End: 2020-01-01

## 2020-01-01 RX ORDER — ALPRAZOLAM 0.25 MG/1
TABLET ORAL
COMMUNITY
Start: 2020-01-01 | End: 2020-01-01 | Stop reason: HOSPADM

## 2020-01-01 RX ORDER — POTASSIUM CHLORIDE 20 MEQ/1
20 TABLET, EXTENDED RELEASE ORAL DAILY
Refills: 0
Start: 2020-01-01 | End: 2020-01-01 | Stop reason: HOSPADM

## 2020-01-01 RX ORDER — AMOXICILLIN 250 MG
1 CAPSULE ORAL
Refills: 0 | Status: ON HOLD
Start: 2020-01-01 | End: 2020-01-01

## 2020-01-01 RX ORDER — CITALOPRAM 10 MG/1
10 TABLET ORAL
Qty: 90 TABLET | Refills: 0 | Status: SHIPPED | OUTPATIENT
Start: 2020-01-01 | End: 2020-01-01 | Stop reason: HOSPADM

## 2020-01-01 RX ORDER — B-COMPLEX WITH VITAMIN C
2 TABLET ORAL
Status: DISCONTINUED | OUTPATIENT
Start: 2020-01-01 | End: 2020-01-01 | Stop reason: HOSPADM

## 2020-01-01 RX ORDER — GABAPENTIN 100 MG/1
100 CAPSULE ORAL 3 TIMES DAILY PRN
Status: DISCONTINUED | OUTPATIENT
Start: 2020-01-01 | End: 2020-01-01 | Stop reason: HOSPADM

## 2020-01-01 RX ORDER — GUAIFENESIN 600 MG
600 TABLET, EXTENDED RELEASE 12 HR ORAL EVERY 12 HOURS SCHEDULED
Status: DISCONTINUED | OUTPATIENT
Start: 2020-01-01 | End: 2020-01-01 | Stop reason: HOSPADM

## 2020-01-01 RX ORDER — ALPRAZOLAM 0.5 MG/1
0.25 TABLET ORAL 3 TIMES DAILY PRN
Qty: 15 TABLET | Refills: 0 | Status: SHIPPED | OUTPATIENT
Start: 2020-01-01 | End: 2020-01-01

## 2020-01-01 RX ORDER — FUROSEMIDE 10 MG/ML
20 INJECTION INTRAMUSCULAR; INTRAVENOUS ONCE
Status: COMPLETED | OUTPATIENT
Start: 2020-01-01 | End: 2020-01-01

## 2020-01-01 RX ORDER — NYSTATIN 100000 [USP'U]/G
POWDER TOPICAL 2 TIMES DAILY
Qty: 15 G | Refills: 0 | Status: SHIPPED | OUTPATIENT
Start: 2020-01-01 | End: 2020-01-01 | Stop reason: HOSPADM

## 2020-01-01 RX ORDER — ACETAMINOPHEN 325 MG/1
975 TABLET ORAL EVERY 8 HOURS SCHEDULED
Status: DISCONTINUED | OUTPATIENT
Start: 2020-01-01 | End: 2020-01-01 | Stop reason: HOSPADM

## 2020-01-01 RX ORDER — ALBUTEROL SULFATE 2.5 MG/3ML
2.5 SOLUTION RESPIRATORY (INHALATION) EVERY 6 HOURS PRN
Refills: 0
Start: 2020-01-01 | End: 2020-01-01 | Stop reason: HOSPADM

## 2020-01-01 RX ORDER — POLYETHYLENE GLYCOL 3350 17 G/17G
17 POWDER, FOR SOLUTION ORAL DAILY
Qty: 14 EACH | Refills: 0 | Status: ON HOLD
Start: 2020-01-01 | End: 2020-01-01

## 2020-01-01 RX ORDER — CEFTRIAXONE 1 G/50ML
1000 INJECTION, SOLUTION INTRAVENOUS ONCE
Status: COMPLETED | OUTPATIENT
Start: 2020-01-01 | End: 2020-01-01

## 2020-01-01 RX ORDER — DEXTRIN 3 G/3.5 G
POWDER (GRAM) ORAL
COMMUNITY
End: 2020-01-01 | Stop reason: HOSPADM

## 2020-01-01 RX ORDER — LABETALOL 20 MG/4 ML (5 MG/ML) INTRAVENOUS SYRINGE
10 EVERY 6 HOURS PRN
Status: DISCONTINUED | OUTPATIENT
Start: 2020-01-01 | End: 2020-01-01 | Stop reason: HOSPADM

## 2020-01-01 RX ORDER — ALPRAZOLAM 0.25 MG/1
0.25 TABLET ORAL 2 TIMES DAILY PRN
Status: DISCONTINUED | OUTPATIENT
Start: 2020-01-01 | End: 2020-01-01

## 2020-01-01 RX ORDER — FUROSEMIDE 10 MG/ML
20 INJECTION INTRAMUSCULAR; INTRAVENOUS ONCE
Status: DISCONTINUED | OUTPATIENT
Start: 2020-01-01 | End: 2020-01-01

## 2020-01-01 RX ORDER — ALBUTEROL SULFATE 2.5 MG/3ML
2.5 SOLUTION RESPIRATORY (INHALATION) EVERY 6 HOURS PRN
Status: DISCONTINUED | OUTPATIENT
Start: 2020-01-01 | End: 2020-01-01

## 2020-01-01 RX ORDER — ACETAMINOPHEN 325 MG/1
650 TABLET ORAL
Status: DISCONTINUED | OUTPATIENT
Start: 2020-01-01 | End: 2020-01-01

## 2020-01-01 RX ORDER — AMLODIPINE BESYLATE 5 MG/1
5 TABLET ORAL DAILY
Qty: 30 TABLET | Refills: 0 | Status: SHIPPED | OUTPATIENT
Start: 2020-01-01 | End: 2020-01-01 | Stop reason: SDUPTHER

## 2020-01-01 RX ORDER — ATORVASTATIN CALCIUM 40 MG/1
40 TABLET, FILM COATED ORAL DAILY
Status: DISCONTINUED | OUTPATIENT
Start: 2020-01-01 | End: 2020-01-01 | Stop reason: HOSPADM

## 2020-01-01 RX ORDER — ONDANSETRON 4 MG/1
4 TABLET, ORALLY DISINTEGRATING ORAL EVERY 6 HOURS PRN
Status: DISCONTINUED | OUTPATIENT
Start: 2020-01-01 | End: 2020-01-01 | Stop reason: HOSPADM

## 2020-01-01 RX ORDER — ALPRAZOLAM 0.25 MG/1
0.25 TABLET ORAL ONCE AS NEEDED
Status: COMPLETED | OUTPATIENT
Start: 2020-01-01 | End: 2020-01-01

## 2020-01-01 RX ORDER — FUROSEMIDE 40 MG/1
40 TABLET ORAL DAILY
Status: DISCONTINUED | OUTPATIENT
Start: 2020-01-01 | End: 2020-01-01

## 2020-01-01 RX ORDER — ALPRAZOLAM 0.5 MG/1
0.25 TABLET ORAL 3 TIMES DAILY PRN
Qty: 15 TABLET | Refills: 0 | Status: ON HOLD | OUTPATIENT
Start: 2020-01-01 | End: 2020-01-01 | Stop reason: SDUPTHER

## 2020-01-01 RX ORDER — CEFTRIAXONE 1 G/50ML
1000 INJECTION, SOLUTION INTRAVENOUS EVERY 24 HOURS
Status: DISCONTINUED | OUTPATIENT
Start: 2020-01-01 | End: 2020-01-01

## 2020-01-01 RX ORDER — FAMOTIDINE 10 MG
10 TABLET ORAL DAILY
Refills: 0
Start: 2020-01-01 | End: 2020-01-01 | Stop reason: HOSPADM

## 2020-01-01 RX ORDER — MORPHINE SULFATE 4 MG/ML
3 INJECTION, SOLUTION INTRAMUSCULAR; INTRAVENOUS ONCE
Status: COMPLETED | OUTPATIENT
Start: 2020-01-01 | End: 2020-01-01

## 2020-01-01 RX ORDER — B-COMPLEX WITH VITAMIN C
1 TABLET ORAL 2 TIMES DAILY WITH MEALS
Status: DISCONTINUED | OUTPATIENT
Start: 2020-01-01 | End: 2020-01-01 | Stop reason: HOSPADM

## 2020-01-01 RX ORDER — ASPIRIN 81 MG/1
81 TABLET, CHEWABLE ORAL DAILY
COMMUNITY
End: 2020-01-01 | Stop reason: HOSPADM

## 2020-01-01 RX ORDER — LORAZEPAM 2 MG/ML
1 INJECTION INTRAMUSCULAR ONCE
Status: COMPLETED | OUTPATIENT
Start: 2020-01-01 | End: 2020-01-01

## 2020-01-01 RX ORDER — POLYETHYLENE GLYCOL 3350 17 G/17G
17 POWDER, FOR SOLUTION ORAL DAILY PRN
Status: DISCONTINUED | OUTPATIENT
Start: 2020-01-01 | End: 2020-01-01

## 2020-01-01 RX ORDER — DOCUSATE SODIUM 100 MG/1
100 CAPSULE, LIQUID FILLED ORAL 2 TIMES DAILY
Qty: 10 CAPSULE | Refills: 0 | Status: SHIPPED | OUTPATIENT
Start: 2020-01-01 | End: 2020-01-01 | Stop reason: SDUPTHER

## 2020-01-01 RX ORDER — AZITHROMYCIN 250 MG/1
500 TABLET, FILM COATED ORAL EVERY 24 HOURS
Status: DISCONTINUED | OUTPATIENT
Start: 2020-01-01 | End: 2020-01-01 | Stop reason: SDUPTHER

## 2020-01-01 RX ORDER — ASPIRIN 325 MG
325 TABLET ORAL ONCE
Status: COMPLETED | OUTPATIENT
Start: 2020-01-01 | End: 2020-01-01

## 2020-01-01 RX ORDER — FUROSEMIDE 10 MG/ML
40 INJECTION INTRAMUSCULAR; INTRAVENOUS
Status: COMPLETED | OUTPATIENT
Start: 2020-01-01 | End: 2020-01-01

## 2020-01-01 RX ORDER — GABAPENTIN 100 MG/1
100 CAPSULE ORAL 3 TIMES DAILY
Refills: 0 | Status: ON HOLD
Start: 2020-01-01 | End: 2020-01-01 | Stop reason: SDUPTHER

## 2020-01-01 RX ORDER — AMOXICILLIN 250 MG
1 CAPSULE ORAL
Refills: 0
Start: 2020-01-01 | End: 2020-01-01 | Stop reason: HOSPADM

## 2020-01-01 RX ORDER — METOPROLOL SUCCINATE 25 MG/1
25 TABLET, EXTENDED RELEASE ORAL DAILY
Qty: 90 TABLET | Refills: 0 | Status: SHIPPED | OUTPATIENT
Start: 2020-01-01 | End: 2020-01-01 | Stop reason: HOSPADM

## 2020-01-01 RX ORDER — HEPARIN SODIUM 5000 [USP'U]/ML
5000 INJECTION, SOLUTION INTRAVENOUS; SUBCUTANEOUS EVERY 8 HOURS SCHEDULED
Status: DISCONTINUED | OUTPATIENT
Start: 2020-01-01 | End: 2020-01-01 | Stop reason: HOSPADM

## 2020-01-01 RX ORDER — OXYCODONE HYDROCHLORIDE 5 MG/1
2.5 TABLET ORAL EVERY 4 HOURS PRN
Status: DISCONTINUED | OUTPATIENT
Start: 2020-01-01 | End: 2020-01-01

## 2020-01-01 RX ORDER — GUAIFENESIN 1200 MG/1
1200 TABLET, EXTENDED RELEASE ORAL EVERY 12 HOURS SCHEDULED
Refills: 0
Start: 2020-01-01 | End: 2020-01-01 | Stop reason: HOSPADM

## 2020-01-01 RX ORDER — CALCIUM CARBONATE 200(500)MG
1 TABLET,CHEWABLE ORAL DAILY
COMMUNITY
End: 2020-01-01 | Stop reason: HOSPADM

## 2020-01-01 RX ORDER — CALCIUM CARBONATE 200(500)MG
500 TABLET,CHEWABLE ORAL DAILY
Status: DISCONTINUED | OUTPATIENT
Start: 2020-01-01 | End: 2020-01-01 | Stop reason: HOSPADM

## 2020-01-01 RX ORDER — HYDRALAZINE HYDROCHLORIDE 20 MG/ML
5 INJECTION INTRAMUSCULAR; INTRAVENOUS EVERY 6 HOURS PRN
Status: DISCONTINUED | OUTPATIENT
Start: 2020-01-01 | End: 2020-01-01

## 2020-01-01 RX ORDER — ACETAMINOPHEN 325 MG/1
500 TABLET ORAL DAILY
Status: DISCONTINUED | OUTPATIENT
Start: 2020-01-01 | End: 2020-01-01 | Stop reason: HOSPADM

## 2020-01-01 RX ORDER — CITALOPRAM 10 MG/1
10 TABLET ORAL
Status: DISCONTINUED | OUTPATIENT
Start: 2020-01-01 | End: 2020-01-01

## 2020-01-01 RX ORDER — POLYETHYLENE GLYCOL 3350 17 G/17G
17 POWDER, FOR SOLUTION ORAL DAILY
Qty: 14 EACH | Refills: 0
Start: 2020-01-01 | End: 2020-01-01 | Stop reason: HOSPADM

## 2020-01-01 RX ORDER — ACETAMINOPHEN 325 MG/1
650 TABLET ORAL EVERY 6 HOURS PRN
Qty: 30 TABLET | Refills: 0 | Status: SHIPPED | OUTPATIENT
Start: 2020-01-01 | End: 2020-01-01 | Stop reason: HOSPADM

## 2020-01-01 RX ORDER — FUROSEMIDE 20 MG/1
20 TABLET ORAL 2 TIMES DAILY
Qty: 90 TABLET | Refills: 0 | Status: SHIPPED | OUTPATIENT
Start: 2020-01-01 | End: 2020-01-01 | Stop reason: SDUPTHER

## 2020-01-01 RX ORDER — FUROSEMIDE 10 MG/ML
80 INJECTION INTRAMUSCULAR; INTRAVENOUS
Status: DISCONTINUED | OUTPATIENT
Start: 2020-01-01 | End: 2020-01-01 | Stop reason: HOSPADM

## 2020-01-01 RX ORDER — ALPRAZOLAM 0.5 MG/1
0.25 TABLET ORAL 3 TIMES DAILY PRN
Qty: 15 TABLET | Refills: 0 | OUTPATIENT
Start: 2020-01-01 | End: 2020-01-01

## 2020-01-01 RX ORDER — METHOCARBAMOL 500 MG/1
500 TABLET, FILM COATED ORAL EVERY 8 HOURS PRN
Status: DISCONTINUED | OUTPATIENT
Start: 2020-01-01 | End: 2020-01-01

## 2020-01-01 RX ORDER — BISACODYL 10 MG
10 SUPPOSITORY, RECTAL RECTAL DAILY PRN
Status: DISCONTINUED | OUTPATIENT
Start: 2020-01-01 | End: 2020-01-01

## 2020-01-01 RX ORDER — NITROGLYCERIN 0.4 MG/1
0.4 TABLET SUBLINGUAL
Refills: 0
Start: 2020-01-01 | End: 2020-01-01 | Stop reason: HOSPADM

## 2020-01-01 RX ORDER — ACETAMINOPHEN 325 MG/1
650 TABLET ORAL EVERY 8 HOURS SCHEDULED
Status: DISCONTINUED | OUTPATIENT
Start: 2020-01-01 | End: 2020-01-01

## 2020-01-01 RX ORDER — OXYCODONE HYDROCHLORIDE 5 MG/1
5 TABLET ORAL ONCE
Status: COMPLETED | OUTPATIENT
Start: 2020-01-01 | End: 2020-01-01

## 2020-01-01 RX ORDER — GABAPENTIN 100 MG/1
100 CAPSULE ORAL 3 TIMES DAILY
Status: DISCONTINUED | OUTPATIENT
Start: 2020-01-01 | End: 2020-01-01

## 2020-01-01 RX ORDER — GABAPENTIN 100 MG/1
CAPSULE ORAL
Qty: 90 CAPSULE | Refills: 0 | Status: SHIPPED | OUTPATIENT
Start: 2020-01-01 | End: 2020-01-01 | Stop reason: SDUPTHER

## 2020-01-01 RX ORDER — LEVALBUTEROL 1.25 MG/.5ML
1.25 SOLUTION, CONCENTRATE RESPIRATORY (INHALATION) 2 TIMES DAILY
Status: DISCONTINUED | OUTPATIENT
Start: 2020-01-01 | End: 2020-01-01

## 2020-01-01 RX ORDER — AMLODIPINE BESYLATE 5 MG/1
5 TABLET ORAL DAILY
Qty: 90 TABLET | Refills: 1 | Status: SHIPPED | OUTPATIENT
Start: 2020-01-01 | End: 2020-01-01 | Stop reason: HOSPADM

## 2020-01-01 RX ORDER — METHOCARBAMOL 500 MG/1
500 TABLET, FILM COATED ORAL EVERY 6 HOURS PRN
Refills: 0 | Status: ON HOLD
Start: 2020-01-01 | End: 2020-01-01 | Stop reason: SDUPTHER

## 2020-01-01 RX ORDER — ASPIRIN 81 MG/1
324 TABLET, CHEWABLE ORAL ONCE
Status: COMPLETED | OUTPATIENT
Start: 2020-01-01 | End: 2020-01-01

## 2020-01-01 RX ORDER — FUROSEMIDE 20 MG/1
20 TABLET ORAL
Status: DISCONTINUED | OUTPATIENT
Start: 2020-01-01 | End: 2020-01-01 | Stop reason: HOSPADM

## 2020-01-01 RX ORDER — METHOCARBAMOL 500 MG/1
500 TABLET, FILM COATED ORAL EVERY 6 HOURS PRN
Refills: 0
Start: 2020-01-01 | End: 2020-01-01 | Stop reason: HOSPADM

## 2020-01-01 RX ORDER — OXYCODONE HYDROCHLORIDE 5 MG/1
5 TABLET ORAL EVERY 4 HOURS PRN
Status: DISCONTINUED | OUTPATIENT
Start: 2020-01-01 | End: 2020-01-01

## 2020-01-01 RX ORDER — ALPRAZOLAM 0.25 MG/1
0.25 TABLET ORAL 2 TIMES DAILY
Status: DISCONTINUED | OUTPATIENT
Start: 2020-01-01 | End: 2020-01-01 | Stop reason: HOSPADM

## 2020-01-01 RX ADMIN — LEVALBUTEROL HYDROCHLORIDE 1.25 MG: 1.25 SOLUTION, CONCENTRATE RESPIRATORY (INHALATION) at 19:40

## 2020-01-01 RX ADMIN — POLYETHYLENE GLYCOL 3350 17 G: 17 POWDER, FOR SOLUTION ORAL at 10:26

## 2020-01-01 RX ADMIN — DOCUSATE SODIUM 100 MG: 100 CAPSULE, LIQUID FILLED ORAL at 18:44

## 2020-01-01 RX ADMIN — METRONIDAZOLE 500 MG: 500 INJECTION, SOLUTION INTRAVENOUS at 11:29

## 2020-01-01 RX ADMIN — METOPROLOL TARTRATE 25 MG: 25 TABLET ORAL at 08:04

## 2020-01-01 RX ADMIN — NYSTATIN: 100000 POWDER TOPICAL at 08:20

## 2020-01-01 RX ADMIN — Medication 1 TABLET: at 17:15

## 2020-01-01 RX ADMIN — Medication 1 TABLET: at 09:10

## 2020-01-01 RX ADMIN — FAMOTIDINE 10 MG: 20 TABLET ORAL at 18:06

## 2020-01-01 RX ADMIN — GUAIFENESIN 600 MG: 600 TABLET, EXTENDED RELEASE ORAL at 05:38

## 2020-01-01 RX ADMIN — ISODIUM CHLORIDE 3 ML: 0.03 SOLUTION RESPIRATORY (INHALATION) at 18:10

## 2020-01-01 RX ADMIN — ALPRAZOLAM 0.25 MG: 0.25 TABLET ORAL at 08:15

## 2020-01-01 RX ADMIN — FAMOTIDINE 10 MG: 20 TABLET ORAL at 09:16

## 2020-01-01 RX ADMIN — ENOXAPARIN SODIUM 30 MG: 30 INJECTION SUBCUTANEOUS at 22:03

## 2020-01-01 RX ADMIN — ALPRAZOLAM 0.25 MG: 0.25 TABLET ORAL at 21:11

## 2020-01-01 RX ADMIN — METOPROLOL TARTRATE 25 MG: 25 TABLET, FILM COATED ORAL at 21:41

## 2020-01-01 RX ADMIN — ONDANSETRON 4 MG: 4 TABLET, ORALLY DISINTEGRATING ORAL at 08:59

## 2020-01-01 RX ADMIN — Medication 1 TABLET: at 08:25

## 2020-01-01 RX ADMIN — ISODIUM CHLORIDE 3 ML: 0.03 SOLUTION RESPIRATORY (INHALATION) at 20:55

## 2020-01-01 RX ADMIN — IPRATROPIUM BROMIDE 0.5 MG: 0.5 SOLUTION RESPIRATORY (INHALATION) at 13:32

## 2020-01-01 RX ADMIN — HEPARIN SODIUM 5000 UNITS: 5000 INJECTION INTRAVENOUS; SUBCUTANEOUS at 13:39

## 2020-01-01 RX ADMIN — METHOCARBAMOL 500 MG: 500 TABLET, FILM COATED ORAL at 21:18

## 2020-01-01 RX ADMIN — ACETAMINOPHEN 975 MG: 325 TABLET ORAL at 22:01

## 2020-01-01 RX ADMIN — GUAIFENESIN 600 MG: 600 TABLET, EXTENDED RELEASE ORAL at 17:15

## 2020-01-01 RX ADMIN — FUROSEMIDE 40 MG: 10 INJECTION, SOLUTION INTRAMUSCULAR; INTRAVENOUS at 17:18

## 2020-01-01 RX ADMIN — ISODIUM CHLORIDE 3 ML: 0.03 SOLUTION RESPIRATORY (INHALATION) at 13:10

## 2020-01-01 RX ADMIN — FAMOTIDINE 10 MG: 20 TABLET ORAL at 17:37

## 2020-01-01 RX ADMIN — OXYCODONE HYDROCHLORIDE 5 MG: 5 TABLET ORAL at 06:35

## 2020-01-01 RX ADMIN — DOCUSATE SODIUM 100 MG: 100 CAPSULE, LIQUID FILLED ORAL at 08:51

## 2020-01-01 RX ADMIN — HEPARIN SODIUM 5000 UNITS: 5000 INJECTION INTRAVENOUS; SUBCUTANEOUS at 05:38

## 2020-01-01 RX ADMIN — SENNOSIDES AND DOCUSATE SODIUM 1 TABLET: 8.6; 5 TABLET ORAL at 21:21

## 2020-01-01 RX ADMIN — FAMOTIDINE 10 MG: 20 TABLET, FILM COATED ORAL at 08:52

## 2020-01-01 RX ADMIN — GUAIFENESIN 600 MG: 600 TABLET, EXTENDED RELEASE ORAL at 18:03

## 2020-01-01 RX ADMIN — Medication 1 TABLET: at 09:50

## 2020-01-01 RX ADMIN — TRAMADOL HYDROCHLORIDE 50 MG: 50 TABLET, COATED ORAL at 02:50

## 2020-01-01 RX ADMIN — DOCUSATE SODIUM 100 MG: 100 CAPSULE, LIQUID FILLED ORAL at 17:15

## 2020-01-01 RX ADMIN — ENOXAPARIN SODIUM 30 MG: 30 INJECTION SUBCUTANEOUS at 21:16

## 2020-01-01 RX ADMIN — ACETAMINOPHEN 975 MG: 325 TABLET ORAL at 05:49

## 2020-01-01 RX ADMIN — ALPRAZOLAM 0.25 MG: 0.25 TABLET ORAL at 21:21

## 2020-01-01 RX ADMIN — ACETAMINOPHEN 975 MG: 325 TABLET ORAL at 22:11

## 2020-01-01 RX ADMIN — TOLVAPTAN 15 MG: 15 TABLET ORAL at 14:55

## 2020-01-01 RX ADMIN — FUROSEMIDE 20 MG: 20 TABLET ORAL at 16:35

## 2020-01-01 RX ADMIN — LEVALBUTEROL 1.25 MG: 1.25 SOLUTION, CONCENTRATE RESPIRATORY (INHALATION) at 08:05

## 2020-01-01 RX ADMIN — OXYCODONE HYDROCHLORIDE 2.5 MG: 5 TABLET ORAL at 03:48

## 2020-01-01 RX ADMIN — GUAIFENESIN 1200 MG: 600 TABLET ORAL at 21:11

## 2020-01-01 RX ADMIN — ALBUTEROL SULFATE 2 PUFF: 90 AEROSOL, METERED RESPIRATORY (INHALATION) at 00:00

## 2020-01-01 RX ADMIN — METOPROLOL TARTRATE 25 MG: 25 TABLET ORAL at 00:53

## 2020-01-01 RX ADMIN — FUROSEMIDE 20 MG: 20 TABLET ORAL at 08:24

## 2020-01-01 RX ADMIN — DOCUSATE SODIUM 100 MG: 100 CAPSULE, LIQUID FILLED ORAL at 08:05

## 2020-01-01 RX ADMIN — TRAMADOL HYDROCHLORIDE 50 MG: 50 TABLET, COATED ORAL at 04:04

## 2020-01-01 RX ADMIN — SENNOSIDES AND DOCUSATE SODIUM 1 TABLET: 8.6; 5 TABLET ORAL at 21:44

## 2020-01-01 RX ADMIN — IPRATROPIUM BROMIDE 0.5 MG: 0.5 SOLUTION RESPIRATORY (INHALATION) at 19:45

## 2020-01-01 RX ADMIN — NYSTATIN: 100000 POWDER TOPICAL at 08:28

## 2020-01-01 RX ADMIN — ASPIRIN 81 MG 81 MG: 81 TABLET ORAL at 09:34

## 2020-01-01 RX ADMIN — CEFTRIAXONE SODIUM 1000 MG: 10 INJECTION, POWDER, FOR SOLUTION INTRAVENOUS at 12:50

## 2020-01-01 RX ADMIN — DOCUSATE SODIUM 100 MG: 100 CAPSULE, LIQUID FILLED ORAL at 17:03

## 2020-01-01 RX ADMIN — FAMOTIDINE 10 MG: 20 TABLET ORAL at 08:39

## 2020-01-01 RX ADMIN — ACETAMINOPHEN 975 MG: 325 TABLET ORAL at 05:36

## 2020-01-01 RX ADMIN — DOCUSATE SODIUM 100 MG: 100 CAPSULE, LIQUID FILLED ORAL at 17:21

## 2020-01-01 RX ADMIN — Medication 5 SPRAY: at 06:33

## 2020-01-01 RX ADMIN — ATORVASTATIN CALCIUM 40 MG: 40 TABLET, FILM COATED ORAL at 18:07

## 2020-01-01 RX ADMIN — OSELTAMIVIR PHOSPHATE 30 MG: 30 CAPSULE ORAL at 22:01

## 2020-01-01 RX ADMIN — IPRATROPIUM BROMIDE 0.5 MG: 0.5 SOLUTION RESPIRATORY (INHALATION) at 15:00

## 2020-01-01 RX ADMIN — ACETAMINOPHEN 975 MG: 325 TABLET ORAL at 05:08

## 2020-01-01 RX ADMIN — CALCIUM CARBONATE-VITAMIN D TAB 500 MG-200 UNIT 2 TABLET: 500-200 TAB at 07:26

## 2020-01-01 RX ADMIN — METOPROLOL TARTRATE 25 MG: 25 TABLET, FILM COATED ORAL at 08:51

## 2020-01-01 RX ADMIN — LEVALBUTEROL 1.25 MG: 1.25 SOLUTION, CONCENTRATE RESPIRATORY (INHALATION) at 19:32

## 2020-01-01 RX ADMIN — SENNOSIDES AND DOCUSATE SODIUM 1 TABLET: 8.6; 5 TABLET ORAL at 20:19

## 2020-01-01 RX ADMIN — ISODIUM CHLORIDE 3 ML: 0.03 SOLUTION RESPIRATORY (INHALATION) at 07:24

## 2020-01-01 RX ADMIN — IPRATROPIUM BROMIDE 0.5 MG: 0.5 SOLUTION RESPIRATORY (INHALATION) at 08:22

## 2020-01-01 RX ADMIN — ATORVASTATIN CALCIUM 40 MG: 40 TABLET, FILM COATED ORAL at 18:43

## 2020-01-01 RX ADMIN — TRAMADOL HYDROCHLORIDE 50 MG: 50 TABLET, COATED ORAL at 17:20

## 2020-01-01 RX ADMIN — ACETAMINOPHEN 975 MG: 325 TABLET ORAL at 21:08

## 2020-01-01 RX ADMIN — ACETAMINOPHEN 650 MG: 325 TABLET ORAL at 08:03

## 2020-01-01 RX ADMIN — FUROSEMIDE 20 MG: 20 TABLET ORAL at 08:27

## 2020-01-01 RX ADMIN — FAMOTIDINE 10 MG: 20 TABLET ORAL at 09:50

## 2020-01-01 RX ADMIN — LEVALBUTEROL HYDROCHLORIDE 1.25 MG: 1.25 SOLUTION, CONCENTRATE RESPIRATORY (INHALATION) at 08:24

## 2020-01-01 RX ADMIN — FUROSEMIDE 40 MG: 10 INJECTION, SOLUTION INTRAMUSCULAR; INTRAVENOUS at 16:18

## 2020-01-01 RX ADMIN — HEPARIN SODIUM 5000 UNITS: 5000 INJECTION INTRAVENOUS; SUBCUTANEOUS at 22:10

## 2020-01-01 RX ADMIN — GUAIFENESIN 600 MG: 600 TABLET, EXTENDED RELEASE ORAL at 05:15

## 2020-01-01 RX ADMIN — LEVALBUTEROL HYDROCHLORIDE 1.25 MG: 1.25 SOLUTION, CONCENTRATE RESPIRATORY (INHALATION) at 20:08

## 2020-01-01 RX ADMIN — IPRATROPIUM BROMIDE 0.5 MG: 0.5 SOLUTION RESPIRATORY (INHALATION) at 13:59

## 2020-01-01 RX ADMIN — ALBUTEROL SULFATE 2 PUFF: 90 AEROSOL, METERED RESPIRATORY (INHALATION) at 15:10

## 2020-01-01 RX ADMIN — ACETAMINOPHEN 975 MG: 325 TABLET ORAL at 14:28

## 2020-01-01 RX ADMIN — LEVALBUTEROL HYDROCHLORIDE 1.25 MG: 1.25 SOLUTION, CONCENTRATE RESPIRATORY (INHALATION) at 07:56

## 2020-01-01 RX ADMIN — METRONIDAZOLE 500 MG: 500 INJECTION, SOLUTION INTRAVENOUS at 11:47

## 2020-01-01 RX ADMIN — OXYCODONE HYDROCHLORIDE 2.5 MG: 5 TABLET ORAL at 08:47

## 2020-01-01 RX ADMIN — POLYETHYLENE GLYCOL 3350 17 G: 17 POWDER, FOR SOLUTION ORAL at 08:24

## 2020-01-01 RX ADMIN — DOCUSATE SODIUM 100 MG: 100 CAPSULE, LIQUID FILLED ORAL at 17:18

## 2020-01-01 RX ADMIN — ISODIUM CHLORIDE 3 ML: 0.03 SOLUTION RESPIRATORY (INHALATION) at 19:03

## 2020-01-01 RX ADMIN — WATER 10 ML: 1 INJECTION INTRAMUSCULAR; INTRAVENOUS; SUBCUTANEOUS at 22:07

## 2020-01-01 RX ADMIN — FUROSEMIDE 20 MG: 20 TABLET ORAL at 08:16

## 2020-01-01 RX ADMIN — ACETAMINOPHEN 650 MG: 325 TABLET ORAL at 20:48

## 2020-01-01 RX ADMIN — SENNOSIDES AND DOCUSATE SODIUM 1 TABLET: 8.6; 5 TABLET ORAL at 00:52

## 2020-01-01 RX ADMIN — POLYETHYLENE GLYCOL 3350 17 G: 17 POWDER, FOR SOLUTION ORAL at 08:53

## 2020-01-01 RX ADMIN — METOPROLOL TARTRATE 25 MG: 25 TABLET, FILM COATED ORAL at 21:06

## 2020-01-01 RX ADMIN — LEVALBUTEROL HYDROCHLORIDE 1.25 MG: 1.25 SOLUTION, CONCENTRATE RESPIRATORY (INHALATION) at 13:10

## 2020-01-01 RX ADMIN — ATORVASTATIN CALCIUM 40 MG: 40 TABLET, FILM COATED ORAL at 15:32

## 2020-01-01 RX ADMIN — DOCUSATE SODIUM 100 MG: 100 CAPSULE, LIQUID FILLED ORAL at 17:46

## 2020-01-01 RX ADMIN — POLYETHYLENE GLYCOL 3350 17 G: 17 POWDER, FOR SOLUTION ORAL at 09:22

## 2020-01-01 RX ADMIN — FUROSEMIDE 40 MG: 10 INJECTION, SOLUTION INTRAMUSCULAR; INTRAVENOUS at 15:36

## 2020-01-01 RX ADMIN — GABAPENTIN 100 MG: 100 CAPSULE ORAL at 17:15

## 2020-01-01 RX ADMIN — TRAMADOL HYDROCHLORIDE 50 MG: 50 TABLET, COATED ORAL at 21:00

## 2020-01-01 RX ADMIN — DOCUSATE SODIUM 100 MG: 100 CAPSULE, LIQUID FILLED ORAL at 09:22

## 2020-01-01 RX ADMIN — AZITHROMYCIN 500 MG: 500 INJECTION, POWDER, LYOPHILIZED, FOR SOLUTION INTRAVENOUS at 00:31

## 2020-01-01 RX ADMIN — ATORVASTATIN CALCIUM 40 MG: 40 TABLET, FILM COATED ORAL at 17:25

## 2020-01-01 RX ADMIN — LEVALBUTEROL HYDROCHLORIDE 1.25 MG: 1.25 SOLUTION, CONCENTRATE RESPIRATORY (INHALATION) at 20:55

## 2020-01-01 RX ADMIN — ENOXAPARIN SODIUM 30 MG: 30 INJECTION SUBCUTANEOUS at 21:36

## 2020-01-01 RX ADMIN — ONDANSETRON 4 MG: 2 INJECTION INTRAMUSCULAR; INTRAVENOUS at 12:57

## 2020-01-01 RX ADMIN — NYSTATIN 1 APPLICATION: 100000 POWDER TOPICAL at 17:25

## 2020-01-01 RX ADMIN — Medication 1 TABLET: at 15:32

## 2020-01-01 RX ADMIN — NYSTATIN: 100000 POWDER TOPICAL at 08:48

## 2020-01-01 RX ADMIN — ONDANSETRON 4 MG: 4 TABLET, ORALLY DISINTEGRATING ORAL at 09:01

## 2020-01-01 RX ADMIN — LEVALBUTEROL HYDROCHLORIDE 1.25 MG: 1.25 SOLUTION, CONCENTRATE RESPIRATORY (INHALATION) at 19:31

## 2020-01-01 RX ADMIN — ENOXAPARIN SODIUM 30 MG: 30 INJECTION SUBCUTANEOUS at 09:45

## 2020-01-01 RX ADMIN — IPRATROPIUM BROMIDE 0.5 MG: 0.5 SOLUTION RESPIRATORY (INHALATION) at 20:02

## 2020-01-01 RX ADMIN — ASPIRIN 325 MG: 325 TABLET, FILM COATED ORAL at 09:42

## 2020-01-01 RX ADMIN — NYSTATIN: 100000 POWDER TOPICAL at 10:26

## 2020-01-01 RX ADMIN — DOCUSATE SODIUM 100 MG: 100 CAPSULE, LIQUID FILLED ORAL at 09:49

## 2020-01-01 RX ADMIN — LIDOCAINE 1 PATCH: 50 PATCH CUTANEOUS at 09:18

## 2020-01-01 RX ADMIN — FAMOTIDINE 10 MG: 20 TABLET, FILM COATED ORAL at 10:27

## 2020-01-01 RX ADMIN — GUAIFENESIN 1200 MG: 600 TABLET ORAL at 08:51

## 2020-01-01 RX ADMIN — FAMOTIDINE 10 MG: 20 TABLET ORAL at 08:28

## 2020-01-01 RX ADMIN — LIDOCAINE 1 PATCH: 50 PATCH CUTANEOUS at 09:10

## 2020-01-01 RX ADMIN — ACETAMINOPHEN 975 MG: 325 TABLET ORAL at 21:06

## 2020-01-01 RX ADMIN — OXYCODONE HYDROCHLORIDE 2.5 MG: 5 TABLET ORAL at 03:26

## 2020-01-01 RX ADMIN — LEVALBUTEROL HYDROCHLORIDE 1.25 MG: 1.25 SOLUTION, CONCENTRATE RESPIRATORY (INHALATION) at 13:49

## 2020-01-01 RX ADMIN — METOPROLOL TARTRATE 25 MG: 25 TABLET, FILM COATED ORAL at 08:11

## 2020-01-01 RX ADMIN — FUROSEMIDE 40 MG: 10 INJECTION, SOLUTION INTRAMUSCULAR; INTRAVENOUS at 09:50

## 2020-01-01 RX ADMIN — ALPRAZOLAM 0.25 MG: 0.25 TABLET ORAL at 08:37

## 2020-01-01 RX ADMIN — ALBUTEROL SULFATE 2 PUFF: 90 AEROSOL, METERED RESPIRATORY (INHALATION) at 05:50

## 2020-01-01 RX ADMIN — ALPRAZOLAM 0.25 MG: 0.25 TABLET ORAL at 21:53

## 2020-01-01 RX ADMIN — IPRATROPIUM BROMIDE 0.5 MG: 0.5 SOLUTION RESPIRATORY (INHALATION) at 07:56

## 2020-01-01 RX ADMIN — METOPROLOL TARTRATE 25 MG: 25 TABLET, FILM COATED ORAL at 21:11

## 2020-01-01 RX ADMIN — METOPROLOL TARTRATE 25 MG: 25 TABLET ORAL at 08:41

## 2020-01-01 RX ADMIN — FUROSEMIDE 20 MG: 20 TABLET ORAL at 17:45

## 2020-01-01 RX ADMIN — ACETAMINOPHEN 975 MG: 325 TABLET ORAL at 05:23

## 2020-01-01 RX ADMIN — CEFUROXIME AXETIL 500 MG: 250 TABLET ORAL at 21:41

## 2020-01-01 RX ADMIN — METOPROLOL SUCCINATE 25 MG: 25 TABLET, EXTENDED RELEASE ORAL at 08:05

## 2020-01-01 RX ADMIN — GABAPENTIN 100 MG: 100 CAPSULE ORAL at 16:20

## 2020-01-01 RX ADMIN — ALBUTEROL SULFATE 2 PUFF: 90 AEROSOL, METERED RESPIRATORY (INHALATION) at 05:19

## 2020-01-01 RX ADMIN — LEVALBUTEROL HYDROCHLORIDE 1.25 MG: 1.25 SOLUTION, CONCENTRATE RESPIRATORY (INHALATION) at 07:45

## 2020-01-01 RX ADMIN — FAMOTIDINE 10 MG: 20 TABLET ORAL at 08:10

## 2020-01-01 RX ADMIN — METOPROLOL TARTRATE 25 MG: 25 TABLET ORAL at 22:12

## 2020-01-01 RX ADMIN — IPRATROPIUM BROMIDE 0.5 MG: 0.5 SOLUTION RESPIRATORY (INHALATION) at 13:45

## 2020-01-01 RX ADMIN — FAMOTIDINE 10 MG: 20 TABLET ORAL at 08:55

## 2020-01-01 RX ADMIN — FUROSEMIDE 20 MG: 20 TABLET ORAL at 08:55

## 2020-01-01 RX ADMIN — AMLODIPINE BESYLATE 5 MG: 5 TABLET ORAL at 17:48

## 2020-01-01 RX ADMIN — ENOXAPARIN SODIUM 30 MG: 30 INJECTION SUBCUTANEOUS at 08:52

## 2020-01-01 RX ADMIN — METHOCARBAMOL 500 MG: 500 TABLET, FILM COATED ORAL at 00:32

## 2020-01-01 RX ADMIN — GUAIFENESIN 600 MG: 600 TABLET, EXTENDED RELEASE ORAL at 17:30

## 2020-01-01 RX ADMIN — NYSTATIN: 100000 POWDER TOPICAL at 17:48

## 2020-01-01 RX ADMIN — ALPRAZOLAM 0.25 MG: 0.25 TABLET ORAL at 09:16

## 2020-01-01 RX ADMIN — FUROSEMIDE 20 MG: 20 TABLET ORAL at 08:02

## 2020-01-01 RX ADMIN — ALPRAZOLAM 0.25 MG: 0.25 TABLET ORAL at 21:09

## 2020-01-01 RX ADMIN — FAMOTIDINE 10 MG: 20 TABLET, FILM COATED ORAL at 09:49

## 2020-01-01 RX ADMIN — ACETAMINOPHEN 488 MG: 325 TABLET ORAL at 09:48

## 2020-01-01 RX ADMIN — LEVALBUTEROL HYDROCHLORIDE 1.25 MG: 1.25 SOLUTION, CONCENTRATE RESPIRATORY (INHALATION) at 19:03

## 2020-01-01 RX ADMIN — DOCUSATE SODIUM 100 MG: 100 CAPSULE, LIQUID FILLED ORAL at 09:14

## 2020-01-01 RX ADMIN — FUROSEMIDE 40 MG: 10 INJECTION, SOLUTION INTRAMUSCULAR; INTRAVENOUS at 23:26

## 2020-01-01 RX ADMIN — Medication 1 TABLET: at 09:33

## 2020-01-01 RX ADMIN — METHOCARBAMOL 500 MG: 500 TABLET, FILM COATED ORAL at 21:30

## 2020-01-01 RX ADMIN — LEVALBUTEROL HYDROCHLORIDE 1.25 MG: 1.25 SOLUTION, CONCENTRATE RESPIRATORY (INHALATION) at 14:08

## 2020-01-01 RX ADMIN — METHOCARBAMOL 500 MG: 500 TABLET, FILM COATED ORAL at 07:13

## 2020-01-01 RX ADMIN — HEPARIN SODIUM 5000 UNITS: 5000 INJECTION INTRAVENOUS; SUBCUTANEOUS at 13:59

## 2020-01-01 RX ADMIN — OSELTAMIVIR PHOSPHATE 30 MG: 30 CAPSULE ORAL at 08:52

## 2020-01-01 RX ADMIN — ENOXAPARIN SODIUM 30 MG: 30 INJECTION SUBCUTANEOUS at 08:15

## 2020-01-01 RX ADMIN — METOPROLOL TARTRATE 25 MG: 25 TABLET, FILM COATED ORAL at 08:16

## 2020-01-01 RX ADMIN — ALPRAZOLAM 0.25 MG: 0.25 TABLET ORAL at 20:03

## 2020-01-01 RX ADMIN — BISACODYL 10 MG: 10 SUPPOSITORY RECTAL at 13:55

## 2020-01-01 RX ADMIN — METOPROLOL TARTRATE 25 MG: 25 TABLET ORAL at 22:15

## 2020-01-01 RX ADMIN — IPRATROPIUM BROMIDE 0.5 MG: 0.5 SOLUTION RESPIRATORY (INHALATION) at 14:08

## 2020-01-01 RX ADMIN — NYSTATIN 1 APPLICATION: 100000 POWDER TOPICAL at 11:47

## 2020-01-01 RX ADMIN — ONDANSETRON 4 MG: 2 INJECTION INTRAMUSCULAR; INTRAVENOUS at 07:11

## 2020-01-01 RX ADMIN — GABAPENTIN 100 MG: 100 CAPSULE ORAL at 08:11

## 2020-01-01 RX ADMIN — POLYETHYLENE GLYCOL 3350 17 G: 17 POWDER, FOR SOLUTION ORAL at 09:35

## 2020-01-01 RX ADMIN — ALPRAZOLAM 0.25 MG: 0.25 TABLET ORAL at 08:53

## 2020-01-01 RX ADMIN — PREDNISONE 40 MG: 20 TABLET ORAL at 10:27

## 2020-01-01 RX ADMIN — IPRATROPIUM BROMIDE 0.5 MG: 0.5 SOLUTION RESPIRATORY (INHALATION) at 07:22

## 2020-01-01 RX ADMIN — DOCUSATE SODIUM 100 MG: 100 CAPSULE, LIQUID FILLED ORAL at 10:23

## 2020-01-01 RX ADMIN — GABAPENTIN 100 MG: 100 CAPSULE ORAL at 21:10

## 2020-01-01 RX ADMIN — GUAIFENESIN 1200 MG: 600 TABLET ORAL at 08:27

## 2020-01-01 RX ADMIN — CEFTRIAXONE 1000 MG: 1 INJECTION, SOLUTION INTRAVENOUS at 21:31

## 2020-01-01 RX ADMIN — FUROSEMIDE 20 MG: 20 TABLET ORAL at 09:43

## 2020-01-01 RX ADMIN — Medication 1 TABLET: at 09:38

## 2020-01-01 RX ADMIN — IPRATROPIUM BROMIDE 0.5 MG: 0.5 SOLUTION RESPIRATORY (INHALATION) at 13:30

## 2020-01-01 RX ADMIN — ATORVASTATIN CALCIUM 40 MG: 40 TABLET, FILM COATED ORAL at 17:45

## 2020-01-01 RX ADMIN — ISOSORBIDE MONONITRATE 30 MG: 30 TABLET, EXTENDED RELEASE ORAL at 11:48

## 2020-01-01 RX ADMIN — ACETAMINOPHEN 975 MG: 325 TABLET ORAL at 21:35

## 2020-01-01 RX ADMIN — IPRATROPIUM BROMIDE 0.5 MG: 0.5 SOLUTION RESPIRATORY (INHALATION) at 07:13

## 2020-01-01 RX ADMIN — LEVALBUTEROL HYDROCHLORIDE 1.25 MG: 1.25 SOLUTION, CONCENTRATE RESPIRATORY (INHALATION) at 19:11

## 2020-01-01 RX ADMIN — METOPROLOL TARTRATE 25 MG: 25 TABLET ORAL at 10:10

## 2020-01-01 RX ADMIN — ENOXAPARIN SODIUM 30 MG: 30 INJECTION SUBCUTANEOUS at 21:37

## 2020-01-01 RX ADMIN — LIDOCAINE 1 PATCH: 50 PATCH CUTANEOUS at 09:51

## 2020-01-01 RX ADMIN — AZITHROMYCIN 500 MG: 500 INJECTION, POWDER, LYOPHILIZED, FOR SOLUTION INTRAVENOUS at 13:57

## 2020-01-01 RX ADMIN — ATORVASTATIN CALCIUM 40 MG: 40 TABLET, FILM COATED ORAL at 17:15

## 2020-01-01 RX ADMIN — IPRATROPIUM BROMIDE 0.5 MG: 0.5 SOLUTION RESPIRATORY (INHALATION) at 07:12

## 2020-01-01 RX ADMIN — NYSTATIN: 100000 POWDER TOPICAL at 08:15

## 2020-01-01 RX ADMIN — ACETAMINOPHEN 975 MG: 325 TABLET ORAL at 13:00

## 2020-01-01 RX ADMIN — HEPARIN SODIUM 5000 UNITS: 5000 INJECTION INTRAVENOUS; SUBCUTANEOUS at 22:01

## 2020-01-01 RX ADMIN — SENNOSIDES AND DOCUSATE SODIUM 1 TABLET: 8.6; 5 TABLET ORAL at 21:11

## 2020-01-01 RX ADMIN — Medication 1 TABLET: at 17:30

## 2020-01-01 RX ADMIN — LEVALBUTEROL HYDROCHLORIDE 1.25 MG: 1.25 SOLUTION, CONCENTRATE RESPIRATORY (INHALATION) at 19:45

## 2020-01-01 RX ADMIN — ASPIRIN 81 MG 81 MG: 81 TABLET ORAL at 08:39

## 2020-01-01 RX ADMIN — LEVALBUTEROL HYDROCHLORIDE 1.25 MG: 1.25 SOLUTION, CONCENTRATE RESPIRATORY (INHALATION) at 19:36

## 2020-01-01 RX ADMIN — ISODIUM CHLORIDE 3 ML: 0.03 SOLUTION RESPIRATORY (INHALATION) at 07:05

## 2020-01-01 RX ADMIN — ENOXAPARIN SODIUM 30 MG: 30 INJECTION SUBCUTANEOUS at 20:42

## 2020-01-01 RX ADMIN — GUAIFENESIN 1200 MG: 600 TABLET, EXTENDED RELEASE ORAL at 08:40

## 2020-01-01 RX ADMIN — FUROSEMIDE 20 MG: 20 TABLET ORAL at 07:36

## 2020-01-01 RX ADMIN — SENNOSIDES AND DOCUSATE SODIUM 1 TABLET: 8.6; 5 TABLET ORAL at 21:10

## 2020-01-01 RX ADMIN — ASPIRIN 81 MG 81 MG: 81 TABLET ORAL at 09:50

## 2020-01-01 RX ADMIN — LIDOCAINE 1 PATCH: 50 PATCH CUTANEOUS at 08:56

## 2020-01-01 RX ADMIN — NYSTATIN: 100000 POWDER TOPICAL at 17:05

## 2020-01-01 RX ADMIN — METOPROLOL TARTRATE 25 MG: 25 TABLET, FILM COATED ORAL at 08:24

## 2020-01-01 RX ADMIN — ISODIUM CHLORIDE 3 ML: 0.03 SOLUTION RESPIRATORY (INHALATION) at 07:39

## 2020-01-01 RX ADMIN — NITROGLYCERIN 0.4 MG: 0.4 TABLET SUBLINGUAL at 03:45

## 2020-01-01 RX ADMIN — FUROSEMIDE 20 MG: 20 TABLET ORAL at 08:19

## 2020-01-01 RX ADMIN — FAMOTIDINE 10 MG: 20 TABLET ORAL at 09:22

## 2020-01-01 RX ADMIN — LEVALBUTEROL HYDROCHLORIDE 1.25 MG: 1.25 SOLUTION, CONCENTRATE RESPIRATORY (INHALATION) at 13:57

## 2020-01-01 RX ADMIN — FUROSEMIDE 40 MG: 10 INJECTION, SOLUTION INTRAMUSCULAR; INTRAVENOUS at 20:44

## 2020-01-01 RX ADMIN — GUAIFENESIN 600 MG: 600 TABLET, EXTENDED RELEASE ORAL at 05:49

## 2020-01-01 RX ADMIN — IPRATROPIUM BROMIDE 0.5 MG: 0.5 SOLUTION RESPIRATORY (INHALATION) at 07:21

## 2020-01-01 RX ADMIN — OXYCODONE HYDROCHLORIDE 2.5 MG: 5 TABLET ORAL at 12:49

## 2020-01-01 RX ADMIN — FUROSEMIDE 20 MG: 20 TABLET ORAL at 08:03

## 2020-01-01 RX ADMIN — FUROSEMIDE 20 MG: 20 TABLET ORAL at 17:47

## 2020-01-01 RX ADMIN — ISODIUM CHLORIDE 3 ML: 0.03 SOLUTION RESPIRATORY (INHALATION) at 08:05

## 2020-01-01 RX ADMIN — ATORVASTATIN CALCIUM 40 MG: 40 TABLET, FILM COATED ORAL at 17:03

## 2020-01-01 RX ADMIN — ACETAMINOPHEN 975 MG: 325 TABLET ORAL at 14:15

## 2020-01-01 RX ADMIN — ATORVASTATIN CALCIUM 40 MG: 40 TABLET, FILM COATED ORAL at 17:57

## 2020-01-01 RX ADMIN — DOCUSATE SODIUM 100 MG: 100 CAPSULE, LIQUID FILLED ORAL at 13:32

## 2020-01-01 RX ADMIN — ACETAMINOPHEN 975 MG: 325 TABLET ORAL at 22:10

## 2020-01-01 RX ADMIN — FAMOTIDINE 10 MG: 20 TABLET ORAL at 08:52

## 2020-01-01 RX ADMIN — FAMOTIDINE 10 MG: 20 TABLET ORAL at 18:03

## 2020-01-01 RX ADMIN — ENOXAPARIN SODIUM 30 MG: 30 INJECTION SUBCUTANEOUS at 21:10

## 2020-01-01 RX ADMIN — ISODIUM CHLORIDE 3 ML: 0.03 SOLUTION RESPIRATORY (INHALATION) at 07:45

## 2020-01-01 RX ADMIN — ENOXAPARIN SODIUM 30 MG: 30 INJECTION SUBCUTANEOUS at 08:38

## 2020-01-01 RX ADMIN — LIDOCAINE 1 PATCH: 50 PATCH CUTANEOUS at 09:43

## 2020-01-01 RX ADMIN — METOPROLOL TARTRATE 25 MG: 25 TABLET, FILM COATED ORAL at 21:32

## 2020-01-01 RX ADMIN — ASPIRIN 81 MG 81 MG: 81 TABLET ORAL at 08:40

## 2020-01-01 RX ADMIN — GUAIFENESIN 1200 MG: 600 TABLET ORAL at 21:10

## 2020-01-01 RX ADMIN — Medication 1 TABLET: at 18:07

## 2020-01-01 RX ADMIN — SENNOSIDES AND DOCUSATE SODIUM 1 TABLET: 8.6; 5 TABLET ORAL at 21:54

## 2020-01-01 RX ADMIN — LEVALBUTEROL HYDROCHLORIDE 1.25 MG: 1.25 SOLUTION, CONCENTRATE RESPIRATORY (INHALATION) at 07:21

## 2020-01-01 RX ADMIN — POLYETHYLENE GLYCOL 3350 17 G: 17 POWDER, FOR SOLUTION ORAL at 13:19

## 2020-01-01 RX ADMIN — ATORVASTATIN CALCIUM 40 MG: 40 TABLET, FILM COATED ORAL at 17:20

## 2020-01-01 RX ADMIN — SENNOSIDES AND DOCUSATE SODIUM 1 TABLET: 8.6; 5 TABLET ORAL at 22:15

## 2020-01-01 RX ADMIN — LEVALBUTEROL HYDROCHLORIDE 1.25 MG: 1.25 SOLUTION, CONCENTRATE RESPIRATORY (INHALATION) at 13:37

## 2020-01-01 RX ADMIN — GUAIFENESIN 1200 MG: 600 TABLET, EXTENDED RELEASE ORAL at 21:12

## 2020-01-01 RX ADMIN — POLYETHYLENE GLYCOL 3350 17 G: 17 POWDER, FOR SOLUTION ORAL at 08:12

## 2020-01-01 RX ADMIN — ISODIUM CHLORIDE 3 ML: 0.03 SOLUTION RESPIRATORY (INHALATION) at 20:09

## 2020-01-01 RX ADMIN — METHOCARBAMOL 500 MG: 500 TABLET, FILM COATED ORAL at 21:24

## 2020-01-01 RX ADMIN — FAMOTIDINE 10 MG: 20 TABLET ORAL at 08:38

## 2020-01-01 RX ADMIN — DOCUSATE SODIUM 100 MG: 100 CAPSULE, LIQUID FILLED ORAL at 08:38

## 2020-01-01 RX ADMIN — ALPRAZOLAM 0.25 MG: 0.25 TABLET ORAL at 19:30

## 2020-01-01 RX ADMIN — METOPROLOL TARTRATE 25 MG: 25 TABLET, FILM COATED ORAL at 08:39

## 2020-01-01 RX ADMIN — Medication 1 TABLET: at 18:40

## 2020-01-01 RX ADMIN — DOXYCYCLINE 100 MG: 100 INJECTION, POWDER, LYOPHILIZED, FOR SOLUTION INTRAVENOUS at 10:54

## 2020-01-01 RX ADMIN — LEVALBUTEROL 1.25 MG: 1.25 SOLUTION, CONCENTRATE RESPIRATORY (INHALATION) at 18:10

## 2020-01-01 RX ADMIN — FAMOTIDINE 10 MG: 20 TABLET ORAL at 09:38

## 2020-01-01 RX ADMIN — HEPARIN SODIUM 5000 UNITS: 5000 INJECTION INTRAVENOUS; SUBCUTANEOUS at 22:11

## 2020-01-01 RX ADMIN — ALBUTEROL SULFATE 2 PUFF: 90 AEROSOL, METERED RESPIRATORY (INHALATION) at 13:17

## 2020-01-01 RX ADMIN — ASPIRIN 81 MG 81 MG: 81 TABLET ORAL at 10:26

## 2020-01-01 RX ADMIN — HEPARIN SODIUM 5000 UNITS: 5000 INJECTION INTRAVENOUS; SUBCUTANEOUS at 05:06

## 2020-01-01 RX ADMIN — TRAMADOL HYDROCHLORIDE 50 MG: 50 TABLET, COATED ORAL at 12:21

## 2020-01-01 RX ADMIN — GUAIFENESIN 1200 MG: 600 TABLET ORAL at 20:42

## 2020-01-01 RX ADMIN — DOCUSATE SODIUM 100 MG: 100 CAPSULE, LIQUID FILLED ORAL at 08:50

## 2020-01-01 RX ADMIN — LEVALBUTEROL HYDROCHLORIDE 1.25 MG: 1.25 SOLUTION, CONCENTRATE RESPIRATORY (INHALATION) at 19:32

## 2020-01-01 RX ADMIN — GUAIFENESIN 600 MG: 600 TABLET, EXTENDED RELEASE ORAL at 17:28

## 2020-01-01 RX ADMIN — METOPROLOL TARTRATE 25 MG: 25 TABLET ORAL at 09:09

## 2020-01-01 RX ADMIN — DOCUSATE SODIUM 100 MG: 100 CAPSULE, LIQUID FILLED ORAL at 17:32

## 2020-01-01 RX ADMIN — ALPRAZOLAM 0.25 MG: 0.25 TABLET ORAL at 21:18

## 2020-01-01 RX ADMIN — DOCUSATE SODIUM 100 MG: 100 CAPSULE, LIQUID FILLED ORAL at 08:01

## 2020-01-01 RX ADMIN — FUROSEMIDE 40 MG: 10 INJECTION, SOLUTION INTRAMUSCULAR; INTRAVENOUS at 11:47

## 2020-01-01 RX ADMIN — IPRATROPIUM BROMIDE 0.5 MG: 0.5 SOLUTION RESPIRATORY (INHALATION) at 08:24

## 2020-01-01 RX ADMIN — NYSTATIN: 100000 POWDER TOPICAL at 17:50

## 2020-01-01 RX ADMIN — ALPRAZOLAM 0.25 MG: 0.25 TABLET ORAL at 21:39

## 2020-01-01 RX ADMIN — LEVALBUTEROL HYDROCHLORIDE 1.25 MG: 1.25 SOLUTION, CONCENTRATE RESPIRATORY (INHALATION) at 13:25

## 2020-01-01 RX ADMIN — TRAMADOL HYDROCHLORIDE 50 MG: 50 TABLET, COATED ORAL at 12:16

## 2020-01-01 RX ADMIN — LEVALBUTEROL HYDROCHLORIDE 1.25 MG: 1.25 SOLUTION, CONCENTRATE RESPIRATORY (INHALATION) at 07:30

## 2020-01-01 RX ADMIN — TRAMADOL HYDROCHLORIDE 50 MG: 50 TABLET, COATED ORAL at 06:48

## 2020-01-01 RX ADMIN — NYSTATIN: 100000 POWDER TOPICAL at 08:09

## 2020-01-01 RX ADMIN — LEVALBUTEROL HYDROCHLORIDE 1.25 MG: 1.25 SOLUTION, CONCENTRATE RESPIRATORY (INHALATION) at 13:32

## 2020-01-01 RX ADMIN — DOCUSATE SODIUM 100 MG: 100 CAPSULE, LIQUID FILLED ORAL at 17:14

## 2020-01-01 RX ADMIN — LEVALBUTEROL HYDROCHLORIDE 1.25 MG: 1.25 SOLUTION, CONCENTRATE RESPIRATORY (INHALATION) at 20:02

## 2020-01-01 RX ADMIN — METOPROLOL SUCCINATE 25 MG: 25 TABLET, EXTENDED RELEASE ORAL at 05:27

## 2020-01-01 RX ADMIN — METHOCARBAMOL TABLETS 500 MG: 500 TABLET, COATED ORAL at 08:11

## 2020-01-01 RX ADMIN — METHOCARBAMOL 500 MG: 500 TABLET, FILM COATED ORAL at 11:31

## 2020-01-01 RX ADMIN — ATORVASTATIN CALCIUM 40 MG: 40 TABLET, FILM COATED ORAL at 17:21

## 2020-01-01 RX ADMIN — Medication 1 TABLET: at 09:22

## 2020-01-01 RX ADMIN — LEVALBUTEROL 1.25 MG: 1.25 SOLUTION, CONCENTRATE RESPIRATORY (INHALATION) at 07:49

## 2020-01-01 RX ADMIN — ACETAMINOPHEN 975 MG: 325 TABLET ORAL at 21:02

## 2020-01-01 RX ADMIN — POTASSIUM CHLORIDE 20 MEQ: 1500 TABLET, EXTENDED RELEASE ORAL at 08:56

## 2020-01-01 RX ADMIN — ATORVASTATIN CALCIUM 40 MG: 40 TABLET, FILM COATED ORAL at 18:06

## 2020-01-01 RX ADMIN — METHOCARBAMOL 500 MG: 500 TABLET, FILM COATED ORAL at 21:57

## 2020-01-01 RX ADMIN — FAMOTIDINE 10 MG: 20 TABLET ORAL at 08:16

## 2020-01-01 RX ADMIN — GUAIFENESIN 1200 MG: 600 TABLET ORAL at 08:24

## 2020-01-01 RX ADMIN — ENOXAPARIN SODIUM 30 MG: 30 INJECTION SUBCUTANEOUS at 21:40

## 2020-01-01 RX ADMIN — FUROSEMIDE 40 MG: 10 INJECTION, SOLUTION INTRAMUSCULAR; INTRAVENOUS at 05:22

## 2020-01-01 RX ADMIN — ALPRAZOLAM 0.25 MG: 0.25 TABLET ORAL at 05:49

## 2020-01-01 RX ADMIN — POTASSIUM CHLORIDE 20 MEQ: 1500 TABLET, EXTENDED RELEASE ORAL at 09:15

## 2020-01-01 RX ADMIN — NYSTATIN: 100000 POWDER TOPICAL at 17:24

## 2020-01-01 RX ADMIN — HEPARIN SODIUM 5000 UNITS: 5000 INJECTION INTRAVENOUS; SUBCUTANEOUS at 21:02

## 2020-01-01 RX ADMIN — LIDOCAINE 1 PATCH: 50 PATCH TOPICAL at 08:01

## 2020-01-01 RX ADMIN — FUROSEMIDE 20 MG: 20 TABLET ORAL at 17:14

## 2020-01-01 RX ADMIN — ACETAMINOPHEN 975 MG: 325 TABLET ORAL at 06:15

## 2020-01-01 RX ADMIN — DOXYCYCLINE 100 MG: 100 INJECTION, POWDER, LYOPHILIZED, FOR SOLUTION INTRAVENOUS at 22:49

## 2020-01-01 RX ADMIN — ENOXAPARIN SODIUM 30 MG: 30 INJECTION SUBCUTANEOUS at 21:06

## 2020-01-01 RX ADMIN — LEVALBUTEROL HYDROCHLORIDE 1.25 MG: 1.25 SOLUTION, CONCENTRATE RESPIRATORY (INHALATION) at 13:52

## 2020-01-01 RX ADMIN — HEPARIN SODIUM 5000 UNITS: 5000 INJECTION INTRAVENOUS; SUBCUTANEOUS at 21:08

## 2020-01-01 RX ADMIN — TOLVAPTAN 15 MG: 15 TABLET ORAL at 13:50

## 2020-01-01 RX ADMIN — METOPROLOL TARTRATE 25 MG: 25 TABLET, FILM COATED ORAL at 09:43

## 2020-01-01 RX ADMIN — ALPRAZOLAM 0.25 MG: 0.25 TABLET ORAL at 21:05

## 2020-01-01 RX ADMIN — AMLODIPINE BESYLATE 5 MG: 5 TABLET ORAL at 08:03

## 2020-01-01 RX ADMIN — TRAMADOL HYDROCHLORIDE 50 MG: 50 TABLET, COATED ORAL at 02:57

## 2020-01-01 RX ADMIN — GUAIFENESIN 1200 MG: 600 TABLET, EXTENDED RELEASE ORAL at 10:27

## 2020-01-01 RX ADMIN — FAMOTIDINE 10 MG: 20 TABLET, FILM COATED ORAL at 08:04

## 2020-01-01 RX ADMIN — IPRATROPIUM BROMIDE 0.5 MG: 0.5 SOLUTION RESPIRATORY (INHALATION) at 19:40

## 2020-01-01 RX ADMIN — Medication 1 TABLET: at 08:56

## 2020-01-01 RX ADMIN — NYSTATIN: 100000 POWDER TOPICAL at 08:03

## 2020-01-01 RX ADMIN — ATORVASTATIN CALCIUM 40 MG: 40 TABLET, FILM COATED ORAL at 15:50

## 2020-01-01 RX ADMIN — LEVALBUTEROL 1.25 MG: 1.25 SOLUTION, CONCENTRATE RESPIRATORY (INHALATION) at 20:26

## 2020-01-01 RX ADMIN — CEFTRIAXONE SODIUM 1000 MG: 10 INJECTION, POWDER, FOR SOLUTION INTRAVENOUS at 14:15

## 2020-01-01 RX ADMIN — LEVALBUTEROL HYDROCHLORIDE 1.25 MG: 1.25 SOLUTION, CONCENTRATE RESPIRATORY (INHALATION) at 20:00

## 2020-01-01 RX ADMIN — METOPROLOL TARTRATE 25 MG: 25 TABLET, FILM COATED ORAL at 08:28

## 2020-01-01 RX ADMIN — ONDANSETRON 4 MG: 2 INJECTION INTRAMUSCULAR; INTRAVENOUS at 08:52

## 2020-01-01 RX ADMIN — FAMOTIDINE 10 MG: 20 TABLET ORAL at 09:43

## 2020-01-01 RX ADMIN — BISACODYL 10 MG: 10 SUPPOSITORY RECTAL at 16:14

## 2020-01-01 RX ADMIN — OSELTAMIVIR PHOSPHATE 30 MG: 30 CAPSULE ORAL at 08:49

## 2020-01-01 RX ADMIN — ONDANSETRON 4 MG: 4 TABLET, ORALLY DISINTEGRATING ORAL at 16:53

## 2020-01-01 RX ADMIN — METOPROLOL TARTRATE 25 MG: 25 TABLET ORAL at 10:26

## 2020-01-01 RX ADMIN — FUROSEMIDE 20 MG: 20 TABLET ORAL at 09:33

## 2020-01-01 RX ADMIN — GABAPENTIN 100 MG: 100 CAPSULE ORAL at 21:18

## 2020-01-01 RX ADMIN — LEVALBUTEROL HYDROCHLORIDE 1.25 MG: 1.25 SOLUTION, CONCENTRATE RESPIRATORY (INHALATION) at 08:22

## 2020-01-01 RX ADMIN — ALPRAZOLAM 0.25 MG: 0.25 TABLET ORAL at 15:33

## 2020-01-01 RX ADMIN — GUAIFENESIN 1200 MG: 600 TABLET, EXTENDED RELEASE ORAL at 21:31

## 2020-01-01 RX ADMIN — LIDOCAINE 1 PATCH: 50 PATCH CUTANEOUS at 08:53

## 2020-01-01 RX ADMIN — FUROSEMIDE 20 MG: 20 TABLET ORAL at 09:15

## 2020-01-01 RX ADMIN — HEPARIN SODIUM 5000 UNITS: 5000 INJECTION INTRAVENOUS; SUBCUTANEOUS at 22:15

## 2020-01-01 RX ADMIN — GUAIFENESIN 600 MG: 600 TABLET, EXTENDED RELEASE ORAL at 05:18

## 2020-01-01 RX ADMIN — IPRATROPIUM BROMIDE 0.5 MG: 0.5 SOLUTION RESPIRATORY (INHALATION) at 13:37

## 2020-01-01 RX ADMIN — ALBUTEROL SULFATE 2.5 MG: 2.5 SOLUTION RESPIRATORY (INHALATION) at 09:00

## 2020-01-01 RX ADMIN — FAMOTIDINE 10 MG: 20 TABLET ORAL at 08:05

## 2020-01-01 RX ADMIN — METOPROLOL TARTRATE 25 MG: 25 TABLET ORAL at 21:07

## 2020-01-01 RX ADMIN — HEPARIN SODIUM 5000 UNITS: 5000 INJECTION INTRAVENOUS; SUBCUTANEOUS at 15:12

## 2020-01-01 RX ADMIN — CEFUROXIME AXETIL 500 MG: 250 TABLET ORAL at 12:00

## 2020-01-01 RX ADMIN — ATORVASTATIN CALCIUM 40 MG: 40 TABLET, FILM COATED ORAL at 18:40

## 2020-01-01 RX ADMIN — HEPARIN SODIUM 5000 UNITS: 5000 INJECTION INTRAVENOUS; SUBCUTANEOUS at 14:29

## 2020-01-01 RX ADMIN — SENNOSIDES AND DOCUSATE SODIUM 1 TABLET: 8.6; 5 TABLET ORAL at 21:37

## 2020-01-01 RX ADMIN — ASPIRIN 81 MG 81 MG: 81 TABLET ORAL at 08:47

## 2020-01-01 RX ADMIN — METOPROLOL SUCCINATE 25 MG: 25 TABLET, EXTENDED RELEASE ORAL at 08:53

## 2020-01-01 RX ADMIN — DOCUSATE SODIUM 100 MG: 100 CAPSULE, LIQUID FILLED ORAL at 17:45

## 2020-01-01 RX ADMIN — ACETAMINOPHEN 975 MG: 325 TABLET ORAL at 13:27

## 2020-01-01 RX ADMIN — NYSTATIN: 100000 POWDER TOPICAL at 17:28

## 2020-01-01 RX ADMIN — WATER 10 ML: 1 INJECTION INTRAMUSCULAR; INTRAVENOUS; SUBCUTANEOUS at 05:14

## 2020-01-01 RX ADMIN — NYSTATIN: 100000 POWDER TOPICAL at 18:11

## 2020-01-01 RX ADMIN — ISODIUM CHLORIDE 3 ML: 0.03 SOLUTION RESPIRATORY (INHALATION) at 07:12

## 2020-01-01 RX ADMIN — ISODIUM CHLORIDE 3 ML: 0.03 SOLUTION RESPIRATORY (INHALATION) at 08:04

## 2020-01-01 RX ADMIN — FAMOTIDINE 10 MG: 20 TABLET ORAL at 17:30

## 2020-01-01 RX ADMIN — GABAPENTIN 100 MG: 100 CAPSULE ORAL at 10:23

## 2020-01-01 RX ADMIN — GUAIFENESIN 1200 MG: 600 TABLET, EXTENDED RELEASE ORAL at 09:50

## 2020-01-01 RX ADMIN — IPRATROPIUM BROMIDE 0.5 MG: 0.5 SOLUTION RESPIRATORY (INHALATION) at 13:52

## 2020-01-01 RX ADMIN — FUROSEMIDE 20 MG: 20 TABLET ORAL at 08:38

## 2020-01-01 RX ADMIN — ASPIRIN 81 MG 81 MG: 81 TABLET ORAL at 08:55

## 2020-01-01 RX ADMIN — GUAIFENESIN 1200 MG: 600 TABLET ORAL at 08:16

## 2020-01-01 RX ADMIN — METOPROLOL TARTRATE 25 MG: 25 TABLET ORAL at 21:31

## 2020-01-01 RX ADMIN — ALPRAZOLAM 0.25 MG: 0.25 TABLET ORAL at 08:09

## 2020-01-01 RX ADMIN — SENNOSIDES AND DOCUSATE SODIUM 1 TABLET: 8.6; 5 TABLET ORAL at 21:16

## 2020-01-01 RX ADMIN — POTASSIUM CHLORIDE 20 MEQ: 1500 TABLET, EXTENDED RELEASE ORAL at 08:50

## 2020-01-01 RX ADMIN — POLYETHYLENE GLYCOL 3350 17 G: 17 POWDER, FOR SOLUTION ORAL at 08:41

## 2020-01-01 RX ADMIN — ACETAMINOPHEN 975 MG: 325 TABLET ORAL at 13:24

## 2020-01-01 RX ADMIN — OXYCODONE HYDROCHLORIDE 2.5 MG: 5 TABLET ORAL at 18:44

## 2020-01-01 RX ADMIN — CALCIUM CARBONATE-VITAMIN D TAB 500 MG-200 UNIT 2 TABLET: 500-200 TAB at 07:36

## 2020-01-01 RX ADMIN — DOCUSATE SODIUM 100 MG: 100 CAPSULE, LIQUID FILLED ORAL at 08:55

## 2020-01-01 RX ADMIN — ACETAMINOPHEN 650 MG: 325 TABLET ORAL at 08:40

## 2020-01-01 RX ADMIN — IPRATROPIUM BROMIDE 0.5 MG: 0.5 SOLUTION RESPIRATORY (INHALATION) at 13:49

## 2020-01-01 RX ADMIN — ALPRAZOLAM 0.25 MG: 0.25 TABLET ORAL at 22:50

## 2020-01-01 RX ADMIN — AMLODIPINE BESYLATE 5 MG: 5 TABLET ORAL at 08:53

## 2020-01-01 RX ADMIN — Medication 1 TABLET: at 08:05

## 2020-01-01 RX ADMIN — HEPARIN SODIUM 5000 UNITS: 5000 INJECTION INTRAVENOUS; SUBCUTANEOUS at 06:14

## 2020-01-01 RX ADMIN — METOPROLOL TARTRATE 25 MG: 25 TABLET, FILM COATED ORAL at 08:38

## 2020-01-01 RX ADMIN — ALPRAZOLAM 0.25 MG: 0.25 TABLET ORAL at 04:08

## 2020-01-01 RX ADMIN — FAMOTIDINE 10 MG: 20 TABLET ORAL at 17:21

## 2020-01-01 RX ADMIN — SENNOSIDES AND DOCUSATE SODIUM 1 TABLET: 8.6; 5 TABLET ORAL at 21:30

## 2020-01-01 RX ADMIN — POLYETHYLENE GLYCOL 3350 17 G: 17 POWDER, FOR SOLUTION ORAL at 10:23

## 2020-01-01 RX ADMIN — Medication 1 TABLET: at 17:19

## 2020-01-01 RX ADMIN — ACETAMINOPHEN 975 MG: 325 TABLET ORAL at 05:38

## 2020-01-01 RX ADMIN — FAMOTIDINE 10 MG: 20 TABLET ORAL at 08:50

## 2020-01-01 RX ADMIN — HEPARIN SODIUM 5000 UNITS: 5000 INJECTION INTRAVENOUS; SUBCUTANEOUS at 05:52

## 2020-01-01 RX ADMIN — GUAIFENESIN 600 MG: 600 TABLET, EXTENDED RELEASE ORAL at 18:06

## 2020-01-01 RX ADMIN — HEPARIN SODIUM 5000 UNITS: 5000 INJECTION INTRAVENOUS; SUBCUTANEOUS at 13:24

## 2020-01-01 RX ADMIN — DOCUSATE SODIUM 100 MG: 100 CAPSULE, LIQUID FILLED ORAL at 08:19

## 2020-01-01 RX ADMIN — ALPRAZOLAM 0.25 MG: 0.25 TABLET ORAL at 08:55

## 2020-01-01 RX ADMIN — IPRATROPIUM BROMIDE 0.5 MG: 0.5 SOLUTION RESPIRATORY (INHALATION) at 20:01

## 2020-01-01 RX ADMIN — Medication 1 TABLET: at 15:50

## 2020-01-01 RX ADMIN — DOCUSATE SODIUM 100 MG: 100 CAPSULE, LIQUID FILLED ORAL at 08:16

## 2020-01-01 RX ADMIN — ENOXAPARIN SODIUM 40 MG: 40 INJECTION SUBCUTANEOUS at 10:04

## 2020-01-01 RX ADMIN — TRAMADOL HYDROCHLORIDE 50 MG: 50 TABLET, COATED ORAL at 05:25

## 2020-01-01 RX ADMIN — HEPARIN SODIUM 5000 UNITS: 5000 INJECTION INTRAVENOUS; SUBCUTANEOUS at 05:49

## 2020-01-01 RX ADMIN — LEVALBUTEROL HYDROCHLORIDE 1.25 MG: 1.25 SOLUTION, CONCENTRATE RESPIRATORY (INHALATION) at 07:20

## 2020-01-01 RX ADMIN — LIDOCAINE 1 PATCH: 50 PATCH CUTANEOUS at 09:22

## 2020-01-01 RX ADMIN — TRAMADOL HYDROCHLORIDE 50 MG: 50 TABLET, COATED ORAL at 18:39

## 2020-01-01 RX ADMIN — FAMOTIDINE 10 MG: 20 TABLET ORAL at 08:02

## 2020-01-01 RX ADMIN — FUROSEMIDE 20 MG: 20 TABLET ORAL at 12:33

## 2020-01-01 RX ADMIN — ACETAMINOPHEN 975 MG: 325 TABLET ORAL at 05:35

## 2020-01-01 RX ADMIN — HEPARIN SODIUM 5000 UNITS: 5000 INJECTION INTRAVENOUS; SUBCUTANEOUS at 05:22

## 2020-01-01 RX ADMIN — FAMOTIDINE 10 MG: 20 TABLET ORAL at 09:09

## 2020-01-01 RX ADMIN — HEPARIN SODIUM 5000 UNITS: 5000 INJECTION INTRAVENOUS; SUBCUTANEOUS at 13:50

## 2020-01-01 RX ADMIN — GUAIFENESIN 1200 MG: 600 TABLET ORAL at 21:18

## 2020-01-01 RX ADMIN — FUROSEMIDE 40 MG: 40 TABLET ORAL at 08:50

## 2020-01-01 RX ADMIN — ENOXAPARIN SODIUM 30 MG: 30 INJECTION SUBCUTANEOUS at 08:01

## 2020-01-01 RX ADMIN — FAMOTIDINE 10 MG: 20 TABLET ORAL at 08:03

## 2020-01-01 RX ADMIN — ATORVASTATIN CALCIUM 40 MG: 40 TABLET, FILM COATED ORAL at 17:29

## 2020-01-01 RX ADMIN — GUAIFENESIN 600 MG: 600 TABLET, EXTENDED RELEASE ORAL at 05:06

## 2020-01-01 RX ADMIN — AZITHROMYCIN 500 MG: 250 TABLET, FILM COATED ORAL at 12:00

## 2020-01-01 RX ADMIN — ENOXAPARIN SODIUM 30 MG: 30 INJECTION SUBCUTANEOUS at 21:15

## 2020-01-01 RX ADMIN — DOCUSATE SODIUM 100 MG: 100 CAPSULE, LIQUID FILLED ORAL at 08:11

## 2020-01-01 RX ADMIN — METOPROLOL TARTRATE 25 MG: 25 TABLET ORAL at 20:18

## 2020-01-01 RX ADMIN — FAMOTIDINE 10 MG: 20 TABLET ORAL at 17:15

## 2020-01-01 RX ADMIN — ASPIRIN 81 MG 81 MG: 81 TABLET ORAL at 09:22

## 2020-01-01 RX ADMIN — GUAIFENESIN 1200 MG: 600 TABLET ORAL at 08:15

## 2020-01-01 RX ADMIN — METHOCARBAMOL 500 MG: 500 TABLET, FILM COATED ORAL at 01:05

## 2020-01-01 RX ADMIN — FUROSEMIDE 20 MG: 20 TABLET ORAL at 17:57

## 2020-01-01 RX ADMIN — LEVALBUTEROL 1.25 MG: 1.25 SOLUTION, CONCENTRATE RESPIRATORY (INHALATION) at 08:04

## 2020-01-01 RX ADMIN — ENOXAPARIN SODIUM 30 MG: 30 INJECTION SUBCUTANEOUS at 08:39

## 2020-01-01 RX ADMIN — ALPRAZOLAM 0.25 MG: 0.25 TABLET ORAL at 10:58

## 2020-01-01 RX ADMIN — CEFTRIAXONE SODIUM 1000 MG: 10 INJECTION, POWDER, FOR SOLUTION INTRAVENOUS at 14:37

## 2020-01-01 RX ADMIN — LIDOCAINE 1 PATCH: 50 PATCH CUTANEOUS at 09:26

## 2020-01-01 RX ADMIN — ISODIUM CHLORIDE 3 ML: 0.03 SOLUTION RESPIRATORY (INHALATION) at 19:32

## 2020-01-01 RX ADMIN — ENOXAPARIN SODIUM 30 MG: 30 INJECTION SUBCUTANEOUS at 09:16

## 2020-01-01 RX ADMIN — FAMOTIDINE 10 MG: 20 TABLET ORAL at 08:19

## 2020-01-01 RX ADMIN — GUAIFENESIN 1200 MG: 600 TABLET, EXTENDED RELEASE ORAL at 20:19

## 2020-01-01 RX ADMIN — Medication 1 TABLET: at 08:39

## 2020-01-01 RX ADMIN — ACETAMINOPHEN 650 MG: 325 TABLET ORAL at 23:11

## 2020-01-01 RX ADMIN — CALCIUM CARBONATE (ANTACID) CHEW TAB 500 MG 500 MG: 500 CHEW TAB at 09:49

## 2020-01-01 RX ADMIN — ALBUTEROL SULFATE 2.5 MG: 2.5 SOLUTION RESPIRATORY (INHALATION) at 21:12

## 2020-01-01 RX ADMIN — METHOCARBAMOL 500 MG: 500 TABLET, FILM COATED ORAL at 12:17

## 2020-01-01 RX ADMIN — GABAPENTIN 100 MG: 100 CAPSULE ORAL at 16:35

## 2020-01-01 RX ADMIN — ENOXAPARIN SODIUM 30 MG: 30 INJECTION SUBCUTANEOUS at 08:10

## 2020-01-01 RX ADMIN — POTASSIUM CHLORIDE 20 MEQ: 1500 TABLET, EXTENDED RELEASE ORAL at 12:36

## 2020-01-01 RX ADMIN — AZITHROMYCIN 500 MG: 250 TABLET, FILM COATED ORAL at 17:15

## 2020-01-01 RX ADMIN — ALPRAZOLAM 0.25 MG: 0.25 TABLET ORAL at 21:35

## 2020-01-01 RX ADMIN — LIDOCAINE 1 PATCH: 50 PATCH CUTANEOUS at 08:05

## 2020-01-01 RX ADMIN — LEVALBUTEROL HYDROCHLORIDE 1.25 MG: 1.25 SOLUTION, CONCENTRATE RESPIRATORY (INHALATION) at 08:29

## 2020-01-01 RX ADMIN — GUAIFENESIN 1200 MG: 600 TABLET ORAL at 21:41

## 2020-01-01 RX ADMIN — METOPROLOL TARTRATE 25 MG: 25 TABLET, FILM COATED ORAL at 21:15

## 2020-01-01 RX ADMIN — ISODIUM CHLORIDE 3 ML: 0.03 SOLUTION RESPIRATORY (INHALATION) at 07:23

## 2020-01-01 RX ADMIN — LORAZEPAM 1 MG: 2 INJECTION INTRAMUSCULAR; INTRAVENOUS at 13:14

## 2020-01-01 RX ADMIN — ATORVASTATIN CALCIUM 40 MG: 40 TABLET, FILM COATED ORAL at 17:48

## 2020-01-01 RX ADMIN — LEVALBUTEROL 1.25 MG: 1.25 SOLUTION, CONCENTRATE RESPIRATORY (INHALATION) at 20:06

## 2020-01-01 RX ADMIN — NYSTATIN: 100000 POWDER TOPICAL at 10:03

## 2020-01-01 RX ADMIN — ACETAMINOPHEN 975 MG: 325 TABLET ORAL at 14:55

## 2020-01-01 RX ADMIN — HEPARIN SODIUM 5000 UNITS: 5000 INJECTION INTRAVENOUS; SUBCUTANEOUS at 05:25

## 2020-01-01 RX ADMIN — AMLODIPINE BESYLATE 5 MG: 5 TABLET ORAL at 10:26

## 2020-01-01 RX ADMIN — ISODIUM CHLORIDE 3 ML: 0.03 SOLUTION RESPIRATORY (INHALATION) at 08:07

## 2020-01-01 RX ADMIN — ENOXAPARIN SODIUM 30 MG: 30 INJECTION SUBCUTANEOUS at 08:05

## 2020-01-01 RX ADMIN — LEVALBUTEROL HYDROCHLORIDE 1.25 MG: 1.25 SOLUTION, CONCENTRATE RESPIRATORY (INHALATION) at 15:00

## 2020-01-01 RX ADMIN — ENOXAPARIN SODIUM 30 MG: 30 INJECTION SUBCUTANEOUS at 08:24

## 2020-01-01 RX ADMIN — METHOCARBAMOL 500 MG: 500 TABLET, FILM COATED ORAL at 06:33

## 2020-01-01 RX ADMIN — SENNOSIDES AND DOCUSATE SODIUM 1 TABLET: 8.6; 5 TABLET ORAL at 21:02

## 2020-01-01 RX ADMIN — ACETAMINOPHEN 975 MG: 325 TABLET ORAL at 21:07

## 2020-01-01 RX ADMIN — ALBUTEROL SULFATE 5 MG: 2.5 SOLUTION RESPIRATORY (INHALATION) at 20:31

## 2020-01-01 RX ADMIN — GUAIFENESIN 600 MG: 600 TABLET, EXTENDED RELEASE ORAL at 17:18

## 2020-01-01 RX ADMIN — ALPRAZOLAM 0.25 MG: 0.25 TABLET ORAL at 20:19

## 2020-01-01 RX ADMIN — POTASSIUM CHLORIDE 20 MEQ: 1500 TABLET, EXTENDED RELEASE ORAL at 08:47

## 2020-01-01 RX ADMIN — SENNOSIDES AND DOCUSATE SODIUM 1 TABLET: 8.6; 5 TABLET ORAL at 21:31

## 2020-01-01 RX ADMIN — METOPROLOL TARTRATE 25 MG: 25 TABLET, FILM COATED ORAL at 21:19

## 2020-01-01 RX ADMIN — ACETAMINOPHEN 975 MG: 325 TABLET ORAL at 05:06

## 2020-01-01 RX ADMIN — IPRATROPIUM BROMIDE 0.5 MG: 0.5 SOLUTION RESPIRATORY (INHALATION) at 20:00

## 2020-01-01 RX ADMIN — ALPRAZOLAM 0.25 MG: 0.25 TABLET ORAL at 22:03

## 2020-01-01 RX ADMIN — GABAPENTIN 100 MG: 100 CAPSULE ORAL at 15:50

## 2020-01-01 RX ADMIN — OXYCODONE HYDROCHLORIDE 2.5 MG: 5 TABLET ORAL at 10:34

## 2020-01-01 RX ADMIN — DOCUSATE SODIUM 100 MG: 100 CAPSULE, LIQUID FILLED ORAL at 22:09

## 2020-01-01 RX ADMIN — LIDOCAINE 1 PATCH: 50 PATCH CUTANEOUS at 08:52

## 2020-01-01 RX ADMIN — AZITHROMYCIN 500 MG: 500 INJECTION, POWDER, LYOPHILIZED, FOR SOLUTION INTRAVENOUS at 15:47

## 2020-01-01 RX ADMIN — OSELTAMIVIR PHOSPHATE 30 MG: 30 CAPSULE ORAL at 21:02

## 2020-01-01 RX ADMIN — OSELTAMIVIR PHOSPHATE 30 MG: 30 CAPSULE ORAL at 08:06

## 2020-01-01 RX ADMIN — IPRATROPIUM BROMIDE 0.5 MG: 0.5 SOLUTION RESPIRATORY (INHALATION) at 19:31

## 2020-01-01 RX ADMIN — POTASSIUM CHLORIDE 20 MEQ: 1500 TABLET, EXTENDED RELEASE ORAL at 09:34

## 2020-01-01 RX ADMIN — FUROSEMIDE 20 MG: 10 INJECTION, SOLUTION INTRAMUSCULAR; INTRAVENOUS at 21:12

## 2020-01-01 RX ADMIN — METOPROLOL TARTRATE 25 MG: 25 TABLET, FILM COATED ORAL at 21:16

## 2020-01-01 RX ADMIN — ALPRAZOLAM 0.25 MG: 0.25 TABLET ORAL at 21:14

## 2020-01-01 RX ADMIN — OSELTAMIVIR PHOSPHATE 30 MG: 30 CAPSULE ORAL at 09:53

## 2020-01-01 RX ADMIN — FUROSEMIDE 40 MG: 10 INJECTION, SOLUTION INTRAMUSCULAR; INTRAVENOUS at 09:43

## 2020-01-01 RX ADMIN — ACETAMINOPHEN 975 MG: 325 TABLET ORAL at 13:59

## 2020-01-01 RX ADMIN — FUROSEMIDE 20 MG: 20 TABLET ORAL at 17:21

## 2020-01-01 RX ADMIN — ACETAMINOPHEN 975 MG: 325 TABLET ORAL at 13:50

## 2020-01-01 RX ADMIN — ACETAMINOPHEN 975 MG: 325 TABLET ORAL at 14:02

## 2020-01-01 RX ADMIN — GABAPENTIN 100 MG: 100 CAPSULE ORAL at 21:38

## 2020-01-01 RX ADMIN — DOCUSATE SODIUM 100 MG: 100 CAPSULE, LIQUID FILLED ORAL at 08:15

## 2020-01-01 RX ADMIN — POTASSIUM CHLORIDE 20 MEQ: 1500 TABLET, EXTENDED RELEASE ORAL at 08:05

## 2020-01-01 RX ADMIN — LEVALBUTEROL HYDROCHLORIDE 1.25 MG: 1.25 SOLUTION, CONCENTRATE RESPIRATORY (INHALATION) at 07:12

## 2020-01-01 RX ADMIN — NYSTATIN: 100000 POWDER TOPICAL at 08:22

## 2020-01-01 RX ADMIN — LEVALBUTEROL HYDROCHLORIDE 1.25 MG: 1.25 SOLUTION, CONCENTRATE RESPIRATORY (INHALATION) at 20:25

## 2020-01-01 RX ADMIN — LEVALBUTEROL HYDROCHLORIDE 1.25 MG: 1.25 SOLUTION, CONCENTRATE RESPIRATORY (INHALATION) at 13:59

## 2020-01-01 RX ADMIN — ISODIUM CHLORIDE 3 ML: 0.03 SOLUTION RESPIRATORY (INHALATION) at 20:23

## 2020-01-01 RX ADMIN — LEVALBUTEROL HYDROCHLORIDE 1.25 MG: 1.25 SOLUTION, CONCENTRATE RESPIRATORY (INHALATION) at 07:23

## 2020-01-01 RX ADMIN — DOCUSATE SODIUM 100 MG: 100 CAPSULE, LIQUID FILLED ORAL at 18:03

## 2020-01-01 RX ADMIN — POLYETHYLENE GLYCOL 3350 17 G: 17 POWDER, FOR SOLUTION ORAL at 08:04

## 2020-01-01 RX ADMIN — GUAIFENESIN 1200 MG: 600 TABLET ORAL at 21:21

## 2020-01-01 RX ADMIN — ISODIUM CHLORIDE 3 ML: 0.03 SOLUTION RESPIRATORY (INHALATION) at 19:36

## 2020-01-01 RX ADMIN — LEVALBUTEROL 1.25 MG: 1.25 SOLUTION, CONCENTRATE RESPIRATORY (INHALATION) at 07:24

## 2020-01-01 RX ADMIN — ISODIUM CHLORIDE 3 ML: 0.03 SOLUTION RESPIRATORY (INHALATION) at 20:08

## 2020-01-01 RX ADMIN — ENOXAPARIN SODIUM 30 MG: 30 INJECTION SUBCUTANEOUS at 08:48

## 2020-01-01 RX ADMIN — ATORVASTATIN CALCIUM 40 MG: 40 TABLET, FILM COATED ORAL at 16:35

## 2020-01-01 RX ADMIN — FUROSEMIDE 20 MG: 20 TABLET ORAL at 08:52

## 2020-01-01 RX ADMIN — TRAMADOL HYDROCHLORIDE 50 MG: 50 TABLET, COATED ORAL at 02:49

## 2020-01-01 RX ADMIN — ALPRAZOLAM 0.25 MG: 0.25 TABLET ORAL at 15:51

## 2020-01-01 RX ADMIN — GUAIFENESIN 1200 MG: 600 TABLET, EXTENDED RELEASE ORAL at 22:03

## 2020-01-01 RX ADMIN — ACETAMINOPHEN 975 MG: 325 TABLET ORAL at 13:39

## 2020-01-01 RX ADMIN — AZITHROMYCIN 500 MG: 250 TABLET, FILM COATED ORAL at 17:27

## 2020-01-01 RX ADMIN — LEVALBUTEROL HYDROCHLORIDE 1.25 MG: 1.25 SOLUTION, CONCENTRATE RESPIRATORY (INHALATION) at 13:45

## 2020-01-01 RX ADMIN — ATORVASTATIN CALCIUM 40 MG: 40 TABLET, FILM COATED ORAL at 17:46

## 2020-01-01 RX ADMIN — FUROSEMIDE 20 MG: 20 TABLET ORAL at 17:29

## 2020-01-01 RX ADMIN — TOLVAPTAN 15 MG: 15 TABLET ORAL at 12:20

## 2020-01-01 RX ADMIN — ENOXAPARIN SODIUM 30 MG: 30 INJECTION SUBCUTANEOUS at 08:28

## 2020-01-01 RX ADMIN — AZITHROMYCIN 500 MG: 250 TABLET, FILM COATED ORAL at 12:56

## 2020-01-01 RX ADMIN — AMLODIPINE BESYLATE 2.5 MG: 2.5 TABLET ORAL at 17:20

## 2020-01-01 RX ADMIN — POLYETHYLENE GLYCOL 3350 17 G: 17 POWDER, FOR SOLUTION ORAL at 08:16

## 2020-01-01 RX ADMIN — GUAIFENESIN 1200 MG: 600 TABLET, EXTENDED RELEASE ORAL at 21:05

## 2020-01-01 RX ADMIN — FUROSEMIDE 20 MG: 20 TABLET ORAL at 08:09

## 2020-01-01 RX ADMIN — LEVALBUTEROL HYDROCHLORIDE 1.25 MG: 1.25 SOLUTION, CONCENTRATE RESPIRATORY (INHALATION) at 20:09

## 2020-01-01 RX ADMIN — FUROSEMIDE 20 MG: 20 TABLET ORAL at 07:26

## 2020-01-01 RX ADMIN — POLYETHYLENE GLYCOL 3350 17 G: 17 POWDER, FOR SOLUTION ORAL at 08:48

## 2020-01-01 RX ADMIN — ASPIRIN 81 MG 81 MG: 81 TABLET ORAL at 08:05

## 2020-01-01 RX ADMIN — Medication 1 TABLET: at 17:21

## 2020-01-01 RX ADMIN — Medication 1 TABLET: at 08:47

## 2020-01-01 RX ADMIN — LEVALBUTEROL HYDROCHLORIDE 1.25 MG: 1.25 SOLUTION, CONCENTRATE RESPIRATORY (INHALATION) at 20:07

## 2020-01-01 RX ADMIN — HEPARIN SODIUM 5000 UNITS: 5000 INJECTION INTRAVENOUS; SUBCUTANEOUS at 21:53

## 2020-01-01 RX ADMIN — TRAMADOL HYDROCHLORIDE 50 MG: 50 TABLET, COATED ORAL at 17:45

## 2020-01-01 RX ADMIN — ALPRAZOLAM 0.25 MG: 0.25 TABLET ORAL at 22:01

## 2020-01-01 RX ADMIN — ONDANSETRON 4 MG: 4 TABLET, ORALLY DISINTEGRATING ORAL at 09:20

## 2020-01-01 RX ADMIN — GUAIFENESIN 1200 MG: 600 TABLET, EXTENDED RELEASE ORAL at 10:35

## 2020-01-01 RX ADMIN — CEFTRIAXONE 1000 MG: 1 INJECTION, SOLUTION INTRAVENOUS at 01:09

## 2020-01-01 RX ADMIN — GUAIFENESIN 1200 MG: 600 TABLET, EXTENDED RELEASE ORAL at 21:36

## 2020-01-01 RX ADMIN — METHOCARBAMOL 500 MG: 500 TABLET, FILM COATED ORAL at 13:12

## 2020-01-01 RX ADMIN — GUAIFENESIN 1200 MG: 600 TABLET, EXTENDED RELEASE ORAL at 10:23

## 2020-01-01 RX ADMIN — LEVALBUTEROL 1.25 MG: 1.25 SOLUTION, CONCENTRATE RESPIRATORY (INHALATION) at 08:00

## 2020-01-01 RX ADMIN — ACETAZOLAMIDE 250 MG: 500 INJECTION, POWDER, LYOPHILIZED, FOR SOLUTION INTRAVENOUS at 05:14

## 2020-01-01 RX ADMIN — DOCUSATE SODIUM 100 MG: 100 CAPSULE, LIQUID FILLED ORAL at 17:28

## 2020-01-01 RX ADMIN — TRAMADOL HYDROCHLORIDE 50 MG: 50 TABLET, COATED ORAL at 09:34

## 2020-01-01 RX ADMIN — ASPIRIN 81 MG 81 MG: 81 TABLET ORAL at 08:50

## 2020-01-01 RX ADMIN — NYSTATIN: 100000 POWDER TOPICAL at 09:18

## 2020-01-01 RX ADMIN — ALPRAZOLAM 0.25 MG: 0.25 TABLET ORAL at 08:27

## 2020-01-01 RX ADMIN — GUAIFENESIN 600 MG: 600 TABLET, EXTENDED RELEASE ORAL at 05:35

## 2020-01-01 RX ADMIN — ATORVASTATIN CALCIUM 40 MG: 40 TABLET, FILM COATED ORAL at 17:18

## 2020-01-01 RX ADMIN — DOCUSATE SODIUM 100 MG: 100 CAPSULE, LIQUID FILLED ORAL at 08:47

## 2020-01-01 RX ADMIN — PSYLLIUM HUSK 1 PACKET: 3.4 POWDER ORAL at 09:50

## 2020-01-01 RX ADMIN — METHOCARBAMOL 500 MG: 500 TABLET, FILM COATED ORAL at 15:59

## 2020-01-01 RX ADMIN — IPRATROPIUM BROMIDE 0.5 MG: 0.5 SOLUTION RESPIRATORY (INHALATION) at 08:29

## 2020-01-01 RX ADMIN — ISODIUM CHLORIDE 3 ML: 0.03 SOLUTION RESPIRATORY (INHALATION) at 07:30

## 2020-01-01 RX ADMIN — NYSTATIN: 100000 POWDER TOPICAL at 08:45

## 2020-01-01 RX ADMIN — CEFUROXIME AXETIL 500 MG: 250 TABLET ORAL at 08:06

## 2020-01-01 RX ADMIN — ASPIRIN 81 MG 81 MG: 81 TABLET ORAL at 08:52

## 2020-01-01 RX ADMIN — ALPRAZOLAM 0.25 MG: 0.25 TABLET ORAL at 21:40

## 2020-01-01 RX ADMIN — LEVALBUTEROL 1.25 MG: 1.25 SOLUTION, CONCENTRATE RESPIRATORY (INHALATION) at 08:20

## 2020-01-01 RX ADMIN — SENNOSIDES AND DOCUSATE SODIUM 1 TABLET: 8.6; 5 TABLET ORAL at 21:07

## 2020-01-01 RX ADMIN — SENNOSIDES AND DOCUSATE SODIUM 1 TABLET: 8.6; 5 TABLET ORAL at 21:18

## 2020-01-01 RX ADMIN — ACETAMINOPHEN 975 MG: 325 TABLET ORAL at 21:24

## 2020-01-01 RX ADMIN — ACETAMINOPHEN 975 MG: 325 TABLET ORAL at 22:17

## 2020-01-01 RX ADMIN — ISODIUM CHLORIDE 3 ML: 0.03 SOLUTION RESPIRATORY (INHALATION) at 08:29

## 2020-01-01 RX ADMIN — NYSTATIN: 100000 POWDER TOPICAL at 18:21

## 2020-01-01 RX ADMIN — ACETAMINOPHEN 975 MG: 325 TABLET ORAL at 05:52

## 2020-01-01 RX ADMIN — CALCIUM CARBONATE-VITAMIN D TAB 500 MG-200 UNIT 1 TABLET: 500-200 TAB at 09:49

## 2020-01-01 RX ADMIN — DOCUSATE SODIUM 100 MG: 100 CAPSULE, LIQUID FILLED ORAL at 17:19

## 2020-01-01 RX ADMIN — SENNOSIDES AND DOCUSATE SODIUM 1 TABLET: 8.6; 5 TABLET ORAL at 21:36

## 2020-01-01 RX ADMIN — ATORVASTATIN CALCIUM 40 MG: 40 TABLET, FILM COATED ORAL at 17:14

## 2020-01-01 RX ADMIN — POTASSIUM CHLORIDE 20 MEQ: 1500 TABLET, EXTENDED RELEASE ORAL at 09:37

## 2020-01-01 RX ADMIN — METOPROLOL TARTRATE 25 MG: 25 TABLET ORAL at 09:22

## 2020-01-01 RX ADMIN — LEVALBUTEROL HYDROCHLORIDE 1.25 MG: 1.25 SOLUTION, CONCENTRATE RESPIRATORY (INHALATION) at 07:05

## 2020-01-01 RX ADMIN — DOCUSATE SODIUM 100 MG: 100 CAPSULE, LIQUID FILLED ORAL at 08:40

## 2020-01-01 RX ADMIN — ONDANSETRON 4 MG: 4 TABLET, ORALLY DISINTEGRATING ORAL at 10:21

## 2020-01-01 RX ADMIN — DOCUSATE SODIUM 100 MG: 100 CAPSULE, LIQUID FILLED ORAL at 18:21

## 2020-01-01 RX ADMIN — METOPROLOL TARTRATE 25 MG: 25 TABLET, FILM COATED ORAL at 21:18

## 2020-01-01 RX ADMIN — ALBUTEROL SULFATE 2.5 MG: 2.5 SOLUTION RESPIRATORY (INHALATION) at 10:48

## 2020-01-01 RX ADMIN — LEVALBUTEROL HYDROCHLORIDE 1.25 MG: 1.25 SOLUTION, CONCENTRATE RESPIRATORY (INHALATION) at 20:23

## 2020-01-01 RX ADMIN — ALPRAZOLAM 0.25 MG: 0.25 TABLET ORAL at 16:36

## 2020-01-01 RX ADMIN — ACETAMINOPHEN 975 MG: 325 TABLET ORAL at 21:42

## 2020-01-01 RX ADMIN — FAMOTIDINE 10 MG: 20 TABLET ORAL at 17:29

## 2020-01-01 RX ADMIN — DOCUSATE SODIUM 100 MG: 100 CAPSULE, LIQUID FILLED ORAL at 17:48

## 2020-01-01 RX ADMIN — ISODIUM CHLORIDE 3 ML: 0.03 SOLUTION RESPIRATORY (INHALATION) at 19:13

## 2020-01-01 RX ADMIN — FAMOTIDINE 10 MG: 20 TABLET ORAL at 08:53

## 2020-01-01 RX ADMIN — ALPRAZOLAM 0.25 MG: 0.25 TABLET ORAL at 08:19

## 2020-01-01 RX ADMIN — Medication 1 TABLET: at 16:18

## 2020-01-01 RX ADMIN — METHOCARBAMOL 500 MG: 500 TABLET, FILM COATED ORAL at 13:06

## 2020-01-01 RX ADMIN — LEVALBUTEROL HYDROCHLORIDE 1.25 MG: 1.25 SOLUTION, CONCENTRATE RESPIRATORY (INHALATION) at 07:13

## 2020-01-01 RX ADMIN — GUAIFENESIN 1200 MG: 600 TABLET, EXTENDED RELEASE ORAL at 21:37

## 2020-01-01 RX ADMIN — ACETAMINOPHEN 975 MG: 325 TABLET ORAL at 15:03

## 2020-01-01 RX ADMIN — AZITHROMYCIN 500 MG: 500 INJECTION, POWDER, LYOPHILIZED, FOR SOLUTION INTRAVENOUS at 12:49

## 2020-01-01 RX ADMIN — IPRATROPIUM BROMIDE 0.5 MG: 0.5 SOLUTION RESPIRATORY (INHALATION) at 19:24

## 2020-01-01 RX ADMIN — LEVALBUTEROL HYDROCHLORIDE 1.25 MG: 1.25 SOLUTION, CONCENTRATE RESPIRATORY (INHALATION) at 07:22

## 2020-01-01 RX ADMIN — GUAIFENESIN 1200 MG: 600 TABLET ORAL at 08:38

## 2020-01-01 RX ADMIN — FAMOTIDINE 10 MG: 20 TABLET ORAL at 08:49

## 2020-01-01 RX ADMIN — SENNOSIDES AND DOCUSATE SODIUM 1 TABLET: 8.6; 5 TABLET ORAL at 22:01

## 2020-01-01 RX ADMIN — METOPROLOL TARTRATE 25 MG: 25 TABLET, FILM COATED ORAL at 08:10

## 2020-01-01 RX ADMIN — ISODIUM CHLORIDE 3 ML: 0.03 SOLUTION RESPIRATORY (INHALATION) at 19:49

## 2020-01-01 RX ADMIN — TRAMADOL HYDROCHLORIDE 50 MG: 50 TABLET, COATED ORAL at 14:29

## 2020-01-01 RX ADMIN — GUAIFENESIN 1200 MG: 600 TABLET, EXTENDED RELEASE ORAL at 08:38

## 2020-01-01 RX ADMIN — ENOXAPARIN SODIUM 40 MG: 40 INJECTION SUBCUTANEOUS at 10:26

## 2020-01-01 RX ADMIN — ACETAMINOPHEN 975 MG: 325 TABLET ORAL at 05:15

## 2020-01-01 RX ADMIN — GABAPENTIN 100 MG: 100 CAPSULE ORAL at 08:04

## 2020-01-01 RX ADMIN — DOCUSATE SODIUM 100 MG: 100 CAPSULE, LIQUID FILLED ORAL at 18:40

## 2020-01-01 RX ADMIN — CITALOPRAM HYDROBROMIDE 10 MG: 10 TABLET ORAL at 21:02

## 2020-01-01 RX ADMIN — HEPARIN SODIUM 5000 UNITS: 5000 INJECTION INTRAVENOUS; SUBCUTANEOUS at 15:00

## 2020-01-01 RX ADMIN — ACETAMINOPHEN 975 MG: 325 TABLET ORAL at 13:33

## 2020-01-01 RX ADMIN — ASPIRIN 81 MG 243 MG: 81 TABLET ORAL at 19:41

## 2020-01-01 RX ADMIN — OXYCODONE HYDROCHLORIDE 2.5 MG: 5 TABLET ORAL at 23:23

## 2020-01-01 RX ADMIN — FAMOTIDINE 10 MG: 20 TABLET ORAL at 17:24

## 2020-01-01 RX ADMIN — ENOXAPARIN SODIUM 40 MG: 40 INJECTION SUBCUTANEOUS at 08:41

## 2020-01-01 RX ADMIN — METOPROLOL TARTRATE 25 MG: 25 TABLET, FILM COATED ORAL at 08:03

## 2020-01-01 RX ADMIN — METHOCARBAMOL 500 MG: 500 TABLET, FILM COATED ORAL at 21:10

## 2020-01-01 RX ADMIN — METOPROLOL SUCCINATE 25 MG: 25 TABLET, EXTENDED RELEASE ORAL at 08:50

## 2020-01-01 RX ADMIN — HEPARIN SODIUM 5000 UNITS: 5000 INJECTION INTRAVENOUS; SUBCUTANEOUS at 21:42

## 2020-01-01 RX ADMIN — FAMOTIDINE 10 MG: 20 TABLET ORAL at 18:44

## 2020-01-01 RX ADMIN — HEPARIN SODIUM 5000 UNITS: 5000 INJECTION INTRAVENOUS; SUBCUTANEOUS at 13:42

## 2020-01-01 RX ADMIN — ISODIUM CHLORIDE 3 ML: 0.03 SOLUTION RESPIRATORY (INHALATION) at 07:07

## 2020-01-01 RX ADMIN — METOPROLOL TARTRATE 25 MG: 25 TABLET, FILM COATED ORAL at 22:04

## 2020-01-01 RX ADMIN — METHOCARBAMOL 500 MG: 500 TABLET, FILM COATED ORAL at 14:35

## 2020-01-01 RX ADMIN — TRAMADOL HYDROCHLORIDE 50 MG: 50 TABLET, COATED ORAL at 10:19

## 2020-01-01 RX ADMIN — GUAIFENESIN 1200 MG: 600 TABLET ORAL at 21:35

## 2020-01-01 RX ADMIN — ALPRAZOLAM 0.25 MG: 0.25 TABLET ORAL at 21:51

## 2020-01-01 RX ADMIN — ASPIRIN 81 MG 81 MG: 81 TABLET ORAL at 09:09

## 2020-01-01 RX ADMIN — LEVALBUTEROL HYDROCHLORIDE 1.25 MG: 1.25 SOLUTION, CONCENTRATE RESPIRATORY (INHALATION) at 19:24

## 2020-01-01 RX ADMIN — NYSTATIN: 100000 POWDER TOPICAL at 17:46

## 2020-01-01 RX ADMIN — ATORVASTATIN CALCIUM 40 MG: 40 TABLET, FILM COATED ORAL at 17:35

## 2020-01-01 RX ADMIN — SENNOSIDES AND DOCUSATE SODIUM 1 TABLET: 8.6; 5 TABLET ORAL at 21:41

## 2020-01-01 RX ADMIN — GUAIFENESIN 1200 MG: 600 TABLET ORAL at 08:02

## 2020-01-01 RX ADMIN — Medication 1 TABLET: at 18:44

## 2020-01-01 RX ADMIN — GUAIFENESIN 1200 MG: 600 TABLET, EXTENDED RELEASE ORAL at 21:15

## 2020-01-01 RX ADMIN — ACETAZOLAMIDE 250 MG: 500 INJECTION, POWDER, LYOPHILIZED, FOR SOLUTION INTRAVENOUS at 22:01

## 2020-01-01 RX ADMIN — NYSTATIN: 100000 POWDER TOPICAL at 17:49

## 2020-01-01 RX ADMIN — GUAIFENESIN 1200 MG: 600 TABLET, EXTENDED RELEASE ORAL at 08:04

## 2020-01-01 RX ADMIN — DOCUSATE SODIUM 100 MG: 100 CAPSULE, LIQUID FILLED ORAL at 08:39

## 2020-01-01 RX ADMIN — ENOXAPARIN SODIUM 30 MG: 30 INJECTION SUBCUTANEOUS at 21:21

## 2020-01-01 RX ADMIN — ACETAMINOPHEN 975 MG: 325 TABLET ORAL at 06:07

## 2020-01-01 RX ADMIN — POTASSIUM CHLORIDE 20 MEQ: 1500 TABLET, EXTENDED RELEASE ORAL at 09:18

## 2020-01-01 RX ADMIN — METHOCARBAMOL 500 MG: 500 TABLET, FILM COATED ORAL at 02:24

## 2020-01-01 RX ADMIN — Medication 1 TABLET: at 07:56

## 2020-01-01 RX ADMIN — ALPRAZOLAM 0.25 MG: 0.25 TABLET ORAL at 20:42

## 2020-01-01 RX ADMIN — PREDNISONE 40 MG: 20 TABLET ORAL at 10:23

## 2020-01-01 RX ADMIN — OSELTAMIVIR PHOSPHATE 30 MG: 30 CAPSULE ORAL at 21:24

## 2020-01-01 RX ADMIN — HEPARIN SODIUM 5000 UNITS: 5000 INJECTION INTRAVENOUS; SUBCUTANEOUS at 05:34

## 2020-01-01 RX ADMIN — GUAIFENESIN 1200 MG: 600 TABLET, EXTENDED RELEASE ORAL at 08:02

## 2020-01-01 RX ADMIN — NYSTATIN: 100000 POWDER TOPICAL at 08:53

## 2020-01-01 RX ADMIN — FUROSEMIDE 20 MG: 20 TABLET ORAL at 17:34

## 2020-01-01 RX ADMIN — FAMOTIDINE 10 MG: 20 TABLET ORAL at 08:15

## 2020-01-01 RX ADMIN — ALPRAZOLAM 0.25 MG: 0.25 TABLET ORAL at 21:24

## 2020-01-01 RX ADMIN — FAMOTIDINE 10 MG: 20 TABLET ORAL at 08:47

## 2020-01-01 RX ADMIN — GABAPENTIN 100 MG: 100 CAPSULE ORAL at 08:03

## 2020-01-01 RX ADMIN — DOCUSATE SODIUM 100 MG: 100 CAPSULE, LIQUID FILLED ORAL at 08:52

## 2020-01-01 RX ADMIN — FUROSEMIDE 20 MG: 20 TABLET ORAL at 17:05

## 2020-01-01 RX ADMIN — ALPRAZOLAM 0.25 MG: 0.25 TABLET ORAL at 08:24

## 2020-01-01 RX ADMIN — LEVALBUTEROL 1.25 MG: 1.25 SOLUTION, CONCENTRATE RESPIRATORY (INHALATION) at 19:48

## 2020-01-01 RX ADMIN — FUROSEMIDE 20 MG: 20 TABLET ORAL at 17:50

## 2020-01-01 RX ADMIN — ONDANSETRON 4 MG: 4 TABLET, ORALLY DISINTEGRATING ORAL at 16:21

## 2020-01-01 RX ADMIN — NYSTATIN: 100000 POWDER TOPICAL at 08:38

## 2020-01-01 RX ADMIN — AMLODIPINE BESYLATE 5 MG: 5 TABLET ORAL at 08:40

## 2020-01-01 RX ADMIN — GABAPENTIN 100 MG: 100 CAPSULE ORAL at 21:40

## 2020-01-01 RX ADMIN — GUAIFENESIN 1200 MG: 600 TABLET ORAL at 21:30

## 2020-01-01 RX ADMIN — LIDOCAINE 1 PATCH: 50 PATCH CUTANEOUS at 08:40

## 2020-01-01 RX ADMIN — ACETAMINOPHEN 975 MG: 325 TABLET ORAL at 21:15

## 2020-01-01 RX ADMIN — Medication 1 TABLET: at 09:15

## 2020-01-01 RX ADMIN — LEVALBUTEROL HYDROCHLORIDE 1.25 MG: 1.25 SOLUTION, CONCENTRATE RESPIRATORY (INHALATION) at 13:39

## 2020-01-01 RX ADMIN — FUROSEMIDE 20 MG: 20 TABLET ORAL at 08:28

## 2020-01-01 RX ADMIN — CALCIUM CARBONATE-VITAMIN D TAB 500 MG-200 UNIT 2 TABLET: 500-200 TAB at 08:03

## 2020-01-01 RX ADMIN — DOCUSATE SODIUM 100 MG: 100 CAPSULE, LIQUID FILLED ORAL at 10:26

## 2020-01-01 RX ADMIN — METOPROLOL TARTRATE 25 MG: 25 TABLET, FILM COATED ORAL at 21:02

## 2020-01-01 RX ADMIN — ACETAMINOPHEN 975 MG: 325 TABLET ORAL at 13:06

## 2020-01-01 RX ADMIN — METOPROLOL TARTRATE 25 MG: 25 TABLET ORAL at 08:55

## 2020-01-01 RX ADMIN — DOCUSATE SODIUM 100 MG: 100 CAPSULE, LIQUID FILLED ORAL at 09:10

## 2020-01-01 RX ADMIN — ONDANSETRON 4 MG: 4 TABLET, ORALLY DISINTEGRATING ORAL at 14:58

## 2020-01-01 RX ADMIN — GUAIFENESIN 600 MG: 600 TABLET, EXTENDED RELEASE ORAL at 05:36

## 2020-01-01 RX ADMIN — GABAPENTIN 100 MG: 100 CAPSULE ORAL at 21:31

## 2020-01-01 RX ADMIN — IPRATROPIUM BROMIDE 0.5 MG: 0.5 SOLUTION RESPIRATORY (INHALATION) at 19:46

## 2020-01-01 RX ADMIN — FAMOTIDINE 10 MG: 20 TABLET ORAL at 17:28

## 2020-01-01 RX ADMIN — ATORVASTATIN CALCIUM 40 MG: 40 TABLET, FILM COATED ORAL at 16:36

## 2020-01-01 RX ADMIN — FUROSEMIDE 20 MG: 20 TABLET ORAL at 17:28

## 2020-01-01 RX ADMIN — CEFUROXIME AXETIL 500 MG: 250 TABLET ORAL at 21:37

## 2020-01-01 RX ADMIN — DOCUSATE SODIUM 100 MG: 100 CAPSULE, LIQUID FILLED ORAL at 08:10

## 2020-01-01 RX ADMIN — METRONIDAZOLE 500 MG: 500 INJECTION, SOLUTION INTRAVENOUS at 03:49

## 2020-01-01 RX ADMIN — ENOXAPARIN SODIUM 40 MG: 40 INJECTION SUBCUTANEOUS at 09:49

## 2020-01-01 RX ADMIN — ENOXAPARIN SODIUM 30 MG: 30 INJECTION SUBCUTANEOUS at 08:16

## 2020-01-01 RX ADMIN — ACETAMINOPHEN 975 MG: 325 TABLET ORAL at 21:53

## 2020-01-01 RX ADMIN — ONDANSETRON 4 MG: 2 INJECTION INTRAMUSCULAR; INTRAVENOUS at 17:59

## 2020-01-01 RX ADMIN — FUROSEMIDE 20 MG: 20 TABLET ORAL at 17:25

## 2020-01-01 RX ADMIN — LEVALBUTEROL HYDROCHLORIDE 1.25 MG: 1.25 SOLUTION, CONCENTRATE RESPIRATORY (INHALATION) at 13:30

## 2020-01-01 RX ADMIN — DOCUSATE SODIUM 100 MG: 100 CAPSULE, LIQUID FILLED ORAL at 08:49

## 2020-01-01 RX ADMIN — ASPIRIN 81 MG 81 MG: 81 TABLET ORAL at 08:53

## 2020-01-01 RX ADMIN — ENOXAPARIN SODIUM 30 MG: 30 INJECTION SUBCUTANEOUS at 21:18

## 2020-01-01 RX ADMIN — POLYETHYLENE GLYCOL 3350 17 G: 17 POWDER, FOR SOLUTION ORAL at 14:28

## 2020-01-01 RX ADMIN — POLYETHYLENE GLYCOL 3350 17 G: 17 POWDER, FOR SOLUTION ORAL at 08:56

## 2020-01-01 RX ADMIN — NYSTATIN: 100000 POWDER TOPICAL at 08:11

## 2020-01-01 RX ADMIN — ISODIUM CHLORIDE 3 ML: 0.03 SOLUTION RESPIRATORY (INHALATION) at 21:34

## 2020-01-01 RX ADMIN — Medication 1 TABLET: at 08:50

## 2020-01-01 RX ADMIN — METHOCARBAMOL 500 MG: 500 TABLET, FILM COATED ORAL at 06:14

## 2020-01-01 RX ADMIN — ATORVASTATIN CALCIUM 40 MG: 40 TABLET, FILM COATED ORAL at 17:28

## 2020-01-01 RX ADMIN — HEPARIN SODIUM 5000 UNITS: 5000 INJECTION INTRAVENOUS; SUBCUTANEOUS at 14:55

## 2020-01-01 RX ADMIN — ASPIRIN 81 MG 81 MG: 81 TABLET ORAL at 09:15

## 2020-01-01 RX ADMIN — TRAMADOL HYDROCHLORIDE 50 MG: 50 TABLET, COATED ORAL at 19:22

## 2020-01-01 RX ADMIN — METHOCARBAMOL 500 MG: 500 TABLET, FILM COATED ORAL at 14:17

## 2020-01-01 RX ADMIN — IPRATROPIUM BROMIDE 0.5 MG: 0.5 SOLUTION RESPIRATORY (INHALATION) at 13:39

## 2020-01-01 RX ADMIN — FUROSEMIDE 20 MG: 20 TABLET ORAL at 17:48

## 2020-01-01 RX ADMIN — Medication 1 TABLET: at 08:53

## 2020-01-01 RX ADMIN — LIDOCAINE 1 PATCH: 50 PATCH CUTANEOUS at 09:39

## 2020-01-01 RX ADMIN — ISODIUM CHLORIDE 3 ML: 0.03 SOLUTION RESPIRATORY (INHALATION) at 20:25

## 2020-01-01 RX ADMIN — FUROSEMIDE 20 MG: 20 TABLET ORAL at 15:38

## 2020-01-01 RX ADMIN — IPRATROPIUM BROMIDE 0.5 MG: 0.5 SOLUTION RESPIRATORY (INHALATION) at 19:32

## 2020-01-01 RX ADMIN — ISODIUM CHLORIDE 3 ML: 0.03 SOLUTION RESPIRATORY (INHALATION) at 20:02

## 2020-01-01 RX ADMIN — ENOXAPARIN SODIUM 30 MG: 30 INJECTION SUBCUTANEOUS at 21:29

## 2020-01-01 RX ADMIN — ISODIUM CHLORIDE 3 ML: 0.03 SOLUTION RESPIRATORY (INHALATION) at 19:40

## 2020-01-01 RX ADMIN — LEVALBUTEROL HYDROCHLORIDE 1.25 MG: 1.25 SOLUTION, CONCENTRATE RESPIRATORY (INHALATION) at 07:07

## 2020-01-01 RX ADMIN — ALPRAZOLAM 0.25 MG: 0.25 TABLET ORAL at 21:10

## 2020-01-01 RX ADMIN — NYSTATIN: 100000 POWDER TOPICAL at 11:11

## 2020-01-01 RX ADMIN — GUAIFENESIN 1200 MG: 600 TABLET, EXTENDED RELEASE ORAL at 08:28

## 2020-01-01 RX ADMIN — ACETAMINOPHEN 650 MG: 325 TABLET ORAL at 05:49

## 2020-01-01 RX ADMIN — METHOCARBAMOL 500 MG: 500 TABLET, FILM COATED ORAL at 17:57

## 2020-01-01 RX ADMIN — FAMOTIDINE 10 MG: 20 TABLET ORAL at 09:33

## 2020-01-01 RX ADMIN — ATORVASTATIN CALCIUM 40 MG: 40 TABLET, FILM COATED ORAL at 16:18

## 2020-01-01 RX ADMIN — AMLODIPINE BESYLATE 5 MG: 5 TABLET ORAL at 09:48

## 2020-01-01 RX ADMIN — LEVALBUTEROL HYDROCHLORIDE 1.25 MG: 1.25 SOLUTION, CONCENTRATE RESPIRATORY (INHALATION) at 20:01

## 2020-01-01 RX ADMIN — DOCUSATE SODIUM 100 MG: 100 CAPSULE, LIQUID FILLED ORAL at 09:34

## 2020-01-01 RX ADMIN — GABAPENTIN 100 MG: 100 CAPSULE ORAL at 00:52

## 2020-01-01 RX ADMIN — HEPARIN SODIUM 5000 UNITS: 5000 INJECTION INTRAVENOUS; SUBCUTANEOUS at 21:25

## 2020-01-01 RX ADMIN — TRAMADOL HYDROCHLORIDE 50 MG: 50 TABLET, COATED ORAL at 14:18

## 2020-01-01 RX ADMIN — GUAIFENESIN 1200 MG: 600 TABLET, EXTENDED RELEASE ORAL at 08:11

## 2020-01-01 RX ADMIN — IPRATROPIUM BROMIDE 0.5 MG: 0.5 SOLUTION RESPIRATORY (INHALATION) at 07:31

## 2020-01-01 RX ADMIN — IOHEXOL 85 ML: 350 INJECTION, SOLUTION INTRAVENOUS at 21:00

## 2020-01-01 RX ADMIN — TRAMADOL HYDROCHLORIDE 50 MG: 50 TABLET, COATED ORAL at 14:59

## 2020-01-01 RX ADMIN — IPRATROPIUM BROMIDE 0.5 MG: 0.5 SOLUTION RESPIRATORY (INHALATION) at 19:11

## 2020-01-01 RX ADMIN — ISODIUM CHLORIDE 3 ML: 0.03 SOLUTION RESPIRATORY (INHALATION) at 19:22

## 2020-01-01 RX ADMIN — ISODIUM CHLORIDE 3 ML: 0.03 SOLUTION RESPIRATORY (INHALATION) at 13:26

## 2020-01-01 RX ADMIN — GABAPENTIN 100 MG: 100 CAPSULE ORAL at 21:13

## 2020-01-01 RX ADMIN — ISODIUM CHLORIDE 3 ML: 0.03 SOLUTION RESPIRATORY (INHALATION) at 07:49

## 2020-01-01 RX ADMIN — METHOCARBAMOL 500 MG: 500 TABLET, FILM COATED ORAL at 10:41

## 2020-01-01 RX ADMIN — METOPROLOL TARTRATE 25 MG: 25 TABLET ORAL at 21:13

## 2020-01-01 RX ADMIN — NYSTATIN: 100000 POWDER TOPICAL at 17:15

## 2020-01-01 RX ADMIN — HEPARIN SODIUM 5000 UNITS: 5000 INJECTION INTRAVENOUS; SUBCUTANEOUS at 05:36

## 2020-01-01 RX ADMIN — LEVALBUTEROL HYDROCHLORIDE 1.25 MG: 1.25 SOLUTION, CONCENTRATE RESPIRATORY (INHALATION) at 07:49

## 2020-01-01 RX ADMIN — GUAIFENESIN 1200 MG: 600 TABLET ORAL at 21:09

## 2020-01-01 RX ADMIN — ASPIRIN 81 MG 81 MG: 81 TABLET ORAL at 09:38

## 2020-01-01 RX ADMIN — ATORVASTATIN CALCIUM 40 MG: 40 TABLET, FILM COATED ORAL at 09:48

## 2020-01-01 RX ADMIN — ALPRAZOLAM 0.25 MG: 0.25 TABLET ORAL at 21:37

## 2020-01-01 RX ADMIN — ONDANSETRON 4 MG: 2 INJECTION INTRAMUSCULAR; INTRAVENOUS at 06:19

## 2020-01-01 RX ADMIN — FUROSEMIDE 20 MG: 20 TABLET ORAL at 08:11

## 2020-01-01 RX ADMIN — ONDANSETRON 4 MG: 2 INJECTION INTRAMUSCULAR; INTRAVENOUS at 13:33

## 2020-01-01 RX ADMIN — POTASSIUM CHLORIDE 20 MEQ: 1500 TABLET, EXTENDED RELEASE ORAL at 09:22

## 2020-01-01 RX ADMIN — GABAPENTIN 100 MG: 100 CAPSULE ORAL at 09:49

## 2020-01-01 RX ADMIN — LEVALBUTEROL HYDROCHLORIDE 1.25 MG: 1.25 SOLUTION, CONCENTRATE RESPIRATORY (INHALATION) at 07:48

## 2020-01-01 RX ADMIN — METOPROLOL TARTRATE 25 MG: 25 TABLET, FILM COATED ORAL at 09:15

## 2020-01-01 RX ADMIN — IPRATROPIUM BROMIDE 0.5 MG: 0.5 SOLUTION RESPIRATORY (INHALATION) at 20:07

## 2020-01-01 RX ADMIN — TRAMADOL HYDROCHLORIDE 50 MG: 50 TABLET, COATED ORAL at 12:18

## 2020-01-01 RX ADMIN — OSELTAMIVIR PHOSPHATE 30 MG: 30 CAPSULE ORAL at 09:39

## 2020-01-01 RX ADMIN — HEPARIN SODIUM 5000 UNITS: 5000 INJECTION INTRAVENOUS; SUBCUTANEOUS at 05:14

## 2020-01-01 RX ADMIN — GABAPENTIN 100 MG: 100 CAPSULE ORAL at 08:52

## 2020-01-01 RX ADMIN — ACETAZOLAMIDE 250 MG: 500 INJECTION, POWDER, LYOPHILIZED, FOR SOLUTION INTRAVENOUS at 15:13

## 2020-01-01 RX ADMIN — HEPARIN SODIUM 5000 UNITS: 5000 INJECTION INTRAVENOUS; SUBCUTANEOUS at 13:27

## 2020-01-01 RX ADMIN — GUAIFENESIN 1200 MG: 600 TABLET ORAL at 08:10

## 2020-01-01 RX ADMIN — NYSTATIN: 100000 POWDER TOPICAL at 08:32

## 2020-01-01 RX ADMIN — POTASSIUM CHLORIDE 20 MEQ: 1500 TABLET, EXTENDED RELEASE ORAL at 08:52

## 2020-01-01 RX ADMIN — ACETAMINOPHEN 975 MG: 325 TABLET ORAL at 22:03

## 2020-01-01 RX ADMIN — IPRATROPIUM BROMIDE 0.5 MG: 0.5 SOLUTION RESPIRATORY (INHALATION) at 07:20

## 2020-01-01 RX ADMIN — GUAIFENESIN 600 MG: 600 TABLET, EXTENDED RELEASE ORAL at 06:15

## 2020-01-01 RX ADMIN — FUROSEMIDE 20 MG: 20 TABLET ORAL at 08:15

## 2020-01-01 RX ADMIN — METOPROLOL TARTRATE 25 MG: 25 TABLET, FILM COATED ORAL at 09:14

## 2020-01-01 RX ADMIN — HEPARIN SODIUM 5000 UNITS: 5000 INJECTION INTRAVENOUS; SUBCUTANEOUS at 05:18

## 2020-01-01 RX ADMIN — METOPROLOL TARTRATE 25 MG: 25 TABLET, FILM COATED ORAL at 20:31

## 2020-01-01 RX ADMIN — LEVALBUTEROL HYDROCHLORIDE 1.25 MG: 1.25 SOLUTION, CONCENTRATE RESPIRATORY (INHALATION) at 19:14

## 2020-01-01 RX ADMIN — ATORVASTATIN CALCIUM 40 MG: 40 TABLET, FILM COATED ORAL at 16:20

## 2020-01-01 RX ADMIN — METRONIDAZOLE 500 MG: 500 INJECTION, SOLUTION INTRAVENOUS at 19:52

## 2020-01-01 RX ADMIN — NYSTATIN: 100000 POWDER TOPICAL at 17:22

## 2020-01-01 RX ADMIN — OXYCODONE HYDROCHLORIDE 2.5 MG: 5 TABLET ORAL at 12:10

## 2020-01-01 RX ADMIN — SENNOSIDES AND DOCUSATE SODIUM 1 TABLET: 8.6; 5 TABLET ORAL at 08:48

## 2020-01-01 RX ADMIN — BISACODYL 10 MG: 10 SUPPOSITORY RECTAL at 14:37

## 2020-01-01 RX ADMIN — ALPRAZOLAM 0.25 MG: 0.25 TABLET ORAL at 21:15

## 2020-01-01 RX ADMIN — POLYETHYLENE GLYCOL 3350 17 G: 17 POWDER, FOR SOLUTION ORAL at 14:02

## 2020-01-01 RX ADMIN — ALPRAZOLAM 0.25 MG: 0.25 TABLET ORAL at 08:59

## 2020-01-01 RX ADMIN — ALPRAZOLAM 0.25 MG: 0.25 TABLET ORAL at 21:25

## 2020-01-01 RX ADMIN — GABAPENTIN 100 MG: 100 CAPSULE ORAL at 17:03

## 2020-01-01 RX ADMIN — CALCIUM CARBONATE-VITAMIN D TAB 500 MG-200 UNIT 2 TABLET: 500-200 TAB at 08:40

## 2020-01-01 RX ADMIN — LEVALBUTEROL HYDROCHLORIDE 1.25 MG: 1.25 SOLUTION, CONCENTRATE RESPIRATORY (INHALATION) at 07:31

## 2020-01-01 RX ADMIN — FAMOTIDINE 10 MG: 20 TABLET ORAL at 08:24

## 2020-01-01 RX ADMIN — ACETAMINOPHEN 975 MG: 325 TABLET ORAL at 15:00

## 2020-01-01 RX ADMIN — METOPROLOL TARTRATE 25 MG: 25 TABLET ORAL at 09:45

## 2020-01-01 RX ADMIN — FAMOTIDINE 10 MG: 20 TABLET ORAL at 18:40

## 2020-01-01 RX ADMIN — SENNOSIDES AND DOCUSATE SODIUM 1 TABLET: 8.6; 5 TABLET ORAL at 21:14

## 2020-01-01 RX ADMIN — AMLODIPINE BESYLATE 2.5 MG: 2.5 TABLET ORAL at 17:35

## 2020-01-01 RX ADMIN — ACETAMINOPHEN 975 MG: 325 TABLET ORAL at 06:23

## 2020-01-01 RX ADMIN — METOPROLOL TARTRATE 25 MG: 25 TABLET ORAL at 08:52

## 2020-01-01 RX ADMIN — ENOXAPARIN SODIUM 30 MG: 30 INJECTION SUBCUTANEOUS at 08:12

## 2020-01-01 RX ADMIN — ISODIUM CHLORIDE 3 ML: 0.03 SOLUTION RESPIRATORY (INHALATION) at 20:26

## 2020-01-01 RX ADMIN — ISODIUM CHLORIDE 3 ML: 0.03 SOLUTION RESPIRATORY (INHALATION) at 08:00

## 2020-01-01 RX ADMIN — GUAIFENESIN 1200 MG: 600 TABLET, EXTENDED RELEASE ORAL at 00:52

## 2020-01-01 RX ADMIN — FUROSEMIDE 20 MG: 20 TABLET ORAL at 17:15

## 2020-01-01 RX ADMIN — ACETAMINOPHEN 650 MG: 325 TABLET ORAL at 01:05

## 2020-01-01 RX ADMIN — ENOXAPARIN SODIUM 30 MG: 30 INJECTION SUBCUTANEOUS at 08:19

## 2020-01-01 RX ADMIN — NYSTATIN: 100000 POWDER TOPICAL at 17:21

## 2020-01-01 RX ADMIN — OXYCODONE HYDROCHLORIDE 2.5 MG: 5 TABLET ORAL at 14:35

## 2020-01-01 RX ADMIN — ENOXAPARIN SODIUM 30 MG: 30 INJECTION SUBCUTANEOUS at 08:27

## 2020-01-01 RX ADMIN — ACETAMINOPHEN 975 MG: 325 TABLET ORAL at 05:25

## 2020-01-01 RX ADMIN — METOPROLOL TARTRATE 25 MG: 25 TABLET ORAL at 21:44

## 2020-01-01 RX ADMIN — ACETAMINOPHEN 975 MG: 325 TABLET ORAL at 21:37

## 2020-01-01 RX ADMIN — OSELTAMIVIR PHOSPHATE 30 MG: 30 CAPSULE ORAL at 22:09

## 2020-01-01 RX ADMIN — Medication 1 TABLET: at 08:52

## 2020-01-01 RX ADMIN — FUROSEMIDE 20 MG: 20 TABLET ORAL at 08:49

## 2020-01-01 RX ADMIN — DOCUSATE SODIUM 100 MG: 100 CAPSULE, LIQUID FILLED ORAL at 17:29

## 2020-01-01 RX ADMIN — METOPROLOL TARTRATE 25 MG: 25 TABLET, FILM COATED ORAL at 21:38

## 2020-01-01 RX ADMIN — NYSTATIN 1 APPLICATION: 100000 POWDER TOPICAL at 18:04

## 2020-01-01 RX ADMIN — ATORVASTATIN CALCIUM 40 MG: 40 TABLET, FILM COATED ORAL at 15:38

## 2020-01-01 RX ADMIN — LEVALBUTEROL HYDROCHLORIDE 1.25 MG: 1.25 SOLUTION, CONCENTRATE RESPIRATORY (INHALATION) at 19:46

## 2020-01-01 RX ADMIN — GUAIFENESIN 1200 MG: 600 TABLET ORAL at 09:15

## 2020-01-01 RX ADMIN — ENOXAPARIN SODIUM 40 MG: 40 INJECTION SUBCUTANEOUS at 10:23

## 2020-01-01 RX ADMIN — FUROSEMIDE 40 MG: 10 INJECTION, SOLUTION INTRAMUSCULAR; INTRAVENOUS at 16:20

## 2020-01-01 RX ADMIN — Medication 1 TABLET: at 16:36

## 2020-01-01 RX ADMIN — GUAIFENESIN 1200 MG: 600 TABLET, EXTENDED RELEASE ORAL at 08:49

## 2020-01-01 RX ADMIN — Medication 1 TABLET: at 08:54

## 2020-01-01 RX ADMIN — IPRATROPIUM BROMIDE 0.5 MG: 0.5 SOLUTION RESPIRATORY (INHALATION) at 13:57

## 2020-01-01 RX ADMIN — FUROSEMIDE 20 MG: 20 TABLET ORAL at 18:06

## 2020-01-01 RX ADMIN — ISODIUM CHLORIDE 3 ML: 0.03 SOLUTION RESPIRATORY (INHALATION) at 08:20

## 2020-01-01 RX ADMIN — METOPROLOL TARTRATE 25 MG: 25 TABLET, FILM COATED ORAL at 21:35

## 2020-01-01 RX ADMIN — METOPROLOL TARTRATE 25 MG: 25 TABLET, FILM COATED ORAL at 08:02

## 2020-01-01 RX ADMIN — METOPROLOL TARTRATE 25 MG: 25 TABLET, FILM COATED ORAL at 08:48

## 2020-01-01 RX ADMIN — METOPROLOL TARTRATE 25 MG: 25 TABLET, FILM COATED ORAL at 21:21

## 2020-01-01 RX ADMIN — METOPROLOL SUCCINATE 25 MG: 25 TABLET, EXTENDED RELEASE ORAL at 09:51

## 2020-01-01 RX ADMIN — FAMOTIDINE 10 MG: 20 TABLET ORAL at 09:14

## 2020-01-01 RX ADMIN — ALPRAZOLAM 0.25 MG: 0.25 TABLET ORAL at 06:16

## 2020-01-01 RX ADMIN — HEPARIN SODIUM 5000 UNITS: 5000 INJECTION INTRAVENOUS; SUBCUTANEOUS at 21:36

## 2020-01-01 RX ADMIN — ACETAMINOPHEN 650 MG: 325 TABLET ORAL at 21:11

## 2020-01-01 RX ADMIN — OSELTAMIVIR PHOSPHATE 30 MG: 30 CAPSULE ORAL at 21:53

## 2020-01-01 RX ADMIN — DOCUSATE SODIUM 100 MG: 100 CAPSULE, LIQUID FILLED ORAL at 17:35

## 2020-01-01 RX ADMIN — LEVALBUTEROL HYDROCHLORIDE 1.25 MG: 1.25 SOLUTION, CONCENTRATE RESPIRATORY (INHALATION) at 21:33

## 2020-01-01 RX ADMIN — TRAMADOL HYDROCHLORIDE 50 MG: 50 TABLET, COATED ORAL at 04:56

## 2020-01-01 RX ADMIN — METOPROLOL TARTRATE 25 MG: 25 TABLET, FILM COATED ORAL at 08:15

## 2020-01-01 RX ADMIN — HEPARIN SODIUM 5000 UNITS: 5000 INJECTION INTRAVENOUS; SUBCUTANEOUS at 05:15

## 2020-01-01 RX ADMIN — LIDOCAINE 1 PATCH: 50 PATCH CUTANEOUS at 09:34

## 2020-01-01 RX ADMIN — FAMOTIDINE 10 MG: 20 TABLET ORAL at 08:11

## 2020-01-01 RX ADMIN — ENOXAPARIN SODIUM 30 MG: 30 INJECTION SUBCUTANEOUS at 21:08

## 2020-01-01 RX ADMIN — ISODIUM CHLORIDE 3 ML: 0.03 SOLUTION RESPIRATORY (INHALATION) at 20:06

## 2020-01-01 RX ADMIN — TRAMADOL HYDROCHLORIDE 50 MG: 50 TABLET, COATED ORAL at 00:33

## 2020-01-01 RX ADMIN — METOPROLOL SUCCINATE 25 MG: 25 TABLET, EXTENDED RELEASE ORAL at 09:37

## 2020-01-01 RX ADMIN — POTASSIUM CHLORIDE 20 MEQ: 1500 TABLET, EXTENDED RELEASE ORAL at 17:15

## 2020-01-01 RX ADMIN — LEVALBUTEROL HYDROCHLORIDE 1.25 MG: 1.25 SOLUTION, CONCENTRATE RESPIRATORY (INHALATION) at 19:22

## 2020-01-01 RX ADMIN — NYSTATIN: 100000 POWDER TOPICAL at 17:52

## 2020-01-01 RX ADMIN — HEPARIN SODIUM 5000 UNITS: 5000 INJECTION INTRAVENOUS; SUBCUTANEOUS at 14:38

## 2020-01-01 RX ADMIN — POTASSIUM CHLORIDE 20 MEQ: 1500 TABLET, EXTENDED RELEASE ORAL at 09:50

## 2020-01-01 RX ADMIN — FUROSEMIDE 80 MG: 10 INJECTION, SOLUTION INTRAMUSCULAR; INTRAVENOUS at 15:50

## 2020-01-01 RX ADMIN — METOPROLOL TARTRATE 25 MG: 25 TABLET, FILM COATED ORAL at 20:42

## 2020-01-01 RX ADMIN — OXYCODONE HYDROCHLORIDE 5 MG: 5 TABLET ORAL at 23:42

## 2020-01-01 RX ADMIN — GUAIFENESIN 600 MG: 600 TABLET, EXTENDED RELEASE ORAL at 18:40

## 2020-01-01 RX ADMIN — GABAPENTIN 100 MG: 100 CAPSULE ORAL at 08:41

## 2020-01-01 RX ADMIN — SENNOSIDES AND DOCUSATE SODIUM 1 TABLET: 8.6; 5 TABLET ORAL at 22:12

## 2020-01-01 RX ADMIN — POTASSIUM CHLORIDE 20 MEQ: 1500 TABLET, EXTENDED RELEASE ORAL at 08:39

## 2020-01-01 RX ADMIN — OXYCODONE HYDROCHLORIDE 5 MG: 5 TABLET ORAL at 18:20

## 2020-01-01 RX ADMIN — FUROSEMIDE 40 MG: 10 INJECTION, SOLUTION INTRAMUSCULAR; INTRAVENOUS at 09:26

## 2020-01-01 RX ADMIN — FUROSEMIDE 40 MG: 10 INJECTION, SOLUTION INTRAMUSCULAR; INTRAVENOUS at 08:05

## 2020-01-01 RX ADMIN — ALPRAZOLAM 0.25 MG: 0.25 TABLET ORAL at 21:29

## 2020-01-01 RX ADMIN — FAMOTIDINE 10 MG: 20 TABLET, FILM COATED ORAL at 08:40

## 2020-01-01 RX ADMIN — ATORVASTATIN CALCIUM 40 MG: 40 TABLET, FILM COATED ORAL at 17:05

## 2020-01-01 RX ADMIN — ALPRAZOLAM 0.25 MG: 0.25 TABLET ORAL at 21:07

## 2020-01-01 RX ADMIN — LEVALBUTEROL HYDROCHLORIDE 1.25 MG: 1.25 SOLUTION, CONCENTRATE RESPIRATORY (INHALATION) at 07:39

## 2020-01-01 RX ADMIN — ACETAMINOPHEN 975 MG: 325 TABLET ORAL at 14:38

## 2020-01-01 RX ADMIN — TRAMADOL HYDROCHLORIDE 50 MG: 50 TABLET, COATED ORAL at 06:16

## 2020-01-01 RX ADMIN — METOPROLOL TARTRATE 25 MG: 25 TABLET, FILM COATED ORAL at 23:11

## 2020-01-01 RX ADMIN — GABAPENTIN 100 MG: 100 CAPSULE ORAL at 17:27

## 2020-01-01 RX ADMIN — ALPRAZOLAM 0.25 MG: 0.25 TABLET ORAL at 22:10

## 2020-01-01 RX ADMIN — MORPHINE SULFATE 3 MG: 4 INJECTION INTRAVENOUS at 21:00

## 2020-01-01 RX ADMIN — ACETAMINOPHEN 650 MG: 325 TABLET ORAL at 21:10

## 2020-01-01 RX ADMIN — GUAIFENESIN 600 MG: 600 TABLET, EXTENDED RELEASE ORAL at 17:32

## 2020-01-02 NOTE — TELEPHONE ENCOUNTER
Sent in xanax  As far as the AM, check her sugars, have a bedtime snack, ?due to vertgo or a low bp? Hard to say w/o seeing the pt

## 2020-01-02 NOTE — TELEPHONE ENCOUNTER
Jennyfer Canon called office stating she needs a refill of lorazepam sent to THE [facility] [address] [place] [address]  I do not see it on her med list  Also, she states she wakes up in the morning nausea and dizzy would like to know what you would recommend  She does have upcoming appt the you on 1/10/2020   Please advise, Jennyfer Soto would like for us to call Miriam Alatorre

## 2020-01-02 NOTE — PROGRESS NOTES
Scheduled Medication Review:  Pt's scheduled medication use was reviewed by myself/staff via the [facility] website  Pt's use has been found to be appropriate w/o any concerns for misuse by the patient  Pt's current conditions require continued scheduled medication use at this time  Future review for continued appropriate medication use and misuse will continue

## 2020-01-10 NOTE — PROGRESS NOTES
BMI Counseling: Body mass index is 28 24 kg/m²  The BMI is above normal  Nutrition recommendations include decreasing portion sizes, increasing intake of lean protein and reducing intake of saturated and trans fat  Exercise recommendations include moderate physical activity 150 minutes/week  No pharmacotherapy was ordered  Assessment/Plan:  Problem List Items Addressed This Visit        Digestive    GERD (gastroesophageal reflux disease)       Respiratory    COPD (chronic obstructive pulmonary disease) ([facility]) (Chronic)       Cardiovascular and Mediastinum    Atherosclerotic heart disease of native coronary artery without angina pectoris    Relevant Medications    metoprolol succinate (TOPROL-XL) 25 mg 24 hr tablet    Essential (primary) hypertension - Primary    Relevant Medications    metoprolol succinate (TOPROL-XL) 25 mg 24 hr tablet    Other Relevant Orders    Comprehensive metabolic panel    Microalbumin / creatinine urine ratio    Diastolic congestive heart failure (HCC)    Relevant Medications    metoprolol succinate (TOPROL-XL) 25 mg 24 hr tablet       Genitourinary    CKD (chronic kidney disease)       Other    Mixed hyperlipidemia    Relevant Orders    LDL cholesterol, direct    Triglycerides      Other Visit Diagnoses     Overweight (BMI 25 0-29  9)        BMI 28 0-28 9,adult        Compression fracture of L1 vertebra, initial encounter ([facility])        MADELYN (generalized anxiety disorder)        Relevant Medications    citalopram (CeleXA) 10 mg tablet    Positive depression screening        Depression, unspecified depression type        Relevant Medications    citalopram (CeleXA) 10 mg tablet    Mild major depression, single episode ([facility])        Relevant Medications    citalopram (CeleXA) 10 mg tablet    Anxiety with depression        Relevant Medications    citalopram (CeleXA) 10 mg tablet           Diagnoses and all orders for this visit:    Essential (primary) hypertension  -     Comprehensive metabolic panel; Future  -     Microalbumin / creatinine urine ratio  -     metoprolol succinate (TOPROL-XL) 25 mg 24 hr tablet; Take 1 tablet (25 mg total) by mouth daily    Chronic diastolic congestive heart failure (HCC)    Atherosclerosis of native coronary artery of native heart without angina pectoris    Chronic obstructive pulmonary disease, unspecified COPD type ([facility] )    Gastroesophageal reflux disease without esophagitis    Chronic kidney disease, unspecified CKD stage    Mixed hyperlipidemia  -     LDL cholesterol, direct; Future  -     Triglycerides; Future    Overweight (BMI 25 0-29  9)    BMI 28 0-28 9,adult    Compression fracture of L1 vertebra, initial encounter ([facility])    MADELYN (generalized anxiety disorder)  -     citalopram (CeleXA) 10 mg tablet; Take 1 tablet (10 mg total) by mouth daily at bedtime    Positive depression screening    Depression, unspecified depression type    Mild major depression, single episode ([facility] )    Anxiety with depression        No problem-specific Assessment & Plan notes found for this encounter  A/P: Doing ok, but overall decline noted  Reports not taking her meds as often and will change and increase the beta blocker due to the increase BP  Feel depression and MADELYN playing a role and need to minimize the benzo  Will start celexa at bedtime for sleep, pain, MADELYN/MDD  ??nausea  Will check labs  If persists, check an obstruction series  Denies using NSAID's  Check routine labs  Continue current labs and RTC six weeks for f/u MDD, nausea, and BP  Subjective:      Patient ID: Dajuan Weller is a 80 y o  female  WF RTC with her daughter for f/u HTN, COPD, recent compression fall with gait abnormality, etc  DOing ok  Activity level is improving some after PT  No pain  Biggest issues is her MADELYN  Using xanax and some increase somnolence, but better with decreasing the dose, but MADELYN worse  Sleep is off as well and naps during the day   Reports feeling nauseated, but denies heartburn and stools are normal w/o any constipation, BRBPR, melena, etc  Due for labs  The following portions of the patient's history were reviewed and updated as appropriate:   She has a past medical history of Abnormal blood sugar (03/13/2017), Abnormal carotid duplex scan, Anxiety, Cervical radiculopathy (08/08/2017), CHF (congestive heart failure) ([place] [address] ), Compression fracture of lumbar spine, non-traumatic, with routine healing, subsequent encounter, COPD (chronic obstructive pulmonary disease) ([place] [address] ) (2/6/2018), Coronary angioplasty status, Coronary artery disease (4/7/2017), Costochondritis (02/05/2013), Dyslipidemia, GERD without esophagitis (8/30/2012), H/O CT scan of chest, History of Holter monitoring, echocardiogram, echocardiogram (08/16/2017), Hypertension, Iron deficiency anemia (4/21/2016), Macular degeneration, right eye, Malignant melanoma of skin ([place] [address] ) (9/17/2012), Mixed hyperlipidemia, NSTEMI (non-ST elevated myocardial infarction) ([place] [address] ) (7/25/2017), Old MI (myocardial infarction), Osteoarthritis (9/17/2012), Osteoporosis (3/13/2017), 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[place] [address] ) (04/07/2017), and Urinary tract infection  ,  does not have any pertinent problems on file  ,   has a past surgical history that includes Breast surgery; Cardiac catheterization (03/24/2017); INSERTION STENT ARTERY (04/07/2017); Cholecystectomy; Hemorrhoid surgery; Coronary stent placement (03/25/2017); Skin biopsy; Tonsillectomy; Abdominal surgery; Colonoscopy (N/A, 7/25/2018); and Appendectomy  ,  family history includes Heart failure in her mother; Prostate cancer in her father; Stroke in her family  ,   reports that she has quit smoking  Her smoking use included cigarettes  She has never used smokeless tobacco  She reports that she drank alcohol  She reports that she does not use drugs  ,  is allergic to ciprofloxacin; keflex [cephalexin]; nitrofurantoin; and penicillins     Current Outpatient Medications   Medication Sig Dispense Refill  ALPRAZolam (XANAX) 0 5 mg tablet Take 1 tablet (0 5 mg total) by mouth 3 (three) times a day as needed for anxiety 90 tablet 0    aspirin 81 mg chewable tablet Chew 1 tablet (81 mg total) daily 30 tablet 0    atorvastatin (LIPITOR) 40 mg tablet Take 40 mg by mouth daily with dinner      Calcium Carbonate-Vit D-Min (CALCIUM 1200 PO) Take by mouth      furosemide (LASIX) 20 mg tablet Take 1 tablet (20 mg total) by mouth daily 90 tablet 0    multivitamin (THERAGRAN) TABS Take 1 tablet by mouth daily      Potassium Chloride BRISSA by Does not apply route      ranitidine (ZANTAC) 150 mg tablet Take 1 tablet (150 mg total) by mouth 2 (two) times a day 60 tablet 0    citalopram (CeleXA) 10 mg tablet Take 1 tablet (10 mg total) by mouth daily at bedtime 90 tablet 0    metoprolol succinate (TOPROL-XL) 25 mg 24 hr tablet Take 1 tablet (25 mg total) by mouth daily 90 tablet 0     No current facility-administered medications for this visit  Review of Systems   Constitutional: Negative for activity change, chills, diaphoresis, fatigue and fever  Eyes: Negative for visual disturbance  Respiratory: Negative for cough, chest tightness, shortness of breath and wheezing  Cardiovascular: Negative for chest pain, palpitations and leg swelling  Gastrointestinal: Positive for nausea  Negative for abdominal distention, abdominal pain, anal bleeding, blood in stool, constipation, diarrhea, rectal pain and vomiting  Endocrine: Negative for cold intolerance and heat intolerance  Genitourinary: Negative for difficulty urinating, dysuria and frequency  Musculoskeletal: Negative for arthralgias, gait problem and myalgias  Neurological: Negative for dizziness, seizures, syncope, weakness, light-headedness and headaches  Psychiatric/Behavioral: Positive for sleep disturbance  Negative for confusion and dysphoric mood  The patient is nervous/anxious          PHQ-9 Depression Screening    PHQ-9:    Frequency of the following problems over the past two weeks:             Objective:  Vitals:    01/10/20 1545 01/10/20 1552   BP: (!) 180/100 160/80   Pulse: 80    Temp: 98 5 °F (36 9 °C)    SpO2: 92%    Weight: 70 kg (154 lb 6 oz)    Height: 5' 2" (1 575 m)      Body mass index is 28 24 kg/m²  Physical Exam   Constitutional: She is oriented to person, place, and time  She appears well-developed and well-nourished  No distress  HENT:   Head: Normocephalic and atraumatic  Mouth/Throat: Oropharynx is clear and moist    Eyes: Pupils are equal, round, and reactive to light  Conjunctivae and EOM are normal    Neck: Neck supple  No JVD present  Cardiovascular: Normal rate, regular rhythm and normal heart sounds  Pulmonary/Chest: Effort normal and breath sounds normal  No respiratory distress  She has no wheezes  She has no rales  Abdominal: Soft  Bowel sounds are normal  She exhibits no distension  There is no tenderness  Musculoskeletal: She exhibits no edema, tenderness or deformity  Neurological: She is alert and oriented to person, place, and time  Psychiatric: She has a normal mood and affect  Her behavior is normal  Judgment and thought content normal    Nursing note and vitals reviewed  BMI Counseling: Body mass index is 28 24 kg/m²  The BMI is above normal  Nutrition recommendations include reducing portion sizes, decreasing overall calorie intake, reducing intake of saturated fat and trans fat and reducing intake of cholesterol  Exercise recommendations include moderate aerobic physical activity for 150 minutes/week  Depression Screening Follow-up Plan: Patient's depression screening was positive with a PHQ-2 score of   Their PHQ-9 score was   Patient assessed for underlying major depression  They have no active suicidal ideations  Brief counseling provided and recommend additional follow-up/re-evaluation next office visit

## 2020-01-10 NOTE — PATIENT INSTRUCTIONS
Weight Management   AMBULATORY CARE:   Why it is important to manage your weight:  Being overweight increases your risk of health conditions such as heart disease, high blood pressure, type 2 diabetes, and certain types of cancer  It can also increase your risk for osteoarthritis, sleep apnea, and other respiratory problems  Aim for a slow, steady weight loss  Even a small amount of weight loss can lower your risk of health problems  How to lose weight safely:  A safe and healthy way to lose weight is to eat fewer calories and get regular exercise  You can lose up about 1 pound a week by decreasing the number of calories you eat by 500 calories each day  You can decrease calories by eating smaller portion sizes or by cutting out high-calorie foods  Read labels to find out how many calories are in the foods you eat  You can also burn calories with exercise such as walking, swimming, or biking  You will be more likely to keep weight off if you make these changes part of your lifestyle  Healthy meal plan for weight management:  A healthy meal plan includes a variety of foods, contains fewer calories, and helps you stay healthy  A healthy meal plan includes the following:  · Eat whole-grain foods more often  A healthy meal plan should contain fiber  Fiber is the part of grains, fruits, and vegetables that is not broken down by your body  Whole-grain foods are healthy and provide extra fiber in your diet  Some examples of whole-grain foods are whole-wheat breads and pastas, oatmeal, brown rice, and bulgur  · Eat a variety of vegetables every day  Include dark, leafy greens such as spinach, kale, osito greens, and mustard greens  Eat yellow and orange vegetables such as carrots, sweet potatoes, and winter squash  · Eat a variety of fruits every day  Choose fresh or canned fruit (canned in its own juice or light syrup) instead of juice  Fruit juice has very little or no fiber  · Eat low-fat dairy foods  Drink fat-free (skim) milk or 1% milk  Eat fat-free yogurt and low-fat cottage cheese  Try low-fat cheeses such as mozzarella and other reduced-fat cheeses  · Choose meat and other protein foods that are low in fat  Choose beans or other legumes such as split peas or lentils  Choose fish, skinless poultry (chicken or turkey), or lean cuts of red meat (beef or pork)  Before you cook meat or poultry, cut off any visible fat  · Use less fat and oil  Try baking foods instead of frying them  Add less fat, such as margarine, sour cream, regular salad dressing and mayonnaise to foods  Eat fewer high-fat foods  Some examples of high-fat foods include french fries, doughnuts, ice cream, and cakes  · Eat fewer sweets  Limit foods and drinks that are high in sugar  This includes candy, cookies, regular soda, and sweetened drinks  Ways to decrease calories:   · Eat smaller portions  ¨ Use a small plate with smaller servings  ¨ Do not eat second helpings  ¨ When you eat at a restaurant, ask for a box and place half of your meal in the box before you eat  ¨ Share an entrée with someone else  · Replace high-calorie snacks with healthy, low-calorie snacks  ¨ Choose fresh fruit, vegetables, fat-free rice cakes, or air-popped popcorn instead of potato chips, nuts, or chocolate  ¨ Choose water or calorie-free drinks instead of soda or sweetened drinks  · Eat regular meals  Skipping meals can lead to overeating later in the day  Eat a healthy snack in place of a meal if you do not have time to eat a regular meal      · Do not shop for groceries when you are hungry  You may be more likely to make unhealthy food choices  Take a grocery list of healthy foods and shop after you have eaten  Exercise:  Exercise at least 30 minutes per day on most days of the week  Some examples of exercise include walking, biking, dancing, and swimming   You can also fit in more physical activity by taking the stairs instead of the elevator or parking farther away from stores  Ask your healthcare provider about the best exercise plan for you  Other things to consider as you try to lose weight:   · Be aware of situations that may give you the urge to overeat, such as eating while watching television  Find ways to avoid these situations  For example, read a book, go for a walk, or do crafts  · Meet with a weight loss support group or friends who are also trying to lose weight  This may help you stay motivated to continue working on your weight loss goals  © 2017 [facility] Information is for End User's use only and may not be sold, redistributed or otherwise used for commercial purposes  All illustrations and images included in CareNotes® are the copyrighted property of [employer]  or [employer]  The above information is an  only  It is not intended as medical advice for individual conditions or treatments  Talk to your doctor, nurse or pharmacist before following any medical regimen to see if it is safe and effective for you  Low Fat Diet   AMBULATORY CARE:   A low-fat diet  is an eating plan that is low in total fat, unhealthy fat, and cholesterol  You may need to follow a low-fat diet if you have trouble digesting or absorbing fat  You may also need to follow this diet if you have high cholesterol  You can also lower your cholesterol by increasing the amount of fiber in your diet  Soluble fiber is a type of fiber that helps to decrease cholesterol levels  Different types of fat in food:   · Limit unhealthy fats  A diet that is high in cholesterol, saturated fat, and trans fat may cause unhealthy cholesterol levels  Unhealthy cholesterol levels increase your risk of heart disease  ¨ Cholesterol:  Limit intake of cholesterol to less than 200 mg per day  Cholesterol is found in meat, eggs, and dairy      ¨ Saturated fat:  Limit saturated fat to less than 7% of your total daily calories  Ask your dietitian how many calories you need each day  Saturated fat is found in butter, cheese, ice cream, whole milk, and palm oil  Saturated fat is also found in meat, such as beef, pork, chicken skin, and processed meats  Processed meats include sausage, hot dogs, and bologna  ¨ Trans fat:  Avoid trans fat as much as possible  Trans fat is used in fried and baked foods  Foods that say trans fat free on the label may still have up to 0 5 grams of trans fat per serving  · Include healthy fats  Replace foods that are high in saturated and trans fat with foods high in healthy fats  This may help to decrease high cholesterol levels  ¨ Monounsaturated fats: These are found in avocados, nuts, and vegetable oils, such as olive, canola, and sunflower oil  ¨ Polyunsaturated fats: These can be found in vegetable oils, such as soybean or corn oil  Omega-3 fats can help to decrease the risk of heart disease  Omega-3 fats are found in fish, such as salmon, herring, trout, and tuna  Omega-3 fats can also be found in plant foods, such as walnuts, flaxseed, soybeans, and canola oil    Foods to limit or avoid:   · Grains:      ¨ Snacks that are made with partially hydrogenated oils, such as chips, regular crackers, and butter-flavored popcorn    ¨ High-fat baked goods, such as biscuits, croissants, doughnuts, pies, cookies, and pastries    · Dairy:      ¨ Whole milk, 2% milk, and yogurt and ice cream made with whole milk    ¨ Half and half creamer, heavy cream, and whipping cream    ¨ Cheese, cream cheese, and sour cream    · Meats and proteins:      ¨ High-fat cuts of meat (T-bone steak, regular hamburger, and ribs)    ¨ Fried meat, poultry (turkey and chicken), and fish    ¨ Poultry (chicken and turkey) with skin    ¨ Cold cuts (salami or bologna), hot dogs, lacy, and sausage    ¨ Whole eggs and egg yolks    · Vegetables and fruits with added fat:      ¨ Fried vegetables or vegetables in butter or high-fat sauces, such as cream or cheese sauces    ¨ Fried fruit or fruit served with butter or cream    · Fats:      ¨ Butter, stick margarine, and shortening    ¨ Coconut, palm oil, and palm kernel oil  Foods to include:   · Grains:      ¨ Whole-grain breads, cereals, pasta, and brown rice    ¨ Low-fat crackers and pretzels    · Vegetables and fruits:      ¨ Fresh, frozen, or canned vegetables (no salt or low-sodium)    ¨ Fresh, frozen, dried, or canned fruit (canned in light syrup or fruit juice)    ¨ Avocado    · Low-fat dairy products:      ¨ Nonfat (skim) or 1% milk    ¨ Nonfat or low-fat cheese, yogurt, and cottage cheese    · Meats and proteins:      ¨ Chicken or turkey with no skin    ¨ Baked or broiled fish    ¨ Lean beef and pork (loin, round, extra lean hamburger)    ¨ Beans and peas, unsalted nuts, soy products    ¨ Egg whites and substitutes    ¨ Seeds and nuts    · Fats:      ¨ Unsaturated oil, such as canola, olive, peanut, soybean, or sunflower oil    ¨ Soft or liquid margarine and vegetable oil spread    ¨ Low-fat salad dressing  Other ways to decrease fat:   · Read food labels before you buy foods  Choose foods that have less than 30% of calories from fat  Choose low-fat or fat-free dairy products  Remember that fat free does not mean calorie free  These foods still contain calories, and too many calories can lead to weight gain  · Trim fat from meat and avoid fried food  Trim all visible fat from meat before you cook it  Remove the skin from poultry  Do not clarke meat, fish, or poultry  Bake, roast, boil, or broil these foods instead  Avoid fried foods  Eat a baked potato instead of Western Sheila fries  Steam vegetables instead of sautéing them in butter  · Add less fat to foods  Use imitation lacy bits on salads and baked potatoes instead of regular lacy bits  Use fat-free or low-fat salad dressings instead of regular dressings   Use low-fat or nonfat butter-flavored topping instead of regular butter or margarine on popcorn and other foods  Ways to decrease fat in recipes:  Replace high-fat ingredients with low-fat or nonfat ones  This may cause baked goods to be drier than usual  You may need to use nonfat cooking spray on pans to prevent food from sticking  You also may need to change the amount of other ingredients, such as water, in the recipe  Try the following:  · Use low-fat or light margarine instead of regular margarine or shortening  · Use lean ground turkey breast or chicken, or lean ground beef (less than 5% fat) instead of hamburger  · Add 1 teaspoon of canola oil to 8 ounces of skim milk instead of using cream or half and half  · Use grated zucchini, carrots, or apples in breads instead of coconut  · Use blenderized, low-fat cottage cheese, plain tofu, or low-fat ricotta cheese instead of cream cheese  · Use 1 egg white and 1 teaspoon of canola oil, or use ¼ cup (2 ounces) of fat-free egg substitute instead of a whole egg  · Replace half of the oil that is called for in a recipe with applesauce when you bake  Use 3 tablespoons of cocoa powder and 1 tablespoon of canola oil instead of a square of baking chocolate  How to increase fiber:  Eat enough high-fiber foods to get 20 to 30 grams of fiber every day  Slowly increase your fiber intake to avoid stomach cramps, gas, and other problems  · Eat 3 ounces of whole-grain foods each day  An ounce is about 1 slice of bread  Eat whole-grain breads, such as whole-wheat bread  Whole wheat, whole-wheat flour, or other whole grains should be listed as the first ingredient on the food label  Replace white flour with whole-grain flour or use half of each in recipes  Whole-grain flour is heavier than white flour, so you may have to add more yeast or baking powder  · Eat a high-fiber cereal for breakfast   Oatmeal is a good source of soluble fiber  Look for cereals that have bran or fiber in the name   Choose whole-grain products, such as brown rice, barley, and whole-wheat pasta  · Eat more beans, peas, and lentils  For example, add beans to soups or salads  Eat at least 5 cups of fruits and vegetables each day  Eat fruits and vegetables with the peel because the peel is high in fiber  © 2017 [address] [employer]  Information is for End User's use only and may not be sold, redistributed or otherwise used for commercial purposes  All illustrations and images included in CareNotes® are the copyrighted property of [employer] , [employer]  or [employer]  The above information is an  only  It is not intended as medical advice for individual conditions or treatments  Talk to your doctor, nurse or pharmacist before following any medical regimen to see if it is safe and effective for you  Heart Healthy Diet   AMBULATORY CARE:   A heart healthy diet  is an eating plan low in total fat, unhealthy fats, and sodium (salt)  A heart healthy diet helps decrease your risk for heart disease and stroke  Limit the amount of fat you eat to 25% to 35% of your total daily calories  Limit sodium to less than 2,300 mg each day  Healthy fats:  Healthy fats can help improve cholesterol levels  The risk for heart disease is decreased when cholesterol levels are normal  Choose healthy fats, such as the following:  · Unsaturated fat  is found in foods such as soybean, canola, olive, corn, and safflower oils  It is also found in soft tub margarine that is made with liquid vegetable oil  · Omega-3 fat  is found in certain fish, such as salmon, tuna, and trout, and in walnuts and flaxseed  Unhealthy fats:  Unhealthy fats can cause unhealthy cholesterol levels in your blood and increase your risk of heart disease  Limit unhealthy fats, such as the following:  · Cholesterol  is found in animal foods, such as eggs and lobster, and in dairy products made from whole milk   Limit cholesterol to less than 300 milligrams (mg) each day  You may need to limit cholesterol to 200 mg each day if you have heart disease  · Saturated fat  is found in meats, such as lacy and hamburger  It is also found in chicken or turkey skin, whole milk, and butter  Limit saturated fat to less than 7% of your total daily calories  Limit saturated fat to less than 6% if you have heart disease or are at increased risk for it  · Trans fat  is found in packaged foods, such as potato chips and cookies  It is also in hard margarine, some fried foods, and shortening  Avoid trans fats as much as possible    Heart healthy foods and drinks to include:  Ask your dietitian or healthcare provider how many servings to have from each of the following food groups:  · Grains:      ¨ Whole-wheat breads, cereals, and pastas, and brown rice    ¨ Low-fat, low-sodium crackers and chips    · Vegetables:      ¨ Broccoli, green beans, green peas, and spinach    ¨ Collards, kale, and lima beans    ¨ Carrots, sweet potatoes, tomatoes, and peppers    ¨ Canned vegetables with no salt added    · Fruits:      ¨ Bananas, peaches, pears, and pineapple    ¨ Grapes, raisins, and dates    ¨ Oranges, tangerines, grapefruit, orange juice, and grapefruit juice    ¨ Apricots, mangoes, melons, and papaya    ¨ Raspberries and strawberries    ¨ Canned fruit with no added sugar    · Low-fat dairy products:      ¨ Nonfat (skim) milk, 1% milk, and low-fat almond, cashew, or soy milks fortified with calcium    ¨ Low-fat cheese, regular or frozen yogurt, and cottage cheese    · Meats and proteins , such as lean cuts of beef and pork (loin, leg, round), skinless chicken and turkey, legumes, soy products, egg whites, and nuts  Foods and drinks to limit or avoid:  Ask your dietitian or healthcare provider about these and other foods that are high in unhealthy fat, sodium, and sugar:  · Snack or packaged foods , such as frozen dinners, cookies, macaroni and cheese, and cereals with more than 300 mg of sodium per serving    · Canned or dry mixes  for cakes, soups, sauces, or gravies    · Vegetables with added sodium , such as instant potatoes, vegetables with added sauces, or regular canned vegetables    · Other foods high in sodium , such as ketchup, barbecue sauce, salad dressing, pickles, olives, soy sauce, and miso    · High-fat dairy foods  such as whole or 2% milk, cream cheese, or sour cream, and cheeses     · High-fat protein foods  such as high-fat cuts of beef (T-bone steaks, ribs), chicken or turkey with skin, and organ meats, such as liver    · Cured or smoked meats , such as hot dogs, lacy, and sausage    · Unhealthy fats and oils , such as butter, stick margarine, shortening, and cooking oils such as coconut or palm oil    · Food and drinks high in sugar , such as soft drinks (soda), sports drinks, sweetened tea, candy, cake, cookies, pies, and doughnuts  Other diet guidelines to follow:   · Eat more foods containing omega-3 fats  Eat fish high in omega-3 fats at least 2 times a week  · Limit alcohol  Too much alcohol can damage your heart and raise your blood pressure  Women should limit alcohol to 1 drink a day  Men should limit alcohol to 2 drinks a day  A drink of alcohol is 12 ounces of beer, 5 ounces of wine, or 1½ ounces of liquor  · Choose low-sodium foods  High-sodium foods can lead to high blood pressure  Add little or no salt to food you prepare  Use herbs and spices in place of salt  · Eat more fiber  to help lower cholesterol levels  Eat at least 5 servings of fruits and vegetables each day  Eat 3 ounces of whole-grain foods each day  Legumes (beans) are also a good source of fiber  Lifestyle guidelines:   · Do not smoke  Nicotine and other chemicals in cigarettes and cigars can cause lung and heart damage  Ask your healthcare provider for information if you currently smoke and need help to quit  E-cigarettes or smokeless tobacco still contain nicotine  Talk to your healthcare provider before you use these products  · Exercise regularly  to help you maintain a healthy weight and improve your blood pressure and cholesterol levels  Ask your healthcare provider about the best exercise plan for you  Do not start an exercise program without asking your healthcare provider  Follow up with your healthcare provider as directed:  Write down your questions so you remember to ask them during your visits  © 2017 [address] [employer] Information is for End User's use only and may not be sold, redistributed or otherwise used for commercial purposes  All illustrations and images included in CareNotes® are the copyrighted property of [employer] M [employer]  or [employer]  The above information is an  only  It is not intended as medical advice for individual conditions or treatments  Talk to your doctor, nurse or pharmacist before following any medical regimen to see if it is safe and effective for you  Chronic Hypertension   AMBULATORY CARE:   Hypertension  is high blood pressure (BP)  Your BP is the force of your blood moving against the walls of your arteries  Normal BP is less than 120/80  Prehypertension is between 120/80 and 139/89  Hypertension is 140/90 or higher  Hypertension causes your BP to get so high that your heart has to work much harder than normal  This can damage your heart  Chronic hypertension is a long-term condition that you can control with a healthy lifestyle or medicines  A controlled blood pressure helps protect your organs, such as your heart, lungs, brain, and kidneys  Common symptoms include the following:   · Headache     · Blurred vision    · Chest pain     · Dizziness or weakness     · Trouble breathing     · Nosebleeds  Call 911 for any of the following:   · You have discomfort in your chest that feels like squeezing, pressure, fullness, or pain  · You become confused or have difficulty speaking      · You suddenly feel lightheaded or have trouble breathing  · You have pain or discomfort in your back, neck, jaw, stomach, or arm  Seek care immediately if:   · You have a severe headache or vision loss  · You have weakness in an arm or leg  Contact your healthcare provider if:   · You feel faint, dizzy, confused, or drowsy  · You have been taking your BP medicine and your BP is still higher than your healthcare provider says it should be  · You have questions or concerns about your condition or care  Treatment for chronic hypertension  may include medicine to lower your BP and lower your cholesterol level  A low cholesterol level helps prevent heart disease and makes it easier to control your blood pressure  Heart disease can make your blood pressure harder to control  You may also need to make lifestyle changes  Take your medicine exactly as directed  Manage chronic hypertension:  Talk with your healthcare provider about these and other ways to manage hypertension:  · Take your BP at home  Sit and rest for 5 minutes before you take your BP  Extend your arm and support it on a flat surface  Your arm should be at the same level as your heart  Follow the directions that came with your BP monitor  If possible, take at least 2 BP readings each time  Take your BP at least twice a day at the same times each day, such as morning and evening  Keep a record of your BP readings and bring it to your follow-up visits  Ask your healthcare provider what your blood pressure should be  · Limit sodium (salt) as directed  Too much sodium can affect your fluid balance  Check labels to find low-sodium or no-salt-added foods  Some low-sodium foods use potassium salts for flavor  Too much potassium can also cause health problems  Your healthcare provider will tell you how much sodium and potassium are safe for you to have in a day  He or she may recommend that you limit sodium to 2,300 mg a day             · Follow the meal plan recommended by your healthcare provider  A dietitian or your provider can give you more information on low-sodium plans or the DASH (Dietary Approaches to Stop Hypertension) eating plan  The DASH plan is low in sodium, unhealthy fats, and total fat  It is high in potassium, calcium, and fiber  · Exercise to maintain a healthy weight  Exercise at least 30 minutes per day, on most days of the week  This will help decrease your blood pressure  Ask about the best exercise plan for you  · Decrease stress  This may help lower your BP  Learn ways to relax, such as deep breathing or listening to music  · Limit alcohol  Women should limit alcohol to 1 drink a day  Men should limit alcohol to 2 drinks a day  A drink of alcohol is 12 ounces of beer, 5 ounces of wine, or 1½ ounces of liquor  · Do not smoke  Nicotine and other chemicals in cigarettes and cigars can increase your BP and also cause lung damage  Ask your healthcare provider for information if you currently smoke and need help to quit  E-cigarettes or smokeless tobacco still contain nicotine  Talk to your healthcare provider before you use these products  Follow up with your healthcare provider as directed: You will need to return to have your BP checked and to have other lab tests done  Write down your questions so you remember to ask them during your visits  © 2017 [address] [employer]  Information is for End User's use only and may not be sold, redistributed or otherwise used for commercial purposes  All illustrations and images included in CareNotes® are the copyrighted property of A [employer] M [employer]  or [employer]  The above information is an  only  It is not intended as medical advice for individual conditions or treatments  Talk to your doctor, nurse or pharmacist before following any medical regimen to see if it is safe and effective for you      Depression   AMBULATORY CARE: Depression  is a medical condition that causes feelings of sadness or hopelessness that do not go away  Depression may cause you to lose interest in things you used to enjoy  These feelings may interfere with your daily life  Common symptoms include the following:   · Appetite changes, or weight gain or loss    · Trouble going to sleep or staying asleep, or sleeping too much    · Fatigue or lack of energy    · Feeling restless, irritable, or withdrawn    · Feeling worthless, hopeless, discouraged, or guilty    · Trouble concentrating, remembering things, doing daily tasks, or making decisions    · Thoughts about hurting or killing yourself  Call 911 for any of the following:   · You think about harming yourself or someone else  Contact your healthcare provider if:   · Your symptoms do not improve  · You cannot make it to your next appointment  · You have new symptoms  · You have questions or concerns about your condition or care  Treatment for depression  may include medicine to improve or balance your mood  Therapy may also be used to treat your depression  A therapist will help you learn to cope with your thoughts and feelings  Therapy can be done alone or in a group  It may also be done with family members or a significant other  Self-care:   · Get regular physical activity  Try to exercise for 30 minutes, 3 to 5 days a week  Work with your healthcare provider to develop an exercise plan that you enjoy  Physical activity may improve your symptoms  · Get enough sleep  Create a routine to help you relax before bed  You can listen to music, read, or do yoga  Try to go to bed and wake up at the same time every day  Sleep is important for emotional health  · Eat a variety of healthy foods from all of the food groups  A healthy meal plan is low in fat, salt, and added sugar  Ask your healthcare provider for more information about a meal plan that is right for you       · Do not drink alcohol or use drugs  Alcohol and drugs can make your symptoms worse  Follow up with your healthcare provider as directed: Your healthcare provider will monitor your progress at follow-up visits  He or she will also monitor your medicine if you take antidepressants  Your healthcare provider will ask if the medicine is helping  Tell him or her about any side effects or problems you may have with your medicine  The type or amount of medicine may need to be changed  Write down your questions so you remember to ask them during your visits  © 2017 [employer] Information is for End User's use only and may not be sold, redistributed or otherwise used for commercial purposes  All illustrations and images included in CareNotes® are the copyrighted property of [employer]  or [employer]  The above information is an  only  It is not intended as medical advice for individual conditions or treatments  Talk to your doctor, nurse or pharmacist before following any medical regimen to see if it is safe and effective for you

## 2020-01-13 PROBLEM — R11.0 NAUSEA: Status: ACTIVE | Noted: 2020-01-01

## 2020-01-13 NOTE — TELEPHONE ENCOUNTER
Kathryn called asking to speak to you about Alessandra Large  She has been sick to her stomach nauseous, up all night last night, feet were swollen yesterday morning  She doesn't know if she is starting with a cold and has mucus that is upsetting her stomach  She started the new meds on Saturday night so she doesn't think it is those  The sleeping aid prescribed did not help her on Saturday night, she was up all night

## 2020-01-26 PROBLEM — S32.010A CLOSED COMPRESSION FRACTURE OF L1 VERTEBRA (HCC): Status: ACTIVE | Noted: 2019-01-01

## 2020-01-26 PROBLEM — R11.0 NAUSEA: Status: RESOLVED | Noted: 2020-01-01 | Resolved: 2020-01-01

## 2020-01-26 PROBLEM — N18.2 STAGE 2 CHRONIC KIDNEY DISEASE: Status: ACTIVE | Noted: 2019-01-01

## 2020-01-26 PROBLEM — J10.1 INFLUENZA A: Status: ACTIVE | Noted: 2020-01-01

## 2020-01-26 PROBLEM — S22.41XA CLOSED FRACTURE OF MULTIPLE RIBS OF RIGHT SIDE: Status: ACTIVE | Noted: 2020-01-01

## 2020-01-26 PROBLEM — J18.9 PNA (PNEUMONIA): Status: RESOLVED | Noted: 2019-01-01 | Resolved: 2020-01-01

## 2020-01-26 PROBLEM — R73.9 HYPERGLYCEMIA: Status: ACTIVE | Noted: 2020-01-01

## 2020-01-26 PROBLEM — J90 CHRONIC BILATERAL PLEURAL EFFUSIONS: Status: ACTIVE | Noted: 2020-01-01

## 2020-01-26 PROBLEM — J96.01 ACUTE RESPIRATORY FAILURE WITH HYPOXIA (HCC): Status: ACTIVE | Noted: 2020-01-01

## 2020-01-26 PROBLEM — E87.1 HYPONATREMIA: Status: ACTIVE | Noted: 2020-01-01

## 2020-01-26 PROBLEM — I50.33 ACUTE ON CHRONIC DIASTOLIC CONGESTIVE HEART FAILURE (HCC): Status: ACTIVE | Noted: 2017-10-09

## 2020-01-26 NOTE — ASSESSMENT & PLAN NOTE
Found on CT imaging  Initially there was concern that this may be new, however this was found on previous CT imaging  No lower extremity weakness or neuro deficits noted    · Can hold off on repeat CT imaging for now  · Continue pain regimen as otherwise ordered

## 2020-01-26 NOTE — ASSESSMENT & PLAN NOTE
As found on PCR  Suspect infection may have contributed to CHF exacerbation  · Droplet precautions  · Continue Tamiflu renally-dosed b i d

## 2020-01-26 NOTE — ASSESSMENT & PLAN NOTE
No known history of diabetes, but her glucose was 141 on BMP  · Obtain A1c  · Repeat BMP  · Start sugar checks and correctional insulin (can discontinue if glucose remains normal without need for insulin)

## 2020-01-26 NOTE — ASSESSMENT & PLAN NOTE
Suspect secondary to CHF exacerbation complicated by influenza positive  Also has history of COPD, but suspect CHF as primary contributor  There is also concern for pneumonia on CT imaging, but left lung consolidations were found on previous imaging  Also, procalcitonin was within normal limits    · Continue plan as outlined for CHF  · Continue nasal cannula oxygen, wean as able  · Repeat procalcitonin negative; will trend x1 more time tomorrow  · Will continue to hold off on antibiotic therapy

## 2020-01-26 NOTE — H&P
H&P- Chong Ryan 2/25/1925, 80 y o  female MRN: [id number]    Unit/Bed#: [facility] 823-01 Encounter: [id number]    Primary Care Provider: Juan Diego Theodore DO   Date and time admitted to hospital: 1/25/2020  9:11 PM        Acute on chronic diastolic congestive heart failure [facility])  Assessment & Plan  Wt Readings from Last 3 Encounters:   01/10/20 70 kg (154 lb 6 oz)   12/06/19 73 kg (161 lb)   11/25/19 77 kg (169 lb 12 8 oz)     Echocardiogram:  Obtained 11/2019, demonstrated EF of 50%, mild concentric hypertrophy, grade 1 diastolic dysfunction, moderate pulmonary hypertension  · Monitor I/O's, daily weights  · Sodium/fluid-restricted diet  · Monitor serum electrolytes  · Incentive spirometry, airway clearance protocol  · Titrate oxygen to maintain saturation of at least 90%  · Diuresis with Lasix b i d , titrate as needed for adequate output  · Beta blocker:  Metoprolol succinate daily  · Obtain TSH with free T4 reflex    Influenza A  Assessment & Plan  As found on PCR  Suspect infection may have contributed to CHF exacerbation  · Droplet precautions  · Continue Tamiflu renally-dosed b i d  Hyperglycemia  Assessment & Plan  No known history of diabetes, but her glucose was 141 on BMP  · Obtain A1c  · Repeat BMP  · Start sugar checks and correctional insulin (can discontinue if glucose remains normal without need for insulin)    Hyponatremia  Assessment & Plan  Suspect this is secondary to CHF exacerbation leading to volume overload  Her FENA was 0 8% which supports this    · Repeat serum BMP and monitor sodium; avoid over-correction  · Continue diuresis with Lasix  · See plan as otherwise outlined for CHF    Closed fracture of multiple ribs of right side  Assessment & Plan  There is a right 10th rib fracture noted on CT imaging with possible 7-9 rib fractures versus motion artifact  · Trauma Service consulted, appreciate recommendations  · Will continue pain regimen as outlined:  · Tylenol every 8 hours scheduled  · Oxycodone p r n  · Dilaudid for breakthrough pain  · Topical lidocaine  · Rib fracture protocol  · Encourage deep breathing, airway clearance    Acute respiratory failure with hypoxia [facility])  Assessment & Plan  Suspect secondary to CHF flare  Also has history of COPD, but suspect CHF as primary contributor  There is also concern for pneumonia on CT imaging, but left lung consolidations were found on previous imaging  Also, procalcitonin was within normal limits  · Continue plan as outlined for CHF  · Continue nasal cannula oxygen, wean as able  · Will obtain VBG  · Repeat procalcitonin  · Low threshold to start antibiotics if there is concern for bacterial infection    Closed compression fracture of L1 vertebra [facility])  Assessment & Plan  Found on CT imaging  Initially there was concern that this may be new, however this was found on previous CT imaging  No lower extremity weakness or neuro deficits noted  · Can hold off on repeat CT imaging for now  · Continue pain regimen as otherwise ordered    Anxiety  Assessment & Plan  · Continue citalopram at bedtime  · Continue home dose of Ativan p r n  COPD (chronic obstructive pulmonary disease) ([facility] [address] )  Assessment & Plan  Patient not on scheduled medications  · Albuterol p r n  Gastroesophageal reflux disease without esophagitis  Assessment & Plan  Continue renally dosed Pepcid b i d      Atherosclerotic heart disease of native coronary artery without angina pectoris  Assessment & Plan  Continue aspirin, statin, metoprolol      History and Physical - [facility]    Patient Information: Marlen Tubbs 80 y o  female MRN: [id number]  Unit/Bed#: [facility] 823-01 Encounter: [id number]  Admitting Physician: Amaya Kaufman DO  PCP: Flaco Archer DO  Date of Admission:  01/26/20      VTE Prophylaxis: Heparin  / sequential compression device   Code Status:  Level 3-DNR DNI  POLST: POLST form is not discussed and not completed at this time     Anticipated Length of Stay:  Patient will be admitted on an Inpatient basis with an anticipated length of stay of > 2 midnights  Justification for Hospital Stay: Please see detailed plans noted above  Total Time for Visit, including Counseling / Coordination of Care: 1 hour  Greater than 50% of this total time spent on direct patient counseling and coordination of care  Chief Complaint:    Shortness of breath, fall    History of Present Illness:    Jonathan Benson is a 80 y o  female who presents with shortness of breath and fall  She has past medical history of COPD, CHF, hyperlipidemia, hypertension, and dysphagia  Patient reports that for the past 2 days she has been increasingly short of breath and has been experiencing dry cough as well as bilateral leg swelling  Patient normally lives with her granddaughters, sometimes living with either 1 or the other  Her daughter noticed her increased leg swelling and attempted to give her an extra dose of Lasix yesterday morning without any improvement in her urination or symptoms  Also, yesterday morning patient experienced a fall when she was attempting to get off the commode  Per patient's daughter, she had tripped on her pants because she had not yet pulled the left  She struck her right side and right head as she fell  No loss of consciousness, vomiting, change in vision, numbness, or inability to ambulate  Patient was brought into the ED  In the ED she was hypoxic and required nasal cannula oxygen  She does not normally need nasal cannula O2 at home  Was also given 40 mg of IV Lasix  CT of the head did not demonstrate acute bleed or abnormality or fracture  There was advanced microangiopathic changes, progressed since 2017  CT of the chest demonstrated displaced fracture of the right 10th rib with questionable fractures versus motion artifact of ribs 7 through 9   There were areas of consolidation and ground-glass opacity within the lungs along with bilateral pleural effusions and large hiatal hernia  Labs revealed hyponatremia with a sodium of 126, serum osmolality of 273  Her influenza PCR was positive for influenza A  Patient was evaluated by trauma who recommended conservative management and pain control  On my exam, patient is saturating well on 3 L nasal cannula oxygen  Her granddaughter is present at bedside who is assisting with providing history  Patient currently is in no acute distress  She says she is not short of breath as line she is standing still  Both lower extremities are edematous  Lungs some coarse with bilateral rales  Review of Systems:    Review of Systems   Constitutional: Negative for activity change, chills and fever  HENT: Negative  Negative for hearing loss, sore throat and trouble swallowing  Eyes: Negative for visual disturbance  Respiratory: Positive for cough and shortness of breath  Negative for wheezing  Cardiovascular: Positive for leg swelling  Negative for chest pain and palpitations  Gastrointestinal: Negative for abdominal distention, abdominal pain, blood in stool, constipation, diarrhea, nausea and vomiting  Endocrine: Negative  Genitourinary: Negative for dysuria, frequency and hematuria  Musculoskeletal: Negative for arthralgias, joint swelling and myalgias  Skin: Negative  Allergic/Immunologic: Negative for immunocompromised state  Neurological: Negative for dizziness, facial asymmetry, speech difficulty, weakness, numbness and headaches  Hematological: Negative  Psychiatric/Behavioral: Negative for behavioral problems and confusion  Past Medical and Surgical History:     Past Medical History:   Diagnosis Date    Abnormal blood sugar 03/13/2017    Abnormal carotid duplex scan     Carotid Duplex (Plaque formation is quite prominent bilaterally     Despite these changes, no evidence for greater than 50% diameter reducing lesions in the cervical portion of either carotid artery hemisystem ) - 6/13/2017    Anxiety     Cervical radiculopathy 08/08/2017    CHF (congestive heart failure) (HCC)     diastolic    Compression fracture of lumbar spine, non-traumatic, with routine healing, subsequent encounter     [facility] 2/6/2018    Coronary angioplasty status     Coronary artery disease 4/7/2017    Costochondritis 02/05/2013    suspect MS in origin given relationship to ROM and location  Start mobic and if persists, reeval  Refused xrayat this time   Dyslipidemia     GERD without esophagitis 8/30/2012    H/O CT scan of chest      (No PE, minimal interstitial change left lower lobe and right upper lobe, which may be acute ) - 5/19/2017    History of Holter monitoring     Holter (Sinus rhythm with rates ranging from 52 to 118 bpm   No afib or heart block  Rare atrial and ventricular ectopic beats  One six-beat atrial run noted  No pauses  ) - 5/31/2017    Hx of echocardiogram     Echo (EF 0 60 (60%), Biatrial enlargement ) - 3/24/2017 Echo (EF 0 55 (55%), mild LVH  Aortic valvular sclerosis  Trace MI with mild left atrial dilatation, mild MAC  Mild TI with mild pulmonary hypertension  ) - 5/22/2017 Echo (EF 0 60 (60%), Normal left vent chamber size and systolic function  Mild diastolic dysfunction of LV w/normal filling pressures  Mild mitral regurg ) - 7/26/2017 Echo (EF 0    Hx of echocardiogram 08/16/2017    EF0 60 (60%) Normal left vent chamber size and systolic function of LV w/normal filling presures  Mild mitral regurg  / EF0 50 (50%) Mild LVH   MIld MR 10/6/17     Hypertension     Iron deficiency anemia 4/21/2016    Macular degeneration, right eye     Malignant melanoma of skin ([facility] [address] ) 9/17/2012    Mixed hyperlipidemia     NSTEMI (non-ST elevated myocardial infarction) ([facility] ) 7/25/2017    Old MI (myocardial infarction)     Osteoarthritis 9/17/2012    Osteoporosis 3/13/2017    Transient complete heart juan antonio ([facility] [address] ) 04/07/2017    Urinary tract infection     frequent UTI's       Past Surgical History:   Procedure Laterality Date    ABDOMINAL SURGERY      APPENDECTOMY      BREAST SURGERY      Lumpectomy    CARDIAC CATHETERIZATION  03/24/2017    ARNIE to 100% occluded prox RCA, chronic 99% ostial LCfx, 60% mid LAD, discrete 40% distal LM / EF0 60 (60%) 100% occluded proximal RCA s/p ARNIE, chronic 99% ostial LCX, 60% mid LAD, discrete 40% dital LM  4/5/17    CHOLECYSTECTOMY      COLONOSCOPY N/A 7/25/2018    Procedure: COLONOSCOPY;  Surgeon: Kayley Garcia MD;  Location: [facility] [address] MAIN OR;  Service: Gastroenterology    CORONARY STENT PLACEMENT  03/25/2017    ARNIE RCA    HEMORRHOID SURGERY      INSERTION STENT ARTERY  04/07/2017    Cardio vascular agents drug-eluting stent    SKIN BIOPSY      TONSILLECTOMY         Meds/Allergies:    Prior to Admission medications    Medication Sig Start Date End Date Taking?  Authorizing Provider   ALPRAZolam Stephane Wise) 0 5 mg tablet Take 1 tablet (0 5 mg total) by mouth 3 (three) times a day as needed for anxiety 1/2/20   Taye Orlando, DO   aspirin 81 mg chewable tablet Chew 1 tablet (81 mg total) daily 8/1/18   ROGELIO Nelson   atorvastatin (LIPITOR) 40 mg tablet Take 40 mg by mouth daily with dinner    Historical Provider, MD   Calcium Carbonate-Vit D-Min (CALCIUM 1200 PO) Take by mouth    Historical Provider, MD   citalopram (CeleXA) 10 mg tablet Take 1 tablet (10 mg total) by mouth daily at bedtime 1/10/20   Taye Orlando, DO   furosemide (LASIX) 20 mg tablet Take 1 tablet (20 mg total) by mouth daily 12/27/19   Taye Orlando, DO   metoprolol succinate (TOPROL-XL) 25 mg 24 hr tablet Take 1 tablet (25 mg total) by mouth daily 1/10/20   Taye Orlando, DO   multivitamin [facility]) TABS Take 1 tablet by mouth daily    Historical Provider, MD   ondansetron (ZOFRAN) 4 mg tablet Take 1 tablet (4 mg total) by mouth every 12 (twelve) hours as needed for nausea or vomiting 1/13/20 Jeremy Cervantes DO   Potassium Chloride BRISSA by Does not apply route    Historical Provider, MD   ranitidine (ZANTAC) 150 mg tablet Take 1 tablet (150 mg total) by mouth 2 (two) times a day 12/27/19   Jeremy Cervantes DO     I have reviewed home medications with patient family member  Allergies: Allergies   Allergen Reactions    Ciprofloxacin      Reaction Date: 01Jul2011;     Keflex [Cephalexin] Dizziness    Nitrofurantoin      Reaction Date: [id number];     Penicillins Rash, Other (See Comments) and Throat Swelling     Throat swells       Social History:     Marital Status:      Substance Use History:   Social History     Substance and Sexual Activity   Alcohol Use Not Currently     Social History     Tobacco Use   Smoking Status Former Smoker    Types: Cigarettes   Smokeless Tobacco Never Used     Social History     Substance and Sexual Activity   Drug Use Never       Family History:    non-contributory    Physical Exam:     Vitals:   Blood Pressure: 163/93 (01/26/20 0305)  Pulse: 68 (01/26/20 0305)  Temperature: 98 1 °F (36 7 °C) (01/26/20 0305)  Respirations: 17 (01/26/20 0305)  Height: 5' 2" (157 5 cm) (01/26/20 0305)  Weight - Scale: 75 kg (165 lb 5 5 oz) (01/26/20 0305)  SpO2: 95 % (01/26/20 0305)    Physical Exam   Constitutional: She is oriented to person, place, and time  She appears well-developed and well-nourished  No distress  Nasal cannula in place  HENT:   Head: Normocephalic and atraumatic  Nose: Nose normal    Mouth/Throat: No oropharyngeal exudate  Eyes: Pupils are equal, round, and reactive to light  Conjunctivae and EOM are normal    Neck: Neck supple  No JVD present  Cardiovascular: Normal rate, regular rhythm, normal heart sounds and intact distal pulses  Pulmonary/Chest: Effort normal  No respiratory distress  She has rales  Abdominal: Soft  Normal appearance  She exhibits no distension  There is no tenderness  There is no guarding     Musculoskeletal: She exhibits no tenderness or deformity  Right lower leg: She exhibits edema  Left lower leg: She exhibits edema  Lymphadenopathy:     She has no cervical adenopathy  Neurological: She is alert and oriented to person, place, and time  She has normal strength  No cranial nerve deficit or sensory deficit  She exhibits normal muscle tone  Skin: Skin is warm and dry  She is not diaphoretic  Psychiatric: She has a normal mood and affect  Her behavior is normal    Vitals reviewed  Additional Data:     Lab Results: I have personally reviewed pertinent reports  Results from last 7 days   Lab Units 01/25/20  2133   WBC Thousand/uL 7 14   HEMOGLOBIN g/dL 14 1   HEMATOCRIT % 43 9   PLATELETS Thousands/uL 315   NEUTROS PCT % 63   LYMPHS PCT % 15   MONOS PCT % 22*   EOS PCT % 0     Results from last 7 days   Lab Units 01/25/20  2133   POTASSIUM mmol/L 4 6   CHLORIDE mmol/L 87*   CO2 mmol/L 36*   BUN mg/dL 10   CREATININE mg/dL 0 96   CALCIUM mg/dL 9 7   ALK PHOS U/L 78   ALT U/L 24   AST U/L 13           Imaging: I have personally reviewed pertinent reports  and I have personally reviewed pertinent films in PACS    Ct Head Without Contrast    Result Date: 1/25/2020  Narrative: CT BRAIN - WITHOUT CONTRAST INDICATION:   fall with head strike  Patient fell in the bathroom, hit right cheek  COMPARISON:  May 19, 2017  TECHNIQUE:  CT examination of the brain was performed  In addition to axial images, coronal 2D reformatted images were created and submitted for interpretation  Radiation dose length product (DLP) for this visit:  885 mGy-cm   This examination, like all CT scans performed in the [facility], was performed utilizing techniques to minimize radiation dose exposure, including the use of iterative reconstruction and automated exposure control  IMAGE QUALITY:  Diagnostic   FINDINGS: PARENCHYMA: Decreased attenuation is noted in periventricular and subcortical white matter demonstrating an appearance that is statistically most likely to represent advanced microangiopathic change; this appearance has progressed somewhat when compared to most recent prior examination  No definite CT signs of acute infarction  No intracranial mass, positive mass effect or midline shift  No acute parenchymal hemorrhage  VENTRICLES AND EXTRA-AXIAL SPACES:  Ventricles and extra-axial CSF spaces are prominent commensurate with the degree of volume loss  No hydrocephalus  No acute extra-axial hemorrhage  VISUALIZED ORBITS AND PARANASAL SINUSES:  A small calcification in the superior right orbit appears unchanged compared to the prior study  The globes appear symmetric bilaterally  There is no evidence of retrobulbar hematoma  There is mild mucosal thickening in the right maxillary sinus  No air-fluid levels are demonstrable within the paranasal sinuses  CALVARIUM AND EXTRACRANIAL SOFT TISSUES:  Normal      Impression: No acute intracranial abnormality  There is advanced microangiopathic change, progressed somewhat compared with May 19, 2017  Workstation performed: [id number]     Ct Chest Without Contrast    Result Date: 1/25/2020  Narrative: CT CHEST WITHOUT IV CONTRAST INDICATION:   fall, chf v pneumonia, right flank pain  Patient fell this morning, right flank pain, increased leg swelling and shortness of breath since yesterday  Nausea  COMPARISON:  November 11, 2019  TECHNIQUE: CT examination of the chest was performed without intravenous contrast   Axial, sagittal, and coronal 2D reformatted images were created from the source data and submitted for interpretation  Radiation dose length product (DLP) for this visit:  289 mGy-cm   This examination, like all CT scans performed in the [facility], was performed utilizing techniques to minimize radiation dose exposure, including the use of iterative reconstruction and automated exposure control   FINDINGS: LUNGS:  The patchy areas of groundglass opacity which had been seen in the superior aspect of the left upper lobe of the lung on the prior study have largely resolved  There is a combination of atelectasis and somewhat nodular appearing consolidation within the posterior aspect of the right upper lobe of the lung, bordering the fissure which appears similar to the prior study  The nodular appearing area measures approximately 2 6 cm  This could represent residual or recurrent pneumonia or mass  Continued follow-up is recommended  There is some patchy consolidation and groundglass opacity within the superior segment of the left lower lobe of the lung which could represent residual or recurrent pneumonia  This area should also be followed to ensure resolution  There is some mild groundglass opacity in the right lower lobe of the lung  There is compressive atelectasis adjacent to bilateral pleural effusions, right more so than left  There is mild septal thickening  PLEURA:  There are small bilateral pleural effusions, right larger than left  There is no pneumothorax  HEART/GREAT VESSELS:  Cardiomegaly appears similar to the prior study  Extensive coronary artery calcifications are present  There is atherosclerosis of the thoracic aorta  MEDIASTINUM AND AWILDA:  There is a large hiatal hernia/partially intrathoracic stomach  CHEST WALL AND LOWER NECK:   Thyroid calcifications are again evident  There is some body wall edema  VISUALIZED STRUCTURES IN THE UPPER ABDOMEN:  Large hiatal hernia/partially intrathoracic stomach  OSSEOUS STRUCTURES:  There is a minimally displaced fracture of the right 10th rib, laterally  There are questionable nondisplaced fractures of the right 7th through 9th ribs, laterally, versus motion artifact  Possible new compression deformity L1   CT of the thoracolumbar spine is recommended for further evaluation  Impression: There is a minimally displaced fracture of the right 10th rib laterally    There are questionable nondisplaced fractures of the right 7th through 9th ribs, versus motion artifact  Possible new compression deformity of L1   CT of the thoracolumbar spine is recommended for further evaluation  No evidence of pneumothorax  Areas of consolidation and groundglass opacity are present within the lungs, as described above  Please see discussion  Follow-up to ensure resolution is recommended  There are small bilateral pleural effusions, right larger than left  There is mild septal thickening  There is a large hiatal hernia/partially intrathoracic stomach  This appears similar to the prior study  Cardiomegaly  Coronary artery disease  Atherosclerosis  I personally discussed this study with [address] [place] [address] on 1/25/2020 at 11:43 PM  Workstation performed: [id number]       EKG, Pathology, and Other Studies Reviewed on Admission:   · EKG:  Sinus rhythm    Allscripts / Epic Records Reviewed: Yes     Portions of the record may have been created with voice recognition software  Occasional wrong word or "sound a like" substitutions may have occurred due to the inherent limitations of voice recognition software  Read the chart carefully and recognize, using context, where substitutions have occurred

## 2020-01-26 NOTE — ASSESSMENT & PLAN NOTE
There is a right 10th rib fracture noted on CT imaging with possible 7-9 rib fractures versus motion artifact  · Trauma Service consulted, appreciate recommendations  · Will continue pain regimen as outlined:  · Tylenol every 8 hours scheduled  · Oxycodone p r n    · Dilaudid for breakthrough pain  · Topical lidocaine  · Rib fracture protocol  · Encourage deep breathing, airway clearance

## 2020-01-26 NOTE — PLAN OF CARE
Problem: Potential for Falls  Goal: Patient will remain free of falls  Description  INTERVENTIONS:  - Assess patient frequently for physical needs  -  Identify cognitive and physical deficits and behaviors that affect risk of falls    -  [place] fall precautions as indicated by assessment   - Educate patient/family on patient safety including physical limitations  - Instruct patient to call for assistance with activity based on assessment  - Modify environment to reduce risk of injury  - Consider OT/PT consult to assist with strengthening/mobility  Outcome: Progressing     Problem: PAIN - ADULT  Goal: Verbalizes/displays adequate comfort level or baseline comfort level  Description  Interventions:  - Encourage patient to monitor pain and request assistance  - Assess pain using appropriate pain scale  - Administer analgesics based on type and severity of pain and evaluate response  - Implement non-pharmacological measures as appropriate and evaluate response  - Consider cultural and social influences on pain and pain management  - Notify physician/advanced practitioner if interventions unsuccessful or patient reports new pain  Outcome: Progressing     Problem: INFECTION - ADULT  Goal: Absence or prevention of progression during hospitalization  Description  INTERVENTIONS:  - Assess and monitor for signs and symptoms of infection  - Monitor lab/diagnostic results  - Monitor all insertion sites, i e  indwelling lines, tubes, and drains  - Monitor endotracheal if appropriate and nasal secretions for changes in amount and color  - [place] appropriate cooling/warming therapies per order  - Administer medications as ordered  - Instruct and encourage patient and family to use good hand hygiene technique  - Identify and instruct in appropriate isolation precautions for identified infection/condition  Outcome: Progressing  Goal: Absence of fever/infection during neutropenic period  Description  INTERVENTIONS:  - Monitor WBC    Outcome: Progressing     Problem: SAFETY ADULT  Goal: Maintain or return to baseline ADL function  Description  INTERVENTIONS:  -  Assess patient's ability to carry out ADLs; assess patient's baseline for ADL function and identify physical deficits which impact ability to perform ADLs (bathing, care of mouth/teeth, toileting, grooming, dressing, etc )  - Assess/evaluate cause of self-care deficits   - Assess range of motion  - Assess patient's mobility; develop plan if impaired  - Assess patient's need for assistive devices and provide as appropriate  - Encourage maximum independence but intervene and supervise when necessary  - Involve family in performance of ADLs  - Assess for home care needs following discharge   - Consider OT consult to assist with ADL evaluation and planning for discharge  - Provide patient education as appropriate  Outcome: Progressing  Goal: Maintain or return mobility status to optimal level  Description  INTERVENTIONS:  - Assess patient's baseline mobility status (ambulation, transfers, stairs, etc )    - Identify cognitive and physical deficits and behaviors that affect mobility  - Identify mobility aids required to assist with transfers and/or ambulation (gait belt, sit-to-stand, lift, walker, cane, etc )  - [place] fall precautions as indicated by assessment  - Record patient progress and toleration of activity level on Mobility SBAR; progress patient to next Phase/Stage  - Instruct patient to call for assistance with activity based on assessment  - Consider rehabilitation consult to assist with strengthening/weightbearing, etc   Outcome: Progressing     Problem: DISCHARGE PLANNING  Goal: Discharge to home or other facility with appropriate resources  Description  INTERVENTIONS:  - Identify barriers to discharge w/patient and caregiver  - Arrange for needed discharge resources and transportation as appropriate  - Identify discharge learning needs (meds, wound care, etc )  - Arrange for interpretive services to assist at discharge as needed  - Refer to Case Management Department for coordinating discharge planning if the patient needs post-hospital services based on physician/advanced practitioner order or complex needs related to functional status, cognitive ability, or social support system  Outcome: Progressing     Problem: Knowledge Deficit  Goal: Patient/family/caregiver demonstrates understanding of disease process, treatment plan, medications, and discharge instructions  Description  Complete learning assessment and assess knowledge base    Interventions:  - Provide teaching at level of understanding  - Provide teaching via preferred learning methods  Outcome: Progressing

## 2020-01-26 NOTE — PROGRESS NOTES
Post Admission Follow up Note - Laya Ruff 2/25/1925, 80 y o  female MRN: [id number]    Unit/Bed#: [facility] 823-01 Encounter: [id number]    Primary Care Provider: Dawit Blount DO   Date and time admitted to hospital: 1/25/2020  9:11 PM    * Acute on chronic diastolic congestive heart failure [facility])  Assessment & Plan  Wt Readings from Last 3 Encounters:   01/26/20 75 kg (165 lb 5 5 oz)   01/10/20 70 kg (154 lb 6 oz)   12/06/19 73 kg (161 lb)     · Echocardiogram:  Obtained 11/2019, demonstrated EF of 50%, mild concentric hypertrophy, grade 1 diastolic dysfunction, moderate pulmonary hypertension  · Monitor I/O's, daily weights  · Sodium/fluid-restricted diet  · Monitor serum electrolytes  · Incentive spirometry, airway clearance protocol  · Titrate oxygen to maintain saturation of at least 90%; patient is not on any home oxygen outpatient  Currently satting appropriately on 4 L nasal cannula  · Diuresis with Lasix b i d , titrate as needed for adequate output  · Beta blocker:  Metoprolol succinate daily  · TSH and free T4 unremarkable    Chronic bilateral pleural effusions  Assessment & Plan  · As evidence by CT scan  · Continue with IV diuresis as noted above  · Repeat imaging to assess for improvement    Hyponatremia  Assessment & Plan  Suspect this is secondary to CHF exacerbation leading to volume overload  Her FENA was 0 8% which supports this  · Repeat serum BMP and monitor sodium; avoid over-correction  · Continue diuresis with Lasix  · See plan as otherwise outlined for CHF    Atherosclerosis of native coronary artery of native heart without angina pectoris  Assessment & Plan  · Continue aspirin, statin, metoprolol    Influenza A  Assessment & Plan  As found on PCR  Suspect infection may have contributed to CHF exacerbation  · Droplet precautions  · Continue Tamiflu renally-dosed b i d      Acute respiratory failure with hypoxia [facility])  Assessment & Plan  Suspect secondary to CHF exacerbation complicated by influenza positive  Also has history of COPD, but suspect CHF as primary contributor  There is also concern for pneumonia on CT imaging, but left lung consolidations were found on previous imaging  Also, procalcitonin was within normal limits  · Continue plan as outlined for CHF  · Continue nasal cannula oxygen, wean as able  · Repeat procalcitonin negative; will trend x1 more time tomorrow  · Will continue to hold off on antibiotic therapy    Closed fracture of multiple ribs of right side  Assessment & Plan  There is a right 10th rib fracture noted on CT imaging with possible 7-9 rib fractures versus motion artifact  · Trauma Service consulted, appreciate recommendations  · Will continue pain regimen as outlined:  · Tylenol every 8 hours scheduled  · Oxycodone p r n  · Dilaudid for breakthrough pain  · Topical lidocaine  · Rib fracture protocol  · Encourage deep breathing, airway clearance    COPD (chronic obstructive pulmonary disease) ([facility] [address] )  Assessment & Plan  Patient not on scheduled medications  · Albuterol p r n  Gastroesophageal reflux disease without esophagitis  Assessment & Plan  · Continue renally dosed Pepcid b i d  Anxiety  Assessment & Plan  · Continue citalopram at bedtime  · Continue home dose of Ativan p r n  Closed compression fracture of L1 vertebra [facility])  Assessment & Plan  Found on CT imaging  Initially there was concern that this may be new, however this was found on previous CT imaging  No lower extremity weakness or neuro deficits noted    · Can hold off on repeat CT imaging for now  · Continue pain regimen as otherwise ordered    Hyperglycemia  Assessment & Plan  No known history of diabetes, but her glucose was 141 on BMP  · Unremarkable  · Repeat BMP  · Start sugar checks and correctional insulin (can discontinue if glucose remains normal without need for insulin)      VTE Pharmacologic Prophylaxis:   Pharmacologic: Heparin  Mechanical VTE Prophylaxis in Place: Yes    Patient Centered Rounds: I have performed bedside rounds with nursing staff today  Discussions with Specialists or Other Care Team Provider:  Nursing staff    Education and Discussions with Family / Patient:  Education provided the bedside regarding plan of care as noted above with patient  She endorsed understanding and had no questions at the time  Time Spent for Care: 30 minutes  More than 50% of total time spent on counseling and coordination of care as described above  Current Length of Stay: 0 day(s)    Current Patient Status: Inpatient   Certification Statement: The patient will continue to require additional inpatient hospital stay due to IV diuresis    Discharge Plan:  Pending response to therapy as noted above  Code Status: Level 3 - DNAR and DNI      Subjective:   Patient is without any complaint  She reports she does however feel a little bit dizzy but feels better when sitting  Objective:     Vitals:   Temp (24hrs), Av 8 °F (36 6 °C), Min:97 5 °F (36 4 °C), Max:98 1 °F (36 7 °C)    Temp:  [97 5 °F (36 4 °C)-98 1 °F (36 7 °C)] 97 5 °F (36 4 °C)  HR:  [68-86] 68  Resp:  [16-26] 18  BP: (144-171)/(72-96) 162/90  SpO2:  [60 %-97 %] 96 %  Body mass index is 30 24 kg/m²  Input and Output Summary (last 24 hours): Intake/Output Summary (Last 24 hours) at 2020 1454  Last data filed at 2020 1401  Gross per 24 hour   Intake 120 ml   Output 1550 ml   Net -1430 ml       Physical Exam:     Physical Exam   Constitutional: She is oriented to person, place, and time  She is cooperative  No distress  Nasal cannula in place  Cardiovascular:   Pulses:       Radial pulses are 2+ on the right side, and 2+ on the left side  Dorsalis pedis pulses are 2+ on the right side, and 2+ on the left side  Posterior tibial pulses are 2+ on the right side, and 2+ on the left side  No peripheral edema noted      Pulmonary/Chest: Effort normal  No respiratory distress  She has wheezes  Abdominal: Soft  Bowel sounds are normal  She exhibits no distension  There is no tenderness  Neurological: She is alert and oriented to person, place, and time  Skin: Skin is warm and dry  Capillary refill takes less than 2 seconds  Psychiatric: She has a normal mood and affect  Her speech is normal and behavior is normal  Judgment normal    Vitals reviewed  Additional Data:     Labs:    Results from last 7 days   Lab Units 01/26/20  0505 01/25/20  2133   WBC Thousand/uL 6 74 7 14   HEMOGLOBIN g/dL 13 3 14 1   HEMATOCRIT % 42 2 43 9   PLATELETS Thousands/uL 281 315   NEUTROS PCT %  --  63   LYMPHS PCT %  --  15   MONOS PCT %  --  22*   EOS PCT %  --  0     Results from last 7 days   Lab Units 01/26/20  0505 01/25/20  2133   SODIUM mmol/L 129* 126*   POTASSIUM mmol/L 4 4 4 6   CHLORIDE mmol/L 88* 87*   CO2 mmol/L 39* 36*   BUN mg/dL 9 10   CREATININE mg/dL 0 78 0 96   ANION GAP mmol/L 2* 3*   CALCIUM mg/dL 8 8 9 7   ALBUMIN g/dL  --  3 5   TOTAL BILIRUBIN mg/dL  --  0 75   ALK PHOS U/L  --  78   ALT U/L  --  24   AST U/L  --  13   GLUCOSE RANDOM mg/dL 120 141*         Results from last 7 days   Lab Units 01/26/20  1124 01/26/20  0813   POC GLUCOSE mg/dl 132 122     Results from last 7 days   Lab Units 01/26/20  0505   HEMOGLOBIN A1C % 6 2     Results from last 7 days   Lab Units 01/26/20  0505 01/25/20  2133   PROCALCITONIN ng/ml <0 05 0 06           * I Have Reviewed All Lab Data Listed Above  * Additional Pertinent Lab Tests Reviewed:  [facility] Reviewed    Imaging:    Imaging Reports Reviewed Today Include:  CT head, CT chest  Imaging Personally Reviewed by Myself Includes:  None    Recent Cultures (last 7 days):           Last 24 Hours Medication List:     Current Facility-Administered Medications:  acetaminophen 975 mg Oral Q8H [address] Allyssa Pica Veres, DO   albuterol 2 puff Inhalation Q4H PRN Allyssa Pica Veres, DO   ALPRAZolam 0 5 mg Oral TID PRN Abner Longo, DO   aspirin 81 mg Oral Daily Karimedario Marquez, DO   atorvastatin 40 mg Oral Daily With [employer] D Jimmy, DO   calcium carbonate-vitamin D 1 tablet Oral BID With Meals Abner Longo, DO   citalopram 10 mg Oral HS Abner Longo, DO   famotidine 10 mg Oral BID Karime Hema Marquez, DO   furosemide 40 mg Intravenous BID (diuretic) Abner Longo,    heparin (porcine) 5,000 Units Subcutaneous Q8H [facility] Abner Longo, DO   HYDROmorphone 0 2 mg Intravenous Q4H PRN Abner Lnogo, DO   insulin lispro 1-5 Units Subcutaneous TID AC Abner Longo, DO   lidocaine 1 patch Topical Daily Karime Marquez, DO   metoprolol succinate 25 mg Oral Daily Abner Longo, DO   multivitamin-minerals 1 tablet Oral Daily Karime Marquez, DO   ondansetron 4 mg Oral Q8H PRN Abner Longo, DO   oseltamivir 30 mg Oral Q12H [facility] Abner Longo, DO   oxyCODONE 2 5 mg Oral Q4H PRN Abner Longo, DO   oxyCODONE 5 mg Oral Q4H PRN Abner Longo, DO   potassium chloride 20 mEq Oral Daily Abner Longo,         Today, Patient Was Seen By: ROGELIO Perez    ** Please Note: Dictation voice to text software may have been used in the creation of this document   **

## 2020-01-26 NOTE — PLAN OF CARE
Problem: Potential for Falls  Goal: Patient will remain free of falls  Description  INTERVENTIONS:  - Assess patient frequently for physical needs  -  Identify cognitive and physical deficits and behaviors that affect risk of falls    -  [place] fall precautions as indicated by assessment   - Educate patient/family on patient safety including physical limitations  - Instruct patient to call for assistance with activity based on assessment  - Modify environment to reduce risk of injury  - Consider OT/PT consult to assist with strengthening/mobility  Outcome: Progressing     Problem: PAIN - ADULT  Goal: Verbalizes/displays adequate comfort level or baseline comfort level  Description  Interventions:  - Encourage patient to monitor pain and request assistance  - Assess pain using appropriate pain scale  - Administer analgesics based on type and severity of pain and evaluate response  - Implement non-pharmacological measures as appropriate and evaluate response  - Consider cultural and social influences on pain and pain management  - Notify physician/advanced practitioner if interventions unsuccessful or patient reports new pain  Outcome: Progressing     Problem: INFECTION - ADULT  Goal: Absence or prevention of progression during hospitalization  Description  INTERVENTIONS:  - Assess and monitor for signs and symptoms of infection  - Monitor lab/diagnostic results  - Monitor all insertion sites, i e  indwelling lines, tubes, and drains  - Monitor endotracheal if appropriate and nasal secretions for changes in amount and color  - [place] appropriate cooling/warming therapies per order  - Administer medications as ordered  - Instruct and encourage patient and family to use good hand hygiene technique  - Identify and instruct in appropriate isolation precautions for identified infection/condition  Outcome: Progressing  Goal: Absence of fever/infection during neutropenic period  Description  INTERVENTIONS:  - Monitor WBC    Outcome: Progressing     Problem: SAFETY ADULT  Goal: Maintain or return to baseline ADL function  Description  INTERVENTIONS:  -  Assess patient's ability to carry out ADLs; assess patient's baseline for ADL function and identify physical deficits which impact ability to perform ADLs (bathing, care of mouth/teeth, toileting, grooming, dressing, etc )  - Assess/evaluate cause of self-care deficits   - Assess range of motion  - Assess patient's mobility; develop plan if impaired  - Assess patient's need for assistive devices and provide as appropriate  - Encourage maximum independence but intervene and supervise when necessary  - Involve family in performance of ADLs  - Assess for home care needs following discharge   - Consider OT consult to assist with ADL evaluation and planning for discharge  - Provide patient education as appropriate  Outcome: Progressing  Goal: Maintain or return mobility status to optimal level  Description  INTERVENTIONS:  - Assess patient's baseline mobility status (ambulation, transfers, stairs, etc )    - Identify cognitive and physical deficits and behaviors that affect mobility  - Identify mobility aids required to assist with transfers and/or ambulation (gait belt, sit-to-stand, lift, walker, cane, etc )  - [place] fall precautions as indicated by assessment  - Record patient progress and toleration of activity level on Mobility SBAR; progress patient to next Phase/Stage  - Instruct patient to call for assistance with activity based on assessment  - Consider rehabilitation consult to assist with strengthening/weightbearing, etc   Outcome: Progressing     Problem: DISCHARGE PLANNING  Goal: Discharge to home or other facility with appropriate resources  Description  INTERVENTIONS:  - Identify barriers to discharge w/patient and caregiver  - Arrange for needed discharge resources and transportation as appropriate  - Identify discharge learning needs (meds, wound care, etc )  - Arrange for interpretive services to assist at discharge as needed  - Refer to Case Management Department for coordinating discharge planning if the patient needs post-hospital services based on physician/advanced practitioner order or complex needs related to functional status, cognitive ability, or social support system  Outcome: Progressing     Problem: Knowledge Deficit  Goal: Patient/family/caregiver demonstrates understanding of disease process, treatment plan, medications, and discharge instructions  Description  Complete learning assessment and assess knowledge base    Interventions:  - Provide teaching at level of understanding  - Provide teaching via preferred learning methods  Outcome: Progressing

## 2020-01-26 NOTE — ASSESSMENT & PLAN NOTE
Wt Readings from Last 3 Encounters:   01/10/20 70 kg (154 lb 6 oz)   12/06/19 73 kg (161 lb)   11/25/19 77 kg (169 lb 12 8 oz)     Echocardiogram:  Obtained 11/2019, demonstrated EF of 50%, mild concentric hypertrophy, grade 1 diastolic dysfunction, moderate pulmonary hypertension    · Monitor I/O's, daily weights  · Sodium/fluid-restricted diet  · Monitor serum electrolytes  · Incentive spirometry, airway clearance protocol  · Titrate oxygen to maintain saturation of at least 90%  · Diuresis with Lasix b i d , titrate as needed for adequate output  · Beta blocker:  Metoprolol succinate daily  · Obtain TSH with free T4 reflex

## 2020-01-26 NOTE — PLAN OF CARE
Problem: OCCUPATIONAL THERAPY ADULT  Goal: Performs self-care activities at highest level of function for planned discharge setting  See evaluation for individualized goals  Description  Treatment Interventions: ADL retraining, Functional transfer training, Endurance training, Cognitive reorientation, Patient/family training, Equipment evaluation/education, Compensatory technique education, Continued evaluation, Energy conservation, Activityengagement          See flowsheet documentation for full assessment, interventions and recommendations  Note:   Limitation: Decreased ADL status, Decreased Safe judgement during ADL, Decreased cognition, Decreased endurance, Decreased self-care trans, Decreased high-level ADLs  Prognosis: Good  Assessment: Pt is a 80 y o  YO  female admitted to [facility]"" on 1/25/2020 w/ SOB and a fall, pt w/ R rib fx  Pt testing positive for influenza  Comorbidities include a h/o COPD, CHF, HLD, HTN, and dysphagia   Pt with active OT orders and up with assistance  orders   Pt resides in a ranch style home with granddtr  Pt's granddtr reporting she doesn't feel comfortable taking her home if she needs assistance at this time    Pt was I w/  ADLS need assist for IADLs, (-) drove, & required no use of DME PTA  Currently pt is Mod A for LB ADLs and Min A for Ub ADLs and functional mobility  Pt is limited at this time 2*: endurance, activity tolerance, functional mobility, balance, trunk control, unsupportive home environment, decreased I w/ ADLS/IADLS, cognitive impairments, decreased safety awareness and decreased insight into deficits  The following Occupational Performance Areas to address include: grooming, bathing/shower, toilet hygiene, dressing and functional mobility  Based on the aforementioned OT evaluation, functional performance deficits, and assessments, pt has been identified as a high complexity evaluation  From OT standpoint, anticipate d/c STR   Pt to continue to benefit from acute immediate OT services to address the following goals 3-5x/week to  w/in 7-10 days:   OT Discharge Recommendation: Short Term Rehab  OT - OK to Discharge:  Yes

## 2020-01-26 NOTE — ED ATTENDING ATTESTATION
Abbey Guajardo MD, saw and evaluated the patient  All available labs and X-rays were ordered by me or the resident and have been reviewed by myself  I discussed the patient with the resident / non-physician and agree with the resident's / non-physician practitioner's findings and plan as documented in the resident's / non-physician practicitioner's note, except where noted  At this point, I agree with the current assessment done in the ED  I was present during key portions of all procedures performed unless otherwise stated  Chief Complaint   Patient presents with    Shortness of Breath     fell this morning, hit cheek on the vanity, decreased appetite, increased swelling and shortness of breath and swelling started yesterday    Leg Swelling    Fall     This is a 80 y o  female presenting for evaluation of not feeling well  The patient has been having increased lower extremity edema and weakness  This morning she had a fall, hitting the cheek on a vanity, decreased appetite, increased SOB  Denies orthopnea  Had an extra dose of lasix today  +nausea w/ zofran  Increased SOB so brought in today  She has the SOB + right lower rib pain which is where she hit which is why she came in for evaluation  Upon arrival, she had a sat 60% on RA good pleth  Not normally on oxygen  PE:  Vitals:    01/27/20 0600 01/27/20 0717 01/27/20 0906 01/27/20 1310   BP:  148/73 168/81    Pulse:  69     Resp:  18     Temp:  98 °F (36 7 °C)     TempSrc:       SpO2: 96% 96%  96%   Weight: 75 2 kg (165 lb 12 6 oz)      Height:       General: VSS, NAD, awake, alert  Well-nourished, well-developed  Appears stated age  Speaking normally in full sentences  Head: Normocephalic, atraumatic, nontender  Eyes: PERRL, EOM-I  No diplopia  No hyphema  No subconjunctival hemorrhages  Symmetrical lids  ENT: Atraumatic external nose and ears  MMM  No malocclusion  No stridor  Normal phonation  No drooling   Normal swallowing  Neck: Symmetric, trachea midline  No JVD  CV: RRR  +S1/S2  No murmurs or gallops  Peripheral pulses +2 throughout  No chest wall tenderness  Lungs:   Unlabored No retractions  CTAB, lungs sounds equal bilateral    No tachypnea  Abd: +BS, soft, NT/ND    MSK:   FROM   Back:   No rashes  Skin: Dry, intact  Neuro: AAOx3, GCS 15, CN II-XII grossly intact  Motor grossly intact  Psychiatric/Behavioral: Appropriate mood and affect   Exam: deferred  A:  - hypoxia  - weakness  -   P:  - CT for rib fx + blood  - CTH for bleeding  - dispo  - 13 point ROS was performed and all are normal unless stated in the history above  - Nursing note reviewed  Vitals reviewed  - Orders placed by myself and/or advanced practitioner / resident     - Previous chart was reviewed  - No language barrier    - History obtained from patient  - There are no limitations to the history obtained  - Critical care time: 45 minutes  - Critical care time was exclusive of seperately bilable procedures and treating other patients as well as teaching time  - Critical care was necessary to treat or prevent imminent or life-threatening deterioration of the following condition: hypoxia  - Critical care time was spent personally by me on the following activities as well as the above as per the ED course and rest of chart: blood draw for specimens, obtaining history from patient / surrogate, developement of a treatment plan, discussions with consultants, evaluation of patient's response to the treatment, examination of the patient, ordering/performing treatements and interventions, re-evaluation of the patient's condition, review of old charts, ordering/reviewing laboratory studies, ordering/reviewing of radiographic studies    Code Status: Level 3 - DNAR and DNI  Advance Directive and Living Will:      Power of :    POLST:      Final Diagnosis:  1  Hypoxia    2  Hyponatremia    3   Acute on chronic diastolic congestive heart failure (HCC)    4  Closed compression fracture of L1 lumbar vertebra with routine healing, subsequent encounter    5   Closed fracture of multiple ribs of right side, initial encounter        ED Course as of Jan 27 1432   Sun Jan 26, 2020   0048 Sodium(!): 126   0048 Chloride(!): 87     Medications   acetaminophen (TYLENOL) tablet 975 mg (975 mg Oral Given 1/27/20 1333)   lidocaine (LIDODERM) 5 % patch 1 patch (1 patch Topical Medication Applied 1/27/20 0805)   oxyCODONE (ROXICODONE) IR tablet 2 5 mg (has no administration in time range)   oxyCODONE (ROXICODONE) IR tablet 5 mg (has no administration in time range)   HYDROmorphone (DILAUDID) injection 0 2 mg (has no administration in time range)   aspirin chewable tablet 81 mg (81 mg Oral Given 1/27/20 0805)   atorvastatin (LIPITOR) tablet 40 mg (40 mg Oral Given 1/26/20 1618)   calcium carbonate-vitamin D (OSCAL-D) 500 mg-200 units per tablet 1 tablet (1 tablet Oral Given 1/27/20 0805)   metoprolol succinate (TOPROL-XL) 24 hr tablet 25 mg (25 mg Oral Given 1/27/20 0805)   multivitamin-minerals (CENTRUM) tablet 1 tablet (1 tablet Oral Given 1/27/20 0805)   ondansetron (ZOFRAN-ODT) dispersible tablet 4 mg (has no administration in time range)   famotidine (PEPCID) tablet 10 mg (10 mg Oral Given 1/27/20 0805)   heparin (porcine) subcutaneous injection 5,000 Units (5,000 Units Subcutaneous Given 1/27/20 1342)   potassium chloride (K-DUR,KLOR-CON) CR tablet 20 mEq (20 mEq Oral Given 1/27/20 0805)   furosemide (LASIX) injection 40 mg (40 mg Intravenous Given 1/27/20 0805)   oseltamivir (TAMIFLU) capsule 30 mg (30 mg Oral Given 1/27/20 0806)   albuterol (PROVENTIL HFA,VENTOLIN HFA) inhaler 2 puff (has no administration in time range)   ALPRAZolam (XANAX) tablet 0 25 mg (has no administration in time range)   docusate sodium (COLACE) capsule 100 mg (100 mg Oral Given 1/27/20 7770)   polyethylene glycol (MIRALAX) packet 17 g (has no administration in time range) levalbuterol (XOPENEX) inhalation solution 1 25 mg (1 25 mg Nebulization Given 1/27/20 1310)   sodium chloride 0 9 % inhalation solution 3 mL (3 mL Nebulization Given 1/27/20 1310)   furosemide (LASIX) injection 40 mg (40 mg Intravenous Given 1/25/20 7246)   tolvaptan (SAMSCA) split tablet 15 mg (15 mg Oral Given 1/27/20 1350)     XR chest pa & lateral (24 hours after admission)   Final Result      Right rib fractures on chest CT not visible  No pneumothorax  Small effusions and bibasilar atelectasis  Workstation performed: [id number]         CT head without contrast   Final Result      No acute intracranial abnormality  There is advanced microangiopathic change, progressed somewhat compared with May 19, 2017  Workstation performed: [id number]         CT chest without contrast   ED Interpretation   Abnormal   QUINN PNA      Final Result      There is a minimally displaced fracture of the right 10th rib laterally  There are questionable nondisplaced fractures of the right 7th through 9th ribs, versus motion artifact  Possible new compression deformity of L1   CT of the thoracolumbar spine is recommended for further evaluation  No evidence of pneumothorax  Areas of consolidation and groundglass opacity are present within the lungs, as described above  Please see discussion  Follow-up to ensure resolution is recommended  There are small bilateral pleural effusions, right larger than left  There is mild septal thickening  There is a large hiatal hernia/partially intrathoracic stomach  This appears similar to the prior study  Cardiomegaly  Coronary artery disease  Atherosclerosis               I personally discussed this study with [address] [place] [address] on 1/25/2020 at 11:43 PM                   Workstation performed: [id number]           Orders Placed This Encounter   Procedures    Influenza A/B and RSV PCR    CT head without contrast    CT chest without contrast    XR chest pa & lateral (24 hours after admission)    CBC and differential    Comprehensive metabolic panel    Procalcitonin    Osmolality-"If this is regarding a toxic alcohol, STOP  Test is not routinely indicated  Please consult medical  on call for further guidance "    Osmolality, urine    Sodium, urine, random    Creatinine, urine, random    UA w Reflex to Microscopic w Reflex to Culture    Basic metabolic panel    CBC    TSH, 3rd generation with Free T4 reflex    Hemoglobin A1C w/ EAG Estimation    Platelet count    Blood gas, venous    Procalcitonin    Basic metabolic panel    CBC and differential    Basic metabolic panel    CBC    Diet Cardiovascular; Cardiac; Dysphagia 3-Dental Soft; Thin Liquid    Insert peripheral IV    Nursing communication Continue IV as ordered  Rose Notify admitting physician    Notify admitting physician on arrival    Continuous Pulse Oximetry    Incentive spirometry    Nursing communication Follow Rib Fracture Protocol    Encourage deep breathing and coughing    Elevate extremity    Nasal cannula oxygen    Vital Signs per unit routine    Daily weights- Use standing scale to weigh patient    I/O    Provide Patient with Heart Failure Education    Nursing communication ECG 12 lead with chest pain or ST segment change and notify MD  Place an ECG order if performed      Up with assistance    Apply SCD or Foot pumps    Insulin Subcutaneous Instruction    Continuous Pulse Oximetry    Level 3 - DNAR (Do Not Attempt Resuscitation) and DNI (Do Not Intubate)    Inpatient Consult to Case Management    Trauma evaluation (Consult)    Inpatient consult to Acute Pain Service (see Comments)    Inpatient Consult to Nutrition Services    Inpatient consult to Trauma (Inpatient Only)    Inpatient consult to Gerontology    Inpatient consult to Nephrology    Droplet isolation status    OT eval and treat    PT eval and treat    Airway Clearance Protocol    Respiratory Protocol    Respiratory Protocol    EKG RESULTS    POCT urinalysis dipstick    ECG 12 lead    Inpatient Admission     Labs Reviewed   INFLUENZA A/B AND RSV PCR - Abnormal       Result Value Ref Range Status    INFLUENZA A PCR Detected (*) None Detected Final    INFLUENZA B PCR None Detected  None Detected Final    RSV PCR None Detected  None Detected Final   CBC AND DIFFERENTIAL - Abnormal    WBC 7 14  4 31 - 10 16 Thousand/uL Final    RBC 4 80  3 81 - 5 12 Million/uL Final    Hemoglobin 14 1  11 5 - 15 4 g/dL Final    Hematocrit 43 9  34 8 - 46 1 % Final    MCV 92  82 - 98 fL Final    MCH 29 4  26 8 - 34 3 pg Final    MCHC 32 1  31 4 - 37 4 g/dL Final    RDW 14 4  11 6 - 15 1 % Final    MPV 9 3  8 9 - 12 7 fL Final    Platelets 264  921 - 390 Thousands/uL Final    nRBC 0  /100 WBCs Final    Neutrophils Relative 63  43 - 75 % Final    Immat GRANS % 0  0 - 2 % Final    Lymphocytes Relative 15  14 - 44 % Final    Monocytes Relative 22 (*) 4 - 12 % Final    Eosinophils Relative 0  0 - 6 % Final    Basophils Relative 0  0 - 1 % Final    Neutrophils Absolute 4 43  1 85 - 7 62 Thousands/µL Final    Immature Grans Absolute 0 02  0 00 - 0 20 Thousand/uL Final    Lymphocytes Absolute 1 06  0 60 - 4 47 Thousands/µL Final    Monocytes Absolute 1 59 (*) 0 17 - 1 22 Thousand/µL Final    Eosinophils Absolute 0 01  0 00 - 0 61 Thousand/µL Final    Basophils Absolute 0 03  0 00 - 0 10 Thousands/µL Final   COMPREHENSIVE METABOLIC PANEL - Abnormal    Sodium 126 (*) 136 - 145 mmol/L Final    Potassium 4 6  3 5 - 5 3 mmol/L Final    Chloride 87 (*) 100 - 108 mmol/L Final    CO2 36 (*) 21 - 32 mmol/L Final    ANION GAP 3 (*) 4 - 13 mmol/L Final    BUN 10  5 - 25 mg/dL Final    Creatinine 0 96  0 60 - 1 30 mg/dL Final    Comment: Standardized to IDMS reference method    Glucose 141 (*) 65 - 140 mg/dL Final    Comment:   If the patient is fasting, the ADA then defines impaired fasting glucose as > 100 mg/dL and diabetes as > or equal to 123 mg/dL  Specimen collection should occur prior to Sulfasalazine administration due to the potential for falsely depressed results  Specimen collection should occur prior to Sulfapyridine administration due to the potential for falsely elevated results  Calcium 9 7  8 3 - 10 1 mg/dL Final    AST 13  5 - 45 U/L Final    Comment:   Specimen collection should occur prior to Sulfasalazine administration due to the potential for falsely depressed results  ALT 24  12 - 78 U/L Final    Comment:   Specimen collection should occur prior to Sulfasalazine and/or Sulfapyridine administration due to the potential for falsely depressed results  Alkaline Phosphatase 78  46 - 116 U/L Final    Total Protein 7 6  6 4 - 8 2 g/dL Final    Albumin 3 5  3 5 - 5 0 g/dL Final    Total Bilirubin 0 75  0 20 - 1 00 mg/dL Final    eGFR 51  ml/min/1 73sq m Final    Narrative:     [place] guidelines for Chronic Kidney Disease (CKD):     Stage 1 with normal or high GFR (GFR > 90 mL/min/1 73 square meters)    Stage 2 Mild CKD (GFR = 60-89 mL/min/1 73 square meters)    Stage 3A Moderate CKD (GFR = 45-59 mL/min/1 73 square meters)    Stage 3B Moderate CKD (GFR = 30-44 mL/min/1 73 square meters)    Stage 4 Severe CKD (GFR = 15-29 mL/min/1 73 square meters)    Stage 5 End Stage CKD (GFR <15 mL/min/1 73 square meters)  Note: GFR calculation is accurate only with a steady state creatinine   OSMOLALITY - Abnormal    Osmolality Serum 273 (*) 282 - 298 mmol/KG Final   PROCALCITONIN TEST - Normal    Procalcitonin 0 06  <=0 25 ng/ml Final    Comment: Suspected Lower Respiratory Tract Infection (LRTI):  - LESS than or EQUAL to 0 25 ng/mL:   low likelihood for bacterial LRTI; antibiotics DISCOURAGED   - GREATER than 0 25 ng/mL:   increased likelihood for bacterial LRTI; antibiotics ENCOURAGED      Suspected Sepsis:  - Strongly consider initiating antibiotics in ALL UNSTABLE patients  - LESS than or EQUAL to 0 5 ng/mL:   low likelihood for bacterial sepsis; antibiotics DISCOURAGED   - GREATER than 0 5 ng/mL:   increased likelihood for bacterial sepsis; antibiotics ENCOURAGED   - GREATER than 2 ng/mL:   high risk for severe sepsis / septic shock; antibiotics strongly ENCOURAGED  Decisions on antibiotic use should not be based solely on Procalcitonin (PCT) levels  If PCT is low but uncertainty exists with stopping antibiotics, repeat PCT in 6-24 hours to confirm the low level  If antibiotics are administered (regardless if initial PCT was high or low), repeat PCT every 1-2 days to consider early antibiotic cessation (when GREATER than 80% decrease from the peak OR when PCT drops below designated cutoffs, whichever comes first), so long as the infection is NOT one that typically requires prolonged treatment durations (e g , bone/joint infections, endocarditis, Staph  aureus bacteremia)      Situations of FALSE-POSITIVE Procalcitonin values:  1) Newborns < 67 hours old  2) Massive stress from severe trauma / burns, major surgery, acute pancreatitis, cardiogenic / hemorrhagic shock, sickle cell crisis, or other organ perfusion abnormalities  3) Malaria and some Candidal infections  4) Treatment with agents that stimulate cytokines (e g , OKT3, anti-lymphocyte globulins, alemtuzumab, IL-2, granulocyte transfusion [NOT GCSFs])  5) Chronic renal disease causes elevated baseline levels (consider GREATER than 0 75 ng/mL as an abnormal cut-off); initiating HD/CRRT may cause transient decreases  6) Paraneoplastic syndromes from medullary thyroid or SCLC, some forms of vasculitis, and acute cprio-vj-ykbt disease    Situations of FALSE-NEGATIVE Procalcitonin values:  1) Too early in clinical course for PCT to have reached its peak (may repeat in 6-24 hours to confirm low level)  2) Localized infection WITHOUT systemic (SIRS / sepsis) response (e g , an abscess, osteomyelitis, cystitis)  3) Mycobacteria (e g , Tuberculosis, MAC)  4) Cystic fibrosis exacerbations     OSMOLALITY, URINE - Normal    Osmolality, Ur 363  250 - 900 mmol/KG Final   SODIUM, URINE, RANDOM    Sodium, Ur 74   Final   CREATININE, URINE, RANDOM    Creatinine, Ur 70 5  mg/dL Final   UA W REFLEX TO MICROSCOPIC WITH REFLEX TO CULTURE    Color, UA Yellow   Final    Clarity, UA Clear   Final    Specific Shelly, UA 1 010  1 003 - 1 030 Final    pH, UA 5 5  4 5, 5 0, 5 5, 6 0, 6 5, 7 0, 7 5, 8 0 Final    Leukocytes, UA Negative  Negative Final    Nitrite, UA Negative  Negative Final    Protein, UA Negative  Negative mg/dl Final    Glucose, UA Negative  Negative mg/dl Final    Ketones, UA Negative  Negative mg/dl Final    Urobilinogen, UA 0 2  0 2, 1 0 E U /dl E U /dl Final    Bilirubin, UA Negative  Negative Final    Blood, UA Negative  Negative Final   POCT URINALYSIS DIPSTICK     Time reflects when diagnosis was documented in both MDM as applicable and the Disposition within this note     Time User Action Codes Description Comment    1/26/2020  1:35 AM Shabbir Munoz Add [R09 02] Hypoxia     1/26/2020  1:35 AM Tam Mann Add [E87 1] Hyponatremia     1/26/2020  2:53 AM Siomara Rubio Add [I50 33] Acute on chronic diastolic congestive heart failure ([facility] [address] )     1/26/2020  4:26 PM Kellen Ibarra Add [S32 010D] Closed compression fracture of L1 lumbar vertebra with routine healing, subsequent encounter     1/26/2020  4:27 PM Kellen Ibarra Add [S22 41XA] Closed fracture of multiple ribs of right side, initial encounter       ED Disposition     ED Disposition Condition Date/Time Comment    Admit Stable Sun Jan 26, 2020  1:35 AM Case was discussed with DELBERT and the patient's admission status was agreed to be Admission Status: inpatient status to the service of Dr Mayco Davey           Follow-up Information    None       Current Discharge Medication List      CONTINUE these medications which have NOT CHANGED Details   ALPRAZolam (XANAX) 0 5 mg tablet Take 1 tablet (0 5 mg total) by mouth 3 (three) times a day as needed for anxiety  Qty: 90 tablet, Refills: 0    Associated Diagnoses: MADELYN (generalized anxiety disorder)      aspirin 81 mg chewable tablet Chew 1 tablet (81 mg total) daily  Qty: 30 tablet, Refills: 0    Associated Diagnoses: Non-STEMI (non-ST elevated myocardial infarction) ([facility])      atorvastatin (LIPITOR) 40 mg tablet Take 40 mg by mouth daily with dinner      Calcium Carbonate-Vit D-Min (CALCIUM 1200 PO) Take by mouth      citalopram (CeleXA) 10 mg tablet Take 1 tablet (10 mg total) by mouth daily at bedtime  Qty: 90 tablet, Refills: 0    Associated Diagnoses: MADELYN (generalized anxiety disorder)      furosemide (LASIX) 20 mg tablet Take 1 tablet (20 mg total) by mouth daily  Qty: 90 tablet, Refills: 0    Associated Diagnoses: Acute congestive heart failure, unspecified heart failure type ([facility])      metoprolol succinate (TOPROL-XL) 25 mg 24 hr tablet Take 1 tablet (25 mg total) by mouth daily  Qty: 90 tablet, Refills: 0    Associated Diagnoses: Essential (primary) hypertension      multivitamin (THERAGRAN) TABS Take 1 tablet by mouth daily      ondansetron (ZOFRAN) 4 mg tablet Take 1 tablet (4 mg total) by mouth every 12 (twelve) hours as needed for nausea or vomiting  Qty: 60 tablet, Refills: 1    Associated Diagnoses: Nausea      Potassium Chloride BRISSA by Does not apply route      ranitidine (ZANTAC) 150 mg tablet Take 1 tablet (150 mg total) by mouth 2 (two) times a day  Qty: 60 tablet, Refills: 0    Associated Diagnoses: Gastroesophageal reflux disease without esophagitis           No discharge procedures on file  Prior to Admission Medications   Prescriptions Last Dose Informant Patient Reported? Taking?    ALPRAZolam (XANAX) 0 5 mg tablet   No No   Sig: Take 1 tablet (0 5 mg total) by mouth 3 (three) times a day as needed for anxiety   Calcium Carbonate-Vit D-Min (CALCIUM 1200 PO)   Yes No   Sig: Take by mouth   Potassium Chloride BRISSA   Yes No   Sig: by Does not apply route   aspirin 81 mg chewable tablet   No No   Sig: Chew 1 tablet (81 mg total) daily   atorvastatin (LIPITOR) 40 mg tablet   Yes No   Sig: Take 40 mg by mouth daily with dinner   citalopram (CeleXA) 10 mg tablet   No No   Sig: Take 1 tablet (10 mg total) by mouth daily at bedtime   furosemide (LASIX) 20 mg tablet   No No   Sig: Take 1 tablet (20 mg total) by mouth daily   metoprolol succinate (TOPROL-XL) 25 mg 24 hr tablet   No No   Sig: Take 1 tablet (25 mg total) by mouth daily   multivitamin (THERAGRAN) TABS   Yes No   Sig: Take 1 tablet by mouth daily   ondansetron (ZOFRAN) 4 mg tablet   No No   Sig: Take 1 tablet (4 mg total) by mouth every 12 (twelve) hours as needed for nausea or vomiting   ranitidine (ZANTAC) 150 mg tablet   No No   Sig: Take 1 tablet (150 mg total) by mouth 2 (two) times a day      Facility-Administered Medications: None       Portions of the record may have been created with voice recognition software  Occasional wrong word or "sound a like" substitutions may have occurred due to the inherent limitations of voice recognition software  Read the chart carefully and recognize, using context, where substitutions have occurred      Electronically signed by:  Jojo Mcfadden

## 2020-01-26 NOTE — ASSESSMENT & PLAN NOTE
Suspect this is secondary to CHF exacerbation leading to volume overload  Her FENA was 0 8% which supports this    · Repeat serum BMP and monitor sodium; avoid over-correction  · Continue diuresis with Lasix  · See plan as otherwise outlined for CHF

## 2020-01-26 NOTE — PLAN OF CARE
Problem: Potential for Falls  Goal: Patient will remain free of falls  Description  INTERVENTIONS:  - Assess patient frequently for physical needs  -  Identify cognitive and physical deficits and behaviors that affect risk of falls    -  [place] fall precautions as indicated by assessment   - Educate patient/family on patient safety including physical limitations  - Instruct patient to call for assistance with activity based on assessment  - Modify environment to reduce risk of injury  - Consider OT/PT consult to assist with strengthening/mobility  Outcome: Progressing     Problem: PAIN - ADULT  Goal: Verbalizes/displays adequate comfort level or baseline comfort level  Description  Interventions:  - Encourage patient to monitor pain and request assistance  - Assess pain using appropriate pain scale  - Administer analgesics based on type and severity of pain and evaluate response  - Implement non-pharmacological measures as appropriate and evaluate response  - Consider cultural and social influences on pain and pain management  - Notify physician/advanced practitioner if interventions unsuccessful or patient reports new pain  Outcome: Progressing     Problem: INFECTION - ADULT  Goal: Absence or prevention of progression during hospitalization  Description  INTERVENTIONS:  - Assess and monitor for signs and symptoms of infection  - Monitor lab/diagnostic results  - Monitor all insertion sites, i e  indwelling lines, tubes, and drains  - Monitor endotracheal if appropriate and nasal secretions for changes in amount and color  - [place] appropriate cooling/warming therapies per order  - Administer medications as ordered  - Instruct and encourage patient and family to use good hand hygiene technique  - Identify and instruct in appropriate isolation precautions for identified infection/condition  Outcome: Progressing  Goal: Absence of fever/infection during neutropenic period  Description  INTERVENTIONS:  - Monitor WBC    Outcome: Progressing     Problem: SAFETY ADULT  Goal: Maintain or return to baseline ADL function  Description  INTERVENTIONS:  -  Assess patient's ability to carry out ADLs; assess patient's baseline for ADL function and identify physical deficits which impact ability to perform ADLs (bathing, care of mouth/teeth, toileting, grooming, dressing, etc )  - Assess/evaluate cause of self-care deficits   - Assess range of motion  - Assess patient's mobility; develop plan if impaired  - Assess patient's need for assistive devices and provide as appropriate  - Encourage maximum independence but intervene and supervise when necessary  - Involve family in performance of ADLs  - Assess for home care needs following discharge   - Consider OT consult to assist with ADL evaluation and planning for discharge  - Provide patient education as appropriate  Outcome: Progressing  Goal: Maintain or return mobility status to optimal level  Description  INTERVENTIONS:  - Assess patient's baseline mobility status (ambulation, transfers, stairs, etc )    - Identify cognitive and physical deficits and behaviors that affect mobility  - Identify mobility aids required to assist with transfers and/or ambulation (gait belt, sit-to-stand, lift, walker, cane, etc )  - [place] fall precautions as indicated by assessment  - Record patient progress and toleration of activity level on Mobility SBAR; progress patient to next Phase/Stage  - Instruct patient to call for assistance with activity based on assessment  - Consider rehabilitation consult to assist with strengthening/weightbearing, etc   Outcome: Progressing     Problem: DISCHARGE PLANNING  Goal: Discharge to home or other facility with appropriate resources  Description  INTERVENTIONS:  - Identify barriers to discharge w/patient and caregiver  - Arrange for needed discharge resources and transportation as appropriate  - Identify discharge learning needs (meds, wound care, etc )  - Arrange for interpretive services to assist at discharge as needed  - Refer to Case Management Department for coordinating discharge planning if the patient needs post-hospital services based on physician/advanced practitioner order or complex needs related to functional status, cognitive ability, or social support system  Outcome: Progressing     Problem: Knowledge Deficit  Goal: Patient/family/caregiver demonstrates understanding of disease process, treatment plan, medications, and discharge instructions  Description  Complete learning assessment and assess knowledge base    Interventions:  - Provide teaching at level of understanding  - Provide teaching via preferred learning methods  Outcome: Progressing     Problem: Prexisting or High Potential for Compromised Skin Integrity  Goal: Skin integrity is maintained or improved  Description  INTERVENTIONS:  - Identify patients at risk for skin breakdown  - Assess and monitor skin integrity  - Assess and monitor nutrition and hydration status  - Monitor labs   - Assess for incontinence   - Turn and reposition patient  - Assist with mobility/ambulation  - Relieve pressure over bony prominences  - Avoid friction and shearing  - Provide appropriate hygiene as needed including keeping skin clean and dry  - Evaluate need for skin moisturizer/barrier cream  - Collaborate with interdisciplinary team   - Patient/family teaching  - Consider wound care consult   Outcome: Progressing

## 2020-01-26 NOTE — ASSESSMENT & PLAN NOTE
Wt Readings from Last 3 Encounters:   01/26/20 75 kg (165 lb 5 5 oz)   01/10/20 70 kg (154 lb 6 oz)   12/06/19 73 kg (161 lb)     · Echocardiogram:  Obtained 11/2019, demonstrated EF of 50%, mild concentric hypertrophy, grade 1 diastolic dysfunction, moderate pulmonary hypertension  · Monitor I/O's, daily weights  · Sodium/fluid-restricted diet  · Monitor serum electrolytes  · Incentive spirometry, airway clearance protocol  · Titrate oxygen to maintain saturation of at least 90%; patient is not on any home oxygen outpatient  Currently satting appropriately on 4 L nasal cannula    · Diuresis with Lasix b i d , titrate as needed for adequate output  · Beta blocker:  Metoprolol succinate daily  · TSH and free T4 unremarkable

## 2020-01-26 NOTE — PROGRESS NOTES
Progress Note - Dianne Justin 2/25/1925, 80 y o  female MRN: [id number]    Unit/Bed#: [facility] 823-01 Encounter: [id number]    Primary Care Provider: Christiano Rosas DO   Date and time admitted to hospital: 1/25/2020  9:11 PM        Closed fracture of multiple ribs of right side  Assessment & Plan  Airway clearance protocol  Rib Fracture Protocol  Incentive spirometer and pulmonary toilet  Continuous pulse ox  Pain management  DVT prophylaxis  Repeat CXR in 24 hours    Acute respiratory failure with hypoxia ([address] )  Assessment & Plan  Currently on O2 via nasal cannula at 4 liters and sats at 96%  With O2 off does desat to 88%  Positive also for influenza  Recommend strict pulmonary toilet            Bedside nurse rounds completed with nurse Farheen     Prophylaxis: Sequential compression device (Venodyne)  and Heparin    Disposition: per primary team    Code status:  Level 3 - DNAR and DNI    Consultants: Trauma    Is the patient 65 years or older?: YES:    1  Before the illness or injury that brought you to the Emergency, did you need someone to help you on a regular basis? 1=Yes   2  Since the illness or injury that brought you to the Emergency, have you needed more help than usual to take care of yourself? 1=Yes   3  Have you been hospitalized for one or more nights during the past 6 months (excluding a stay in the Emergency Department)? 1=Yes   4  In general, do you see well? 0=Yes   5  In general, do you have serious problems with your memory? 0=No   6  Do you take more than three different medications everyday? 1=Yes   TOTAL   4     Did you order a geriatric consult if the score was 2 or greater?: yes    SUBJECTIVE:     Transfer from: home  Outside Films Received: no  Tertiary Exam Due on: 1/26/20    Mechanism of Injury:Fall    Details related to Injury: +LOC:  no    Chief Complaint: rib pain    HPI/Last 24 hour events: admitted to medicine, tertiary by trauma and will sign off  On continuous pulse ox    Droplet precaution secondary to Positive influenza result    Active medications:           Current Facility-Administered Medications:     acetaminophen (TYLENOL) tablet 975 mg, 975 mg, Oral, Q8H [address], 975 mg at 01/26/20 1359    albuterol (PROVENTIL HFA,VENTOLIN HFA) inhaler 2 puff, 2 puff, Inhalation, Q4H PRN    ALPRAZolam (XANAX) tablet 0 5 mg, 0 5 mg, Oral, TID PRN    aspirin chewable tablet 81 mg, 81 mg, Oral, Daily, 81 mg at 01/26/20 0950    atorvastatin (LIPITOR) tablet 40 mg, 40 mg, Oral, Daily With Dinner, 40 mg at 01/26/20 1618    calcium carbonate-vitamin D (OSCAL-D) 500 mg-200 units per tablet 1 tablet, 1 tablet, Oral, BID With Meals, 1 tablet at 01/26/20 1618    citalopram (CeleXA) tablet 10 mg, 10 mg, Oral, HS    famotidine (PEPCID) tablet 10 mg, 10 mg, Oral, BID, 10 mg at 01/26/20 0950    furosemide (LASIX) injection 40 mg, 40 mg, Intravenous, BID (diuretic), 40 mg at 01/26/20 1618    heparin (porcine) subcutaneous injection 5,000 Units, 5,000 Units, Subcutaneous, Q8H [address], 5,000 Units at 01/26/20 1359 **AND** Platelet count, , , Once    HYDROmorphone (DILAUDID) injection 0 2 mg, 0 2 mg, Intravenous, Q4H PRN    insulin lispro (HumaLOG) 100 units/mL subcutaneous injection 1-5 Units, 1-5 Units, Subcutaneous, TID AC **AND** Fingerstick Glucose (POCT), , , TID AC    lidocaine (LIDODERM) 5 % patch 1 patch, 1 patch, Topical, Daily, 1 patch at 01/26/20 0951    metoprolol succinate (TOPROL-XL) 24 hr tablet 25 mg, 25 mg, Oral, Daily, 25 mg at 01/26/20 0951    multivitamin-minerals (CENTRUM) tablet 1 tablet, 1 tablet, Oral, Daily, 1 tablet at 01/26/20 0950    ondansetron (ZOFRAN-ODT) dispersible tablet 4 mg, 4 mg, Oral, Q8H PRN    oseltamivir (TAMIFLU) capsule 30 mg, 30 mg, Oral, Q12H LIZZY, 30 mg at 01/26/20 0953    oxyCODONE (ROXICODONE) IR tablet 2 5 mg, 2 5 mg, Oral, Q4H PRN    oxyCODONE (ROXICODONE) IR tablet 5 mg, 5 mg, Oral, Q4H PRN    potassium chloride (K-DUR,KLOR-CON) CR tablet 20 mEq, 20 mEq, Oral, Daily, 20 mEq at 01/26/20 0950      OBJECTIVE:     Vitals:   Vitals:    01/26/20 1507   BP: 137/69   Pulse: 79   Resp: 16   Temp: 98 °F (36 7 °C)   SpO2: 95%       Physical Exam:   GENERAL APPEARANCE: resting OOB in a chair  NEURO: intact  HEENT: EOm's intact  CV: RRR, no complaints of chest pain offered  LUNGS: No obvious shortness of breath at this time  GI: tolerating diet  : vodiing  MSK: moves all extremities  SKIN: fragile      I/O:   I/O       01/24 0701 - 01/25 0700 01/25 0701 - 01/26 0700 01/26 0701 - 01/27 0700    P  O    120    Total Intake(mL/kg)   120 (1 6)    Urine (mL/kg/hr)  550 1000 (1 4)    Total Output  550 1000    Net  -550 -880                 Invasive Devices: Invasive Devices     Peripheral Intravenous Line            Peripheral IV 01/25/20 Left Antecubital less than 1 day    Peripheral IV 01/25/20 Right Antecubital less than 1 day                  Imaging:   Ct Head Without Contrast    Result Date: 1/25/2020  Impression: No acute intracranial abnormality  There is advanced microangiopathic change, progressed somewhat compared with May 19, 2017  Workstation performed: [id number]     Ct Chest Without Contrast    Result Date: 1/25/2020  Impression: There is a minimally displaced fracture of the right 10th rib laterally  There are questionable nondisplaced fractures of the right 7th through 9th ribs, versus motion artifact  Possible new compression deformity of L1   CT of the thoracolumbar spine is recommended for further evaluation  No evidence of pneumothorax  Areas of consolidation and groundglass opacity are present within the lungs, as described above  Please see discussion  Follow-up to ensure resolution is recommended  There are small bilateral pleural effusions, right larger than left  There is mild septal thickening  There is a large hiatal hernia/partially intrathoracic stomach  This appears similar to the prior study  Cardiomegaly  Coronary artery disease  Atherosclerosis  I personally discussed this study with [address] [place] [address] on 1/25/2020 at 11:43 PM  Workstation performed: [id number]       Labs: Results for Lindsey Hermosillo (MRN [id number]) as of 1/26/2020 16:51   Ref  Range 1/26/2020 05:05 1/26/2020 08:13   pH, Norberto Latest Ref Range: 7 300 - 7 400  7 309    pCO2, Norberto Latest Ref Range: 42 0 - 50 0 mm Hg 81 1 (H)    pO2, Norberto Latest Ref Range: 35 0 - 45 0 mm Hg 55 7 (H)    HCO3, Norberto Latest Ref Range: 24 - 30 mmol/L 39 8 (H)    Base Excess, Norberto Latest Units: mmol/L 10 0    O2 Content, Norberto Latest Units: ml/dL 17 1    O2 HGB, VENOUS Latest Ref Range: 60 0 - 80 0 % 85 1 (H)    POC Glucose Latest Ref Range: 65 - 140 mg/dl  122   Sodium Latest Ref Range: 136 - 145 mmol/L 129 (L)    Potassium Latest Ref Range: 3 5 - 5 3 mmol/L 4 4    Chloride Latest Ref Range: 100 - 108 mmol/L 88 (L)    CO2 Latest Ref Range: 21 - 32 mmol/L 39 (H)    Anion Gap Latest Ref Range: 4 - 13 mmol/L 2 (L)    BUN Latest Ref Range: 5 - 25 mg/dL 9    Creatinine Latest Ref Range: 0 60 - 1 30 mg/dL 0 78    Glucose, Random Latest Ref Range: 65 - 140 mg/dL 120    Calcium Latest Ref Range: 8 3 - 10 1 mg/dL 8 8    eGFR Latest Units: ml/min/1 73sq m 65    WBC Latest Ref Range: 4 31 - 10 16 Thousand/uL 6 74    Red Blood Cell Count Latest Ref Range: 3 81 - 5 12 Million/uL 4 59    Hemoglobin Latest Ref Range: 11 5 - 15 4 g/dL 13 3    HCT Latest Ref Range: 34 8 - 46 1 % 42 2    MCV Latest Ref Range: 82 - 98 fL 92    MCH Latest Ref Range: 26 8 - 34 3 pg 29 0    MCHC Latest Ref Range: 31 4 - 37 4 g/dL 31 5    RDW Latest Ref Range: 11 6 - 15 1 % 14 2    Platelet Count Latest Ref Range: 149 - 390 Thousands/uL 281    MPV Latest Ref Range: 8 9 - 12 7 fL 9 3    Hemoglobin A1C Latest Ref Range: 4 2 - 6 3 % 6 2    EAG Latest Units: mg/dl 131    TSH 3RD GENERATON Latest Ref Range: 0 358 - 3 740 uIU/mL 0 636    Procalcitonin Latest Ref Range: <=0 25 ng/ml <0 05          No new trauma issues identified    Will refer to primary team and sign off  Any questions or concerns please call

## 2020-01-26 NOTE — ASSESSMENT & PLAN NOTE
Suspect secondary to CHF flare  Also has history of COPD, but suspect CHF as primary contributor  There is also concern for pneumonia on CT imaging, but left lung consolidations were found on previous imaging  Also, procalcitonin was within normal limits    · Continue plan as outlined for CHF  · Continue nasal cannula oxygen, wean as able  · Will obtain VBG  · Repeat procalcitonin  · Low threshold to start antibiotics if there is concern for bacterial infection

## 2020-01-26 NOTE — ED NOTES
Unable to perform flu swab at this time as we do not have flu swabs in the department  Store room has been called and  left        Amber Verduzco RN  01/25/20 [id number]

## 2020-01-26 NOTE — CONSULTS
Evaluation - Trauma   Lebron Plascencia 80 y o  female MRN: [id number]  Unit/Bed#: ED 12 Encounter: [id number]    Assessment/Plan   Trauma Alert: Evaluation  Model of Arrival: Self  Trauma Team: Attending Sandra Jones and Residents Humphrey Pavon  Consultants:     Trauma Active Problems:  Shortness of breath, minimally displaced right 10th rib fracture, bilateral pleural effusions    Trauma Plan:   · Recommend medicine admission  · Trauma will continue to follow  · Rib fracture protocol  · Consider CT thoracolumbar spine for further evaluation of L1 deformity, however given the patient's mechanism and prior imaging this is unlikely necessary  · Recommend APS consult  · Incentive spirometry, aggressive pulmonary toilet  · PT/OT  · DVT prophylaxis      Chief Complaint:  Shortness of breath    History of Present Illness   Physician Requesting Evaluation: Riana Martini MD  Reason for Evaluation / Principal Problem:  Right rib fracture    HPI:  Lebron Plascencia is a 80 y o  female with past medical history of CAD status post stent, CHF, hypertension, and COPD, with known L1 fracture managed non operatively who presents with 2+ days of worsening dyspnea and lower extremity edema  Patient states she had a fall while transferring from her toilet yesterday (1/25)  Patient struck her cheek on the vanity as well as her right chest wall  Denies loss of consciousness or symptoms proceeding her fall such as lightheadedness, dizziness, chest pain  Patient brought in by family for concern regarding her shortness of breath  Workup in the emergency department has been notable for CT chest imaging demonstrating a minimally displaced right 10th rib fracture with question of 7-9th fractures vs motion artifact  Mechanism:Fall    Trauma evaluation (Consult)  Consult performed by: Azalia Phan MD  Consult ordered by: Riana Martini MD          Review of Systems   Constitutional: Positive for appetite change   Negative for chills and fever  Decreased appetite   HENT: Negative  Eyes: Negative for visual disturbance  Respiratory: Positive for cough and shortness of breath  Cardiovascular: Positive for leg swelling  Negative for chest pain and palpitations  Right rib pain   Gastrointestinal: Positive for constipation and nausea  Negative for abdominal pain, diarrhea and vomiting  Endocrine: Negative  Genitourinary: Positive for difficulty urinating  Musculoskeletal: Negative for back pain and neck pain  Skin: Negative for wound  Allergic/Immunologic: Negative  Neurological: Negative for dizziness, seizures, speech difficulty, weakness, light-headedness, numbness and headaches  Hematological: Negative  Psychiatric/Behavioral: Negative  Historical Information       Past Medical History:   Diagnosis Date    Abnormal blood sugar 03/13/2017    Abnormal carotid duplex scan     Carotid Duplex (Plaque formation is quite prominent bilaterally  Despite these changes, no evidence for greater than 50% diameter reducing lesions in the cervical portion of either carotid artery hemisystem ) - 6/13/2017    Anxiety     Cervical radiculopathy 08/08/2017    [facility])     diastolic    Compression fracture of lumbar spine, non-traumatic, with routine healing, subsequent encounter     COPD (chronic obstructive pulmonary disease) ([facility] [address] ) 2/6/2018    Coronary angioplasty status     Coronary artery disease 4/7/2017    Costochondritis 02/05/2013    suspect MS in origin given relationship to ROM and location  Start mobic and if persists, reeval  Refused xrayat this time   Dyslipidemia     GERD without esophagitis 8/30/2012    H/O CT scan of chest      (No PE, minimal interstitial change left lower lobe and right upper lobe, which may be acute ) - 5/19/2017    History of Holter monitoring     Holter (Sinus rhythm with rates ranging from 52 to 118 bpm   No afib or heart block    Rare atrial and ventricular ectopic beats  One six-beat atrial run noted  No pauses  ) - 5/31/2017    Hx of echocardiogram     Echo (EF 0 60 (60%), Biatrial enlargement ) - 3/24/2017 Echo (EF 0 55 (55%), mild LVH  Aortic valvular sclerosis  Trace MI with mild left atrial dilatation, mild MAC  Mild TI with mild pulmonary hypertension  ) - 5/22/2017 Echo (EF 0 60 (60%), Normal left vent chamber size and systolic function  Mild diastolic dysfunction of LV w/normal filling pressures  Mild mitral regurg ) - 7/26/2017 Echo (EF 0    Hx of echocardiogram 08/16/2017    EF0 60 (60%) Normal left vent chamber size and systolic function of LV w/normal filling presures  Mild mitral regurg  / EF0 50 (50%) Mild LVH  MIld MR 10/6/17     Hypertension     Iron deficiency anemia 4/21/2016    Macular degeneration, right eye     Malignant melanoma of skin ([facility] [address] ) 9/17/2012    Mixed hyperlipidemia     NSTEMI (non-ST elevated myocardial infarction) ([facility] [address] ) 7/25/2017    Old MI (myocardial infarction)     Osteoarthritis 9/17/2012    Osteoporosis 3/13/2017    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[facility] [address] ) 04/07/2017    Urinary tract infection     frequent UTI's     Past Surgical History:   Procedure Laterality Date    ABDOMINAL SURGERY      APPENDECTOMY      BREAST SURGERY      Lumpectomy    CARDIAC CATHETERIZATION  03/24/2017    ARNIE to 100% occluded prox RCA, chronic 99% ostial LCfx, 60% mid LAD, discrete 40% distal LM / EF0 60 (60%) 100% occluded proximal RCA s/p ARNIE, chronic 99% ostial LCX, 60% mid LAD, discrete 40% dital LM  4/5/17    CHOLECYSTECTOMY      COLONOSCOPY N/A 7/25/2018    Procedure: COLONOSCOPY;  Surgeon:  Ermias Sharpe MD;  Location: [address] [place], [place] [address];  Service: Gastroenterology    CORONARY STENT PLACEMENT  03/25/2017    ARNIE RCA    HEMORRHOID SURGERY      INSERTION STENT ARTERY  04/07/2017    Cardio vascular agents drug-eluting stent    SKIN BIOPSY      TONSILLECTOMY       Social History   Social History     Substance and Sexual Activity   Alcohol Use Not Currently     Social History     Substance and Sexual Activity   Drug Use Never     Social History     Tobacco Use   Smoking Status Former Smoker    Types: Cigarettes   Smokeless Tobacco Never Used     Immunization History   Administered Date(s) Administered    H1N1, All Formulations 01/19/2010    INFLUENZA 09/14/2015, 09/12/2016, 09/27/2017, 10/27/2017, 09/12/2018    Influenza Split High Dose Preservative Free IM 09/14/2015, 09/12/2016    Influenza TIV (IM) 10/16/2012, 09/20/2013, 11/17/2014, 09/27/2017    Influenza, high dose seasonal 0 5 mL 09/12/2018, 11/10/2019    Pneumococcal Conjugate 13-Valent 03/13/2017    Pneumococcal Polysaccharide PPV23 01/01/2004, 04/24/2009     Last Tetanus:   Family History: Non-contributory  Unable to obtain/limited by       Meds/Allergies   current meds:   No current facility-administered medications for this encounter  and PTA meds:   Prior to Admission Medications   Prescriptions Last Dose Informant Patient Reported? Taking?    ALPRAZolam (XANAX) 0 5 mg tablet   No No   Sig: Take 1 tablet (0 5 mg total) by mouth 3 (three) times a day as needed for anxiety   Calcium Carbonate-Vit D-Min (CALCIUM 1200 PO)   Yes No   Sig: Take by mouth   Potassium Chloride BRISSA   Yes No   Sig: by Does not apply route   aspirin 81 mg chewable tablet   No No   Sig: Chew 1 tablet (81 mg total) daily   atorvastatin (LIPITOR) 40 mg tablet   Yes No   Sig: Take 40 mg by mouth daily with dinner   citalopram (CeleXA) 10 mg tablet   No No   Sig: Take 1 tablet (10 mg total) by mouth daily at bedtime   furosemide (LASIX) 20 mg tablet   No No   Sig: Take 1 tablet (20 mg total) by mouth daily   metoprolol succinate (TOPROL-XL) 25 mg 24 hr tablet   No No   Sig: Take 1 tablet (25 mg total) by mouth daily   multivitamin (THERAGRAN) TABS   Yes No   Sig: Take 1 tablet by mouth daily   ondansetron (ZOFRAN) 4 mg tablet   No No   Sig: Take 1 tablet (4 mg total) by mouth every 12 (twelve) hours as needed for nausea or vomiting   ranitidine (ZANTAC) 150 mg tablet   No No   Sig: Take 1 tablet (150 mg total) by mouth 2 (two) times a day      Facility-Administered Medications: None       Allergies   Allergen Reactions    Ciprofloxacin      Reaction Date: 01Jul2011;     Keflex [Cephalexin] Dizziness    Nitrofurantoin      Reaction Date: 01Jul2011;     Penicillins Rash, Other (See Comments) and Throat Swelling     Throat swells         Objective   Vitals:   First set: Temperature: 97 9 °F (36 6 °C) (01/25/20 2115)  Pulse: 86 (01/25/20 2115)  Respirations: (!) 26 (01/25/20 2115)  Blood Pressure: 144/72 (01/25/20 2115)    Invasive Devices     Peripheral Intravenous Line            Peripheral IV 01/25/20 Left Antecubital less than 1 day    Peripheral IV 01/25/20 Right Antecubital less than 1 day                Neurologic Exam     Mental Status   Oriented to person, place, and time  Physical Exam   Constitutional: She is oriented to person, place, and time  She appears well-developed and well-nourished  No distress  HENT:   Head: Normocephalic and atraumatic  Nasal cannula in place   Eyes: Right eye exhibits no discharge  Left eye exhibits no discharge  No scleral icterus  Pupils 3 mm equal round and reactive   Neck: Normal range of motion  Neck supple  No JVD present  No tracheal deviation present  Cardiovascular: Normal rate and intact distal pulses  Palpable radial/DP pulses bilaterally   Pulmonary/Chest: Effort normal  No respiratory distress  She exhibits tenderness  Abdominal: Soft  She exhibits no distension  There is no tenderness  There is no rebound and no guarding  Genitourinary:   Genitourinary Comments: Deferred   Musculoskeletal: Normal range of motion  She exhibits edema  She exhibits no tenderness or deformity  No C/T/L-spine tenderness  No step-offs appreciated  Bilateral pedal edema  Neurological: She is alert and oriented to person, place, and time     GCS 15   Following commands, answering questions appropriately  Face symmetric, tongue midline  Speech clear  Strength 5/5 throughout  Skin: Skin is warm and dry  She is not diaphoretic  Psychiatric: She has a normal mood and affect  Her behavior is normal      PE limited by:     Lab Results:   BMP/CMP:   Lab Results   Component Value Date    SODIUM 126 (L) 01/25/2020    K 4 6 01/25/2020    CL 87 (L) 01/25/2020    CO2 36 (H) 01/25/2020    BUN 10 01/25/2020    CREATININE 0 96 01/25/2020    CALCIUM 9 7 01/25/2020    AST 13 01/25/2020    ALT 24 01/25/2020    ALKPHOS 78 01/25/2020    EGFR 51 01/25/2020   , CBC:   Lab Results   Component Value Date    WBC 7 14 01/25/2020    HGB 14 1 01/25/2020    HCT 43 9 01/25/2020    MCV 92 01/25/2020     01/25/2020    MCH 29 4 01/25/2020    MCHC 32 1 01/25/2020    RDW 14 4 01/25/2020    MPV 9 3 01/25/2020    NRBC 0 01/25/2020   , Coagulation: No results found for: PT, INR, APTT and ABG: No results found for: PHART, CIA1WCZ, PO2ART, JDY9KEM, I4WYDCJN, BEART, SOURCE  Imaging/EKG Studies: Results: I have personally reviewed pertinent reports  and I have personally reviewed pertinent films in PACS  CT head without contrast   Final Result by Silverio Weller MD (01/25 3054)      No acute intracranial abnormality  There is advanced microangiopathic change, progressed somewhat compared with May 19, 2017  Workstation performed: [id number]         CT chest without contrast   ED Interpretation by Ena Perez MD (01/25 8504)   Abnormal   QUINN PNA      Final Result by Silverio Weller MD (01/25 5078)      There is a minimally displaced fracture of the right 10th rib laterally  There are questionable nondisplaced fractures of the right 7th through 9th ribs, versus motion artifact  Possible new compression deformity of L1   CT of the thoracolumbar spine is recommended for further evaluation  No evidence of pneumothorax        Areas of consolidation and groundglass opacity are present within the lungs, as described above  Please see discussion  Follow-up to ensure resolution is recommended  There are small bilateral pleural effusions, right larger than left  There is mild septal thickening  There is a large hiatal hernia/partially intrathoracic stomach  This appears similar to the prior study  Cardiomegaly  Coronary artery disease  Atherosclerosis  I personally discussed this study with [address] [place] [address] on 1/25/2020 at 11:43 PM                   Workstation performed: [id number]             Other Studies:     Code Status: Prior  Advance Directive and Living Will:      Power of :    POLST:      Counseling / Coordination of Care  Total Critical Care time spent 30 minutes excluding procedures, teaching and family updates

## 2020-01-26 NOTE — ASSESSMENT & PLAN NOTE
· As evidence by CT scan  · Continue with IV diuresis as noted above  · Repeat imaging to assess for improvement

## 2020-01-26 NOTE — OCCUPATIONAL THERAPY NOTE
OccupationalTherapy Evaluation     Patient Name: Kristi Garcia  YHYSL'P Date: 1/26/2020  Problem List  Principal Problem:    Acute on chronic diastolic congestive heart failure ([facility] [address] )  Active Problems:    Atherosclerosis of native coronary artery of native heart without angina pectoris    Gastroesophageal reflux disease without esophagitis    COPD (chronic obstructive pulmonary disease) ([facility])    Anxiety    Closed compression fracture of L1 vertebra (HCC)    Hyponatremia    Hyperglycemia    Influenza A    Acute respiratory failure with hypoxia ([facility])    Closed fracture of multiple ribs of right side    Past Medical History  Past Medical History:   Diagnosis Date    Abnormal blood sugar 03/13/2017    Abnormal carotid duplex scan     Carotid Duplex (Plaque formation is quite prominent bilaterally  Despite these changes, no evidence for greater than 50% diameter reducing lesions in the cervical portion of either carotid artery hemisystem ) - 6/13/2017    Anxiety     Cervical radiculopathy 08/08/2017    CHF (congestive heart failure) ([facility])     diastolic    Compression fracture of lumbar spine, non-traumatic, with routine healing, subsequent encounter     COPD (chronic obstructive pulmonary disease) ([facility] [address] ) 2/6/2018    Coronary angioplasty status     Coronary artery disease 4/7/2017    Costochondritis 02/05/2013    suspect MS in origin given relationship to ROM and location  Start mobic and if persists, reeval  Refused xrayat this time   Dyslipidemia     GERD without esophagitis 8/30/2012    H/O CT scan of chest      (No PE, minimal interstitial change left lower lobe and right upper lobe, which may be acute ) - 5/19/2017    History of Holter monitoring     Holter (Sinus rhythm with rates ranging from 52 to 118 bpm   No afib or heart block  Rare atrial and ventricular ectopic beats  One six-beat atrial run noted  No pauses  ) - 5/31/2017    Hx of echocardiogram     Echo (EF 0 60 (60%), Biatrial enlargement ) - 3/24/2017 Echo (EF 0 55 (55%), mild LVH  Aortic valvular sclerosis  Trace MI with mild left atrial dilatation, mild MAC  Mild TI with mild pulmonary hypertension  ) - 5/22/2017 Echo (EF 0 60 (60%), Normal left vent chamber size and systolic function  Mild diastolic dysfunction of LV w/normal filling pressures  Mild mitral regurg ) - 7/26/2017 Echo (EF 0    Hx of echocardiogram 08/16/2017    EF0 60 (60%) Normal left vent chamber size and systolic function of LV w/normal filling presures  Mild mitral regurg  / EF0 50 (50%) Mild LVH  MIld MR 10/6/17     Hypertension     Iron deficiency anemia 4/21/2016    Macular degeneration, right eye     Malignant melanoma of skin ([facility] [address] ) 9/17/2012    Mixed hyperlipidemia     NSTEMI (non-ST elevated myocardial infarction) ([facility] [address] ) 7/25/2017    Old MI (myocardial infarction)     Osteoarthritis 9/17/2012    Osteoporosis 3/13/2017    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[facility] [address] ) 04/07/2017    Urinary tract infection     frequent UTI's     Past Surgical History  Past Surgical History:   Procedure Laterality Date    ABDOMINAL SURGERY      APPENDECTOMY      BREAST SURGERY      Lumpectomy    CARDIAC CATHETERIZATION  03/24/2017    ARNIE to 100% occluded prox RCA, chronic 99% ostial LCfx, 60% mid LAD, discrete 40% distal LM / EF0 60 (60%) 100% occluded proximal RCA s/p ARNIE, chronic 99% ostial LCX, 60% mid LAD, discrete 40% dital LM  4/5/17    CHOLECYSTECTOMY      COLONOSCOPY N/A 7/25/2018    Procedure: COLONOSCOPY;  Surgeon: Kim Austin MD;  Location: [address] [place] [address] OR;  Service: Gastroenterology    CORONARY STENT PLACEMENT  03/25/2017    ARNIE RCA    HEMORRHOID SURGERY      INSERTION STENT ARTERY  04/07/2017    Cardio vascular agents drug-eluting stent    SKIN BIOPSY      TONSILLECTOMY        01/26/20 1115   Note Type   Note type Eval/Treat   Restrictions/Precautions   Weight Bearing Precautions Per Order No   Other Precautions Droplet precautions;Cognitive; Chair Alarm;Hard of hearing  (chair alarm activated end of session)   Pain Assessment   Pain Assessment No/denies pain   Pain Score No Pain   Home Living   Type of [address] One level   Bathroom Shower/Tub Tub/shower unit   Bathroom Toilet Standard   Bathroom Equipment Shower chair   Home Equipment Walker   Additional Comments granddtr confirmed   Prior Function   Level of [place] Independent with ADLs and functional mobility   Lives With Family  (granddtr)   Receives Help From Family   ADL Assistance Independent   IADLs Needs assistance   Falls in the last 6 months 1 to 4   Vocational Retired   Comments pt's granddtr wreporting she was very active prior able to dress, bathe, and toilet independently   Lifestyle   Autonomy pt's granddtr wreporting she was very active prior able to dress, bathe, and toilet independently   Reciprocal Relationships supportive granddtr who works from home- reports she is worried about taking pt home due to need for assistance   Service to Others retired   Intrinsic Gratification enjoys reading and watching tv   Psychosocial   Psychosocial (WDL) WDL   Subjective   Subjective "We're in therapy right now"   ADL   Where Assessed Chair   Eating Assistance 5  Supervision/Setup   Grooming Assistance 5  Supervision/Setup   UB Bathing Assistance 4  Minimal Assistance   LB [address] 3  Moderate Assistance   UB Dressing Assistance 4  Minimal Assistance   LB Dressing Assistance 3  Moderate Assistance   Bed Mobility   Supine to Sit 3  Moderate assistance   Additional items Assist x 1   Transfers   Sit to Stand 4  Minimal assistance   Additional items Assist x 1   Stand to Sit 4  Minimal assistance   Additional items Assist x 1   Functional Mobility   Functional Mobility 4  Minimal assistance   Additional items Rolling walker   Balance   Static Sitting Fair +   Dynamic Sitting Fair   Static Standing Fair -   Dynamic Standing Poor +   Ambulatory Poor +   Activity Tolerance Activity Tolerance Patient limited by fatigue   Nurse Made Aware okay to see per RN   RUE Assessment   RUE Assessment WFL   LUE Assessment   LUE Assessment WFL   Hand Function   Gross Motor Coordination Functional   Fine Motor Coordination Functional   Cognition   Overall Cognitive Status Impaired   Arousal/Participation Cooperative   Attention Attends with cues to redirect   Orientation Level Oriented to person;Oriented to time;Disoriented to situation;Disoriented to place  (grossly oriented to time)   Memory Decreased recall of precautions;Decreased recall of recent events   Following Commands Follows one step commands with increased time or repetition   Comments pt [place], cues for redirect at times   Assessment   Limitation Decreased ADL status; Decreased Safe judgement during ADL;Decreased cognition;Decreased endurance;Decreased self-care trans;Decreased high-level ADLs   Prognosis Good   Assessment Pt is a 80 y o  YO  female admitted to \[facility]\"" on 1/25/2020 w/ SOB and a fall, pt w/ R rib fx  Pt testing positive for influenza  Comorbidities include a h/o COPD, CHF, HLD, HTN, and dysphagia   Pt with active OT orders and up with assistance  orders   Pt resides in a ranch style home with granddtr  Pt's granddtr reporting she doesn't feel comfortable taking her home if she needs assistance at this time    Pt was I w/  ADLS need assist for IADLs, (-) drove, & required no use of DME PTA  Currently pt is Mod A for LB ADLs and Min A for Ub ADLs and functional mobility  Pt is limited at this time 2*: endurance, activity tolerance, functional mobility, balance, trunk control, unsupportive home environment, decreased I w/ ADLS/IADLS, cognitive impairments, decreased safety awareness and decreased insight into deficits  The following Occupational Performance Areas to address include: grooming, bathing/shower, toilet hygiene, dressing and functional mobility   Based on the aforementioned OT evaluation, functional performance deficits, and assessments, pt has been identified as a high complexity evaluation  From OT standpoint, anticipate d/c STR  Pt to continue to benefit from acute immediate OT services to address the following goals 3-5x/week to  w/in 7-10 days: Wilton Menezesars Goals   Patient Goals eat her yogurt   LTG Time Frame 7-10   Plan   Treatment Interventions ADL retraining;Functional transfer training; Endurance training;Cognitive reorientation;Patient/family training;Equipment evaluation/education; Compensatory technique education;Continued evaluation; Energy conservation; Activityengagement   Goal Expiration Date 20   OT Frequency 3-5x/wk   Recommendation   OT Discharge Recommendation Short Term Rehab   OT - OK to Discharge Yes   Modified Rk Scale   Modified Rk Scale 4     GOALS    1) Pt will increase activity tolerance to G for 30 min txment sessions    2) Pt will complete UB/LB dressing/self care w/ mod I using adaptive device and DME as needed    3) Pt will complete bathing w/ Mod I w/ use of AE and DME as needed    4) Pt will complete toileting w/ mod I w/ G hygiene/thoroughness using DME as needed    5) Pt will improve functional transfers to Mod I on/off all surfaces using DME as needed w/ G balance/safety     6) Pt will improve functional mobility during ADL/IADL/leisure tasks to Mod I using DME as needed w/ G balance/safety     7) Pt will participate in simulated IADL management task to increase independence to  w/ G safety and endurance    8) Pt will demonstrate G carryover of pt/caregiver education and training as appropriate  9) Pt will demonstrate 100% carryover of energy conservation techniques t/o functional I/ADL/leisure tasks w/o cues s/p skilled education    10) Pt will independently identify and utilize 2-3 coping strategies to increase positive affect and promote overall well-being      11) Pt will engage in ongoing cognitive assessment w/ G participation to assist w/ safe d/c planning/recommendations    Sheron Flores MS, OTR/L

## 2020-01-26 NOTE — ED PROVIDER NOTES
History  Chief Complaint   Patient presents with    Shortness of Breath     fell this morning, hit cheek on the vanity, decreased appetite, increased swelling and shortness of breath and swelling started yesterday    Leg Swelling    Fall     Patient presents for evaluation shortness of breath and a fall  Past medical history significant for COPD, CHF, hyperlipidemia, hypertension  Per family the patient has been increasingly short of breath over the last 2 days  She has had a cough which has been nonproductive as well  Increased leg swelling  Daughter attempted to give an extra dose of Lasix this morning without any improvement of urination  Denies any fever/chills, chest pain, orthopnea, dyspnea on exertion  Patient also reports that she had a fall earlier today when she was getting off of the commode and she struck her right side of her flank and head on a piece of furniture as she fell  Denies any loss of conscious, nausea/vomiting/change in vision  She was able to ambulate after she was helped to her feet by her family members and has been acting her normal self since then  Currently complains only of difficulty breathing and right-sided flank pain  Prior to Admission Medications   Prescriptions Last Dose Informant Patient Reported? Taking?    ALPRAZolam (XANAX) 0 5 mg tablet   No No   Sig: Take 1 tablet (0 5 mg total) by mouth 3 (three) times a day as needed for anxiety   Calcium Carbonate-Vit D-Min (CALCIUM 1200 PO)   Yes No   Sig: Take by mouth   Potassium Chloride BRISSA   Yes No   Sig: by Does not apply route   aspirin 81 mg chewable tablet   No No   Sig: Chew 1 tablet (81 mg total) daily   atorvastatin (LIPITOR) 40 mg tablet   Yes No   Sig: Take 40 mg by mouth daily with dinner   citalopram (CeleXA) 10 mg tablet   No No   Sig: Take 1 tablet (10 mg total) by mouth daily at bedtime   furosemide (LASIX) 20 mg tablet   No No   Sig: Take 1 tablet (20 mg total) by mouth daily   metoprolol succinate (TOPROL-XL) 25 mg 24 hr tablet   No No   Sig: Take 1 tablet (25 mg total) by mouth daily   multivitamin (THERAGRAN) TABS   Yes No   Sig: Take 1 tablet by mouth daily   ondansetron (ZOFRAN) 4 mg tablet   No No   Sig: Take 1 tablet (4 mg total) by mouth every 12 (twelve) hours as needed for nausea or vomiting   ranitidine (ZANTAC) 150 mg tablet   No No   Sig: Take 1 tablet (150 mg total) by mouth 2 (two) times a day      Facility-Administered Medications: None       Past Medical History:   Diagnosis Date    Abnormal blood sugar 03/13/2017    Abnormal carotid duplex scan     Carotid Duplex (Plaque formation is quite prominent bilaterally  Despite these changes, no evidence for greater than 50% diameter reducing lesions in the cervical portion of either carotid artery hemisystem ) - 6/13/2017    Anxiety     Cervical radiculopathy 08/08/2017    CHF [facility]congestive heart failure) ([facility])     diastolic    Compression fracture of lumbar spine, non-traumatic, with routine healing, subsequent encounter     COPD [facility]chronic obstructive pulmonary disease) ([facility] ) 2/6/2018    Coronary angioplasty status     Coronary artery disease 4/7/2017    Costochondritis 02/05/2013    suspect MS in origin given relationship to ROM and location  Start mobic and if persists, reeval  Refused xrayat this time   Dyslipidemia     GERD without esophagitis 8/30/2012    H/O CT scan of chest      (No PE, minimal interstitial change left lower lobe and right upper lobe, which may be acute ) - 5/19/2017    History of Holter monitoring     Holter (Sinus rhythm with rates ranging from 52 to 118 bpm   No afib or heart block  Rare atrial and ventricular ectopic beats  One six-beat atrial run noted  No pauses  ) - 5/31/2017    Hx of echocardiogram     Echo (EF 0 60 (60%), Biatrial enlargement ) - 3/24/2017 Echo (EF 0 55 (55%), mild LVH  Aortic valvular sclerosis  Trace MI with mild left atrial dilatation, mild MAC   Mild TI with mild pulmonary hypertension  ) - 5/22/2017 Echo (EF 0 60 (60%), Normal left vent chamber size and systolic function  Mild diastolic dysfunction of LV w/normal filling pressures  Mild mitral regurg ) - 7/26/2017 Echo (EF 0    Hx of echocardiogram 08/16/2017    EF0 60 (60%) Normal left vent chamber size and systolic function of LV w/normal filling presures  Mild mitral regurg  / EF0 50 (50%) Mild LVH  MIld MR 10/6/17     Hypertension     Iron deficiency anemia 4/21/2016    Macular degeneration, right eye     Malignant melanoma [facility] skin [facility] ) 9/17/2012    Mixed hyperlipidemia     NSTEMI (non-ST elevated myocardial infarction) ([facility] ) 7/25/2017    Old MI (myocardial infarction)     Osteoarthritis 9/17/2012    Osteoporosis 3/13/2017    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[facility] ) 04/07/2017    Urinary tract infection     frequent UTI's       Past Surgical History:   Procedure Laterality Date    ABDOMINAL SURGERY      APPENDECTOMY      BREAST SURGERY      Lumpectomy    CARDIAC CATHETERIZATION  03/24/2017    ARNIE to 100% occluded prox RCA, chronic 99% ostial LCfx, 60% mid LAD, discrete 40% distal LM / EF0 60 (60%) 100% occluded proximal RCA s/p ARNIE, chronic 99% ostial LCX, 60% mid LAD, discrete 40% dital LM  4/5/17    CHOLECYSTECTOMY      COLONOSCOPY N/A 7/25/2018    Procedure: COLONOSCOPY;  Surgeon: Ferny Cordova MD;  Location: [facility] MAIN OR;  Service: Gastroenterology    CORONARY STENT PLACEMENT  03/25/2017    ARNIE RCA    HEMORRHOID SURGERY      INSERTION STENT ARTERY  04/07/2017    Cardio vascular agents drug-eluting stent    SKIN BIOPSY      TONSILLECTOMY         Family History   Problem Relation Age of Onset    Heart failure Mother     Prostate cancer Father     Stroke Family      I have reviewed and agree with the history as documented      Social History     Tobacco Use    Smoking status: Former Smoker     Types: Cigarettes    Smokeless tobacco: Never Used   Substance Use Topics    Alcohol use: Not Currently    Drug use: Never        Review of Systems   Constitutional: Negative for chills, diaphoresis, fatigue and fever  Respiratory: Positive for cough and shortness of breath  Cardiovascular: Positive for leg swelling  Negative for chest pain and palpitations  Gastrointestinal: Negative for abdominal distention, abdominal pain, constipation, diarrhea, nausea and vomiting  Genitourinary: Positive for flank pain  Negative for difficulty urinating, dysuria, frequency and hematuria  Musculoskeletal: Positive for myalgias  Negative for arthralgias and neck pain  Neurological: Negative for dizziness, syncope, light-headedness and headaches  All other systems reviewed and are negative  Physical Exam  ED Triage Vitals   Temperature Pulse Respirations Blood Pressure SpO2   01/25/20 2115 01/25/20 2115 01/25/20 2115 01/25/20 2115 01/25/20 2115   97 9 °F (36 6 °C) 86 (!) 26 144/72 (!) 60 %      Temp src Heart Rate Source Patient Position - Orthostatic VS BP Location FiO2 (%)   -- 01/26/20 0149 -- -- --    Monitor         Pain Score       01/25/20 2115       No Pain             Orthostatic Vital Signs  Vitals:    01/26/20 0005 01/26/20 0030 01/26/20 0130 01/26/20 0149   BP: (!) 171/96 160/74 161/72 161/72   Pulse: 77 70 68 69       Physical Exam   Constitutional: She is oriented to person, place, and time  She appears well-nourished  No distress  HENT:   Head: Normocephalic and atraumatic  Right Ear: External ear normal    Left Ear: External ear normal    Mouth/Throat: Oropharynx is clear and moist    Eyes: Pupils are equal, round, and reactive to light  Conjunctivae and EOM are normal  Right eye exhibits no discharge  Left eye exhibits no discharge  No scleral icterus  Neck: Normal range of motion  Neck supple  No JVD present  Cardiovascular: Normal rate, regular rhythm, normal heart sounds and intact distal pulses  Exam reveals no gallop and no friction rub     No murmur heard   Pulmonary/Chest: Effort normal  Tachypnea noted  No respiratory distress  She has no wheezes  She has rhonchi ( Diffuse rhonchi in all lung fields)  She has no rales  Patient saturating at 92 on 4 L nasal cannula   Abdominal: Soft  Bowel sounds are normal  She exhibits no distension and no mass  There is no tenderness  There is no guarding  Musculoskeletal: Normal range of motion  She exhibits edema  She exhibits no tenderness or deformity  Back:         Right lower leg: She exhibits edema  Left lower leg: She exhibits edema  Pitting edema up to knees bilaterally, not baseline  Tenderness to palpation over right flank and mid axillary line, no obvious signs of trauma   Neurological: She is alert and oriented to person, place, and time  No cranial nerve deficit or sensory deficit  She exhibits normal muscle tone  Coordination normal    Skin: Skin is warm  She is not diaphoretic  Psychiatric: She has a normal mood and affect  Her behavior is normal  Judgment and thought content normal    Vitals reviewed        ED Medications  Medications   acetaminophen (TYLENOL) tablet 975 mg (has no administration in time range)   lidocaine (LIDODERM) 5 % patch 1 patch (has no administration in time range)   oxyCODONE (ROXICODONE) IR tablet 2 5 mg (has no administration in time range)   oxyCODONE (ROXICODONE) IR tablet 5 mg (has no administration in time range)   HYDROmorphone (DILAUDID) injection 0 2 mg (has no administration in time range)   furosemide (LASIX) injection 40 mg (40 mg Intravenous Given 1/25/20 2326)       Diagnostic Studies  Results Reviewed     Procedure Component Value Units Date/Time    Sodium, urine, random [[id number]] Collected:  01/26/20 0121    Lab Status:  Final result Specimen:  Urine, Other Updated:  01/26/20 0207     Sodium, Ur 74    Creatinine, urine, random [[id number]] Collected:  01/26/20 0121    Lab Status:  Final result Specimen:  Urine, Other Updated:  01/26/20 8144 Creatinine, Ur 70 5 mg/dL     UA w Reflex to Microscopic w Reflex to Culture [[id number]] Collected:  01/26/20 0121    Lab Status:  Final result Specimen:  Urine Updated:  01/26/20 0204     Color, UA Yellow     Clarity, UA Clear     Specific Gravity, UA 1 010     pH, UA 5 5     Leukocytes, UA Negative     Nitrite, UA Negative     Protein, UA Negative mg/dl      Glucose, UA Negative mg/dl      Ketones, UA Negative mg/dl      Urobilinogen, UA 0 2 E U /dl      Bilirubin, UA Negative     Blood, UA Negative    Osmolality, urine [[id number]]  (Normal) Collected:  01/26/20 0121    Lab Status:  Final result Specimen:  Urine, Other Updated:  01/26/20 0152     Osmolality, Ur 363 mmol/KG     Osmolality-"If this is regarding a toxic alcohol, STOP  Test is not routinely indicated  Please consult medical  on call for further guidance " [[id number]]  (Abnormal) Collected:  01/26/20 0121    Lab Status:  Final result Specimen:  Blood from Arm, Left Updated:  01/26/20 0147     Osmolality Serum 273 mmol/KG     Influenza A/B and RSV PCR [[id number]] Collected:  01/26/20 0121    Lab Status:   In process Specimen:  Nasopharyngeal Swab Updated:  01/26/20 0129    Procalcitonin [[id number]]  (Normal) Collected:  01/25/20 2133    Lab Status:  Final result Specimen:  Blood from Arm, Left Updated:  01/25/20 2230     Procalcitonin 0 06 ng/ml     Comprehensive metabolic panel [[id number]]  (Abnormal) Collected:  01/25/20 2133    Lab Status:  Final result Specimen:  Blood from Arm, Left Updated:  01/25/20 2214     Sodium 126 mmol/L      Potassium 4 6 mmol/L      Chloride 87 mmol/L      CO2 36 mmol/L      ANION GAP 3 mmol/L      BUN 10 mg/dL      Creatinine 0 96 mg/dL      Glucose 141 mg/dL      Calcium 9 7 mg/dL      AST 13 U/L      ALT 24 U/L      Alkaline Phosphatase 78 U/L      Total Protein 7 6 g/dL      Albumin 3 5 g/dL      Total Bilirubin 0 75 mg/dL      eGFR 51 ml/min/1 73sq m     Narrative:       [place] guidelines for Chronic Kidney Disease (CKD):     Stage 1 with normal or high GFR (GFR > 90 mL/min/1 73 square meters)    Stage 2 Mild CKD (GFR = 60-89 mL/min/1 73 square meters)    Stage 3A Moderate CKD (GFR = 45-59 mL/min/1 73 square meters)    Stage 3B Moderate CKD (GFR = 30-44 mL/min/1 73 square meters)    Stage 4 Severe CKD (GFR = 15-29 mL/min/1 73 square meters)    Stage 5 End Stage CKD (GFR <15 mL/min/1 73 square meters)  Note: GFR calculation is accurate only with a steady state creatinine    CBC and differential [[id number]]  (Abnormal) Collected:  01/25/20 2133    Lab Status:  Final result Specimen:  Blood from Arm, Left Updated:  01/25/20 2141     WBC 7 14 Thousand/uL      RBC 4 80 Million/uL      Hemoglobin 14 1 g/dL      Hematocrit 43 9 %      MCV 92 fL      MCH 29 4 pg      MCHC 32 1 g/dL      RDW 14 4 %      MPV 9 3 fL      Platelets 358 Thousands/uL      nRBC 0 /100 WBCs      Neutrophils Relative 63 %      Immat GRANS % 0 %      Lymphocytes Relative 15 %      Monocytes Relative 22 %      Eosinophils Relative 0 %      Basophils Relative 0 %      Neutrophils Absolute 4 43 Thousands/µL      Immature Grans Absolute 0 02 Thousand/uL      Lymphocytes Absolute 1 06 Thousands/µL      Monocytes Absolute 1 59 Thousand/µL      Eosinophils Absolute 0 01 Thousand/µL      Basophils Absolute 0 03 Thousands/µL     POCT urinalysis dipstick [[id number]]     Lab Status:  No result Specimen:  Urine                  CT head without contrast   Final Result by Cally Parker MD (01/25 9430)      No acute intracranial abnormality  There is advanced microangiopathic change, progressed somewhat compared with May 19, 2017  Workstation performed: [id number]         CT chest without contrast   ED Interpretation by Yevgeniy Kiran MD (01/25 2206)   Abnormal   QUINN PNA      Final Result by Cally Parker MD (01/25 9605)      There is a minimally displaced fracture of the right 10th rib laterally  There are questionable nondisplaced fractures of the right 7th through 9th ribs, versus motion artifact  Possible new compression deformity of L1   CT of the thoracolumbar spine is recommended for further evaluation  No evidence of pneumothorax  Areas of consolidation and groundglass opacity are present within the lungs, as described above  Please see discussion  Follow-up to ensure resolution is recommended  There are small bilateral pleural effusions, right larger than left  There is mild septal thickening  There is a large hiatal hernia/partially intrathoracic stomach  This appears similar to the prior study  Cardiomegaly  Coronary artery disease  Atherosclerosis  I personally discussed this study with [address] [place] [address] on 1/25/2020 at 11:43 PM                   Workstation performed: [id number]         XR chest pa & lateral (24 hours after admission)    (Results Pending)         Procedures  Procedures      ED Course           Identification of Seniors at Risk      Most Recent Value   (ISAR) Identification of Seniors at Risk   Before the illness or injury that brought you to the Emergency, did you need someone to help you on a regular basis? 0 Filed at: 01/25/2020 2114   In the last 24 hours, have you needed more help than usual?  1 Filed at: 01/25/2020 2114   Have you been hospitalized for one or more nights during the past 6 months? 1 Filed at: 01/25/2020 2114   In general, do you see well?  0 Filed at: 01/25/2020 2114   In general, do you have serious problems with your memory? 0 Filed at: 01/25/2020 2114   Do you take more than three different medications every day? 1 Filed at: 01/25/2020 2114   ISAR Score  3 Filed at: 01/25/2020 2114                          MDM  Number of Diagnoses or Management Options  Hyponatremia:   Hypoxia:   Diagnosis management comments: Rate: 83, sinus rhythm, normal axis    Normal DE interval, normal QRS, QT interval within normal range   No ST elevations or depressions to indicate ischemia  No diffuse elevations to indicate pericarditis  No biphasic T waves in precordial leads to indicate Wellens  Laboratory analysis revealed a hyponatremia, no leukocytosis or other signs of infection  Pro callus negative  CT scan of the chest revealed consolidations as well as a fracture of the 10th rib, potential new fracture at L1  Patient was evaluated by the trauma service who felt that her current dyspnea and hypoxia was unlikely to be related to her fall  Patient was then admitted to the medical service, provided with Lasix,  Disposition  Final diagnoses:   Hypoxia   Hyponatremia     Time reflects when diagnosis was documented in both MDM as applicable and the Disposition within this note     Time User Action Codes Description Comment    1/26/2020  1:35 AM Kami Iglesias Add [R09 02] Hypoxia     1/26/2020  1:35 AM Kami Iglesias Add [E87 1] Hyponatremia       ED Disposition     ED Disposition Condition Date/Time Comment    Admit Stable Sun Jan 26, 2020  1:35 AM Case was discussed with DELBERT and the patient's admission status was agreed to be Admission Status: inpatient status to the service of Dr Charmaine Perkins   Follow-up Information    None         Patient's Medications   Discharge Prescriptions    No medications on file     No discharge procedures on file  ED Provider  Attending physically available and evaluated Quin Record  I managed the patient along with the ED Attending      Electronically Signed by         Sajan Washington MD  01/26/20 2473

## 2020-01-26 NOTE — ED NOTES
Still waiting for flu swaps to been sent from store room at this time   Another call has been made      Rosa Dunne RN  01/25/20 6818

## 2020-01-26 NOTE — ASSESSMENT & PLAN NOTE
Airway clearance protocol  Rib Fracture Protocol  Incentive spirometer and pulmonary toilet  Continuous pulse ox  Pain management  DVT prophylaxis  Repeat CXR in 24 hours

## 2020-01-26 NOTE — ASSESSMENT & PLAN NOTE
Currently on O2 via nasal cannula at 4 liters and sats at 96%  With O2 off does desat to 88%  Positive also for influenza  Recommend strict pulmonary toilet

## 2020-01-26 NOTE — ASSESSMENT & PLAN NOTE
No known history of diabetes, but her glucose was 141 on BMP  · Unremarkable  · Repeat BMP  · Start sugar checks and correctional insulin (can discontinue if glucose remains normal without need for insulin)

## 2020-01-26 NOTE — RESPIRATORY THERAPY NOTE
RT Protocol Note  Rachell Fleming 80 y o  female MRN: [id number]  Unit/Bed#: [facility] 823-01 Encounter: [id number]    Assessment    Principal Problem:    Acute on chronic diastolic congestive heart failure ([facility] )  Active Problems:    Atherosclerosis of native coronary artery of native heart without angina pectoris    Gastroesophageal reflux disease without esophagitis    COPD (chronic obstructive pulmonary disease) ([facility])    Anxiety    Closed compression fracture of L1 vertebra ([facility])    Hyponatremia    Hyperglycemia    Influenza A    Acute respiratory failure with hypoxia ([facility])    Closed fracture of multiple ribs of right side      Home Pulmonary Medications:  Albuterol MDI       Past Medical History:   Diagnosis Date    Abnormal blood sugar 03/13/2017    Abnormal carotid duplex scan     Carotid Duplex (Plaque formation is quite prominent bilaterally  Despite these changes, no evidence for greater than 50% diameter reducing lesions in the cervical portion of either carotid artery hemisystem ) - 6/13/2017    Anxiety     Cervical radiculopathy 08/08/2017    CHF (congestive heart failure) ([facility])     diastolic    Compression fracture of lumbar spine, non-traumatic, with routine healing, subsequent encounter     COPD (chronic obstructive pulmonary disease) ([facility] ) 2/6/2018    Coronary angioplasty status     Coronary artery disease 4/7/2017    Costochondritis 02/05/2013    suspect MS in origin given relationship to ROM and location  Start mobic and if persists, reeval  Refused xrayat this time   Dyslipidemia     GERD without esophagitis 8/30/2012    H/O CT scan of chest      (No PE, minimal interstitial change left lower lobe and right upper lobe, which may be acute ) - 5/19/2017    History of Holter monitoring     Holter (Sinus rhythm with rates ranging from 52 to 118 bpm   No afib or heart block  Rare atrial and ventricular ectopic beats  One six-beat atrial run noted  No pauses  ) - 5/31/2017    Hx of echocardiogram     Echo (EF 0 60 (60%), Biatrial enlargement ) - 3/24/2017 Echo (EF 0 55 (55%), mild LVH  Aortic valvular sclerosis  Trace MI with mild left atrial dilatation, mild MAC  Mild TI with mild pulmonary hypertension  ) - 5/22/2017 Echo (EF 0 60 (60%), Normal left vent chamber size and systolic function  Mild diastolic dysfunction of LV w/normal filling pressures  Mild mitral regurg ) - 7/26/2017 Echo (EF 0    Hx of echocardiogram 08/16/2017    EF0 60 (60%) Normal left vent chamber size and systolic function of LV w/normal filling presures  Mild mitral regurg  / EF0 50 (50%) Mild LVH  MIld MR 10/6/17     Hypertension     Iron deficiency anemia 4/21/2016    Macular degeneration, right eye     [facility] 9/17/2012    Mixed hyperlipidemia     NSTEMI (non-ST elevated myocardial infarction) ([facility] ) 7/25/2017    Old MI (myocardial infarction)     Osteoarthritis 9/17/2012    Osteoporosis 3/13/2017    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[facility] ) 04/07/2017    Urinary tract infection     frequent UTI's     Social History     Socioeconomic History    Marital status:       Spouse name: None    Number of children: None    Years of education: None    Highest education level: None   Occupational History    Occupation: Retired   Social Needs    Financial resource strain: None    Food insecurity:     Worry: None     Inability: None    Transportation needs:     Medical: None     Non-medical: None   Tobacco Use    Smoking status: Former Smoker     Types: Cigarettes    Smokeless tobacco: Never Used   Substance and Sexual Activity    Alcohol use: Not Currently    Drug use: Never    Sexual activity: Not Currently   Lifestyle    Physical activity:     Days per week: None     Minutes per session: None    Stress: None   Relationships    Social connections:     Talks on phone: None     Gets together: None     Attends Anabaptism service: None     Active member of club or organization: None     Attends meetings of clubs or organizations: None     Relationship status: None    Intimate partner violence:     Fear of current or ex partner: None     Emotionally abused: None     Physically abused: None     Forced sexual activity: None   Other Topics Concern    None   Social History Narrative    No caffeine use    Uses safety equipment-seatbelts    Primary language is English  Subjective         Objective    Physical Exam:   Assessment Type: Assess only  General Appearance: Awake  Respiratory Pattern: Normal  Chest Assessment: Chest expansion symmetrical  Bilateral Breath Sounds: Crackles, Diminished  O2 Device: (P) NC    Vitals:  Blood pressure 163/93, pulse 68, temperature 98 1 °F (36 7 °C), resp  rate 17, height 5' 2" (1 575 m), weight 75 kg (165 lb 5 5 oz), SpO2 94 %, not currently breastfeeding  Imaging and other studies: I have personally reviewed pertinent reports  O2 Device: (P) NC     Plan       Airway Clearance Plan: Incentive Spirometer     Resp Comments: (P) Pt ordered on ACP at this time for broken ribs  Pt was a fall  Pt has resp hx of COPD and takes PRN MID at home  Pt also started on deep breathing and IS at this time  PT was able to get up to 500 on IS   WIll cont to follow pt on rib fx protocol for 48hrs

## 2020-01-27 NOTE — CONSULTS
Consult Note - Acute Pain Service  Mckay Fam 80 y o  female MRN: [id number]  Unit/Bed#: [facility] 823-01 Encounter: [id number]    Assessment:   Patient is a 79 y/o F s/p mechanical fall resulting in right 10th rib fracture (possible 7-9th)  She appears to be compensating very well from a pain standpoint and denies any pain since admission while utilizing no opioids  Evaluation of her true disability secondary to possible pain is somewhat clouded by her overall age related cognitive decline and her inability to participate in conversation at times  Her respiratory status does appear to be improving with interventions and I suspect her hypoxia is largely due to underlying CHF/COPD/flu rather than hypoventilation related to fracture(s)  In my opinion, at this time, interventional pain procedures would add little to her overall pulmonary recovery  Plan:   - Continue acetaminophen 975 mg PO q8hrs scheduled  - Continue lidoderm patches 12 hours on 12 hours off  - Agree with holding muscle relaxants and gabapentinoids as no pain currently, and likely to contribute to overall sedation  - Continue geriatric opioid protocol with oxycodone 2 5 mg /5 mg PO q4hrs PRN for moderate/severe pain and hydromorphone 0 2 mg IV q4hrs PRN for breakthrough pain  - Agree with home dose of xanax 2 5 mg PO qhs though would be cautious given h/o encephalopathy and risk of hospital delirium  - Agree with bowel regimen    APS will sign off at this time  Please call  / [phone] or [facility] ( between [phone] and on weekends) with questions or concerns      HPI:  Patient states she was having difficulty breathing for several days prior to admission with a cough and leg swelling  She was trying to move from the commode when she fell sustaining a right 10th rib fracture as well as striking her head   She was brought to the ED where she was found to be significantly hypoxic and hyponatremic in the setting of CHF exacerbation  She was also found to be influenza+  Patient denies any pain at this time  She denies any chronic pain issues  She does not feel as though deep breathing or coughing cause her any significant discomfort  She cannot pull more than 500mL on IS and cannot achieve this volume reliably  She is unable to pinpoint the reason why though appears to be due to baseline age related disability       Meds/Allergies     current meds:   Current Facility-Administered Medications   Medication Dose Route Frequency    acetaminophen (TYLENOL) tablet 975 mg  975 mg Oral Q8H [address]    albuterol (PROVENTIL HFA,VENTOLIN HFA) inhaler 2 puff  2 puff Inhalation Q4H PRN    albuterol inhalation solution 2 5 mg  2 5 mg Nebulization Once    ALPRAZolam (XANAX) tablet 0 25 mg  0 25 mg Oral HS    aspirin chewable tablet 81 mg  81 mg Oral Daily    atorvastatin (LIPITOR) tablet 40 mg  40 mg Oral Daily With Dinner    calcium carbonate-vitamin D (OSCAL-D) 500 mg-200 units per tablet 1 tablet  1 tablet Oral BID With Meals    docusate sodium (COLACE) capsule 100 mg  100 mg Oral BID    famotidine (PEPCID) tablet 10 mg  10 mg Oral BID    furosemide (LASIX) injection 40 mg  40 mg Intravenous BID (diuretic)    heparin (porcine) subcutaneous injection 5,000 Units  5,000 Units Subcutaneous Q8H [address]    HYDROmorphone (DILAUDID) injection 0 2 mg  0 2 mg Intravenous Q4H PRN    lidocaine (LIDODERM) 5 % patch 1 patch  1 patch Topical Daily    metoprolol succinate (TOPROL-XL) 24 hr tablet 25 mg  25 mg Oral Daily    multivitamin-minerals (CENTRUM) tablet 1 tablet  1 tablet Oral Daily    ondansetron (ZOFRAN-ODT) dispersible tablet 4 mg  4 mg Oral Q8H PRN    oseltamivir (TAMIFLU) capsule 30 mg  30 mg Oral Q12H LIZZY    oxyCODONE (ROXICODONE) IR tablet 2 5 mg  2 5 mg Oral Q4H PRN    oxyCODONE (ROXICODONE) IR tablet 5 mg  5 mg Oral Q4H PRN    polyethylene glycol (MIRALAX) packet 17 g  17 g Oral Daily PRN    potassium chloride (K-DUR,KLOR-CON) CR tablet 20 mEq  20 mEq Oral Daily    tolvaptan (SAMSCA) split tablet 15 mg  15 mg Oral Once       Allergies   Allergen Reactions    Ciprofloxacin      Reaction Date: 01Jul2011;     Keflex [Cephalexin] Dizziness    Nitrofurantoin      Reaction Date: 01Jul2011;     Penicillins Rash, Other (See Comments) and Throat Swelling     Throat swells     ROS:  Deferred given patient's overall inability to participate in conversation at time of encounter    Objective     Temp:  [97 9 °F (36 6 °C)-98 °F (36 7 °C)] 98 °F (36 7 °C)  HR:  [66-79] 69  Resp:  [16-18] 18  BP: (137-168)/(69-81) 168/81    Physical Exam   Gen: Slouched in bed, NAD, mildly confused, AOx3  CV: RRR  Pulm: Decreased breath sound throughout, productive cough with yellow sputum, scattered rhonchi  Abd: Obese, soft, NTND  Ext: No appreciable LE edema on exam, motor grossly intact   Psych: Normal mood and affect, pleasant

## 2020-01-27 NOTE — PLAN OF CARE
Problem: PHYSICAL THERAPY ADULT  Goal: Performs mobility at highest level of function for planned discharge setting  See evaluation for individualized goals  Description  Treatment/Interventions: LE strengthening/ROM, Therapeutic exercise, Endurance training, Functional transfer training, Patient/family training, Bed mobility, Gait training          See flowsheet documentation for full assessment, interventions and recommendations  Note:   Prognosis: Fair  Problem List: Decreased strength, Decreased endurance, Impaired balance, Decreased mobility, Decreased cognition, Decreased safety awareness  Assessment: Pt is a very pleasant 79 yo female admitted on 1/26 with acute on chronic diastolic heart failure, influenza A, fall with R rib fx's  Prior to admission, pt resides between 2 granddaughters  Family reports independent mobility with RW at home  Recent fall occurred while family was assisting with transfer in the bathroom  Currently, pt requires Jeff for transfers and ambulation 15' with RW, limited secondary to fatigue  Pt currently on 4L supplemental O2  Pt will benefit from skilled PT intervention during course of hospital stay to address balance, endurance, strength impairments impacting safety with functional mobility  Recommend continued therapy in IP rehab upon hospital D/C  Barriers to Discharge: Decreased caregiver support(granddaughters work during the day)     Recommendation: Post acute IP rehab     PT - OK to Discharge: Yes(when medically cleared to IP rehab)    See flowsheet documentation for full assessment

## 2020-01-27 NOTE — ASSESSMENT & PLAN NOTE
· No known history of diabetes, but her glucose was 141 on admissionBMP  · Glucose checks have been WNL and patient has not required SSI coverage, therefor fingersticks and SSI coverage was discontinued

## 2020-01-27 NOTE — CONSULTS
2           Consultation - Nephrology   Kristi Garcia 80 y o  female MRN: [id number]  Unit/Bed#: [facility] 823-01 Encounter: [id number]      Assessment/Plan     Assessment / Plan:  1  Hyponatremia    The patient appears to have developed hyponatremia last labs from our records show that her sodium concentration was normal in November of 2019  She arrives remission with shortness of breath and is found to have sodium concentration 126  The level is not really improved and the patient is on celexa and when I spoke to her it appears that that was started relatively recently  Urine studies and blood studies reflect inappropriately elevated urine osmolality relative the serum osmolality  She does not appear hypovolemic  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  Discontinue Celexa and it appears that has been done by primary team  Samsca 15 mg p o  X1 and discontinue water restriction which was done while patient received this medication  BMP a m     2  Shortness of breath    The patient has audible wheezes but is being treated with IV Lasix creatinine is remaining stable so allowed diuresis today  Will give dose of albuterol nebulizer 2 5 mg x 1 and she is on a meter dose inhaler order as well  Influenza was positive and she is on Tamiflu  3  Geriatric medicine is following and appreciate their coordination of care  History of Present Illness   Physician Requesting Consult: Lei Jauregui MD  Reason for Consult / Principal Problem:  Hyponatremia  Hx and PE limited by:   HPI: Kristi Garcia is a 80y o  year old female who presents to the emergency room  According to the chart the patient was developing worsening shortness of breath some lower extremity swelling and her family notice this and brought her to the emergency room  In the emergency room she was found to have low oxygen saturations and abnormalities on imaging of the lungs and I did personally review the report of the CT scan    She was found have hyponatremia we were asked to see her regarding this  It does appear that she had Celexa started recently  History obtained from chart review and the patient  Consults    Review of Systems   Constitutional: Positive for activity change and fatigue  Negative for chills and fever  HENT: Negative  Eyes: Negative  Respiratory: Positive for chest tightness, shortness of breath and wheezing  Negative for cough  Cardiovascular: Negative for chest pain, palpitations and leg swelling  Some dyspnea on exertion  Gastrointestinal: Negative  Negative for abdominal distention, abdominal pain, diarrhea, nausea and vomiting  Genitourinary: Negative for difficulty urinating, dysuria, flank pain and hematuria  Musculoskeletal: Negative  Neurological: Negative for dizziness, tremors, seizures and syncope  Psychiatric/Behavioral: Negative for agitation, behavioral problems and confusion  The patient is not nervous/anxious  Historical Information   Patient Active Problem List   Diagnosis    Atherosclerosis of native coronary artery of native heart without angina pectoris    Gastroesophageal reflux disease without esophagitis    Essential (primary) hypertension    Old myocardial infarction    COPD (chronic obstructive pulmonary disease) ([facility] [address] )    Anxiety    Acute on chronic diastolic congestive heart failure (HCC)    Closed compression fracture of L1 vertebra (HCC)    Stage 2 chronic kidney disease    Dysphagia    Mixed hyperlipidemia    Hyponatremia    Hyperglycemia    Influenza A    Acute respiratory failure with hypoxia (HCC)    Closed fracture of multiple ribs of right side    Chronic bilateral pleural effusions     Past Medical History:   Diagnosis Date    Abnormal blood sugar 03/13/2017    Abnormal carotid duplex scan     Carotid Duplex (Plaque formation is quite prominent bilaterally     Despite these changes, no evidence for greater than 50% diameter reducing lesions in the cervical portion of either carotid artery hemisystem ) - 6/13/2017    Anxiety     Cervical radiculopathy 08/08/2017    CHF (congestive heart failure) (HCC)     diastolic    Compression fracture of lumbar spine, non-traumatic, with routine healing, subsequent encounter     COPD [facility] obstructive [facility]) [facility] 2/6/2018    Coronary angioplasty status     Coronary artery disease 4/7/2017    Costochondritis 02/05/2013    suspect MS in origin given relationship to ROM and location  Start mobic and if persists, reeval  Refused xrayat this time   Dyslipidemia     GERD without esophagitis 8/30/2012    H/O CT scan of chest      (No PE, minimal interstitial change left lower lobe and right upper lobe, which may be acute ) - 5/19/2017    History of Holter monitoring     Holter (Sinus rhythm with rates ranging from 52 to 118 bpm   No afib or heart block  Rare atrial and ventricular ectopic beats  One six-beat atrial run noted  No pauses  ) - 5/31/2017    Hx of echocardiogram     Echo (EF 0 60 (60%), Biatrial enlargement ) - 3/24/2017 Echo (EF 0 55 (55%), mild LVH  Aortic valvular sclerosis  Trace MI with mild left atrial dilatation, mild MAC  Mild TI with mild pulmonary hypertension  ) - 5/22/2017 Echo (EF 0 60 (60%), Normal left vent chamber size and systolic function  Mild diastolic dysfunction of LV w/normal filling pressures  Mild mitral regurg ) - 7/26/2017 Echo (EF 0    Hx of echocardiogram 08/16/2017    EF0 60 (60%) Normal left vent chamber size and systolic function of LV w/normal filling presures  Mild mitral regurg  / EF0 50 (50%) Mild LVH   MIld MR 10/6/17     Hypertension     Iron deficiency anemia 4/21/2016    Macular degeneration, right eye     Malignant melanoma of skin ([facility] [address] ) 9/17/2012    Mixed hyperlipidemia     NSTEMI (non-ST elevated myocardial infarction) ([facility] ) 7/25/2017    Old MI (myocardial infarction)     Osteoarthritis 9/17/2012    Osteoporosis 3/13/2017    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[facility] [address] ) 04/07/2017    Urinary tract infection     frequent UTI's     Past Surgical History:   Procedure Laterality Date    ABDOMINAL SURGERY      APPENDECTOMY      BREAST SURGERY      Lumpectomy    CARDIAC CATHETERIZATION  03/24/2017    ARNIE to 100% occluded prox RCA, chronic 99% ostial LCfx, 60% mid LAD, discrete 40% distal LM / EF0 60 (60%) 100% occluded proximal RCA s/p ARNIE, chronic 99% ostial LCX, 60% mid LAD, discrete 40% dital LM  4/5/17    CHOLECYSTECTOMY      COLONOSCOPY N/A 7/25/2018    Procedure: COLONOSCOPY;  Surgeon:  Niru Castelan MD;  Location: [facility] MAIN OR;  Service: Gastroenterology    CORONARY STENT PLACEMENT  03/25/2017    ARNIE RCA    HEMORRHOID SURGERY      INSERTION STENT ARTERY  04/07/2017    Cardio vascular agents drug-eluting stent    SKIN BIOPSY      TONSILLECTOMY       Social History   Social History     Substance and Sexual Activity   Alcohol Use Not Currently     Social History     Substance and Sexual Activity   Drug Use Never     Social History     Tobacco Use   Smoking Status Former Smoker    Types: Cigarettes   Smokeless Tobacco Never Used     Family History   Problem Relation Age of Onset    Heart failure Mother     Prostate cancer Father     Stroke Family        Meds/Allergies   current meds:   Current Facility-Administered Medications   Medication Dose Route Frequency    acetaminophen (TYLENOL) tablet 975 mg  975 mg Oral Q8H [address]    albuterol (PROVENTIL HFA,VENTOLIN HFA) inhaler 2 puff  2 puff Inhalation Q4H PRN    albuterol inhalation solution 2 5 mg  2 5 mg Nebulization Once    ALPRAZolam (XANAX) tablet 0 25 mg  0 25 mg Oral HS    aspirin chewable tablet 81 mg  81 mg Oral Daily    atorvastatin (LIPITOR) tablet 40 mg  40 mg Oral Daily With Dinner    calcium carbonate-vitamin D (OSCAL-D) 500 mg-200 units per tablet 1 tablet  1 tablet Oral BID With Meals    docusate sodium (COLACE) capsule 100 mg  100 mg Oral BID    famotidine (PEPCID) tablet 10 mg  10 mg Oral BID    furosemide (LASIX) injection 40 mg  40 mg Intravenous BID (diuretic)    heparin (porcine) subcutaneous injection 5,000 Units  5,000 Units Subcutaneous Q8H [address]    HYDROmorphone (DILAUDID) injection 0 2 mg  0 2 mg Intravenous Q4H PRN    lidocaine (LIDODERM) 5 % patch 1 patch  1 patch Topical Daily    metoprolol succinate (TOPROL-XL) 24 hr tablet 25 mg  25 mg Oral Daily    multivitamin-minerals (CENTRUM) tablet 1 tablet  1 tablet Oral Daily    ondansetron (ZOFRAN-ODT) dispersible tablet 4 mg  4 mg Oral Q8H PRN    oseltamivir (TAMIFLU) capsule 30 mg  30 mg Oral Q12H [address]    oxyCODONE (ROXICODONE) IR tablet 2 5 mg  2 5 mg Oral Q4H PRN    oxyCODONE (ROXICODONE) IR tablet 5 mg  5 mg Oral Q4H PRN    polyethylene glycol (MIRALAX) packet 17 g  17 g Oral Daily PRN    potassium chloride (K-DUR,KLOR-CON) CR tablet 20 mEq  20 mEq Oral Daily     Allergies   Allergen Reactions    Ciprofloxacin      Reaction Date: 01Jul2011;     Keflex [Cephalexin] Dizziness    Nitrofurantoin      Reaction Date: 01Jul2011;     Penicillins Rash, Other (See Comments) and Throat Swelling     Throat swells       Objective     Intake/Output Summary (Last 24 hours) at 1/27/2020 1142  Last data filed at 1/27/2020 0717  Gross per 24 hour   Intake 240 ml   Output 1000 ml   Net -760 ml     Body mass index is 30 32 kg/m²  Invasive Devices:        PHYSICAL EXAM:  /81   Pulse 69   Temp 98 °F (36 7 °C)   Resp 18   Ht 5' 2" (1 575 m)   Wt 75 2 kg (165 lb 12 6 oz)   SpO2 96%   BMI 30 32 kg/m²     Physical Exam   Constitutional: She is oriented to person, place, and time  Non-toxic appearance  She does not appear ill  No distress  HENT:   Head: Atraumatic  Oropharynx clear but dry  Neck: Neck supple  No JVD present  Cardiovascular: Normal rate and regular rhythm  Exam reveals no gallop and no friction rub  Pulmonary/Chest: Effort normal  No respiratory distress   She has no decreased breath sounds  She has wheezes  She has no rhonchi  She has no rales  Abdominal: Soft  Bowel sounds are normal  She exhibits no distension  There is no tenderness  There is no rebound  Neurological: She is alert and oriented to person, place, and time  Current Weight: Weight - Scale: 75 2 kg (165 lb 12 6 oz)  First Weight: Weight - Scale: 75 kg (165 lb 5 5 oz)    Lab Results:    Results from last 7 days   Lab Units 01/27/20  0502   WBC Thousand/uL 5 62   HEMOGLOBIN g/dL 13 5   HEMATOCRIT % 42 9   PLATELETS Thousands/uL 276     Results from last 7 days   Lab Units 01/27/20  0502   POTASSIUM mmol/L 4 2   CHLORIDE mmol/L 83*   CO2 mmol/L 42*   BUN mg/dL 11   CREATININE mg/dL 0 71   CALCIUM mg/dL 8 7     Results from last 7 days   Lab Units 01/27/20  0502  01/25/20  2133   POTASSIUM mmol/L 4 2   < > 4 6   CHLORIDE mmol/L 83*   < > 87*   CO2 mmol/L 42*   < > 36*   BUN mg/dL 11   < > 10   CREATININE mg/dL 0 71   < > 0 96   CALCIUM mg/dL 8 7   < > 9 7   ALK PHOS U/L  --   --  78   ALT U/L  --   --  24   AST U/L  --   --  13    < > = values in this interval not displayed

## 2020-01-27 NOTE — ASSESSMENT & PLAN NOTE
· Found on CT imaging  Initially there was concern that this may be new, however this was found on previous CT imaging  No lower extremity weakness or neuro deficits noted    · Can hold off on repeat CT imaging for now  · Continue pain regimen as otherwise ordered  · Trauma following

## 2020-01-27 NOTE — SOCIAL WORK
Pt lives in 1 story home  2 DANIEL  Lives with er granddaughter  Uses rolling walker  Denies MH Hx Denies substance issues  [place]ses [facility] in [place]  No LW  Family transports

## 2020-01-27 NOTE — ASSESSMENT & PLAN NOTE
· Celexa discontinued in light of hyponatremia   · Xanax decreased to 0 25mg QHS for 7 days then stop

## 2020-01-27 NOTE — ASSESSMENT & PLAN NOTE
· Suspect infection may have contributed to CHF exacerbation  · Droplet precautions  · Continue Tamiflu renally-dosed b i d

## 2020-01-27 NOTE — ASSESSMENT & PLAN NOTE
· Suspect multifactorial including CHF exacerbation and Influenza A positive    · Continue plan as outlined for CHF and Influenza  · Continue nasal cannula oxygen, wean as able  · Procalcitonins have been negative  · Monitoring off abx  · Respiratory protocol

## 2020-01-27 NOTE — ASSESSMENT & PLAN NOTE
Wt Readings from Last 3 Encounters:   01/27/20 75 2 kg (165 lb 12 6 oz)   01/10/20 70 kg (154 lb 6 oz)   12/06/19 73 kg (161 lb)     · Echocardiogram:  Obtained 11/2019, demonstrated EF of 50%, mild concentric hypertrophy, grade 1 diastolic dysfunction, moderate pulmonary hypertension  · BNP was not obtained on admission  · Monitor I/O's, daily weights  · Monitor serum electrolytes  · Incentive spirometry, airway clearance protocol  · Titrate oxygen to maintain saturation of at least 90%, wean as able  · Continue diuresis with Lasix b i d   Per Nephrology  · Continue Metoprolol succinate daily  · Consider Cardiology consult

## 2020-01-27 NOTE — ASSESSMENT & PLAN NOTE
· Nephrology consult appreciated  · 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 which has been discontinued   · Continue diuresis with Lasix per Nephrology  · Monitor BMP

## 2020-01-27 NOTE — PHYSICAL THERAPY NOTE
Physical Therapy Evaluation    Patient's Name: Connor Hays    Admitting Diagnosis  Hyponatremia [E87 1]  SOB (shortness of breath) [R06 02]  Hypoxia [R09 02]  Acute on chronic diastolic congestive heart failure ([facility] [address] ) [I50 33]    Problem List  Patient Active Problem List   Diagnosis    Atherosclerosis of native coronary artery of native heart without angina pectoris    Gastroesophageal reflux disease without esophagitis    Essential (primary) hypertension    Old myocardial infarction    COPD (chronic obstructive pulmonary disease) ([facility] [address] )    Anxiety    Acute on chronic diastolic congestive heart failure (HCC)    Closed compression fracture of L1 vertebra ([facility])    Stage 2 chronic kidney disease    Dysphagia    Mixed hyperlipidemia    Hyponatremia    Hyperglycemia    Influenza A    Acute respiratory failure with hypoxia ([facility])    Closed fracture of multiple ribs of right side    Chronic bilateral pleural effusions    SIADH (syndrome of inappropriate ADH production) [facility]       Past Medical History  Past Medical History:   Diagnosis Date    Abnormal blood sugar 03/13/2017    Abnormal carotid duplex scan     Carotid Duplex (Plaque formation is quite prominent bilaterally  Despite these changes, no evidence for greater than 50% diameter reducing lesions in the cervical portion of either carotid artery hemisystem ) - 6/13/2017    Anxiety     Cervical radiculopathy 08/08/2017    CHF (congestive heart failure) ([facility])     diastolic    Compression fracture of lumbar spine, non-traumatic, with routine healing, subsequent encounter     COPD (chronic obstructive pulmonary disease) [facility] 2/6/2018    Coronary angioplasty status     Coronary artery disease 4/7/2017    Costochondritis 02/05/2013    suspect MS in origin given relationship to ROM and location  Start mobic and if persists, reeval  Refused xrayat this time        Dyslipidemia     GERD without esophagitis 8/30/2012    H/O CT scan of chest (No PE, minimal interstitial change left lower lobe and right upper lobe, which may be acute ) - 5/19/2017    History of Holter monitoring     Holter (Sinus rhythm with rates ranging from 52 to 118 bpm   No afib or heart block  Rare atrial and ventricular ectopic beats  One six-beat atrial run noted  No pauses  ) - 5/31/2017    Hx of echocardiogram     Echo (EF 0 60 (60%), Biatrial enlargement ) - 3/24/2017 Echo (EF 0 55 (55%), mild LVH  Aortic valvular sclerosis  Trace MI with mild left atrial dilatation, mild MAC  Mild TI with mild pulmonary hypertension  ) - 5/22/2017 Echo (EF 0 60 (60%), Normal left vent chamber size and systolic function  Mild diastolic dysfunction of LV w/normal filling pressures  Mild mitral regurg ) - 7/26/2017 Echo (EF 0    Hx of echocardiogram 08/16/2017    EF0 60 (60%) Normal left vent chamber size and systolic function of LV w/normal filling presures  Mild mitral regurg  / EF0 50 (50%) Mild LVH  MIld MR 10/6/17     Hypertension     Iron deficiency anemia 4/21/2016    Macular degeneration, right eye     Malignant melanoma of skin ([facility] [address] ) 9/17/2012    Mixed hyperlipidemia     NSTEMI (non-ST elevated myocardial infarction) ([facility] ) 7/25/2017    Old MI (myocardial infarction)     Osteoarthritis 9/17/2012    Osteoporosis 3/13/2017    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[facility] ) 04/07/2017    Urinary tract infection     frequent UTI's       Past Surgical History  Past Surgical History:   Procedure Laterality Date    ABDOMINAL SURGERY      APPENDECTOMY      BREAST SURGERY      Lumpectomy    CARDIAC CATHETERIZATION  03/24/2017    ARNIE to 100% occluded prox RCA, chronic 99% ostial LCfx, 60% mid LAD, discrete 40% distal LM / EF0 60 (60%) 100% occluded proximal RCA s/p ARNIE, chronic 99% ostial LCX, 60% mid LAD, discrete 40% dital LM  4/5/17    CHOLECYSTECTOMY      COLONOSCOPY N/A 7/25/2018    Procedure: COLONOSCOPY;  Surgeon:  Keya Agustin MD;  [facility] MA[facility];  Service: Gastroenterology    CORONARY STENT PLACEMENT  03/25/2017    ARNIE RCA    HEMORRHOID SURGERY      INSERTION STENT ARTERY  04/07/2017    Cardio vascular agents drug-eluting stent    SKIN BIOPSY      TONSILLECTOMY          01/27/20 6218   Note Type   Note type Eval only   Pain Assessment   Pain Assessment No/denies pain   Pain Score No Pain   Home Living   Type of [address] One level;Stairs to enter with rails   Bathroom Shower/Tub Tub/shower unit   Bathroom Toilet Standard   Bathroom Equipment Shower chair   Home Equipment Walker   Additional Comments History obtained from granddaughter, reports pt lives between 2 granddaughters, one works from home, other works outside the home, pt home alone for periods during the day   Prior Function   Level of [place] Independent with ADLs and functional mobility   Lives With [address] [place] [address]   IADLs Needs assistance   Falls in the last 6 months 1 to 4   Vocational Retired   Comments Granddaughter reports fall occured while transfering from the commode, fell backward onto R side against toilet/wall  Pt has been able to ambulate throughout the home with RW independently    Restrictions/Precautions   Weight Bearing Precautions Per Order No   Other Precautions Droplet precautions;Cognitive; Chair Alarm;Hard of hearing;O2   General   Family/Caregiver Present Yes   Cognition   Overall Cognitive Status Impaired   Arousal/Participation Alert   Attention Attends with cues to redirect   Orientation Level Oriented to person;Oriented to place   Memory Decreased recall of precautions;Decreased short term memory;Decreased recall of recent events   Following Commands Follows one step commands with increased time or repetition   Comments Confused upon PT arrival, granddaughter reports pt just woke up and is usually confused upon waking      RLE Assessment   RLE Assessment WFL   LLE Assessment   LLE Assessment [facility] Movements are Fluid and Coordinated 1   Sensation WFL   Light Touch   RLE Light Touch Grossly intact   LLE Light Touch Grossly intact   Bed Mobility   Additional Comments Pt seated in chair upon PT arrival    Transfers   Sit to Stand 4  Minimal assistance   Additional items Assist x 1   Stand to Sit 4  Minimal assistance   Additional items Assist x 1   Additional Comments VC's for hand placement, proximity to surface prior to sitting, safety    Ambulation/Elevation   Gait pattern Forward Flexion;Decreased foot clearance; Excessively slow   Gait Assistance 4  Minimal assist   Additional items Assist x 1   Assistive Device Rolling walker   Distance 15  (15 ftx2, sitting rest break between trials )   Balance   Static Sitting Fair +   Dynamic Sitting Fair   Static Standing Fair -   Dynamic Standing Poor +   Ambulatory Poor   Endurance Deficit   Endurance Deficit Yes   Activity Tolerance   Activity Tolerance Patient limited by fatigue   Nurse [address] [place] [address] communication with RN before and after PT session    Assessment   Prognosis Fair   Problem List Decreased strength;Decreased endurance; Impaired balance;Decreased mobility; Decreased cognition;Decreased safety awareness   Assessment Pt is a very pleasant 79 yo female admitted on 1/26 with acute on chronic diastolic heart failure, influenza A, fall with R rib fx's  Prior to admission, pt resides between 2 granddaughters  Family reports independent mobility with RW at home  Recent fall occurred while family was assisting with transfer in the bathroom  Currently, pt requires Jeff for transfers and ambulation 15' with RW, limited secondary to fatigue  Pt currently on 4L supplemental O2  Pt will benefit from skilled PT intervention during course of hospital stay to address balance, endurance, strength impairments impacting safety with functional mobility  Recommend continued therapy in IP rehab upon hospital D/C      Barriers to Discharge Decreased caregiver support  (granddaughters work during the day)   Goals   Patient Goals "to be able to walk"    [facility] Expiration Date 02/06/20   Short Term Goal #1 In 10 days pt will be able to: 1  Perform bed mobility independently to improve functional independence  2  Perform functional transfers independently with RW to facilitate safe return to previous living environment  3   Ambulate 150 ft with RW and supervision with stable vitals to improve safety with household distances and reduce fall risk  4  Climb 2 steps with Jeff and 1 HR to simulate entrance to home  5  Improve LE strength grades by 1 to increase independence with transfers and gait  Plan   Treatment/Interventions LE strengthening/ROM; Therapeutic exercise; Endurance training;Functional transfer training;Patient/family training;Bed mobility;Gait training   PT Frequency Other (Comment)  (3-5x/wk)   Recommendation   Recommendation Post acute IP rehab   PT - OK to Discharge Yes  (when medically cleared to IP rehab)   Barthel Index   Feeding 10   Bathing 0   Grooming Score 0   Dressing Score 5   Bladder Score 10   Bowels Score 10   Toilet Use Score 5   Transfers (Bed/Chair) Score 10   Mobility (Level Surface) Score 0   Stairs Score 0   Barthel Index Score 50       Jasen Hughes, PT, DPT

## 2020-01-27 NOTE — PROGRESS NOTES
Progress Note - Shaye Gil 2/25/1925, 80 y o  female MRN: [id number]    Unit/Bed#: [facility] 823-01 Encounter: [id number]    Primary Care Provider: Anny Reece DO   Date and time admitted to hospital: 1/25/2020  9:11 PM        * Acute respiratory failure with hypoxia ([address] )  Assessment & Plan  · Suspect multifactorial including CHF exacerbation and Influenza A positive  · Continue plan as outlined for CHF and Influenza  · Continue nasal cannula oxygen, wean as able  · Procalcitonins have been negative  · Monitoring off abx  · Respiratory protocol     Acute on chronic diastolic congestive heart failure (HCC)  Assessment & Plan  Wt Readings from Last 3 Encounters:   01/27/20 75 2 kg (165 lb 12 6 oz)   01/10/20 70 kg (154 lb 6 oz)   12/06/19 73 kg (161 lb)     · Echocardiogram:  Obtained 11/2019, demonstrated EF of 50%, mild concentric hypertrophy, grade 1 diastolic dysfunction, moderate pulmonary hypertension  · BNP was not obtained on admission  · Monitor I/O's, daily weights  · Monitor serum electrolytes  · Incentive spirometry, airway clearance protocol  · Titrate oxygen to maintain saturation of at least 90%, wean as able  · Continue diuresis with Lasix b i d  Per Nephrology  · Continue Metoprolol succinate daily  · Consider Cardiology consult    Influenza A  Assessment & Plan  · Suspect infection may have contributed to CHF exacerbation  · Droplet precautions  · Continue Tamiflu renally-dosed b i d      Hyponatremia  Assessment & Plan  · Nephrology consult appreciated  · 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 which has been discontinued   · Continue diuresis with Lasix per Nephrology  · Monitor BMP    Hyperglycemia  Assessment & Plan  · No known history of diabetes, but her glucose was 141 on admissionBMP  · Glucose checks have been WNL and patient has not required SSI coverage, therefor fingersticks and SSI coverage was discontinued     Anxiety  Assessment & Plan  · Celexa discontinued in light of hyponatremia   · Xanax decreased to 0 25mg QHS for 7 days then stop    Closed compression fracture of L1 vertebra [facility])  Assessment & Plan  · Found on CT imaging  Initially there was concern that this may be new, however this was found on previous CT imaging  No lower extremity weakness or neuro deficits noted  · Can hold off on repeat CT imaging for now  · Continue pain regimen as otherwise ordered  · Trauma following     Closed fracture of multiple ribs of right side  Assessment & Plan  · There is a right 10th rib fracture noted on CT imaging with possible 7-9 rib fractures versus motion artifact  · Trauma consult appreciated   · Will continue pain regimen as outlined:  · Tylenol every 8 hours scheduled  · Oxycodone p r n  · Dilaudid for breakthrough pain  · Topical lidocaine  · Rib fracture protocol  · Encourage deep breathing, airway clearance    Atherosclerosis of native coronary artery of native heart without angina pectoris  Assessment & Plan  · Continue aspirin, statin, metoprolol    Chronic bilateral pleural effusions  Assessment & Plan  · As evidence by CT scan  · Continue with IV diuresis as noted above  · Repeat CXR today pending     COPD (chronic obstructive pulmonary disease) ([facility] [address] )  Assessment & Plan  · Patient not on scheduled medications  · Albuterol p r n  Gastroesophageal reflux disease without esophagitis  Assessment & Plan  · Continue renally dosed Pepcid b i d       VTE Pharmacologic Prophylaxis:   Pharmacologic: Heparin  Mechanical VTE Prophylaxis in Place: Yes    Patient Centered Rounds: I have performed bedside rounds with nursing staff today  Discussions with Specialists or Other Care Team Provider: [facility]    Education and Discussions with Family / Patient: patient    Time Spent for Care: 30 minutes  More than 50% of total time spent on counseling and coordination of care as described above      Current Length of Stay: 1 day(s)    Current Patient Status: Inpatient Certification Statement: The patient will continue to require additional inpatient hospital stay due to hyponatremia, flu    Discharge Plan: pending the above    Code Status: Level 3 - DNAR and DNI      Subjective:   Ms Arti Myers states she is still SOB  States she coughed up a big bunch of mucus earlier  Objective:     Vitals:   Temp (24hrs), Av °F (36 7 °C), Min:97 9 °F (36 6 °C), Max:98 °F (36 7 °C)    Temp:  [97 9 °F (36 6 °C)-98 °F (36 7 °C)] 98 °F (36 7 °C)  HR:  [66-79] 69  Resp:  [16-18] 18  BP: (137-168)/(69-81) 168/81  SpO2:  [94 %-98 %] 96 %  Body mass index is 30 32 kg/m²  Input and Output Summary (last 24 hours): Intake/Output Summary (Last 24 hours) at 2020 1211  Last data filed at 2020 0717  Gross per 24 hour   Intake 240 ml   Output 400 ml   Net -160 ml       Physical Exam:     Physical Exam   Constitutional:   Patient seen sitting upright in bedside chair comfortably resting  Pleasant and cooperative   Cardiovascular: Normal rate and regular rhythm  Pulmonary/Chest:   Coarse rhonchi b/l    Abdominal: Soft  Bowel sounds are normal  She exhibits no distension  There is no tenderness  Musculoskeletal: She exhibits no edema  Neurological: She is alert  Knows she is in [address] but thought the [facility], not [facility]   Skin: Skin is warm  Psychiatric: She has a normal mood and affect  Her behavior is normal    Vitals reviewed        Additional Data:     Labs:    Results from last 7 days   Lab Units 20  0502   WBC Thousand/uL 5 62   HEMOGLOBIN g/dL 13 5   HEMATOCRIT % 42 9   PLATELETS Thousands/uL 276   NEUTROS PCT % 56   LYMPHS PCT % 22   MONOS PCT % 22*   EOS PCT % 0     Results from last 7 days   Lab Units 20  0502  20  2133   SODIUM mmol/L 127*   < > 126*   POTASSIUM mmol/L 4 2   < > 4 6   CHLORIDE mmol/L 83*   < > 87*   CO2 mmol/L 42*   < > 36*   BUN mg/dL 11   < > 10   CREATININE mg/dL 0 71   < > 0 96   ANION GAP mmol/L 2*   < > 3*   CALCIUM mg/dL 8 7   < > 9 7   ALBUMIN g/dL  --   --  3 5   TOTAL BILIRUBIN mg/dL  --   --  0 75   ALK PHOS U/L  --   --  78   ALT U/L  --   --  24   AST U/L  --   --  13   GLUCOSE RANDOM mg/dL 100   < > 141*    < > = values in this interval not displayed  Results from last 7 days   Lab Units 01/27/20  0721 01/26/20  2111 01/26/20  1709 01/26/20  1124 01/26/20  0813   POC GLUCOSE mg/dl 103 122 104 132 122     Results from last 7 days   Lab Units 01/26/20  0505   HEMOGLOBIN A1C % 6 2     Results from last 7 days   Lab Units 01/26/20  0505 01/25/20  2133   PROCALCITONIN ng/ml <0 05 0 06           * I Have Reviewed All Lab Data Listed Above  * Additional Pertinent Lab Tests Reviewed:  All Labs Within Last 24 Hours Reviewed    Imaging:    Imaging Reports Reviewed Today Include: CT chest  Imaging Personally Reviewed by Myself Includes:  None    Recent Cultures (last 7 days):           Last 24 Hours Medication List:     Current Facility-Administered Medications:  acetaminophen 975 mg Oral Q8H [address] Cuco , DO   albuterol 2 puff Inhalation Q4H PRN Hospers , DO   albuterol 2 5 mg Nebulization Once Tejas Preston MD   ALPRAZolam 0 25 mg Oral HS Tuesday M ROGELIO Reina   aspirin 81 mg Oral Daily [facility], [facility]   atorvastatin 40 mg Oral Daily With [employer] MARITZA Marquez, DO   calcium carbonate-vitamin D 1 tablet Oral BID With Meals Cuco , DO   docusate sodium 100 mg Oral BID Tuesday M ROGELIO Reina   famotidine 10 mg Oral BID [facility] Verelias, DO   furosemide 40 mg Intravenous BID (diuretic) Cuco , DO   heparin (porcine) 5,000 Units Subcutaneous Q8H [address] Hospers , DO   HYDROmorphone 0 2 mg Intravenous Q4H PRN Cuco , DO   lidocaine 1 patch Topical Daily [facility] Veres, DO   metoprolol succinate 25 mg Oral Daily Hospers , DO   multivitamin-minerals 1 tablet Oral Daily Evens Marquez DO   ondansetron 4 mg Oral Q8H PRN Cuco Rahman, DO   oseltamivir 30 mg Oral Q12H [facility] Stevphen Shawl, DO   oxyCODONE 2 5 mg Oral Q4H PRN Stevphen Shawl, DO   oxyCODONE 5 mg Oral Q4H PRN Stevphen Shawl, DO   polyethylene glycol 17 g Oral Daily PRN Tuesday M Rosebud, CRNP   potassium chloride 20 mEq Oral Daily Latha Marquez, DO   tolvaptan 15 mg Oral Once William Colmenares MD        Today, Patient Was Seen By: Juliette Benson PA-C    ** Please Note: Dictation voice to text software may have been used in the creation of this document   **

## 2020-01-27 NOTE — CONSULTS
Consultation - Bar Rowe 80 y o  female MRN: [id number]  [facility] Encounter: [id number]      Assessment/Plan  1  Ambulatory dysfunction  Fall precautions  PT/OT  Rehab post hospitalization  Vitamin D3 1000 International Units daily    2  Acute pain due to trauma  Geriatric pain protocol  Scheduled acetaminophen  Lidoderm patch  Oxycodone 2 5 mg p o  Q 4 hours p r n  Moderate pain  Oxycodone 5 mg p o  Q 4 hours p r n  Severe pain  Monitor for constipation    3  Frailty  Moderate frailty  Risk factors:  Hospitalization, polypharmacy, limited mobility, poor nutrition  Albumin 3 5  Decreased appetite    4  Hyponatremia    Multifactorial  New start of Celexa contributing  Recommend discontinuation    5  Cognitive decline  Cannot be ruled out, unable to mini cog at present time secondary to acute illness  Recommend check vitamin B12  TSH within normal limits  PARENCHYMA: Decreased attenuation is noted in periventricular and subcortical white matter demonstrating an appearance that is statistically most likely to represent advanced microangiopathic change; this appearance has progressed somewhat when compared   to most recent prior examination  Continue supportive care    6  Anxiety  Citalopram, new start with last office appointment, recommend discontinuation secondary to hyponatremia, re evaluate as outpatient  Pt currently on xanax 0 25mg po BID per family    01/11/2020  2  01/02/2020  ALPRAZOLAM 0 5 MG TABLET  21 0  7  RO DUR  [id number]  PENNS ([id number])  1   Private Pay  [place]    01/02/2020  2  01/02/2020  ALPRAZOLAM 0 5 MG TABLET  21 0  7  RO DUR  [id number]  PENNS ([id number])  0   Private Pay  [place]     Recommend Xanax 0 25 mg p o  Q h s x 7 days , to prevent abrupt withdrawal    7   Delirium precautions  Alert and orient x3  Resolve hyponatremia  Avoid benzodiazepine withdrawal  Treat pain  Monitor for constipation  Monitor for urinary retention  Maintain circadian rhythm  Encourage adequate p o  Hydration/nutrition  Provide frequent redirection, reorientation, distraction techniques    8  Insomnia  Maintain circadian rhythm  Continue Xanax 0 5 mg p o  Q h s  See number 6  Consider addition of melatonin 6 mg p o  Q h s     9  Medication review  Medications reviewed with granddaughter  She states medications in epic are correct except for Xanax is being given 0 25 mg b i d  At home    10  Influenza A  Droplet precautions  Renally dosed Tamiflu    11  Acute respiratory failure with hypoxia  Multifactorial:  Exacerbation CHF, influenza positive, history of COPD, questionable pneumonia  Management by Internal Medicine    12  Multiple rib fractures, right side  Rib fracture protocol  Geriatric pain protocol  Incentive spirometry  Trauma following                History of Present Illness   Physician Requesting Consult: Ary Pereira MD  Reason for Consult / Principal Problem:  Rib fractures  Hx and PE limited by:  Not applicable  HPI: Dania Valencia is a 80y o  year old female who presents with shortness of breath and fall  Patient reported that for the last 2 days she had been increasingly short of breath with dry cough as well as bilateral leg swelling  Her daughter noticed her increased leg swelling and attempted to give her an extra dose of Lasix without any improvement  Patient was trying to get off the commode when she fell  Per the patient's daughter she tripped on her pant  She fell on her right side and struck her head  She denies loss of consciousness, dizziness, vomiting, inability to ambulate  She was brought to the emergency room  Upon arrival she was found to be hypoxic requiring oxygen  She was hypo need treatment with a sodium of 126  Influenza a positive she was evaluated by Trauma for her fall  She has a past medical history of COPD, CHF, hyperlipidemia, hypertension and dysphagia, lumbar compression fracture, anxiety       Patient has a history of metabolic encephalopathy with previous admission November 2019  She required Zyprexa during this admission  Prior to arrival patient lives with her granddaughters  She does not drive  Family assist with IADLs and ADLs  She ambulates with a roller walker  She wears glasses, she denies issues with hearing  She has dentures  She she has reported weight loss in the last year  She is continent of bowel and bladder  She has some memory issues  She likes a wash the [employer] channel  Spoke at length with granddaughter, Liudmila Brar    Upon exam patient out of bed in recliner chair  She is alert and orient x3  She is complaining of feeling dizzy  Spoke with nursing and Internal Medicine  Inpatient consult to Gerontology  Consult performed by: ROGELIO Miller  Consult ordered by: ROGELIO Rajan          Review of Systems   Constitutional: Negative for unexpected weight change  HENT: Negative for hearing loss  Eyes: Negative for visual disturbance  Respiratory: Positive for shortness of breath  Cardiovascular: Negative for chest pain  Gastrointestinal: Negative for diarrhea  Genitourinary: Negative for difficulty urinating  Musculoskeletal: Positive for gait problem  Skin: Negative for color change  Neurological: Positive for dizziness  Psychiatric/Behavioral: Negative for sleep disturbance  Historical Information   Past Medical History:   Diagnosis Date    Abnormal blood sugar 03/13/2017    Abnormal carotid duplex scan     Carotid Duplex (Plaque formation is quite prominent bilaterally     Despite these changes, no evidence for greater than 50% diameter reducing lesions in the cervical portion of either carotid artery hemisystem ) - 6/13/2017    Anxiety     Cervical radiculopathy 08/08/2017    [facility]estive [facility]     diastolic    Compression fracture of lumbar spine, non-traumatic, with routine healing, subsequent encounter     COPD [facility]ch[facility] obstructive pulmonary disease[facility] 2/6/2018    Coronary angioplasty status     Coronary artery disease 4/7/2017    Costochondritis 02/05/2013    suspect MS in origin given relationship to ROM and location  Start mobic and if persists, reeval  Refused xrayat this time   Dyslipidemia     GERD without esophagitis 8/30/2012    H/O CT scan of chest      (No PE, minimal interstitial change left lower lobe and right upper lobe, which may be acute ) - 5/19/2017    History of Holter monitoring     Holter (Sinus rhythm with rates ranging from 52 to 118 bpm   No afib or heart block  Rare atrial and ventricular ectopic beats  One six-beat atrial run noted  No pauses  ) - 5/31/2017    Hx of echocardiogram     Echo (EF 0 60 (60%), Biatrial enlargement ) - 3/24/2017 Echo (EF 0 55 (55%), mild LVH  Aortic valvular sclerosis  Trace MI with mild left atrial dilatation, mild MAC  Mild TI with mild pulmonary hypertension  ) - 5/22/2017 Echo (EF 0 60 (60%), Normal left vent chamber size and systolic function  Mild diastolic dysfunction of LV w/normal filling pressures  Mild mitral regurg ) - 7/26/2017 Echo (EF 0    Hx of echocardiogram 08/16/2017    EF0 60 (60%) Normal left vent chamber size and systolic function of LV w/normal filling presures  Mild mitral regurg  / EF0 50 (50%) Mild LVH   MIld MR 10/6/17     Hypertension     Iron deficiency anemia 4/21/2016    Macular degeneration, right eye     Malignant melanoma of skin [facility] ) 9/17/2012    Mixed hyperlipidemia     NSTEMI (non-ST elevated myocardial infarction) ([facility] ) 7/25/2017    Old MI (myocardial infarction)     Osteoarthritis 9/17/2012    Osteoporosis 3/13/2017    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[facility] ) 04/07/2017    Urinary tract infection     frequent UTI's     Past Surgical History:   Procedure Laterality Date    ABDOMINAL SURGERY      APPENDECTOMY      BREAST SURGERY      Lumpectomy    CARDIAC CATHETERIZATION  03/24/2017    ARNIE to 100% occluded prox RCA, chronic 99% ostial LCfx, 60% mid LAD, discrete 40% distal LM / EF0 60 (60%) 100% occluded proximal RCA s/p ARNIE, chronic 99% ostial LCX, 60% mid LAD, discrete 40% dital LM  4/5/17    CHOLECYSTECTOMY      COLONOSCOPY N/A 7/25/2018    Procedure: COLONOSCOPY;  Surgeon:  Gaviota Mcgraw MD;  Location: [facility] MAIN OR;  Service: Gastroenterology    CORONARY STENT PLACEMENT  03/25/2017    ARNIE RCA    HEMORRHOID SURGERY      INSERTION STENT ARTERY  04/07/2017    Cardio vascular agents drug-eluting stent    SKIN BIOPSY      TONSILLECTOMY       Social History   Social History     Substance and Sexual Activity   Alcohol Use Not Currently     Social History     Substance and Sexual Activity   Drug Use Never     Social History     Tobacco Use   Smoking Status Former Smoker    Types: Cigarettes   Smokeless Tobacco Never Used         Family History:   Family History   Problem Relation Age of Onset    Heart failure Mother     Prostate cancer Father     Stroke Family        Meds/Allergies   Current meds:   Current Facility-Administered Medications   Medication Dose Route Frequency    acetaminophen (TYLENOL) tablet 975 mg  975 mg Oral Q8H [address]    albuterol (PROVENTIL HFA,VENTOLIN HFA) inhaler 2 puff  2 puff Inhalation Q4H PRN    ALPRAZolam (XANAX) tablet 0 5 mg  0 5 mg Oral TID PRN    aspirin chewable tablet 81 mg  81 mg Oral Daily    atorvastatin (LIPITOR) tablet 40 mg  40 mg Oral Daily With Dinner    calcium carbonate-vitamin D (OSCAL-D) 500 mg-200 units per tablet 1 tablet  1 tablet Oral BID With Meals    citalopram (CeleXA) tablet 10 mg  10 mg Oral HS    famotidine (PEPCID) tablet 10 mg  10 mg Oral BID    furosemide (LASIX) injection 40 mg  40 mg Intravenous BID (diuretic)    heparin (porcine) subcutaneous injection 5,000 Units  5,000 Units Subcutaneous Q8H [address]    HYDROmorphone (DILAUDID) injection 0 2 mg  0 2 mg Intravenous Q4H PRN    insulin lispro (HumaLOG) 100 units/mL subcutaneous injection 1-5 Units 1-5 Units Subcutaneous TID AC    lidocaine (LIDODERM) 5 % patch 1 patch  1 patch Topical Daily    metoprolol succinate (TOPROL-XL) 24 hr tablet 25 mg  25 mg Oral Daily    multivitamin-minerals (CENTRUM) tablet 1 tablet  1 tablet Oral Daily    ondansetron (ZOFRAN-ODT) dispersible tablet 4 mg  4 mg Oral Q8H PRN    oseltamivir (TAMIFLU) capsule 30 mg  30 mg Oral Q12H [facility]    oxyCODONE (ROXICODONE) IR tablet 2 5 mg  2 5 mg Oral Q4H PRN    oxyCODONE (ROXICODONE) IR tablet 5 mg  5 mg Oral Q4H PRN    potassium chloride (K-DUR,KLOR-CON) CR tablet 20 mEq  20 mEq Oral Daily      Current PTA meds:  Medications Prior to Admission   Medication    ALPRAZolam (XANAX) 0 5 mg tablet    aspirin 81 mg chewable tablet    atorvastatin (LIPITOR) 40 mg tablet    Calcium Carbonate-Vit D-Min (CALCIUM 1200 PO)    citalopram (CeleXA) 10 mg tablet    furosemide (LASIX) 20 mg tablet    metoprolol succinate (TOPROL-XL) 25 mg 24 hr tablet    multivitamin (THERAGRAN) TABS    ondansetron (ZOFRAN) 4 mg tablet    Potassium Chloride BRISSA    ranitidine (ZANTAC) 150 mg tablet        Allergies   Allergen Reactions    Ciprofloxacin      Reaction Date: 01Jul2011;     Keflex [Cephalexin] Dizziness    Nitrofurantoin      Reaction Date: 01Jul2011;     Penicillins Rash, Other (See Comments) and Throat Swelling     Throat swells       Objective   Vitals: Blood pressure 148/73, pulse 69, temperature 98 °F (36 7 °C), resp  rate 18, height 5' 2" (1 575 m), weight 75 2 kg (165 lb 12 6 oz), SpO2 96 %, not currently breastfeeding  ,Body mass index is 30 32 kg/m²  Physical Exam   Constitutional: She is oriented to person, place, and time  She appears well-developed and well-nourished  No distress  HENT:   Head: Normocephalic  Eyes: Right eye exhibits no discharge  Left eye exhibits no discharge  Neck: Normal range of motion  Cardiovascular: Normal rate, regular rhythm and normal heart sounds   Exam reveals no gallop and no friction rub    No murmur heard  Pulmonary/Chest: No stridor  No respiratory distress  Coarse sounds scattered throughout bilateral lungs  Moist loose productive cough, yellow sputum   Abdominal: Soft  Bowel sounds are normal  She exhibits no distension  There is no tenderness  There is no guarding  Musculoskeletal: Normal range of motion  She exhibits no edema or deformity  Neurological: She is alert and oriented to person, place, and time  Skin: Skin is warm and dry  She is not diaphoretic  No erythema  No pallor  Psychiatric: She has a normal mood and affect  Nursing note and vitals reviewed  Lab Results:   Results from last 7 days   Lab Units 01/27/20  0502   WBC Thousand/uL 5 62   HEMOGLOBIN g/dL 13 5   HEMATOCRIT % 42 9   PLATELETS Thousands/uL 276        Results from last 7 days   Lab Units 01/27/20  0502  01/25/20  2133   POTASSIUM mmol/L 4 2   < > 4 6   CHLORIDE mmol/L 83*   < > 87*   CO2 mmol/L 42*   < > 36*   BUN mg/dL 11   < > 10   CREATININE mg/dL 0 71   < > 0 96   CALCIUM mg/dL 8 7   < > 9 7   ALK PHOS U/L  --   --  78   ALT U/L  --   --  24   AST U/L  --   --  13    < > = values in this interval not displayed  Imaging Studies: I have personally reviewed pertinent reports  EKG, Pathology, and Other Studies: I have personally reviewed pertinent reports      VTE Prophylaxis: Sequential compression device (Venodyne)     Code Status: Level 3 - DNAR and DNI

## 2020-01-27 NOTE — ASSESSMENT & PLAN NOTE
· There is a right 10th rib fracture noted on CT imaging with possible 7-9 rib fractures versus motion artifact  · Trauma consult appreciated   · Will continue pain regimen as outlined:  · Tylenol every 8 hours scheduled  · Oxycodone p r n    · Dilaudid for breakthrough pain  · Topical lidocaine  · Rib fracture protocol  · Encourage deep breathing, airway clearance

## 2020-01-28 PROBLEM — R41.89 COGNITIVE IMPAIRMENT: Status: ACTIVE | Noted: 2020-01-01

## 2020-01-28 NOTE — PLAN OF CARE
Problem: Potential for Falls  Goal: Patient will remain free of falls  Description  INTERVENTIONS:  - Assess patient frequently for physical needs  -  Identify cognitive and physical deficits and behaviors that affect risk of falls    -  [place] fall precautions as indicated by assessment   - Educate patient/family on patient safety including physical limitations  - Instruct patient to call for assistance with activity based on assessment  - Modify environment to reduce risk of injury  - Consider OT/PT consult to assist with strengthening/mobility  Outcome: Progressing     Problem: PAIN - ADULT  Goal: Verbalizes/displays adequate comfort level or baseline comfort level  Description  Interventions:  - Encourage patient to monitor pain and request assistance  - Assess pain using appropriate pain scale  - Administer analgesics based on type and severity of pain and evaluate response  - Implement non-pharmacological measures as appropriate and evaluate response  - Consider cultural and social influences on pain and pain management  - Notify physician/advanced practitioner if interventions unsuccessful or patient reports new pain  Outcome: Progressing     Problem: INFECTION - ADULT  Goal: Absence or prevention of progression during hospitalization  Description  INTERVENTIONS:  - Assess and monitor for signs and symptoms of infection  - Monitor lab/diagnostic results  - Monitor all insertion sites, i e  indwelling lines, tubes, and drains  - Monitor endotracheal if appropriate and nasal secretions for changes in amount and color  - [place] appropriate cooling/warming therapies per order  - Administer medications as ordered  - Instruct and encourage patient and family to use good hand hygiene technique  - Identify and instruct in appropriate isolation precautions for identified infection/condition  Outcome: Progressing  Goal: Absence of fever/infection during neutropenic period  Description  INTERVENTIONS:  - Monitor WBC    Outcome: Progressing     Problem: SAFETY ADULT  Goal: Maintain or return to baseline ADL function  Description  INTERVENTIONS:  -  Assess patient's ability to carry out ADLs; assess patient's baseline for ADL function and identify physical deficits which impact ability to perform ADLs (bathing, care of mouth/teeth, toileting, grooming, dressing, etc )  - Assess/evaluate cause of self-care deficits   - Assess range of motion  - Assess patient's mobility; develop plan if impaired  - Assess patient's need for assistive devices and provide as appropriate  - Encourage maximum independence but intervene and supervise when necessary  - Involve family in performance of ADLs  - Assess for home care needs following discharge   - Consider OT consult to assist with ADL evaluation and planning for discharge  - Provide patient education as appropriate  Outcome: Progressing  Goal: Maintain or return mobility status to optimal level  Description  INTERVENTIONS:  - Assess patient's baseline mobility status (ambulation, transfers, stairs, etc )    - Identify cognitive and physical deficits and behaviors that affect mobility  - Identify mobility aids required to assist with transfers and/or ambulation (gait belt, sit-to-stand, lift, walker, cane, etc )  - [place] fall precautions as indicated by assessment  - Record patient progress and toleration of activity level on Mobility SBAR; progress patient to next Phase/Stage  - Instruct patient to call for assistance with activity based on assessment  - Consider rehabilitation consult to assist with strengthening/weightbearing, etc   Outcome: Progressing     Problem: DISCHARGE PLANNING  Goal: Discharge to home or other facility with appropriate resources  Description  INTERVENTIONS:  - Identify barriers to discharge w/patient and caregiver  - Arrange for needed discharge resources and transportation as appropriate  - Identify discharge learning needs (meds, wound care, etc )  - Arrange for interpretive services to assist at discharge as needed  - Refer to Case Management Department for coordinating discharge planning if the patient needs post-hospital services based on physician/advanced practitioner order or complex needs related to functional status, cognitive ability, or social support system  Outcome: Progressing     Problem: Knowledge Deficit  Goal: Patient/family/caregiver demonstrates understanding of disease process, treatment plan, medications, and discharge instructions  Description  Complete learning assessment and assess knowledge base  Interventions:  - Provide teaching at level of understanding  - Provide teaching via preferred learning methods  Outcome: Progressing     Problem: Prexisting or High Potential for Compromised Skin Integrity  Goal: Skin integrity is maintained or improved  Description  INTERVENTIONS:  - Identify patients at risk for skin breakdown  - Assess and monitor skin integrity  - Assess and monitor nutrition and hydration status  - Monitor labs   - Assess for incontinence   - Turn and reposition patient  - Assist with mobility/ambulation  - Relieve pressure over bony prominences  - Avoid friction and shearing  - Provide appropriate hygiene as needed including keeping skin clean and dry  - Evaluate need for skin moisturizer/barrier cream  - Collaborate with interdisciplinary team   - Patient/family teaching  - Consider wound care consult   Outcome: Progressing     Problem: Nutrition/Hydration-ADULT  Goal: Nutrient/Hydration intake appropriate for improving, restoring or maintaining nutritional needs  Description  Monitor and assess patient's nutrition/hydration status for malnutrition  Collaborate with interdisciplinary team and initiate plan and interventions as ordered  Monitor patient's weight and dietary intake as ordered or per policy  Utilize nutrition screening tool and intervene as necessary   Determine patient's food preferences and provide high-protein, high-caloric foods as appropriate       INTERVENTIONS:  - Monitor oral intake, urinary output, labs, and treatment plans  - Assess nutrition and hydration status and recommend course of action  - Evaluate amount of meals eaten  - Assist patient with eating if necessary   - Allow adequate time for meals  - Recommend/ encourage appropriate diets, oral nutritional supplements, and vitamin/mineral supplements  - Order, calculate, and assess calorie counts as needed  - Recommend, monitor, and adjust tube feedings and TPN/PPN based on assessed needs  - Assess need for intravenous fluids  - Provide specific nutrition/hydration education as appropriate  - Include patient/family/caregiver in decisions related to nutrition  Outcome: Progressing     Problem: COPING  Goal: Pt/Family able to verbalize concerns and demonstrate effective coping strategies  Description  INTERVENTIONS:  - Assist patient/family to identify coping skills, available support systems and cultural and spiritual values  - Provide emotional support, including active listening and acknowledgement of concerns of patient and caregivers  - Reduce environmental stimuli, as able  - Provide patient education  - Assess for spiritual pain/suffering and initiate spiritual care, including notification of Pastoral Care or leandra based community as needed  - Assess effectiveness of coping strategies  Outcome: Progressing

## 2020-01-28 NOTE — ASSESSMENT & PLAN NOTE
· There is a right 10th rib fracture noted on CT imaging with possible 7-9 rib fractures versus motion artifact  · Trauma consult appreciated   · Will continue pain regimen as outlined:  · Rib fracture protocol  · Encourage deep breathing, airway clearance  · Possibly will require rehab

## 2020-01-28 NOTE — ASSESSMENT & PLAN NOTE
· Celexa discontinued in light of hyponatremia   · Per geriatrics planned to wean off thus Xanax decreased to 0 25mg QHS x 7 days then stop

## 2020-01-28 NOTE — PROGRESS NOTES
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  In 2019 sodium concentration was normal appears she was started on Celexa in between that developed hyponatremia which was detected on admission  She was given tolvaptan and on IV Lasix with no water restriction as this should be held when patient is on tolvaptan  Sodium concentration has improved  Will repeat tolvaptan 15 mg x 1 orally  Continue to hold fluid restriction  She did receive IV Lasix x1 will discontinue IV Lasix and implement oral chronic dosing tomorrow  BMP a m  Continue to hold Celexa    2  Pulmonary    The patient has preserved left ventricular function as I personally reviewed echocardiogram report from 19 and ejection fraction was 50%  It is likely that the respiratory issues were related to the flu resulting in pneumonitis  The patient is on Tamiflu  SUBJECTIVE:  Review of Systems   Constitution: Negative for chills, fever and night sweats  Not eating much  No rigors  HENT: Negative  Eyes: Negative  Cardiovascular: Negative for chest pain, dyspnea on exertion, leg swelling and orthopnea  Respiratory: Positive for wheezing  Negative for cough and sputum production  Some improvement in shortness of breath  Gastrointestinal: Negative for abdominal pain, diarrhea, nausea and vomiting  Genitourinary: Negative for dysuria, flank pain, hematuria and incomplete emptying  Neurological: Negative  Psychiatric/Behavioral: Negative          OBJECTIVE:  Current Weight: Weight - Scale: 75 2 kg (165 lb 12 6 oz)  Vitals:Temp (24hrs), Av 2 °F (36 8 °C), Min:97 9 °F (36 6 °C), Max:98 3 °F (36 8 °C)  Current: Temperature: 98 3 °F (36 8 °C)   Blood pressure (!) 174/76, pulse 78, temperature 98 3 °F (36 8 °C), resp  rate 20, height 5' 2" (1 575 m), weight 75 2 kg (165 lb 12 6 oz), SpO2 94 %, not currently breastfeeding  , Body mass index is 30 32 kg/m²  Intake/Output Summary (Last 24 hours) at 1/28/2020 1132  Last data filed at 1/28/2020 1053  Gross per 24 hour   Intake 480 ml   Output 1200 ml   Net -720 ml       Physical Exam: BP (!) 174/76   Pulse 78   Temp 98 3 °F (36 8 °C)   Resp 20   Ht 5' 2" (1 575 m)   Wt 75 2 kg (165 lb 12 6 oz)   SpO2 94%   BMI 30 32 kg/m²   Physical Exam   Constitutional: She is oriented to person, place, and time  Non-toxic appearance  She does not appear ill  No distress  HENT:   Head: Atraumatic  Neck: Neck supple  No JVD present  Cardiovascular: Normal rate and regular rhythm  Exam reveals no gallop and no friction rub  Pulmonary/Chest: Effort normal  She has no decreased breath sounds  She has rhonchi  She has no rales  Abdominal: Soft  Bowel sounds are normal  She exhibits no distension  There is no tenderness  Neurological: She is alert and oriented to person, place, and time         Medications:    Current Facility-Administered Medications:     acetaminophen (TYLENOL) tablet 975 mg, 975 mg, Oral, Q8H [facility], [facility], DO, 975 mg at 01/28/20 0552    albuterol (PROVENTIL HFA,VENTOLIN HFA) inhaler 2 puff, 2 puff, Inhalation, Q4H PRN, Carmelina Tsai, DO, 2 puff at 01/28/20 0550    ALPRAZolam Dia Lock) tablet 0 25 mg, 0 25 mg, Oral, HS, Tuesday M ROGELIO Reina, 0 25 mg at 01/27/20 2210    aspirin chewable tablet 81 mg, 81 mg, Oral, Daily, [facility] Scott, DO, 81 mg at 01/28/20 0748    atorvastatin (LIPITOR) tablet 40 mg, 40 mg, Oral, Daily With Chu Marquez DO, 40 mg at 01/27/20 1721    calcium carbonate-vitamin D (OSCAL-D) 500 mg-200 units per tablet 1 tablet, 1 tablet, Oral, BID With Meals, Carmelina Tsai DO, 1 tablet at 01/28/20 0852    docusate sodium (COLACE) capsule 100 mg, 100 mg, Oral, BID, Tuesday M ROGELIO Reina, 100 mg at 01/28/20 5466    famotidine (PEPCID) tablet 10 mg, 10 mg, Oral, BID, Latha Ebbing Veres, DO, 10 mg at 01/28/20 0853    [START ON 1/29/2020] furosemide (LASIX) tablet 40 mg, 40 mg, Oral, Daily, William Colmenares MD    heparin (porcine) subcutaneous injection 5,000 Units, 5,000 Units, Subcutaneous, Q8H [address], 5,000 Units at 01/28/20 0552 **AND** Platelet count, , , Once, Stevphen Shawl, DO    HYDROmorphone (DILAUDID) injection 0 2 mg, 0 2 mg, Intravenous, Q4H PRN, Stevphen Shawl, DO    levalbuterol [facility]) inhalation solution 1 25 mg, 1 25 mg, Nebulization, TID, Carolynn Romberg, MD, 1 25 mg at 01/27/20 2002    lidocaine (LIDODERM) 5 % patch 1 patch, 1 patch, Topical, Daily, Stevphen Shawl, DO, 1 patch at 01/28/20 0628    metoprolol succinate (TOPROL-XL) 24 hr tablet 25 mg, 25 mg, Oral, Daily, Latha Ebbing Veres, DO, 25 mg at 01/28/20 0853    multivitamin-minerals (CENTRUM) tablet 1 tablet, 1 tablet, Oral, Daily, Stevphen Shawl, DO, 1 tablet at 01/28/20 0853    ondansetron (ZOFRAN-ODT) dispersible tablet 4 mg, 4 mg, Oral, Q8H PRN, Stevphen Shawl, DO, 4 mg at 01/27/20 1458    oseltamivir (TAMIFLU) capsule 30 mg, 30 mg, Oral, Q12H [address], Latha Ebbing Veres, DO, 30 mg at 01/28/20 4017    oxyCODONE (ROXICODONE) IR tablet 2 5 mg, 2 5 mg, Oral, Q4H PRN, Stevphen Shawl, DO    oxyCODONE (ROXICODONE) IR tablet 5 mg, 5 mg, Oral, Q4H PRN, Stevzonia Lomas, DO    polyethylene glycol (MIRALAX) packet 17 g, 17 g, Oral, Daily PRN, Tuesday M ROGELIO Reina    potassium chloride (K-DUR,KLOR-CON) CR tablet 20 mEq, 20 mEq, Oral, Daily, Latha Marquez, DO, 20 mEq at 01/28/20 2060    senna-docusate sodium (SENOKOT S) 8 6-50 mg per tablet 1 tablet, 1 tablet, Oral, HS, Tuesday M ROGELIO Reina    sodium chloride 0 9 % inhalation solution 3 mL, 3 mL, Nebulization, TID, Carolynn Romberg, MD, 3 mL at 01/27/20 2002    tolvaptan [facility]) split tablet 15 mg, 15 mg, Oral, Once, Christel Appiah MD    Laboratory Results:  Lab Results   Component Value Date    WBC 4 12 (L) 01/28/2020    HGB 13 5 01/28/2020    HCT 43 4 01/28/2020    MCV 93 01/28/2020     01/28/2020     Lab Results   Component Value Date    SODIUM 129 (L) 01/28/2020    K 4 3 01/28/2020    CL 83 (L) 01/28/2020    CO2 42 (H) 01/28/2020    BUN 9 01/28/2020    CREATININE 0 62 01/28/2020    GLUC 95 01/28/2020    CALCIUM 8 8 01/28/2020     Lab Results   Component Value Date    CALCIUM 8 8 01/28/2020    PHOS 3 3 07/25/2018     No results found for: LABPROT

## 2020-01-28 NOTE — PROGRESS NOTES
Pt rang call bell and c/o SOB  On visual assessment, pt appeared visibly SOB with audible gurgling  Pt's cough was moist but minimally productive  Prn inhaler given, flutter valve used and encouraged to help cough up sputum  Respiratory also paged and at bedside  Will continue to monitor

## 2020-01-28 NOTE — ASSESSMENT & PLAN NOTE
· Suspect multifactorial including CHF exacerbation and Influenza A positive    · Neg procalcitonin therefore low suspicion for superimposed bacterial infection  · Chronic small b/l pleural effusion  · Repeat cxr tomorrow  · Cont to wean off oxygen as able to   · Respiratory protocol

## 2020-01-28 NOTE — ASSESSMENT & PLAN NOTE
· Nephrology consult appreciated  · 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 which has been discontinued   · On tolvaptan  · Continue diuresis with Lasix per Nephrology  · Monitor BMP  · No change in mental status

## 2020-01-28 NOTE — PROGRESS NOTES
Progress Note - Radha Bhakta 2/25/1925, 80 y o  female MRN: [id number]    Unit/Bed#: [facility] 823-01 Encounter: [id number]    Primary Care Provider: Shaye Yates DO   Date and time admitted to hospital: 1/25/2020  9:11 PM        * Acute respiratory failure with hypoxia ([facility] [address] )  Assessment & Plan  · Suspect multifactorial including CHF exacerbation and Influenza A positive  · Neg procalcitonin therefore low suspicion for superimposed bacterial infection  · Chronic small b/l pleural effusion  · Repeat cxr tomorrow  · Cont to wean off oxygen as able to   · Respiratory protocol     Acute on chronic diastolic congestive heart failure (HCC)  Assessment & Plan  Wt Readings from Last 3 Encounters:   01/27/20 75 2 kg (165 lb 12 6 oz)   01/10/20 70 kg (154 lb 6 oz)   12/06/19 73 kg (161 lb)     · Echocardiogram:  Obtained 11/2019, demonstrated EF of 50%, mild concentric hypertrophy, grade 1 diastolic dysfunction, moderate pulmonary hypertension  · Monitor I/O's, daily weights, serum electrolytes  · Incentive spirometry, airway clearance protocol  · Titrate oxygen to maintain saturation of at least 90%, wean as able  · Diuresis with IV lasix daily, per nephrology plans to transition to po lasix starting tomorrow      Influenza A  Assessment & Plan  · Suspect infection may have contributed to CHF exacerbation  · Droplet precautions  · Continue Tamiflu renally-dosed b i d  Hyponatremia  Assessment & Plan  · Nephrology consult appreciated  · 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 which has been discontinued   · On tolvaptan  · Continue diuresis with Lasix per Nephrology  · Monitor BMP  · No change in mental status    Closed compression fracture of L1 vertebra (HCC)  Assessment & Plan  · Found on CT imaging  Initially there was concern that this may be new, however this was found on previous CT imaging  No lower extremity weakness or neuro deficits noted    · Continue pain regimen as otherwise ordered  · Trauma following Closed fracture of multiple ribs of right side  Assessment & Plan  · There is a right 10th rib fracture noted on CT imaging with possible 7-9 rib fractures versus motion artifact  · Trauma consult appreciated   · Will continue pain regimen as outlined:  · Rib fracture protocol  · Encourage deep breathing, airway clearance  · Possibly will require rehab    Cognitive impairment  Assessment & Plan  Chronic  Geriatric following  Avoid delirium related agents    Anxiety  Assessment & Plan  · Celexa discontinued in light of hyponatremia   · Per geriatrics planned to wean off thus Xanax decreased to 0 25mg QHS x 7 days then stop    COPD (chronic obstructive pulmonary disease) ([facility] [address] )  Assessment & Plan  · Patient not on scheduled medications  · Albuterol p r n  · No sign of exacerbation  · Not chronically on home oxygen    Gastroesophageal reflux disease without esophagitis  Assessment & Plan  · Continue renally dosed Pepcid b i d  Atherosclerosis of native coronary artery of native heart without angina pectoris  Assessment & Plan  · Continue aspirin, statin, metoprolol    VTE Pharmacologic Prophylaxis:   Pharmacologic: Heparin  Mechanical VTE Prophylaxis in Place: Yes    Patient Centered Rounds: I have performed bedside rounds with nursing staff today  Discussions with Specialists or Other Care Team Provider:     Education and Discussions with Family / Patient: Patient and Grand-dtr     Time Spent for Care: 30 minutes  More than 50% of total time spent on counseling and coordination of care as described above      Current Length of Stay: 2 day(s)    Current Patient Status: Inpatient   Certification Statement: The patient will continue to require additional inpatient hospital stay due to Upon clinical improvement    Discharge Plan: Possibly to rehab when able to     Code Status: Level 3 - DNAR and DNI      Subjective:   Sleeping comfortably, remains on 4L of oxygen  Reports of slightly improved dyspnea    Objective:     Vitals:   Temp (24hrs), Av 2 °F (36 8 °C), Min:98 °F (36 7 °C), Max:98 3 °F (36 8 °C)    Temp:  [98 °F (36 7 °C)-98 3 °F (36 8 °C)] 98 °F (36 7 °C)  HR:  [78] 78  Resp:  [16-24] 16  BP: (154-174)/(73-76) 172/75  SpO2:  [94 %-97 %] 94 %  Body mass index is 30 32 kg/m²  Input and Output Summary (last 24 hours): Intake/Output Summary (Last 24 hours) at 2020 1654  Last data filed at 2020 1455  Gross per 24 hour   Intake 360 ml   Output 1250 ml   Net -890 ml       Physical Exam:     Physical Exam   Constitutional: She is oriented to person, place, and time  She appears well-developed and well-nourished  Neck: Neck supple  Cardiovascular: Normal rate, regular rhythm and intact distal pulses  Exam reveals no gallop and no friction rub  Murmur heard  Pulmonary/Chest: Effort normal  No stridor  No respiratory distress  She has no wheezes  She has no rales  She exhibits no tenderness  Coarse rhonchi b/l   Abdominal: Soft  Bowel sounds are normal  She exhibits no distension  There is no tenderness  There is no rebound and no guarding  Musculoskeletal: She exhibits no edema, tenderness or deformity  Neurological: She is alert and oriented to person, place, and time  She displays normal reflexes  No cranial nerve deficit or sensory deficit  She exhibits normal muscle tone   Coordination normal        Additional Data:     Labs:    Results from last 7 days   Lab Units 20  0502 20  0502   WBC Thousand/uL 4 12* 5 62   HEMOGLOBIN g/dL 13 5 13 5   HEMATOCRIT % 43 4 42 9   PLATELETS Thousands/uL 258 276   NEUTROS PCT %  --  56   LYMPHS PCT %  --  22   MONOS PCT %  --  22*   EOS PCT %  --  0     Results from last 7 days   Lab Units 20  0502  20  2133   SODIUM mmol/L 129*   < > 126*   POTASSIUM mmol/L 4 3   < > 4 6   CHLORIDE mmol/L 83*   < > 87*   CO2 mmol/L 42*   < > 36*   BUN mg/dL 9   < > 10   CREATININE mg/dL 0 62   < > 0 96   ANION GAP mmol/L 4 < > 3*   CALCIUM mg/dL 8 8   < > 9 7   ALBUMIN g/dL  --   --  3 5   TOTAL BILIRUBIN mg/dL  --   --  0 75   ALK PHOS U/L  --   --  78   ALT U/L  --   --  24   AST U/L  --   --  13   GLUCOSE RANDOM mg/dL 95   < > 141*    < > = values in this interval not displayed  Results from last 7 days   Lab Units 01/28/20  1129 01/28/20  0704 01/27/20  2123 01/27/20  1630 01/27/20  0721 01/26/20  2111 01/26/20  1709 01/26/20  1124 01/26/20  0813   POC GLUCOSE mg/dl 132 112 114 119 103 122 104 132 122     Results from last 7 days   Lab Units 01/26/20  0505   HEMOGLOBIN A1C % 6 2     Results from last 7 days   Lab Units 01/26/20  0505 01/25/20  2133   PROCALCITONIN ng/ml <0 05 0 06           * I Have Reviewed All Lab Data Listed Above  * Additional Pertinent Lab Tests Reviewed:  All Labs Within Last 24 Hours Reviewed    Imaging:    Imaging Reports Reviewed Today Include:   Imaging Personally Reviewed by Myself Includes:      Recent Cultures (last 7 days):           Last 24 Hours Medication List:     Current Facility-Administered Medications:  acetaminophen 975 mg Oral Q8H [facility] Carmelina Tsai DO   albuterol 2 puff Inhalation Q4H PRN Carmelina Tsai DO   ALPRAZolam 0 25 mg Oral HS Tuesday M ROGELIO Reina   aspirin 81 mg Oral Daily Laniedwin ChavezesDO   atorvastatin 40 mg Oral Daily With [employer] MARITZA Marquez DO   calcium carbonate-vitamin D 1 tablet Oral BID With Meals Carmelina Tsai DO   docusate sodium 100 mg Oral BID Tuesday M ROGELIO Reina   famotidine 10 mg Oral BID Carmelina Tsai DO   [START ON 1/29/2020] furosemide 40 mg Oral Daily Sherita Lopez MD   heparin (porcine) 5,000 Units Subcutaneous Q8H [facility] Carmelina Tsai DO   HYDROmorphone 0 2 mg Intravenous Q4H PRN Carmelina Tsai DO   levalbuterol 1 25 mg Nebulization TID Matthew Root MD   lidocaine 1 patch Topical Daily Lani Marquez DO   metoprolol succinate 25 mg Oral Daily Carmelina Tsai DO   multivitamin-minerals 1 tablet Oral Daily Nina Marquez, DO   ondansetron 4 mg Oral Q8H PRN Naveen Carlos, DO   oseltamivir 30 mg Oral Q12H [address] Naveen Carlos, DO   oxyCODONE 2 5 mg Oral Q4H PRN Naveen Carlos, DO   oxyCODONE 5 mg Oral Q4H PRN Naveen Carlos, DO   polyethylene glycol 17 g Oral Daily PRN Tuesday M ROGELIO Reina   potassium chloride 20 mEq Oral Daily Naveen Carlos, DO   senna-docusate sodium 1 tablet Oral HS Tuesday M ROGELIO Renia   sodium chloride 3 mL Nebulization TID Nikko Washburn MD        Today, Patient Was Seen By: Jed Quevedo DO    ** Please Note: Dictation voice to text software may have been used in the creation of this document   **

## 2020-01-28 NOTE — ASSESSMENT & PLAN NOTE
· Found on CT imaging  Initially there was concern that this may be new, however this was found on previous CT imaging  No lower extremity weakness or neuro deficits noted    · Continue pain regimen as otherwise ordered  · Trauma following

## 2020-01-28 NOTE — SOCIAL WORK
Met with patient and family  The are agreeable to STR  1  ARC, other requests are [facility], mirian, and the [facility] sending referrals

## 2020-01-28 NOTE — ASSESSMENT & PLAN NOTE
· Patient not on scheduled medications  · Albuterol p r n    · No sign of exacerbation  · Not chronically on home oxygen

## 2020-01-28 NOTE — ASSESSMENT & PLAN NOTE
Wt Readings from Last 3 Encounters:   01/27/20 75 2 kg (165 lb 12 6 oz)   01/10/20 70 kg (154 lb 6 oz)   12/06/19 73 kg (161 lb)     · Echocardiogram:  Obtained 11/2019, demonstrated EF of 50%, mild concentric hypertrophy, grade 1 diastolic dysfunction, moderate pulmonary hypertension    · Monitor I/O's, daily weights, serum electrolytes  · Incentive spirometry, airway clearance protocol  · Titrate oxygen to maintain saturation of at least 90%, wean as able  · Diuresis with IV lasix daily, per nephrology plans to transition to po lasix starting tomorrow

## 2020-01-28 NOTE — PROGRESS NOTES
Progress Note - Yariel Torres 80 y o  female MRN: [id number]    [facility] Encounter: [id number]      Assessment/Plan:  1  Ambulatory dysfunction  Fall precautions  PT/OT  Rehab post hospitalization  Vitamin D3 1000 International Units daily    2  Acute pain due to trauma  Geriatric pain protocol  Scheduled acetaminophen  Lidoderm patch  P r n  Oxycodone  Monitor for constipation    3  Frailty  Moderate frailty  Risk factors:  Hospitalization, polypharmacy, limited mobility, poor nutrition  Albumin 3 5  Decreased appetite  PT/OT    4  Hyponatremia  Sodium 129  Multifactorial  Celexa discontinued, recommend re-evaluation as outpatient continue to monitor sodium    5  Cognitive decline  Cognitive testing deferred at present secondary to limited reserve with acute illness  Recommend check vitamin B12  TSH within normal limits  PARENCHYMA: Decreased attenuation is noted in periventricular and subcortical white matter demonstrating an appearance that is statistically most likely to represent advanced microangiopathic change; this appearance has progressed somewhat when compared   to most recent prior examination  Continue supportive care    6  Anxiety  Citalopram new start without last office appointment discontinued at present secondary to hyponatremia, re-evaluate as outpatient  Patient currently on Xanax 0 25 mg p o  B i d  Per family, the family was concerned with increased sedation   aware check done  Continue Xanax 0 25 mg p o  Q h s  X7 days to prevent abrupt withdrawal    7  Delirium precautions  Alert and orient x3  Sodium 129, improved  Avoid benzodiazepine withdrawal  Geriatric pain protocol  Monitor for constipation, continue Colace and p r n  MiraLax, Senokot S ordered q h s  Maintain circadian rhythm  Encourage adequate p o  Hydration/nutrition  Provide frequent redirection, reorientation, distraction techniques    8   Medication review  Medications reviewed with granddaughter  She states medications in epic are correct except for Xanax is being given 0 25 mg b i d  At home    10  Influenza a  Droplet precaution  Renally dosed Tamiflu    11  Acute respiratory failure with hypoxia  Multifactorial:  Exacerbation CHF, influenza positive, history of COPD, pleural effusion  Management by Internal Medicine    12  Multiple rib fractures, right side  Rib fracture protocol  Geriatric pain protocol  Incentive spirometry  Trauma following        Subjective:   Upon exam patient out of bed in recliner chair  She is alert and orient x3, she is, cooperative  She is slightly dyspneic  She does not recall she slept well last night  She states she is not eating well  No reported bowel movement since admission  Objective:     Vitals: Blood pressure (!) 174/76, pulse 78, temperature 98 3 °F (36 8 °C), resp  rate 20, height 5' 2" (1 575 m), weight 75 2 kg (165 lb 12 6 oz), SpO2 94 %, not currently breastfeeding  ,Body mass index is 30 32 kg/m²  Intake/Output Summary (Last 24 hours) at 1/28/2020 0944  Last data filed at 1/28/2020 0729  Gross per 24 hour   Intake 360 ml   Output 950 ml   Net -590 ml       Physical Exam:   General : NAD  HEENT : MMM   Heart : irregular, murmur  Lungs : scattered rhonchi throughout, slightly dyspneic, O2 at 5 L nasal cannula  Abdomen : Soft, NT/ND, BS auscultated in all 4 quads  Ext :  no edema, trace  Skin : Pink, warm, dry and flaky  Neuro : Nonfocal  Psych : Alert and O x 3      Invasive Devices     Peripheral Intravenous Line            Peripheral IV 01/25/20 Left Antecubital 2 days    Peripheral IV 01/25/20 Right Antecubital 2 days                Lab, Imaging and other studies: I have personally reviewed pertinent reports      VTE Pharmacologic Prophylaxis: Sequential compression device (Venodyne)   VTE Mechanical Prophylaxis: sequential compression device

## 2020-01-29 PROBLEM — E87.2 COMPENSATED RESPIRATORY ACIDOSIS: Status: ACTIVE | Noted: 2020-01-01

## 2020-01-29 PROBLEM — J96.02 ACUTE RESPIRATORY FAILURE WITH HYPOXIA AND HYPERCAPNIA (HCC): Status: ACTIVE | Noted: 2020-01-01

## 2020-01-29 NOTE — PHYSICAL THERAPY NOTE
Physical Therapy Cancellation Note    PT ATTEMPTED TO SEE PATIENT FOR TREATMENT SESSION HOWEVER NOT MEDICALLY APPROPRIATE AT THIS TIME PER RN SECONDARY TO RESPIRATORY CHANGES AND ABNORMAL BLOOD WORK  PATIENT OFF FLOOR AT XRAY AND ALSO NOW ADMITTED TO DIFFERENT MEDICAL SURGICAL FLOOR  WILL CONTINUE TO FOLLOW ON CASELOAD AND PERFORM FUTURE TREATMENT SESSIONS AS MEDICALLY APPROPRIATE       RICHARD SIMPSON PT, DPT

## 2020-01-29 NOTE — PROGRESS NOTES
Spoke to critical care over the phone regarding case below after note completed  It was recommended to initiate on the Diamox given metabolic alkalosis on admission to the hospital   Will hold Lasix and administer Diamox every 8 hours times 3 doses  Repeat ABG at 3:00 p m  Cody Ferguson Continuous BiPAP ordered  Consult to Medical Critical Care placed  Progress Note - Suzi Mckenzie 2/25/1925, 80 y o  female MRN: [id number]    Unit/Bed#: [facility] 831-01 Encounter: [id number]    Primary Care Provider: Taye Orlando DO   Date and time admitted to hospital: 1/25/2020  9:11 PM    * Acute respiratory failure with hypoxia ([facility] [address] )  Assessment & Plan  · Suspect multifactorial including CHF exacerbation and Influenza A positive complicated by rib fractures secondary to trauma  · Negative procalcitonin therefore low suspicion for superimposed bacterial infection  · Chronic small bilateral pleural effusions as evidence on chest XR  · On 1/29 around 0800 the night team was made aware that the patient was belly breathing, tachypnic, very junky sounding  A CXR and ABG was ordered immediately  I personally saw the patient and evaluated her  She is still alert and oriented X4  She is extremely weak from hospitalization deconditioning as well as influenza and fall, thus is having difficulty clearing her secretions  There is also a factor of dementia and she often forgets she needs to clear her secretions and needs to be reminded to do so  She was saturating appropriately on nasal cannula but per bicarb elevation suspect retaining CO2  · ABG with compensated respiratory acidosis     · Repeat chest XR from 1/29 ordered and pending; will change order to STAT considering current respiratory status  · Continuous BiPAP ordered; level 1 step down updated level of care  · Pulmonary consulted in regards to plan as above  · Recommended discontinuation of vest   · Continue upright posture with frequent reminder of secretion to clear  · Flutter Valve  · They will see her this morning and make formal recommendations on 1/29  · Respiratory protocol     Compensated respiratory acidosis  Assessment & Plan  · As evidence by Arterial Blood Gas result:  pO2 71 7; pCO2 116 9; pH 7 303;  HCO3 56 6, %O2 Sat 96  · Continuous BiPAP ordered  · Stat repeat chest x-ray  · Updated level of care to level 1 step down  · Pulmonary consult ordered    Acute on chronic diastolic congestive heart failure (HCC)  Assessment & Plan  Wt Readings from Last 3 Encounters:   01/29/20 76 2 kg (167 lb 15 9 oz)   01/10/20 70 kg (154 lb 6 oz)   12/06/19 73 kg (161 lb)     · Echocardiogram:  Obtained 11/2019, demonstrated EF of 50%, mild concentric hypertrophy, grade 1 diastolic dysfunction, moderate pulmonary hypertension  · Monitor I/O's, daily weights, serum electrolytes  · Incentive spirometry, airway clearance protocol  · Titrate oxygen to maintain saturation of at least 90%, BiPAP continuous ordered   · Diuresis with IV lasix daily; transitioned per nephrology to PO lasix on 1/29  · Given current lung sounds would consider transitioning back to IV lasix, but question how effective or helpful that will be as suspect secretions for respiratory than pulmonary edema/congestion from heart failure  Will defer decision to Nephrology team      Influenza A  Assessment & Plan  · Suspect infection may have contributed to CHF exacerbation    · Droplet precautions  · Continue Tamiflu renally-dosed b i d ; currently on day 4/5    Hyponatremia  Assessment & Plan  · Nephrology consult appreciated  · 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 which has been discontinued   · On tolvaptan  · Continue diuresis with Lasix per Nephrology  · Monitor BMP  · No change in mental status    Atherosclerosis of native coronary artery of native heart without angina pectoris  Assessment & Plan  · Continue aspirin, statin, metoprolol    Closed fracture of multiple ribs of right side  Assessment & Plan  · There is a right 10th rib fracture noted on CT imaging with possible 7-9 rib fractures versus motion artifact  · Trauma consult completed; recommendations appreciated; since signed off  · Will continue pain regimen as outlined:  · Rib fracture protocol  · Encourage deep breathing, airway clearance  · Possibly will require rehab    COPD (chronic obstructive pulmonary disease) ([facility] [address] )  Assessment & Plan  · Patient not on scheduled medications  · Albuterol p r n  · No sign of exacerbation  · Not chronically on home oxygen    Gastroesophageal reflux disease without esophagitis  Assessment & Plan  · Continue renally dosed Pepcid b i d  Anxiety  Assessment & Plan  · Celexa discontinued in light of hyponatremia   · Per geriatrics planned to wean off thus Xanax decreased to 0 25mg QHS x 7 days then stop  · Currently on day 3/7 on 1/29    Closed compression fracture of L1 vertebra [facility])  Assessment & Plan  · Found on CT imaging  Initially there was concern that this may be new, however this was found on previous CT imaging  No lower extremity weakness or neuro deficits noted  · Continue pain regimen as otherwise ordered  · Trauma evaluated; since signed off    Cognitive impairment  Assessment & Plan  · Chronic  · Geriatric following  · Avoid delirium related agents    VTE Pharmacologic Prophylaxis:   Pharmacologic: Heparin  Mechanical VTE Prophylaxis in Place: Yes    Patient Centered Rounds: I have performed bedside rounds with nursing staff today  Discussions with Specialists or Other Care Team Provider:  Pulmonology, nursing staff, respiratory therapy, case management    Education and Discussions with Family / Patient:  Education provided the bedside regarding plan of care as noted above  Patient endorsed understanding and is aware that she will require a BiPAP    Time Spent for Care: 30 minutes  More than 50% of total time spent on counseling and coordination of care as described above      Current Length of Stay: 3 day(s)    Current Patient Status: Inpatient   Certification Statement: The patient will continue to require additional inpatient hospital stay due to Obtain level of care with continued monitoring of her respiratory status    Discharge Plan:  Pending response to therapy  The Likely 48-72 hours    Code Status: Level 3 - DNAR and DNI      Subjective:   Patient reports that she is having trouble breathing  Objective:     Vitals:   Temp (24hrs), Av 1 °F (36 7 °C), Min:97 7 °F (36 5 °C), Max:98 4 °F (36 9 °C)    Temp:  [97 7 °F (36 5 °C)-98 4 °F (36 9 °C)] 98 1 °F (36 7 °C)  HR:  [76-85] 85  Resp:  [16-47] 22  BP: (167-177)/(75-89) 168/83  SpO2:  [94 %-98 %] 98 %  Body mass index is 30 73 kg/m²  Input and Output Summary (last 24 hours): Intake/Output Summary (Last 24 hours) at 2020 0903  Last data filed at 2020 0640  Gross per 24 hour   Intake 120 ml   Output 1420 ml   Net -1300 ml       Physical Exam:     Physical Exam   Constitutional: She is oriented to person, place, and time  She is cooperative  She appears distressed  Cardiovascular: Normal rate, regular rhythm, normal heart sounds and intact distal pulses  Pulses:       Radial pulses are 2+ on the right side, and 2+ on the left side  No peripheral edema noted  Pulmonary/Chest: Accessory muscle usage present  Tachypnea noted  She is in respiratory distress  She has rhonchi in the right upper field and the left upper field  Abdominal: Soft  Bowel sounds are normal  She exhibits no distension  There is no tenderness  Neurological: She is alert and oriented to person, place, and time  Skin: Skin is warm and dry  Capillary refill takes less than 2 seconds  Psychiatric: She has a normal mood and affect  Her speech is normal and behavior is normal  Judgment normal    Vitals reviewed        Additional Data:     Labs:    Results from last 7 days   Lab Units 20  0504   WBC Thousand/uL 6 10   HEMOGLOBIN g/dL 14 2   HEMATOCRIT % 45 9   PLATELETS Thousands/uL 265   NEUTROS PCT % 62   LYMPHS PCT % 21   MONOS PCT % 17*   EOS PCT % 0     Results from last 7 days   Lab Units 01/29/20  0504 01/28/20  0502  01/25/20  2133   SODIUM mmol/L 134* 129*   < > 126*   POTASSIUM mmol/L 4 4 4 3   < > 4 6   CHLORIDE mmol/L 86* 83*   < > 87*   CO2 mmol/L >45* 42*   < > 36*   BUN mg/dL 9 9   < > 10   CREATININE mg/dL 0 61 0 62   < > 0 96   ANION GAP mmol/L  --  4   < > 3*   CALCIUM mg/dL 9 1 8 8   < > 9 7   ALBUMIN g/dL  --   --   --  3 5   TOTAL BILIRUBIN mg/dL  --   --   --  0 75   ALK PHOS U/L  --   --   --  78   ALT U/L  --   --   --  24   AST U/L  --   --   --  13   GLUCOSE RANDOM mg/dL 94 95   < > 141*    < > = values in this interval not displayed  Results from last 7 days   Lab Units 01/28/20  1129 01/28/20  0704 01/27/20  2123 01/27/20  1630 01/27/20  0721 01/26/20  2111 01/26/20  1709 01/26/20  1124 01/26/20  0813   POC GLUCOSE mg/dl 132 112 114 119 103 122 104 132 122     Results from last 7 days   Lab Units 01/26/20  0505   HEMOGLOBIN A1C % 6 2     Results from last 7 days   Lab Units 01/26/20  0505 01/25/20  2133   PROCALCITONIN ng/ml <0 05 0 06           * I Have Reviewed All Lab Data Listed Above  * Additional Pertinent Lab Tests Reviewed:  Amanda 66 Admission Reviewed    Imaging:    Imaging Reports Reviewed Today Include: CXR  Imaging Personally Reviewed by Myself Includes:  None    Recent Cultures (last 7 days):           Last 24 Hours Medication List:     Current Facility-Administered Medications:  acetaminophen 975 mg Oral Q8H [facility] Paulita Rinne Veres, DO   albuterol 2 puff Inhalation Q4H PRN Laura Edin, DO   ALPRAZolam 0 25 mg Oral HS Tuesday M ROGELIO Reina   aspirin 81 mg Oral Daily Sherita Rinne Veres, DO   atorvastatin 40 mg Oral Daily With Marylouise Gain MARITZA Marquez, DO   calcium carbonate-vitamin D 1 tablet Oral BID With Meals Laura Jesus, DO   docusate sodium 100 mg Oral BID Tuesday Anmol Akbar, CRNP   famotidine 10 mg Oral BID [facility], DO   furosemide 40 mg Oral Daily Trupti Sagastume MD   guaiFENesin 600 mg Oral Q12H [facility] Juan Montoya PA-C   heparin (porcine) 5,000 Units Subcutaneous [facility], [facility]   HYDROmorphone 0 2 mg Intravenous Q4H PRN Abner Qing, DO   levalbuterol 1 25 mg Nebulization TID Jose Alfredo Reid MD   lidocaine 1 patch Topical Daily Karime  Verelias, DO   metoprolol succinate 25 mg Oral Daily [facility], DO   multivitamin-minerals 1 tablet Oral Daily [facility]   OLANZapine 2 5 mg Oral Once PRN Juan Montoya PA-C   ondansetron 4 mg Oral Q8H PRN [facility], DO   oseltamivir 30 mg Oral Q12H [facility], DO   oxyCODONE 2 5 mg Oral Q4H PRN [facility], DO   oxyCODONE 5 mg Oral Q4H PRN [facility], DO   polyethylene glycol 17 g Oral Daily PRN Tuesday M ROGELIO Reina   potassium chloride 20 mEq Oral Daily [facility], DO   senna-docusate sodium 1 tablet Oral HS Tuesday M ROGELIO Reina   sodium chloride 3 mL Nebulization TID Jose Alfredo Reid MD        Today, Patient Was Seen By: ROGELIO Perez    ** Please Note: Dictation voice to text software may have been used in the creation of this document   **

## 2020-01-29 NOTE — ASSESSMENT & PLAN NOTE
· Acute on chronic hypercapnia now with respiratory failure requiring continuous BiPAP  · Pulmonology following  · Managing continuous BiPAP (12/5 currently)  · Standing nebulizers (xopenox and atrovent)  · As per discussion between [address] [place] [address] attending and primary team, recommend diamox q8hrs x3 doses to continue diuresis and help with contraction alkalosis  · Follow-up repeat ABG this afternoon  · CXR today with continued vascular congestion with possible initial RLL PNA-- recommend trending fever curve and WBC  · Aggressive pulmonary hygiene with multimodal analgesia for rib fractures  · CC will continue to follow along with pulmonology, please contact [phone] if concern for further clinical deterioration

## 2020-01-29 NOTE — ASSESSMENT & PLAN NOTE
· Acute on chronic hypercapnia now with respiratory failure requiring continuous BiPAP  · Pulmonology following and managing continuous BiPAP (12/5 currently)  · As per discussion between [address] [place] [address] attending and primary team, recommend diamox q8hrs x3 doses to continue diuresis and help with contraction alkalosis  · Follow-up repeat ABG this afternoon  · CXR today with continued vascular congestion with possible initial RLL PNA-- recommend trending fever curve and WBC  · CC will continue to follow along with pulmonology, please contact [phone] if concern for further clinical deterioration

## 2020-01-29 NOTE — CONSULTS
Consult Note - Pulmonary   Darvin Bias 80 y o  female MRN: [id number]  Unit/Bed#: [facility] 831-01 Encounter: [id number]      Reason for consultation:  Acute hypoxic/hypercarbic respiratory failure    Requesting: ROGELIO Khalil    1  Acute hypoxic and acute on chronic hypercarbic respiratory failure, multifactorial etiology  - patient with significant hypoxia and hypercarbia as noted on recent ABG on 6 L nasal cannula 7 3/117/72/56/93%  - I suspect etiology of worsening hypercarbia to be multifactorial in the setting of acute on chronic diastolic CHF with diuresis and contraction alkalosis, influenza a infection, as well as chronic benzo/narcotic use and atelectasis  - CT chest this admission with evidence of volume overload right greater than left pleural effusion and ground-glass with atelectasis  - will continue with BiPAP, 14/5 and repeat ABG in 1-2 hours to monitor for improvement  - will follow repeat chest x-ray  - suspect patient has some component of chronic hypercapnia given her relative compensation on most recent ABG  It is likely that she has a baseline pCO2 around 70 given her chronic elevation and bicarb of mid 30s  - can consider dose of Diamox, but will defer to Nephrology  - diuretics switched back to IV given that patient is now on BiPAP and NPO and it appears that she likely has some ongoing fluid retention  - avoid further benzos and narcotics as able  - wean supplemental oxygen as tolerated, when more stable, incentive spirometry, out of bed to chair, pulmonary toilet  - will start standing nebulizers with Xopenex/Atrovent t i d   - continue Tamiflu for influenza a infection, currently day 4/5  - no evidence of superimposed bacterial infection, continue to monitor off antibiotics    2   Acute on chronic diastolic CHF  - Echo 95/05/6385  EF 44%, grade 1 diastolic dysfunction, peak PA pressure 54, consistent with moderate pulmonary hypertension  - continue diuresis as above, -3 28 L since admission  - continue Toprol  - monitor intake/output, daily weights  - nephrology following, can consider cardiology consultation    3  Hyponatremia  - status post tolvaptan, sodium 134 today, nephrology following    4  Acute right-sided rib fracture  - CT chest 01/25/2020  Minimally displaced right 10th rib fracture, questionable fractures of 7th-9th ribs versus motion artifact  Compression deformity of L1  - pain management following, avoid narcotics as able, continue scheduled Tylenol  - aggressive incentive spirometry    5  CAD  - continue aspirin, statin, beta-blocker    6  COPD, not in acute exacerbation  - history of COPD, without formal pulmonary function testing, does not follow with a pulmonologist  - uses p r n  Albuterol inhaler at home, but not on any maintenance therapy  - continue Xopenex/Atrovent, no role for systemic steroids at this time  - would benefit from outpatient pulmonary follow-up and spirometry    7  GERD  - continue Pepcid    Case to be discussed with Dr Jean Larsen  History of Present Illness      HPI:  Chong Ryan is a 80 y o  female with anxiety, COPD unknown severity, GERD, hypertension, hyperlipidemia, CAD with history of MI, diastolic CHF, who initially presented on 01/25/2020 presented with worsening shortness of breath and lower extremity edema, as well as a fall while transferring from her toilet on 01/25  Patient stated that she struck her cheek on the vanity as well as her right chest wall without losing consciousness and without any symptoms preceding her fall such as lightheadedness, dizziness, chest pain  CT chest demonstrated minimally displaced right 10th rib fracture with question of 7-9 fractures versus motion artifact  Patient's chief complaint was actually her shortness of breath  She was found to be fluid overloaded and started on IV diuretics with Lasix 40 mg b i d, which has been switched to p o  40 mg today  Jean Wilson   She was also found to be influenza A positive with negative procalcitonin and was started on Tamiflu  She has also been seen by Nephrology for hyponatremia and received tolvaptan x1  She has been seen by pain management for pain control and is receiving p r n  Narcotics, as well as scheduled Tylenol and lighted derm patch  She also receives nightly Xanax for anxiety  This morning, patient was noted to be extremely tachypneic with increased work of breathing and stat chest x-ray and ABG were ordered  ABG on 6 L nasal cannula showed significant respiratory acidosis 7 3/117/72/56/93%  Patient was subsequently transitioned to BiPAP for acute on chronic respiratory acidosis  Pulmonology evaluation was then requested  Patient now seen examined at bedside  She is on BiPAP 12/5 with a rate of 8, but over breathing in the 30s  Tidal volumes are around 400  She complains of some shortness of breath, but denies cough or congestion  No fever, chills, nausea, vomiting, chest pain  She is alert, but oriented x2 to self and place, does not know correct year  In terms of pulmonary history, patient is a former smoker, but quit many years ago  She has a diagnosis of COPD, but is not maintained on any maintenance inhalers at home, only rescue albuterol inhaler  She does not routinely see a pulmonologist as an outpatient and does not have any pulmonary function testing on file  She has no history of occupational exposures  Review of Systems   Constitutional: Negative for chills, fever and unexpected weight change  HENT: Negative for congestion, rhinorrhea, sneezing and sore throat  Respiratory: Positive for chest tightness and shortness of breath  Negative for cough and wheezing  Cardiovascular: Negative for chest pain, palpitations and leg swelling  Gastrointestinal: Negative for abdominal pain, constipation, diarrhea, nausea and vomiting  Endocrine: Negative for cold intolerance and heat intolerance  Genitourinary: Negative for dysuria  Musculoskeletal: Negative for arthralgias  Pain over right-sided rib fractures   Allergic/Immunologic: Negative for immunocompromised state  Neurological: Negative for dizziness and numbness  Historical Information   Past Medical History:   Diagnosis Date    Abnormal blood sugar 03/13/2017    Abnormal carotid duplex scan     Carotid Duplex (Plaque formation is quite prominent bilaterally  Despite these changes, no evidence for greater than 50% diameter reducing lesions in the cervical portion of either carotid artery hemisystem ) - 6/13/2017    Anxiety     Cervical radiculopathy 08/08/2017    CH[facility]congestive [facility] failure) [facility])     diastolic    Compression fracture of lumbar spine, non-traumatic, with routine healing, subsequent encounter     COPD (chronic obstructive pulmonary disease) ([facility] [facility] ) 2/6/2018    Coronary angioplasty status     Coronary artery disease 4/7/2017    Costochondritis 02/05/2013    suspect MS in origin given relationship to ROM and location  Start mobic and if persists, reeval  Refused xrayat this time   Dyslipidemia     GERD without esophagitis 8/30/2012    H/O CT scan of chest      (No PE, minimal interstitial change left lower lobe and right upper lobe, which may be acute ) - 5/19/2017    History of Holter monitoring     Holter (Sinus rhythm with rates ranging from 52 to 118 bpm   No afib or heart block  Rare atrial and ventricular ectopic beats  One six-beat atrial run noted  No pauses  ) - 5/31/2017    Hx of echocardiogram     Echo (EF 0 60 (60%), Biatrial enlargement ) - 3/24/2017 Echo (EF 0 55 (55%), mild LVH  Aortic valvular sclerosis  Trace MI with mild left atrial dilatation, mild MAC  Mild TI with mild pulmonary hypertension  ) - 5/22/2017 Echo (EF 0 60 (60%), Normal left vent chamber size and systolic function  Mild diastolic dysfunction of LV w/normal filling pressures   Mild mitral regurg ) - 7/26/2017 Echo (EF 0    Hx of echocardiogram 08/16/2017    EF0 60 (60%) Normal left vent chamber size and systolic function of LV w/normal filling presures  Mild mitral regurg  / EF0 50 (50%) Mild LVH  MIld MR 10/6/17     Hypertension     Iron deficiency anemia 4/21/2016    Macular degeneration, right eye     [facility] 9/17/2012    Mixed hyperlipidemia     NSTEMI (non-ST elevated myocardial infarction) ([facility] ) 7/25/2017    Old MI (myocardial infarction)     Osteoarthritis 9/17/2012    Osteoporosis 3/13/2017    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 ) 04/07/2017    Urinary tract infection     frequent UTI's     Past Surgical History:   Procedure Laterality Date    ABDOMINAL SURGERY      APPENDECTOMY      BREAST SURGERY      Lumpectomy    CARDIAC CATHETERIZATION  03/24/2017    ARNIE to 100% occluded prox RCA, chronic 99% ostial LCfx, 60% mid LAD, discrete 40% distal LM / EF0 60 (60%) 100% occluded proximal RCA s/p ARNIE, chronic 99% ostial LCX, 60% mid LAD, discrete 40% dital LM  4/5/17    CHOLECYSTECTOMY      COLONOSCOPY N/A 7/25/2018    Procedure: COLONOSCOPY;  Surgeon: Marvin Thomas MD;  [facility];  Service: Gastroenterology    CORONARY STENT PLACEMENT  03/25/2017    ARNIE RCA    HEMORRHOID SURGERY      INSERTION STENT ARTERY  04/07/2017    Cardio vascular agents drug-eluting stent    SKIN BIOPSY      TONSILLECTOMY       Family History   Problem Relation Age of Onset    Heart failure Mother     Prostate cancer Father     Stroke Family      Social History:   Former smoker, quit many years ago, no alcohol or drug use    Meds/Allergies   Current Facility-Administered Medications   Medication Dose Route Frequency    acetaminophen (TYLENOL) tablet 975 mg  975 mg Oral Q8H [address]    albuterol (PROVENTIL HFA,VENTOLIN HFA) inhaler 2 puff  2 puff Inhalation Q4H PRN    ALPRAZolam (XANAX) tablet 0 25 mg  0 25 mg Oral HS    aspirin chewable tablet 81 mg  81 mg Oral Daily    atorvastatin (LIPITOR) tablet 40 mg  40 mg Oral Daily With Dinner    calcium carbonate-vitamin D (OSCAL-D) 500 mg-200 units per tablet 1 tablet  1 tablet Oral BID With Meals    docusate sodium (COLACE) capsule 100 mg  100 mg Oral BID    famotidine (PEPCID) tablet 10 mg  10 mg Oral BID    furosemide (LASIX) tablet 40 mg  40 mg Oral Daily    guaiFENesin (MUCINEX) 12 hr tablet 600 mg  600 mg Oral Q12H [facility]    heparin (porcine) subcutaneous injection 5,000 Units  5,000 Units Subcutaneous Q8H [facility]    HYDROmorphone (DILAUDID) injection 0 2 mg  0 2 mg Intravenous Q4H PRN    levalbuterol (XOPENEX) inhalation solution 1 25 mg  1 25 mg Nebulization TID    lidocaine (LIDODERM) 5 % patch 1 patch  1 patch Topical Daily    metoprolol succinate (TOPROL-XL) 24 hr tablet 25 mg  25 mg Oral Daily    multivitamin-minerals (CENTRUM) tablet 1 tablet  1 tablet Oral Daily    OLANZapine (ZyPREXA ZYDIS) dispersible tablet 2 5 mg  2 5 mg Oral Once PRN    ondansetron (ZOFRAN-ODT) dispersible tablet 4 mg  4 mg Oral Q8H PRN    oseltamivir (TAMIFLU) capsule 30 mg  30 mg Oral Q12H LIZZ[facility]    oxyCODONE (ROXICODONE) IR tablet 2 5 mg  2 5 mg Oral Q4H PRN    oxyCODONE (ROXICODONE) IR tablet 5 mg  5 mg Oral Q4H PRN    polyethylene glycol (MIRALAX) packet 17 g  17 g Oral Daily PRN    potassium chloride (K-DUR,KLOR-CON) CR tablet 20 mEq  20 mEq Oral Daily    senna-docusate sodium (SENOKOT S) 8 6-50 mg per tablet 1 tablet  1 tablet Oral HS    sodium chloride 0 9 % inhalation solution 3 mL  3 mL Nebulization TID     Medications Prior to Admission   Medication    ALPRAZolam (XANAX) 0 5 mg tablet    aspirin 81 mg chewable tablet    atorvastatin (LIPITOR) 40 mg tablet    Calcium Carbonate-Vit D-Min (CALCIUM 1200 PO)    citalopram (CeleXA) 10 mg tablet    furosemide (LASIX) 20 mg tablet    metoprolol succinate (TOPROL-XL) 25 mg 24 hr tablet    multivitamin (THERAGRAN) TABS    ondansetron (ZOFRAN) 4 mg tablet    Potassium Chloride BRISSA    ranitidine (ZANTAC) 150 mg tablet Allergies   Allergen Reactions    Ciprofloxacin      Reaction Date: [id number];     Keflex [Cephalexin] Dizziness    Nitrofurantoin      Reaction Date: [id number];     Penicillins Rash, Other (See Comments) and Throat Swelling     Throat swells       Vitals:    01/28/20 2354 01/29/20 0248 01/29/20 0600 01/29/20 0726   BP: (!) 177/89 167/80  168/83   Pulse: 84 76  85   Resp: (!) 47 (!) 28  22   Temp: 98 4 °F (36 9 °C) 97 7 °F (36 5 °C)  98 1 °F (36 7 °C)   TempSrc:       SpO2: 95% 95%  98%   Weight:   76 2 kg (167 lb 15 9 oz)    Height:           Physical Exam   Constitutional: She is oriented to person, place, and time  She appears well-developed and well-nourished  No distress  Thin, elderly female  On BiPAP, mildly tachypneic   HENT:   Head: Normocephalic and atraumatic  Mouth/Throat: Oropharynx is clear and moist  No oropharyngeal exudate  Eyes: EOM are normal  No scleral icterus  Cardiovascular: Normal rate and regular rhythm  No murmur heard  Pulmonary/Chest: Effort normal  No respiratory distress (Mildly tachypneic)  She has no wheezes  Diffuse rhonchi with bibasilar crackles   Abdominal: Soft  Bowel sounds are normal  She exhibits no distension  There is no tenderness  Musculoskeletal: She exhibits no edema  Neurological: She is alert and oriented to person, place, and time  Skin: She is not diaphoretic  Labs: I have personally reviewed pertinent lab results  Results from last 7 days   Lab Units 01/29/20  0504 01/28/20  0502 01/27/20  0502  01/25/20  2133   WBC Thousand/uL 6 10 4 12* 5 62   < > 7 14   HEMOGLOBIN g/dL 14 2 13 5 13 5   < > 14 1   HEMATOCRIT % 45 9 43 4 42 9   < > 43 9   PLATELETS Thousands/uL 265 258 276   < > 315   NEUTROS PCT % 62  --  56  --  63   MONOS PCT % 17*  --  22*  --  22*    < > = values in this interval not displayed        Results from last 7 days   Lab Units 01/29/20  0504 01/28/20  0502 01/27/20  0502  01/25/20  2133   POTASSIUM mmol/L 4 4 4 3 4 2 < > 4 6   CHLORIDE mmol/L 86* 83* 83*   < > 87*   CO2 mmol/L >45* 42* 42*   < > 36*   BUN mg/dL 9 9 11   < > 10   CREATININE mg/dL 0 61 0 62 0 71   < > 0 96   CALCIUM mg/dL 9 1 8 8 8 7   < > 9 7   ALK PHOS U/L  --   --   --   --  78   ALT U/L  --   --   --   --  24   AST U/L  --   --   --   --  13    < > = values in this interval not displayed  0   Lab Value Date/Time    TROPONINI 0 07 (H) 11/11/2019 1915    TROPONINI 0 09 (H) 11/11/2019 1742    TROPONINI 0 07 (H) 11/11/2019 1516    TROPONINI <0 03 11/09/2019 2215    TROPONINI 0 03 05/30/2019 0935    TROPONINI 0 06 (H) 05/21/2019 1622    TROPONINI 0 07 (H) 05/21/2019 1240    TROPONINI 0 07 (H) 05/21/2019 0759    TROPONINI 2 56 (H) 07/30/2018 0153    TROPONINI 2 40 (H) 07/29/2018 2211    TROPONINI 2 57 (H) 07/29/2018 1821    TROPONINI 0 28 (HH) 05/22/2018 0603    TROPONINI 0 39 (HH) 05/21/2018 1752       Imaging and other studies: I have personally reviewed pertinent reports  and I have personally reviewed pertinent films in PACS  Chest x-ray 01/27/2020  Bibasilar effusions with atelectasis and pulmonary vascular congestion with cardiomegaly    CT chest 01/25/2020  Minimally displaced right 10th rib fracture, questionable fractures of 7th-9th ribs versus motion artifact  Compression deformity of L1  Small bilateral pleural effusions right greater than left with scattered ground-glass, likely secondary to fluid overload, with bibasilar atelectasis    Pulmonary function testing:  None    EKG, Pathology, and Other Studies: I have personally reviewed pertinent reports      Echo 11/12/2019  EF 68%, grade 1 diastolic dysfunction, peak PA pressure 54, consistent with moderate pulmonary hypertension    Code Status: Level 3 - DNAR and DNI      Jena Hackett MD  Pulmonary & Critical Care Fellow, [facility]

## 2020-01-29 NOTE — ASSESSMENT & PLAN NOTE
· Suspect multifactorial including CHF exacerbation and Influenza A positive complicated by rib fractures secondary to trauma  · Negative procalcitonin therefore low suspicion for superimposed bacterial infection  · Chronic small bilateral pleural effusions as evidence on chest XR  · On 1/29 around 0800 the night team was made aware that the patient was belly breathing, tachypnic, very junky sounding  A CXR and ABG was ordered immediately  I personally saw the patient and evaluated her  She is still alert and oriented X4  She is extremely weak from hospitalization deconditioning as well as influenza and fall, thus is having difficulty clearing her secretions  There is also a factor of dementia and she often forgets she needs to clear her secretions and needs to be reminded to do so  She was saturating appropriately on nasal cannula but per bicarb elevation suspect retaining CO2  · ABG with compensated respiratory acidosis     · Repeat chest XR from 1/29 ordered and pending; will change order to STAT considering current respiratory status  · Continuous BiPAP ordered; level 1 step down updated level of care  · Pulmonary consulted in regards to plan as above  · Recommended discontinuation of vest   · Continue upright posture with frequent reminder of secretion to clear  · Flutter Valve  · They will see her this morning and make formal recommendations on 1/29  · Respiratory protocol

## 2020-01-29 NOTE — OCCUPATIONAL THERAPY NOTE
Occupational Therapy Cancel Note    Chart Reviewed  Attempt to see pt however after speaking with nsg, NSG reports that pt is having respiratory issues and not appropriate at this time  Will re-attempt this afternoon as able       Ken Valencia

## 2020-01-29 NOTE — CONSULTS
Consult- Sofi Minor 2/25/1925, 80 y o  female MRN: [id number]    Unit/Bed#: [facility] [place] 529-01 Encounter: [id number]    Primary Care Provider: Maurice Flanagan DO   Date and time admitted to hospital: 1/25/2020  9:11 PM      Inpatient consult to Issac Drummond Jennifer performed by: Clay Payne PA-C  Consult ordered by: ROGELIO Wilson           * Acute respiratory failure with hypoxia and hypercapnia ([facility] [address] )  Assessment & Plan  · Acute on chronic hypercapnia now with respiratory failure requiring continuous BiPAP  · Pulmonology following  · Managing continuous BiPAP (12/5 currently)  · Standing nebulizers (xopenox and atrovent)  · As per discussion between Yosvany Lemos attending and primary team, recommend diamox q8hrs x3 doses to continue diuresis and help with contraction alkalosis  · Follow-up repeat ABG this afternoon  · CXR today with continued vascular congestion with possible initial RLL PNA-- recommend trending fever curve and WBC  · Aggressive pulmonary hygiene with multimodal analgesia for rib fractures  · CC will continue to follow along with pulmonology, please contact [phone] if concern for further clinical deterioration      -------------------------------------------------------------------------------------------------------------  Chief Complaint: Respiratory distress    History of Present Illness   HX and PE limited by: BiPAP  Sofi Minor is a 80 y o  female with PMH COPD, diastolic CHF, CAD, HTN, HLD, anxiety who initially presented to [facility] on 1/25/20 following a fall while transferring from commode  Found to have displaced R  10th rib fx with questionable R  7-9th rib fxs  Found to be fluid overloaded and started on IV diuresis  Also found to be flu A positive and started on tamiflu  This AM, noted to have increasing tachypnea and work of breathing  CXR revealed continued vascular congestion and question of PNA  ABG revealed respiratory acidosis with contraction alkalosis from diuresis   BiPAP applied, pulmonology and critical care consulted  History obtained from chart review and unobtainable from patient due to BiPAP   -------------------------------------------------------------------------------------------------------------  Dispo: Continue Stepdown Level 1 level of care     Code Status: Level 3 - DNAR and DNI  --------------------------------------------------------------------------------------------------------------  Review of Systems    A 12-point, complete review of systems was reviewed and negative except as stated above     Physical Exam   Constitutional:   Elderly appearing female in bed with BiPAP applied  Short of breath with difficulty finishing sentences  HENT:   Head: Normocephalic  Eyes: Pupils are equal, round, and reactive to light  EOM are normal    Neck: Normal range of motion  Neck supple  Cardiovascular: Normal rate, regular rhythm and normal heart sounds  Pulmonary/Chest: No stridor  She is in respiratory distress  She has no wheezes  She has no rales  BiPAP applied  Ronchorous breath sounds throughout  No noted wheeze  Abdominal: Soft  She exhibits no distension  There is no tenderness  There is no guarding  Musculoskeletal: She exhibits edema (1+)  Neurological: She is alert  Oriented to self and place but not year   Skin: Skin is warm and dry  Capillary refill takes less than 2 seconds  She is not diaphoretic  No erythema        --------------------------------------------------------------------------------------------------------------  Vitals:   Vitals:    01/29/20 1106 01/29/20 1208 01/29/20 1221 01/29/20 1410   BP: 166/84  (!) 171/74    BP Location:   Right arm    Pulse: 77  75    Resp: (!) 24  (!) 24    Temp: 98 9 °F (37 2 °C)      TempSrc:       SpO2: 97% 96%  98%   Weight:       Height:         Temp  Min: 97 5 °F (36 4 °C)  Max: 98 9 °F (37 2 °C)  IBW: 50 1 kg  Height: 5' 2" (157 5 cm)  Body mass index is 30 73 kg/m²      Laboratory and Diagnostics:  Results from last 7 days   Lab Units 01/29/20  0504 01/28/20  0502 01/27/20  0502 01/26/20  0505 01/25/20  2133   WBC Thousand/uL 6 10 4 12* 5 62 6 74 7 14   HEMOGLOBIN g/dL 14 2 13 5 13 5 13 3 14 1   HEMATOCRIT % 45 9 43 4 42 9 42 2 43 9   PLATELETS Thousands/uL 265 258 276 281 315   NEUTROS PCT % 62  --  56  --  63   MONOS PCT % 17*  --  22*  --  22*     Results from last 7 days   Lab Units 01/29/20  0504 01/28/20  0502 01/27/20  0502 01/26/20  0505 01/25/20  2133   SODIUM mmol/L 134* 129* 127* 129* 126*   POTASSIUM mmol/L 4 4 4 3 4 2 4 4 4 6   CHLORIDE mmol/L 86* 83* 83* 88* 87*   CO2 mmol/L >45* 42* 42* 39* 36*   ANION GAP mmol/L  --  4 2* 2* 3*   BUN mg/dL 9 9 11 9 10   CREATININE mg/dL 0 61 0 62 0 71 0 78 0 96   CALCIUM mg/dL 9 1 8 8 8 7 8 8 9 7   GLUCOSE RANDOM mg/dL 94 95 100 120 141*   ALT U/L  --   --   --   --  24   AST U/L  --   --   --   --  13   ALK PHOS U/L  --   --   --   --  78   ALBUMIN g/dL  --   --   --   --  3 5   TOTAL BILIRUBIN mg/dL  --   --   --   --  0 75                       ABG:  Results from last 7 days   Lab Units 01/29/20  0815   PH ART  7 303*   PCO2 ART mm Hg 116 9*   PO2 ART mm Hg 71 7*   HCO3 ART mmol/L 56 6*   BASE EXC ART mmol/L 23 0   ABG SOURCE  Radial, Left     VBG:  Results from last 7 days   Lab Units 01/29/20  0815 01/26/20  0505   PH MALAIKA   --  7 309   PCO2 MALAIKA mm Hg  --  81 1*   PO2 MALAIKA mm Hg  --  55 7*   HCO3 MALAIKA mmol/L  --  39 8*   BASE EXC MALAIKA mmol/L  --  10 0   ABG SOURCE  Radial, Left  --      Results from last 7 days   Lab Units 01/26/20  0505 01/25/20  2133   PROCALCITONIN ng/ml <0 05 0 06       Micro:        Imaging: I have personally reviewed pertinent reports  and I have personally reviewed pertinent films in PACS   Xr Chest Pa & Lateral    Result Date: 1/29/2020  Impression: Persistent CHF  Increased conspicuity of suspected right basal pneumonia    Probable bilateral pleural effusions Workstation performed: [id number]     Xr Chest Pa & Lateral (24 Hours After Admission)    Result Date: 1/27/2020  Impression: Right rib fractures on chest CT not visible  No pneumothorax  Small effusions and bibasilar atelectasis  Workstation performed: [id number]     Ct Head Without Contrast    Result Date: 1/25/2020  Impression: No acute intracranial abnormality  There is advanced microangiopathic change, progressed somewhat compared with May 19, 2017  Workstation performed: [id number]     Ct Chest Without Contrast    Result Date: 1/25/2020  Impression: There is a minimally displaced fracture of the right 10th rib laterally  There are questionable nondisplaced fractures of the right 7th through 9th ribs, versus motion artifact  Possible new compression deformity of L1   CT of the thoracolumbar spine is recommended for further evaluation  No evidence of pneumothorax  Areas of consolidation and groundglass opacity are present within the lungs, as described above  Please see discussion  Follow-up to ensure resolution is recommended  There are small bilateral pleural effusions, right larger than left  There is mild septal thickening  There is a large hiatal hernia/partially intrathoracic stomach  This appears similar to the prior study  Cardiomegaly  Coronary artery disease  Atherosclerosis  I personally discussed this study with [address] [place] [address] on 1/25/2020 at 11:43 PM  Workstation performed: [id number]         Historical Information   Past Medical History:   Diagnosis Date    Abnormal blood sugar 03/13/2017    Abnormal carotid duplex scan     Carotid Duplex (Plaque formation is quite prominent bilaterally     Despite these changes, no evidence for greater than 50% diameter reducing lesions in the cervical portion of either carotid artery hemisystem ) - 6/13/2017    Anxiety     Cervical radiculopathy 08/08/2017    CHF (congestive heart failure) (HCC)     diastolic    Compression fracture of lumbar spine, non-traumatic, with routine healing, subsequent encounter     COPD (chronic obstructive pulmonary disease) ([facility] [address] ) 2/6/2018    Coronary angioplasty status     Coronary artery disease 4/7/2017    Costochondritis 02/05/2013    suspect MS in origin given relationship to ROM and location  Start mobic and if persists, reeval  Refused xrayat this time   Dyslipidemia     GERD without esophagitis 8/30/2012    H/O CT scan of chest      (No PE, minimal interstitial change left lower lobe and right upper lobe, which may be acute ) - 5/19/2017    History of Holter monitoring     Holter (Sinus rhythm with rates ranging from 52 to 118 bpm   No afib or heart block  Rare atrial and ventricular ectopic beats  One six-beat atrial run noted  No pauses  ) - 5/31/2017    Hx of echocardiogram     Echo (EF 0 60 (60%), Biatrial enlargement ) - 3/24/2017 Echo (EF 0 55 (55%), mild LVH  Aortic valvular sclerosis  Trace MI with mild left atrial dilatation, mild MAC  Mild TI with mild pulmonary hypertension  ) - 5/22/2017 Echo (EF 0 60 (60%), Normal left vent chamber size and systolic function  Mild diastolic dysfunction of LV w/normal filling pressures  Mild mitral regurg ) - 7/26/2017 Echo (EF 0    Hx of echocardiogram 08/16/2017    EF0 60 (60%) Normal left vent chamber size and systolic function of LV w/normal filling presures  Mild mitral regurg  / EF0 50 (50%) Mild LVH   MIld MR 10/6/17     Hypertension     Iron deficiency anemia 4/21/2016    Macular degeneration, right eye     Malignant melanoma of skin ([facility] [address] ) 9/17/2012    Mixed hyperlipidemia     NSTEMI (non-ST elevated myocardial infarction) ([facility] [address] ) 7/25/2017    Old MI (myocardial infarction)     Osteoarthritis 9/17/2012    Osteoporosis 3/13/2017    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[facility] [address] ) 04/07/2017    Urinary tract infection     frequent UTI's     Past Surgical History:   Procedure Laterality Date    ABDOMINAL SURGERY      APPENDECTOMY      BREAST SURGERY      Lumpectomy    CARDIAC CATHETERIZATION  03/24/2017 ARNIE to 100% occluded prox RCA, chronic 99% ostial LCfx, 60% mid LAD, discrete 40% distal LM / EF0 60 (60%) 100% occluded proximal RCA s/p ARNIE, chronic 99% ostial LCX, 60% mid LAD, discrete 40% dital LM  4/5/17    CHOLECYSTECTOMY      COLONOSCOPY N/A 7/25/2018    Procedure: COLONOSCOPY;  Surgeon:  Masha Perdomo MD;  Location: [address] MAIN OR;  Service: Gastroenterology    CORONARY STENT PLACEMENT  03/25/2017    ARNIE RCA    HEMORRHOID SURGERY      INSERTION STENT ARTERY  04/07/2017    Cardio vascular agents drug-eluting stent    SKIN BIOPSY      TONSILLECTOMY       Social History   Social History     Substance and Sexual Activity   Alcohol Use Not Currently     Social History     Substance and Sexual Activity   Drug Use Never     Social History     Tobacco Use   Smoking Status Former Smoker    Types: Cigarettes   Smokeless Tobacco Never Used     Family History:   Family History   Problem Relation Age of Onset    Heart failure Mother     Prostate cancer Father     Stroke Family      I have reviewed this patient's family history and commented on sigificant items within the HPI      Medications:  Current Facility-Administered Medications   Medication Dose Route Frequency    acetaminophen (TYLENOL) tablet 975 mg  975 mg Oral Q8H [facility]    acetaZOLAMIDE (DIAMOX) injection 250 mg  250 mg Intravenous Q8H [facility]    albuterol (PROVENTIL HFA,VENTOLIN HFA) inhaler 2 puff  2 puff Inhalation Q4H PRN    ALPRAZolam (XANAX) tablet 0 25 mg  0 25 mg Oral HS    aspirin chewable tablet 81 mg  81 mg Oral Daily    atorvastatin (LIPITOR) tablet 40 mg  40 mg Oral Daily With Dinner    calcium carbonate-vitamin D (OSCAL-D) 500 mg-200 units per tablet 1 tablet  1 tablet Oral BID With Meals    docusate sodium (COLACE) capsule 100 mg  100 mg Oral BID    famotidine (PEPCID) tablet 10 mg  10 mg Oral BID    guaiFENesin (MUCINEX) 12 hr tablet 600 mg  600 mg Oral Q12H [facility]    heparin (porcine) subcutaneous injection 5,000 Units  5,000 Units Subcutaneous Q8H [address]    HYDROmorphone (DILAUDID) injection 0 2 mg  0 2 mg Intravenous Q4H PRN    ipratropium (ATROVENT) 0 02 % inhalation solution 0 5 mg  0 5 mg Nebulization TID    levalbuterol (XOPENEX) inhalation solution 1 25 mg  1 25 mg Nebulization TID    lidocaine (LIDODERM) 5 % patch 1 patch  1 patch Topical Daily    metoprolol succinate (TOPROL-XL) 24 hr tablet 25 mg  25 mg Oral Daily    multivitamin-minerals (CENTRUM) tablet 1 tablet  1 tablet Oral Daily    OLANZapine (ZyPREXA ZYDIS) dispersible tablet 2 5 mg  2 5 mg Oral Once PRN    ondansetron (ZOFRAN-ODT) dispersible tablet 4 mg  4 mg Oral Q8H PRN    oseltamivir (TAMIFLU) capsule 30 mg  30 mg Oral Q12H [address]    oxyCODONE (ROXICODONE) IR tablet 2 5 mg  2 5 mg Oral Q4H PRN    oxyCODONE (ROXICODONE) IR tablet 5 mg  5 mg Oral Q4H PRN    polyethylene glycol (MIRALAX) packet 17 g  17 g Oral Daily PRN    potassium chloride (K-DUR,KLOR-CON) CR tablet 20 mEq  20 mEq Oral Daily    senna-docusate sodium (SENOKOT S) 8 6-50 mg per tablet 1 tablet  1 tablet Oral HS     Home medications:  Prior to Admission Medications   Prescriptions Last Dose Informant Patient Reported? Taking?    ALPRAZolam (XANAX) 0 5 mg tablet   No No   Sig: Take 1 tablet (0 5 mg total) by mouth 3 (three) times a day as needed for anxiety   Calcium Carbonate-Vit D-Min (CALCIUM 1200 PO)   Yes No   Sig: Take by mouth   Potassium Chloride BRISSA   Yes No   Sig: by Does not apply route   aspirin 81 mg chewable tablet   No No   Sig: Chew 1 tablet (81 mg total) daily   atorvastatin (LIPITOR) 40 mg tablet   Yes No   Sig: Take 40 mg by mouth daily with dinner   citalopram (CeleXA) 10 mg tablet   No No   Sig: Take 1 tablet (10 mg total) by mouth daily at bedtime   furosemide (LASIX) 20 mg tablet   No No   Sig: Take 1 tablet (20 mg total) by mouth daily   metoprolol succinate (TOPROL-XL) 25 mg 24 hr tablet   No No   Sig: Take 1 tablet (25 mg total) by mouth daily   multivitamin (THERAGRAN) TABS Yes No   Sig: Take 1 tablet by mouth daily   ondansetron (ZOFRAN) 4 mg tablet   No No   Sig: Take 1 tablet (4 mg total) by mouth every 12 (twelve) hours as needed for nausea or vomiting   ranitidine (ZANTAC) 150 mg tablet   No No   Sig: Take 1 tablet (150 mg total) by mouth 2 (two) times a day      Facility-Administered Medications: None     Allergies: Allergies   Allergen Reactions    Ciprofloxacin      Reaction Date: 01Jul2011;     Keflex [Cephalexin] Dizziness    Nitrofurantoin      Reaction Date: 01Jul2011;     Penicillins Rash, Other (See Comments) and Throat Swelling     Throat swells     ------------------------------------------------------------------------------------------------------------  Advance Directive and Living Will:      Power of :    POLST:    ------------------------------------------------------------------------------------------------------------  Counseling / Coordination of Care  Total Critical Care time spent 15 minutes excluding procedures, teaching and family updates  Jossy Cramer PA-C        Portions of the record may have been created with voice recognition software  Occasional wrong word or "sound a like" substitutions may have occurred due to the inherent limitations of voice recognition software    Read the chart carefully and recognize, using context, where substitutions have occurred

## 2020-01-29 NOTE — PLAN OF CARE
Problem: Potential for Falls  Goal: Patient will remain free of falls  Description  INTERVENTIONS:  - Assess patient frequently for physical needs  -  Identify cognitive and physical deficits and behaviors that affect risk of falls    -  [place] fall precautions as indicated by assessment   - Educate patient/family on patient safety including physical limitations  - Instruct patient to call for assistance with activity based on assessment  - Modify environment to reduce risk of injury  - Consider OT/PT consult to assist with strengthening/mobility  Outcome: Progressing     Problem: PAIN - ADULT  Goal: Verbalizes/displays adequate comfort level or baseline comfort level  Description  Interventions:  - Encourage patient to monitor pain and request assistance  - Assess pain using appropriate pain scale  - Administer analgesics based on type and severity of pain and evaluate response  - Implement non-pharmacological measures as appropriate and evaluate response  - Consider cultural and social influences on pain and pain management  - Notify physician/advanced practitioner if interventions unsuccessful or patient reports new pain  Outcome: Progressing     Problem: INFECTION - ADULT  Goal: Absence or prevention of progression during hospitalization  Description  INTERVENTIONS:  - Assess and monitor for signs and symptoms of infection  - Monitor lab/diagnostic results  - Monitor all insertion sites, i e  indwelling lines, tubes, and drains  - Monitor endotracheal if appropriate and nasal secretions for changes in amount and color  - [place] appropriate cooling/warming therapies per order  - Administer medications as ordered  - Instruct and encourage patient and family to use good hand hygiene technique  - Identify and instruct in appropriate isolation precautions for identified infection/condition  Outcome: Progressing  Goal: Absence of fever/infection during neutropenic period  Description  INTERVENTIONS:  - Monitor WBC    Outcome: Progressing     Problem: SAFETY ADULT  Goal: Maintain or return to baseline ADL function  Description  INTERVENTIONS:  -  Assess patient's ability to carry out ADLs; assess patient's baseline for ADL function and identify physical deficits which impact ability to perform ADLs (bathing, care of mouth/teeth, toileting, grooming, dressing, etc )  - Assess/evaluate cause of self-care deficits   - Assess range of motion  - Assess patient's mobility; develop plan if impaired  - Assess patient's need for assistive devices and provide as appropriate  - Encourage maximum independence but intervene and supervise when necessary  - Involve family in performance of ADLs  - Assess for home care needs following discharge   - Consider OT consult to assist with ADL evaluation and planning for discharge  - Provide patient education as appropriate  Outcome: Progressing  Goal: Maintain or return mobility status to optimal level  Description  INTERVENTIONS:  - Assess patient's baseline mobility status (ambulation, transfers, stairs, etc )    - Identify cognitive and physical deficits and behaviors that affect mobility  - Identify mobility aids required to assist with transfers and/or ambulation (gait belt, sit-to-stand, lift, walker, cane, etc )  - [place] fall precautions as indicated by assessment  - Record patient progress and toleration of activity level on Mobility SBAR; progress patient to next Phase/Stage  - Instruct patient to call for assistance with activity based on assessment  - Consider rehabilitation consult to assist with strengthening/weightbearing, etc   Outcome: Progressing     Problem: DISCHARGE PLANNING  Goal: Discharge to home or other facility with appropriate resources  Description  INTERVENTIONS:  - Identify barriers to discharge w/patient and caregiver  - Arrange for needed discharge resources and transportation as appropriate  - Identify discharge learning needs (meds, wound care, etc )  - Arrange for interpretive services to assist at discharge as needed  - Refer to Case Management Department for coordinating discharge planning if the patient needs post-hospital services based on physician/advanced practitioner order or complex needs related to functional status, cognitive ability, or social support system  Outcome: Progressing     Problem: Knowledge Deficit  Goal: Patient/family/caregiver demonstrates understanding of disease process, treatment plan, medications, and discharge instructions  Description  Complete learning assessment and assess knowledge base  Interventions:  - Provide teaching at level of understanding  - Provide teaching via preferred learning methods  Outcome: Progressing     Problem: Prexisting or High Potential for Compromised Skin Integrity  Goal: Skin integrity is maintained or improved  Description  INTERVENTIONS:  - Identify patients at risk for skin breakdown  - Assess and monitor skin integrity  - Assess and monitor nutrition and hydration status  - Monitor labs   - Assess for incontinence   - Turn and reposition patient  - Assist with mobility/ambulation  - Relieve pressure over bony prominences  - Avoid friction and shearing  - Provide appropriate hygiene as needed including keeping skin clean and dry  - Evaluate need for skin moisturizer/barrier cream  - Collaborate with interdisciplinary team   - Patient/family teaching  - Consider wound care consult   Outcome: Progressing     Problem: Nutrition/Hydration-ADULT  Goal: Nutrient/Hydration intake appropriate for improving, restoring or maintaining nutritional needs  Description  Monitor and assess patient's nutrition/hydration status for malnutrition  Collaborate with interdisciplinary team and initiate plan and interventions as ordered  Monitor patient's weight and dietary intake as ordered or per policy  Utilize nutrition screening tool and intervene as necessary   Determine patient's food preferences and provide high-protein, high-caloric foods as appropriate       INTERVENTIONS:  - Monitor oral intake, urinary output, labs, and treatment plans  - Assess nutrition and hydration status and recommend course of action  - Evaluate amount of meals eaten  - Assist patient with eating if necessary   - Allow adequate time for meals  - Recommend/ encourage appropriate diets, oral nutritional supplements, and vitamin/mineral supplements  - Order, calculate, and assess calorie counts as needed  - Recommend, monitor, and adjust tube feedings and TPN/PPN based on assessed needs  - Assess need for intravenous fluids  - Provide specific nutrition/hydration education as appropriate  - Include patient/family/caregiver in decisions related to nutrition  Outcome: Progressing     Problem: COPING  Goal: Pt/Family able to verbalize concerns and demonstrate effective coping strategies  Description  INTERVENTIONS:  - Assist patient/family to identify coping skills, available support systems and cultural and spiritual values  - Provide emotional support, including active listening and acknowledgement of concerns of patient and caregivers  - Reduce environmental stimuli, as able  - Provide patient education  - Assess for spiritual pain/suffering and initiate spiritual care, including notification of Pastoral Care or [place] based community as needed  - Assess effectiveness of coping strategies  Outcome: Progressing  Goal: Will report anxiety at manageable levels  Description  INTERVENTIONS:  - Administer medication as ordered  - Teach and encourage coping skills  - Provide emotional support  - Assess patient/family for anxiety and ability to cope  Outcome: Progressing     Problem: METABOLIC, FLUID AND ELECTROLYTES - ADULT  Goal: Electrolytes maintained within normal limits  Description  INTERVENTIONS:  - Monitor labs and assess patient for signs and symptoms of electrolyte imbalances  - Administer electrolyte replacement as ordered  - Monitor response to electrolyte replacements, including repeat lab results as appropriate  - Instruct patient on fluid and nutrition as appropriate  Outcome: Progressing  Goal: Fluid balance maintained  Description  INTERVENTIONS:  - Monitor labs   - Monitor I/O and WT  - Instruct patient on fluid and nutrition as appropriate  - Assess for signs & symptoms of volume excess or deficit  Outcome: Progressing  Goal: Glucose maintained within target range  Description  INTERVENTIONS:  - Monitor Blood Glucose as ordered  - Assess for signs and symptoms of hyperglycemia and hypoglycemia  - Administer ordered medications to maintain glucose within target range  - Assess nutritional intake and initiate nutrition service referral as needed  Outcome: Progressing     Problem: MUSCULOSKELETAL - ADULT  Goal: Maintain or return mobility to safest level of function  Description  INTERVENTIONS:  - Assess patient's ability to carry out ADLs; assess patient's baseline for ADL function and identify physical deficits which impact ability to perform ADLs (bathing, care of mouth/teeth, toileting, grooming, dressing, etc )  - Assess/evaluate cause of self-care deficits   - Assess range of motion  - Assess patient's mobility  - Assess patient's need for assistive devices and provide as appropriate  - Encourage maximum independence but intervene and supervise when necessary  - Involve family in performance of ADLs  - Assess for home care needs following discharge   - Consider OT consult to assist with ADL evaluation and planning for discharge  - Provide patient education as appropriate  Outcome: Progressing  Goal: Maintain proper alignment of affected body part  Description  INTERVENTIONS:  - Support, maintain and protect limb and body alignment  - Provide patient/ family with appropriate education  Outcome: Progressing

## 2020-01-29 NOTE — ASSESSMENT & PLAN NOTE
Wt Readings from Last 3 Encounters:   01/29/20 76 2 kg (167 lb 15 9 oz)   01/10/20 70 kg (154 lb 6 oz)   12/06/19 73 kg (161 lb)     · Echocardiogram:  Obtained 11/2019, demonstrated EF of 50%, mild concentric hypertrophy, grade 1 diastolic dysfunction, moderate pulmonary hypertension  · Monitor I/O's, daily weights, serum electrolytes  · Incentive spirometry, airway clearance protocol  · Titrate oxygen to maintain saturation of at least 90%, BiPAP continuous ordered   · Diuresis with IV lasix daily; transitioned per nephrology to PO lasix on 1/29  · Given current lung sounds would consider transitioning back to IV lasix, but question how effective or helpful that will be as suspect secretions for respiratory than pulmonary edema/congestion from heart failure   Will defer decision to Nephrology team

## 2020-01-29 NOTE — ASSESSMENT & PLAN NOTE
· Celexa discontinued in light of hyponatremia   · Per geriatrics planned to wean off thus Xanax decreased to 0 25mg QHS x 7 days then stop  · Currently on day 3/7 on 1/29

## 2020-01-29 NOTE — ASSESSMENT & PLAN NOTE
· Found on CT imaging  Initially there was concern that this may be new, however this was found on previous CT imaging  No lower extremity weakness or neuro deficits noted    · Continue pain regimen as otherwise ordered  · Trauma evaluated; since signed off

## 2020-01-29 NOTE — RESPIRATORY THERAPY NOTE
RT Protocol Note  Lebron Plascencia 80 y o  female MRN: [id number]  Unit/Bed#: [facility] 831-01 Encounter: [id number]    Resp Comments: pt switched to vest therapy due to porr effort with flutter valve

## 2020-01-29 NOTE — ASSESSMENT & PLAN NOTE
· As evidence by Arterial Blood Gas result:  pO2 71 7; pCO2 116 9; pH 7 303;  HCO3 56 6, %O2 Sat 96    · Continuous BiPAP ordered  · Stat repeat chest x-ray  · Updated level of care to level 1 step down  · Pulmonary consult ordered

## 2020-01-29 NOTE — ASSESSMENT & PLAN NOTE
· Suspect infection may have contributed to CHF exacerbation    · Droplet precautions  · Continue Tamiflu renally-dosed b i d ; currently on day 4/5

## 2020-01-29 NOTE — NURSING NOTE
Pt with increased confusion, disoriented X4  SpO2 in high 80s, Respiratory rate 47, increased oxygen from 4L to 6L  PRN albuterol given  Pt repositioned  SLIM and Respiratory notified

## 2020-01-29 NOTE — PROGRESS NOTES
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 as that was started rather recently  With some loop diuresis and given 2 doses of tolvaptan sodium concentration is approaching normal levels  Celexa has been discontinued  At this point would hold further loop diuretic and no need for tolvaptan today  Will monitor with fluid restriction and once normalizes will then begin to eliminate fluid restriction to see if recurs  Fluid restriction 1800 cc per day  Hold diuretic and tolvaptan    2  Acid-base status    The patient's ABG today shows chronic respiratory acidosis with metabolic compensation  The patient does not appear to be in heart failure clinically and has fairly well-preserved left ventricular ejection fraction  This likely represents severe underlying lung disease  Given that the pH trends towards acidemia to primary respiratory process  Pulmonary has been consulted  If noninvasive ventilation is implemented you need to be careful on degree with which she had reduce the PA CO2 because it could acutely cause alkalemia due to severely elevated bicarbonate levels  Patient was given some a set results him I would to reduce bicarbonate  Continue to monitor along with pulmonary  SUBJECTIVE:  Review of Systems   Constitution: Positive for malaise/fatigue  Negative for chills, fever and night sweats  HENT: Negative  Eyes: Negative  Cardiovascular: Negative for chest pain, leg swelling, orthopnea and palpitations     Respiratory: Positive for shortness of breath  Negative for cough, sputum production and wheezing  No respiratory distress no use of accessory muscles able speak in full sentences  Gastrointestinal: Negative for abdominal pain, diarrhea, nausea and vomiting  Genitourinary: Negative for dysuria, frequency, hematuria and incomplete emptying  Neurological: Negative  Psychiatric/Behavioral: Negative  OBJECTIVE:  Current Weight: Weight - Scale: 76 2 kg (167 lb 15 9 oz)  Vitals:Temp (24hrs), Av 2 °F (36 8 °C), Min:97 7 °F (36 5 °C), Max:98 9 °F (37 2 °C)  Current: Temperature: 98 9 °F (37 2 °C)   Blood pressure 166/84, pulse 77, temperature 98 9 °F (37 2 °C), resp  rate (!) 24, height 5' 2" (1 575 m), weight 76 2 kg (167 lb 15 9 oz), SpO2 97 %, not currently breastfeeding  , Body mass index is 30 73 kg/m²  Intake/Output Summary (Last 24 hours) at 2020 1122  Last data filed at 2020 1027  Gross per 24 hour   Intake 120 ml   Output 1170 ml   Net -1050 ml       Physical Exam: /84   Pulse 77   Temp 98 9 °F (37 2 °C)   Resp (!) 24   Ht 5' 2" (1 575 m)   Wt 76 2 kg (167 lb 15 9 oz)   SpO2 97%   BMI 30 73 kg/m²   Physical Exam   Constitutional: She is oriented to person, place, and time  Non-toxic appearance  She does not appear ill  No distress  HENT:   Head: Atraumatic  Oropharynx dry   Eyes: EOM are normal    Neck: Neck supple  No JVD present  Cardiovascular: Normal rate and regular rhythm  Exam reveals no gallop and no friction rub  Pulmonary/Chest: Effort normal  No respiratory distress  She has no decreased breath sounds  She has no wheezes  She has rhonchi  She has no rales  Abdominal: Soft  Bowel sounds are normal  She exhibits no distension  There is no tenderness  Neurological: She is alert and oriented to person, place, and time         Medications:    Current Facility-Administered Medications:     acetaminophen (TYLENOL) tablet 975 mg, 975 mg, Oral, Q8H [address], Bing Schwartz Verelias DO, 975 mg at 01/29/20 0506    acetaZOLAMIDE (DIAMOX) injection 250 mg, 250 mg, Intravenous, Q8H [address], ROGELIO Holley    albuterol (PROVENTIL HFA,VENTOLIN HFA) inhaler 2 puff, 2 puff, Inhalation, Q4H PRN, Onofre Ramirez DO, 2 puff at 01/29/20 0519    ALPRAZolam Stephane Wise) tablet 0 25 mg, 0 25 mg, Oral, HS, Tuesday M Nuno, ROGELIO, 0 25 mg at 01/28/20 2153    aspirin chewable tablet 81 mg, 81 mg, Oral, Daily, Bing Marquez DO, 81 mg at 01/29/20 0850    atorvastatin (LIPITOR) tablet 40 mg, 40 mg, Oral, Daily With Александр Granados, DO, 40 mg at 01/28/20 1843    calcium carbonate-vitamin D (OSCAL-D) 500 mg-200 units per tablet 1 tablet, 1 tablet, Oral, BID With Meals, Onofre Ramirez DO, 1 tablet at 01/29/20 0850    docusate sodium (COLACE) capsule 100 mg, 100 mg, Oral, BID, Tuesday M ROGELIO Reina, 100 mg at 01/29/20 0850    famotidine (PEPCID) tablet 10 mg, 10 mg, Oral, BID, Bing Marquez DO, 10 mg at 01/29/20 0850    guaiFENesin (MUCINEX) 12 hr tablet 600 mg, 600 mg, Oral, Q12H [address], Rashel Don PA-C, 600 mg at 01/29/20 2047    heparin (porcine) subcutaneous injection 5,000 Units, 5,000 Units, Subcutaneous, Q8H [address], 5,000 Units at 01/29/20 0506 **AND** Platelet count, , , Once, Onofre Ramirez, DO    HYDROmorphone (DILAUDID) injection 0 2 mg, 0 2 mg, Intravenous, Q4H PRN, Onofre Ramirez, DO    ipratropium (ATROVENT) 0 02 % inhalation solution 0 5 mg, 0 5 mg, Nebulization, TID, Neymar Smith DO    levalbuterol [facility]) inhalation solution 1 25 mg, 1 25 mg, Nebulization, TID, Dariana Blue MD, 1 25 mg at 01/29/20 0707    lidocaine (LIDODERM) 5 % patch 1 patch, 1 patch, Topical, Daily, Bing Marquez DO, 1 patch at 01/29/20 7787    metoprolol succinate (TOPROL-XL) 24 hr tablet 25 mg, 25 mg, Oral, Daily, Bing Marquez DO, 25 mg at 01/29/20 0850    multivitamin-minerals (CENTRUM) tablet 1 tablet, 1 tablet, Oral, Daily, Carole Craze, DO, 1 tablet at 01/29/20 0850    OLANZapine (ZyPREXA ZYDIS) dispersible tablet 2 5 mg, 2 5 mg, Oral, Once PRN, Susan Ureña PA-C    ondansetron (ZOFRAN-ODT) dispersible tablet 4 mg, 4 mg, Oral, Q8H PRN, Tamara Spell Veres, DO, 4 mg at 01/27/20 1458    oseltamivir (TAMIFLU) capsule 30 mg, 30 mg, Oral, Q12H [address], Tamara Spell Veres, DO, 30 mg at 01/29/20 0849    oxyCODONE (ROXICODONE) IR tablet 2 5 mg, 2 5 mg, Oral, Q4H PRN, Carole Craze, DO, 2 5 mg at 01/28/20 1844    oxyCODONE (ROXICODONE) IR tablet 5 mg, 5 mg, Oral, Q4H PRN, Carole Craze, DO    polyethylene glycol (MIRALAX) packet 17 g, 17 g, Oral, Daily PRN, Tuesday ROGELIO Covarrubias    potassium chloride (K-DUR,KLOR-CON) CR tablet 20 mEq, 20 mEq, Oral, Daily, Tamara Spell Veres, DO, 20 mEq at 01/29/20 0850    senna-docusate sodium (SENOKOT S) 8 6-50 mg per tablet 1 tablet, 1 tablet, Oral, HS, Tuesday ROGELIO Ontiveros, 1 tablet at 01/28/20 2154    Laboratory Results:  Lab Results   Component Value Date    WBC 6 10 01/29/2020    HGB 14 2 01/29/2020    HCT 45 9 01/29/2020    MCV 93 01/29/2020     01/29/2020     Lab Results   Component Value Date    SODIUM 134 (L) 01/29/2020    K 4 4 01/29/2020    CL 86 (L) 01/29/2020    CO2 >45 (HH) 01/29/2020    BUN 9 01/29/2020    CREATININE 0 61 01/29/2020    GLUC 94 01/29/2020    CALCIUM 9 1 01/29/2020     Lab Results   Component Value Date    CALCIUM 9 1 01/29/2020    PHOS 3 3 07/25/2018     No results found for: LABPROT

## 2020-01-30 NOTE — PROGRESS NOTES
CCRS Progress Note    Patient received diamox yesterday and today with improvement in bicarb  Improvement in hypercapnia with BiPAP  Weaned off of BiPAP early this AM to NC without issue and has remained off throughout the day  I personally examined the patient today  On exam she is alert and oriented x3  She is breathing at a rate of 16 with scattered ronchi still  Pulmonology continues to follow patient  Discussed with primary team and patient appropriate to be downgraded to SD2  CCRS to sign-off at this time  Please reach out to critical care at if there is concern for further clinical deterioration      Vonzella Lesch, PA-C  Critical [facility] ()

## 2020-01-30 NOTE — PLAN OF CARE
Problem: Potential for Falls  Goal: Patient will remain free of falls  Description  INTERVENTIONS:  - Assess patient frequently for physical needs  -  Identify cognitive and physical deficits and behaviors that affect risk of falls    -  [place] fall precautions as indicated by assessment   - Educate patient/family on patient safety including physical limitations  - Instruct patient to call for assistance with activity based on assessment  - Modify environment to reduce risk of injury  - Consider OT/PT consult to assist with strengthening/mobility  Outcome: Progressing     Problem: PAIN - ADULT  Goal: Verbalizes/displays adequate comfort level or baseline comfort level  Description  Interventions:  - Encourage patient to monitor pain and request assistance  - Assess pain using appropriate pain scale  - Administer analgesics based on type and severity of pain and evaluate response  - Implement non-pharmacological measures as appropriate and evaluate response  - Consider cultural and social influences on pain and pain management  - Notify physician/advanced practitioner if interventions unsuccessful or patient reports new pain  Outcome: Progressing     Problem: INFECTION - ADULT  Goal: Absence or prevention of progression during hospitalization  Description  INTERVENTIONS:  - Assess and monitor for signs and symptoms of infection  - Monitor lab/diagnostic results  - Monitor all insertion sites, i e  indwelling lines, tubes, and drains  - Monitor endotracheal if appropriate and nasal secretions for changes in amount and color  - [place] appropriate cooling/warming therapies per order  - Administer medications as ordered  - Instruct and encourage patient and family to use good hand hygiene technique  - Identify and instruct in appropriate isolation precautions for identified infection/condition  Outcome: Progressing  Goal: Absence of fever/infection during neutropenic period  Description  INTERVENTIONS:  - Monitor WBC    Outcome: Progressing     Problem: SAFETY ADULT  Goal: Maintain or return to baseline ADL function  Description  INTERVENTIONS:  -  Assess patient's ability to carry out ADLs; assess patient's baseline for ADL function and identify physical deficits which impact ability to perform ADLs (bathing, care of mouth/teeth, toileting, grooming, dressing, etc )  - Assess/evaluate cause of self-care deficits   - Assess range of motion  - Assess patient's mobility; develop plan if impaired  - Assess patient's need for assistive devices and provide as appropriate  - Encourage maximum independence but intervene and supervise when necessary  - Involve family in performance of ADLs  - Assess for home care needs following discharge   - Consider OT consult to assist with ADL evaluation and planning for discharge  - Provide patient education as appropriate  Outcome: Progressing  Goal: Maintain or return mobility status to optimal level  Description  INTERVENTIONS:  - Assess patient's baseline mobility status (ambulation, transfers, stairs, etc )    - Identify cognitive and physical deficits and behaviors that affect mobility  - Identify mobility aids required to assist with transfers and/or ambulation (gait belt, sit-to-stand, lift, walker, cane, etc )  - [place] fall precautions as indicated by assessment  - Record patient progress and toleration of activity level on Mobility SBAR; progress patient to next Phase/Stage  - Instruct patient to call for assistance with activity based on assessment  - Consider rehabilitation consult to assist with strengthening/weightbearing, etc   Outcome: Progressing     Problem: DISCHARGE PLANNING  Goal: Discharge to home or other facility with appropriate resources  Description  INTERVENTIONS:  - Identify barriers to discharge w/patient and caregiver  - Arrange for needed discharge resources and transportation as appropriate  - Identify discharge learning needs (meds, wound care, etc )  - Arrange for interpretive services to assist at discharge as needed  - Refer to Case Management Department for coordinating discharge planning if the patient needs post-hospital services based on physician/advanced practitioner order or complex needs related to functional status, cognitive ability, or social support system  Outcome: Progressing     Problem: Knowledge Deficit  Goal: Patient/family/caregiver demonstrates understanding of disease process, treatment plan, medications, and discharge instructions  Description  Complete learning assessment and assess knowledge base  Interventions:  - Provide teaching at level of understanding  - Provide teaching via preferred learning methods  Outcome: Progressing     Problem: Prexisting or High Potential for Compromised Skin Integrity  Goal: Skin integrity is maintained or improved  Description  INTERVENTIONS:  - Identify patients at risk for skin breakdown  - Assess and monitor skin integrity  - Assess and monitor nutrition and hydration status  - Monitor labs   - Assess for incontinence   - Turn and reposition patient  - Assist with mobility/ambulation  - Relieve pressure over bony prominences  - Avoid friction and shearing  - Provide appropriate hygiene as needed including keeping skin clean and dry  - Evaluate need for skin moisturizer/barrier cream  - Collaborate with interdisciplinary team   - Patient/family teaching  - Consider wound care consult   Outcome: Progressing     Problem: Nutrition/Hydration-ADULT  Goal: Nutrient/Hydration intake appropriate for improving, restoring or maintaining nutritional needs  Description  Monitor and assess patient's nutrition/hydration status for malnutrition  Collaborate with interdisciplinary team and initiate plan and interventions as ordered  Monitor patient's weight and dietary intake as ordered or per policy  Utilize nutrition screening tool and intervene as necessary   Determine patient's food preferences and provide high-protein, high-caloric foods as appropriate       INTERVENTIONS:  - Monitor oral intake, urinary output, labs, and treatment plans  - Assess nutrition and hydration status and recommend course of action  - Evaluate amount of meals eaten  - Assist patient with eating if necessary   - Allow adequate time for meals  - Recommend/ encourage appropriate diets, oral nutritional supplements, and vitamin/mineral supplements  - Order, calculate, and assess calorie counts as needed  - Recommend, monitor, and adjust tube feedings and TPN/PPN based on assessed needs  - Assess need for intravenous fluids  - Provide specific nutrition/hydration education as appropriate  - Include patient/family/caregiver in decisions related to nutrition  Outcome: Progressing     Problem: COPING  Goal: Pt/Family able to verbalize concerns and demonstrate effective coping strategies  Description  INTERVENTIONS:  - Assist patient/family to identify coping skills, available support systems and cultural and spiritual values  - Provide emotional support, including active listening and acknowledgement of concerns of patient and caregivers  - Reduce environmental stimuli, as able  - Provide patient education  - Assess for spiritual pain/suffering and initiate spiritual care, including notification of Pastoral Care or leandra based community as needed  - Assess effectiveness of coping strategies  Outcome: Progressing  Goal: Will report anxiety at manageable levels  Description  INTERVENTIONS:  - Administer medication as ordered  - Teach and encourage coping skills  - Provide emotional support  - Assess patient/family for anxiety and ability to cope  Outcome: Progressing     Problem: RESPIRATORY - ADULT  Goal: Achieves optimal ventilation and oxygenation  Description  INTERVENTIONS:  - Assess for changes in respiratory status  - Assess for changes in mentation and behavior  - Position to facilitate oxygenation and minimize respiratory effort  - Oxygen administered by appropriate delivery if ordered  - Initiate smoking cessation education as indicated  - Encourage broncho-pulmonary hygiene including cough, deep breathe, Incentive Spirometry  - Assess the need for suctioning and aspirate as needed  - Assess and instruct to report SOB or any respiratory difficulty  - Respiratory Therapy support as indicated  Outcome: Progressing     Problem: METABOLIC, FLUID AND ELECTROLYTES - ADULT  Goal: Electrolytes maintained within normal limits  Description  INTERVENTIONS:  - Monitor labs and assess patient for signs and symptoms of electrolyte imbalances  - Administer electrolyte replacement as ordered  - Monitor response to electrolyte replacements, including repeat lab results as appropriate  - Instruct patient on fluid and nutrition as appropriate  Outcome: Progressing  Goal: Fluid balance maintained  Description  INTERVENTIONS:  - Monitor labs   - Monitor I/O and WT  - Instruct patient on fluid and nutrition as appropriate  - Assess for signs & symptoms of volume excess or deficit  Outcome: Progressing  Goal: Glucose maintained within target range  Description  INTERVENTIONS:  - Monitor Blood Glucose as ordered  - Assess for signs and symptoms of hyperglycemia and hypoglycemia  - Administer ordered medications to maintain glucose within target range  - Assess nutritional intake and initiate nutrition service referral as needed  Outcome: Progressing     Problem: MUSCULOSKELETAL - ADULT  Goal: Maintain or return mobility to safest level of function  Description  INTERVENTIONS:  - Assess patient's ability to carry out ADLs; assess patient's baseline for ADL function and identify physical deficits which impact ability to perform ADLs (bathing, care of mouth/teeth, toileting, grooming, dressing, etc )  - Assess/evaluate cause of self-care deficits   - Assess range of motion  - Assess patient's mobility  - Assess patient's need for assistive devices and provide as appropriate  - Encourage maximum independence but intervene and supervise when necessary  - Involve family in performance of ADLs  - Assess for home care needs following discharge   - Consider OT consult to assist with ADL evaluation and planning for discharge  - Provide patient education as appropriate  Outcome: Progressing  Goal: Maintain proper alignment of affected body part  Description  INTERVENTIONS:  - Support, maintain and protect limb and body alignment  - Provide patient/ family with appropriate education  Outcome: Progressing

## 2020-01-30 NOTE — ASSESSMENT & PLAN NOTE
Wt Readings from Last 3 Encounters:   01/30/20 66 5 kg (146 lb 9 7 oz)   01/10/20 70 kg (154 lb 6 oz)   12/06/19 73 kg (161 lb)     · Echocardiogram:  Obtained 11/2019, demonstrated EF of 50%, mild concentric hypertrophy, grade 1 diastolic dysfunction, moderate pulmonary hypertension    · Status post IV Lasix and IV Diamox  · Monitor I/O's, daily weights, serum electrolytes  · Incentive spirometry, airway clearance protocol  · Titrate oxygen to maintain saturation of at least 90%

## 2020-01-30 NOTE — ASSESSMENT & PLAN NOTE
· There is a right 10th rib fracture noted on CT imaging with possible 7-9 rib fractures versus motion artifact  · Trauma consult completed; recommendations appreciated; since signed off  · Will continue pain regimen as outlined:  · Rib fracture protocol  · Encourage deep breathing, airway clearance  · Possibly will require rehab

## 2020-01-30 NOTE — ASSESSMENT & PLAN NOTE
· As evidence by Arterial Blood Gas result:  pO2 71 7; pCO2 116 9; pH 7 303;  HCO3 56 6, %O2 Sat 96    · Continuous BiPAP ordered  · Pulmonology is following

## 2020-01-30 NOTE — ASSESSMENT & PLAN NOTE
· Suspect multifactorial including CHF exacerbation and Influenza A positive complicated by rib fractures secondary to trauma  · Negative procalcitonin therefore low suspicion for superimposed bacterial infection  · Chronic small bilateral pleural effusions as evidence on chest XR  · Chest x-ray with volume overload  · ABG with chronic hypercapnia  · Continue bronchodilator and treatment for influenza  · BiPAP, diuresis  · Pulmonology and critical care following

## 2020-01-30 NOTE — ASSESSMENT & PLAN NOTE
· Suspect infection may have contributed to CHF exacerbation    · Droplet precautions  · Last day of Tamiflu

## 2020-01-30 NOTE — PROGRESS NOTES
Progress Note - Frandy Fowler 2/25/1925, 80 y o  female MRN: [id number]    Unit/Bed#: [facility] 529-01 Encounter: [id number]    Primary Care Provider: Tavares Crump DO   Date and time admitted to hospital: 1/25/2020  9:11 PM        * Acute respiratory failure with hypoxia and hypercapnia (HCC)  Assessment & Plan  · Suspect multifactorial including CHF exacerbation and Influenza A positive complicated by rib fractures secondary to trauma  · Negative procalcitonin therefore low suspicion for superimposed bacterial infection  · Chronic small bilateral pleural effusions as evidence on chest XR  · Chest x-ray with volume overload  · ABG with chronic hypercapnia  · Continue bronchodilator and treatment for influenza  · BiPAP, diuresis  · Pulmonology and critical care following      Acute on chronic diastolic congestive heart failure ([facility] [address] )  Assessment & Plan  Wt Readings from Last 3 Encounters:   01/30/20 66 5 kg (146 lb 9 7 oz)   01/10/20 70 kg (154 lb 6 oz)   12/06/19 73 kg (161 lb)     · Echocardiogram:  Obtained 11/2019, demonstrated EF of 50%, mild concentric hypertrophy, grade 1 diastolic dysfunction, moderate pulmonary hypertension  · Status post IV Lasix and IV Diamox  · Monitor I/O's, daily weights, serum electrolytes  · Incentive spirometry, airway clearance protocol  · Titrate oxygen to maintain saturation of at least 90%      Influenza A  Assessment & Plan  · Suspect infection may have contributed to CHF exacerbation  · Droplet precautions  · Last day of Tamiflu    Compensated respiratory acidosis  Assessment & Plan  · As evidence by Arterial Blood Gas result:  pO2 71 7; pCO2 116 9; pH 7 303;  HCO3 56 6, %O2 Sat 96    · Continuous BiPAP ordered  · Pulmonology is following    Cognitive impairment  Assessment & Plan  · Chronic  · Geriatric following  · Avoid delirium related agents    Closed fracture of multiple ribs of right side  Assessment & Plan  · There is a right 10th rib fracture noted on CT imaging with possible 7-9 rib fractures versus motion artifact  · Trauma consult completed; recommendations appreciated; since signed off  · Will continue pain regimen as outlined:  · Rib fracture protocol  · Encourage deep breathing, airway clearance  · Possibly will require rehab    Closed compression fracture of L1 vertebra (HCC)  Assessment & Plan  · Found on CT imaging  Initially there was concern that this may be new, however this was found on previous CT imaging  No lower extremity weakness or neuro deficits noted  · Continue pain regimen as otherwise ordered  · Trauma evaluated; since signed off    [address]  · Celexa discontinued in light of hyponatremia   · Taper Xanax      COPD (chronic obstructive pulmonary disease) ([facility] [address] )  Assessment & Plan  · Patient not on scheduled medications  · Albuterol p r n  · No sign of exacerbation  · Not chronically on home oxygen             VTE Pharmacologic Prophylaxis:   Pharmacologic: Heparin  Mechanical VTE Prophylaxis in Place: Yes    Patient Centered Rounds: I have performed bedside rounds with nursing staff today  Discussions with Specialists or Other Care Team Provider:     Education and Discussions with Family / Patient:  Discussed with patient's grandchild Berenice    Time Spent for Care: 30 minutes  More than 50% of total time spent on counseling and coordination of care as described above      Current Length of Stay: 4 day(s)    Current Patient Status: Inpatient   Certification Statement: The patient will continue to require additional inpatient hospital stay due to Above    Discharge Plan / Estimated Discharge Date: TBD    Code Status: Level 3 - DNAR and DNI      Subjective:   Patient seen and examined  Lethargic this morning  Arousable to verbal stimuli  Had Xanax last night and Ativan yesterday afternoon    Objective:     Vitals:   Temp (24hrs), Av 5 °F (36 9 °C), Min:97 8 °F (36 6 °C), Max:99 4 °F (37 4 °C)    Temp:  [97 8 °F (36 6 °C)-99 4 °F (37 4 °C)] 98 4 °F (36 9 °C)  HR:  [64-80] 76  Resp:  [18-22] 20  BP: (120-181)/(58-82) 171/72  SpO2:  [95 %-98 %] 95 %  Body mass index is 26 81 kg/m²  Input and Output Summary (last 24 hours): Intake/Output Summary (Last 24 hours) at 1/30/2020 1225  Last data filed at 1/30/2020 0952  Gross per 24 hour   Intake 120 ml   Output 1975 ml   Net -1855 ml       Physical Exam:     Physical Exam  Patient is very lethargy,  arousable to verbal stimuli, BiPAP  Lung with decreased breath sounds bilateral and scattered rhonchi  Heart positive S1-S2 no murmur  Abdomen soft nontender positive bowel sound  Lower extremities no edema    Additional Data:     Labs:    Results from last 7 days   Lab Units 01/30/20  0732 01/30/20  0509   WBC Thousand/uL  --  3 91*   HEMOGLOBIN g/dL  --  13 2   I STAT HEMOGLOBIN g/dl 13 6  --    HEMATOCRIT %  --  43 5   HEMATOCRIT, ISTAT % 40  --    PLATELETS Thousands/uL  --  217   NEUTROS PCT %  --  50   LYMPHS PCT %  --  33   MONOS PCT %  --  16*   EOS PCT %  --  0     Results from last 7 days   Lab Units 01/30/20  0732 01/30/20  0509  01/25/20  2133   POTASSIUM mmol/L  --  3 5   < > 4 6   CHLORIDE mmol/L  --  89*   < > 87*   CO2 mmol/L  --  39*   < > 36*   CO2, I-STAT mmol/L >45*  --   --   --    BUN mg/dL  --  11   < > 10   CREATININE mg/dL  --  0 65   < > 0 96   CALCIUM mg/dL  --  9 0   < > 9 7   ALK PHOS U/L  --   --   --  78   ALT U/L  --   --   --  24   AST U/L  --   --   --  13   GLUCOSE, ISTAT mg/dl 99  --   --   --     < > = values in this interval not displayed  * I Have Reviewed All Lab Data Listed Above  * Additional Pertinent Lab Tests Reviewed:  [address] Admission Reviewed    Imaging:    Imaging Reports Reviewed Today Include:   Imaging Personally Reviewed by Myself Includes:      Recent Cultures (last 7 days):           Last 24 Hours Medication List:     Current Facility-Administered Medications:  acetaminophen 975 mg Oral Q8H [address] Tamara Hines Jimmy, DO   albuterol 2 puff Inhalation Q4H PRN Abner Longo, DO   ALPRAZolam 0 25 mg Oral HS PRN Robert French MD   aspirin 81 mg Oral Daily Karime Marquez, DO   atorvastatin 40 mg Oral Daily With [facility] MARITZA Marquez, DO   calcium carbonate-vitamin D 1 tablet Oral BID With Meals Abner Longo, DO   docusate sodium 100 mg Oral BID Tuesday M ROGELIO Reina   famotidine 10 mg Oral BID Abner Longo, DO   guaiFENesin 600 mg Oral Q12H [address] Juan Montoya PA-C   heparin (porcine) 5,000 Units Subcutaneous Q8H [address] Abner Longo, DO   HYDROmorphone 0 2 mg Intravenous Q4H PRN Karime Marquez, DO   ipratropium 0 5 mg Nebulization TID Dalia Main, DO   levalbuterol 1 25 mg Nebulization TID Jose Alfredo Reid MD   lidocaine 1 patch Topical Daily Karime Marquez, DO   metoprolol succinate 25 mg Oral Daily Abner Longo, DO   multivitamin-minerals 1 tablet Oral Daily Karime Marquez, DO   OLANZapine 2 5 mg Oral Once PRN Juan Montoya PA-C   ondansetron 4 mg Oral Q8H PRN Abner Longo, DO   oseltamivir 30 mg Oral Q12H [address] Abner Longo, DO   oxyCODONE 2 5 mg Oral Q4H PRN Abner Longo, DO   oxyCODONE 5 mg Oral Q4H PRN Abner Longo, DO   polyethylene glycol 17 g Oral Daily PRN Tuesday M ROGELIO Reina   potassium chloride 20 mEq Oral Daily Abner Longo, DO   senna-docusate sodium 1 tablet Oral HS Tuesday ROGELIO Parker        Today, Patient Was Seen By: Medardo Chilel DO    ** Please Note: This note has been constructed using a voice recognition system   **

## 2020-01-30 NOTE — PROGRESS NOTES
Progress Note - Pulmonary   Yariel Torres 80 y o  female MRN: [id number]  Unit/Bed#: [facility] 529-01 Encounter: [id number]      1  Acute hypoxic and acute on chronic hypercarbic respiratory failure, multifactorial etiology  - patient with significant hypoxia and hypercarbia  etiology of worsening hypercarbia to be multifactorial in the setting of acute on chronic diastolic CHF with diuresis and contraction alkalosis, influenza a infection, as well as chronic benzo/narcotic use and atelectasis  - CT chest this admission with evidence of volume overload right greater than left pleural effusion and ground-glass with atelectasis  -  most recent ABG improved  7 32/84/117/43  Patient received Diamox 250 mg X 3 for contraction alkalosis and was net negative about 2 L yesterday  - I suspect current mental status and lethargy related to benzos, as patient received Xanax x-ray evening and Ativan yesterday afternoon  Patient placed back on BiPAP until she is more awake  Will follow mental status closely for now, but it does not appear to be related to acidosis  If does not improve, will likely obtain CT head for further evaluation  - suspect patient has some component of chronic hypercapnia:   It is likely that she has a baseline pCO2 around 70 given her chronic elevation and bicarb of mid 30s  - will defer further diuresis to Nephrology  - avoid further benzos and narcotics as able  - wean supplemental oxygen as tolerated, when more stable, incentive spirometry, out of bed to chair, pulmonary toilet  -  continue nebulizers with Xopenex/Atrovent t i d   - continue Tamiflu for influenza a infection, currently day 5/5  - no evidence of superimposed bacterial infection, continue to monitor off antibiotics     2   Acute on chronic diastolic CHF  - Echo 05/26/6427  EF 65%, grade 1 diastolic dysfunction, peak PA pressure 54, consistent with moderate pulmonary hypertension  - continue diuresis per Nephrology  - continue Toprol  - monitor intake/output, daily weights  - nephrology following, can consider cardiology consultation     3  Hyponatremia  - status post tolvaptan, sodium 131 today, nephrology following     4  Acute right-sided rib fracture  - CT chest 01/25/2020  Minimally displaced right 10th rib fracture, questionable fractures of 7th-9th ribs versus motion artifact  Compression deformity of L1  - pain management following, avoid narcotics as able, continue scheduled Tylenol  - aggressive incentive spirometry     5  CAD  - continue aspirin, statin, beta-blocker     6  COPD, not in acute exacerbation  - history of COPD, without formal pulmonary function testing, does not follow with a pulmonologist  - uses p r n  Albuterol inhaler at home, but not on any maintenance therapy  - continue Xopenex/Atrovent, no role for systemic steroids at this time  - would benefit from outpatient pulmonary follow-up and spirometry     7  GERD  - continue Pepcid    Subjective:   Patient seen and examined at bedside  Patient extremely lethargic and minimally responsive to painful stimuli and sternal rub, intermittently opening eyes  Stat ABG checked 7 32/84/117/43 on 2 L nasal cannula  Of note, patient received Xanax 0 25 mg yesterday evening, as well as Ativan 1 mg yesterday afternoon  She has not received narcotics since 01/28  Per RN, patient did sleep overnight and used BiPAP 14/5  She woke up and took her medications early this morning at 5:00 a m, but appeared to be sleeping since  Patient did received Diamox 250 mg X 3 yesterday for metabolic alkalosis      Review of Systems   Unable to perform ROS: Mental status change       Objective:     Vitals:    01/30/20 0228 01/30/20 0400 01/30/20 0600 01/30/20 0731   BP: (!) 181/82   136/60   BP Location:       Pulse: 64   69   Resp: 22   22   Temp: 98 3 °F (36 8 °C)   98 4 °F (36 9 °C)   TempSrc:    Axillary   SpO2: 98% 98%  98%   Weight:   66 5 kg (146 lb 9 7 oz)    Height: Intake/Output Summary (Last 24 hours) at 1/30/2020 0841  Last data filed at 1/30/2020 0513  Gross per 24 hour   Intake 0 ml   Output 1975 ml   Net -1975 ml         Physical Exam   Constitutional: She appears well-developed and well-nourished  No distress  Thin   HENT:   Head: Normocephalic and atraumatic  Mouth/Throat: Oropharynx is clear and moist  No oropharyngeal exudate  Eyes: EOM are normal  No scleral icterus  Cardiovascular: Normal rate and regular rhythm  No murmur heard  Pulmonary/Chest: Effort normal and breath sounds normal  No respiratory distress  She has no wheezes  Scattered rhonchi   Abdominal: Soft  Bowel sounds are normal  She exhibits no distension  There is no tenderness  Musculoskeletal: She exhibits no edema  Neurological:   Patient lethargic and minimally responsive to sternal rub and painful stimuli  Did open eyes intermittently, but drifted back to sleep sleep  Pupils were equal and reactive  Skin: She is not diaphoretic  Labs: I have personally reviewed pertinent lab results    Results from last 7 days   Lab Units 01/30/20  0732 01/30/20  0509 01/29/20  0504 01/28/20  0502 01/27/20  0502   WBC Thousand/uL  --  3 91* 6 10 4 12* 5 62   HEMOGLOBIN g/dL  --  13 2 14 2 13 5 13 5   I STAT HEMOGLOBIN g/dl 13 6  --   --   --   --    HEMATOCRIT %  --  43 5 45 9 43 4 42 9   HEMATOCRIT, ISTAT % 40  --   --   --   --    PLATELETS Thousands/uL  --  217 265 258 276   NEUTROS PCT %  --  50 62  --  56   MONOS PCT %  --  16* 17*  --  22*      Results from last 7 days   Lab Units 01/30/20  0732 01/30/20  0509 01/29/20  0504 01/28/20  0502  01/25/20  2133   POTASSIUM mmol/L  --  3 5 4 4 4 3   < > 4 6   CHLORIDE mmol/L  --  89* 86* 83*   < > 87*   CO2 mmol/L  --  39* >45* 42*   < > 36*   CO2, I-STAT mmol/L >45*  --   --   --   --   --    BUN mg/dL  --  11 9 9   < > 10   CREATININE mg/dL  --  0 65 0 61 0 62   < > 0 96   CALCIUM mg/dL  --  9 0 9 1 8 8   < > 9 7   ALK PHOS U/L --   --   --   --   --  78   ALT U/L  --   --   --   --   --  24   AST U/L  --   --   --   --   --  13   GLUCOSE, ISTAT mg/dl 99  --   --   --   --   --     < > = values in this interval not displayed  0   Lab Value Date/Time    TROPONINI 0 07 (H) 11/11/2019 1915    TROPONINI 0 09 (H) 11/11/2019 1742    TROPONINI 0 07 (H) 11/11/2019 1516    TROPONINI <0 03 11/09/2019 2215    TROPONINI 0 03 05/30/2019 0935    TROPONINI 0 06 (H) 05/21/2019 1622    TROPONINI 0 07 (H) 05/21/2019 1240    TROPONINI 0 07 (H) 05/21/2019 0759    TROPONINI 2 56 (H) 07/30/2018 0153    TROPONINI 2 40 (H) 07/29/2018 2211    TROPONINI 2 57 (H) 07/29/2018 1821    TROPONINI 0 28 (HH) 05/22/2018 0603    TROPONINI 0 39 (HH) 05/21/2018 1752     Imaging and other studies: I have personally reviewed pertinent reports  and I have personally reviewed pertinent films in PACS  Chest x-ray 01/29/2020  Persistent CHF    Chest x-ray 01/27/2020  Bibasilar effusions with atelectasis and pulmonary vascular congestion with cardiomegaly     CT chest 01/25/2020  Minimally displaced right 10th rib fracture, questionable fractures of 7th-9th ribs versus motion artifact  Compression deformity of L1  Small bilateral pleural effusions right greater than left with scattered ground-glass, likely secondary to fluid overload, with bibasilar atelectasis     Pulmonary function testing:  None     EKG, Pathology, and Other Studies: I have personally reviewed pertinent reports      Echo 11/12/2019  EF 54%, grade 1 diastolic dysfunction, peak PA pressure 54, consistent with moderate pulmonary hypertension    Micro:  Influenza a positive      Bienvenido Yanez MD  Pulmonary & Critical Care Fellow, PGY4  [facility]

## 2020-01-30 NOTE — PROGRESS NOTES
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  Suspect pulmonary processes also contributing + pain from rib fracture   · Now off celexa  · S/p tolvaptan x 2 (last dose 1/28) and lasix (last does yesterday)   · Sodium was up to 134 with tolvaptan but now down to 131 today   · Urine sodium 74, urine osm 363, serum osm 273, TSH normal   · Continue 1800cc/day oral fluid restriction   · Will d/w attending for further management   · Check am BMP   2  Acute hypoxic and hypercarbic respiratory failure: due to influenza and CHF  · S/p diamox x 3 and was on IV lasix and then po yesterday   · Per discussion with pulmonary they feel volume status improved and now main issue is the influenza   3  Acute on chronic diastolic CHF:  Status post IV diuretics and volume status improved per my discussion with pulmonology  Currently with no peripheral edema and is off diuretics  4  Right-sided rib fractures        SUBJECTIVE:  Still coughing but thinks her breathing is a little better today  No nausea, vomiting or diarrhea       OBJECTIVE:  Current Weight: Weight - Scale: 66 5 kg (146 lb 9 7 oz)  Vitals:    01/30/20 1133   BP: (!) 171/72   Pulse: 76   Resp: 20   Temp: 98 4 °F (36 9 °C)   SpO2: 95%       Intake/Output Summary (Last 24 hours) at 1/30/2020 1153  Last data filed at 1/30/2020 0637  Gross per 24 hour   Intake 120 ml   Output 1975 ml   Net -1855 ml       General:  No acute distress  Skin:  No rash  Eyes:  Sclerae anicteric  ENT:  Moist mucous membranes  Neck:  Trachea midline with no JVD  Chest:  Decreased with some rhonchi   CVS:  Regular rate and rhythm  Abdomen:  Soft, nontender, nondistended  Extremities:  No edema  Neuro:  Awake and alert  Psych:  Appropriate affect        Medications:  Scheduled Meds:  Current Facility-Administered Medications:  acetaminophen 975 mg Oral Q8H [address] Annie Marek, DO   albuterol 2 puff Inhalation Q4H PRN Lolis Marek, DO   ALPRAZolam 0 25 mg Oral HS PRN Bienvenido Yanez MD   aspirin 81 mg Oral Daily Annie Marek, DO   atorvastatin 40 mg Oral Daily With Dinner Lolis Marek, DO   calcium carbonate-vitamin D 1 tablet Oral BID With Meals Lolis Marek, DO   docusate sodium 100 mg Oral BID Tuesday M ROGELIO Reina   famotidine 10 mg Oral BID Lolis Marek, DO   guaiFENesin 600 mg Oral Q12H [address] Rusty Sandoval PA-C   heparin (porcine) 5,000 Units Subcutaneous Q8H [address] Annie Marek, DO   HYDROmorphone 0 2 mg Intravenous Q4H PRN Lolis Marek, DO   ipratropium 0 5 mg Nebulization TID Sammi Banerjee, DO   levalbuterol 1 25 mg Nebulization TID Quin Kwon MD   lidocaine 1 patch Topical Daily Alverna Rule Veres, DO   metoprolol succinate 25 mg Oral Daily Annie Marek, DO   multivitamin-minerals 1 tablet Oral Daily Alverna Rule Veres, DO   OLANZapine 2 5 mg Oral Once PRN Rusty Sandoval PA-C   ondansetron 4 mg Oral Q8H PRN Lolis Marek, DO   oseltamivir 30 mg Oral Q12H [address] Annie Marek, DO   oxyCODONE 2 5 mg Oral Q4H PRN Lolis Marek, DO   oxyCODONE 5 mg Oral Q4H PRN Lolis Marek, DO   polyethylene glycol 17 g Oral Daily PRN Tuesday M ROGELIO Reina   potassium chloride 20 mEq Oral Daily Lolis Marek, DO   senna-docusate sodium 1 tablet Oral HS Tuesday ROGELIO Tyler       PRN Meds: albuterol    ALPRAZolam    HYDROmorphone    OLANZapine    ondansetron    oxyCODONE    oxyCODONE    polyethylene glycol    Laboratory Results:  Results from last 7 days   Lab Units 01/30/20  0732 01/30/20  0509 01/29/20  0504 01/28/20  0502 01/27/20  0502 01/26/20  0505 01/25/20  2133   WBC Thousand/uL  --  3 91* 6 10 4 12* 5 62 6 74 7 14   HEMOGLOBIN g/dL  --  13 2 14 2 13 5 13 5 13 3 14 1   I STAT HEMOGLOBIN g/dl 13 6  --   --   --   --   --   --    HEMATOCRIT %  --  43 5 45 9 43 4 42 9 42 2 43 9 HEMATOCRIT, ISTAT % 40  --   --   --   --   --   --    PLATELETS Thousands/uL  --  217 265 258 276 281 315   SODIUM mmol/L  --  131* 134* 129* 127* 129* 126*   POTASSIUM mmol/L  --  3 5 4 4 4 3 4 2 4 4 4 6   CHLORIDE mmol/L  --  89* 86* 83* 83* 88* 87*   CO2 mmol/L  --  39* >45* 42* 42* 39* 36*   CO2, I-STAT mmol/L >45*  --   --   --   --   --   --    BUN mg/dL  --  11 9 9 11 9 10   CREATININE mg/dL  --  0 65 0 61 0 62 0 71 0 78 0 96   CALCIUM mg/dL  --  9 0 9 1 8 8 8 7 8 8 9 7   GLUCOSE, ISTAT mg/dl 99  --   --   --   --   --   --

## 2020-01-31 NOTE — PROGRESS NOTES
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  Suspect pulmonary processes also contributing + pain from rib fracture   · Now off celexa  · S/p tolvaptan x 2 (last dose 1/28) and lasix (last dose 1/29)   · Sodium trending down to 127 today off tolvaptan  · Urine sodium 74, urine osm 363, serum osm 273, TSH normal   · Will stop oral fluid restriction and give tolvaptan 15 g x 1  · Check am BMP   2  Acute hypoxic and hypercarbic respiratory failure: due to influenza and CHF  · S/p diamox x 3 and was on IV lasix initially  · Per discussion with pulmonary yesterday they feel volume status improved and now main issue is the influenza   3  Acute on chronic diastolic CHF:  Status post IV diuretics but currently off diuretics  May need to restart soon if shortness of breath worsens  · Weight decreasing overall (but is bedscale)   4  Right-sided rib fractures        SUBJECTIVE:  Patient is sleepy today but according to the nurse she had been up and moving around with therapy this morning  Her oxygen requirements have been improving and she is currently on 2 L nasal cannula  She is not eating well but she is drinking      OBJECTIVE:  Current Weight: Weight - Scale: 65 1 kg (143 lb 8 3 oz)  Vitals:    01/31/20 1010   BP: 136/64   Pulse:    Resp:    Temp:    SpO2:        Intake/Output Summary (Last 24 hours) at 1/31/2020 1051  Last data filed at 1/31/2020 0932  Gross per 24 hour   Intake 30 ml   Output 1250 ml   Net -1220 ml     General:  No acute distress  Skin:  No rash  Eyes:  Sclerae anicteric  ENT:  Moist mucous membranes  Neck:  Trachea midline   Chest:  rhonchi bilaterally  CVS:  Regular rate and rhythm  Abdomen:  Soft, nontender, nondistended  Extremities:  No edema  Neuro:  Awake and alert  Psych:  Appropriate affect    Medications:  Scheduled Meds:    Current Facility-Administered Medications:  acetaminophen 975 mg Oral Q8H [facility] Gary Marquez, DO   albuterol 2 puff Inhalation Q4H PRN Alec Doe, DO   ALPRAZolam 0 25 mg Oral HS PRN Nino Davenport MD   aspirin 81 mg Oral Daily Alec Doe, DO   atorvastatin 40 mg Oral Daily With Dinner Alec Doe, DO   calcium carbonate-vitamin D 1 tablet Oral BID With Meals Alec Doe, DO   docusate sodium 100 mg Oral BID Tuesday M WINSTON ReinaNP   famotidine 10 mg Oral BID Alec Doe, DO   guaiFENesin 600 mg Oral Q12H [facility] Wilian Ruiz PA-C   heparin (porcine) 5,000 Units Subcutaneous Q8H [facility] Alec Doe, DO   HYDROmorphone 0 2 mg Intravenous Q4H PRN Alec Doe, DO   ipratropium 0 5 mg Nebulization TID Win Alonso, DO   levalbuterol 1 25 mg Nebulization TID Gigi Vera MD   lidocaine 1 patch Topical Daily Gary Daquan Marquez, DO   metoprolol tartrate 25 mg Oral Q12H [facility] Gissell Number, DO   multivitamin-minerals 1 tablet Oral Daily Gary Daquan Chavezelias, DO   OLANZapine 2 5 mg Oral Once PRN Wilian Ruiz PA-C   ondansetron 4 mg Oral Q8H PRN Alec Doe, DO   oxyCODONE 2 5 mg Oral Q4H PRN Alec Doe, DO   oxyCODONE 5 mg Oral Q4H PRN Alec Doe, DO   polyethylene glycol 17 g Oral Daily PRN Tuesday M Nuno, ROGELIO   potassium chloride 20 mEq Oral Daily Alec Doe, DO   senna-docusate sodium 1 tablet Oral HS Tuesday SaturROGELIO Mott       PRN Meds: albuterol    ALPRAZolam    HYDROmorphone    OLANZapine    ondansetron    oxyCODONE    oxyCODONE    polyethylene glycol    Laboratory Results:  Results from last 7 days   Lab Units 01/31/20  0510 01/30/20  0732 01/30/20  0509 01/29/20  0504 01/28/20  0502 01/27/20  0502 01/26/20  0505 01/25/20  2133   WBC Thousand/uL  --   --  3 91* 6 10 4 12* 5 62 6 74 7 14   HEMOGLOBIN g/dL  --   --  13 2 14 2 13 5 13 5 13 3 14 1   I STAT HEMOGLOBIN g/dl  --  13 6  --   --   --   --   --   -- HEMATOCRIT %  --   --  43 5 45 9 43 4 42 9 42 2 43 9   HEMATOCRIT, ISTAT %  --  40  --   --   --   --   --   --    PLATELETS Thousands/uL  --   --  217 265 258 276 281 315   SODIUM mmol/L 127*  --  131* 134* 129* 127* 129* 126*   POTASSIUM mmol/L 3 5  --  3 5 4 4 4 3 4 2 4 4 4 6   CHLORIDE mmol/L 89*  --  89* 86* 83* 83* 88* 87*   CO2 mmol/L 36*  --  39* >45* 42* 42* 39* 36*   CO2, I-STAT mmol/L  --  >45*  --   --   --   --   --   --    BUN mg/dL 12  --  11 9 9 11 9 10   CREATININE mg/dL 0 59*  --  0 65 0 61 0 62 0 71 0 78 0 96   CALCIUM mg/dL 9 0  --  9 0 9 1 8 8 8 7 8 8 9 7   GLUCOSE, ISTAT mg/dl  --  99  --   --   --   --   --   --

## 2020-01-31 NOTE — ASSESSMENT & PLAN NOTE
· Nephrology consult appreciated  · 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 which has been discontinued   · On tolvaptan  · Continue to monitor

## 2020-01-31 NOTE — ASSESSMENT & PLAN NOTE
· Suspect multifactorial including CHF exacerbation and Influenza A positive complicated by rib fractures secondary to trauma  · Negative procalcitonin therefore low suspicion for superimposed bacterial infection  · Volume overload,  Chronic small bilateral pleural effusions as evidence on chest XR  · ABG with chronic hypercapnia  · Continue bronchodilator and completed influenza treatment  · BiPAP p r n , diuresis  · Pulmonology is following

## 2020-01-31 NOTE — PHYSICAL THERAPY NOTE
Physical Therapy Progress Note     01/31/20 1220   Pain Assessment   Pain Assessment No/denies pain   Pain Score No Pain   Restrictions/Precautions   Other Precautions Droplet precautions;Cognitive; Chair Alarm; Fall Risk;O2;Telemetry;Hard of hearing  (Alarm active post session )   Subjective   Subjective The pt  states that she is tired from working with therapy this morning  She is concerned about her teeth  Transfers   Sit to Stand 3  Moderate assistance   Additional items Assist x 1; Increased time required;Verbal cues   Stand to Sit 3  Moderate assistance   Additional items Assist x 1; Increased time required;Verbal cues   Ambulation/Elevation   Gait pattern Excessively slow; Step to;Short stride; Inconsistent raghav;Decreased foot clearance; Forward Flexion   Gait Assistance 4  Minimal assist   Additional items Assist x 1;Verbal cues   Assistive Device Rolling walker   Distance 10 feet  Balance   Static Sitting Fair +   Dynamic Sitting Fair   Static Standing Fair -   Ambulatory Poor +   Activity Tolerance   Activity Tolerance Patient tolerated treatment well;Patient limited by fatigue   Nurse [id number]ina RN  Assessment   Prognosis Fair   Problem List Decreased strength;Decreased endurance; Impaired balance;Decreased mobility; Decreased cognition;Decreased safety awareness   Assessment The pt  was fatigued this afternoon, and she was unable to ambulate farther  She was anxious, and required redirection throughout the session  She continues to remain limited from her baseline, and she will benefit from continued physical therapy in order to maximize her functional mobility and independence  Barriers to Discharge Inaccessible home environment;Decreased caregiver support   Goals   Patient Goals To get better  STG Expiration Date 02/06/20   PT Treatment Day 1   Plan   Treatment/Interventions LE strengthening/ROM; Functional transfer training; Therapeutic exercise; Endurance training;Patient/family training;Bed mobility;Gait training;Elevations   Progress Slow progress, decreased activity tolerance   PT Frequency   (3-5x a week )   Recommendation   Recommendation Post acute IP rehab   Equipment Recommended Colby Thomas PTA

## 2020-01-31 NOTE — PROGRESS NOTES
Progress Note - Pulmonary   Marlen Tubbs 80 y o  female MRN: [id number]  Unit/Bed#: [facility] 529-01 Encounter: [id number]      1  Acute hypoxic and acute on chronic hypercarbic respiratory failure, multifactorial etiology  etiology of worsening hypercarbia to be multifactorial in the setting of acute on chronic diastolic CHF with diuresis and contraction alkalosis, influenza a infection, as well as chronic benzo/narcotic use and atelectasis  - CT chest this admission with evidence of volume overload right greater than left pleural effusion and ground-glass with atelectasis  -  patient improved with Diamox and withholding sedating medications , she is currently alert and oriented x3 on 2 L nasal cannula   - continue BiPAP p r n  with naps and overnight for now   - suspect patient has some component of chronic hypercapnia:   It is likely that she has a baseline pCO2 around 70 given her chronic elevation and bicarb of mid 30s  - will defer further diuresis to Nephrology  - avoid further benzos and narcotics as able  - wean supplemental oxygen as tolerated, incentive spirometry, out of bed to chair, pulmonary toilet  -  continue nebulizers with Xopenex/Atrovent t i d   - completed 5 days of Tamiflu, will dc   - no evidence of superimposed bacterial infection, continue to monitor off antibiotics     2  Acute on chronic diastolic CHF  - Echo 15/09/0266  EF 35%, grade 1 diastolic dysfunction, peak PA pressure 54, consistent with moderate pulmonary hypertension  - continue diuresis per Nephrology  - continue Toprol  - monitor intake/output, daily weights  - nephrology following, can consider cardiology consultation     3  Hyponatremia  - status post tolvaptan, sodium 131 today, nephrology following     4  Acute right-sided rib fracture  - CT chest 01/25/2020  Minimally displaced right 10th rib fracture, questionable fractures of 7th-9th ribs versus motion artifact  Compression deformity of L1  - pain management following, avoid narcotics as able, continue scheduled Tylenol  - aggressive incentive spirometry     5  CAD  - continue aspirin, statin, beta-blocker     6  COPD, not in acute exacerbation  - history of COPD, without formal pulmonary function testing, does not follow with a pulmonologist  - uses p r n  Albuterol inhaler at home, but not on any maintenance therapy  - continue Xopenex/Atrovent, no role for systemic steroids at this time  - would benefit from outpatient pulmonary follow-up and spirometry     7   GERD  - continue Pepcid      Subjective:   Patient seen examined at bedside  She is much more awake and oriented this morning  Currently oriented x3  worew BiPAP 14/5 overnight, now comfortable on 2 L nasal cannula  She does still complain of intermittent nonproductive cough and mild shortness of breath, denies fever, chills, nausea, vomiting, chest pain  No further diuretics given yesterday  Review of Systems   Constitutional: Positive for fatigue  Negative for chills, fever and unexpected weight change  HENT: Negative for congestion, rhinorrhea, sneezing and sore throat  Respiratory: Positive for cough and shortness of breath  Negative for chest tightness and wheezing  Cardiovascular: Negative for chest pain, palpitations and leg swelling  Gastrointestinal: Negative for abdominal pain, constipation, diarrhea, nausea and vomiting  Endocrine: Negative for cold intolerance and heat intolerance  Genitourinary: Negative for dysuria  Musculoskeletal: Negative for arthralgias  Allergic/Immunologic: Negative for immunocompromised state  Neurological: Negative for dizziness and numbness         Objective:     Vitals:    01/31/20 0527 01/31/20 0548 01/31/20 0720 01/31/20 0723   BP: (!) 182/83  (!) 184/92    BP Location:       Pulse: 69  70    Resp:   (!) 24    Temp:   97 7 °F (36 5 °C)    TempSrc:   Oral    SpO2:   98% 96%   Weight:  65 1 kg (143 lb 8 3 oz)     Height:               Intake/Output Summary (Last 24 hours) at 1/31/2020 0837  Last data filed at 1/31/2020 6839  Gross per 24 hour   Intake 150 ml   Output 1000 ml   Net -850 ml         Physical Exam   Constitutional: She is oriented to person, place, and time  She appears well-developed and well-nourished  No distress  Thin, elderly   HENT:   Head: Normocephalic and atraumatic  Mouth/Throat: Oropharynx is clear and moist  No oropharyngeal exudate  Eyes: EOM are normal  No scleral icterus  Cardiovascular: Normal rate, regular rhythm and normal heart sounds  No murmur heard  Pulmonary/Chest: Effort normal  No respiratory distress  She has no wheezes  Scattered rhonchi at bases   Abdominal: Soft  Bowel sounds are normal  She exhibits no distension  There is no tenderness  Musculoskeletal: She exhibits no edema  Neurological: She is alert and oriented to person, place, and time  Skin: She is not diaphoretic  Labs: I have personally reviewed pertinent lab results    Results from last 7 days   Lab Units 01/30/20  0732 01/30/20  0509 01/29/20  0504 01/28/20  0502 01/27/20  0502   WBC Thousand/uL  --  3 91* 6 10 4 12* 5 62   HEMOGLOBIN g/dL  --  13 2 14 2 13 5 13 5   I STAT HEMOGLOBIN g/dl 13 6  --   --   --   --    HEMATOCRIT %  --  43 5 45 9 43 4 42 9   HEMATOCRIT, ISTAT % 40  --   --   --   --    PLATELETS Thousands/uL  --  217 265 258 276   NEUTROS PCT %  --  50 62  --  56   MONOS PCT %  --  16* 17*  --  22*      Results from last 7 days   Lab Units 01/31/20  0510 01/30/20  0732 01/30/20  0509 01/29/20  0504  01/25/20  2133   POTASSIUM mmol/L 3 5  --  3 5 4 4   < > 4 6   CHLORIDE mmol/L 89*  --  89* 86*   < > 87*   CO2 mmol/L 36*  --  39* >45*   < > 36*   CO2, I-STAT mmol/L  --  >45*  --   --   --   --    BUN mg/dL 12  --  11 9   < > 10   CREATININE mg/dL 0 59*  --  0 65 0 61   < > 0 96   CALCIUM mg/dL 9 0  --  9 0 9 1   < > 9 7   ALK PHOS U/L  --   --   --   --   --  78   ALT U/L  --   --   --   --   --  24   AST U/L  --   -- --   --   --  13   GLUCOSE, ISTAT mg/dl  --  99  --   --   --   --     < > = values in this interval not displayed                        0   Lab Value Date/Time    TROPONINI 0 07 (H) 11/11/2019 1915    TROPONINI 0 09 (H) 11/11/2019 1742    TROPONINI 0 07 (H) 11/11/2019 1516    TROPONINI <0 03 11/09/2019 2215    TROPONINI 0 03 05/30/2019 0935    TROPONINI 0 06 (H) 05/21/2019 1622    TROPONINI 0 07 (H) 05/21/2019 1240    TROPONINI 0 07 (H) 05/21/2019 0759    TROPONINI 2 56 (H) 07/30/2018 0153    TROPONINI 2 40 (H) 07/29/2018 2211    TROPONINI 2 57 (H) 07/29/2018 1821    TROPONINI 0 28 (HH) 05/22/2018 0603    TROPONINI 0 39 ([facility]) 05/21/2018 1752     Imaging and other studies: I have personally reviewed pertinent reports  Taqueria Sarah I have personally reviewed pertinent films in PACS  Chest x-ray 01/29/2020  Persistent CHF     Chest x-ray 01/27/2020  Bibasilar effusions with atelectasis and pulmonary vascular congestion with cardiomegaly     CT chest 01/25/2020  Minimally displaced right 10th rib fracture, questionable fractures of 7th-9th ribs versus motion artifact  Compression deformity of L1  Small bilateral pleural effusions right greater than left with scattered ground-glass, likely secondary to fluid overload, with bibasilar atelectasis     Pulmonary function testing:  None     EKG, Pathology, and Other Studies: I have personally reviewed pertinent reports     Echo 11/12/2019  EF 55%, grade 1 diastolic dysfunction, peak PA pressure 54, consistent with moderate pulmonary hypertension     Micro:  Influenza a positive        Car Gaviria MD  Pulmonary & Critical Care Fellow, [facility]

## 2020-01-31 NOTE — PROGRESS NOTES
Progress Note - Lebron Plascencia 2/25/1925, 80 y o  female MRN: [id number]    Unit[facility]Bed[facility] 529-01 Encounter: [id number]    Primary Care Provider: Bud Cagle DO   Date and time admitted to hospital: 1/25/2020  9:11 PM        * Acute respiratory failure with hypoxia and hypercapnia (HCC)  Assessment & Plan  · Suspect multifactorial including CHF exacerbation and Influenza A positive complicated by rib fractures secondary to trauma  · Negative procalcitonin therefore low suspicion for superimposed bacterial infection  · Volume overload,  Chronic small bilateral pleural effusions as evidence on chest XR  · ABG with chronic hypercapnia  · Continue bronchodilator and completed influenza treatment  · BiPAP p r n , diuresis  · Pulmonology is following      Acute on chronic diastolic congestive heart failure (HCC)  Assessment & Plan  Wt Readings from Last 3 Encounters:   01/31/20 65 1 kg (143 lb 8 3 oz)   01/10/20 70 kg (154 lb 6 oz)   12/06/19 73 kg (161 lb)     · Echocardiogram:  Obtained 11/2019, demonstrated EF of 50%, mild concentric hypertrophy, grade 1 diastolic dysfunction, moderate pulmonary hypertension  · Status post IV Lasix and IV Diamox  · Improving with negative fluid balance  · Continue above treatment  · Currently off diuretics      Influenza A  Assessment & Plan  · Suspect infection may have contributed to CHF exacerbation    · Completed Tamiflu treatment    Cognitive impairment  Assessment & Plan  · Chronic  · Geriatric following  · Avoid delirium related agents    Closed fracture of multiple ribs of right side  Assessment & Plan  · There is a right 10th rib fracture noted on CT imaging with possible 7-9 rib fractures versus motion artifact  · Trauma consult completed; recommendations appreciated; since signed off  · Will continue pain regimen as outlined:  · Rib fracture protocol  · Encourage deep breathing, airway clearance  · Possibly will require rehab    Hyponatremia  Assessment & Plan  · Nephrology consult appreciated  · 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 which has been discontinued   · On tolvaptan  · Continue to monitor      Closed compression fracture of L1 vertebra [facility])  Assessment & Plan  · Found on CT imaging  Initially there was concern that this may be new, however this was found on previous CT imaging  No lower extremity weakness or neuro deficits noted  · Continue pain regimen as otherwise ordered  · Trauma evaluated; since signed off    [address]  · Celexa discontinued in light of hyponatremia   · Taper Xanax      COPD (chronic obstructive pulmonary disease) ([facility] [address] )  Assessment & Plan  · Patient not on scheduled medications  · Albuterol p r n  · No sign of exacerbation  · Not chronically on home oxygen         VTE Pharmacologic Prophylaxis:   Pharmacologic: Heparin  Mechanical VTE Prophylaxis in Place: Yes    Patient Centered Rounds: I have performed bedside rounds with nursing staff today  Discussions with Specialists or Other Care Team Provider:     Education and Discussions with Family / Patient:  Patient, unable to reach granddaughter by phone    Time Spent for Care: 30 minutes  More than 50% of total time spent on counseling and coordination of care as described above      Current Length of Stay: 5 day(s)    Current Patient Status: Inpatient   Certification Statement: The patient will continue to require additional inpatient hospital stay due to Above    Discharge Plan / Estimated Discharge Date:  Likely rehab in 2-3 days    Code Status: Level 3 - DNAR and DNI      Subjective:   Patient seen and examined  Comfortable sitting in chair  Much more awake and oriented today  Mild cough and shortness of breath no chest pain    Objective:     Vitals:   Temp (24hrs), Av 3 °F (36 8 °C), Min:97 7 °F (36 5 °C), Max:98 6 °F (37 °C)    Temp:  [97 7 °F (36 5 °C)-98 6 °F (37 °C)] 97 7 °F (36 5 °C)  HR:  [69-76] 70  Resp:  [20-24] 24  BP: (136-184)/(63-92) 136/64  SpO2:  [96 %-98 %] 96 %  Body mass index is 26 25 kg/m²  Input and Output Summary (last 24 hours): Intake/Output Summary (Last 24 hours) at 1/31/2020 1311  Last data filed at 1/31/2020 0932  Gross per 24 hour   Intake 30 ml   Output 900 ml   Net -870 ml       Physical Exam:     Physical Exam  Patient is awake in no acute distress  Lung with decreased breath sounds bilateral  Heart positive S1-S2 no murmur  Abdomen soft nontender  Lower extremities no edema    Additional Data:     Labs:    Results from last 7 days   Lab Units 01/30/20  0732 01/30/20  0509   WBC Thousand/uL  --  3 91*   HEMOGLOBIN g/dL  --  13 2   I STAT HEMOGLOBIN g/dl 13 6  --    HEMATOCRIT %  --  43 5   HEMATOCRIT, ISTAT % 40  --    PLATELETS Thousands/uL  --  217   NEUTROS PCT %  --  50   LYMPHS PCT %  --  33   MONOS PCT %  --  16*   EOS PCT %  --  0     Results from last 7 days   Lab Units 01/31/20  0510 01/30/20  0732  01/25/20  2133   POTASSIUM mmol/L 3 5  --    < > 4 6   CHLORIDE mmol/L 89*  --    < > 87*   CO2 mmol/L 36*  --    < > 36*   CO2, I-STAT mmol/L  --  >45*  --   --    BUN mg/dL 12  --    < > 10   CREATININE mg/dL 0 59*  --    < > 0 96   CALCIUM mg/dL 9 0  --    < > 9 7   ALK PHOS U/L  --   --   --  78   ALT U/L  --   --   --  24   AST U/L  --   --   --  13   GLUCOSE, ISTAT mg/dl  --  99  --   --     < > = values in this interval not displayed  * I Have Reviewed All Lab Data Listed Above  * Additional Pertinent Lab Tests Reviewed:  Amanda 66 Admission Reviewed    Imaging:    Imaging Reports Reviewed Today Include:   Imaging Personally Reviewed by Myself Includes:      Recent Cultures (last 7 days):           Last 24 Hours Medication List:     Current Facility-Administered Medications:  acetaminophen 975 mg Oral Q8H [facility] Gary Marquez, DO   albuterol 2 puff Inhalation Q4H PRN Alec Doe DO   ALPRAZolam 0 25 mg Oral HS PRN Nino Davenport MD   aspirin 81 mg Oral Daily Gary Butt Jimmy, DO   atorvastatin 40 mg Oral Daily With [employer] MARITZA Marquez, DO   calcium carbonate-vitamin D 1 tablet Oral BID With Meals Alan Crowley, DO   docusate sodium 100 mg Oral BID Tuesday M ROGELIO Reina   famotidine 10 mg Oral BID Alan Crowley, DO   guaiFENesin 600 mg Oral Q12H [facility] Ti Chilel PA-C   heparin (porcine) 5,000 Units Subcutaneous Q8H [facility] Alan Crowley, DO   HYDROmorphone 0 2 mg Intravenous Q4H PRN Ezio Marquez, DO   ipratropium 0 5 mg Nebulization TID Cain Kaleb, DO   levalbuterol 1 25 mg Nebulization TID Breana Carty MD   lidocaine 1 patch Topical Daily Ezio Marquez, DO   metoprolol tartrate 25 mg Oral Q12H [facility] Anisa Tapia,    multivitamin-minerals 1 tablet Oral Daily Ezio Marquez, DO   OLANZapine 2 5 mg Oral Once PRN Ti Chilel PA-C   ondansetron 4 mg Oral Q8H PRN Alan Crowley, DO   oxyCODONE 2 5 mg Oral Q4H PRN Alanjusto Crowley, DO   oxyCODONE 5 mg Oral Q4H PRN Alanjusto Crowley, DO   polyethylene glycol 17 g Oral Daily PRN Tuesday ROGELIO Covarrubias   potassium chloride 20 mEq Oral Daily Alan Crowley, DO   senna-docusate sodium 1 tablet Oral HS Tuesday ROGELIO Farias        Today, Patient Was Seen By: Anisa Tapia DO    ** Please Note: This note has been constructed using a voice recognition system   **

## 2020-01-31 NOTE — ASSESSMENT & PLAN NOTE
Wt Readings from Last 3 Encounters:   01/31/20 65 1 kg (143 lb 8 3 oz)   01/10/20 70 kg (154 lb 6 oz)   12/06/19 73 kg (161 lb)     · Echocardiogram:  Obtained 11/2019, demonstrated EF of 50%, mild concentric hypertrophy, grade 1 diastolic dysfunction, moderate pulmonary hypertension    · Status post IV Lasix and IV Diamox  · Improving with negative fluid balance  · Continue above treatment  · Currently off diuretics

## 2020-01-31 NOTE — OCCUPATIONAL THERAPY NOTE
Occupational Therapy Treatment Note      Marleen Chu    1/31/2020    Principal Problem:    Acute respiratory failure with hypoxia and hypercapnia ([facility])  Active Problems:    Atherosclerosis of native coronary artery of native heart without angina pectoris    Gastroesophageal reflux disease without esophagitis    COPD (chronic obstructive pulmonary disease) ([facility])    Anxiety    Acute on chronic diastolic congestive heart failure (HCC)    Closed compression fracture of L1 vertebra (HCC)    Hyponatremia    Influenza A    Closed fracture of multiple ribs of right side    Cognitive impairment    Compensated respiratory acidosis    Chronic respiratory acidosis      Past Medical History:   Diagnosis Date    Abnormal blood sugar 03/13/2017    Abnormal carotid duplex scan     Carotid Duplex (Plaque formation is quite prominent bilaterally  Despite these changes, no evidence for greater than 50% diameter reducing lesions in the cervical portion of either carotid artery hemisystem ) - 6/13/2017    Anxiety     Cervical radiculopathy 08/08/2017    CHF (congestive heart failure) ([facility])     diastolic    Compression fracture of lumbar spine, non-traumatic, with routine healing, subsequent encounter     COPD (chronic obstructive pulmonary disease) ([facility] ) 2/6/2018    Coronary angioplasty status     Coronary artery disease 4/7/2017    Costochondritis 02/05/2013    suspect MS in origin given relationship to ROM and location  Start mobic and if persists, reeval  Refused xrayat this time   Dyslipidemia     GERD without esophagitis 8/30/2012    H/O CT scan of chest      (No PE, minimal interstitial change left lower lobe and right upper lobe, which may be acute ) - 5/19/2017    History of Holter monitoring     Holter (Sinus rhythm with rates ranging from 52 to 118 bpm   No afib or heart block  Rare atrial and ventricular ectopic beats  One six-beat atrial run noted  No pauses  ) - 5/31/2017    Hx of echocardiogram Echo (EF 0 60 (60%), Biatrial enlargement ) - 3/24/2017 Echo (EF 0 55 (55%), mild LVH  Aortic valvular sclerosis  Trace MI with mild left atrial dilatation, mild MAC  Mild TI with mild pulmonary hypertension  ) - 5/22/2017 Echo (EF 0 60 (60%), Normal left vent chamber size and systolic function  Mild diastolic dysfunction of LV w/normal filling pressures  Mild mitral regurg ) - 7/26/2017 Echo (EF 0    Hx of echocardiogram 08/16/2017    EF0 60 (60%) Normal left vent chamber size and systolic function of LV w/normal filling presures  Mild mitral regurg  / EF0 50 (50%) Mild LVH  MIld MR 10/6/17     Hypertension     Iron deficiency anemia 4/21/2016    Macular degeneration, right eye     Malignant melanoma of skin ([facility] [address] ) 9/17/2012    Mixed hyperlipidemia     NSTEMI (non-ST elevated myocardial infarction) ([facility] [address] ) 7/25/2017    Old MI (myocardial infarction)     Osteoarthritis 9/17/2012    Osteoporosis 3/13/2017    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[facility] [address] ) 04/07/2017    Urinary tract infection     frequent UTI's       Past Surgical History:   Procedure Laterality Date    ABDOMINAL SURGERY      APPENDECTOMY      BREAST SURGERY      Lumpectomy    CARDIAC CATHETERIZATION  03/24/2017    ARNIE to 100% occluded prox RCA, chronic 99% ostial LCfx, 60% mid LAD, discrete 40% distal LM / EF0 60 (60%) 100% occluded proximal RCA s/p ARNIE, chronic 99% ostial LCX, 60% mid LAD, discrete 40% dital LM  4/5/17    CHOLECYSTECTOMY      COLONOSCOPY N/A 7/25/2018    Procedure: COLONOSCOPY;  Surgeon: Erick Greenfield MD;  Location: [address] [place] [address] OR;  Service: Gastroenterology    CORONARY STENT PLACEMENT  03/25/2017    ARNIE RCA    HEMORRHOID SURGERY      INSERTION STENT ARTERY  04/07/2017    Cardio vascular agents drug-eluting stent    SKIN BIOPSY      TONSILLECTOMY          01/31/20 0908   Restrictions/Precautions   Weight Bearing Precautions Per Order No   Other Precautions Droplet precautions;Cognitive; Chair Alarm; Bed Alarm;Multiple lines;Telemetry;O2;Fall Risk;Pain;Hard of hearing  (2L NC O2; (+) influenza)   Lifestyle   Autonomy pt's granddtr wreporting she was very active prior able to dress, bathe, and toilet independently   Reciprocal Relationships supportive granddtr who works from home- reports she is worried about taking pt home due to need for assistance   Service to Others retired   [address] enjoys reading and watching tv   Pain Assessment   Pain Assessment No/denies pain   Pain Score No Pain   ADL   Where Assessed Chair   Grooming Assistance 5  Supervision/Setup   Grooming Deficit Setup;Verbal cueing;Supervision/safety; Increased time to complete;Wash/dry hands; Wash/dry face   UB Bathing Assistance 4  Minimal Assistance   UB Bathing Deficit Setup;Verbal cueing;Supervision/safety; Increased time to complete   UB Bathing Comments Pt required A to wash her back  LB Bathing Assistance 2  Maximal Assistance   LB Bathing Deficit Steadying;Setup;Verbal cueing;Supervision/safety; Increased time to complete;Left lower leg including foot;Right lower leg including foot; Buttocks; Perineal area   LB Bathing Comments Pt required A for all LB bathing while in stance w/ B/L UE support of RW     UB Dressing Assistance 4  Minimal Assistance   UB Dressing Deficit Setup;Verbal cueing;Supervision/safety; Increased time to complete;Pull around back; Fasteners   UB Dressing Comments [place]/donning gown   LB Dressing Assistance 2  Maximal Assistance   LB Dressing Deficit Setup;Verbal cueing;Supervision/safety; Increased time to complete; Don/doff R sock; Don/doff L sock   LB Dressing Comments [place]/donning B/L socks   Bed Mobility   Supine to Sit 3  Moderate assistance   Additional items Assist x 1; Increased time required;LE management;Verbal cues; Bedrails;HOB elevated   Sit to Supine Unable to assess   Additional Comments Pt laying supine in bed upon OT arrival  Pt seated OOB in chair w/ chair alarm activated and all needs in reach s/p OT session  Transfers   Sit to Stand 3  Moderate assistance   Additional items Assist x 1; Increased time required;Verbal cues;Armrests   Stand to Sit 4  Minimal assistance   Additional items Assist x 1; Increased time required;Verbal cues;Armrests   Additional Comments Transfers w/ RW  VC and TC required for safety and hand placement  Functional Mobility   Functional Mobility 3  Moderate assistance   Additional Comments Pt demonstrated short distance mobility EOB>chair w/ RW at Mod A level  Pt required VC for safety and RW management  Additional items Rolling walker   Cognition   Overall Cognitive Status Impaired   Arousal/Participation Alert; Cooperative;Lethargic   Attention Attends with cues to redirect   Orientation Level Oriented to person;Oriented to place; Disoriented to time;Disoriented to situation   Memory Decreased recall of precautions;Decreased short term memory;Decreased recall of recent events   Following Commands Follows one step commands with increased time or repetition   Comments Pt presents intermittently confused t/o OT session  Pt required VC and TC for all safety, sequencing at times, and redirection to task  Pt cooperative to particpiate in therapy w/ increased encouragement  Activity Tolerance   Activity Tolerance Patient limited by fatigue;Patient limited by pain;Treatment limited secondary to medical complications (Comment)  (dizziness; SOB - ELENA Guy notified and aware)   Medical Staff Made Aware RN [facility] cleared Pt for OT session   Assessment   Assessment Patient participated in Skilled OT session this date with interventions consisting of ADL re training with the use of correct body mechnaics, Energy Conservation techniques, deep breathing technique, safety awareness and fall prevention techniques, one handed dressing technique, increase dynamic sit/ stand balance during functional activity  and increase OOB/ sitting tolerance    Patient agreeable to OT treatment session, upon arrival patient was found supine in bed  Pt participated in self care tasks, functional transfers, and short distance mobility w/ RW  Please refer to chart for functional levels  Patient requiring frequent re direction, verbal cues for safety, verbal cues for correct technique, verbal cues for pacing thru activity steps, cognitive assistance to anticipate next step, one step directives and ocassional safety reminders  Patient continues to be functioning below baseline level, occupational performance remains limited secondary to factors listed above and increased risk for falls and injury  From OT standpoint, recommendation at time of d/c would be Short Term Rehab  Patient to benefit from continued Occupational Therapy treatment while in the hospital to address deficits as defined above and maximize level of functional independence with ADLs and functional mobility  Plan   Treatment Interventions ADL retraining;Functional transfer training;UE strengthening/ROM; Endurance training;Cognitive reorientation;Patient/family training;Equipment evaluation/education; Fine motor coordination activities; Compensatory technique education; Activityengagement; Energy conservation   Goal Expiration Date 02/05/20   OT Treatment Day 1   OT Frequency 3-5x/wk   Recommendation   OT Discharge Recommendation Short Term Rehab   OT - OK to Discharge Yes  (when medically cleared)   Modified Hood Scale   Modified Rk Scale 4         Elijah Gerber MS, OTR/L

## 2020-01-31 NOTE — PLAN OF CARE
Problem: OCCUPATIONAL THERAPY ADULT  Goal: Performs self-care activities at highest level of function for planned discharge setting  See evaluation for individualized goals  Description  Treatment Interventions: ADL retraining, Functional transfer training, Endurance training, Cognitive reorientation, Patient/family training, Equipment evaluation/education, Compensatory technique education, Continued evaluation, Energy conservation, Activityengagement          See flowsheet documentation for full assessment, interventions and recommendations  Outcome: Progressing  Note:   Limitation: Decreased ADL status, Decreased Safe judgement during ADL, Decreased cognition, Decreased endurance, Decreased self-care trans, Decreased high-level ADLs  Prognosis: Good  Assessment: Patient participated in Skilled OT session this date with interventions consisting of ADL re training with the use of correct body mechnaics, Energy Conservation techniques, deep breathing technique, safety awareness and fall prevention techniques, one handed dressing technique, increase dynamic sit/ stand balance during functional activity  and increase OOB/ sitting tolerance   Patient agreeable to OT treatment session, upon arrival patient was found supine in bed  Pt participated in self care tasks, functional transfers, and short distance mobility w/ RW  Please refer to chart for functional levels  Patient requiring frequent re direction, verbal cues for safety, verbal cues for correct technique, verbal cues for pacing thru activity steps, cognitive assistance to anticipate next step, one step directives and ocassional safety reminders  Patient continues to be functioning below baseline level, occupational performance remains limited secondary to factors listed above and increased risk for falls and injury  From OT standpoint, recommendation at time of d/c would be Short Term Rehab     Patient to benefit from continued Occupational Therapy treatment while in the hospital to address deficits as defined above and maximize level of functional independence with ADLs and functional mobility        OT Discharge Recommendation: Short Term Rehab  OT - OK to Discharge: Yes(when medically cleared)

## 2020-01-31 NOTE — PLAN OF CARE
Problem: PHYSICAL THERAPY ADULT  Goal: Performs mobility at highest level of function for planned discharge setting  See evaluation for individualized goals  Description  Treatment/Interventions: LE strengthening/ROM, Therapeutic exercise, Endurance training, Functional transfer training, Patient/family training, Bed mobility, Gait training          See flowsheet documentation for full assessment, interventions and recommendations  Outcome: Progressing  Note:   Prognosis: Fair  Problem List: Decreased strength, Decreased endurance, Impaired balance, Decreased mobility, Decreased cognition, Decreased safety awareness  Assessment: The pt  was fatigued this afternoon, and she was unable to ambulate farther  She was anxious, and required redirection throughout the session  She continues to remain limited from her baseline, and she will benefit from continued physical therapy in order to maximize her functional mobility and independence  Barriers to Discharge: Inaccessible home environment, Decreased caregiver support     Recommendation: Post acute IP rehab     PT - OK to Discharge: Yes(when medically cleared to IP rehab)    See flowsheet documentation for full assessment

## 2020-02-01 NOTE — PROGRESS NOTES
Progress Note - Pulmonary   Shaye Gil 80 y o  female MRN: [id number]  [facility] Encounter: [id number]    Assessment/Plan:    Acute hypoxic with acute on chronic hypercapnic respiratory failure, multifactorial due to below   Titrate oxygen to maintain saturations greater than or equal to 89%  Out of bed as tolerated  Etiology of worsening hypercapnia suspected to be in the setting of benzo/narcotic use, splinting/atelectasis due to rib fracture, influenza A infection, and volume overload with subsequent contraction alkalosis  Not tolerating BiPAP well at night  Continue to use at [facility] and as needed for now  Will need to evaluate for use at discharge if she is agreeable  Will need overnight testing if so  Influenza A   Completed Tamiflu course  Acute on chronic diastolic CHF   Diuretics currently on hold  Further diuresis per Nephrology and primary team     Acute right-sided rib fracture   Doing well with holding narcotic pain medications  Continue Lidoderm patch per primary team     Presumed COPD of unknown severity without acute exacerbation   Continue Xopenex/Atrovent 3 times daily  Outpatient pulmonary follow-up pending course  Discussed with primary team     Chief Complaint:    I feel okay      Subjective: Darwin Salcedo is sitting out of bed in the chair  She reports she feels okay today  She denies any shortness of breath or cough  She stood from the bed and gotten the chair without difficulty per her report  She is eager to leave the hospital     Objective:    Vitals: Blood pressure 143/69, pulse 82, temperature (!) 97 3 °F (36 3 °C), temperature source Oral, resp  rate 22, height 5' 2" (1 575 m), weight 65 5 kg (144 lb 5 oz), SpO2 94 %, not currently breastfeeding  3 liters nasal cannula,Body mass index is 26 4 kg/m²        Intake/Output Summary (Last 24 hours) at 2/1/2020 0917  Last data filed at 2/1/2020 0757  Gross per 24 hour   Intake 180 ml   Output 2644 ml   Net -2464 ml       Invasive Devices     Peripheral Intravenous Line            Peripheral IV 20 Proximal;Right;Ventral (anterior) Forearm 3 days          Drain            External Urinary Catheter 2 days                Physical Exam:     Physical Exam   Constitutional: She is oriented to person, place, and time  She appears well-developed and well-nourished  She is cooperative  Non-toxic appearance  No distress  HENT:   Head: Normocephalic and atraumatic  Mouth/Throat: No oropharyngeal exudate  Eyes: EOM are normal  No scleral icterus  Neck: Neck supple  No JVD present  No tracheal deviation present  Cardiovascular: Normal rate, regular rhythm, S1 normal and S2 normal  Exam reveals no gallop and no friction rub  No murmur heard  Pulmonary/Chest: Effort normal and breath sounds normal  No accessory muscle usage or stridor  No tachypnea  No respiratory distress  She has no decreased breath sounds  She has no wheezes  She has no rhonchi  She has no rales  She exhibits no tenderness  Abdominal: Soft  Bowel sounds are normal  She exhibits no distension  There is no tenderness  There is no rebound and no guarding  Musculoskeletal: She exhibits no edema  Neurological: She is alert and oriented to person, place, and time  She has normal strength  GCS eye subscore is 4  GCS verbal subscore is 5  GCS motor subscore is 6  Skin: Skin is warm and dry  No rash noted  She is not diaphoretic  No cyanosis  Psychiatric: Her speech is normal    Vitals reviewed      Labs:   CMP:   Lab Results   Component Value Date    SODIUM 135 (L) 2020    K 3 7 2020    CL 98 (L) 2020    CO2 35 (H) 2020    BUN 15 2020    CREATININE 0 62 2020    CALCIUM 9 0 2020    EGFR 77 2020     AB 32/84/117    Imaging and other studies: None today

## 2020-02-01 NOTE — ASSESSMENT & PLAN NOTE
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

## 2020-02-01 NOTE — PLAN OF CARE
Problem: Potential for Falls  Goal: Patient will remain free of falls  Description  INTERVENTIONS:  - Assess patient frequently for physical needs  -  Identify cognitive and physical deficits and behaviors that affect risk of falls    -  [place] fall precautions as indicated by assessment   - Educate patient/family on patient safety including physical limitations  - Instruct patient to call for assistance with activity based on assessment  - Modify environment to reduce risk of injury  - Consider OT/PT consult to assist with strengthening/mobility  Outcome: Progressing     Problem: PAIN - ADULT  Goal: Verbalizes/displays adequate comfort level or baseline comfort level  Description  Interventions:  - Encourage patient to monitor pain and request assistance  - Assess pain using appropriate pain scale  - Administer analgesics based on type and severity of pain and evaluate response  - Implement non-pharmacological measures as appropriate and evaluate response  - Consider cultural and social influences on pain and pain management  - Notify physician/advanced practitioner if interventions unsuccessful or patient reports new pain  Outcome: Progressing     Problem: INFECTION - ADULT  Goal: Absence or prevention of progression during hospitalization  Description  INTERVENTIONS:  - Assess and monitor for signs and symptoms of infection  - Monitor lab/diagnostic results  - Monitor all insertion sites, i e  indwelling lines, tubes, and drains  - Monitor endotracheal if appropriate and nasal secretions for changes in amount and color  - [place] appropriate cooling/warming therapies per order  - Administer medications as ordered  - Instruct and encourage patient and family to use good hand hygiene technique  - Identify and instruct in appropriate isolation precautions for identified infection/condition  Outcome: Progressing  Goal: Absence of fever/infection during neutropenic period  Description  INTERVENTIONS:  - Monitor WBC    Outcome: Progressing     Problem: SAFETY ADULT  Goal: Maintain or return to baseline ADL function  Description  INTERVENTIONS:  -  Assess patient's ability to carry out ADLs; assess patient's baseline for ADL function and identify physical deficits which impact ability to perform ADLs (bathing, care of mouth/teeth, toileting, grooming, dressing, etc )  - Assess/evaluate cause of self-care deficits   - Assess range of motion  - Assess patient's mobility; develop plan if impaired  - Assess patient's need for assistive devices and provide as appropriate  - Encourage maximum independence but intervene and supervise when necessary  - Involve family in performance of ADLs  - Assess for home care needs following discharge   - Consider OT consult to assist with ADL evaluation and planning for discharge  - Provide patient education as appropriate  Outcome: Progressing  Goal: Maintain or return mobility status to optimal level  Description  INTERVENTIONS:  - Assess patient's baseline mobility status (ambulation, transfers, stairs, etc )    - Identify cognitive and physical deficits and behaviors that affect mobility  - Identify mobility aids required to assist with transfers and/or ambulation (gait belt, sit-to-stand, lift, walker, cane, etc )  - [place] fall precautions as indicated by assessment  - Record patient progress and toleration of activity level on Mobility SBAR; progress patient to next Phase/Stage  - Instruct patient to call for assistance with activity based on assessment  - Consider rehabilitation consult to assist with strengthening/weightbearing, etc   Outcome: Progressing     Problem: DISCHARGE PLANNING  Goal: Discharge to home or other facility with appropriate resources  Description  INTERVENTIONS:  - Identify barriers to discharge w/patient and caregiver  - Arrange for needed discharge resources and transportation as appropriate  - Identify discharge learning needs (meds, wound care, etc )  - Arrange for interpretive services to assist at discharge as needed  - Refer to Case Management Department for coordinating discharge planning if the patient needs post-hospital services based on physician/advanced practitioner order or complex needs related to functional status, cognitive ability, or social support system  Outcome: Progressing     Problem: Knowledge Deficit  Goal: Patient/family/caregiver demonstrates understanding of disease process, treatment plan, medications, and discharge instructions  Description  Complete learning assessment and assess knowledge base  Interventions:  - Provide teaching at level of understanding  - Provide teaching via preferred learning methods  Outcome: Progressing     Problem: Prexisting or High Potential for Compromised Skin Integrity  Goal: Skin integrity is maintained or improved  Description  INTERVENTIONS:  - Identify patients at risk for skin breakdown  - Assess and monitor skin integrity  - Assess and monitor nutrition and hydration status  - Monitor labs   - Assess for incontinence   - Turn and reposition patient  - Assist with mobility/ambulation  - Relieve pressure over bony prominences  - Avoid friction and shearing  - Provide appropriate hygiene as needed including keeping skin clean and dry  - Evaluate need for skin moisturizer/barrier cream  - Collaborate with interdisciplinary team   - Patient/family teaching  - Consider wound care consult   Outcome: Progressing     Problem: Nutrition/Hydration-ADULT  Goal: Nutrient/Hydration intake appropriate for improving, restoring or maintaining nutritional needs  Description  Monitor and assess patient's nutrition/hydration status for malnutrition  Collaborate with interdisciplinary team and initiate plan and interventions as ordered  Monitor patient's weight and dietary intake as ordered or per policy  Utilize nutrition screening tool and intervene as necessary   Determine patient's food preferences and provide high-protein, high-caloric foods as appropriate       INTERVENTIONS:  - Monitor oral intake, urinary output, labs, and treatment plans  - Assess nutrition and hydration status and recommend course of action  - Evaluate amount of meals eaten  - Assist patient with eating if necessary   - Allow adequate time for meals  - Recommend/ encourage appropriate diets, oral nutritional supplements, and vitamin/mineral supplements  - Order, calculate, and assess calorie counts as needed  - Recommend, monitor, and adjust tube feedings and TPN/PPN based on assessed needs  - Assess need for intravenous fluids  - Provide specific nutrition/hydration education as appropriate  - Include patient/family/caregiver in decisions related to nutrition  Outcome: Progressing     Problem: COPING  Goal: Pt/Family able to verbalize concerns and demonstrate effective coping strategies  Description  INTERVENTIONS:  - Assist patient/family to identify coping skills, available support systems and cultural and spiritual values  - Provide emotional support, including active listening and acknowledgement of concerns of patient and caregivers  - Reduce environmental stimuli, as able  - Provide patient education  - Assess for spiritual pain/suffering and initiate spiritual care, including notification of Pastoral Care or [facility] based community as needed  - Assess effectiveness of coping strategies  Outcome: Progressing  Goal: Will report anxiety at manageable levels  Description  INTERVENTIONS:  - Administer medication as ordered  - Teach and encourage coping skills  - Provide emotional support  - Assess patient/family for anxiety and ability to cope  Outcome: Progressing     Problem: RESPIRATORY - ADULT  Goal: Achieves optimal ventilation and oxygenation  Description  INTERVENTIONS:  - Assess for changes in respiratory status  - Assess for changes in mentation and behavior  - Position to facilitate oxygenation and minimize respiratory effort  - Oxygen administered by appropriate delivery if ordered  - Initiate smoking cessation education as indicated  - Encourage broncho-pulmonary hygiene including cough, deep breathe, Incentive Spirometry  - Assess the need for suctioning and aspirate as needed  - Assess and instruct to report SOB or any respiratory difficulty  - Respiratory Therapy support as indicated  Outcome: Progressing     Problem: METABOLIC, FLUID AND ELECTROLYTES - ADULT  Goal: Electrolytes maintained within normal limits  Description  INTERVENTIONS:  - Monitor labs and assess patient for signs and symptoms of electrolyte imbalances  - Administer electrolyte replacement as ordered  - Monitor response to electrolyte replacements, including repeat lab results as appropriate  - Instruct patient on fluid and nutrition as appropriate  Outcome: Progressing  Goal: Fluid balance maintained  Description  INTERVENTIONS:  - Monitor labs   - Monitor I/O and WT  - Instruct patient on fluid and nutrition as appropriate  - Assess for signs & symptoms of volume excess or deficit  Outcome: Progressing  Goal: Glucose maintained within target range  Description  INTERVENTIONS:  - Monitor Blood Glucose as ordered  - Assess for signs and symptoms of hyperglycemia and hypoglycemia  - Administer ordered medications to maintain glucose within target range  - Assess nutritional intake and initiate nutrition service referral as needed  Outcome: Progressing     Problem: MUSCULOSKELETAL - ADULT  Goal: Maintain or return mobility to safest level of function  Description  INTERVENTIONS:  - Assess patient's ability to carry out ADLs; assess patient's baseline for ADL function and identify physical deficits which impact ability to perform ADLs (bathing, care of mouth/teeth, toileting, grooming, dressing, etc )  - Assess/evaluate cause of self-care deficits   - Assess range of motion  - Assess patient's mobility  - Assess patient's need for assistive devices and provide as appropriate  - Encourage maximum independence but intervene and supervise when necessary  - Involve family in performance of ADLs  - Assess for home care needs following discharge   - Consider OT consult to assist with ADL evaluation and planning for discharge  - Provide patient education as appropriate  Outcome: Progressing  Goal: Maintain proper alignment of affected body part  Description  INTERVENTIONS:  - Support, maintain and protect limb and body alignment  - Provide patient/ family with appropriate education  Outcome: Progressing

## 2020-02-01 NOTE — ASSESSMENT & PLAN NOTE
Wt Readings from Last 3 Encounters:   02/01/20 65 5 kg (144 lb 5 oz)   01/10/20 70 kg (154 lb 6 oz)   12/06/19 73 kg (161 lb)     · Echocardiogram:  Obtained 11/2019, demonstrated EF of 50%, mild concentric hypertrophy, grade 1 diastolic dysfunction, moderate pulmonary hypertension    · Status post IV Lasix and IV Diamox  · Improving with negative fluid balance  · Continue above treatment  · Diuretics on hold

## 2020-02-01 NOTE — PROGRESS NOTES
PHYSICAL MEDICINE AND REHABILITATION   PREADMISSION ASSESSMENT     Projected [facility] Diagnoses:  Impairment of mobility, safety, Activities of Daily Living (ADLs), and cognitive/communication skills due to Debility:  16  Debility (Non-cardiac/Non-pulmonary)  Etiologic: ARF with hypoxia/hypercapnia secondary to influenza A  Date of Onset: 1/26/2020  Date of surgery: N/A    PATIENT INFORMATION  Name: Rachell Fleming Phone #: [phone] (home)   Address: [address] [place] [address],[address] [address]  YOB: 1925 Age: 80 y o  SS# [id number]  Marital Status:   Ethnicity: Not //German  Employment Status: retired  Extended Emergency Contact Information  Primary Emergency Contact: [address] of [facility] [address] Phone: [phone]  Relation: Grandchild  Secondary Emergency Contact: Eloy Summers  Mobile Phone: [phone]  Relation: Grandchild  Advance Directive: Level 3 DNAR & DNI (no ACP docs)    INSURANCE/COVERAGE:     Primary Payor: MEDICARE / Plan: MEDICARE A AND B / Product Type: Medicare A & B Fee for Service /   Secondary Payer:[employer]   Payer Contact:  Payer Contact:   Contact Phone:  Contact Phone:     Authorization #:   Coverage Dates:  LCD:   Yessy Saldaña [id number]  Medicare Days: 41/30/60  Medical Record #: [id number]    REFERRAL SOURCE:   Referring provider: Ivett Sterling DO  Referring facility: [address] [place] [address]  Room: [place] [address]/[place] [address]  PCP: Sofy Garg DO PCP phone number: [phone]    MEDICAL INFORMATION  HPI: Nilsa Finney is a 81 yo who presented to ER with SOB & fall  Pt reported that for the past 2 days she has become increasingly SOB and has been experiencing a dry cough as well as BLE swelling  Pt normally alternates living between living with 2 granddaughters    Pt's daughter noted increased BLE swelling & attempted to give pt an extra dose of Lasix yesterday in AM but without improvement in urination or symptoms  Also, yesterday in AM pt fell when she was attempting to get off of the commode  Per pt's daughter, she had tripped on her pants because she had not yet pulled up the L side  Pt struck her R side and the R side of her head during fall  (-) LOC  In ER, pt was found to be hypoxic and required supplemental Oxygen  Pt does not normally wear supplemental Oxygen at home  CT of head was (-)  However, there was advanced microangiopathic changes that progressed since 2017  CT of chest revealed displaced fx of R 10th rib with questionable fractures vs motion artifact of ribs 7-9  There were areas of consolidation & ground-glass opacity within the lungs along with B pleural effusions & large hiatal hernia  Labs revealed hyponatremia with a Na+ of 126, serum osmolality of 273  Influenza PCR was (+) for influenza A  Therapists are recommending inpatient  ARU admission for pt post discharge from the acute care setting  as she has had a decline from her functional baseline  The pt would also benefit from close medical oversight during an ARU admission to manage her co-morbidities  Pt has now been medically cleared for transfer to ARU  Past Medical History:   Past Surgical History: Allergies:     Past Medical History:   Diagnosis Date    Abnormal blood sugar 03/13/2017    Abnormal carotid duplex scan     Carotid Duplex (Plaque formation is quite prominent bilaterally     Despite these changes, no evidence for greater than 50% diameter reducing lesions in the cervical portion of either carotid artery hemisystem ) - 6/13/2017    Anxiety     Cervical radiculopathy 08/08/2017    [facility]congestive [facility] failure[facility])     diastolic    Compression fracture of lumbar spine, non-traumatic, with routine healing, subsequent encounter     COPD (chronic obstructive pulmonary disease) ([facility] [address] ) 2/6/2018    Coronary angioplasty status     Coronary artery disease 4/7/2017    Costochondritis 02/05/2013 suspect MS in origin given relationship to ROM and location  Start mobic and if persists, reeval  Refused xrayat this time   Dyslipidemia     GERD without esophagitis 8/30/2012    H/O CT scan of chest      (No PE, minimal interstitial change left lower lobe and right upper lobe, which may be acute ) - 5/19/2017    History of Holter monitoring     Holter (Sinus rhythm with rates ranging from 52 to 118 bpm   No afib or heart block  Rare atrial and ventricular ectopic beats  One six-beat atrial run noted  No pauses  ) - 5/31/2017    Hx of echocardiogram     Echo (EF 0 60 (60%), Biatrial enlargement ) - 3/24/2017 Echo (EF 0 55 (55%), mild LVH  Aortic valvular sclerosis  Trace MI with mild left atrial dilatation, mild MAC  Mild TI with mild pulmonary hypertension  ) - 5/22/2017 Echo (EF 0 60 (60%), Normal left vent chamber size and systolic function  Mild diastolic dysfunction of LV w/normal filling pressures  Mild mitral regurg ) - 7/26/2017 Echo (EF 0    Hx of echocardiogram 08/16/2017    EF0 60 (60%) Normal left vent chamber size and systolic function of LV w/normal filling presures  Mild mitral regurg  / EF0 50 (50%) Mild LVH   MIld MR 10/6/17     Hypertension     Iron deficiency anemia 4/21/2016    Macular degeneration, right eye     Malignant melanoma of skin ([address] ) 9/17/2012    Mixed hyperlipidemia     NSTEMI (non-ST elevated myocardial infarction) ([facility] [address] ) 7/25/2017    Old MI (myocardial infarction)     Osteoarthritis 9/17/2012    Osteoporosis 3/13/2017    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[address] ) 04/07/2017    Urinary tract infection     frequent UTI's    Past Surgical History:   Procedure Laterality Date    ABDOMINAL SURGERY      APPENDECTOMY      BREAST SURGERY      Lumpectomy    CARDIAC CATHETERIZATION  03/24/2017    ARNIE to 100% occluded prox RCA, chronic 99% ostial LCfx, 60% mid LAD, discrete 40% distal LM / EF0 60 (60%) 100% occluded proximal RCA s/p ARNIE, chronic 99% ostial LCX, 60% mid LAD, discrete 40% dital LM  4/5/17    CHOLECYSTECTOMY      COLONOSCOPY N/A 7/25/2018    Procedure: COLONOSCOPY;  Surgeon:  Valentina Sidhu MD;  Location: [facility] MAIN OR;  Service: Gastroenterology    CORONARY STENT PLACEMENT  03/25/2017    ARNIE RCA    HEMORRHOID SURGERY      INSERTION STENT ARTERY  04/07/2017    Cardio vascular agents drug-eluting stent    SKIN BIOPSY      TONSILLECTOMY       Allergies   Allergen Reactions    Ciprofloxacin      Reaction Date: 01Jul2011;     Keflex [Cephalexin] Dizziness    Nitrofurantoin      Reaction Date: 01Jul2011;     Penicillins Rash, Other (See Comments) and Throat Swelling     Throat swells         Comorbidities/Surgeries in the last 100 days: ARF with hypoxia/hypercapnia suspected multifactorial including CHF exacerbation, influenza A & rib fractures secondary to trauma, acute on chronic CHF, influenza A, cognitive impairment, closed fx of multiple ribs of R side (10th rib fx & possible 7-9 rib fx's vs motion artifact), hyponatremia, closed compression fx of L1 vertebra (initially concern that this was new but was found on previous CT imaging), anxiety, COPD    CURRENT VITAL SIGNS:   Temp:  [97 3 °F (36 3 °C)-98 1 °F (36 7 °C)] 97 3 °F (36 3 °C)  HR:  [62-82] 82  Resp:  [22] 22  BP: (128-145)/(61-75) 143/69   Intake/Output Summary (Last 24 hours) at 2/1/2020 0826  Last data filed at 2/1/2020 0757  Gross per 24 hour   Intake 180 ml   Output 2894 ml   Net -2714 ml        LABORATORY RESULTS:      Lab Results   Component Value Date    HGB 13 6 01/30/2020    HGB 13 2 01/30/2020    HGB 12 6 05/23/2018    HCT 40 01/30/2020    HCT 43 5 01/30/2020    HCT 38 1 05/23/2018    WBC 3 91 (L) 01/30/2020    WBC 9 9 05/23/2018     Lab Results   Component Value Date    BUN 15 02/01/2020    BUN 16 06/04/2018     06/04/2018    K 3 7 02/01/2020    K 4 5 06/04/2018    CL 98 (L) 02/01/2020     06/04/2018    GLUCOSE 99 01/30/2020    GLUCOSE 115 09/15/2015    CREATININE 0 62 02/01/2020 CREATININE 0 74 06/04/2018     Lab Results   Component Value Date    PROTIME 12 9 05/21/2019    INR 1 11 05/21/2019    INR 1 06 05/21/2018        DIAGNOSTIC STUDIES:  Xr Chest Pa & Lateral    Result Date: 1/29/2020  Impression: Persistent CHF  Increased conspicuity of suspected right basal pneumonia  Probable bilateral pleural effusions Workstation performed: [id number]     Xr Chest Pa & Lateral (24 Hours After Admission)    Result Date: 1/27/2020  Impression: Right rib fractures on chest CT not visible  No pneumothorax  Small effusions and bibasilar atelectasis  Workstation performed: [id number]     Ct Head Without Contrast    Result Date: 1/25/2020  Impression: No acute intracranial abnormality  There is advanced microangiopathic change, progressed somewhat compared with May 19, 2017  Workstation performed: [id number]     Ct Chest Without Contrast    Result Date: 1/25/2020  Impression: There is a minimally displaced fracture of the right 10th rib laterally  There are questionable nondisplaced fractures of the right 7th through 9th ribs, versus motion artifact  Possible new compression deformity of L1   CT of the thoracolumbar spine is recommended for further evaluation  No evidence of pneumothorax  Areas of consolidation and groundglass opacity are present within the lungs, as described above  Please see discussion  Follow-up to ensure resolution is recommended  There are small bilateral pleural effusions, right larger than left  There is mild septal thickening  There is a large hiatal hernia/partially intrathoracic stomach  This appears similar to the prior study  Cardiomegaly  Coronary artery disease  Atherosclerosis    I personally discussed this study with [address] [place] [address] on 1/25/2020 at 11:43 PM  Workstation performed: [id number]       PRECAUTIONS/SPECIAL NEEDS:    Droplet isolation (influenza), Cardiac 2gm Na+ diet,  A&O x 3, fall precautions, safety precautions, urine incontinence, decreased blood saturation levels    MEDICATIONS:     Current Facility-Administered Medications:     acetaminophen (TYLENOL) tablet 975 mg, 975 mg, Oral, Q8H LIZZY, Guy Jim Veres, DO, 975 mg at 02/01/20 0536    albuterol (PROVENTIL HFA,VENTOLIN HFA) inhaler 2 puff, 2 puff, Inhalation, Q4H PRN, Corinn Check, DO, 2 puff at 01/29/20 0436    ALPRAZolam [place] Duff) tablet 0 25 mg, 0 25 mg, Oral, HS PRN, Polo Jimenez MD, 0 25 mg at 01/31/20 2107    aspirin chewable tablet 81 mg, 81 mg, Oral, Daily, Guy Jim Veres, DO, 81 mg at 01/31/20 0847    atorvastatin (LIPITOR) tablet 40 mg, 40 mg, Oral, Daily With Angela Bradley, DO, 40 mg at 01/31/20 1718    calcium carbonate-vitamin D (OSCAL-D) 500 mg-200 units per tablet 1 tablet, 1 tablet, Oral, BID With Meals, Corinn Check, DO, 1 tablet at 01/31/20 1719    docusate sodium (COLACE) capsule 100 mg, 100 mg, Oral, BID, Tuesday M ROGELIO Reina, 100 mg at 01/31/20 1718    famotidine (PEPCID) tablet 10 mg, 10 mg, Oral, BID, Guy Jim Veres, DO, 10 mg at 01/31/20 1721    guaiFENesin (MUCINEX) 12 hr tablet 600 mg, 600 mg, Oral, Q12H [address], Leandra Dickerson PA-C, 600 mg at 02/01/20 0536    heparin (porcine) subcutaneous injection 5,000 Units, 5,000 Units, Subcutaneous, Q8H [address], 5,000 Units at 02/01/20 0536 **AND** Platelet count, , , Once, Corinn Check, DO    HYDROmorphone (DILAUDID) injection 0 2 mg, 0 2 mg, Intravenous, Q4H PRN, Corinn Check, DO    ipratropium (ATROVENT) 0 02 % inhalation solution 0 5 mg, 0 5 mg, Nebulization, TID, Branden Tolliver DO, 0 5 mg at 01/31/20 1946    levalbuterol [facility]) inhalation solution 1 25 mg, 1 25 mg, Nebulization, TID, Mo Valentine MD, 1 25 mg at 01/31/20 1946    lidocaine (LIDODERM) 5 % patch 1 patch, 1 patch, Topical, Daily, Jenni Zamora DO, 1 patch at 01/31/20 0926    metoprolol tartrate (LOPRESSOR) tablet 25 mg, 25 mg, Oral, Q12H [address], Earjorge luis Montez DO, 25 mg at 01/31/20 2107   multivitamin-minerals (CENTRUM) tablet 1 tablet, 1 tablet, Oral, Daily, Tamara Spell Veres, DO, 1 tablet at 01/31/20 0847    OLANZapine (ZyPREXA ZYDIS) dispersible tablet 2 5 mg, 2 5 mg, Oral, Once PRN, Susan Ureña PA-C    ondansetron (ZOFRAN-ODT) dispersible tablet 4 mg, 4 mg, Oral, Q8H PRN, Tamara Spell Veres, DO, 4 mg at 01/31/20 0920    oxyCODONE (ROXICODONE) IR tablet 2 5 mg, 2 5 mg, Oral, Q4H PRN, Carole Craze, DO, 2 5 mg at 01/28/20 1844    oxyCODONE (ROXICODONE) IR tablet 5 mg, 5 mg, Oral, Q4H PRN, Carole Craze, DO    polyethylene glycol (MIRALAX) packet 17 g, 17 g, Oral, Daily PRN, Tuesday M Nuno, CRNP    potassium chloride (K-DUR,KLOR-CON) CR tablet 20 mEq, 20 mEq, Oral, Daily, Tamara Spell Veres, DO, 20 mEq at 01/31/20 0847    senna-docusate sodium (SENOKOT S) 8 6-50 mg per tablet 1 tablet, 1 tablet, Oral, HS, Tuesday M [place], CRNP, 1 tablet at 01/31/20 2107    SKIN INTEGRITY:     Sacrum is red  PRIOR LEVEL OF FUNCTION:  AmarirefugioHernandez Gregorio is a  who resides alternately between 2 granddaughters homes  One granddaughter works from home and the other work outside of the home  Pt is home alone at times during the day  Pt was independent with ambulation utilizing a RW and independent with ADL's  Requires assistance for IADL's  FALLS IN THE LAST 6 MONTHS: 1-4    HOME ENVIRONMENT:  The living area is one level  There are 2 steps to enter the home  The patient have 24 hour supervision available upon discharge  PREVIOUS DME:  Equipment in home (previous DME): Shower chair, RW    FUNCTIONAL STATUS:  Physical Therapy Occupational Therapy Speech Therapy          CURRENT GAP IN FUNCTION  Prior to Admission: Pt was Independent with ambulation & transfers utilizing a RW  Pt also independent with bathing, dressing, toiletting & eating  Pt resides alternately between 2 granddaughters homes where she is alone at times    One granddaughter works from home while the other granddaughter works outside of the home  There are 2 DANIEL and then once inside the pt has a first floor setup  Estimated length of stay: 10 to 14 days    Anticipated Post-Discharge Disposition/Treatment  Disposition: Return to previous home/apartment  Outpatient Services: This is will be determined by the ARU interdisciplinary team as pt gets closer to discharge  BARRIERS TO DISCHARGE    Inaccessible home environment, decreased caregiver support, weakness,  balance, fatigue, community resources, home alone at times, decreased cognition, decreased safety    INTERVENTIONS FOR DISCHARGE    Adaptive equipment, patient/family/caregiver education, community resources, arrange DME needs, medication changes per MD, therapy exercies, center of balance support, energy conservation education    REQUIRED THERAPY:    Patient will require PT and OT 90 minutes each per day, five days per week to achieve rehab goals  REQUIRED FUNCTIONAL AND MEDICAL MANAGEMENT FOR INPATIENT REHABILITATION:    Monitoring of vital signs & blood oxygen saturation levels, daily weights, indications of medication intolerance, pain management, cognition monitoring, skin integrity due to decreased activity level & currently area of breakdown, PM&R to maximize function & provide medical oversight including the ordering of any additional lab tests, imaging studies or consults to manage pt's case, PT/OT intervention, pt and family education, bowel/bladder management to decrease occurrences of incontinence, safety education      RECOMMENDED LEVEL OF CARE:     Lisa Thompson is a 81 yo who presented to ER with SOB, dry cough, fall & BLE swelling  During acute hospitalization, pt's medical workup revealed pt had ARF with hypoxia/hypercapnia secondary to influenza A as well as CHF  Pt also found to have R 10th rib fx and possible R rib fractures of 7-9 vs motion artifact   Other co-morbidities during this hospitalization also included hyponatremia, anxiety and COPD  The pt is now medically cleared for transfer to ARU

## 2020-02-01 NOTE — PROGRESS NOTES
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 as well as recently started on Celexa as well as component due to pain  Serum sodium improved after extra dose of Samsca yesterday  Sodium 135 today from 127 yesterday  Hold Samsca for now, will reassess tomorrow morning with labs  2  Acute hypoxemic and hypercapnic respiratory failure, multifactorial in the setting of influenza and CHF  3  Acute on chronic congestive diastolic heart failure, was previously diuresed but currently off diuretics  4  Influenza type A, she complete treatment with Tamiflu  5  Right-sided rib fractures      My plan and recommendations were discussed with Internal Medicine attending    SUBJECTIVE:  Patient seen and examined, denies any significant complaints, feeling tired, denies chest pain or shortness of breath, no abdominal pain, no nausea or vomiting      OBJECTIVE:  Current Weight: Weight - Scale: 65 5 kg (144 lb 5 oz)  Vitals:    02/01/20 0830   BP:    Pulse:    Resp:    Temp:    SpO2: 94%       Intake/Output Summary (Last 24 hours) at 2/1/2020 1259  Last data filed at 2/1/2020 0757  Gross per 24 hour   Intake 180 ml   Output 2644 ml   Net -2464 ml     General:  Fragile elderly woman, cooperative, in not acute distress  Eyes: conjunctivae pale, anicteric sclerae  ENT: lips and mucous membranes moist, oxygen via nasal cannula  Neck: supple, no JVD  Chest: clear breath sounds bilateral, no crackles, ronchus or wheezings  CVS: distinct S1 & S2, normal rate, regular rhythm  Abdomen: soft, non-tender, non-distended, normoactive bowel sounds  Extremities: no significant edema of both legs  Skin: no rash  Neuro: awake, interactive        Medications:    Current Facility-Administered Medications:     acetaminophen (TYLENOL) tablet 975 mg, 975 mg, Oral, Q8H [address], Aniket Marquez DO, 975 mg at 02/01/20 0536    albuterol (PROVENTIL HFA,VENTOLIN HFA) inhaler 2 puff, 2 puff, Inhalation, Q4H PRN, Alirio Duke DO, 2 puff at 01/29/20 2237    ALPRAZolam Eleazar Meredith) tablet 0 25 mg, 0 25 mg, Oral, HS PRN, Luz Elena Buck MD, 0 25 mg at 01/31/20 2107    aspirin chewable tablet 81 mg, 81 mg, Oral, Daily, Alirio Duke DO, 81 mg at 02/01/20 0909    atorvastatin (LIPITOR) tablet 40 mg, 40 mg, Oral, Daily With Eyal Sebastian DO, 40 mg at 01/31/20 1718    calcium carbonate-vitamin D (OSCAL-D) 500 mg-200 units per tablet 1 tablet, 1 tablet, Oral, BID With Meals, Alirio Duke DO, 1 tablet at 02/01/20 0910    docusate sodium (COLACE) capsule 100 mg, 100 mg, Oral, BID, Tuesday M ROGELIO Reina, 100 mg at 02/01/20 0910    famotidine (PEPCID) tablet 10 mg, 10 mg, Oral, BID, Aniket Marquez DO, 10 mg at 02/01/20 0909    guaiFENesin (MUCINEX) 12 hr tablet 600 mg, 600 mg, Oral, Q12H [address], Pj Johnson PA-C, 600 mg at 02/01/20 0536    heparin (porcine) subcutaneous injection 5,000 Units, 5,000 Units, Subcutaneous, Q8H [address], 5,000 Units at 02/01/20 0536 **AND** Platelet count, , , Once, Alirio Duke DO    HYDROmorphone (DILAUDID) injection 0 2 mg, 0 2 mg, Intravenous, Q4H PRN, Alirio Duke DO    ipratropium (ATROVENT) 0 02 % inhalation solution 0 5 mg, 0 5 mg, Nebulization, TID, Yandel Wing DO, 0 5 mg at 02/01/20 5481    levalbuterol (XOPENEX) inhalation solution 1 25 mg, 1 25 mg, Nebulization, TID, Hunter Frias MD, 1 25 mg at 02/01/20 0829    lidocaine (LIDODERM) 5 % patch 1 patch, 1 patch, Topical, Daily, Aniket Marquez DO, 1 patch at 02/01/20 0910    metoprolol tartrate (LOPRESSOR) tablet 25 mg, 25 mg, Oral, Q12H [address], Ora Young DO, 25 mg at 02/01/20 0909    multivitamin-minerals (CENTRUM) tablet 1 tablet, 1 tablet, Oral, Daily, Alirio Duke DO, 1 tablet at 02/01/20 0910    OLANZapine (ZyPREXA ZYDIS) dispersible tablet 2 5 mg, 2 5 mg, Oral, Once PRN, Susan Ureña PA-C    ondansetron (ZOFRAN-ODT) dispersible tablet 4 mg, 4 mg, Oral, Q8H PRN, Tamara Spell Veres, DO, 4 mg at 02/01/20 0901    oxyCODONE (ROXICODONE) IR tablet 2 5 mg, 2 5 mg, Oral, Q4H PRN, Tamara Spell Veres, DO, 2 5 mg at 01/28/20 1844    oxyCODONE (ROXICODONE) IR tablet 5 mg, 5 mg, Oral, Q4H PRN, Carole Craze, DO    polyethylene glycol (MIRALAX) packet 17 g, 17 g, Oral, Daily PRN, Tuesday ROGELIO Covarrubias    potassium chloride (K-DUR,KLOR-CON) CR tablet 20 mEq, 20 mEq, Oral, Daily, Tamara Spell Veres, DO, 20 mEq at 02/01/20 0620    senna-docusate sodium (SENOKOT S) 8 6-50 mg per tablet 1 tablet, 1 tablet, Oral, HS, Tuesday ROGELIO Ontiveros, 1 tablet at 01/31/20 2107    Invasive Devices:        Lab Results:   Results from last 7 days   Lab Units 02/01/20  0438 01/31/20  0510 01/30/20  0732 01/30/20  0509 01/29/20  0504 01/28/20  0502  01/25/20  2133   WBC Thousand/uL  --   --   --  3 91* 6 10 4 12*   < > 7 14   HEMOGLOBIN g/dL  --   --   --  13 2 14 2 13 5   < > 14 1   I STAT HEMOGLOBIN g/dl  --   --  13 6  --   --   --   --   --    HEMATOCRIT %  --   --   --  43 5 45 9 43 4   < > 43 9   HEMATOCRIT, ISTAT %  --   --  40  --   --   --   --   --    PLATELETS Thousands/uL  --   --   --  217 265 258   < > 315   SODIUM mmol/L 135* 127*  --  131* 134* 129*   < > 126*   POTASSIUM mmol/L 3 7 3 5  --  3 5 4 4 4 3   < > 4 6   CHLORIDE mmol/L 98* 89*  --  89* 86* 83*   < > 87*   CO2 mmol/L 35* 36*  --  39* >45* 42*   < > 36*   CO2, I-STAT mmol/L  --   --  >45*  --   --   --   --   --    BUN mg/dL 15 12  --  11 9 9   < > 10   CREATININE mg/dL 0 62 0 59*  --  0 65 0 61 0 62   < > 0 96   CALCIUM mg/dL 9 0 9 0  --  9 0 9 1 8 8   < > 9 7   ALK PHOS U/L  --   --   --   --   --   --   --  78   ALT U/L  --   --   --   --   --   --   --  24   AST U/L  --   --   --   --   --   --   --  13   GLUCOSE, ISTAT mg/dl  --   --  99  --   --   --   --   --     < > = values in this interval not displayed  Previous work up:  See previous notes      Portions of the record may have been created with voice recognition software  Occasional wrong word or "sound a like" substitutions may have occurred due to the inherent limitations of voice recognition software  Read the chart carefully and recognize, using context, where substitutions have occurred  If you have any questions, please contact the dictating provider

## 2020-02-01 NOTE — PROGRESS NOTES
Progress Note - Paige Powers 2/25/1925, 80 y o  female MRN: [id number]    Unit/Bed#: [facility] 529-01 Encounter: [id number]    Primary Care Provider: Rober De LaO DO   Date and time admitted to hospital: 1/25/2020  9:11 PM        * Acute respiratory failure with hypoxia and hypercapnia (HCC)  Assessment & Plan  · Suspect multifactorial including CHF exacerbation and Influenza A positive complicated by rib fractures secondary to trauma  · Negative procalcitonin therefore low suspicion for superimposed bacterial infection  · Volume overload,  Chronic small bilateral pleural effusions as evidence on chest XR  · ABG with chronic hypercapnia  · Deleted Tamiflu treatment  · Pulmonology is following  · Continue to titrate oxygen, continue bronchodilator  · BiPAP q h s  Acute on chronic diastolic congestive heart failure [facility])  Assessment & Plan  Wt Readings from Last 3 Encounters:   02/01/20 65 5 kg (144 lb 5 oz)   01/10/20 70 kg (154 lb 6 oz)   12/06/19 73 kg (161 lb)     · Echocardiogram:  Obtained 11/2019, demonstrated EF of 50%, mild concentric hypertrophy, grade 1 diastolic dysfunction, moderate pulmonary hypertension  · Status post IV Lasix and IV Diamox  · Improving with negative fluid balance  · Continue above treatment  · Diuretics on hold      Influenza A  Assessment & Plan  · Suspect infection may have contributed to CHF exacerbation    · Completed Tamiflu treatment    Cognitive impairment  Assessment & Plan  · Chronic  · Geriatric following  · Avoid delirium related agents    Closed fracture of multiple ribs of right side  Assessment & Plan  · There is a right 10th rib fracture noted on CT imaging with possible 7-9 rib fractures versus motion artifact  · Trauma consult completed; recommendations appreciated; since signed off  · Will continue pain regimen as outlined:  · Rib fracture protocol  · Encourage deep breathing, airway clearance  · Possibly will require rehab    Hyponatremia  Assessment & Plan  · Nephrology consult appreciated  · 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 which has been discontinued   · Status post Samsca  · Continue to monitor      Closed compression fracture of L1 vertebra [facility])  Assessment & Plan  · Found on CT imaging  Initially there was concern that this may be new, however this was found on previous CT imaging  No lower extremity weakness or neuro deficits noted  · Continue pain regimen as otherwise ordered  · Trauma evaluated; since signed off    [address]  · Celexa discontinued in light of hyponatremia   · Taper Xanax      COPD (chronic obstructive pulmonary disease) ([facility] [address] )  Assessment & Plan  · Patient not on scheduled medications  · Albuterol p r n  · No sign of exacerbation  · Not chronically on home oxygen      VTE Pharmacologic Prophylaxis:   Pharmacologic: Heparin  Mechanical VTE Prophylaxis in Place: Yes    Patient Centered Rounds: I have performed bedside rounds with nursing staff today  Discussions with Specialists or Other Care Team Provider:  Pulmonology    Education and Discussions with Family / Patient:  Discussed with granddaughter    Time Spent for Care: 30 minutes  More than 50% of total time spent on counseling and coordination of care as described above      Current Length of Stay: 6 day(s)    Current Patient Status: Inpatient   Certification Statement: The patient will continue to require additional inpatient hospital stay due to Above    Discharge Plan / Estimated Discharge Date: TBD    Code Status: Level 3 - DNAR and DNI      Subjective:   Patient seen and examined  Comfortable sitting in chair  No chest pain or shortness of breath  Did not tolerate BiPAP last night    Objective:     Vitals:   Temp (24hrs), Av 7 °F (36 5 °C), Min:97 3 °F (36 3 °C), Max:98 1 °F (36 7 °C)    Temp:  [97 3 °F (36 3 °C)-98 1 °F (36 7 °C)] 97 3 °F (36 3 °C)  HR:  [62-82] 82  Resp:  [22] 22  BP: (128-143)/(61-75) 143/69  SpO2:  [92 %-98 %] 94 %  Body mass index is 26 4 kg/m²  Input and Output Summary (last 24 hours): Intake/Output Summary (Last 24 hours) at 2/1/2020 1337  Last data filed at 2/1/2020 0757  Gross per 24 hour   Intake 180 ml   Output 2644 ml   Net -2464 ml       Physical Exam:     Physical Exam    Patient is awake alert in no acute distress  Looks tired and weak  Lung with decreased breath sounds bilateral  Heart positive S1-S2 no murmur  Abdomen soft nontender positive bowel sounds  Lower extremities no edema    Additional Data:     Labs:    Results from last 7 days   Lab Units 01/30/20  0732 01/30/20  0509   WBC Thousand/uL  --  3 91*   HEMOGLOBIN g/dL  --  13 2   I STAT HEMOGLOBIN g/dl 13 6  --    HEMATOCRIT %  --  43 5   HEMATOCRIT, ISTAT % 40  --    PLATELETS Thousands/uL  --  217   NEUTROS PCT %  --  50   LYMPHS PCT %  --  33   MONOS PCT %  --  16*   EOS PCT %  --  0     Results from last 7 days   Lab Units 02/01/20  0438  01/30/20  0732  01/25/20  2133   POTASSIUM mmol/L 3 7   < >  --    < > 4 6   CHLORIDE mmol/L 98*   < >  --    < > 87*   CO2 mmol/L 35*   < >  --    < > 36*   CO2, I-STAT mmol/L  --   --  >45*  --   --    BUN mg/dL 15   < >  --    < > 10   CREATININE mg/dL 0 62   < >  --    < > 0 96   CALCIUM mg/dL 9 0   < >  --    < > 9 7   ALK PHOS U/L  --   --   --   --  78   ALT U/L  --   --   --   --  24   AST U/L  --   --   --   --  13   GLUCOSE, ISTAT mg/dl  --   --  99  --   --     < > = values in this interval not displayed  * I Have Reviewed All Lab Data Listed Above  * Additional Pertinent Lab Tests Reviewed:  Amanda Khoury Admission Reviewed    Imaging:    Imaging Reports Reviewed Today Include:   Imaging Personally Reviewed by Myself Includes:      Recent Cultures (last 7 days):           Last 24 Hours Medication List:     Current Facility-Administered Medications:  acetaminophen 975 mg Oral Q8H [address] Marylouise Conradi Veres, DO   albuterol 2 puff Inhalation Q4H PRN Marylouise Conradi Veres, DO   ALPRAZolam 0 25 mg Oral HS PRN Destin Archibald MD   aspirin 81 mg Oral Daily Suzy Villegas, DO   atorvastatin 40 mg Oral Daily With [employer] MARITZA Marquez, DO   calcium carbonate-vitamin D 1 tablet Oral BID With Meals Szuy Villegas, DO   docusate sodium 100 mg Oral BID Tuesday ROGELIO Covarrubias   famotidine 10 mg Oral BID Suzy Villegas, DO   guaiFENesin 600 mg Oral Q12H [address] SONU Ledbetter-C   heparin (porcine) 5,000 Units Subcutaneous Q8H [address] Suzy Villegas, DO   HYDROmorphone 0 2 mg Intravenous Q4H PRN Josyeder Marquez, DO   ipratropium 0 5 mg Nebulization TID Yaz Alex, DO   levalbuterol 1 25 mg Nebulization TID Colten Magaña MD   lidocaine 1 patch Topical Daily Josyeder Marquez, DO   metoprolol tartrate 25 mg Oral Q12H [address] Toño Bragg, DO   multivitamin-minerals 1 tablet Oral Daily Josy Marquez, DO   OLANZapine 2 5 mg Oral Once PRN Dion Shane PA-C   ondansetron 4 mg Oral Q8H PRN Suzy Villegas, DO   oxyCODONE 2 5 mg Oral Q4H PRN Suzy Villegas, DO   oxyCODONE 5 mg Oral Q4H PRN Suzy Villegas, DO   polyethylene glycol 17 g Oral Daily Toño Bragg, DO   potassium chloride 20 mEq Oral Daily Suzy Villegas, DO   senna-docusate sodium 1 tablet Oral HS Tuesday ROGELIO Crane        Today, Patient Was Seen By: Toño Bragg DO    ** Please Note: This note has been constructed using a voice recognition system   **

## 2020-02-01 NOTE — ASSESSMENT & PLAN NOTE
· Suspect multifactorial including CHF exacerbation and Influenza A positive complicated by rib fractures secondary to trauma  · Negative procalcitonin therefore low suspicion for superimposed bacterial infection  · Volume overload,  Chronic small bilateral pleural effusions as evidence on chest XR  · ABG with chronic hypercapnia  · Deleted Tamiflu treatment  · Pulmonology is following  · Continue to titrate oxygen, continue bronchodilator  · BiPAP q h s

## 2020-02-02 NOTE — PROGRESS NOTES
NEPHROLOGY PROGRESS NOTE   Quin Record 80 y o  female MRN: [id number]  Unit/Bed#: [facility] 529-01 Encounter: [id number]      ASSESSMENT & PLAN:  1  Hypo osmolar hyponatremia suspected secondary to 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 as well as recently started on Celexa as well as component due to pain  Last Samsca dose on 1/31  Serum sodium is stable at 135  Patient received intravenously Lasix overnight  Monitor ins and outs  Follow daily labs  2  Acute hypoxemic and hypercapnic respiratory failure, multifactorial in the setting of influenza and CHF  Received Lasix 40 mg intravenously last night, monitor ins and outs  If serum bicarbonate remains stable may resume home oral diuretics tomorrow    3  Acute on chronic congestive diastolic heart failure, was previously diuresed but currently off diuretics  4  Influenza type A, she completed treatment with Tamiflu  5  Right-sided rib fractures      My plan and recommendations were discussed with Internal Medicine attending    SUBJECTIVE:  Patient seen and examined, no complaints, denies any chest pain or shortness of breath    Overnight events reviewed, patient received Lasix intravenously increased shortness of breath overnight    OBJECTIVE:  Current Weight: Weight - Scale: 64 5 kg (142 lb 2 oz)  Vitals:    02/02/20 0722   BP:    Pulse:    Resp:    Temp:    SpO2: 97%       Intake/Output Summary (Last 24 hours) at 2/2/2020 1005  Last data filed at 2/2/2020 0941  Gross per 24 hour   Intake 910 ml   Output 1825 ml   Net -915 ml     General:  Fragile elderly woman, cooperative, in not acute distress  Eyes: conjunctivae pale, anicteric sclerae  ENT: lips and mucous membranes moist  Neck: supple, no JVD  Chest: clear breath sounds bilateral, no crackles, ronchus or wheezings, few rales  CVS: distinct S1 & S2, normal rate, regular rhythm  Abdomen: soft, non-tender, non-distended, normoactive bowel sounds  Extremities: no significant edema of both legs  Skin: no rash  Neuro: awake, alert, oriented      Medications:    Current Facility-Administered Medications:     acetaminophen (TYLENOL) tablet 975 mg, 975 mg, Oral, Q8H LIZZY, Evens Glassing Veres, DO, 975 mg at 02/02/20 0615    albuterol (PROVENTIL HFA,VENTOLIN HFA) inhaler 2 puff, 2 puff, Inhalation, Q4H PRN, Cuco , DO, 2 puff at 01/29/20 0519    ALPRAZolam Barron ) tablet 0 25 mg, 0 25 mg, Oral, HS PRN, Zahra Christian MD, 0 25 mg at 02/01/20 2003    aspirin chewable tablet 81 mg, 81 mg, Oral, Daily, Hitchcock , DO, 81 mg at 02/02/20 5086    atorvastatin (LIPITOR) tablet 40 mg, 40 mg, Oral, Daily With Bobby Range, DO, 40 mg at 02/01/20 1729    calcium carbonate-vitamin D (OSCAL-D) 500 mg-200 units per tablet 1 tablet, 1 tablet, Oral, BID With Meals, Hitchcock , DO, 1 tablet at 02/02/20 6666    docusate sodium (COLACE) capsule 100 mg, 100 mg, Oral, BID, Tuesday M ROGELIO Reina, 100 mg at 02/02/20 9381    famotidine (PEPCID) tablet 10 mg, 10 mg, Oral, BID, [place] [facility] Veres, DO, 10 mg at 02/02/20 8414    guaiFENesin (MUCINEX) 12 hr tablet 600 mg, 600 mg, Oral, Q12H [address], Jin Long PA-C, 600 mg at 02/02/20 9476    heparin (porcine) subcutaneous injection 5,000 Units, 5,000 Units, Subcutaneous, Q8H [address], 5,000 Units at 02/02/20 0614 **AND** [CANCELED] Platelet count, , , Once, Cuco , DO    HYDROmorphone (DILAUDID) injection 0 2 mg, 0 2 mg, Intravenous, Q4H PRN, Hitchcock , DO    ipratropium (ATROVENT) 0 02 % inhalation solution 0 5 mg, 0 5 mg, Nebulization, TID, Valerie Diesel, DO, 0 5 mg at 02/02/20 1685    levalbuterol Nyoka Nicole) inhalation solution 1 25 mg, 1 25 mg, Nebulization, TID, Erica Ann MD, 1 25 mg at 02/02/20 0722    lidocaine (LIDODERM) 5 % patch 1 patch, 1 patch, Topical, Daily, Evens Marquez DO, 1 patch at 02/02/20 5134    metoprolol tartrate (LOPRESSOR) tablet 25 mg, 25 mg, Oral, Q12H [address], Sayra Moreno, DO, 25 mg at 02/02/20 2765    multivitamin-minerals (CENTRUM) tablet 1 tablet, 1 tablet, Oral, Daily, Barbie Leesa Veres, DO, 1 tablet at 02/02/20 9532    OLANZapine (ZyPREXA ZYDIS) dispersible tablet 2 5 mg, 2 5 mg, Oral, Once PRN, Ursula Kessler PA-C    ondansetron (ZOFRAN-ODT) dispersible tablet 4 mg, 4 mg, Oral, Q8H PRN, Barbie Leesa Veres, DO, 4 mg at 02/01/20 0901    oxyCODONE (ROXICODONE) IR tablet 2 5 mg, 2 5 mg, Oral, Q4H PRN, Barbie Leesa Veres, DO, 2 5 mg at 01/28/20 1844    oxyCODONE (ROXICODONE) IR tablet 5 mg, 5 mg, Oral, Q4H PRN, Tiffanie Mcallister, DO    polyethylene glycol (MIRALAX) packet 17 g, 17 g, Oral, Daily, Amee Nava, DO, 17 g at 02/02/20 5057    potassium chloride (K-DUR,KLOR-CON) CR tablet 20 mEq, 20 mEq, Oral, Daily, Barbie Leesa Veres, DO, 20 mEq at 02/02/20 0361    senna-docusate sodium (SENOKOT S) 8 6-50 mg per tablet 1 tablet, 1 tablet, Oral, HS, Tuesday Willistine End, CRNP, 1 tablet at 02/01/20 2144    Invasive Devices:        Lab Results:   Results from last 7 days   Lab Units 02/02/20  0503 02/01/20  0438 01/31/20  0510 01/30/20  0732 01/30/20  0509 01/29/20  0504   WBC Thousand/uL 5 10  --   --   --  3 91* 6 10   HEMOGLOBIN g/dL 14 0  --   --   --  13 2 14 2   I STAT HEMOGLOBIN g/dl  --   --   --  13 6  --   --    HEMATOCRIT % 45 0  --   --   --  43 5 45 9   HEMATOCRIT, ISTAT %  --   --   --  40  --   --    PLATELETS Thousands/uL 246  --   --   --  217 265   SODIUM mmol/L 135* 135* 127*  --  131* 134*   POTASSIUM mmol/L 4 2 3 7 3 5  --  3 5 4 4   CHLORIDE mmol/L 97* 98* 89*  --  89* 86*   CO2 mmol/L 36* 35* 36*  --  39* >45*   CO2, I-STAT mmol/L  --   --   --  >45*  --   --    BUN mg/dL 16 15 12  --  11 9   CREATININE mg/dL 0 60 0 62 0 59*  --  0 65 0 61   CALCIUM mg/dL 9 1 9 0 9 0  --  9 0 9 1   GLUCOSE, ISTAT mg/dl  --   --   --  99  --   --        Previous work up:  See previous notes      Portions of the record may have been created with voice recognition software   Occasional wrong word or "sound a like" substitutions may have occurred due to the inherent limitations of voice recognition software  Read the chart carefully and recognize, using context, where substitutions have occurred  If you have any questions, please contact the dictating provider

## 2020-02-02 NOTE — PROGRESS NOTES
Progress Note - Pulmonary   Madalyn Jensen 80 y o  female MRN: [id number]  Unit/Bed#: [facility] 529-01 Encounter: [id number]    Assessment/Plan:    Acute hypoxic with acute on chronic hypercapnic respiratory failure, multifactorial due to below              Titrate oxygen to maintain saturations greater than or equal to 89%  Out of bed as tolerated  Etiology of worsening hypercapnia suspected to be in the setting of benzo/narcotic use, splinting/atelectasis due to rib fracture, influenza A infection, and volume overload with subsequent contraction alkalosis  Not tolerating BiPAP well at night  Dr Nataliia Dow discussed with family yesterday and due to her intolerance of BiPAP and overall goals, we will not attempt to qualify for BiPAP at discharge      Influenza A              Completed Tamiflu course      Acute on chronic diastolic CHF              Diuretics given overnight with improvement symptoms  Discussed with primary team who is reinitiating oral diuretic today  Further diuresis per Nephrology and primary team      Acute right-sided rib fracture              Doing well with holding narcotic pain medications  Continue Lidoderm patch per primary team      Presumed COPD of unknown severity without acute exacerbation              Continue Xopenex/Atrovent 3 times daily      Outpatient pulmonary follow-up pending course  Discussed with primary team and nursing  Chief Complaint:    I feel pretty good      Subjective: Elizabeth Mireles is sitting out of bed in the chair  She reports that she feels good in regard to her breathing this morning  She reports that she felt short of breath overnight, but cannot recall what she was doing when it happened  She was given medications and in discussion with nursing, was on BiPAP for approximately 1 hour; however, did not tolerate it longer than that  At present, she reports her breathing is back to normal for her    She has an occasional dry cough  She denies chest or leg pain  She reports she has not walked today because she felt dizzy  Objective:    Vitals: Blood pressure 154/72, pulse 82, temperature 98 5 °F (36 9 °C), temperature source Oral, resp  rate 22, height 5' 2" (1 575 m), weight 64 5 kg (142 lb 2 oz), SpO2 97 %, not currently breastfeeding  3 liters nasal cannula,Body mass index is 26 kg/m²  Intake/Output Summary (Last 24 hours) at 2/2/2020 0950  Last data filed at 2/2/2020 0941  Gross per 24 hour   Intake 910 ml   Output 1825 ml   Net -915 ml       Invasive Devices     Peripheral Intravenous Line            Peripheral IV 02/02/20 Right Antecubital less than 1 day          Drain            External Urinary Catheter 3 days                Physical Exam:     Physical Exam   Constitutional: She is oriented to person, place, and time  She appears well-developed and well-nourished  She is cooperative  Non-toxic appearance  No distress  HENT:   Head: Normocephalic and atraumatic  Mouth/Throat: No oropharyngeal exudate  Eyes: EOM are normal  No scleral icterus  Neck: Neck supple  No JVD present  No tracheal deviation present  Cardiovascular: Normal rate, S1 normal and S2 normal  An irregular rhythm present  Exam reveals no gallop and no friction rub  No murmur heard  Pulmonary/Chest: Effort normal  No accessory muscle usage or stridor  No tachypnea  No respiratory distress  She has decreased breath sounds in the right lower field and the left lower field  She has no wheezes  She has no rhonchi  She has no rales  She exhibits no tenderness  Abdominal: Soft  Bowel sounds are normal  She exhibits no distension  There is no tenderness  There is no rebound and no guarding  Musculoskeletal: She exhibits no edema  Neurological: She is alert and oriented to person, place, and time  She has normal strength  GCS eye subscore is 4  GCS verbal subscore is 5  GCS motor subscore is 6     Skin: Skin is warm and dry  No rash noted  She is not diaphoretic  No cyanosis  Psychiatric: Her speech is normal    Vitals reviewed        Labs:   CBC:   Lab Results   Component Value Date    WBC 5 10 02/02/2020    HGB 14 0 02/02/2020    HCT 45 0 02/02/2020    MCV 92 02/02/2020     02/02/2020    MCH 28 5 02/02/2020    MCHC 31 1 (L) 02/02/2020    RDW 14 2 02/02/2020    MPV 9 8 02/02/2020    NRBC 0 02/02/2020   , CMP:   Lab Results   Component Value Date    SODIUM 135 (L) 02/02/2020    K 4 2 02/02/2020    CL 97 (L) 02/02/2020    CO2 36 (H) 02/02/2020    BUN 16 02/02/2020    CREATININE 0 60 02/02/2020    CALCIUM 9 1 02/02/2020    EGFR 78 02/02/2020     Imaging and other studies: I have personally reviewed pertinent films in PACS and Chest x-ray done yesterday shows bilateral pulmonary vascular congestion with probable bilateral pleural effusions and right basilar opacity

## 2020-02-02 NOTE — PHYSICAL THERAPY NOTE
Physical Therapy Progress Note        02/02/20 1036   Pain Assessment   Pain Assessment 0-10   Pain Score No Pain  (reports nausea and dizziness)   Hospital Pain Intervention(s) Repositioned;Distraction; Emotional support   Response to Interventions Guarded   Restrictions/Precautions   Weight Bearing Precautions Per Order No   Other Precautions Droplet precautions;Cognitive; Chair Alarm; Fall Risk;O2;Hard of hearing   General   Chart Reviewed Yes   Response to Previous Treatment Patient with no complaints from previous session  Family/Caregiver Present No   Cognition   Overall Cognitive Status Impaired   Arousal/Participation Alert   Comments Patient guarded to ambulate with therapy due to nausea and dizziness   Transfers   Sit to Stand 4  Minimal assistance   Additional items Assist x 1; Armrests; Increased time required   Stand to Sit 4  Minimal assistance   Additional items Assist x 1; Armrests; Increased time required   Ambulation/Elevation   Gait pattern Excessively slow; Inconsistent raghav; Short stride;Decreased foot clearance; Forward Flexion   Gait Assistance 4  Minimal assist   Additional items Assist x 1;Verbal cues; Tactile cues   Assistive Device Rolling walker   Distance 4 feet   Balance   Static Sitting Fair +   Dynamic Sitting Fair   Static Standing Fair -   Dynamic Standing Poor +   Endurance Deficit   Endurance Deficit No   Activity Tolerance   Activity Tolerance Patient limited by fatigue   Nurse [id number] RN   Exercises   Hip Flexion Sitting;10 reps;AROM; Bilateral   Hip Abduction Sitting;10 reps;AROM; Bilateral   Knee AROM Long Arc Quad Sitting;10 reps;AROM; Bilateral   Ankle Pumps Sitting;20 reps;AROM; Bilateral   Assessment   Prognosis Fair   Problem List Decreased strength;Decreased endurance; Impaired balance;Decreased mobility; Decreased cognition;Decreased safety awareness   Assessment Patient seated in bed side recliner, agreeable to perfrom exercises with therapy, but states she is too dizzy and nauseas to ambulate  She was agreeable to standing and walking forward x4 feet which she was able to perform with min A  Patient performed standing exercises in place, marches and kick outs without complaints  She remains anxious with mobility at this time  She would continue to benefit from skilled PT to maximzie functional independence  Barriers to Discharge Inaccessible home environment;Decreased caregiver support   Goals   Patient Goals To get better   STG Expiration Date 02/06/20   PT Treatment Day 2   Plan   Treatment/Interventions Functional transfer training;LE strengthening/ROM; Therapeutic exercise; Endurance training;Gait training;Spoke to nursing   Progress Slow progress, decreased activity tolerance   PT Frequency   (3-5x a week)   Recommendation   Recommendation Post acute IP rehab   Equipment Recommended Walker  (RW)   PT - OK to Discharge Yes     Dayan Lyon, PTA

## 2020-02-02 NOTE — PLAN OF CARE
Problem: PHYSICAL THERAPY ADULT  Goal: Performs mobility at highest level of function for planned discharge setting  See evaluation for individualized goals  Description  Treatment/Interventions: LE strengthening/ROM, Therapeutic exercise, Endurance training, Functional transfer training, Patient/family training, Bed mobility, Gait training          See flowsheet documentation for full assessment, interventions and recommendations  Outcome: Progressing  Note:   Prognosis: Fair  Problem List: Decreased strength, Decreased endurance, Impaired balance, Decreased mobility, Decreased cognition, Decreased safety awareness  Assessment: Patient seated in bed side recliner, agreeable to perfrom exercises with therapy, but states she is too dizzy and nauseas to ambulate  She was agreeable to standing and walking forward x4 feet which she was able to perform with min A  Patient performed standing exercises in place, marches and kick outs without complaints  She remains anxious with mobility at this time  She would continue to benefit from skilled PT to maximzie functional independence  Barriers to Discharge: Inaccessible home environment, Decreased caregiver support     Recommendation: Post acute IP rehab     PT - OK to Discharge: Yes    See flowsheet documentation for full assessment

## 2020-02-02 NOTE — QUICK NOTE
Called for pt c/o SOB  Pt received respiratory treatment and did not have improvement  Denies cp at this time  Noted by nursing to have min output today  On exam pt with moderate rales b/l and min pitting edema in LE  No increased WOB or tachypnea Pt has had lasix held x3 days for hyponatremia  CXR completed  Appears to have mild worsening of vascular congestion  Pt given 1 dose of IV lasix 40 mg  Pt no longer symptomatic  Crackles mildly improved  Pt voided 650 since administration  Addendum: pt with 2nd episode of SOB  Pt exhibiting respirations at 24  93% O2 on 3L NC  HR in 120s initially  Pt denying CP, only SOB similar to prior  EKG obtained for tachycardia, showing NSR  Morning labs sent down prior to further administration of lasix  Respiratory called up as pt has been on BiPAP previously  Pt placed on BiPAP for SOB and tolerating  HR improved  BMP showing stable sodium and creatinine following lasix administration  Will hold off on further doses at this point

## 2020-02-02 NOTE — RESPIRATORY THERAPY NOTE
Pt  with increasing dyspnea overnight  Repeat CXR done showing mild worsening vascular congestion  Coarse/crackles noted bilaterally  Slim PA at bedside to reassess patient  Placed on bipap 14/5-35% per previous settings  Pt appears to be tolerating well at this time  Will continue to monitor

## 2020-02-02 NOTE — OCCUPATIONAL THERAPY NOTE
Occupational Therapy Treatment Note     02/02/20 1054   Restrictions/Precautions   Weight Bearing Precautions Per Order No   Other Precautions Droplet precautions;Cognitive; Chair Alarm; Fall Risk;O2;Telemetry;Hard of hearing   Lifestyle   Autonomy pt's granddtr wreporting she was very active prior able to dress, bathe, and toilet independently   Reciprocal Relationships supportive granddtr who works from home- reports she is worried about taking pt home due to need for assistance   Service to Others retired   [address] enjoys reading and watching tv   Pain Assessment   Pain Assessment 0-10   Pain Score No Pain   ADL   Where Assessed Chair   Equipment Provided Reacher;Sock aid   Grooming Assistance 5  Supervision/Setup   Grooming Deficit Increased time to complete   UB Bathing Assistance 4  Minimal Assistance   UB Bathing Deficit Right arm;Left arm   LB Bathing Assistance 4  Minimal Assistance   LB Bathing Deficit Buttocks;Right lower leg including foot; Left lower leg including foot   UB Dressing Assistance 3  Moderate Assistance   UB Dressing Deficit Thread RUE; Thread LUE;Pull around back   LB Dressing Assistance 4  Minimal Assistance   LB Dressing Deficit Pull up over hips   Toileting Assistance  4  Minimal Assistance   Toileting Deficit Perineal hygiene   Transfers   Sit to Stand 4  Minimal assistance   Additional items Assist x 1   Stand to Sit 4  Minimal assistance   Additional items Assist x 1   Cognition   Overall Cognitive Status Impaired   Arousal/Participation Alert; Responsive   Attention Attends with cues to redirect   Orientation Level Oriented to person;Oriented to place; Disoriented to situation;Oriented to time   Memory Decreased recall of precautions;Decreased short term memory;Decreased recall of recent events   Following Commands Follows one step commands with increased time or repetition   Activity Tolerance   Activity Tolerance Patient limited by fatigue;Patient limited by pain;Treatment limited secondary to medical complications (Comment)  (dizziness; SOB - RN \[facility], [address] notified and aware)   Assessment   Assessment Pt participated in occupational therapy with focus on activity tolerance, LB/UB and self-care  Pt cleared by ELENA/Priscila  for pt participation in occupational therapy  Pt received sitting out of bed to bedside chair and agreeable to therapy following pt Identifiers confirmed  Pt reported her goal to return to home/pt reported she lives with her granddaughters  Pt reports she is fully Independent with her bathing and dressing and her granddaughters do not help her  pt reported use of long handle adaptive equipment and stated " I've used them since my hip replacement"  Pt required min A with LB/UB dressing 2* pt decondition  Pt reported dizziness and some nausea  Pt nurse made aware and involved  Pt BP checked 132/63 this session  Pt will require in-pt rehab to continue to address pt noted deficits which currently impair pt ADL and functional mob  Pt chair alarm active post session all needs within reach      Plan   Treatment Interventions ADL retraining   Goal Expiration Date 02/05/20   OT Treatment Day 2   OT Frequency 3-5x/wk   Recommendation   OT Discharge Recommendation Short Term Rehab   Barthel Index   Feeding 10   Bathing 0   Grooming Score 0   Dressing Score 5   Bladder Score 10   Bowels Score 10   Toilet Use Score 5   Transfers (Bed/Chair) Score 10   Mobility (Level Surface) Score 0   Stairs Score 0   Barthel Index Score 50   Modified [place] Scale   Modified Rk Scale 4       [place] Councilman COTA/YUKO

## 2020-02-02 NOTE — PROGRESS NOTES
Progress Note - Rene Hughes 2/25/1925, 80 y o  female MRN: [id number]    Unit/Bed#: [facility] 529-01 Encounter: [id number]    Primary Care Provider: Kami Carranza DO   Date and time admitted to hospital: 1/25/2020  9:11 PM        * Acute respiratory failure with hypoxia and hypercapnia (HCC)  Assessment & Plan  · Suspect multifactorial including CHF exacerbation and Influenza A  complicated by rib fractures secondary to trauma  · Negative procalcitonin therefore low suspicion for superimposed bacterial infection  · Volume overload,  Chronic small bilateral pleural effusions as evidence on chest XR  · ABG with chronic hypercapnia  · Completed Tamiflu course  · Pulmonology is following  · Continue to titrate oxygen, continue bronchodilator  · BiPAP q h s  While in the hospital        Acute on chronic diastolic congestive heart failure (HCC)  Assessment & Plan  Wt Readings from Last 3 Encounters:   02/01/20 65 5 kg (144 lb 5 oz)   01/10/20 70 kg (154 lb 6 oz)   12/06/19 73 kg (161 lb)     · Echocardiogram on  11/2019, demonstrated EF of 50%, mild concentric hypertrophy, grade 1 diastolic dysfunction, moderate pulmonary hypertension  · Status post IV Lasix and IV Diamox  · Improving with negative fluid balance  · Continue above treatment  · Restart oral Lasix      Influenza A  Assessment & Plan  · Suspect infection may have contributed to CHF exacerbation    · Completed Tamiflu treatment    Cognitive impairment  Assessment & Plan  · Chronic  · Geriatric following  · Avoid delirium related agents    Closed fracture of multiple ribs of right side  Assessment & Plan  · There is a right 10th rib fracture noted on CT imaging with possible 7-9 rib fractures versus motion artifact  · Trauma consult completed; recommendations appreciated; since signed off  · Will continue pain regimen as outlined:  · Rib fracture protocol  · Encourage deep breathing, airway clearance  · Possibly will require rehab    Hyponatremia  Assessment & Plan  · Nephrology consult appreciated  · 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)  Assessment & Plan  · Found on CT imaging  Initially there was concern that this may be new, however this was found on previous CT imaging  No lower extremity weakness or neuro deficits noted  · Continue pain regimen as otherwise ordered  · Trauma evaluated; since signed off    [address]  · Celexa discontinued in light of hyponatremia   · Taper Xanax      COPD (chronic obstructive pulmonary disease) ([facility] [address] )  Assessment & Plan  · Patient not on scheduled medications  · Albuterol p r n  · No sign of exacerbation  · Not on home oxygen      VTE Pharmacologic Prophylaxis:   Pharmacologic: Heparin  Mechanical VTE Prophylaxis in Place: Yes    Patient Centered Rounds: I have performed bedside rounds with nursing staff today  Discussions with Specialists or Other Care Team Provider:  Pulmonology, Nephrology    Education and Discussions with Family / Patient:  Patient    Time Spent for Care: 30 minutes  More than 50% of total time spent on counseling and coordination of care as described above      Current Length of Stay: 7 day(s)    Current Patient Status: Inpatient   Certification Statement: The patient will continue to require additional inpatient hospital stay due to Above    Discharge Plan / Estimated Discharge Date:  Rehab when stable    Code Status: Level 3 - DNAR and DNI      Subjective:   Patient seen and examined  Comfortable sitting in chair  Mild short of breath  Patient received Lasix IV overnight due to worsening shortness of breath  No nausea vomiting or diarrhea    Objective:     Vitals:   Temp (24hrs), Av 9 °F (36 6 °C), Min:97 4 °F (36 3 °C), Max:98 5 °F (36 9 °C)    Temp:  [97 4 °F (36 3 °C)-98 5 °F (36 9 °C)] 97 4 °F (36 3 °C)  HR:  [73-84] 78  Resp: [16-22] 20  BP: (120-154)/(58-72) 133/71  SpO2:  [92 %-97 %] 94 %  Body mass index is 26 kg/m²  Input and Output Summary (last 24 hours): Intake/Output Summary (Last 24 hours) at 2/2/2020 1125  Last data filed at 2/2/2020 0941  Gross per 24 hour   Intake 910 ml   Output 1825 ml   Net -915 ml       Physical Exam:     Physical Exam    Patient is awake alert in no acute distress  Looks tired and weak, sitting in chair  Lung with decreased breath sounds bilateral  Heart positive S1-S2 no murmur  Abdomen soft nontender positive bowel sounds  Lower extremities no edema    Additional Data:     Labs:    Results from last 7 days   Lab Units 02/02/20  0503   WBC Thousand/uL 5 10   HEMOGLOBIN g/dL 14 0   HEMATOCRIT % 45 0   PLATELETS Thousands/uL 246   NEUTROS PCT % 53   LYMPHS PCT % 34   MONOS PCT % 11   EOS PCT % 2     Results from last 7 days   Lab Units 02/02/20  0503  01/30/20  0732   POTASSIUM mmol/L 4 2   < >  --    CHLORIDE mmol/L 97*   < >  --    CO2 mmol/L 36*   < >  --    CO2, I-STAT mmol/L  --   --  >45*   BUN mg/dL 16   < >  --    CREATININE mg/dL 0 60   < >  --    CALCIUM mg/dL 9 1   < >  --    GLUCOSE, ISTAT mg/dl  --   --  99    < > = values in this interval not displayed  * I Have Reviewed All Lab Data Listed Above  * Additional Pertinent Lab Tests Reviewed:  [facility] Admission Reviewed    Imaging:    Imaging Reports Reviewed Today Include:   Imaging Personally Reviewed by Myself Includes:      Recent Cultures (last 7 days):           Last 24 Hours Medication List:     Current Facility-Administered Medications:  acetaminophen 975 mg Oral Q8H [facility] Sol Marquez DO   albuterol 2 puff Inhalation Q4H PRN Noe Berry DO   ALPRAZolam 0 25 mg Oral HS PRN Feliberto James MD   aspirin 81 mg Oral Daily Sol Marquez DO   atorvastatin 40 mg Oral Daily With [employer] MARITZA Marquez DO   calcium carbonate-vitamin D 1 tablet Oral BID With Meals Noe Berry DO docusate sodium 100 mg Oral BID Tuesday Hannah Gee, ROGELIO   famotidine 10 mg Oral BID Corinn Check, DO   furosemide 20 mg Oral Daily Kassy Montez, DO   guaiFENesin 600 mg Oral Q12H [facility] Leandra Dickerson PA-C   heparin (porcine) 5,000 Units Subcutaneous [facility] Corinn Check, DO   HYDROmorphone 0 2 mg Intravenous Q4H PRN Corinn Check, DO   ipratropium 0 5 mg Nebulization TID Branden Tolliver, DO   levalbuterol 1 25 mg Nebulization TID Mo Valentine MD   lidocaine 1 patch Topical Daily Guy Jim Veres, DO   metoprolol tartrate 25 mg Oral Q12H [facility] Kassy Montez, DO   multivitamin-minerals 1 tablet Oral Daily Guy Jim Veres, DO   OLANZapine 2 5 mg Oral Once PRN Leandra Dickerson PA-C   ondansetron 4 mg Oral Q8H PRN Corinn Check, DO   oxyCODONE 2 5 mg Oral Q4H PRN Corinn Check, DO   oxyCODONE 5 mg Oral Q4H PRN Corinn Check, DO   polyethylene glycol 17 g Oral Daily Kassy Montez, DO   potassium chloride 20 mEq Oral Daily Corinn Check, DO   senna-docusate sodium 1 tablet Oral HS Tuesday [place] ROGELIO Gee        Today, Patient Was Seen By: Kassy Montez DO    ** Please Note: This note has been constructed using a voice recognition system   **

## 2020-02-02 NOTE — PLAN OF CARE
Problem: OCCUPATIONAL THERAPY ADULT  Goal: Performs self-care activities at highest level of function for planned discharge setting  See evaluation for individualized goals  Description  Treatment Interventions: ADL retraining, Functional transfer training, Endurance training, Cognitive reorientation, Patient/family training, Equipment evaluation/education, Compensatory technique education, Continued evaluation, Energy conservation, Activityengagement          See flowsheet documentation for full assessment, interventions and recommendations  Outcome: Progressing  Note:   Limitation: Decreased ADL status, Decreased Safe judgement during ADL, Decreased cognition, Decreased endurance, Decreased self-care trans, Decreased high-level ADLs  Prognosis: Good  Assessment: Pt participated in occupational therapy with focus on activity tolerance, LB/UB and self-care  Pt cleared by ELENA/Priscila  for pt participation in occupational therapy  Pt received sitting out of bed to bedside chair and agreeable to therapy following pt Identifiers confirmed  Pt reported her goal to return to home/pt reported she lives with her granddaughters  Pt reports she is fully Independent with her bathing and dressing and her granddaughters do not help her  pt reported use of long handle adaptive equipment and stated " I've used them since my hip replacement"  Pt required min A with LB/UB dressing 2* pt decondition  Pt reported dizziness and some nausea  Pt nurse made aware and involved  Pt BP checked 132/63 this session  Pt will require in-pt rehab to continue to address pt noted deficits which currently impair pt ADL and functional mob  Pt chair alarm active post session all needs within reach        OT Discharge Recommendation: Short Term Rehab  OT - OK to Discharge: Yes(when medically cleared)

## 2020-02-03 NOTE — ASSESSMENT & PLAN NOTE
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

## 2020-02-03 NOTE — ASSESSMENT & PLAN NOTE
Wt Readings from Last 3 Encounters:   02/03/20 66 3 kg (146 lb 2 6 oz)   01/10/20 70 kg (154 lb 6 oz)   12/06/19 73 kg (161 lb)     · Echocardiogram:  Obtained 11/2019, demonstrated EF of 50%, mild concentric hypertrophy, grade 1 diastolic dysfunction, moderate pulmonary hypertension    · Status post IV Lasix and IV Diamox  · Improving with negative fluid balance  · Continue above treatment  · Restarted on oral Lasix

## 2020-02-03 NOTE — PROGRESS NOTES
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, recently started on Celexa as well as pain with rib fracture  -status post Samsca on 01/31/2020 and IV Lasix  -currently off Celexa  -current sodium stable at 135  -home dose furosemide resumed yesterday  -will continue trend I/O, lab values volume status  -will restart moderate fluid restriction of 1800 mL/per day    2  Acute hypoxic and hypercarbic respiratory failure:  Likely secondary to influenza and volume overloaded CHF  -status post Diamox x3 previously treated with IV Lasix  -improvement noted    3  Acute on chronic diastolic congestive heart failure:  Initially treated with IV diuretics  -currently on oral furosemide 20 mg daily restarted yesterday  -continue to trend  -weight is stable at 66 3 kg (75kg)     4  Influenza type A:  Completed Tamiflu course    5  Right-sided rib fracture:  Per primary team      Disposition:  Sodium level has stabilized at 135 okay from renal standpoint for discharge when medically ready and follow up with Dr Elisabeth Son local nephrologist with BMP in one week      SUBJECTIVE:  Patient seen examined  Denies chest pain  Feels breathing is better    Denies nausea vomiting    OBJECTIVE:  Current Weight: Weight - Scale: 66 3 kg (146 lb 2 6 oz)  Vitals:    02/02/20 2320 02/03/20 0535 02/03/20 0756 02/03/20 0817   BP: 128/74   148/65   BP Location: Left arm   Left arm   Pulse: 66   83   Resp: 16   18   Temp: 97 9 °F (36 6 °C)   98 1 °F (36 7 °C)   TempSrc: Oral   Oral   SpO2: 92%  96% 93%   Weight:  66 3 kg (146 lb 2 6 oz)     Height:           Intake/Output Summary (Last 24 hours) at 2/3/2020 1046  Last data filed at 2/2/2020 2301  Gross per 24 hour   Intake 540 ml   Output 950 ml   Net -410 ml     General:  No acute distress, cooperative,   Skin:  Warm and dry without rash  ENMT:  Mucous membranes moist, sclera anicteric, tongue is midline  Neck:  Supple without JVD  Respiratory:  Essentially clear on auscultation without crackles, rhonchi, wheeze, slightly decreased bases  Cardiac:  Regular rate and rhythm without rub  GI:  Soft, nontender, no distention, active bowel sounds  Extremities:  No significant edema noted bilaterally  Neuro:  Alert oriented and awake, no gross deficits  Psych:  Appropriate affect    Medications:    Current Facility-Administered Medications:     acetaminophen (TYLENOL) tablet 975 mg, 975 mg, Oral, Q8H LIZZY, Lavanda Nicole Veres, DO, 975 mg at 02/03/20 0535    albuterol (PROVENTIL HFA,VENTOLIN HFA) inhaler 2 puff, 2 puff, Inhalation, Q4H PRN, Estrella Fleet, DO, 2 puff at 01/29/20 0519    ALPRAZolam [place] ) tablet 0 25 mg, 0 25 mg, Oral, HS PRN, Beryl Sanchez MD, 0 25 mg at 02/03/20 4330    aspirin chewable tablet 81 mg, 81 mg, Oral, Daily, Lavanda Nicole Veres, DO, 81 mg at 02/03/20 0855    atorvastatin (LIPITOR) tablet 40 mg, 40 mg, Oral, Daily With Wilton Northern, DO, 40 mg at 02/02/20 1715    calcium carbonate-vitamin D (OSCAL-D) 500 mg-200 units per tablet 1 tablet, 1 tablet, Oral, BID With Meals, Estrella Guerreroet, DO, 1 tablet at 02/03/20 0854    docusate sodium (COLACE) capsule 100 mg, 100 mg, Oral, BID, Tuesday M ROGELIO Reina, 100 mg at 02/03/20 3758    famotidine (PEPCID) tablet 10 mg, 10 mg, Oral, BID, Lavanda Nicole Veres, DO, 10 mg at 02/03/20 5103    furosemide (LASIX) tablet 20 mg, 20 mg, Oral, Daily, Rice Calkin, DO, 20 mg at 02/03/20 0855    guaiFENesin (MUCINEX) 12 hr tablet 600 mg, 600 mg, Oral, Q12H [facility], Prabha Arguello PA-C, 600 mg at 02/03/20 0535    heparin (porcine) subcutaneous injection 5,000 Units, 5,000 Units, Subcutaneous, Q8H [facility], 5,000 Units at 02/03/20 0534 **AND** [CANCELED] Platelet count, , , Once, August Marquez,     HYDROmorphone (DILAUDID) injection 0 2 mg, 0 2 mg, Intravenous, Q4H PRN, Barbie Marquez DO    ipratropium (ATROVENT) 0 02 % inhalation solution 0 5 mg, 0 5 mg, Nebulization, TID, Tammie Landau, DO, 0 5 mg at 02/03/20 0756    levalbuterol [facility]) inhalation solution 1 25 mg, 1 25 mg, Nebulization, TID, Jorgito Nunez MD, 1 25 mg at 02/03/20 0756    lidocaine (LIDODERM) 5 % patch 1 patch, 1 patch, Topical, Daily, Tiffanie Mcallister DO, 1 patch at 02/03/20 0856    metoprolol tartrate (LOPRESSOR) tablet 25 mg, 25 mg, Oral, Q12H [address], Amee Nava DO, 25 mg at 02/03/20 0855    multivitamin-minerals (CENTRUM) tablet 1 tablet, 1 tablet, Oral, Daily, Tiffanie Mcallister DO, 1 tablet at 02/03/20 0856    OLANZapine (ZyPREXA ZYDIS) dispersible tablet 2 5 mg, 2 5 mg, Oral, Once PRN, Ursula Kessler PA-C    ondansetron (ZOFRAN-ODT) dispersible tablet 4 mg, 4 mg, Oral, Q8H PRN, Barbie Marquez DO, 4 mg at 02/03/20 0859    oxyCODONE (ROXICODONE) IR tablet 2 5 mg, 2 5 mg, Oral, Q4H PRN, Barbie Marquez DO, 2 5 mg at 01/28/20 1844    oxyCODONE (ROXICODONE) IR tablet 5 mg, 5 mg, Oral, Q4H PRN, Tiffanie Mcallister DO    polyethylene glycol (MIRALAX) packet 17 g, 17 g, Oral, Daily, Amee Nava DO, 17 g at 02/03/20 0856    potassium chloride (K-DUR,KLOR-CON) CR tablet 20 mEq, 20 mEq, Oral, Daily, Barbie Marquez DO, 20 mEq at 02/03/20 0856    senna-docusate sodium (SENOKOT S) 8 6-50 mg per tablet 1 tablet, 1 tablet, Oral, HS, Tuesday Willistine End, CRNP, 1 tablet at 02/02/20 6646    Laboratory Results:  Results from last 7 days   Lab Units 02/03/20  0441 02/02/20  0503 02/01/20  0438 01/31/20  0510 01/30/20  0732 01/30/20  0509 01/29/20  0504 01/28/20  0502   WBC Thousand/uL  --  5 10  --   --   --  3 91* 6 10 4 12*   HEMOGLOBIN g/dL  --  14 0  --   --   --  13 2 14 2 13 5   I STAT HEMOGLOBIN g/dl  --   --   --   --  13 6  --   --   --    HEMATOCRIT %  --  45 0  --   --   --  43 5 45 9 43 4   HEMATOCRIT, ISTAT %  --   --   --   --  40  --   --   --    PLATELETS Thousands/uL  --  246  --   --   --  217 265 258   SODIUM mmol/L 135* 135* 135* 127*  --  131* 134* 129*   POTASSIUM mmol/L 4 2 4 2 3 7 3 5  --  3 5 4 4 4 3   CHLORIDE mmol/L 97* 97* 98* 89*  --  89* 86* 83*   CO2 mmol/L 36* 36* 35* 36*  --  39* >45* 42*   CO2, I-STAT mmol/L  --   --   --   --  >45*  --   --   --    BUN mg/dL 13 16 15 12  --  11 9 9   CREATININE mg/dL 0 50* 0 60 0 62 0 59*  --  0 65 0 61 0 62   CALCIUM mg/dL 8 7 9 1 9 0 9 0  --  9 0 9 1 8 8   GLUCOSE, ISTAT mg/dl  --   --   --   --  99  --   --   --

## 2020-02-03 NOTE — PROGRESS NOTES
Progress Note - Chong Ryan 2/25/1925, 80 y o  female MRN: [id number]    Unit/Bed#: [facility] 529-01 Encounter: [id number]    Primary Care Provider: Juan Diego Theodore DO   Date and time admitted to hospital: 1/25/2020  9:11 PM        * Acute respiratory failure with hypoxia and hypercapnia (HCC)  Assessment & Plan  · Suspect multifactorial including CHF exacerbation and Influenza A positive complicated by rib fractures secondary to trauma  · Negative procalcitonin therefore low suspicion for superimposed bacterial infection  · Volume overload,  Chronic small bilateral pleural effusions as evidence on chest XR  · ABG with chronic hypercapnia  · Completed Tamiflu treatment  · Pulmonology is following  · Continue to titrate oxygen, continue bronchodilator  · BiPAP q h s  While in the hospital  · Clinically improving        Acute on chronic diastolic congestive heart failure [facility])  Assessment & Plan  Wt Readings from Last 3 Encounters:   02/03/20 66 3 kg (146 lb 2 6 oz)   01/10/20 70 kg (154 lb 6 oz)   12/06/19 73 kg (161 lb)     · Echocardiogram:  Obtained 11/2019, demonstrated EF of 50%, mild concentric hypertrophy, grade 1 diastolic dysfunction, moderate pulmonary hypertension  · Status post IV Lasix and IV Diamox  · Improving with negative fluid balance  · Continue above treatment  · Restarted on oral Lasix      Influenza A  Assessment & Plan  · Suspect infection may have contributed to CHF exacerbation    · Completed Tamiflu treatment    Cognitive impairment  Assessment & Plan  · Chronic  · Geriatric following  · Avoid delirium related agents    Closed fracture of multiple ribs of right side  Assessment & Plan  · There is a right 10th rib fracture noted on CT imaging with possible 7-9 rib fractures versus motion artifact  · Trauma consult completed; recommendations appreciated; since signed off  · Will continue pain regimen as outlined:  · Rib fracture protocol  · Encourage deep breathing, airway clearance  · Possibly will require rehab    Hyponatremia  Assessment & Plan  · Nephrology consult appreciated  · 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 which has been discontinued   · Status post Samsca  · Improving  · Continue to monitor      Closed compression fracture of L1 vertebra [facility])  Assessment & Plan  · Found on CT imaging  Initially there was concern that this may be new, however this was found on previous CT imaging  No lower extremity weakness or neuro deficits noted  · Continue pain regimen as otherwise ordered  · Trauma evaluated; since signed off    [address]  · Celexa discontinued in light of hyponatremia   · Taper Xanax      COPD (chronic obstructive pulmonary disease) ([facility] [address] )  Assessment & Plan  · Patient not on scheduled medications  · Albuterol p r n  · No sign of exacerbation  · Not  on home oxygen          VTE Pharmacologic Prophylaxis:   Pharmacologic: Heparin  Mechanical VTE Prophylaxis in Place: Yes    Patient Centered Rounds: I have performed bedside rounds with nursing staff today  Discussions with Specialists or Other Care Team Provider:    Education and Discussions with Family / Patient:  Discussed with patient's granddaughter Berenice    Time Spent for Care: 30 minutes  More than 50% of total time spent on counseling and coordination of care as described above      Current Length of Stay: 8 day(s)    Current Patient Status: Inpatient   Certification Statement: The patient will continue to require additional inpatient hospital stay due to Above    Discharge Plan / Estimated Discharge Date:  Rehab in 24-48 hours    Code Status: Level 3 - DNAR and DNI      Subjective:   Patient seen and examined  Comfortable sitting in chair  More awake alert today  Denied pain or shortness of breath    Objective:     Vitals:   Temp (24hrs), Av 9 °F (36 6 °C), Min:97 7 °F (36 5 °C), Max:98 1 °F (36 7 °C)    Temp:  [97 7 °F (36 5 °C)-98 1 °F (36 7 °C)] 98 1 °F (36 7 °C)  HR:  Anamaria Bella 83  Resp:  [16-18] 18  BP: (128-150)/(65-74) 148/65  SpO2:  [92 %-96 %] 93 %  Body mass index is 26 73 kg/m²  Input and Output Summary (last 24 hours): Intake/Output Summary (Last 24 hours) at 2/3/2020 1127  Last data filed at 2/2/2020 2301  Gross per 24 hour   Intake 540 ml   Output 950 ml   Net -410 ml       Physical Exam:     Physical Exam  Patient is awake alert in no acute distress  Lung with decreased breath sounds bilateral intermittent rhonchi  Heart positive S1-S2 no murmur  Abdomen soft nontender  Lower extremities no edema  Additional Data:     Labs:    Results from last 7 days   Lab Units 02/02/20  0503   WBC Thousand/uL 5 10   HEMOGLOBIN g/dL 14 0   HEMATOCRIT % 45 0   PLATELETS Thousands/uL 246   NEUTROS PCT % 53   LYMPHS PCT % 34   MONOS PCT % 11   EOS PCT % 2     Results from last 7 days   Lab Units 02/03/20  0441  01/30/20  0732   POTASSIUM mmol/L 4 2   < >  --    CHLORIDE mmol/L 97*   < >  --    CO2 mmol/L 36*   < >  --    CO2, I-STAT mmol/L  --   --  >45*   BUN mg/dL 13   < >  --    CREATININE mg/dL 0 50*   < >  --    CALCIUM mg/dL 8 7   < >  --    GLUCOSE, ISTAT mg/dl  --   --  99    < > = values in this interval not displayed  * I Have Reviewed All Lab Data Listed Above  * Additional Pertinent Lab Tests Reviewed:  [facility]    Imaging:    Imaging Reports Reviewed Today Include:   Imaging Personally Reviewed by Myself Includes:      Recent Cultures (last 7 days):           Last 24 Hours Medication List:     Current Facility-Administered Medications:  acetaminophen 975 mg Oral Q8H [address] Domitila Marquez, DO   albuterol 2 puff Inhalation Q4H PRN Masha Mcbride, DO   ALPRAZolam 0 25 mg Oral HS PRN Samson Arteaga MD   aspirin 81 mg Oral Daily Domitila Marquez DO   atorvastatin 40 mg Oral Daily With [employer] D Jimmy, DO   calcium carbonate-vitamin D 1 tablet Oral BID With Meals Domitila Marquez DO   docusate sodium 100 mg Oral BID Tuesday ROGELIO Tyler   famotidine 10 mg Oral BID Lolis Hare, DO   furosemide 20 mg Oral Daily Mary Guerra, DO   guaiFENesin 600 mg Oral Q12H [address] Rusty Sandoval PA-C   heparin (porcine) 5,000 Units Subcutaneous [facility] Lolis Hare, DO   HYDROmorphone 0 2 mg Intravenous Q4H PRN Lolis Hare, DO   ipratropium 0 5 mg Nebulization TID Sammi Banerjee, DO   levalbuterol 1 25 mg Nebulization TID Quin Kwon MD   lidocaine 1 patch Topical Daily Alverna Rule Veres, DO   metoprolol tartrate 25 mg Oral Q12H [address] Mary Guerra, DO   multivitamin-minerals 1 tablet Oral Daily Alverna Rule Verelias, DO   OLANZapine 2 5 mg Oral Once PRN Rusty Sandoval PA-C   ondansetron 4 mg Oral Q8H PRN Lolis Hare, DO   oxyCODONE 2 5 mg Oral Q4H PRN Lolis Hare, DO   oxyCODONE 5 mg Oral Q4H PRN Lolis Hare, DO   polyethylene glycol 17 g Oral Daily Mary Guerra, DO   potassium chloride 20 mEq Oral Daily Lolis Hare, DO   senna-docusate sodium 1 tablet Oral HS Tuesday ROGELIO Tyler        Today, Patient Was Seen By: Mary Guerra DO    ** Please Note: This note has been constructed using a voice recognition system   **

## 2020-02-03 NOTE — ASSESSMENT & PLAN NOTE
· Patient not on scheduled medications  · Albuterol p r n    · No sign of exacerbation  · Not  on home oxygen

## 2020-02-04 NOTE — PLAN OF CARE
Problem: OCCUPATIONAL THERAPY ADULT  Goal: Performs self-care activities at highest level of function for planned discharge setting  See evaluation for individualized goals  Description  Treatment Interventions: ADL retraining, Functional transfer training, Endurance training, Cognitive reorientation, Patient/family training, Equipment evaluation/education, Compensatory technique education, Continued evaluation, Energy conservation, Activityengagement          See flowsheet documentation for full assessment, interventions and recommendations  Outcome: Progressing  Note:   Limitation: Decreased ADL status, Decreased Safe judgement during ADL, Decreased cognition, Decreased endurance, Decreased self-care trans, Decreased high-level ADLs  Prognosis: Good  Assessment: Patient participated in Skilled OT session this date with interventions consisting of ADL re training with the use of correct body mechnaics, Energy Conservation techniques, deep breathing technique, safety awareness and fall prevention techniques, one handed dressing technique, increase dynamic sit/ stand balance during functional activity  and increase OOB/ sitting tolerance   Patient agreeable to OT treatment session, upon arrival patient was found seated OOB to Chair  Pt participated in all self care tasks and functional transfers  Please refer to chart for functional levels  Patient requiring frequent re direction, verbal cues for safety, verbal cues for correct technique, verbal cues for pacing thru activity steps, cognitive assistance to anticipate next step, one step directives and frequent rest periods  Patient continues to be functioning below baseline level, occupational performance remains limited secondary to factors listed above and increased risk for falls and injury  From OT standpoint, recommendation at time of d/c would be Short Term Rehab     Patient to benefit from continued Occupational Therapy treatment while in the hospital to address deficits as defined above and maximize level of functional independence with ADLs and functional mobility        OT Discharge Recommendation: Short Term Rehab  OT - OK to Discharge: Yes(when medically cleared)

## 2020-02-04 NOTE — OCCUPATIONAL THERAPY NOTE
Occupational Therapy Treatment Note      Jonathonil Tarah    2/4/2020    Principal Problem:    [facility]  Active Problems:    Atherosclerosis of native coronary artery of native heart without angina pectoris    Gastroesophageal reflux disease without esophagitis    COPD (chronic obstructive pulmonary disease) ([facility])    Anxiety    Acute on chronic diastolic congestive heart failure (HCC)    Closed compression fracture of L1 vertebra (HCC)    Hyponatremia    Influenza A    Closed fracture of multiple ribs of right side    Cognitive impairment    Compensated respiratory acidosis    Chronic respiratory acidosis      Past Medical History:   Diagnosis Date    Abnormal blood sugar 03/13/2017    Abnormal carotid duplex scan     Carotid Duplex (Plaque formation is quite prominent bilaterally  Despite these changes, no evidence for greater than 50% diameter reducing lesions in the cervical portion of either carotid artery hemisystem ) - 6/13/2017    Anxiety     Cervical radiculopathy 08/08/2017    CHF (congestive heart failure) ([facility])     diastolic    Compression fracture of lumbar spine, non-traumatic, with routine healing, subsequent encounter     COPD (chronic obstructive pulmonary disease) ([facility] ) 2/6/2018    Coronary angioplasty status     Coronary artery disease 4/7/2017    Costochondritis 02/05/2013    suspect MS in origin given relationship to ROM and location  Start mobic and if persists, reeval  Refused xrayat this time   Dyslipidemia     GERD without esophagitis 8/30/2012    H/O CT scan of chest      (No PE, minimal interstitial change left lower lobe and right upper lobe, which may be acute ) - 5/19/2017    History of Holter monitoring     Holter (Sinus rhythm with rates ranging from 52 to 118 bpm   No afib or heart block  Rare atrial and ventricular ectopic beats  One six-beat atrial run noted  No pauses  ) - 5/31/2017    Hx of echocardiogram Echo (EF 0 60 (60%), Biatrial enlargement ) - 3/24/2017 Echo (EF 0 55 (55%), mild LVH  Aortic valvular sclerosis  Trace MI with mild left atrial dilatation, mild MAC  Mild TI with mild pulmonary hypertension  ) - 5/22/2017 Echo (EF 0 60 (60%), Normal left vent chamber size and systolic function  Mild diastolic dysfunction of LV w/normal filling pressures  Mild mitral regurg ) - 7/26/2017 Echo (EF 0    Hx of echocardiogram 08/16/2017    EF0 60 (60%) Normal left vent chamber size and systolic function of LV w/normal filling presures  Mild mitral regurg  / EF0 50 (50%) Mild LVH  MIld MR 10/6/17     Hypertension     Iron deficiency anemia 4/21/2016    Macular degeneration, right eye     Malignant melanoma of skin ([facility] [address] ) 9/17/2012    Mixed hyperlipidemia     NSTEMI (non-ST elevated myocardial infarction) ([facility] [address] ) 7/25/2017    Old MI (myocardial infarction)     Osteoarthritis 9/17/2012    Osteoporosis 3/13/2017    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[facility] [address] ) 04/07/2017    Urinary tract infection     frequent UTI's       Past Surgical History:   Procedure Laterality Date    ABDOMINAL SURGERY      APPENDECTOMY      BREAST SURGERY      Lumpectomy    CARDIAC CATHETERIZATION  03/24/2017    ARNIE to 100% occluded prox RCA, chronic 99% ostial LCfx, 60% mid LAD, discrete 40% distal LM / EF0 60 (60%) 100% occluded proximal RCA s/p ARNIE, chronic 99% ostial LCX, 60% mid LAD, discrete 40% dital LM  4/5/17    CHOLECYSTECTOMY      COLONOSCOPY N/A 7/25/2018    Procedure: COLONOSCOPY;  Surgeon: Dino Osman MD;  Location: [facility] MAIN OR;  Service: Gastroenterology    CORONARY STENT PLACEMENT  03/25/2017    ARNIE RCA    HEMORRHOID SURGERY      INSERTION STENT ARTERY  04/07/2017    Cardio vascular agents drug-eluting stent    SKIN BIOPSY      TONSILLECTOMY          02/04/20 0825   Restrictions/Precautions   Weight Bearing Precautions Per Order No   Other Precautions Droplet precautions;Cognitive; Chair Alarm; Bed Alarm;Multiple lines;Telemetry;O2;Fall Risk;Hard of hearing  (2L NC O2)   Lifestyle   Autonomy pt's granddtr wreporting she was very active prior able to dress, bathe, and toilet independently   Reciprocal Relationships supportive granddtr who works from home- reports she is worried about taking pt home due to need for assistance   Service to Others retired   [address] enjoys reading and watching tv   Pain Assessment   Pain Assessment No/denies pain   Pain Score No Pain   ADL   Where Assessed Chair   Grooming Assistance 5  Supervision/Setup   Grooming Deficit Setup;Verbal cueing;Supervision/safety; Increased time to complete;Wash/dry hands; Wash/dry face;Brushing hair   UB Bathing Assistance 4  Minimal Assistance   UB Bathing Deficit Setup;Verbal cueing;Supervision/safety; Increased time to complete   UB Bathing Comments Pt required A to wash her back   LB Bathing Assistance 2  Maximal Assistance   LB Bathing Deficit Setup;Verbal cueing;Supervision/safety; Increased time to complete; Buttocks;Right lower leg including foot; Left lower leg including foot; Perineal area   LB Bathing Comments Pt required A for LB bathing while in stance w/ B/L UE support of RW     UB Dressing Assistance 3  Moderate Assistance   UB Dressing Deficit Setup;Verbal cueing;Supervision/safety; Increased time to complete; Thread RUE;Pull around back; Fasteners   UB Dressing Comments [place]/donning gown while seated in chair  LB Dressing Assistance 3  Moderate Assistance   LB Dressing Deficit Setup;Verbal cueing;Supervision/safety; Increased time to complete; Don/doff R sock; Don/doff L sock   LB Dressing Comments Pt able to doff socks at S level w/ crossed-leg technique while seated in chair  Pt required full A to don B/L socks   Pt stated "I could get them off but I can't put them back on "   Toileting Comments Pt denies need at this time   Functional Standing Tolerance   Time ~2 minutes   Activity Buttock/dimple hygiene while in stance w/ B/L UE support of RW w/ Min A for steadying support  Comments Pt presents highly SOB s/p static standing and required seated rest break  OT educated Pt on use of diaphragmatic breathing techniques to decrease SOB - Pt demonstrated fair carryover  Bed Mobility   Supine to Sit Unable to assess   Sit to Supine Unable to assess   Additional Comments Pt seated OOB in chair upon OT arrival  Pt seated in chair w/ chair alarm activated and all  needs in reach s/p OT session  Transfers   Sit to Stand 4  Minimal assistance   Additional items Assist x 1; Increased time required;Verbal cues;Armrests   Stand to Sit 3  Moderate assistance   Additional items Assist x 1; Increased time required;Verbal cues;Armrests; Impulsive   Additional Comments Transfers w/ RW  VC and TC required for safe hand placement  Pt impulsive during stand>sit, requiring A for controlled descent into chair  Cognition   Overall Cognitive Status Impaired   Arousal/Participation Alert; Cooperative;Lethargic   Attention Attends with cues to redirect   Orientation Level Oriented to person;Oriented to place; Disoriented to time;Disoriented to situation   Memory Decreased recall of precautions;Decreased recall of recent events;Decreased short term memory   Following Commands Follows one step commands with increased time or repetition   Comments Pt presents pleasant and cooperative to participate in therapy this day  Pt required VC for safety awareness and frequent redirection to task     Activity Tolerance   Activity Tolerance Patient limited by fatigue;Treatment limited secondary to medical complications (Comment)  (SOB/LLANES)   Medical Staff Made Aware RN cleared Pt for OT session   Assessment   Assessment Patient participated in Skilled OT session this date with interventions consisting of ADL re training with the use of correct body mechnaics, Energy Conservation techniques, deep breathing technique, safety awareness and fall prevention techniques, one handed dressing technique, increase dynamic sit/ stand balance during functional activity  and increase OOB/ sitting tolerance   Patient agreeable to OT treatment session, upon arrival patient was found seated OOB to Chair  Pt participated in all self care tasks and functional transfers  Please refer to chart for functional levels  Patient requiring frequent re direction, verbal cues for safety, verbal cues for correct technique, verbal cues for pacing thru activity steps, cognitive assistance to anticipate next step, one step directives and frequent rest periods  Patient continues to be functioning below baseline level, occupational performance remains limited secondary to factors listed above and increased risk for falls and injury  From OT standpoint, recommendation at time of d/c would be Short Term Rehab  Patient to benefit from continued Occupational Therapy treatment while in the hospital to address deficits as defined above and maximize level of functional independence with ADLs and functional mobility  Plan   Treatment Interventions ADL retraining;Functional transfer training;UE strengthening/ROM; Endurance training;Cognitive reorientation;Equipment evaluation/education;Patient/family training;Fine motor coordination activities; Compensatory technique education; Activityengagement; Energy conservation   Goal Expiration Date 02/14/20  (goals extended +10 days )   OT Treatment Day 3   OT Frequency 3-5x/wk   Recommendation   OT Discharge Recommendation Short Term Rehab   OT - OK to Discharge Yes  (when medically cleared)   Modified [place] Scale   Modified Rk Scale 4         Je Morin MS, OTR/L

## 2020-02-04 NOTE — PROGRESS NOTES
Progress Note - Burtis Baumgarten 2/25/1925, 80 y o  female MRN: [id number]    Unit/Bed#: [facility] 529-01 Encounter: [id number]    Primary Care Provider: Juan Lema DO   Date and time admitted to hospital: 1/25/2020  9:11 PM        * Acute respiratory failure with hypoxia and hypercapnia (HCC)  Assessment & Plan  · Suspect multifactorial including CHF exacerbation and Influenza A positive complicated by rib fractures secondary to trauma  · Negative procalcitonin therefore low suspicion for superimposed bacterial infection  · Volume overload,  Chronic small bilateral pleural effusions as evidence on chest XR  · ABG with chronic hypercapnia  · Completed Tamiflu treatment  · Wean oxygen as tolerated  · Continue bronchodilator  · BiPAP q h s  While in the hospital  · Clinically improving        Influenza A  Assessment & Plan  · Suspect infection may have contributed to CHF exacerbation  · Completed Tamiflu treatment    Acute on chronic diastolic congestive heart failure 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 which has been discontinued   · Status post Samsca  · Sodium is remained stable for the past 4 days    · Continue fluid restriction and Lasix      Closed fracture of multiple ribs of right side  Assessment & Plan  · There is a right 10th rib fracture noted on CT imaging with possible 7-9 rib fractures versus motion artifact  · Trauma consult completed; recommendations appreciated; since signed off  · Will continue pain regimen as outlined:  · Rib fracture protocol  · Encourage deep breathing, airway clearance  · Possibly will require rehab    Closed compression fracture of L1 vertebra [facility])  Assessment & Plan  · Found on CT imaging  Initially there was concern that this may be new, however this was found on previous CT imaging  No lower extremity weakness or neuro deficits noted  · Continue pain regimen as otherwise ordered  · Trauma evaluated; since signed off    [address]  · Celexa discontinued in light of hyponatremia   · P r n  Xanax      Essential (primary) hypertension  Assessment & Plan  Blood pressure is acceptable  Continue Lasix, metoprolol    Gastroesophageal reflux disease without esophagitis  Assessment & Plan  · Continue renally dosed Pepcid b i d  Atherosclerosis of native coronary artery of native heart without angina pectoris  Assessment & Plan  · Continue aspirin, statin, metoprolol        VTE Pharmacologic Prophylaxis:   Pharmacologic: Heparin  Mechanical VTE Prophylaxis in Place: Yes    Patient Centered Rounds: I have performed bedside rounds with nursing staff today  Education and Discussions with Family / Patient:  Patient, plan of care  Spoke with granddaughter Berenice over telephone, discussed plan of discharge    Time Spent for Care: 20 minutes  More than 50% of total time spent on counseling and coordination of care as described above  Current Length of Stay: 9 day(s)    Current Patient Status: Inpatient   Certification Statement: The patient will continue to require additional inpatient hospital stay due to Discharge planning    Discharge Plan:  SNF when bed available    Code Status: Level 3 - DNAR and DNI      Subjective:   Denies any acute complaints  Reports appetite has been poor chronically  Reports bowel movement today       Objective:     Vitals:   Temp (24hrs), Av 9 °F (36 6 °C), Min:97 5 °F (36 4 °C), Max:98 1 °F (36 7 °C)    Temp:  [97 5 °F (36 4 °C)-98 1 °F (36 7 °C)] 97 5 °F (36 4 °C)  HR: [77-83] 78  Resp:  [16-20] 16  BP: (133-163)/(58-79) 142/67  SpO2:  [92 %-96 %] 92 %  Body mass index is 26 85 kg/m²  Input and Output Summary (last 24 hours): Intake/Output Summary (Last 24 hours) at 2/4/2020 1535  Last data filed at 2/4/2020 1500  Gross per 24 hour   Intake 720 ml   Output 1650 ml   Net -930 ml       Physical Exam:     Physical Exam   Constitutional: She is oriented to person, place, and time  She appears well-developed and well-nourished  HENT:   Head: Normocephalic and atraumatic  Eyes: Pupils are equal, round, and reactive to light  EOM are normal    Neck: Neck supple  Cardiovascular: Normal rate and regular rhythm  Pulmonary/Chest:   Mild wheezes bilaterally   Abdominal: Soft  Musculoskeletal: She exhibits no edema  Neurological: She is alert and oriented to person, place, and time  Skin: Skin is warm and dry  Additional Data:     Labs:    Results from last 7 days   Lab Units 02/02/20  0503   WBC Thousand/uL 5 10   HEMOGLOBIN g/dL 14 0   HEMATOCRIT % 45 0   PLATELETS Thousands/uL 246   NEUTROS PCT % 53   LYMPHS PCT % 34   MONOS PCT % 11   EOS PCT % 2     Results from last 7 days   Lab Units 02/04/20  0431   SODIUM mmol/L 135*   POTASSIUM mmol/L 4 7   CHLORIDE mmol/L 96*   CO2 mmol/L 37*   BUN mg/dL 13   CREATININE mg/dL 0 65   ANION GAP mmol/L 2*   CALCIUM mg/dL 9 0   GLUCOSE RANDOM mg/dL 117                 Results from last 7 days   Lab Units 01/30/20  0509   PROCALCITONIN ng/ml 0 06           * I Have Reviewed All Lab Data Listed Above  * Additional Pertinent Lab Tests Reviewed:  All Labs Within Last 24 Hours Reviewed        Recent Cultures (last 7 days):           Last 24 Hours Medication List:     Current Facility-Administered Medications:  acetaminophen 975 mg Oral Q8H [facility] & long-term Srikanth Marquez DO   albuterol 2 puff Inhalation Q4H PRN Bing Marquez DO   ALPRAZolam 0 25 mg Oral TID PRN Chari Sutherland MD   aspirin 81 mg Oral Daily Onofre Ramirez DO atorvastatin 40 mg Oral Daily With [employer] D Veres, DO   calcium carbonate-vitamin D 1 tablet Oral BID With Meals Carole Craze, DO   docusate sodium 100 mg Oral BID Tuesday ROGELIO Covarrubias   famotidine 10 mg Oral BID Carole Craze, DO   furosemide 20 mg Oral Daily Shervivian Amaya, DO   guaiFENesin 600 mg Oral Q12H [address] Susan Ureña PA-C   heparin (porcine) 5,000 Units Subcutaneous Q8H [address] Carole Craze, DO   HYDROmorphone 0 2 mg Intravenous Q4H PRN Tamara Specarlo Verelias, DO   ipratropium 0 5 mg Nebulization TID Cordjasmin Decker, DO   levalbuterol 1 25 mg Nebulization TID Lei Jauregui MD   lidocaine 1 patch Topical Daily Tamara Flora Verelias, DO   metoprolol tartrate 25 mg Oral Q12H [address] Kayla Concepción Mems, DO   multivitamin-minerals 1 tablet Oral Daily Tamara Flora Marquez, DO   OLANZapine 2 5 mg Oral Once PRN Susan Ureña PA-C   ondansetron 4 mg Oral Q8H PRN Carole Craze, DO   oxyCODONE 2 5 mg Oral Q4H PRN Carole Craze, DO   oxyCODONE 5 mg Oral Q4H PRN Carole Craze, DO   polyethylene glycol 17 g Oral Daily Danay Amaya, DO   potassium chloride 20 mEq Oral Daily Carole Craze, DO   senna-docusate sodium 1 tablet Oral HS Tuesday ROGELIO Ontiveros        Today, Patient Was Seen By: Binu Gaines MD    ** Please Note: Dictation voice to text software may have been used in the creation of this document   **

## 2020-02-04 NOTE — ASSESSMENT & PLAN NOTE
· Suspect multifactorial including CHF exacerbation and Influenza A positive complicated by rib fractures secondary to trauma  · Negative procalcitonin therefore low suspicion for superimposed bacterial infection  · Volume overload,  Chronic small bilateral pleural effusions as evidence on chest XR  · ABG with chronic hypercapnia  · Completed Tamiflu treatment  · Wean oxygen as tolerated  · Continue bronchodilator  · BiPAP q h s   While in the hospital  · Clinically improving

## 2020-02-04 NOTE — ASSESSMENT & PLAN NOTE
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

## 2020-02-04 NOTE — PHYSICAL THERAPY NOTE
Physical Therapy Progress Note     02/04/20 0900   Pain Assessment   Pain Assessment No/denies pain   Pain Score No Pain   Restrictions/Precautions   Other Precautions Droplet precautions; Fall Risk;O2;Cognitive; Chair Alarm; Bed Alarm  (Alarm active post session )   Subjective   Subjective The pt  reports that she is feeling better, but that she is fatigued after walking as she has been sedentary for the past week or so  Transfers   Sit to Stand 4  Minimal assistance   Additional items Assist x 1; Increased time required;Verbal cues   Stand to Sit 4  Minimal assistance   Additional items Assist x 1; Increased time required;Verbal cues   Ambulation/Elevation   Gait pattern Excessively slow; Step to;Short stride; Inconsistent raghav;Decreased foot clearance; Forward Flexion;Narrow W[place]ATT   Gait Assistance 4  Minimal assist   Additional items Assist x 1;Verbal cues   Assistive Device Rolling walker   Distance 60 feet, 40 feet, 20 feet  Balance   Static Sitting Good   Dynamic Sitting Fair   Static Standing Fair -   Ambulatory Poor +   Activity Tolerance   Activity Tolerance Patient tolerated treatment well;Patient limited by fatigue   Nurse [address] [place] [address] Rossi RN  Exercises   TKR Sitting;Standing;Bilateral;AROM;10 reps  (Including standing hip exercises in all four planes )   Assessment   Prognosis Fair   Problem List Decreased strength;Decreased endurance; Impaired balance;Decreased mobility; Decreased cognition;Decreased safety awareness   Assessment The pt  was able to progress her functional mobility today, but she continues to fatigue with household distances  She required a seated and a standing rest during gait today  She also required several minutes in order to recover from her dyspnea  Her SPO2 was 95% during ambulation, however  She does continue to remain limited from her baseline independence, and she will benefit from continued physical therapy as she regains her indepedence     Barriers to Discharge Inaccessible home environment;Decreased caregiver support   Goals   Patient Goals To regain her strength  STG Expiration Date 02/06/20   PT Treatment Day 3   Plan   Treatment/Interventions Functional transfer training;LE strengthening/ROM; Therapeutic exercise; Endurance training;Patient/family training;Bed mobility;Gait training;Elevations   Progress Progressing toward goals   PT Frequency   (3-5x a week )   Recommendation   Recommendation Post acute IP rehab   Equipment Recommended Doyle Banks PTA

## 2020-02-04 NOTE — PROGRESS NOTES
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 in setting of pulmonary process +/- celexa +/- dilutional in setting of dCHF  -s/p tolvaptan on   sNa has remained stable at 135 for the past 4 days  -continue lasix 20mg daily, fluid restriction 1 5L/day  -urine studies performed  support SIADH as urine osm and urine Na high  TSH acceptable    -will check uric acid level and am BMP    2  essential HTN - BP acceptable on average for age  Continue metoprolol 25mg q12 hr with hold parameters  BP parameter adjusted today to hold for SBP < 120      3  Hypoxic/hypercapneic respiratory failure, multifactorial in setting of influenza, CHF  -monitor daily weight, I/O  Weight stable at 146lbs    -continue lasix 20mg daily  Did receive IV diuretics initially  Also s/p diamox x3 doses  Other issues: influenza, rib fractures    Subjective:   She denies chest pain, shortness of breath, dizziness, nausea or difficulty urinating  She denies any pain currently  Needs to have a bowel movement  No other complaints  Objective:     Vitals: Blood pressure 163/79, pulse 83, temperature 98 °F (36 7 °C), temperature source Oral, resp  rate 20, height 5' 2" (1 575 m), weight 66 6 kg (146 lb 13 2 oz), SpO2 95 %, not currently breastfeeding  ,Body mass index is 26 85 kg/m²  Temp (24hrs), Av 1 °F (36 7 °C), Min:98 °F (36 7 °C), Max:98 1 °F (36 7 °C)      Weight (last 2 days)     Date/Time   Weight    20 0347   66 6 (146 83)    20 0535   66 3 (146 17)    20 0600   64 5 (142 13)                Intake/Output Summary (Last 24 hours) at 2020 0926  Last data filed at 2020 0758  Gross per 24 hour   Intake 240 ml   Output 1550 ml   Net -1310 ml     I/O last 24 hours:   In: 360 [P O :360]  Out: 1550 [Urine:1550]        Physical Exam:   Physical Exam   Constitutional: She appears well-developed and well-nourished  No distress  thin   HENT:   Head: Normocephalic and atraumatic  Mouth/Throat: No oropharyngeal exudate  Eyes: Right eye exhibits no discharge  Left eye exhibits no discharge  No scleral icterus  Neck: Normal range of motion  Neck supple  No thyromegaly present  Cardiovascular: Normal rate and regular rhythm  No murmur heard  Pulmonary/Chest: Effort normal and breath sounds normal  No respiratory distress  She has no wheezes  Abdominal: Soft  Bowel sounds are normal  She exhibits no distension  Genitourinary:   Genitourinary Comments: External urinary catheter   Musculoskeletal: She exhibits no edema  Neurological: She is alert  She exhibits normal muscle tone  awake   Skin: Skin is warm and dry  No rash noted  She is not diaphoretic  Psychiatric: She has a normal mood and affect  Her behavior is normal    Nursing note and vitals reviewed        Invasive Devices     Peripheral Intravenous Line            Peripheral IV 02/02/20 Right Antecubital 2 days          Drain            External Urinary Catheter less than 1 day                Medications:    Scheduled Meds:  Current Facility-Administered Medications:  acetaminophen 975 mg Oral Q8H [facility] Terie Quant Veres, DO   albuterol 2 puff Inhalation Q4H PRN Lety Jose, DO   ALPRAZolam 0 25 mg Oral BID PRN Delonte Jackson, DO   aspirin 81 mg Oral Daily Terie Quant Veres, DO   atorvastatin 40 mg Oral Daily With [employer] D Veres, DO   calcium carbonate-vitamin D 1 tablet Oral BID With Meals Lety Hem, DO   docusate sodium 100 mg Oral BID Tuesday M ROGELIO Reina   famotidine 10 mg Oral BID Lety Hem, DO   furosemide 20 mg Oral Daily Delonte Jackson, DO   guaiFENesin 600 mg Oral Q12H [facility] Mary Worley PA-C   heparin (porcine) 5,000 Units Subcutaneous Q8H [facility] Lety Hem, DO   HYDROmorphone 0 2 mg Intravenous Q4H PRN Terie Quant Veres, DO   ipratropium 0 5 mg Nebulization TID Violet Story Georgiana Perez, DO   levalbuterol 1 25 mg Nebulization TID Breana Carty MD   lidocaine 1 patch Topical Daily Ezio Marquez, DO   metoprolol tartrate 25 mg Oral Q12H [facility] [place], DO   multivitamin-minerals 1 tablet Oral Daily Ezio Marquez, DO   OLANZapine 2 5 mg Oral Once PRN Ti Chilel PA-C   ondansetron 4 mg Oral Q8H PRN Alan Keo, DO   oxyCODONE 2 5 mg Oral Q4H PRN Alan Keo, DO   oxyCODONE 5 mg Oral Q4H PRN Alan Keo, DO   polyethylene glycol 17 g Oral Daily [facility] Willie, DO   potassium chloride 20 mEq Oral Daily Alan Keo, DO   senna-docusate sodium 1 tablet Oral HS Tuesday ROGELIO Farias       PRN Meds: albuterol    ALPRAZolam    HYDROmorphone    OLANZapine    ondansetron    oxyCODONE    oxyCODONE    Continuous Infusions:         LAB RESULTS:      Results from last 7 days   Lab Units 02/04/20  0431 02/03/20  0441 02/02/20  0503 02/01/20  0438 01/31/20  0510 01/30/20  0732 01/30/20  0509 01/29/20  0504   WBC Thousand/uL  --   --  5 10  --   --   --  3 91* 6 10   HEMOGLOBIN g/dL  --   --  14 0  --   --   --  13 2 14 2   I STAT HEMOGLOBIN g/dl  --   --   --   --   --  13 6  --   --    HEMATOCRIT %  --   --  45 0  --   --   --  43 5 45 9   HEMATOCRIT, ISTAT %  --   --   --   --   --  40  --   --    PLATELETS Thousands/uL  --   --  246  --   --   --  217 265   NEUTROS PCT %  --   --  53  --   --   --  50 62   LYMPHS PCT %  --   --  34  --   --   --  33 21   MONOS PCT %  --   --  11  --   --   --  16* 17*   EOS PCT %  --   --  2  --   --   --  0 0   POTASSIUM mmol/L 4 7 4 2 4 2 3 7 3 5  --  3 5 4 4   CHLORIDE mmol/L 96* 97* 97* 98* 89*  --  89* 86*   CO2 mmol/L 37* 36* 36* 35* 36*  --  39* >45*   CO2, I-STAT mmol/L  --   --   --   --   --  >45*  --   --    BUN mg/dL 13 13 16 15 12  --  11 9   CREATININE mg/dL 0 65 0 50* 0 60 0 62 0 59*  --  0 65 0 61   CALCIUM mg/dL 9 0 8 7 9 1 9 0 9 0  --  9 0 9 1   GLUCOSE, ISTAT mg/dl  --   --   --   --   --  99  -- --        CUTURES:  Lab Results   Component Value Date    BLOODCX No Growth After 5 Days  05/21/2019    BLOODCX No Growth After 5 Days  05/21/2019    URINECX No Growth <1000 cfu/mL 11/11/2019    URINECX No Growth <1000 cfu/mL 11/28/2018    URINECX 30,000-39,000 cfu/ml  11/02/2018    URINECX >100,000 cfu/ml Escherichia coli (A) 10/15/2018    URINECX 4577-5508 cfu/ml  10/15/2018    URINECX 80,000-89,000 cfu/ml Escherichia coli (A) 09/29/2018                 Portions of the record may have been created with voice recognition software  Occasional wrong word or "sound a like" substitutions may have occurred due to the inherent limitations of voice recognition software  Read the chart carefully and recognize, using context, where substitutions have occurred  If you have any questions, please contact the dictating provider

## 2020-02-04 NOTE — SOCIAL WORK
Cm spoke with pt's oumou Ng advised [facility] denied [facility] still billy  Also advised [facility]  Per Berenice she would prefer pt go to [facility]  Cm contacted Jose Alberto Acosta at [phone] to enquire about bed

## 2020-02-04 NOTE — PLAN OF CARE
Problem: Potential for Falls  Goal: Patient will remain free of falls  Description  INTERVENTIONS:  - Assess patient frequently for physical needs  -  Identify cognitive and physical deficits and behaviors that affect risk of falls    -  [place] fall precautions as indicated by assessment   - Educate patient/family on patient safety including physical limitations  - Instruct patient to call for assistance with activity based on assessment  - Modify environment to reduce risk of injury  - Consider OT/PT consult to assist with strengthening/mobility  Outcome: Progressing     Problem: PAIN - ADULT  Goal: Verbalizes/displays adequate comfort level or baseline comfort level  Description  Interventions:  - Encourage patient to monitor pain and request assistance  - Assess pain using appropriate pain scale  - Administer analgesics based on type and severity of pain and evaluate response  - Implement non-pharmacological measures as appropriate and evaluate response  - Consider cultural and social influences on pain and pain management  - Notify physician/advanced practitioner if interventions unsuccessful or patient reports new pain  Outcome: Progressing     Problem: INFECTION - ADULT  Goal: Absence or prevention of progression during hospitalization  Description  INTERVENTIONS:  - Assess and monitor for signs and symptoms of infection  - Monitor lab/diagnostic results  - Monitor all insertion sites, i e  indwelling lines, tubes, and drains  - Monitor endotracheal if appropriate and nasal secretions for changes in amount and color  - [place] appropriate cooling/warming therapies per order  - Administer medications as ordered  - Instruct and encourage patient and family to use good hand hygiene technique  - Identify and instruct in appropriate isolation precautions for identified infection/condition  Outcome: Progressing  Goal: Absence of fever/infection during neutropenic period  Description  INTERVENTIONS:  - Monitor WBC    Outcome: Progressing     Problem: SAFETY ADULT  Goal: Maintain or return to baseline ADL function  Description  INTERVENTIONS:  -  Assess patient's ability to carry out ADLs; assess patient's baseline for ADL function and identify physical deficits which impact ability to perform ADLs (bathing, care of mouth/teeth, toileting, grooming, dressing, etc )  - Assess/evaluate cause of self-care deficits   - Assess range of motion  - Assess patient's mobility; develop plan if impaired  - Assess patient's need for assistive devices and provide as appropriate  - Encourage maximum independence but intervene and supervise when necessary  - Involve family in performance of ADLs  - Assess for home care needs following discharge   - Consider OT consult to assist with ADL evaluation and planning for discharge  - Provide patient education as appropriate  Outcome: Progressing  Goal: Maintain or return mobility status to optimal level  Description  INTERVENTIONS:  - Assess patient's baseline mobility status (ambulation, transfers, stairs, etc )    - Identify cognitive and physical deficits and behaviors that affect mobility  - Identify mobility aids required to assist with transfers and/or ambulation (gait belt, sit-to-stand, lift, walker, cane, etc )  - [place] fall precautions as indicated by assessment  - Record patient progress and toleration of activity level on Mobility SBAR; progress patient to next Phase/Stage  - Instruct patient to call for assistance with activity based on assessment  - Consider rehabilitation consult to assist with strengthening/weightbearing, etc   Outcome: Progressing     Problem: DISCHARGE PLANNING  Goal: Discharge to home or other facility with appropriate resources  Description  INTERVENTIONS:  - Identify barriers to discharge w/patient and caregiver  - Arrange for needed discharge resources and transportation as appropriate  - Identify discharge learning needs (meds, wound care, etc )  - Arrange for interpretive services to assist at discharge as needed  - Refer to Case Management Department for coordinating discharge planning if the patient needs post-hospital services based on physician/advanced practitioner order or complex needs related to functional status, cognitive ability, or social support system  Outcome: Progressing     Problem: Knowledge Deficit  Goal: Patient/family/caregiver demonstrates understanding of disease process, treatment plan, medications, and discharge instructions  Description  Complete learning assessment and assess knowledge base  Interventions:  - Provide teaching at level of understanding  - Provide teaching via preferred learning methods  Outcome: Progressing     Problem: Prexisting or High Potential for Compromised Skin Integrity  Goal: Skin integrity is maintained or improved  Description  INTERVENTIONS:  - Identify patients at risk for skin breakdown  - Assess and monitor skin integrity  - Assess and monitor nutrition and hydration status  - Monitor labs   - Assess for incontinence   - Turn and reposition patient  - Assist with mobility/ambulation  - Relieve pressure over bony prominences  - Avoid friction and shearing  - Provide appropriate hygiene as needed including keeping skin clean and dry  - Evaluate need for skin moisturizer/barrier cream  - Collaborate with interdisciplinary team   - Patient/family teaching  - Consider wound care consult   Outcome: Progressing     Problem: Nutrition/Hydration-ADULT  Goal: Nutrient/Hydration intake appropriate for improving, restoring or maintaining nutritional needs  Description  Monitor and assess patient's nutrition/hydration status for malnutrition  Collaborate with interdisciplinary team and initiate plan and interventions as ordered  Monitor patient's weight and dietary intake as ordered or per policy  Utilize nutrition screening tool and intervene as necessary   Determine patient's food preferences and provide high-protein, high-caloric foods as appropriate       INTERVENTIONS:  - Monitor oral intake, urinary output, labs, and treatment plans  - Assess nutrition and hydration status and recommend course of action  - Evaluate amount of meals eaten  - Assist patient with eating if necessary   - Allow adequate time for meals  - Recommend/ encourage appropriate diets, oral nutritional supplements, and vitamin/mineral supplements  - Order, calculate, and assess calorie counts as needed  - Recommend, monitor, and adjust tube feedings and TPN/PPN based on assessed needs  - Assess need for intravenous fluids  - Provide specific nutrition/hydration education as appropriate  - Include patient/family/caregiver in decisions related to nutrition  Outcome: Progressing     Problem: COPING  Goal: Pt/Family able to verbalize concerns and demonstrate effective coping strategies  Description  INTERVENTIONS:  - Assist patient/family to identify coping skills, available support systems and cultural and spiritual values  - Provide emotional support, including active listening and acknowledgement of concerns of patient and caregivers  - Reduce environmental stimuli, as able  - Provide patient education  - Assess for spiritual pain/suffering and initiate spiritual care, including notification of Pastoral Care or leandra based community as needed  - Assess effectiveness of coping strategies  Outcome: Progressing  Goal: Will report anxiety at manageable levels  Description  INTERVENTIONS:  - Administer medication as ordered  - Teach and encourage coping skills  - Provide emotional support  - Assess patient/family for anxiety and ability to cope  Outcome: Progressing     Problem: RESPIRATORY - ADULT  Goal: Achieves optimal ventilation and oxygenation  Description  INTERVENTIONS:  - Assess for changes in respiratory status  - Assess for changes in mentation and behavior  - Position to facilitate oxygenation and minimize respiratory effort  - Oxygen administered by appropriate delivery if ordered  - Initiate smoking cessation education as indicated  - Encourage broncho-pulmonary hygiene including cough, deep breathe, Incentive Spirometry  - Assess the need for suctioning and aspirate as needed  - Assess and instruct to report SOB or any respiratory difficulty  - Respiratory Therapy support as indicated  Outcome: Progressing     Problem: METABOLIC, FLUID AND ELECTROLYTES - ADULT  Goal: Electrolytes maintained within normal limits  Description  INTERVENTIONS:  - Monitor labs and assess patient for signs and symptoms of electrolyte imbalances  - Administer electrolyte replacement as ordered  - Monitor response to electrolyte replacements, including repeat lab results as appropriate  - Instruct patient on fluid and nutrition as appropriate  Outcome: Progressing  Goal: Fluid balance maintained  Description  INTERVENTIONS:  - Monitor labs   - Monitor I/O and WT  - Instruct patient on fluid and nutrition as appropriate  - Assess for signs & symptoms of volume excess or deficit  Outcome: Progressing  Goal: Glucose maintained within target range  Description  INTERVENTIONS:  - Monitor Blood Glucose as ordered  - Assess for signs and symptoms of hyperglycemia and hypoglycemia  - Administer ordered medications to maintain glucose within target range  - Assess nutritional intake and initiate nutrition service referral as needed  Outcome: Progressing     Problem: MUSCULOSKELETAL - ADULT  Goal: Maintain or return mobility to safest level of function  Description  INTERVENTIONS:  - Assess patient's ability to carry out ADLs; assess patient's baseline for ADL function and identify physical deficits which impact ability to perform ADLs (bathing, care of mouth/teeth, toileting, grooming, dressing, etc )  - Assess/evaluate cause of self-care deficits   - Assess range of motion  - Assess patient's mobility  - Assess patient's need for assistive devices and provide as appropriate  - Encourage maximum independence but intervene and supervise when necessary  - Involve family in performance of ADLs  - Assess for home care needs following discharge   - Consider OT consult to assist with ADL evaluation and planning for discharge  - Provide patient education as appropriate  Outcome: Progressing  Goal: Maintain proper alignment of affected body part  Description  INTERVENTIONS:  - Support, maintain and protect limb and body alignment  - Provide patient/ family with appropriate education  Outcome: Progressing

## 2020-02-04 NOTE — PLAN OF CARE
Problem: PHYSICAL THERAPY ADULT  Goal: Performs mobility at highest level of function for planned discharge setting  See evaluation for individualized goals  Description  Treatment/Interventions: LE strengthening/ROM, Therapeutic exercise, Endurance training, Functional transfer training, Patient/family training, Bed mobility, Gait training          See flowsheet documentation for full assessment, interventions and recommendations  Outcome: Progressing  Note:   Prognosis: Fair  Problem List: Decreased strength, Decreased endurance, Impaired balance, Decreased mobility, Decreased cognition, Decreased safety awareness  Assessment: The pt  was able to progress her functional mobility today, but she continues to fatigue with household distances  She required a seated and a standing rest during gait today  She also required several minutes in order to recover from her dyspnea  Her SPO2 was 95% during ambulation, however  She does continue to remain limited from her baseline independence, and she will benefit from continued physical therapy as she regains her indepedence  Barriers to Discharge: Inaccessible home environment, Decreased caregiver support     Recommendation: Post acute IP rehab     PT - OK to Discharge: Yes    See flowsheet documentation for full assessment

## 2020-02-04 NOTE — ASSESSMENT & PLAN NOTE
Wt Readings from Last 3 Encounters:   02/04/20 66 6 kg (146 lb 13 2 oz)   01/10/20 70 kg (154 lb 6 oz)   12/06/19 73 kg (161 lb)     · Echocardiogram:  Obtained 11/2019, demonstrated EF of 50%, mild concentric hypertrophy, grade 1 diastolic dysfunction, moderate pulmonary hypertension  · Status post IV Lasix and IV Diamox  · Weights are stable on p o  Lasix    · Continue low-sodium diet

## 2020-02-05 NOTE — TRANSPORTATION MEDICAL NECESSITY
Section I - General Information    Name of Patient: Bobby Gonzalez                 : 1925    Medicare #: [id number]  Transport Date: 20 (PCS is valid for round trips on this date and for all repetitive trips in the 60-day range as noted below )  Origin: [address] [facility]                                                         Destination: [facility]  Is the pt's stay covered under Medicare Part A (PPS/DRG)   [x]     Closest appropriate facility? If no, why is transport to more distant facility required? Yes  If hospice pt, is this transport related to pt's terminal illness? NA       Section II - Medical Necessity Questionnaire  Ambulance transportation is medically necessary only if other means of transport are contraindicated or would be potentially harmful to the patient  To meet this requirement, the patient must either be "bed confined" or suffer from a condition such that transport by means other than ambulance is contraindicated by the patient's condition  The following questions must be answered by the medical professional signing below for this form to be valid:    1)  Describe the MEDICAL CONDITION (physical and/or mental) of this patient AT [address] [place] [address] that requires the patient to be transported in an ambulance and why transport by other means is contraindicated by the patient's condition: Influenza    2) Is the patient "bed confined" as defined below? No  To be "be confined" the patient must satisfy all three of the following conditions: (1) unable to get up from bed without Assistance; AND (2) unable to ambulate; AND (3) unable to sit in a chair or wheelchair  3) Can this patient safely be transported by car or wheelchair van (i e , seated during transport without a medical attendant or monitoring)?    No    4) In addition to completing questions 1-3 above, please check any of the following conditions that apply*:   *Note: supporting documentation for any boxes checked must be maintained in the patient's medical records  If hosp-hosp transfer, describe services needed at 2nd facility not available at 1st facility? Patient is confused  Requires oxygen-unable to self administer  Special handling/isolation/infection control precautions required   Unable to tolerate seated position for time needed to transport       Section III - Signature of Physician or Healthcare Professional  I certify that the above information is true and correct based on my evaluation of this patient, and represent that the patient requires transport by ambulance and that other forms of transport are contraindicated  I understand that this information will be used by the Centers for Medicare and Medicaid Services (CMS) to support the determination of medical necessity for ambulance services, and I represent that I have personal knowledge of the patient's condition at time of transport  [x]  If this box is checked, I also certify that the patient is physically or mentally incapable of signing the ambulance service's claim and that the institution with which I am affiliated has furnished care, services, or assistance to the patient  My signature below is made on behalf of the patient pursuant to 42 CFR §424 36(b)(4)  In accordance with 42 CFR §424 37, the specific reason(s) that the patient is physically or mentally incapable of signing the claim form is as follows: Carmen Dye of Physician* or Healthcare Professional______________________________________________________________  Signature Date 02/05/20 (For scheduled repetitive transports, this form is not valid for transports performed more than 60 days after this date)    Printed Name & Credentials of Physician or Healthcare Professional (MD, DO, RN, etc )________________________________  *Form must be signed by patient's attending physician for scheduled, repetitive transports   For non-repetitive, unscheduled ambulance transports, if unable to obtain the signature of the attending physician, any of the following may sign (choose appropriate option below)  [] Physician Assistant []  Clinical Nurse Specialist []  Registered Nurse  []  Nurse Practitioner  [x] Discharge Planner

## 2020-02-05 NOTE — ASSESSMENT & PLAN NOTE
· Suspect multifactorial including CHF exacerbation and Influenza A positive complicated by rib fractures secondary to trauma  · Negative procalcitonin therefore low suspicion for superimposed bacterial infection  · Volume overload,  Chronic small bilateral pleural effusions as evidence on chest XR  · ABG with chronic hypercapnia  · Completed Tamiflu treatment  · Wean oxygen as tolerated  · Continue bronchodilator  · BiPAP q h s  While in the hospital  · Patient had an episode of chest pain and shortness of breath last night which resolved with nitroglycerin and Lasix  · Will get an additional dose of 40 mg today and restart Lasix 20 mg p o  b i d  Tomorrow    · Wean down oxygen back to 2 L

## 2020-02-05 NOTE — ASSESSMENT & PLAN NOTE
· Suspect infection may have contributed to CHF exacerbation    · Completed Tamiflu treatment  · OK to discontinue droplet isolation

## 2020-02-05 NOTE — PHYSICAL THERAPY NOTE
Physical Therapy Cancellation Note      PT session cancelled as patient was receiving nebulizer tx  Will continue to follow as able

## 2020-02-05 NOTE — ASSESSMENT & PLAN NOTE
Wt Readings from Last 3 Encounters:   02/05/20 66 3 kg (146 lb 2 6 oz)   01/10/20 70 kg (154 lb 6 oz)   12/06/19 73 kg (161 lb)     · Echocardiogram:  Obtained 11/2019, demonstrated EF of 50%, mild concentric hypertrophy, grade 1 diastolic dysfunction, moderate pulmonary hypertension  · Weights have increased slightly and she had an episode of chest pain shortness of breath last night  · Improved with IV Lasix  · Restart Lasix 20 mg p o  B i d   Tomorrow  · Continue low-sodium diet

## 2020-02-05 NOTE — PROGRESS NOTES
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/Celexa/component of pain  -status post tolvaptan on 1/31/20  Serum sodium remains stable for last several days with serum sodium 134 today  Now remains on Lasix 20 mg daily   -continue to monitor  -fluid restriction to 1 5 L per day  -workup -  urine sodium 74, urine osmolality 363, serum osmolality 273       Hypoxic/hypercapnic respiratory failure, multifactorial the setting of influenza, CHF, now on low-dose Lasix 20 mg daily  Continue to monitor daily weight, accurate intake and output  She currently remains on oxygen via nasal cannula  -which seems to be somewhat fluctuating  Addendum:  Patient received Lasix 40 mg IV once today and will be getting another dose of 40 mg IV later today  Agree with this plan      Influenza  Rib fractures     Discussed above plan with primary team     SUBJECTIVE:  Patient seen and examined at bedside  Denies any worsening shortness of breath although currently requiring oxygen via nasal cannula  Denies any nausea or vomiting or headache      OBJECTIVE:  Current Weight: Weight - Scale: 66 3 kg (146 lb 2 6 oz)  Vitals:    02/05/20 0800   BP: 157/72   Pulse: 80   Resp: 18   Temp: 97 8 °F (36 6 °C)   SpO2: 95%       Intake/Output Summary (Last 24 hours) at 2/5/2020 1406  Last data filed at 2/5/2020 1244  Gross per 24 hour   Intake 890 ml   Output 1915 ml   Net -1025 ml     Wt Readings from Last 3 Encounters:   02/05/20 66 3 kg (146 lb 2 6 oz)   01/10/20 70 kg (154 lb 6 oz)   12/06/19 73 kg (161 lb)     Temp Readings from Last 3 Encounters:   02/05/20 97 8 °F (36 6 °C) (Oral)   01/10/20 98 5 °F (36 9 °C)   12/06/19 (!) 97 °F (36 1 °C) (Tympanic)     BP Readings from Last 3 Encounters:   02/05/20 157/72   01/10/20 160/80 12/06/19 134/68     Pulse Readings from Last 3 Encounters:   02/05/20 80   01/10/20 80   12/06/19 75        Physical Examination:  General:  Sitting in chair, no acute distress   Eyes:  No conjunctival pallor present, sclerae anicteric  ENT:  External examination of ears and nose unremarkable  Neck:  No obvious lymphadenopathy appreciated  Respiratory:  Bilateral air entry present  CVS:  S1, S2 present  GI:  Soft, nontender, nondistended  CNS:  Active alert oriented x3  Extremities:  No significant edema in legs  Skin:  No new rash in legs    Medications:    Current Facility-Administered Medications:     acetaminophen (TYLENOL) tablet 975 mg, 975 mg, Oral, Q8H [address], Chestine Nettle Veres, DO, 975 mg at 02/05/20 1350    albuterol (PROVENTIL HFA,VENTOLIN HFA) inhaler 2 puff, 2 puff, Inhalation, Q4H PRN, Bull Malloy DO, 2 puff at 01/29/20 0519    ALPRAZolam Arlina Shady) tablet 0 25 mg, 0 25 mg, Oral, TID PRN, Gideon Iyer MD, 0 25 mg at 02/05/20 0855    aspirin chewable tablet 81 mg, 81 mg, Oral, Daily, Chestine Kitty Marquez, DO, 81 mg at 02/05/20 1585    atorvastatin (LIPITOR) tablet 40 mg, 40 mg, Oral, Daily With Go Chambers DO, 40 mg at 02/04/20 1636    calcium carbonate-vitamin D (OSCAL-D) 500 mg-200 units per tablet 1 tablet, 1 tablet, Oral, BID With Meals, Bull Malloy DO, 1 tablet at 02/05/20 0825    docusate sodium (COLACE) capsule 100 mg, 100 mg, Oral, BID, Tuesday M ROGELIO Reina, 100 mg at 02/05/20 9316    famotidine (PEPCID) tablet 10 mg, 10 mg, Oral, BID, Chestine Nettle Veres, DO, 10 mg at 02/05/20 3891    furosemide (LASIX) injection 40 mg, 40 mg, Intravenous, Once, Gideon Iyer MD    guaiFENesin (MUCINEX) 12 hr tablet 600 mg, 600 mg, Oral, Q12H [address], Prabha Arguello, PA-C, 600 mg at 02/05/20 0518    heparin (porcine) subcutaneous injection 5,000 Units, 5,000 Units, Subcutaneous, Q8H [address], 5,000 Units at 02/05/20 1350 **AND** [CANCELED] Platelet count, , , Once, Bull Malloy, DO    HYDROmorphone (DILAUDID) injection 0 2 mg, 0 2 mg, Intravenous, Q4H PRN, Allyssa Ortegaa Veres, DO    ipratropium (ATROVENT) 0 02 % inhalation solution 0 5 mg, 0 5 mg, Nebulization, TID, Sol Reaper, DO, 0 5 mg at 02/05/20 1339    Labetalol HCl (NORMODYNE) injection 10 mg, 10 mg, Intravenous, Q6H PRN, Geronimo Hua PA-C    levalbuterol [facility]) inhalation solution 1 25 mg, 1 25 mg, Nebulization, TID, Cayla Dunne MD, 1 25 mg at 02/05/20 1339    lidocaine (LIDODERM) 5 % patch 1 patch, 1 patch, Topical, Daily, Allyssa Ortegaa Veres, DO, 1 patch at 02/05/20 0840    metoprolol tartrate (LOPRESSOR) tablet 25 mg, 25 mg, Oral, Q12H [address], Kayla K Dimas, DO, 25 mg at 02/05/20 5376    multivitamin-minerals (CENTRUM) tablet 1 tablet, 1 tablet, Oral, Daily, Maddi Metro, DO, 1 tablet at 02/05/20 0839    nitroglycerin (NITROSTAT) SL tablet 0 4 mg, 0 4 mg, Sublingual, Q5 Min PRN, Geronimo Hua PA-C, 0 4 mg at 02/05/20 0345    OLANZapine (ZyPREXA ZYDIS) dispersible tablet 2 5 mg, 2 5 mg, Oral, Once PRN, Geronimo Hua PA-C    ondansetron (ZOFRAN-ODT) dispersible tablet 4 mg, 4 mg, Oral, Q8H PRN, Allyssa Pica Veres, DO, 4 mg at 02/03/20 0859    oxyCODONE (ROXICODONE) IR tablet 2 5 mg, 2 5 mg, Oral, Q4H PRN, Allyssa Pica Veres, DO, 2 5 mg at 01/28/20 1844    oxyCODONE (ROXICODONE) IR tablet 5 mg, 5 mg, Oral, Q4H PRN, Maddi Metro, DO    polyethylene glycol (MIRALAX) packet 17 g, 17 g, Oral, Daily, Svitlana Gilbert, DO, 17 g at 02/04/20 0935    potassium chloride (K-DUR,KLOR-CON) CR tablet 20 mEq, 20 mEq, Oral, Daily, Allyssa Keane Verelias, DO, 20 mEq at 02/05/20 1615    senna-docusate sodium (SENOKOT S) 8 6-50 mg per tablet 1 tablet, 1 tablet, Oral, HS, Tuesday Ky Abebe, WINSTONNP, 1 tablet at 02/04/20 2102    Laboratory Results:  Results from last 7 days   Lab Units 02/05/20  9611 02/05/20  0522 02/04/20  0431 02/03/20  0441 02/02/20  0503 02/01/20  7393 01/31/20  0510 01/30/20  0732 01/30/20  0509   WBC Thousand/uL 5 31  --   --   --  5 10  --   --   --  3 91*   HEMOGLOBIN g/dL 12 9  --   --   --  14 0  --   --   --  13 2   I STAT HEMOGLOBIN g/dl  --   --   --   --   --   --   --  13 6  --    HEMATOCRIT % 41 8  --   --   --  45 0  --   --   --  43 5   HEMATOCRIT, ISTAT %  --   --   --   --   --   --   --  40  --    PLATELETS Thousands/uL 268  --   --   --  246  --   --   --  217   SODIUM mmol/L  --  134* 135* 135* 135* 135* 127*  --  131*   POTASSIUM mmol/L  --  4 4 4 7 4 2 4 2 3 7 3 5  --  3 5   CHLORIDE mmol/L  --  99* 96* 97* 97* 98* 89*  --  89*   CO2 mmol/L  --  35* 37* 36* 36* 35* 36*  --  39*   CO2, I-STAT mmol/L  --   --   --   --   --   --   --  >45*  --    BUN mg/dL  --  14 13 13 16 15 12  --  11   CREATININE mg/dL  --  0 56* 0 65 0 50* 0 60 0 62 0 59*  --  0 65   CALCIUM mg/dL  --  8 9 9 0 8 7 9 1 9 0 9 0  --  9 0   GLUCOSE, ISTAT mg/dl  --   --   --   --   --   --   --  99  --        XR chest portable   Final Result by Christiane Ann MD (02/05 1057)      Bilateral small pleural effusions  Workstation performed: [id number]         XR chest portable   Final Result by Brittany Sarkar MD (02/02 1620)      Cardiomegaly, pulmonary vascular congestive changes, and small to moderate pleural effusions, similar when compared to previous examination  Workstation performed: [id number]         XR chest pa & lateral   Final Result by Nina Braden MD (01/29 1310)   Persistent CHF  Increased conspicuity of suspected right basal pneumonia  Probable bilateral pleural effusions         Workstation performed: [id number]         XR chest pa & lateral (24 hours after admission)   Final Result by Denise Messina MD (01/27 1405)      Right rib fractures on chest CT not visible  No pneumothorax  Small effusions and bibasilar atelectasis              Workstation performed: [id number]         CT head without contrast   Final Result by Aubrey Caldera MD (01/25 8120) No acute intracranial abnormality  There is advanced microangiopathic change, progressed somewhat compared with May 19, 2017  Workstation performed: [id number]         CT chest without contrast   ED Interpretation by Lennox Kearns, MD (01/25 2206)   Abnormal   QUINN PNA      Final Result by Dana Agudelo MD (01/25 9853)      There is a minimally displaced fracture of the right 10th rib laterally  There are questionable nondisplaced fractures of the right 7th through 9th ribs, versus motion artifact  Possible new compression deformity of L1   CT of the thoracolumbar spine is recommended for further evaluation  No evidence of pneumothorax  Areas of consolidation and groundglass opacity are present within the lungs, as described above  Please see discussion  Follow-up to ensure resolution is recommended  There are small bilateral pleural effusions, right larger than left  There is mild septal thickening  There is a large hiatal hernia/partially intrathoracic stomach  This appears similar to the prior study  Cardiomegaly  Coronary artery disease  Atherosclerosis  I personally discussed this study with [address] [place] [address] on 1/25/2020 at 11:43 PM                   Workstation performed: [id number]             Portions of the record may have been created with voice recognition software  Occasional wrong word or "sound a like" substitutions may have occurred due to the inherent limitations of voice recognition software  Read the chart carefully and recognize, using context, where substitutions have occurred

## 2020-02-05 NOTE — PROGRESS NOTES
Progress Note - Mariah Samson 2/25/1925, 80 y o  female MRN: [id number]    Unit/Bed#: [facility] 529-01 Encounter: [id number]    Primary Care Provider: Angela Dobson DO   Date and time admitted to hospital: 1/25/2020  9:11 PM        * Acute respiratory failure with hypoxia and hypercapnia (HCC)  Assessment & Plan  · Suspect multifactorial including CHF exacerbation and Influenza A positive complicated by rib fractures secondary to trauma  · Negative procalcitonin therefore low suspicion for superimposed bacterial infection  · Volume overload,  Chronic small bilateral pleural effusions as evidence on chest XR  · ABG with chronic hypercapnia  · Completed Tamiflu treatment  · Wean oxygen as tolerated  · Continue bronchodilator  · BiPAP q h s  While in the hospital  · Patient had an episode of chest pain and shortness of breath last night which resolved with nitroglycerin and Lasix  · Will get an additional dose of 40 mg today and restart Lasix 20 mg p o  b i d  Tomorrow  · Wean down oxygen back to 2 L        Influenza A  Assessment & Plan  · Suspect infection may have contributed to CHF exacerbation  · Completed Tamiflu treatment  · OK to discontinue droplet isolation    Acute on chronic diastolic congestive heart failure (HCC)  Assessment & Plan  Wt Readings from Last 3 Encounters:   02/05/20 66 3 kg (146 lb 2 6 oz)   01/10/20 70 kg (154 lb 6 oz)   12/06/19 73 kg (161 lb)     · Echocardiogram:  Obtained 11/2019, demonstrated EF of 50%, mild concentric hypertrophy, grade 1 diastolic dysfunction, moderate pulmonary hypertension  · Weights have increased slightly and she had an episode of chest pain shortness of breath last night  · Improved with IV Lasix  · Restart Lasix 20 mg p o  B i d  Tomorrow  · Continue low-sodium diet      Hyponatremia  Assessment & Plan  · 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 which has been discontinued   · Status post Samsca  · Sodium is remained stable for the past 4 days    · Continue fluid restriction and Lasix      COPD (chronic obstructive pulmonary disease) ([facility] [address] )  Assessment & Plan  · Patient not on scheduled medications  · Albuterol p r n  · No sign of exacerbation  · Not  on home oxygen        VTE Pharmacologic Prophylaxis:   Pharmacologic: Heparin  Mechanical VTE Prophylaxis in Place: Yes    Patient Centered Rounds: I have performed bedside rounds with nursing staff today  Education and Discussions with Family / Patient:  Patient and family, plan of care    Time Spent for Care: 20 minutes  More than 50% of total time spent on counseling and coordination of care as described above  Current Length of Stay: 10 day(s)    Current Patient Status: Inpatient   Certification Statement: The patient will continue to require additional inpatient hospital stay due to Discharge Plan    Discharge Plan:  SNF tomorrow    Code Status: Level 3 - DNAR and DNI      Subjective: An episode of chest pain shortness of breath last night which improved the nitroglycerin and IV Lasix  Objective:     Vitals:   Temp (24hrs), Av °F (36 7 °C), Min:97 8 °F (36 6 °C), Max:98 2 °F (36 8 °C)    Temp:  [97 8 °F (36 6 °C)-98 2 °F (36 8 °C)] 97 8 °F (36 6 °C)  HR:  [] 76  Resp:  [18-22] 18  BP: (109-178)/(57-72) 138/62  SpO2:  [87 %-96 %] 96 %  Body mass index is 26 73 kg/m²  Input and Output Summary (last 24 hours): Intake/Output Summary (Last 24 hours) at 2020 1757  Last data filed at 2020 1600  Gross per 24 hour   Intake 890 ml   Output 1915 ml   Net -1025 ml       Physical Exam:     Physical Exam   Constitutional: She is oriented to person, place, and time  She appears well-developed and well-nourished  HENT:   Head: Normocephalic and atraumatic  Eyes: Pupils are equal, round, and reactive to light  EOM are normal    Neck: Neck supple  Cardiovascular: Normal rate and regular rhythm  Pulmonary/Chest:   Trace bibasilar crackles   Abdominal: Soft     Musculoskeletal: She exhibits no edema  Neurological: She is alert and oriented to person, place, and time  Skin: Skin is warm and dry  Psychiatric: She has a normal mood and affect  Her behavior is normal        Additional Data:     Labs:    Results from last 7 days   Lab Units 02/05/20  0523   WBC Thousand/uL 5 31   HEMOGLOBIN g/dL 12 9   HEMATOCRIT % 41 8   PLATELETS Thousands/uL 268   NEUTROS PCT % 61   LYMPHS PCT % 27   MONOS PCT % 10   EOS PCT % 2     Results from last 7 days   Lab Units 02/05/20  0522   SODIUM mmol/L 134*   POTASSIUM mmol/L 4 4   CHLORIDE mmol/L 99*   CO2 mmol/L 35*   BUN mg/dL 14   CREATININE mg/dL 0 56*   ANION GAP mmol/L 0*   CALCIUM mg/dL 8 9   GLUCOSE RANDOM mg/dL 118                 Results from last 7 days   Lab Units 01/30/20  0509   PROCALCITONIN ng/ml 0 06           * I Have Reviewed All Lab Data Listed Above  * Additional Pertinent Lab Tests Reviewed:  All Labs Within Last 24 Hours Reviewed        Recent Cultures (last 7 days):           Last 24 Hours Medication List:     Current Facility-Administered Medications:  acetaminophen 975 mg Oral Q8H [address] Vane Mckee Veres, DO   albuterol 2 puff Inhalation Q4H PRN [place] Harm, DO   ALPRAZolam 0 25 mg Oral TID PRN Nydia Matias MD   aspirin 81 mg Oral Daily Vane Mckee Veres, DO   atorvastatin 40 mg Oral Daily With [employer] D Veres, DO   calcium carbonate-vitamin D 1 tablet Oral BID With Meals [place] Harm, DO   docusate sodium 100 mg Oral BID Tuesday M ROGELIO Reina   famotidine 10 mg Oral BID [place] Harm, DO   [START ON 2/6/2020] furosemide 20 mg Oral BID (diuretic) Nydia Matias MD   guaiFENesin 600 mg Oral Q12H [address] Massiel Obrien PA-C   heparin (porcine) 5,000 Units Subcutaneous Q8H [address] [place] Harm, DO   HYDROmorphone 0 2 mg Intravenous Q4H PRN Vane Rafi Marquez, DO   ipratropium 0 5 mg Nebulization TID Jeana Briones, DO   Labetalol HCl 10 mg Intravenous Q6H PRN Evalina Ravens, PA-C   levalbuterol 1 25 mg Nebulization TID Erica Ann MD   lidocaine 1 patch Topical Daily [employer] Veres, DO   metoprolol tartrate 25 mg Oral Q12H [address] Kayla Akhil Presto, DO   multivitamin-minerals 1 tablet Oral Daily [employer] Veres, DO   nitroglycerin 0 4 mg Sublingual Q5 Min PRN Jin Passy, PA-C   OLANZapine 2 5 mg Oral Once PRN Jin Passy, PA-C   ondansetron 4 mg Oral Q8H PRN [place] , DO   oxyCODONE 2 5 mg Oral Q4H PRN [place] , DO   oxyCODONE 5 mg Oral Q4H PRN [place] , DO   polyethylene glycol 17 g Oral Daily Suszanne Lux, DO   potassium chloride 20 mEq Oral Daily [place] , DO   senna-docusate sodium 1 tablet Oral HS Tuesday ROGELIO Davis        Today, Patient Was Seen By: Tejas Michaels MD    ** Please Note: Dictation voice to text software may have been used in the creation of this document   **

## 2020-02-05 NOTE — PLAN OF CARE
Problem: Potential for Falls  Goal: Patient will remain free of falls  Description  INTERVENTIONS:  - Assess patient frequently for physical needs  -  Identify cognitive and physical deficits and behaviors that affect risk of falls    -  Lesage fall precautions as indicated by assessment   - Educate patient/family on patient safety including physical limitations  - Instruct patient to call for assistance with activity based on assessment  - Modify environment to reduce risk of injury  - Consider OT/PT consult to assist with strengthening/mobility  Outcome: Progressing     Problem: PAIN - ADULT  Goal: Verbalizes/displays adequate comfort level or baseline comfort level  Description  Interventions:  - Encourage patient to monitor pain and request assistance  - Assess pain using appropriate pain scale  - Administer analgesics based on type and severity of pain and evaluate response  - Implement non-pharmacological measures as appropriate and evaluate response  - Consider cultural and social influences on pain and pain management  - Notify physician/advanced practitioner if interventions unsuccessful or patient reports new pain  Outcome: Progressing     Problem: INFECTION - ADULT  Goal: Absence or prevention of progression during hospitalization  Description  INTERVENTIONS:  - Assess and monitor for signs and symptoms of infection  - Monitor lab/diagnostic results  - Monitor all insertion sites, i e  indwelling lines, tubes, and drains  - Monitor endotracheal if appropriate and nasal secretions for changes in amount and color  - Lesage appropriate cooling/warming therapies per order  - Administer medications as ordered  - Instruct and encourage patient and family to use good hand hygiene technique  - Identify and instruct in appropriate isolation precautions for identified infection/condition  Outcome: Progressing  Goal: Absence of fever/infection during neutropenic period  Description  INTERVENTIONS:  - Monitor WBC    Outcome: Progressing     Problem: SAFETY ADULT  Goal: Maintain or return to baseline ADL function  Description  INTERVENTIONS:  -  Assess patient's ability to carry out ADLs; assess patient's baseline for ADL function and identify physical deficits which impact ability to perform ADLs (bathing, care of mouth/teeth, toileting, grooming, dressing, etc )  - Assess/evaluate cause of self-care deficits   - Assess range of motion  - Assess patient's mobility; develop plan if impaired  - Assess patient's need for assistive devices and provide as appropriate  - Encourage maximum independence but intervene and supervise when necessary  - Involve family in performance of ADLs  - Assess for home care needs following discharge   - Consider OT consult to assist with ADL evaluation and planning for discharge  - Provide patient education as appropriate  Outcome: Progressing  Goal: Maintain or return mobility status to optimal level  Description  INTERVENTIONS:  - Assess patient's baseline mobility status (ambulation, transfers, stairs, etc )    - Identify cognitive and physical deficits and behaviors that affect mobility  - Identify mobility aids required to assist with transfers and/or ambulation (gait belt, sit-to-stand, lift, walker, cane, etc )  - Raysal fall precautions as indicated by assessment  - Record patient progress and toleration of activity level on Mobility SBAR; progress patient to next Phase/Stage  - Instruct patient to call for assistance with activity based on assessment  - Consider rehabilitation consult to assist with strengthening/weightbearing, etc   Outcome: Progressing     Problem: DISCHARGE PLANNING  Goal: Discharge to home or other facility with appropriate resources  Description  INTERVENTIONS:  - Identify barriers to discharge w/patient and caregiver  - Arrange for needed discharge resources and transportation as appropriate  - Identify discharge learning needs (meds, wound care, etc )  - Arrange for interpretive services to assist at discharge as needed  - Refer to Case Management Department for coordinating discharge planning if the patient needs post-hospital services based on physician/advanced practitioner order or complex needs related to functional status, cognitive ability, or social support system  Outcome: Progressing     Problem: Knowledge Deficit  Goal: Patient/family/caregiver demonstrates understanding of disease process, treatment plan, medications, and discharge instructions  Description  Complete learning assessment and assess knowledge base  Interventions:  - Provide teaching at level of understanding  - Provide teaching via preferred learning methods  Outcome: Progressing     Problem: Prexisting or High Potential for Compromised Skin Integrity  Goal: Skin integrity is maintained or improved  Description  INTERVENTIONS:  - Identify patients at risk for skin breakdown  - Assess and monitor skin integrity  - Assess and monitor nutrition and hydration status  - Monitor labs   - Assess for incontinence   - Turn and reposition patient  - Assist with mobility/ambulation  - Relieve pressure over bony prominences  - Avoid friction and shearing  - Provide appropriate hygiene as needed including keeping skin clean and dry  - Evaluate need for skin moisturizer/barrier cream  - Collaborate with interdisciplinary team   - Patient/family teaching  - Consider wound care consult   Outcome: Progressing     Problem: Nutrition/Hydration-ADULT  Goal: Nutrient/Hydration intake appropriate for improving, restoring or maintaining nutritional needs  Description  Monitor and assess patient's nutrition/hydration status for malnutrition  Collaborate with interdisciplinary team and initiate plan and interventions as ordered  Monitor patient's weight and dietary intake as ordered or per policy  Utilize nutrition screening tool and intervene as necessary   Determine patient's food preferences and provide high-protein, high-caloric foods as appropriate       INTERVENTIONS:  - Monitor oral intake, urinary output, labs, and treatment plans  - Assess nutrition and hydration status and recommend course of action  - Evaluate amount of meals eaten  - Assist patient with eating if necessary   - Allow adequate time for meals  - Recommend/ encourage appropriate diets, oral nutritional supplements, and vitamin/mineral supplements  - Order, calculate, and assess calorie counts as needed  - Recommend, monitor, and adjust tube feedings and TPN/PPN based on assessed needs  - Assess need for intravenous fluids  - Provide specific nutrition/hydration education as appropriate  - Include patient/family/caregiver in decisions related to nutrition  Outcome: Progressing     Problem: COPING  Goal: Pt/Family able to verbalize concerns and demonstrate effective coping strategies  Description  INTERVENTIONS:  - Assist patient/family to identify coping skills, available support systems and cultural and spiritual values  - Provide emotional support, including active listening and acknowledgement of concerns of patient and caregivers  - Reduce environmental stimuli, as able  - Provide patient education  - Assess for spiritual pain/suffering and initiate spiritual care, including notification of Pastoral Care or leandra based community as needed  - Assess effectiveness of coping strategies  Outcome: Progressing  Goal: Will report anxiety at manageable levels  Description  INTERVENTIONS:  - Administer medication as ordered  - Teach and encourage coping skills  - Provide emotional support  - Assess patient/family for anxiety and ability to cope  Outcome: Progressing     Problem: RESPIRATORY - ADULT  Goal: Achieves optimal ventilation and oxygenation  Description  INTERVENTIONS:  - Assess for changes in respiratory status  - Assess for changes in mentation and behavior  - Position to facilitate oxygenation and minimize respiratory effort  - Oxygen administered by appropriate delivery if ordered  - Initiate smoking cessation education as indicated  - Encourage broncho-pulmonary hygiene including cough, deep breathe, Incentive Spirometry  - Assess the need for suctioning and aspirate as needed  - Assess and instruct to report SOB or any respiratory difficulty  - Respiratory Therapy support as indicated  Outcome: Progressing     Problem: METABOLIC, FLUID AND ELECTROLYTES - ADULT  Goal: Electrolytes maintained within normal limits  Description  INTERVENTIONS:  - Monitor labs and assess patient for signs and symptoms of electrolyte imbalances  - Administer electrolyte replacement as ordered  - Monitor response to electrolyte replacements, including repeat lab results as appropriate  - Instruct patient on fluid and nutrition as appropriate  Outcome: Progressing  Goal: Fluid balance maintained  Description  INTERVENTIONS:  - Monitor labs   - Monitor I/O and WT  - Instruct patient on fluid and nutrition as appropriate  - Assess for signs & symptoms of volume excess or deficit  Outcome: Progressing  Goal: Glucose maintained within target range  Description  INTERVENTIONS:  - Monitor Blood Glucose as ordered  - Assess for signs and symptoms of hyperglycemia and hypoglycemia  - Administer ordered medications to maintain glucose within target range  - Assess nutritional intake and initiate nutrition service referral as needed  Outcome: Progressing     Problem: MUSCULOSKELETAL - ADULT  Goal: Maintain or return mobility to safest level of function  Description  INTERVENTIONS:  - Assess patient's ability to carry out ADLs; assess patient's baseline for ADL function and identify physical deficits which impact ability to perform ADLs (bathing, care of mouth/teeth, toileting, grooming, dressing, etc )  - Assess/evaluate cause of self-care deficits   - Assess range of motion  - Assess patient's mobility  - Assess patient's need for assistive devices and provide as appropriate  - Encourage maximum independence but intervene and supervise when necessary  - Involve family in performance of ADLs  - Assess for home care needs following discharge   - Consider OT consult to assist with ADL evaluation and planning for discharge  - Provide patient education as appropriate  Outcome: Progressing  Goal: Maintain proper alignment of affected body part  Description  INTERVENTIONS:  - Support, maintain and protect limb and body alignment  - Provide patient/ family with appropriate education  Outcome: Progressing

## 2020-02-05 NOTE — SOCIAL WORK
Cm waiting to hear back from [facility]   She advised cm she will speak with granddaughter Berenice and f/u today  Cm received call from [facility] [place] [facility]   Pt accepted for admission  Cm made MD aware and they would like to give pt a dose of IV lasix today and discharge tomorrow  [facility] transport scheduled with [facility] for 1230pm  Cy made oumou rizvi

## 2020-02-06 PROBLEM — J10.1 INFLUENZA A: Status: RESOLVED | Noted: 2020-01-01 | Resolved: 2020-01-01

## 2020-02-06 PROBLEM — J96.02 ACUTE RESPIRATORY FAILURE WITH HYPOXIA AND HYPERCAPNIA (HCC): Status: RESOLVED | Noted: 2020-01-01 | Resolved: 2020-01-01

## 2020-02-06 PROBLEM — J96.01 ACUTE RESPIRATORY FAILURE WITH HYPOXIA AND HYPERCAPNIA (HCC): Status: RESOLVED | Noted: 2020-01-01 | Resolved: 2020-01-01

## 2020-02-06 NOTE — H&P
Keysha Abreu#  SND:[birth year] F  JCU:[id number]    TFE:[id number]  Adm Date: 2/6/2020 1333  1:33 PM   ATT PHY: Mary Ann Lora, [facility]         Chief Complaint:  Short-term rehabilitation following acute CHF which shortness of breath and hypoxemia    History of Presenting Illness: Connor Hays is a(n) 80y o  year old female who was admitted from [facility] which she was initially admitted with acute shortness of breath and a fall  Patient was found to be in acute CHF and influenza infection  Patient was treated with Tamiflu  Patient has generalized weakness  Patient is admitted for short-term rehabilitation at [facility] for cardiac rehab  On examination at bedside  Patient denied any shortness of breath at the time of examination  Patient admits that she feels weak  Patient has no other acute symptoms      Allergies   Allergen Reactions    Ciprofloxacin      Reaction Date: 01Jul2011;     Keflex [Cephalexin] Dizziness    Nitrofurantoin      Reaction Date: [id number];     Penicillins Rash, Other (See Comments) and Throat Swelling     Throat swells       Current Facility-Administered Medications on File Prior to Encounter   Medication Dose Route Frequency Provider Last Rate Last Dose    [DISCONTINUED] acetaminophen (TYLENOL) tablet 975 mg  975 mg Oral Q8H [address] Marylouise Patty Marquez, DO   975 mg at 02/06/20 0538    [DISCONTINUED] albuterol (PROVENTIL HFA,VENTOLIN HFA) inhaler 2 puff  2 puff Inhalation Q4H PRN Denia Brewer DO   2 puff at 01/29/20 0519    [DISCONTINUED] ALPRAZolam Shira Fam) tablet 0 25 mg  0 25 mg Oral TID PRN Anthony Perkins MD   0 25 mg at 02/05/20 2135    [DISCONTINUED] aspirin chewable tablet 81 mg  81 mg Oral Daily Jaime Marquez DO   81 mg at 02/06/20 0915    [DISCONTINUED] atorvastatin (LIPITOR) tablet 40 mg  40 mg Oral Daily With Paula Marquez DO   40 mg at 02/05/20 1532    [DISCONTINUED] calcium carbonate-vitamin D (OSCAL-D) 500 mg-200 units per tablet 1 tablet  1 tablet Oral BID With Meals Alec Doe DO   1 tablet at 02/06/20 0915    [DISCONTINUED] docusate sodium (COLACE) capsule 100 mg  100 mg Oral BID Tuesday Saturnino Latasha CRNP   100 mg at 02/06/20 3032    [DISCONTINUED] famotidine (PEPCID) tablet 10 mg  10 mg Oral BID Gary Patmacy Verelias, DO   10 mg at 02/06/20 3781    [DISCONTINUED] furosemide (LASIX) tablet 20 mg  20 mg Oral BID (diuretic) Iris Jameson MD   20 mg at 02/06/20 0915    [DISCONTINUED] guaiFENesin (MUCINEX) 12 hr tablet 600 mg  600 mg Oral Q12H [facility] Wilian Ruiz PA-C   600 mg at 02/06/20 3102    [DISCONTINUED] heparin (porcine) subcutaneous injection 5,000 Units  5,000 Units Subcutaneous [facility] Alec Doe DO   5,000 Units at 02/06/20 3922    [DISCONTINUED] HYDROmorphone (DILAUDID) injection 0 2 mg  0 2 mg Intravenous Q4H PRN Alec Doe DO        [DISCONTINUED] ipratropium (ATROVENT) 0 02 % inhalation solution 0 5 mg  0 5 mg Nebulization TID Win Alonso DO   0 5 mg at 02/06/20 2076    [DISCONTINUED] Labetalol HCl (NORMODYNE) injection 10 mg  10 mg Intravenous Q6H PRN Wilian Ruiz PA-C        [DISCONTINUED] levalbuterol Dorma Canard) inhalation solution 1 25 mg  1 25 mg Nebulization TID Gigi Vera MD   1 25 mg at 02/06/20 0824    [DISCONTINUED] lidocaine (LIDODERM) 5 % patch 1 patch  1 patch Topical Daily Alec Doe DO   1 patch at 02/06/20 9718    [DISCONTINUED] metoprolol tartrate (LOPRESSOR) tablet 25 mg  25 mg Oral Q12H [facility] Kayla Cochran, DO   25 mg at 02/06/20 0914    [DISCONTINUED] multivitamin-minerals (CENTRUM) tablet 1 tablet  1 tablet Oral Daily Gary Marquez, DO   1 tablet at 02/06/20 0915    [DISCONTINUED] nitroglycerin (NITROSTAT) SL tablet 0 4 mg  0 4 mg Sublingual Q5 Min PRN Wilian Ruiz PA-C   0 4 mg at 02/05/20 0345    [DISCONTINUED] OLANZapine (ZyPREXA ZYDIS) dispersible tablet 2 5 mg  2 5 mg Oral Once PRN Judith Rsoa PA-C        [DISCONTINUED] ondansetron (ZOFRAN-ODT) dispersible tablet 4 mg  4 mg Oral Q8H PRN Denia Osman, DO   4 mg at 02/03/20 9443    [DISCONTINUED] oxyCODONE (ROXICODONE) IR tablet 2 5 mg  2 5 mg Oral Q4H PRN Denia Osman, DO   2 5 mg at 01/28/20 1844    [DISCONTINUED] oxyCODONE (ROXICODONE) IR tablet 5 mg  5 mg Oral Q4H PRN Denia Osman, DO        [DISCONTINUED] polyethylene glycol (MIRALAX) packet 17 g  17 g Oral Daily Santos Schiller, DO   17 g at 02/04/20 0935    [DISCONTINUED] potassium chloride (K-DUR,KLOR-CON) CR tablet 20 mEq  20 mEq Oral Daily Marylouise Patty Jimmy, DO   20 mEq at 02/06/20 0915    [DISCONTINUED] senna-docusate sodium (SENOKOT S) 8 6-50 mg per tablet 1 tablet  1 tablet Oral HS Tuesday ROGELIO Ashford   Stopped at 02/05/20 2200     Current Outpatient Medications on File Prior to Encounter   Medication Sig Dispense Refill    albuterol (PROVENTIL HFA,VENTOLIN HFA) 90 mcg/act inhaler Inhale 2 puffs every 4 (four) hours as needed for wheezing  0    ALPRAZolam (XANAX) 0 5 mg tablet Take 0 5 tablets (0 25 mg total) by mouth 3 (three) times a day as needed for anxiety 15 tablet 0    aspirin 81 mg chewable tablet Chew 1 tablet (81 mg total) daily 30 tablet 0    atorvastatin (LIPITOR) 40 mg tablet Take 40 mg by mouth daily with dinner      Calcium Carbonate-Vit D-Min (CALCIUM 1200 PO) Take by mouth      docusate sodium (COLACE) 100 mg capsule Take 1 capsule (100 mg total) by mouth 2 (two) times a day 10 capsule 0    famotidine (PEPCID) 10 mg tablet Take 1 tablet (10 mg total) by mouth daily  0    furosemide (LASIX) 20 mg tablet Take 1 tablet (20 mg total) by mouth 2 (two) times a day 90 tablet 0    ipratropium (ATROVENT) 0 02 % nebulizer solution Take 1 vial (0 5 mg total) by nebulization 3 (three) times a day  0    levalbuterol (XOPENEX) 1 25 mg/0 5 mL nebulizer solution Take 0 5 mL (1 25 mg total) by nebulization 3 (three) times a day  0    lidocaine (LIDODERM) 5 % Apply 1 patch topically daily Remove & Discard patch within 12 hours or as directed by MD  0    metoprolol tartrate (LOPRESSOR) 25 mg tablet Take 1 tablet (25 mg total) by mouth every 12 (twelve) hours  0    multivitamin (THERAGRAN) TABS Take 1 tablet by mouth daily      nitroglycerin (NITROSTAT) 0 4 mg SL tablet Place 1 tablet (0 4 mg total) under the tongue every 5 (five) minutes as needed for chest pain  0    ondansetron (ZOFRAN) 4 mg tablet Take 1 tablet (4 mg total) by mouth every 12 (twelve) hours as needed for nausea or vomiting 60 tablet 1    polyethylene glycol (MIRALAX) 17 g packet Take 17 g by mouth daily 14 each 0    potassium chloride (K-DUR,KLOR-CON) 20 mEq tablet Take 1 tablet (20 mEq total) by mouth daily  0    senna-docusate sodium (SENOKOT S) 8 6-50 mg per tablet Take 1 tablet by mouth daily at bedtime  0    [DISCONTINUED] ALPRAZolam (XANAX) 0 5 mg tablet Take 1 tablet (0 5 mg total) by mouth 3 (three) times a day as needed for anxiety 90 tablet 0    [DISCONTINUED] citalopram (CeleXA) 10 mg tablet Take 1 tablet (10 mg total) by mouth daily at bedtime 90 tablet 0    [DISCONTINUED] furosemide (LASIX) 20 mg tablet Take 1 tablet (20 mg total) by mouth daily 90 tablet 0    [DISCONTINUED] metoprolol succinate (TOPROL-XL) 25 mg 24 hr tablet Take 1 tablet (25 mg total) by mouth daily 90 tablet 0    [DISCONTINUED] Potassium Chloride BRISSA by Does not apply route      [DISCONTINUED] ranitidine (ZANTAC) 150 mg tablet Take 1 tablet (150 mg total) by mouth 2 (two) times a day 60 tablet 0       Active Ambulatory Problems     Diagnosis Date Noted    Atherosclerosis of native coronary artery of native heart without angina pectoris 04/06/2017    Gastroesophageal reflux disease without esophagitis 08/30/2012    Essential (primary) hypertension 09/17/2012    Old myocardial infarction 07/25/2017    COPD [facility]chronic obstructive pulmonary disease) [facility] ) 02/06/2018    Anxiety 05/07/2018    Acute on chronic diastolic congestive heart failure (HCC) 10/09/2017    Closed compression fracture of L1 vertebra (HCC) 11/10/2019    Stage 2 chronic kidney disease 11/12/2019    Dysphagia 11/19/2019    Mixed hyperlipidemia 04/06/2017    Hyponatremia 01/26/2020    Hyperglycemia 01/26/2020    Closed fracture of multiple ribs of right side 01/26/2020    Chronic bilateral pleural effusions 01/26/2020    SIADH (syndrome of inappropriate ADH production) ([facility] )     Cognitive impairment 01/28/2020    Compensated respiratory acidosis 01/29/2020    Chronic respiratory acidosis      Resolved Ambulatory Problems     Diagnosis Date Noted    Benign paroxysmal vertigo 09/17/2012    Diverticulosis 04/21/2016    Osteoarthritis 09/17/2012    Osteoporosis 03/13/2017    Palpitations 03/13/2017    ST elevation myocardial infarction involving right coronary artery ([facility] ) 03/24/2017    Hematochezia 07/23/2018    Lactic acid increased 07/23/2018    Acute blood loss anemia 07/24/2018    Acute diastolic congestive heart failure ([facility] ) 07/29/2018    Dyslipidemia 08/09/2018    Acute congestive heart failure ([facility] ) 03/18/2019    PNA (pneumonia) 11/12/2019    Nausea 01/13/2020    Influenza A 01/26/2020    Acute respiratory failure with hypoxia and hypercapnia ([facility] ) 01/26/2020     Past Medical History:   Diagnosis Date    Abnormal blood sugar 03/13/2017    Abnormal carotid duplex scan     Cervical radiculopathy 08/08/2017    [facility]     Compression fracture of lumbar spine, non-traumatic, with routine healing, subsequent encounter     Coronary angioplasty status     Coronary artery disease 4/7/2017    Costochondritis 02/05/2013    DJD (degenerative joint disease)     Gait abnormality     GERD without esophagitis 8/30/2012    H/O CT scan of chest     History of Holter monitoring     Hx of echocardiogram     Hx of echocardiogram 08/16/2017  Hypertension     Iron deficiency anemia 4/21/2016    Macular degeneration, right eye     Malignant melanoma of skin ([facility] ) 9/17/2012    NSTEMI (non-ST elevated myocardial infarction) ([facility] ) 7/25/2017    Old MI (myocardial infarction)     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[facility] ) 04/07/2017    Urinary tract infection        Past Surgical History:   Procedure Laterality Date    ABDOMINAL SURGERY      APPENDECTOMY      BREAST SURGERY      Lumpectomy    CARDIAC CATHETERIZATION  03/24/2017    ARNIE to 100% occluded prox RCA, chronic 99% ostial LCfx, 60% mid LAD, discrete 40% distal LM / EF0 60 (60%) 100% occluded proximal RCA s/p ARNIE, chronic 99% ostial LCX, 60% mid LAD, discrete 40% dital LM  4/5/17    CHOLECYSTECTOMY      COLONOSCOPY N/A 7/25/2018    Procedure: COLONOSCOPY;  Surgeon: Elizabet Rogel MD;  Location: [address] [place] [address] OR;  Service: Gastroenterology    CORONARY STENT PLACEMENT  03/25/2017    ARNIE RCA    HEMORRHOID SURGERY      INSERTION STENT ARTERY  04/07/2017    Cardio vascular agents drug-eluting stent    SKIN BIOPSY      TONSILLECTOMY         Social History:   Social History     Socioeconomic History    Marital status:       Spouse name: None    Number of children: None    Years of education: None    Highest education level: None   Occupational History    Occupation: Retired   Social Needs    Financial resource strain: None    Food insecurity:     Worry: None     Inability: None    Transportation needs:     Medical: None     Non-medical: None   Tobacco Use    Smoking status: Former Smoker     Types: Cigarettes    Smokeless tobacco: Never Used   Substance and Sexual Activity    Alcohol use: Not Currently    Drug use: Never    Sexual activity: Not Currently   Lifestyle    Physical activity:     Days per week: None     Minutes per session: None    Stress: None   Relationships    Social connections:     Talks on phone: None     Gets together: None     Attends [employer] service: None     Active member of club or organization: None     Attends meetings of clubs or organizations: None     Relationship status: None    Intimate partner violence:     Fear of current or ex partner: None     Emotionally abused: None     Physically abused: None     Forced sexual activity: None   Other Topics Concern    None   Social History Narrative    No caffeine use    Uses safety equipment-seatbelts    Primary language is English  Family History:   Family History   Problem Relation Age of Onset    Heart failure Mother     Prostate cancer Father     Stroke Family        Review of Systems   Respiratory: Positive for cough and shortness of breath  Musculoskeletal: Positive for arthralgias, back pain and gait problem  Neurological: Positive for weakness  All other systems reviewed and are negative  Physical Exam   Vitals  Patient's weight on admission is 145 lb  Temperature 97 3°, heart rate 82, respirations 20, blood pressure 142/80, O2 sats 90% on 3 L of oxygen via nasal cannula  Constitutional: Awake and Alert  Well-developed and well-nourished  No distress  HENT: PERR,EOMI, conjunctiva normal  Head: Normocephalic and atraumatic  Mouth/Throat: Oropharynx is clear and moist     Eyes: Conjunctivae and EOM are normal  Pupils are equal, round, and reactive to light  Right and left eye exhibits no discharge  Neck: Neck supple  No tracheal deviation present  No thyromegaly present  Cardiovascular: Normal rate, regular rhythm and normal heart sounds  Exam reveals no friction rub  No murmur heard  Pulmonary/Chest: Effort normal and breath sounds normal  No respiratory distress  She has no wheezes  Abdominal: Soft  Bowel sounds are normal  She exhibits no distension  There is no tenderness  There is no rebound and no guarding  Neurological: Cranial Nerves grossly intact  No sensory deficit  Coordination normal    Musculoskeletal:   Nontender spine  Skin: Skin is warm and dry   No rash noted  No diaphoresis  No erythema  No edema  No cyanosis  Assessment     [place] is a(n) 80y o  year old female for short-term rehabilitation following congestive heart failure    1  Cardiac with history of hypertension, dyslipidemia, coronary artery disease, CHF  Patient will be receiving extensive cardiac rehabilitation along with physical therapy as a part of rehab  Patient will be continued on metoprolol, Lasix, atorvastatin, nitroglycerin and potassium chloride  2  COPD with hypoxemia  Patient is on DuoNebs, Xopenex nebs and albuterol inhaler on as-needed basis  Patient also gets oxygen via nasal cannula  3  GERD  Patient is on famotidine along with Zofran on as needed basis  4  Constipation  Patient is on Colace along with MiraLax on as needed basis  5  DJD/osteoarthritis  Patient may get Tylenol on as needed basis along with lidocaine patch over the affected area  6  Anxiety  Patient may get alprazolam on as-needed basis  7  Gait abnormality  Patient will be receiving PT OT as a part of rehabilitation  Prognosis: fair  Discharge Plan: in progress  Advanced Directives: I have discussed in detail the patient the advanced directives  Patient has not appointed anybody as her POA  Patient has no living will with advanced directives but she claims that she has 5 wishes on a piece of paper  When discussing cardiac and pulmonary resuscitation efforts with the patient, the patient wishes to be DNR/DNI  I have spent more than 50 minutes gathering data, doing physical examination, and discussing the advanced directives, which was witnessed by caring staff

## 2020-02-06 NOTE — OCCUPATIONAL THERAPY NOTE
[address]  Treatment Note     Patient Name: Dianne Anderson  YRMALLORYI'F Date: 2/6/2020  Problem List  Active Problems:    Atherosclerosis of native coronary artery of native heart without angina pectoris    Gastroesophageal reflux disease without esophagitis    Essential (primary) hypertension    COPD (chronic obstructive pulmonary disease) (HCC)    Anxiety    Acute on chronic diastolic congestive heart failure (HCC)    Closed compression fracture of L1 vertebra (HCC)    Hyponatremia    Closed fracture of multiple ribs of right side    Cognitive impairment    Compensated respiratory acidosis    Chronic respiratory acidosis          02/06/20 0915   Restrictions/Precautions   Weight Bearing Precautions Per Order No   Other Precautions Cognitive; Chair Alarm; Bed Alarm;Telemetry;O2;Fall Risk;Hard of hearing   Pain Assessment   Pain Assessment No/denies pain   Pain Score No Pain   ADL   Eating Assistance 5  Supervision/Setup   Grooming Assistance 4  Minimal Assistance   Grooming Comments standing sinkside   UB Dressing Assistance 4  Minimal Assistance   UB Dressing Comments sitting upright in chair to don new hospital gown   Toileting Assistance  3  Moderate Assistance   Toileting Comments for thoroughness w/ hygiene s/p BM   Functional Standing Tolerance   Time 4 mins   Activity toileting/hygiene/CM and grooming tasks   Comments with RW, min A overall, SOB w/ minimal activity although O2 sats remain in low 90s   Bed Mobility   Additional Comments oob to chair on arrival therefore did not assess   Transfers   Sit to Stand 4  Minimal assistance   Additional items Assist x 1; Increased time required; Impulsive;Verbal cues   Stand to Sit 4  Minimal assistance   Additional items Assist x 1; Increased time required; Impulsive;Verbal cues   Stand pivot 4  Minimal assistance   Additional items Assist x 1; Increased time required; Impulsive;Verbal cues   Toilet transfer 4  Minimal assistance   Additional items Assist x 1;Increased time required; Impulsive;Verbal cues;Raised toilet seat  (w/ rails)   Additional Comments transfers w/ RW  cues for hand placement, impulsivity noted  cues for controlled descent into chair   Functional Mobility   Functional Mobility 4  Minimal assistance   Additional Comments Ax1 short household distances in room, impulsive, RW management + cues for activity pacing   Additional items Rolling walker   Toilet Transfers   Toilet Transfer From Rolling walker   Toilet Transfer Type To and from   Toilet Transfer to Raised toilet seat with rails   Toilet Transfer Technique Ambulating   Toilet Transfers Minimal assistance   Cognition   Overall Cognitive Status Impaired   Arousal/Participation Alert; Cooperative;Responsive   Attention Attends with cues to redirect   Orientation Level Oriented to person;Oriented to place; Disoriented to situation   Memory Decreased recall of precautions;Decreased recall of recent events;Decreased short term memory   Following Commands Follows one step commands with increased time or repetition   Comments pleasant and cooperative throughout session  cues for safety and hand placement w/ tasks  occassional cues for sequencing and requires cues to redirect for safety to decrease risk of falls/injury   Activity Tolerance   Activity Tolerance Patient limited by fatigue   Medical Staff Made Aware OK to see per ELENA Aj   Assessment   Assessment Arrived to patient ringing call bell from bathroom  OT arrived to patient and agreeable to treatment session    Patient participated in Skilled OT session this date with interventions consisting of ADL re training with the use of correct body mechnaics, Energy Conservation techniques, deep breathing technique, safety awareness and fall prevention techniques,  therapeutic activities to: increase activity tolerance, increase standing tolerance time with unilateral UE support to complete sink level ADLs, increase cardiovascular endurance  and increase OOB/ sitting tolerance   Patient agreeable to OT treatment session, upon arrival patient was found in bathroom finished toileting  In comparison to previous session, patient with improvements in activity tolerance, strengthening, balance and overall ADL status  Completed toileting with mod A for BM hygiene, required A for thoroughness  Stood for approx 4 mins to complete hygiene/CM  Patient requires cues for deep breathing due to increased SOB w/ standing/minimal activity, although O2 sats stable on 2L O2  Patient then ambulated to sink and completed grooming standing sinkside with min A  Returned to sitting upright in bedside chair and completed UB dressing to change soiled gown w/ min A and increased time  Chair alarm on  Patient continues to be functioning below baseline level, occupational performance remains limited secondary to factors listed above and increased risk for falls and injury  From OT standpoint, recommendation at time of d/c would be Short Term Rehab  Patient to benefit from continued Occupational Therapy treatment while in the hospital to address deficits as defined above and maximize level of functional independence with ADLs and functional mobility      Plan   Goal Expiration Date 02/14/20   OT Treatment Day 4   OT Frequency 3-5x/wk   Recommendation   OT Discharge Recommendation Short Term Rehab   OT - OK to Discharge Yes  (if to rehab when medically stable)         Scooter Ambrose MS, OTR/L

## 2020-02-06 NOTE — PLAN OF CARE
Problem: OCCUPATIONAL THERAPY ADULT  Goal: Performs self-care activities at highest level of function for planned discharge setting  See evaluation for individualized goals  Description  Treatment Interventions: ADL retraining, Functional transfer training, UE strengthening/ROM, Endurance training, Cognitive reorientation, Equipment evaluation/education, Patient/family training, Fine motor coordination activities, Compensatory technique education, Activityengagement, Energy conservation          See flowsheet documentation for full assessment, interventions and recommendations  Outcome: Progressing  Note:   Limitation: Decreased ADL status, Decreased Safe judgement during ADL, Decreased cognition, Decreased endurance, Decreased self-care trans, Decreased high-level ADLs  Prognosis: Good  Assessment: Arrived to patient ringing call bell from bathroom  OT arrived to patient and agreeable to treatment session  Patient participated in Skilled OT session this date with interventions consisting of ADL re training with the use of correct body mechnaics, Energy Conservation techniques, deep breathing technique, safety awareness and fall prevention techniques,  therapeutic activities to: increase activity tolerance, increase standing tolerance time with unilateral UE support to complete sink level ADLs, increase cardiovascular endurance  and increase OOB/ sitting tolerance   Patient agreeable to OT treatment session, upon arrival patient was found in bathroom finished toileting  In comparison to previous session, patient with improvements in activity tolerance, strengthening, balance and overall ADL status  Completed toileting with mod A for BM hygiene, required A for thoroughness  Stood for approx 4 mins to complete hygiene/CM  Patient requires cues for deep breathing due to increased SOB w/ standing/minimal activity, although O2 sats stable on 2L O2    Patient then ambulated to sink and completed grooming standing sinkside with min A  Returned to sitting upright in bedside chair and completed UB dressing to change soiled gown w/ min A and increased time  Chair alarm on  Patient continues to be functioning below baseline level, occupational performance remains limited secondary to factors listed above and increased risk for falls and injury  From OT standpoint, recommendation at time of d/c would be Short Term Rehab  Patient to benefit from continued Occupational Therapy treatment while in the hospital to address deficits as defined above and maximize level of functional independence with ADLs and functional mobility        OT Discharge Recommendation: Short Term Rehab  OT - OK to Discharge: Yes(if to rehab when medically stable)

## 2020-02-06 NOTE — PROGRESS NOTES
D/c papers faxed to [address] [place] [address] Drive Extension, RN on MS7 to call report  SLETS aware that patient transitioned to d/c imani

## 2020-02-06 NOTE — SOCIAL WORK
CM was informed by Dr Daria Batista that pt is medically clear to dc to [facility] today  Flr ELENA Arizmendi aware

## 2020-02-07 NOTE — CONSULTS
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, celexa use, and pain due to rib fracture  · Initial workup: urine sodium 74, urine osm 363, serum osm 273  · Received Samsca 15 mg 1/31/20  · Most recent sodium 135 2/6/20  · Celexa has been discontinued  · Lasix 20 mg daily  · Fluid restriction 1 5 liters per day  · Protein supplement with meals  · Repeat BMP next week  2  Hypertension associated with CKD 3  · BP goal <140/85 given age  1  Hypoxic and Hypercapneic Respiratory Failure  · On chronic nasal cannula  4  Chronic Diastolic Heart Failure  · Appears compensated  · Continue lasix daily   5  Influenza A  · Resolved  6  Stage 3 Chronic Kidney Disease   · Etiology chronic tubulointerstitial disease versus hypertensive nephrosclerosis  · Upon review of medical record, baseline creatinine 0 9 mg/dL  · Most recent creatinine 0 63 mg/dL, below baseline and stable  HPI:    Chanel Flanagan is a 80 y o  female with an active problem list significant for CAD with history of MI, CdHF, hypertension, COPD,  who initally [facility] [place] [facility] with dyspnea, lower extremity edema, and fall from toilet  Volume overloaded and initiated on IV diuresis  CT chest + displaced right 10th rib fracture with questionable 7-9 fractures  Also, found to be Flu A positive and treated with tamiflu  While hospitalized, Ivy Cowart was seen by nephrology for hyponatremia attributed to 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, celexa use, pain  She received Tolvaptan 1/31/20 with increase of sodium and was also receiving lasix 20 mg daily, with 1 5 liter fluid restriction  She was admitted to [facility] yesterday, 2/7/20, and a renal consultation was requested for hospital follow-up       Upon discussion with the patient, she denies chest pain, shortness of breath, dizziness, nausea, vomiting, diarrhea, dysuria, urgency, frequency  Denies rib fracture pain, states, "I haven't had much pain at all lately "    The medical record, including Care Everywhere and media tabs were reviewed  Reason for Consult: Hospital follow-up for Hyponatremia    Review of Systems:  A complete 10-point review of systems was performed  Aside from what was mentioned in the HPI, it is otherwise negative  Historical Information   Past Medical History:   Diagnosis Date    Abnormal blood sugar 03/13/2017    Abnormal carotid duplex scan     Carotid Duplex (Plaque formation is quite prominent bilaterally  Despite these changes, no evidence for greater than 50% diameter reducing lesions in the cervical portion of either carotid artery hemisystem ) - 6/13/2017    Anxiety     Cervical radiculopathy 08/08/2017    CHF (congestive heart failure) ([facility])     diastolic    Compression fracture of lumbar spine, non-traumatic, with routine healing, subsequent encounter     COPD (chronic obstructive pulmonary disease) ([facility] [address] ) 2/6/2018    Coronary angioplasty status     Coronary artery disease 4/7/2017    Costochondritis 02/05/2013    suspect MS in origin given relationship to ROM and location  Start mobic and if persists, reeval  Refused xrayat this time   DJD (degenerative joint disease)     Dyslipidemia     Gait abnormality     GERD without esophagitis 8/30/2012    H/O CT scan of chest      (No PE, minimal interstitial change left lower lobe and right upper lobe, which may be acute ) - 5/19/2017    History of Holter monitoring     Holter (Sinus rhythm with rates ranging from 52 to 118 bpm   No afib or heart block  Rare atrial and ventricular ectopic beats  One six-beat atrial run noted  No pauses  ) - 5/31/2017    Hx of echocardiogram     Echo (EF 0 60 (60%), Biatrial enlargement ) - 3/24/2017 Echo (EF 0 55 (55%), mild LVH   Aortic valvular sclerosis  Trace MI with mild left atrial dilatation, mild MAC  Mild TI with mild pulmonary hypertension  ) - 5/22/2017 Echo (EF 0 60 (60%), Normal left vent chamber size and systolic function  Mild diastolic dysfunction of LV w/normal filling pressures  Mild mitral regurg ) - 7/26/2017 Echo (EF 0    Hx of echocardiogram 08/16/2017    EF0 60 (60%) Normal left vent chamber size and systolic function of LV w/normal filling presures  Mild mitral regurg  / EF0 50 (50%) Mild LVH  MIld MR 10/6/17     Hypertension     Iron deficiency anemia 4/21/2016    Macular degeneration, right eye     Malignant melanoma of skin ([facility] [address] ) 9/17/2012    Mixed hyperlipidemia     NSTEMI (non-ST elevated myocardial infarction) ([facility] [address] ) 7/25/2017    Old MI (myocardial infarction)     Osteoarthritis 9/17/2012    Osteoporosis 3/13/2017    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[facility] [address] ) 04/07/2017    Urinary tract infection     frequent UTI's     Past Surgical History:   Procedure Laterality Date    ABDOMINAL SURGERY      APPENDECTOMY      BREAST SURGERY      Lumpectomy    CARDIAC CATHETERIZATION  03/24/2017    ARNIE to 100% occluded prox RCA, chronic 99% ostial LCfx, 60% mid LAD, discrete 40% distal LM / EF0 60 (60%) 100% occluded proximal RCA s/p ARNIE, chronic 99% ostial LCX, 60% mid LAD, discrete 40% dital LM  4/5/17    CHOLECYSTECTOMY      COLONOSCOPY N/A 7/25/2018    Procedure: COLONOSCOPY;  Surgeon:  Nela Calixto MD;  Location: [address] [place] [address];  Service: Gastroenterology    CORONARY STENT PLACEMENT  03/25/2017    ARNIE RCA    HEMORRHOID SURGERY      INSERTION STENT ARTERY  04/07/2017    Cardio vascular agents drug-eluting stent    SKIN BIOPSY      TONSILLECTOMY       Social History   Social History     Substance and Sexual Activity   Alcohol Use Not Currently     Social History     Substance and Sexual Activity   Drug Use Never     Social History     Tobacco Use   Smoking Status Former Smoker    Types: Cigarettes   Smokeless Tobacco Never Used       Family History:   Family History   Problem Relation Age of Onset    Heart failure Mother     Prostate cancer Father     Stroke Family        Medications:  Pertinent medications were reviewed        Allergies   Allergen Reactions    Ciprofloxacin      Reaction Date: 01Jul2011;     Keflex [Cephalexin] Dizziness    Nitrofurantoin      Reaction Date: 01Jul2011;     Penicillins Rash, Other (See Comments) and Throat Swelling     Throat swells         Vitals: There were no vitals taken for this visit  There is no height or weight on file to calculate BMI    ,    ,        No intake or output data in the 24 hours ending 02/07/20 0944  Invasive Devices     None                 Physical Exam:  General: conscious, cooperative, in no acute distress  Eyes: conjunctivae pink, anicteric sclerae  ENT: lips and mucous membranes moist  Neck: supple, no JVD, no masses  Chest: diminished breath sounds bilateral, no crackles, ronchus or wheezings  CVS: distinct S1 & S2, normal rate, regular rhythm  Abdomen: soft, non-tender, non-distended, normoactive bowel sounds  Extremities: no edema of both legs  Skin: no rash  Neuro: awake, alert, oriented      Diagnostic Data:  Lab: I have personally reviewed pertinent lab results  ,   CBC:  Results from last 7 days   Lab Units 02/05/20  0523   WBC Thousand/uL 5 31   HEMOGLOBIN g/dL 12 9   HEMATOCRIT % 41 8   PLATELETS Thousands/uL 268      CMP: No results found for: SODIUM, K, CL, CO2, ANIONGAP, BUN, CREATININE, GLUCOSE, CALCIUM, AST, ALT, ALKPHOS, PROT, BILITOT, EGFR,   PT/INR: No results found for: PT, INR,   Magnesium: No components found for: MAG,  Phosphorous: No results found for: PHOS    Microbiology:  @[facility],(urinecx:7)@        ROGELIO Delarosa    Portions of the record may have been created with voice recognition software  Occasional wrong word or "sound a like" substitutions may have occurred due to the inherent limitations of voice recognition software  Read the chart carefully and recognize, using context, where substitutions have occurred

## 2020-02-16 NOTE — DISCHARGE SUMMARY
Discharge Summary -   Pancho Browne 80 y o  female MRN: [id number]  Unit/Bed#: -01 Encounter: [id number]    Admission Date: 2/6/2020     Discharge Date:  02/19/2020    Admitting Diagnosis: Hyponatremia [E87 1]  SOB (shortness of breath) [R06 02]  Acute CHF  Generalized weakness  Gait abnormality    Discharge Diagnosis:   Resolution of shortness of breath  Improved CHF  Improved ambulation with generalized weakness    Attending:  Desi Tarango MD    [facility] Course:  Miss Pancho Browne is a 22-year-old who was admitted to [facility] for short-term rehabilitation following acute CHF a generalized weakness and gait abnormality  Patient was admitted for reconditioning with the cardiac rehab and physical therapy and occupational therapy  During the short course of her stay at [facility] patient showed significant improvement  Patient was examined at bedside  Patient seems confident going back home  Patient's vitals were reviewed which were found to be within normal limits  Patient's physical examination was also found to be within normal limits  Patient is requesting all her prescriptions  Patient will continue to receive home health aide with PT OT  Patient will be requiring home oxygen  Condition at Discharge:  FAIR     Discharge instructions/Information to patient and family:   See after visit summary for information provided to patient and family  Provisions for Follow-Up Care:  See after visit summary for information related to follow-up care and any pertinent home health orders  Disposition: Home    Discharge Statement   I spent 50 minutes discharging the patient  This time was spent on the day of discharge  I had direct contact with the patient on the day of discharge  Discharge Medications:  See after visit summary for reconciled discharge medications provided to patient and family

## 2020-02-19 NOTE — PROGRESS NOTES
Call from facility [facility] reports patient will discharge to home today with [facility] for SN / PT / OT  [facility] reports discharge paperwork will be emailed to this CM later today or tomorrow  [facility] form and Care Coordination note updated  Removed self from care team and sent Inbasket message to COLE Peguero regarding patient handoff

## 2020-02-20 NOTE — PROGRESS NOTES
Outpatient Care Management Note:  [facility] referral received  Chart reviewed  Discharged with [facility]

## 2020-02-21 NOTE — PROGRESS NOTES
Outpatient Care Management Note:  Outreach call placed to Indu's grand daughter, Edwin Liz  Introduce myself, my role and the bundle program   Berenice marquez outreach  Discharged to home health services, will monitor chart  First visit has not been completed at this time

## 2020-03-04 NOTE — TELEPHONE ENCOUNTER
Patient discharged from the nursing home and is s/p [facility]  Visiting nurse called yesterday late afternoon  Patient has had a 2 5 lb weight gain overnight  She has +1 LLE edema and +2 RLE edema  She is on 02 at 3 liters 97% sat  She also has rales in her right lower lobe and is coughing up clear mucous  Please advise  In addition, her discharge summary from the hospital discharge to the nursing home has aspirin 81 mg daily, however, it did not appear on her discharge instructions from the nursing home  She is to continue on the aspirin 81 mg daily?

## 2020-03-04 NOTE — TELEPHONE ENCOUNTER
Patient is currently taking Lasix 20 mg daily (was previously on Lasix 20 mg bid)  Her weight today is stable, no change  Her granddaughter is trying to get her in with her family physician next Friday

## 2020-03-04 NOTE — TELEPHONE ENCOUNTER
Yes should be on ASA 81 mg, unless active bleeding evidence  What current dose Lasix taking? Able to see her PCP yet?

## 2020-03-04 NOTE — TELEPHONE ENCOUNTER
Daughter called office to schedule appt for Jeanette Mobley which is a hospital f/u for next Friday  However, she was not discharged with taking calcium, they have been giving it to her and would like to know if they should continue giving it to her or stop it? Please advise so we can call her back   Thanks

## 2020-03-06 NOTE — PROGRESS NOTES
Outpatient Care Management Note:  Remains current with [facility]    Per note dated 2/25/2020 at 8:13:    From Visit:  02/25/2020 08:13 AM    giovanyyasir states pt is going to [place] early next week to stay with other granddaughter for awhile will give date next sn visit    [address] [place]  CHF  COPD    PRECERT VISIT STATUS__Medicare  HHA  SUPV___    FOLLOW UP ON MEDICATION__review all meds    PERFORM LABS AS ORDERED  None ordered on [facility]  pain management  skin assess  ss infections  O2 precautions  diet ed  CHF  med ed  wt daily and record    IF CHF PATIENT WHAT WAS THE LAST WEIGHT 146lbs AND WHAT IS THE CURRENT WEIGHT__147 4lbs  pt scale after breakfast    OTHER DISCIPLINES ORDERED  PT x OT x ST  MSW  HHA  x  DECLINED__  REOFFERED__    UPCOMING APPOINTMENTS  PCP  March 26th due to dr being off on sick leave      DISCHARGE PLAN To around better and get stronger

## 2020-03-12 NOTE — TELEPHONE ENCOUNTER
[facility] called in about patients blood pressure    Was checked on 3/6/20    Left arm: 168/78  Right arm: 160/70      Checked on 3/10/20    Left arm: 162/80    Right arm: 176/76    Checked today 3/12/20 ( checked by elisabeth)    Left arm: 122/60        Patient does have apt  3/13/20 with Dr Veda Pulido

## 2020-03-13 PROBLEM — I50.32 CHRONIC DIASTOLIC CONGESTIVE HEART FAILURE (HCC): Status: ACTIVE | Noted: 2017-10-09

## 2020-03-13 PROBLEM — Z99.81 REQUIRES CONTINUOUS AT HOME SUPPLEMENTAL OXYGEN: Status: ACTIVE | Noted: 2020-01-01

## 2020-03-13 NOTE — PROGRESS NOTES
Assessment and Plan:    Problem List Items Addressed This Visit        Respiratory    COPD (chronic obstructive pulmonary disease) ([facility] ) (Chronic)       Cardiovascular and Mediastinum    Chronic diastolic congestive heart failure ([facility] ) - Primary      Other Visit Diagnoses     Medicare annual wellness visit, subsequent        Gait abnormality        Adult failure to thrive        Physical deconditioning            Health Maintenance Due   Topic Date Due    DTaP,Tdap,and Td Vaccines (1 - Tdap) 02/25/1936    Medicare Annual Wellness Visit (AWV)  01/16/2020         HPI:  Mckay Fam is a 80 y o  female here for her Subsequent Wellness Visit  Patient Active Problem List   Diagnosis    Atherosclerosis of native coronary artery of native heart without angina pectoris    Gastroesophageal reflux disease without esophagitis    Essential (primary) hypertension    Old myocardial infarction    COPD (chronic obstructive pulmonary disease) (HCC)    Anxiety    Chronic diastolic congestive heart failure (HCC)    Closed compression fracture of L1 vertebra ([facility])    Stage 2 chronic kidney disease    Dysphagia    Mixed hyperlipidemia    Hyponatremia    Hyperglycemia    Closed fracture of multiple ribs of right side    Chronic bilateral pleural effusions    SIADH (syndrome of inappropriate ADH production) ([facility] )    Cognitive impairment    Compensated respiratory acidosis    Chronic respiratory acidosis     Past Medical History:   Diagnosis Date    Abnormal blood sugar 03/13/2017    Abnormal carotid duplex scan     Carotid Duplex (Plaque formation is quite prominent bilaterally     Despite these changes, no evidence for greater than 50% diameter reducing lesions in the cervical portion of either carotid artery hemisystem ) - 6/13/2017    Anxiety     Cervical radiculopathy 08/08/2017    CHF (congestive heart failure) ([facility])     diastolic    Compression fracture of lumbar spine, non-traumatic, with routine healing, subsequent encounter     COPD (chronic obstructive pulmonary disease) ([facility] ) 2/6/2018    Coronary angioplasty status     Coronary artery disease 4/7/2017    Costochondritis 02/05/2013    suspect MS in origin given relationship to ROM and location  Start mobic and if persists, reeval  Refused xrayat this time   DJD (degenerative joint disease)     Dyslipidemia     Gait abnormality     GERD without esophagitis 8/30/2012    H/O CT scan of chest      (No PE, minimal interstitial change left lower lobe and right upper lobe, which may be acute ) - 5/19/2017    History of Holter monitoring     Holter (Sinus rhythm with rates ranging from 52 to 118 bpm   No afib or heart block  Rare atrial and ventricular ectopic beats  One six-beat atrial run noted  No pauses  ) - 5/31/2017    Hx of echocardiogram     Echo (EF 0 60 (60%), Biatrial enlargement ) - 3/24/2017 Echo (EF 0 55 (55%), mild LVH  Aortic valvular sclerosis  Trace MI with mild left atrial dilatation, mild MAC  Mild TI with mild pulmonary hypertension  ) - 5/22/2017 Echo (EF 0 60 (60%), Normal left vent chamber size and systolic function  Mild diastolic dysfunction of LV w/normal filling pressures  Mild mitral regurg ) - 7/26/2017 Echo (EF 0    Hx of echocardiogram 08/16/2017    EF0 60 (60%) Normal left vent chamber size and systolic function of LV w/normal filling presures  Mild mitral regurg  / EF0 50 (50%) Mild LVH   MIld MR 10/6/17     Hypertension     Iron deficiency anemia 4/21/2016    Macular degeneration, right eye     Malignant melanoma of skin ([facility] ) 9/17/2012    Mixed hyperlipidemia     NSTEMI (non-ST elevated myocardial infarction) ([facility] ) 7/25/2017    Old MI (myocardial infarction)     Osteoarthritis 9/17/2012    Osteoporosis 3/13/2017    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[facility] ) 04/07/2017    Urinary tract infection     frequent UTI's     Past Surgical History:   Procedure Laterality Date    ABDOMINAL SURGERY      APPENDECTOMY  BREAST SURGERY      Lumpectomy    CARDIAC CATHETERIZATION  03/24/2017    ARNIE to 100% occluded prox RCA, chronic 99% ostial LCfx, 60% mid LAD, discrete 40% distal LM / EF0 60 (60%) 100% occluded proximal RCA s/p ARNIE, chronic 99% ostial LCX, 60% mid LAD, discrete 40% dital LM  4/5/17    CHOLECYSTECTOMY      COLONOSCOPY N/A 7/25/2018    Procedure: COLONOSCOPY;  Surgeon:  Nela Calixto MD;  Location: [facility] MAIN OR;  Service: Gastroenterology    CORONARY STENT PLACEMENT  03/25/2017    ARNIE RCA    HEMORRHOID SURGERY      INSERTION STENT ARTERY  04/07/2017    Cardio vascular agents drug-eluting stent    SKIN BIOPSY      TONSILLECTOMY       Family History   Problem Relation Age of Onset    Heart failure Mother     Prostate cancer Father     Stroke Family      Social History     Tobacco Use   Smoking Status Former Smoker    Types: Cigarettes   Smokeless Tobacco Never Used     Social History     Substance and Sexual Activity   Alcohol Use Not Currently      Social History     Substance and Sexual Activity   Drug Use Never       Current Outpatient Medications   Medication Sig Dispense Refill    ALPRAZolam (XANAX) 0 5 mg tablet Take 0 5 tablets (0 25 mg total) by mouth 3 (three) times a day as needed for anxiety 15 tablet 0    aspirin 81 mg chewable tablet Chew 1 tablet (81 mg total) daily 30 tablet 0    atorvastatin (LIPITOR) 40 mg tablet Take 40 mg by mouth daily with dinner      Calcium Carbonate-Vit D-Min (CALCIUM 1200 PO) Take by mouth      docusate sodium (COLACE) 100 mg capsule Take 1 capsule (100 mg total) by mouth 2 (two) times a day 10 capsule 0    famotidine (PEPCID) 10 mg tablet Take 1 tablet (10 mg total) by mouth daily  0    furosemide (LASIX) 20 mg tablet Take 1 tablet (20 mg total) by mouth 2 (two) times a day 90 tablet 0    ipratropium (ATROVENT) 0 02 % nebulizer solution Take 1 vial (0 5 mg total) by nebulization 3 (three) times a day (Patient taking differently: Take 0 5 mg by nebulization 2 (two) times a day )  0    levalbuterol (XOPENEX) 1 25 mg/0 5 mL nebulizer solution Take 0 5 mL (1 25 mg total) by nebulization 3 (three) times a day (Patient taking differently: Take 1 25 mg by nebulization 2 (two) times a day )  0    lidocaine (LIDODERM) 5 % Apply 1 patch topically daily Remove & Discard patch within 12 hours or as directed by MD  0    metoprolol tartrate (LOPRESSOR) 25 mg tablet Take 1 tablet (25 mg total) by mouth every 12 (twelve) hours  0    multivitamin (THERAGRAN) TABS Take 1 tablet by mouth daily      nitroglycerin (NITROSTAT) 0 4 mg SL tablet Place 1 tablet (0 4 mg total) under the tongue every 5 (five) minutes as needed for chest pain  0    ondansetron (ZOFRAN) 4 mg tablet Take 1 tablet (4 mg total) by mouth every 12 (twelve) hours as needed for nausea or vomiting 60 tablet 1    polyethylene glycol (MIRALAX) 17 g packet Take 17 g by mouth daily 14 each 0    potassium chloride (K-DUR,KLOR-CON) 20 mEq tablet Take 1 tablet (20 mEq total) by mouth daily  0    senna-docusate sodium (SENOKOT S) 8 6-50 mg per tablet Take 1 tablet by mouth daily at bedtime (Patient not taking: Reported on 3/13/2020)  0     No current facility-administered medications for this visit        Allergies   Allergen Reactions    Ciprofloxacin      Reaction Date: [id number];     Keflex [Cephalexin] Dizziness    Nitrofurantoin      Reaction Date: [id number];     Penicillins Rash, Other (See Comments) and Throat Swelling     Throat swells     Immunization History   Administered Date(s) Administered    H1N1, All Formulations 01/19/2010    INFLUENZA 09/14/2015, 09/12/2016, 09/27/2017, 10/27/2017, 09/12/2018    Influenza Split High Dose Preservative Free IM 09/14/2015, 09/12/2016    Influenza TIV (IM) 10/16/2012, 09/20/2013, 11/17/2014, 09/27/2017    Influenza, high dose seasonal 0 5 mL 09/12/2018, 11/10/2019    Pneumococcal Conjugate 13-Valent 03/13/2017    Pneumococcal Polysaccharide PPV23 01/01/2004, 04/24/2009       Patient Care Team:  Chrissy Michaels DO as PCP - General (Internal Medicine)  Jacques Nieves MD as Endoscopist  Kelsey Higginbotham RN as Lead OP Care Mgr (Care Coordination)    Medicare Screening Tests and Risk Assessments:  Last Medicare Wellness visit information reviewed, patient interviewed and updates made to the record today  Health Risk Assessment:   Patient rates overall health as good  Patient feels that their physical health rating is Same  Eyesight was rated as Slightly worse  Hearing was rated as Slightly worse  Patient feels that their emotional and mental health rating is Same  Pain experienced in the last 7 days has been None  Patient states that she has experienced no weight loss or gain in last 6 months  Fall Risk Screening: In the past year, patient has experienced: no history of falling in past year      Urinary Incontinence Screening:   Patient has not leaked urine accidently in the last six months  Home Safety:  Patient has trouble with stairs inside or outside of their home  Patient has working smoke alarms and has no working carbon monoxide detector  Home safety hazards include: none  Nutrition:   Current diet is Regular  Medications:   Patient is currently taking over-the-counter supplements  OTC medications include: see medication list  Patient is able to manage medications  Activities of Daily Living (ADLs)/Instrumental Activities of Daily Living (IADLs):   Walk and transfer into and out of bed and chair?: Yes  Dress and groom yourself?: Yes    Bathe or shower yourself?: Yes    Do your laundry/housekeeping?: No  Manage your money, pay your bills and track your expenses?: Yes  Make your own meals?: Yes    Do your own shopping?: No    Previous Hospitalizations:   Any hospitalizations or ED visits within the last 12 months?: No      Advance Care Planning:   Living will: No    Advanced directive: Yes    Advanced directive counseling given:  Yes Five wishes given: No    Patient declined ACP directive: No    End of Life Decisions reviewed with patient: Yes    Provider agrees with end of life decisions: Yes      Cognitive Screening:   Provider or family/friend/caregiver concerned regarding cognition?: No    PREVENTIVE SCREENINGS      Cardiovascular Screening:    General: History Lipid Disorder and Screening Current    Due for: Lipid Panel      Diabetes Screening:     General: Screening Current    Due for: Blood Glucose      Colorectal Cancer Screening:     General: Screening Not Indicated      Breast Cancer Screening:     General: Screening Not Indicated      Cervical Cancer Screening:    General: Screening Not Indicated      Osteoporosis Screening:    General: Risks and Benefits Discussed and Patient Declines      Abdominal Aortic Aneurysm (AAA) Screening:        General: Screening Not Indicated      Lung Cancer Screening:     General: Screening Not Indicated      Hepatitis C Screening:    General: Screening Not Indicated    Other Counseling Topics:   Car/seat belt/driving safety and Calcium and Vitamin D Intake and Regular Weightbearing Exercise  A/P Doing well and denies depression  No recent falls and just out of IP rehab following an admission  Feels safe at home  Diverse diet  Doesn't drive, but uses seat belts when in the car  Has a living will and POA  Vaccines up to date  No DME or referrals needed today  RTC one year for medicare wellness

## 2020-03-13 NOTE — PROGRESS NOTES
Assessment/Plan:  Problem List Items Addressed This Visit        Respiratory    COPD (chronic obstructive pulmonary disease) (HCC) (Chronic)       Cardiovascular and Mediastinum    Chronic diastolic congestive heart failure ([facility] ) - Primary       Other    Requires continuous at home supplemental oxygen      Other Visit Diagnoses     Medicare annual wellness visit, subsequent        Gait abnormality        Adult failure to thrive        Physical deconditioning               Diagnoses and all orders for this visit:    Acute on chronic diastolic congestive heart failure ([facility] )    Medicare annual wellness visit, subsequent    Chronic obstructive pulmonary disease, unspecified COPD type ([facility] )    Gait abnormality    Adult failure to thrive    Physical deconditioning    Requires continuous at home supplemental oxygen        No problem-specific Assessment & Plan notes found for this encounter  A/P: Doing better, but overall slow decline continues with more frequent admissions  ??moving towards hospice  Wt now stable and will continue current treatment including O2  Continue daily wt and call if goes above 3#  RTC 5-06 weeks for routine  Recent labs ok  Subjective:      Patient ID: Sol Kc is a 80 y o  female  WM presents with her relative for f/u recent acute and rehab hospital admission  Pt with increase weakness and SOB  Admitted and found to have CHF, flare of COPD, influenza, and deconditioning  Treated conservatively and improved, but required an uneventful acute IP rehab stay  Recently d/c'd to home  Now on continuous O2  Home PT just stopped  Had her wt go up and diuretics temprorarily increase and improved  Since d/c, doing better  Activity level improving  Appetite is good  No fever or chills  NO CP, worsening SOB, edema, palpitations, orthopnea or PND  Tolerating her meds  Fatigue and weakness improving         The following portions of the patient's history were reviewed and updated as appropriate: She has a past medical history of Abnormal blood sugar (03/13/2017), Abnormal carotid duplex scan, Anxiety, Cervical radiculopathy (08/08/2017), CHF (congestive heart failure) ([place] [address] ), Compression fracture of lumbar spine, non-traumatic, with routine healing, subsequent encounter, COPD (chronic obstructive pulmonary disease) ([place] [address] ) (2/6/2018), Coronary angioplasty status, Coronary artery disease (4/7/2017), Costochondritis (02/05/2013), DJD (degenerative joint disease), Dyslipidemia, Gait abnormality, GERD without esophagitis (8/30/2012), H/O CT scan of chest, History of Holter monitoring, echocardiogram, echocardiogram (08/16/2017), Hypertension, Iron deficiency anemia (4/21/2016), Macular degeneration, right eye, Malignant melanoma of skin ([place] [address] ) (9/17/2012), Mixed hyperlipidemia, NSTEMI (non-ST elevated myocardial infarction) ([place] [address] ) (7/25/2017), Old MI (myocardial infarction), Osteoarthritis (9/17/2012), Osteoporosis (3/13/2017), 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[place] [address] ) (04/07/2017), and Urinary tract infection  ,  does not have any pertinent problems on file  ,   has a past surgical history that includes Breast surgery; Cardiac catheterization (03/24/2017); INSERTION STENT ARTERY (04/07/2017); Cholecystectomy; Hemorrhoid surgery; Coronary stent placement (03/25/2017); Skin biopsy; Tonsillectomy; Abdominal surgery; Colonoscopy (N/A, 7/25/2018); and Appendectomy  ,  family history includes Heart failure in her mother; Prostate cancer in her father; Stroke in her family  ,   reports that she has quit smoking  Her smoking use included cigarettes  She has never used smokeless tobacco  She reports that she drank alcohol  She reports that she does not use drugs  ,  is allergic to ciprofloxacin; keflex [cephalexin]; nitrofurantoin; and penicillins     Current Outpatient Medications   Medication Sig Dispense Refill    ALPRAZolam (XANAX) 0 5 mg tablet Take 0 5 tablets (0 25 mg total) by mouth 3 (three) times a day as needed for anxiety 15 tablet 0    aspirin 81 mg chewable tablet Chew 1 tablet (81 mg total) daily 30 tablet 0    atorvastatin (LIPITOR) 40 mg tablet Take 40 mg by mouth daily with dinner      Calcium Carbonate-Vit D-Min (CALCIUM 1200 PO) Take by mouth      docusate sodium (COLACE) 100 mg capsule Take 1 capsule (100 mg total) by mouth 2 (two) times a day 10 capsule 0    famotidine (PEPCID) 10 mg tablet Take 1 tablet (10 mg total) by mouth daily  0    furosemide (LASIX) 20 mg tablet Take 1 tablet (20 mg total) by mouth 2 (two) times a day 90 tablet 0    ipratropium (ATROVENT) 0 02 % nebulizer solution Take 1 vial (0 5 mg total) by nebulization 3 (three) times a day (Patient taking differently: Take 0 5 mg by nebulization 2 (two) times a day )  0    levalbuterol (XOPENEX) 1 25 mg/0 5 mL nebulizer solution Take 0 5 mL (1 25 mg total) by nebulization 3 (three) times a day (Patient taking differently: Take 1 25 mg by nebulization 2 (two) times a day )  0    lidocaine (LIDODERM) 5 % Apply 1 patch topically daily Remove & Discard patch within 12 hours or as directed by MD  0    metoprolol tartrate (LOPRESSOR) 25 mg tablet Take 1 tablet (25 mg total) by mouth every 12 (twelve) hours  0    multivitamin (THERAGRAN) TABS Take 1 tablet by mouth daily      nitroglycerin (NITROSTAT) 0 4 mg SL tablet Place 1 tablet (0 4 mg total) under the tongue every 5 (five) minutes as needed for chest pain  0    ondansetron (ZOFRAN) 4 mg tablet Take 1 tablet (4 mg total) by mouth every 12 (twelve) hours as needed for nausea or vomiting 60 tablet 1    polyethylene glycol (MIRALAX) 17 g packet Take 17 g by mouth daily 14 each 0    potassium chloride (K-DUR,KLOR-CON) 20 mEq tablet Take 1 tablet (20 mEq total) by mouth daily  0    senna-docusate sodium (SENOKOT S) 8 6-50 mg per tablet Take 1 tablet by mouth daily at bedtime (Patient not taking: Reported on 3/13/2020)  0     No current facility-administered medications for this visit  Review of Systems   Constitutional: Positive for fatigue  Negative for activity change, chills, diaphoresis and fever  Eyes: Negative for visual disturbance  Respiratory: Negative for cough, chest tightness, shortness of breath and wheezing  Cardiovascular: Negative for chest pain, palpitations and leg swelling  Gastrointestinal: Negative for abdominal pain, constipation, diarrhea, nausea and vomiting  Endocrine: Negative for cold intolerance and heat intolerance  Genitourinary: Negative for difficulty urinating, dysuria and frequency  Musculoskeletal: Negative for arthralgias, gait problem and myalgias  Neurological: Positive for weakness  Negative for dizziness, seizures, syncope, light-headedness and headaches  Psychiatric/Behavioral: Negative for confusion, dysphoric mood and sleep disturbance  The patient is not nervous/anxious  PHQ-9 Depression Screening    PHQ-9:    Frequency of the following problems over the past two weeks:             Objective:  Vitals:    03/13/20 1308   BP: 164/66   Pulse: 83   Temp: (!) 97 4 °F (36 3 °C)   SpO2: 94%   Weight: 67 2 kg (148 lb 4 oz)   Height: 5' 2" (1 575 m)     Body mass index is 27 12 kg/m²  Physical Exam   Constitutional: She is oriented to person, place, and time  She appears well-developed and well-nourished  No distress  HENT:   Head: Normocephalic and atraumatic  Mouth/Throat: Oropharynx is clear and moist    Eyes: Pupils are equal, round, and reactive to light  Conjunctivae and EOM are normal    Neck: Normal range of motion  Neck supple  No JVD present  No tracheal deviation present  No thyromegaly present  Cardiovascular: Normal rate, regular rhythm and normal heart sounds  Pulmonary/Chest: Effort normal and breath sounds normal  No respiratory distress  She has no wheezes  She has no rales  Coarse and decreased BS with O2 present  Abdominal: Soft  Bowel sounds are normal  She exhibits no distension   There is no tenderness  Musculoskeletal: She exhibits no edema  Using a walker  Lymphadenopathy:     She has no cervical adenopathy  Neurological: She is alert and oriented to person, place, and time  Psychiatric: She has a normal mood and affect  Her behavior is normal  Judgment and thought content normal    Nursing note and vitals reviewed

## 2020-03-13 NOTE — PATIENT INSTRUCTIONS
Medicare Preventive Visit Patient Instructions  Thank you for completing your Welcome to Medicare Visit or Medicare Annual Wellness Visit today  Your next wellness visit will be due in one year (3/13/2021)  The screening/preventive services that you may require over the next 5-10 years are detailed below  Some tests may not apply to you based off risk factors and/or age  Screening tests ordered at today's visit but not completed yet may show as past due  Also, please note that scanned in results may not display below  Preventive Screenings:  Service Recommendations Previous Testing/Comments   Colorectal Cancer Screening  * Colonoscopy    * Fecal Occult Blood Test (FOBT)/Fecal Immunochemical Test (FIT)  * Fecal DNA/Cologuard Test  * Flexible Sigmoidoscopy Age: 54-65 years old   Colonoscopy: every 10 years (may be performed more frequently if at higher risk)  OR  FOBT/FIT: every 1 year  OR  Cologuard: every 3 years  OR  Sigmoidoscopy: every 5 years  Screening may be recommended earlier than age 48 if at higher risk for colorectal cancer  Also, an individualized decision between you and your healthcare provider will decide whether screening between the ages of 74-80 would be appropriate  Colonoscopy: 07/25/2018  FOBT/FIT: Not on file  Cologuard: Not on file  Sigmoidoscopy: Not on file    Screening Not Indicated     Breast Cancer Screening Age: 36 years old  Frequency: every 1-2 years  Not required if history of left and right mastectomy Mammogram: 10/30/2012    Screening Not Indicated   Cervical Cancer Screening Between the ages of 21-29, pap smear recommended once every 3 years  Between the ages of 33-67, can perform pap smear with HPV co-testing every 5 years     Recommendations may differ for women with a history of total hysterectomy, cervical cancer, or abnormal pap smears in past  Pap Smear: Not on file    Screening Not Indicated   Hepatitis C Screening Once for adults born between [facility] frequently in patients at high risk for Hepatitis C Hep C Antibody: Not on file    Screening Not Indicated   Diabetes Screening 1-2 times per year if you're at risk for diabetes or have pre-diabetes Fasting glucose: 126 mg/dL   A1C: 6 2 %    Screening Current   Cholesterol Screening Once every 5 years if you don't have a lipid disorder  May order more often based on risk factors  Lipid panel: 02/09/2019    Screening Not Indicated  History Lipid Disorder     Other Preventive Screenings Covered by Medicare:  1  Abdominal Aortic Aneurysm (AAA) Screening: covered once if your at risk  You're considered to be at risk if you have a family history of AAA  2  Lung Cancer Screening: covers low dose CT scan once per year if you meet all of the following conditions: (1) Age 50-69; (2) No signs or symptoms of lung cancer; (3) Current smoker or have quit smoking within the last 15 years; (4) You have a tobacco smoking history of at least 30 pack years (packs per day multiplied by number of years you smoked); (5) You get a written order from a healthcare provider  3  Glaucoma Screening: covered annually if you're considered high risk: (1) You have diabetes OR (2) Family history of glaucoma OR (3)  aged 48 and older OR (3)  American aged 72 and older  3  Osteoporosis Screening: covered every 2 years if you meet one of the following conditions: (1) You're estrogen deficient and at risk for osteoporosis based off medical history and other findings; (2) Have a vertebral abnormality; (3) On glucocorticoid therapy for more than 3 months; (4) Have primary hyperparathyroidism; (5) On osteoporosis medications and need to assess response to drug therapy  · Last bone density test (DXA Scan): Not on file  5  HIV Screening: covered annually if you're between the age of 12-76  Also covered annually if you are younger than 13 and older than 72 with risk factors for HIV infection   For pregnant patients, it is covered up to 3 times per pregnancy  Immunizations:  Immunization Recommendations   Influenza Vaccine Annual influenza vaccination during flu season is recommended for all persons aged >= 6 months who do not have contraindications   Pneumococcal Vaccine (Prevnar and Pneumovax)  * Prevnar = PCV13  * Pneumovax = PPSV23   Adults 25-60 years old: 1-3 doses may be recommended based on certain risk factors  Adults 72 years old: Prevnar (PCV13) vaccine recommended followed by Pneumovax (PPSV23) vaccine  If already received PPSV23 since turning 65, then PCV13 recommended at least one year after PPSV23 dose  Hepatitis B Vaccine 3 dose series if at intermediate or high risk (ex: diabetes, end stage renal disease, liver disease)   Tetanus (Td) Vaccine - COST NOT COVERED BY MEDICARE PART B Following completion of primary series, a booster dose should be given every 10 years to maintain immunity against tetanus  Td may also be given as tetanus wound prophylaxis  Tdap Vaccine - COST NOT COVERED BY MEDICARE PART B Recommended at least once for all adults  For pregnant patients, recommended with each pregnancy  Shingles Vaccine (Shingrix) - COST NOT COVERED BY MEDICARE PART B  2 shot series recommended in those aged 48 and above     Health Maintenance Due:      Topic Date Due    CRC Screening: Colonoscopy  07/25/2023     Immunizations Due:      Topic Date Due    DTaP,Tdap,and Td Vaccines (1 - Tdap) 02/25/1936     Advance Directives   What are advance directives? Advance directives are legal documents that state your wishes and plans for medical care  These plans are made ahead of time in case you lose your ability to make decisions for yourself  Advance directives can apply to any medical decision, such as the treatments you want, and if you want to donate organs  What are the types of advance directives? There are many types of advance directives, and each state has rules about how to use them   You may choose a combination of any of the following:  · Living will: This is a written record of the treatment you want  You can also choose which treatments you do not want, which to limit, and which to stop at a certain time  This includes surgery, medicine, IV fluid, and tube feedings  · Durable power of  for healthcare [place] [facility]): This is a written record that states who you want to make healthcare choices for you when you are unable to make them for yourself  This person, called a proxy, is usually a family member or a friend  You may choose more than 1 proxy  · Do not resuscitate (DNR) order:  A DNR order is used in case your heart stops beating or you stop breathing  It is a request not to have certain forms of treatment, such as CPR  A DNR order may be included in other types of advance directives  · Medical directive: This covers the care that you want if you are in a coma, near death, or unable to make decisions for yourself  You can list the treatments you want for each condition  Treatment may include pain medicine, surgery, blood transfusions, dialysis, IV or tube feedings, and a ventilator (breathing machine)  · Values history: This document has questions about your views, beliefs, and how you feel and think about life  This information can help others choose the care that you would choose  Why are advance directives important? An advance directive helps you control your care  Although spoken wishes may be used, it is better to have your wishes written down  Spoken wishes can be misunderstood, or not followed  Treatments may be given even if you do not want them  An advance directive may make it easier for your family to make difficult choices about your care  Weight Management   Why it is important to manage your weight:  Being overweight increases your risk of health conditions such as heart disease, high blood pressure, type 2 diabetes, and certain types of cancer   It can also increase your risk for osteoarthritis, sleep apnea, and other respiratory problems  Aim for a slow, steady weight loss  Even a small amount of weight loss can lower your risk of health problems  How to lose weight safely:  A safe and healthy way to lose weight is to eat fewer calories and get regular exercise  You can lose up about 1 pound a week by decreasing the number of calories you eat by 500 calories each day  Healthy meal plan for weight management:  A healthy meal plan includes a variety of foods, contains fewer calories, and helps you stay healthy  A healthy meal plan includes the following:  · Eat whole-grain foods more often  A healthy meal plan should contain fiber  Fiber is the part of grains, fruits, and vegetables that is not broken down by your body  Whole-grain foods are healthy and provide extra fiber in your diet  Some examples of whole-grain foods are whole-wheat breads and pastas, oatmeal, brown rice, and bulgur  · Eat a variety of vegetables every day  Include dark, leafy greens such as spinach, kale, osito greens, and mustard greens  Eat yellow and orange vegetables such as carrots, sweet potatoes, and winter squash  · Eat a variety of fruits every day  Choose fresh or canned fruit (canned in its own juice or light syrup) instead of juice  Fruit juice has very little or no fiber  · Eat low-fat dairy foods  Drink fat-free (skim) milk or 1% milk  Eat fat-free yogurt and low-fat cottage cheese  Try low-fat cheeses such as mozzarella and other reduced-fat cheeses  · Choose meat and other protein foods that are low in fat  Choose beans or other legumes such as split peas or lentils  Choose fish, skinless poultry (chicken or turkey), or lean cuts of red meat (beef or pork)  Before you cook meat or poultry, cut off any visible fat  · Use less fat and oil  Try baking foods instead of frying them  Add less fat, such as margarine, sour cream, regular salad dressing and mayonnaise to foods   Eat fewer high-fat foods  Some examples of high-fat foods include french fries, doughnuts, ice cream, and cakes  · Eat fewer sweets  Limit foods and drinks that are high in sugar  This includes candy, cookies, regular soda, and sweetened drinks  Exercise:  Exercise at least 30 minutes per day on most days of the week  Some examples of exercise include walking, biking, dancing, and swimming  You can also fit in more physical activity by taking the stairs instead of the elevator or parking farther away from stores  Ask your healthcare provider about the best exercise plan for you  © Copyright [employer] 2018 Information is for End User's use only and may not be sold, redistributed or otherwise used for commercial purposes  All illustrations and images included in CareNotes® are the copyrighted property of [employer]  or [facility] Preventive Visit Patient Instructions  Thank you for completing your Welcome to Medicare Visit or Medicare Annual Wellness Visit today  Your next wellness visit will be due in one year (3/13/2021)  The screening/preventive services that you may require over the next 5-10 years are detailed below  Some tests may not apply to you based off risk factors and/or age  Screening tests ordered at today's visit but not completed yet may show as past due  Also, please note that scanned in results may not display below  Preventive Screenings:  Service Recommendations Previous Testing/Comments   Colorectal Cancer Screening  * Colonoscopy    * Fecal Occult Blood Test (FOBT)/Fecal Immunochemical Test (FIT)  * Fecal DNA/Cologuard Test  * Flexible Sigmoidoscopy Age: 54-65 years old   Colonoscopy: every 10 years (may be performed more frequently if at higher risk)  OR  FOBT/FIT: every 1 year  OR  Cologuard: every 3 years  OR  Sigmoidoscopy: every 5 years  Screening may be recommended earlier than age 48 if at higher risk for colorectal cancer   Also, an individualized decision between you and your healthcare provider will decide whether screening between the ages of 74-80 would be appropriate  Colonoscopy: 07/25/2018  FOBT/FIT: Not on file  Cologuard: Not on file  Sigmoidoscopy: Not on file    Screening Not Indicated     Breast Cancer Screening Age: 36 years old  Frequency: every 1-2 years  Not required if history of left and right mastectomy Mammogram: 10/30/2012    Screening Not Indicated   Cervical Cancer Screening Between the ages of 21-29, pap smear recommended once every 3 years  Between the ages of 33-67, can perform pap smear with HPV co-testing every 5 years  Recommendations may differ for women with a history of total hysterectomy, cervical cancer, or abnormal pap smears in past  Pap Smear: Not on file    Screening Not Indicated   Hepatitis C Screening Once for adults born between 1945 and 1965  More frequently in patients at high risk for Hepatitis C Hep C Antibody: Not on file    Screening Not Indicated   Diabetes Screening 1-2 times per year if you're at risk for diabetes or have pre-diabetes Fasting glucose: 126 mg/dL   A1C: 6 2 %    Screening Current   Cholesterol Screening Once every 5 years if you don't have a lipid disorder  May order more often based on risk factors  Lipid panel: 02/09/2019    Screening Not Indicated  History Lipid Disorder     Other Preventive Screenings Covered by Medicare:  6  Abdominal Aortic Aneurysm (AAA) Screening: covered once if your at risk  You're considered to be at risk if you have a family history of AAA  7  Lung Cancer Screening: covers low dose CT scan once per year if you meet all of the following conditions: (1) Age 50-69; (2) No signs or symptoms of lung cancer; (3) Current smoker or have quit smoking within the last 15 years; (4) You have a tobacco smoking history of at least 30 pack years (packs per day multiplied by number of years you smoked); (5) You get a written order from a healthcare provider    8  Glaucoma Screening: covered annually if you're considered high risk: (1) You have diabetes OR (2) Family history of glaucoma OR (3)  aged 48 and older OR (3)  American aged 72 and older  5  Osteoporosis Screening: covered every 2 years if you meet one of the following conditions: (1) You're estrogen deficient and at risk for osteoporosis based off medical history and other findings; (2) Have a vertebral abnormality; (3) On glucocorticoid therapy for more than 3 months; (4) Have primary hyperparathyroidism; (5) On osteoporosis medications and need to assess response to drug therapy  · Last bone density test (DXA Scan): Not on file  10  HIV Screening: covered annually if you're between the age of 12-76  Also covered annually if you are younger than 13 and older than 72 with risk factors for HIV infection  For pregnant patients, it is covered up to 3 times per pregnancy  Immunizations:  Immunization Recommendations   Influenza Vaccine Annual influenza vaccination during flu season is recommended for all persons aged >= 6 months who do not have contraindications   Pneumococcal Vaccine (Prevnar and Pneumovax)  * Prevnar = PCV13  * Pneumovax = PPSV23   Adults 25-60 years old: 1-3 doses may be recommended based on certain risk factors  Adults 72 years old: Prevnar (PCV13) vaccine recommended followed by Pneumovax (PPSV23) vaccine  If already received PPSV23 since turning 65, then PCV13 recommended at least one year after PPSV23 dose  Hepatitis B Vaccine 3 dose series if at intermediate or high risk (ex: diabetes, end stage renal disease, liver disease)   Tetanus (Td) Vaccine - COST NOT COVERED BY MEDICARE PART B Following completion of primary series, a booster dose should be given every 10 years to maintain immunity against tetanus  Td may also be given as tetanus wound prophylaxis  Tdap Vaccine - COST NOT COVERED BY MEDICARE PART B Recommended at least once for all adults   For pregnant patients, recommended with each pregnancy  Shingles Vaccine (Shingrix) - COST NOT COVERED BY MEDICARE PART B  2 shot series recommended in those aged 48 and above     Health Maintenance Due:      Topic Date Due    CRC Screening: Colonoscopy  07/25/2023     Immunizations Due:      Topic Date Due    DTaP,Tdap,and Td Vaccines (1 - Tdap) 02/25/1936     Advance Directives   What are advance directives? Advance directives are legal documents that state your wishes and plans for medical care  These plans are made ahead of time in case you lose your ability to make decisions for yourself  Advance directives can apply to any medical decision, such as the treatments you want, and if you want to donate organs  What are the types of advance directives? There are many types of advance directives, and each state has rules about how to use them  You may choose a combination of any of the following:  · Living will: This is a written record of the treatment you want  You can also choose which treatments you do not want, which to limit, and which to stop at a certain time  This includes surgery, medicine, IV fluid, and tube feedings  · Durable power of  for healthcare [place] [facility]): This is a written record that states who you want to make healthcare choices for you when you are unable to make them for yourself  This person, called a proxy, is usually a family member or a friend  You may choose more than 1 proxy  · Do not resuscitate (DNR) order:  A DNR order is used in case your heart stops beating or you stop breathing  It is a request not to have certain forms of treatment, such as CPR  A DNR order may be included in other types of advance directives  · Medical directive: This covers the care that you want if you are in a coma, near death, or unable to make decisions for yourself  You can list the treatments you want for each condition   Treatment may include pain medicine, surgery, blood transfusions, dialysis, IV or tube feedings, and a ventilator (breathing machine)  · Values history: This document has questions about your views, beliefs, and how you feel and think about life  This information can help others choose the care that you would choose  Why are advance directives important? An advance directive helps you control your care  Although spoken wishes may be used, it is better to have your wishes written down  Spoken wishes can be misunderstood, or not followed  Treatments may be given even if you do not want them  An advance directive may make it easier for your family to make difficult choices about your care  Weight Management   Why it is important to manage your weight:  Being overweight increases your risk of health conditions such as heart disease, high blood pressure, type 2 diabetes, and certain types of cancer  It can also increase your risk for osteoarthritis, sleep apnea, and other respiratory problems  Aim for a slow, steady weight loss  Even a small amount of weight loss can lower your risk of health problems  How to lose weight safely:  A safe and healthy way to lose weight is to eat fewer calories and get regular exercise  You can lose up about 1 pound a week by decreasing the number of calories you eat by 500 calories each day  Healthy meal plan for weight management:  A healthy meal plan includes a variety of foods, contains fewer calories, and helps you stay healthy  A healthy meal plan includes the following:  · Eat whole-grain foods more often  A healthy meal plan should contain fiber  Fiber is the part of grains, fruits, and vegetables that is not broken down by your body  Whole-grain foods are healthy and provide extra fiber in your diet  Some examples of whole-grain foods are whole-wheat breads and pastas, oatmeal, brown rice, and bulgur  · Eat a variety of vegetables every day  Include dark, leafy greens such as spinach, kale, osito greens, and mustard greens   Eat yellow and orange vegetables such as carrots, sweet potatoes, and winter squash  · Eat a variety of fruits every day  Choose fresh or canned fruit (canned in its own juice or light syrup) instead of juice  Fruit juice has very little or no fiber  · Eat low-fat dairy foods  Drink fat-free (skim) milk or 1% milk  Eat fat-free yogurt and low-fat cottage cheese  Try low-fat cheeses such as mozzarella and other reduced-fat cheeses  · Choose meat and other protein foods that are low in fat  Choose beans or other legumes such as split peas or lentils  Choose fish, skinless poultry (chicken or turkey), or lean cuts of red meat (beef or pork)  Before you cook meat or poultry, cut off any visible fat  · Use less fat and oil  Try baking foods instead of frying them  Add less fat, such as margarine, sour cream, regular salad dressing and mayonnaise to foods  Eat fewer high-fat foods  Some examples of high-fat foods include french fries, doughnuts, ice cream, and cakes  · Eat fewer sweets  Limit foods and drinks that are high in sugar  This includes candy, cookies, regular soda, and sweetened drinks  Exercise:  Exercise at least 30 minutes per day on most days of the week  Some examples of exercise include walking, biking, dancing, and swimming  You can also fit in more physical activity by taking the stairs instead of the elevator or parking farther away from stores  Ask your healthcare provider about the best exercise plan for you  © Copyright [employer] 2018 Information is for End User's use only and may not be sold, redistributed or otherwise used for commercial purposes   All illustrations and images included in CareNotes® are the copyrighted property of [employer] M [employer]  or [address] [place] [address] [employer]

## 2020-03-17 NOTE — TELEPHONE ENCOUNTER
P/c from [facility], will be d/c pt, granddaughter does not want to take chances with them coming in because of the virus, wanted you to be aware

## 2020-03-18 NOTE — TELEPHONE ENCOUNTER
----- Message from Tristen Abreu sent at 3/18/2020  9:40 AM EDT -----  Regarding: Prescription Question  Contact: [phone]  furosemide 20 mg tabletneed refill   Could not request through refill link  Metoprolol Tartrate 25 mg  Lasix 20 mg

## 2020-03-18 NOTE — TELEPHONE ENCOUNTER
----- Message from Sekou Abreu sent at 3/18/2020  9:42 AM EDT -----  Regarding: Prescription Question  Contact: [phone]  Nila asked for lasix twice need Xanax refill too

## 2020-03-20 NOTE — PROGRESS NOTES
Outpatient Care Management Note:  Chart review completed  No longer active with home health  Per home health nurse dated 3/18/2020 grand daughter cancelled services due to the fear of the coronavirus  Per last home health note:    From Visit:  03/12/2020 10:18 AM    will be going to [facility] March 24 2020  stays between 2 granddaughters changing about Q3 to 4 weeks     [address] [facility]  CHF  COPD    Perform HHA SUPV    PHYSICIAN NOTIFICATION   3 11 20   called pcp and cardiology re asymptomatic elevated bp readings  3 12 Sn updated BP reading to PCP and cardio  Much improved 122 60  Assess cp status   hx of CHF and COPD   on O2 continuous  EDUCATION NEEDED  pain management   Ashkan Cowing   O2 precautions   fluid restriction    signs of chf   chf management     3 3 20   took in heart failure booklet  Pt and GD stated did not have one  GD denied need as pt has HF  Feels comfortable with management of same  IF CHF PATIENT WHAT WAS THE LAST WEIGHT 146 2 lbs AND WHAT IS THE CURRENT WEIGHT__145 7 lbs     OTHER DISCIPLINES ORDERED  PT and OT discharged  HHA  x    UPCOMING APPOINTMENTS  PCP  Dr Grayson Leong 3 13 20    DISCHARGE PLAN To around better and get stronger    Will continue to track

## 2020-04-10 PROBLEM — S22.080A TRAUMATIC COMPRESSION FRACTURE OF T11 THORACIC VERTEBRA (HCC): Status: ACTIVE | Noted: 2020-01-01

## 2020-04-11 PROBLEM — N39.0 URINARY TRACT INFECTION: Status: ACTIVE | Noted: 2020-01-01

## 2020-04-11 PROBLEM — R82.71 BACTERIURIA, ASYMPTOMATIC: Status: ACTIVE | Noted: 2020-01-01

## 2020-04-12 PROBLEM — E43 SEVERE PROTEIN-CALORIE MALNUTRITION (HCC): Status: ACTIVE | Noted: 2020-01-01

## 2020-04-15 PROBLEM — S32.010A CLOSED COMPRESSION FRACTURE OF L1 VERTEBRA (HCC): Status: RESOLVED | Noted: 2019-01-01 | Resolved: 2020-01-01

## 2020-04-15 PROBLEM — K59.00 CONSTIPATION: Status: ACTIVE | Noted: 2020-01-01

## 2020-04-21 PROBLEM — R68.2 DRY MOUTH: Status: ACTIVE | Noted: 2020-01-01

## 2020-05-08 PROBLEM — N39.0 URINARY TRACT INFECTION: Status: RESOLVED | Noted: 2020-01-01 | Resolved: 2020-01-01

## 2020-05-20 PROBLEM — J18.9 PNEUMONIA: Status: RESOLVED | Noted: 2019-01-01 | Resolved: 2020-01-01

## 2020-05-20 PROBLEM — E43 SEVERE PROTEIN-CALORIE MALNUTRITION (HCC): Status: RESOLVED | Noted: 2020-01-01 | Resolved: 2020-01-01

## 2020-05-20 PROBLEM — R73.9 HYPERGLYCEMIA: Status: RESOLVED | Noted: 2020-01-01 | Resolved: 2020-01-01

## 2020-05-20 PROBLEM — S22.41XA CLOSED FRACTURE OF MULTIPLE RIBS OF RIGHT SIDE: Status: RESOLVED | Noted: 2020-01-01 | Resolved: 2020-01-01

## 2020-05-20 PROBLEM — E87.1 HYPONATREMIA: Status: RESOLVED | Noted: 2020-01-01 | Resolved: 2020-01-01

## 2020-05-20 PROBLEM — E87.2 COMPENSATED RESPIRATORY ACIDOSIS: Status: RESOLVED | Noted: 2020-01-01 | Resolved: 2020-01-01

## 2020-05-31 PROBLEM — R13.19 OTHER DYSPHAGIA: Status: ACTIVE | Noted: 2019-01-01

## 2020-05-31 PROBLEM — M94.0 COSTOCHONDRITIS: Status: ACTIVE | Noted: 2020-01-01

## 2020-05-31 PROBLEM — J44.0 CHRONIC OBSTRUCTIVE PULMONARY DISEASE WITH ACUTE LOWER RESPIRATORY INFECTION (HCC): Status: ACTIVE | Noted: 2018-02-06

## 2020-05-31 PROBLEM — J41.0 SIMPLE CHRONIC BRONCHITIS (HCC): Status: ACTIVE | Noted: 2018-02-06

## 2020-05-31 PROBLEM — R06.89 HYPERCARBIA: Status: ACTIVE | Noted: 2020-01-01

## 2020-06-01 PROBLEM — J96.11 CHRONIC RESPIRATORY FAILURE WITH HYPOXIA AND HYPERCAPNIA (HCC): Status: ACTIVE | Noted: 2020-01-01

## 2020-06-01 PROBLEM — J96.12 CHRONIC RESPIRATORY FAILURE WITH HYPOXIA AND HYPERCAPNIA (HCC): Status: ACTIVE | Noted: 2020-01-01

## 2020-06-03 PROBLEM — E44.0 MODERATE PROTEIN-CALORIE MALNUTRITION (HCC): Status: ACTIVE | Noted: 2020-01-01

## 2020-06-03 PROBLEM — J44.1 CHRONIC OBSTRUCTIVE PULMONARY DISEASE WITH ACUTE EXACERBATION (HCC): Status: ACTIVE | Noted: 2018-02-06

## 2020-06-04 PROBLEM — J18.9 PNEUMONIA OF BOTH LOWER LOBES DUE TO INFECTIOUS ORGANISM: Status: RESOLVED | Noted: 2019-01-01 | Resolved: 2020-01-01

## 2020-06-09 PROBLEM — J96.21 ACUTE ON CHRONIC RESPIRATORY FAILURE WITH HYPOXIA AND HYPERCAPNIA (HCC): Status: ACTIVE | Noted: 2020-01-01

## 2020-06-09 PROBLEM — J96.22 ACUTE ON CHRONIC RESPIRATORY FAILURE WITH HYPOXIA AND HYPERCAPNIA (HCC): Status: ACTIVE | Noted: 2020-01-01

## 2020-06-09 PROBLEM — J44.1 CHRONIC OBSTRUCTIVE PULMONARY DISEASE WITH ACUTE EXACERBATION (HCC): Chronic | Status: ACTIVE | Noted: 2018-02-06

## 2020-06-09 PROBLEM — R77.8 ELEVATED TROPONIN: Status: ACTIVE | Noted: 2020-01-01

## 2020-06-09 PROBLEM — J43.1 PANLOBULAR EMPHYSEMA (HCC): Chronic | Status: ACTIVE | Noted: 2018-02-06

## 2020-06-14 LAB
BACTERIA BLD CULT: NORMAL
BACTERIA BLD CULT: NORMAL

## 2022-09-30 NOTE — ADDENDUM NOTE
Addended by: Maci Dos Santos on: 10/3/2018 11:11 AM     Modules accepted: Orders Quality 226: Preventive Care And Screening: Tobacco Use: Screening And Cessation Intervention: Patient screened for tobacco use and is an ex/non-smoker Detail Level: Detailed Quality 130: Documentation Of Current Medications In The Medical Record: Current Medications Documented Quality 431: Preventive Care And Screening: Unhealthy Alcohol Use - Screening: Patient not identified as an unhealthy alcohol user when screened for unhealthy alcohol use using a systematic screening method Quality 110: Preventive Care And Screening: Influenza Immunization: Influenza Immunization previously received during influenza season

## 2023-09-24 NOTE — ASSESSMENT & PLAN NOTE
· There is a right 10th rib fracture noted on CT imaging with possible 7-9 rib fractures versus motion artifact  · Trauma consult completed; recommendations appreciated; since signed off  · Will continue pain regimen as outlined:  · Rib fracture protocol  · Encourage deep breathing, airway clearance  · Possibly will require rehab Stable

## 2024-07-01 NOTE — DISCHARGE INSTR - AVS FIRST PAGE
Chest xray in 1-2 weeks to follow up small bilateral pleural effusions Continued Stay Note   [place]     Patient Name: Arcelia Walter  MRN: [id number]  Today's Date: 7/1/2024    Admit Date: 6/28/2024    Plan: TBD   Discharge Plan       Row Name 07/01/24 1319       Plan    Plan Comments MSW made aware fo consult that APS report needed as pt is confused and has no family or friends or any contacts listed or known for her. MSW made APS report. Web Id report number is [id number].                                                     Discharge Codes    No documentation.                 Expected Discharge Date and Time       Expected Discharge Date Expected Discharge Time    Jul 3, 2024               SHANTI Ramsey

## 2024-08-18 NOTE — PLAN OF CARE
Patient received her last rabies vaccination.    Problem: Potential for Falls  Goal: Patient will remain free of falls  Description  INTERVENTIONS:  - Assess patient frequently for physical needs  -  Identify cognitive and physical deficits and behaviors that affect risk of falls    -  [place] fall precautions as indicated by assessment   - Educate patient/family on patient safety including physical limitations  - Instruct patient to call for assistance with activity based on assessment  - Modify environment to reduce risk of injury  - Consider OT/PT consult to assist with strengthening/mobility  Outcome: Progressing     Problem: PAIN - ADULT  Goal: Verbalizes/displays adequate comfort level or baseline comfort level  Description  Interventions:  - Encourage patient to monitor pain and request assistance  - Assess pain using appropriate pain scale  - Administer analgesics based on type and severity of pain and evaluate response  - Implement non-pharmacological measures as appropriate and evaluate response  - Consider cultural and social influences on pain and pain management  - Notify physician/advanced practitioner if interventions unsuccessful or patient reports new pain  Outcome: Progressing     Problem: INFECTION - ADULT  Goal: Absence or prevention of progression during hospitalization  Description  INTERVENTIONS:  - Assess and monitor for signs and symptoms of infection  - Monitor lab/diagnostic results  - Monitor all insertion sites, i e  indwelling lines, tubes, and drains  - Monitor endotracheal if appropriate and nasal secretions for changes in amount and color  - [place] appropriate cooling/warming therapies per order  - Administer medications as ordered  - Instruct and encourage patient and family to use good hand hygiene technique  - Identify and instruct in appropriate isolation precautions for identified infection/condition  Outcome: Progressing  Goal: Absence of fever/infection during neutropenic period  Description  INTERVENTIONS:  - Monitor WBC    Outcome: Progressing     Problem: SAFETY ADULT  Goal: Maintain or return to baseline ADL function  Description  INTERVENTIONS:  -  Assess patient's ability to carry out ADLs; assess patient's baseline for ADL function and identify physical deficits which impact ability to perform ADLs (bathing, care of mouth/teeth, toileting, grooming, dressing, etc )  - Assess/evaluate cause of self-care deficits   - Assess range of motion  - Assess patient's mobility; develop plan if impaired  - Assess patient's need for assistive devices and provide as appropriate  - Encourage maximum independence but intervene and supervise when necessary  - Involve family in performance of ADLs  - Assess for home care needs following discharge   - Consider OT consult to assist with ADL evaluation and planning for discharge  - Provide patient education as appropriate  Outcome: Progressing  Goal: Maintain or return mobility status to optimal level  Description  INTERVENTIONS:  - Assess patient's baseline mobility status (ambulation, transfers, stairs, etc )    - Identify cognitive and physical deficits and behaviors that affect mobility  - Identify mobility aids required to assist with transfers and/or ambulation (gait belt, sit-to-stand, lift, walker, cane, etc )  - [place] fall precautions as indicated by assessment  - Record patient progress and toleration of activity level on Mobility SBAR; progress patient to next Phase/Stage  - Instruct patient to call for assistance with activity based on assessment  - Consider rehabilitation consult to assist with strengthening/weightbearing, etc   Outcome: Progressing     Problem: DISCHARGE PLANNING  Goal: Discharge to home or other facility with appropriate resources  Description  INTERVENTIONS:  - Identify barriers to discharge w/patient and caregiver  - Arrange for needed discharge resources and transportation as appropriate  - Identify discharge learning needs (meds, wound care, etc )  - Arrange for interpretive services to assist at discharge as needed  - Refer to Case Management Department for coordinating discharge planning if the patient needs post-hospital services based on physician/advanced practitioner order or complex needs related to functional status, cognitive ability, or social support system  Outcome: Progressing     Problem: Knowledge Deficit  Goal: Patient/family/caregiver demonstrates understanding of disease process, treatment plan, medications, and discharge instructions  Description  Complete learning assessment and assess knowledge base    Interventions:  - Provide teaching at level of understanding  - Provide teaching via preferred learning methods  Outcome: Progressing

## (undated) DEVICE — KIT ENDOSCOPY BASIC

## (undated) DEVICE — THE BIOSHIELD BIOPSY VALVE - STERILE IS USED TO COVER THE OPENING TO THE BIOPSY/SUCTION CHANNEL INLET OF A GASTROINTESTINAL ENDOSCOPE. IT PROVIDES ACCESS FOR ENDOSCOPIC DEVICE PASSAGE AND EXCHANGE, HELPS MAINTAIN INSUFFLATION AND MINIMIZES LEAKAGE OF BIOMATERIAL FROM THE BIOPSY PORT THROUGHOUT THE GASTROINTESTINAL ENDOSCOPIC PROCEDURE.: Brand: BIOSHIELD

## (undated) DEVICE — THE TORRENT IRRIGATION SCOPE CONNECTOR IS USED WITH THE TORRENT IRRIGATION TUBING TO PROVIDE IRRIGATION FLUIDS SUCH AS STERILE WATER DURING GASTROINTESTINAL ENDOSCOPIC PROCEDURES WHEN USED IN CONJUNCTION WITH AN IRRIGATION PUMP (OR ELECTROSURGICAL UNIT).: Brand: TORRENT

## (undated) DEVICE — THE SINGLE USE BIOGUARD AIR WATER VALVE, OLYMPUS IS USED TO CONTROL THE AIR WATER FUNCTION OF AN ENDOSCOPE DURING GI ENDOSCOPIC PROCEDURES.: Brand: BIOGUARD

## (undated) DEVICE — GROUNDING PAD UNIVERSAL SLW

## (undated) DEVICE — THE TORRENT IRRIGATION TUBING IS INTENDED TO PROVIDE IRRIGATION VIA IRRIGATION FLUIDS, SUCH AS STERILE WATER, DURING GASTROINTESTINAL ENDOSCOPIC PROCEDURES WHEN USED IN CONJUNCTION WITH AN IRRIGATION PUMP OR ELECTROSURGICAL UNIT.: Brand: TORRENT

## (undated) DEVICE — ASTOUND IMPERVIOUS SURGICAL GOWN: Brand: CONVERTORS

## (undated) DEVICE — TRANSPOSAL ULTRAFLEX DUO/QUAD ULTRA CART MANIFOLD